# Patient Record
Sex: MALE | Race: BLACK OR AFRICAN AMERICAN | NOT HISPANIC OR LATINO | Employment: OTHER | URBAN - METROPOLITAN AREA
[De-identification: names, ages, dates, MRNs, and addresses within clinical notes are randomized per-mention and may not be internally consistent; named-entity substitution may affect disease eponyms.]

---

## 2017-05-01 ENCOUNTER — HISTORICAL (OUTPATIENT)
Dept: LAB | Facility: HOSPITAL | Age: 62
End: 2017-05-01

## 2017-05-01 LAB
AMPHET UR QL SCN: NORMAL
BARBITURATE SCN PRESENT UR: NORMAL
BENZODIAZ UR QL SCN: NORMAL
CANNABINOIDS UR QL SCN: NORMAL
COCAINE UR QL SCN: NORMAL
OPIATES UR QL SCN: NORMAL
PCP UR QL: NORMAL
PH UR STRIP.AUTO: 7 [PH] (ref 5–7.5)
SP GR FLD REFRACTOMETRY: 1.01 (ref 1–1.03)

## 2017-05-03 ENCOUNTER — HISTORICAL (OUTPATIENT)
Dept: CARDIOLOGY | Facility: HOSPITAL | Age: 62
End: 2017-05-03

## 2017-06-21 ENCOUNTER — HISTORICAL (OUTPATIENT)
Dept: ADMINISTRATIVE | Facility: HOSPITAL | Age: 62
End: 2017-06-21

## 2017-06-21 LAB
ABS NEUT (OLG): 5.2 X10(3)/MCL (ref 2.1–9.2)
BASOPHILS # BLD AUTO: 0 X10(3)/MCL (ref 0–0.2)
BASOPHILS NFR BLD AUTO: 1 %
BUN SERPL-MCNC: 67 MG/DL (ref 7–18)
CALCIUM SERPL-MCNC: 7.8 MG/DL (ref 8.5–10.1)
CHLORIDE SERPL-SCNC: 108 MMOL/L (ref 98–107)
CO2 SERPL-SCNC: 21 MMOL/L (ref 21–32)
CREAT SERPL-MCNC: 5.83 MG/DL (ref 0.7–1.3)
EOSINOPHIL # BLD AUTO: 0.1 X10(3)/MCL (ref 0–0.9)
EOSINOPHIL NFR BLD AUTO: 2 %
ERYTHROCYTE [DISTWIDTH] IN BLOOD BY AUTOMATED COUNT: 12.7 % (ref 11.5–17)
GLUCOSE SERPL-MCNC: 94 MG/DL (ref 74–106)
HCT VFR BLD AUTO: 26.2 % (ref 42–52)
HGB BLD-MCNC: 8.5 GM/DL (ref 14–18)
LYMPHOCYTES # BLD AUTO: 0.7 X10(3)/MCL (ref 0.6–4.6)
LYMPHOCYTES NFR BLD AUTO: 11 %
MCH RBC QN AUTO: 31.4 PG (ref 27–31)
MCHC RBC AUTO-ENTMCNC: 32.4 GM/DL (ref 33–36)
MCV RBC AUTO: 96.7 FL (ref 80–94)
MONOCYTES # BLD AUTO: 0.4 X10(3)/MCL (ref 0.1–1.3)
MONOCYTES NFR BLD AUTO: 7 %
NEUTROPHILS # BLD AUTO: 5.2 X10(3)/MCL (ref 1.4–7.9)
NEUTROPHILS NFR BLD AUTO: 79 %
PLATELET # BLD AUTO: 230 X10(3)/MCL (ref 130–400)
PMV BLD AUTO: 9.4 FL (ref 9.4–12.4)
POTASSIUM SERPL-SCNC: 4.4 MMOL/L (ref 3.5–5.1)
RBC # BLD AUTO: 2.71 X10(6)/MCL (ref 4.7–6.1)
SODIUM SERPL-SCNC: 139 MMOL/L (ref 136–145)
WBC # SPEC AUTO: 6.6 X10(3)/MCL (ref 4.5–11.5)

## 2017-07-06 ENCOUNTER — HISTORICAL (OUTPATIENT)
Dept: INFUSION THERAPY | Facility: HOSPITAL | Age: 62
End: 2017-07-06

## 2017-07-06 LAB
HCT VFR BLD AUTO: 26.2 % (ref 42–52)
HGB BLD-MCNC: 8.3 GM/DL (ref 14–18)

## 2017-07-20 ENCOUNTER — HISTORICAL (OUTPATIENT)
Dept: INFUSION THERAPY | Facility: HOSPITAL | Age: 62
End: 2017-07-20

## 2017-08-03 ENCOUNTER — HISTORICAL (OUTPATIENT)
Dept: INFUSION THERAPY | Facility: HOSPITAL | Age: 62
End: 2017-08-03

## 2017-08-03 LAB
HCT VFR BLD AUTO: 30.7 % (ref 42–52)
HGB BLD-MCNC: 9.4 GM/DL (ref 14–18)

## 2017-08-17 ENCOUNTER — HISTORICAL (OUTPATIENT)
Dept: INFUSION THERAPY | Facility: HOSPITAL | Age: 62
End: 2017-08-17

## 2017-08-17 LAB
HCT VFR BLD AUTO: 34.2 % (ref 42–52)
HGB BLD-MCNC: 10.8 GM/DL (ref 14–18)

## 2017-08-31 ENCOUNTER — HISTORICAL (OUTPATIENT)
Dept: INFUSION THERAPY | Facility: HOSPITAL | Age: 62
End: 2017-08-31

## 2017-08-31 LAB
ABS NEUT (OLG): 4.37 X10(3)/MCL (ref 2.1–9.2)
BASOPHILS # BLD AUTO: 0 X10(3)/MCL (ref 0–0.2)
BASOPHILS NFR BLD AUTO: 1 %
EOSINOPHIL # BLD AUTO: 0.2 X10(3)/MCL (ref 0–0.9)
EOSINOPHIL NFR BLD AUTO: 3 %
ERYTHROCYTE [DISTWIDTH] IN BLOOD BY AUTOMATED COUNT: 13 % (ref 11.5–17)
HCT VFR BLD AUTO: 30.6 % (ref 42–52)
HGB BLD-MCNC: 10 GM/DL (ref 14–18)
LYMPHOCYTES # BLD AUTO: 1 X10(3)/MCL (ref 0.6–4.6)
LYMPHOCYTES NFR BLD AUTO: 17 %
MCH RBC QN AUTO: 31.1 PG (ref 27–31)
MCHC RBC AUTO-ENTMCNC: 32.7 GM/DL (ref 33–36)
MCV RBC AUTO: 95 FL (ref 80–94)
MONOCYTES # BLD AUTO: 0.4 X10(3)/MCL (ref 0.1–1.3)
MONOCYTES NFR BLD AUTO: 7 %
NEUTROPHILS # BLD AUTO: 4.37 X10(3)/MCL (ref 1.4–7.9)
NEUTROPHILS NFR BLD AUTO: 72 %
PLATELET # BLD AUTO: 181 X10(3)/MCL (ref 130–400)
PMV BLD AUTO: 9.2 FL (ref 9.4–12.4)
RBC # BLD AUTO: 3.22 X10(6)/MCL (ref 4.7–6.1)
WBC # SPEC AUTO: 6.1 X10(3)/MCL (ref 4.5–11.5)

## 2017-09-14 ENCOUNTER — HISTORICAL (OUTPATIENT)
Dept: INFUSION THERAPY | Facility: HOSPITAL | Age: 62
End: 2017-09-14

## 2017-09-14 LAB
FERRITIN SERPL-MCNC: 227 NG/ML (ref 8–388)
HCT VFR BLD AUTO: 35.6 % (ref 42–52)
HGB BLD-MCNC: 10.8 GM/DL (ref 14–18)
IRON SATN MFR SERPL: 11.2 % (ref 20–50)
IRON SERPL-MCNC: 28 MCG/DL (ref 50–175)
TIBC SERPL-MCNC: 251 MCG/DL (ref 250–450)
TRANSFERRIN SERPL-MCNC: 180 MG/DL (ref 200–360)

## 2017-09-28 ENCOUNTER — HISTORICAL (OUTPATIENT)
Dept: ADMINISTRATIVE | Facility: HOSPITAL | Age: 62
End: 2017-09-28

## 2017-09-28 LAB
FERRITIN SERPL-MCNC: 298.3 NG/ML (ref 8–388)
HCT VFR BLD AUTO: 31.6 % (ref 42–52)
HGB BLD-MCNC: 10.5 GM/DL (ref 14–18)
IRON SATN MFR SERPL: 14.4 % (ref 20–50)
IRON SERPL-MCNC: 31 MCG/DL (ref 50–175)
TIBC SERPL-MCNC: 215 MCG/DL (ref 250–450)
TRANSFERRIN SERPL-MCNC: 163 MG/DL (ref 200–360)

## 2017-10-12 ENCOUNTER — HISTORICAL (OUTPATIENT)
Dept: INFUSION THERAPY | Facility: HOSPITAL | Age: 62
End: 2017-10-12

## 2017-10-12 ENCOUNTER — HISTORICAL (OUTPATIENT)
Dept: ADMINISTRATIVE | Facility: HOSPITAL | Age: 62
End: 2017-10-12

## 2017-10-12 LAB
HCT VFR BLD AUTO: 28.9 % (ref 42–52)
HGB BLD-MCNC: 9.5 GM/DL (ref 14–18)

## 2017-10-26 ENCOUNTER — HISTORICAL (OUTPATIENT)
Dept: INFUSION THERAPY | Facility: HOSPITAL | Age: 62
End: 2017-10-26

## 2017-10-26 LAB
HCT VFR BLD AUTO: 31 % (ref 42–52)
HGB BLD-MCNC: 10.1 GM/DL (ref 14–18)

## 2017-11-09 ENCOUNTER — HISTORICAL (OUTPATIENT)
Dept: INFUSION THERAPY | Facility: HOSPITAL | Age: 62
End: 2017-11-09

## 2017-11-09 ENCOUNTER — HISTORICAL (OUTPATIENT)
Dept: ADMINISTRATIVE | Facility: HOSPITAL | Age: 62
End: 2017-11-09

## 2017-11-09 LAB
HCT VFR BLD AUTO: 30.7 % (ref 42–52)
HGB BLD-MCNC: 9.7 GM/DL (ref 14–18)

## 2017-12-15 ENCOUNTER — HISTORICAL (OUTPATIENT)
Dept: INFUSION THERAPY | Facility: HOSPITAL | Age: 62
End: 2017-12-15

## 2017-12-15 ENCOUNTER — HISTORICAL (OUTPATIENT)
Dept: ADMINISTRATIVE | Facility: HOSPITAL | Age: 62
End: 2017-12-15

## 2017-12-15 LAB
HCT VFR BLD AUTO: 29.9 % (ref 42–52)
HGB BLD-MCNC: 9.5 GM/DL (ref 14–18)

## 2017-12-28 ENCOUNTER — HISTORICAL (OUTPATIENT)
Dept: ADMINISTRATIVE | Facility: HOSPITAL | Age: 62
End: 2017-12-28

## 2017-12-28 ENCOUNTER — HISTORICAL (OUTPATIENT)
Dept: INFUSION THERAPY | Facility: HOSPITAL | Age: 62
End: 2017-12-28

## 2017-12-28 LAB
FERRITIN SERPL-MCNC: 287.7 NG/ML (ref 8–388)
HCT VFR BLD AUTO: 29.5 % (ref 42–52)
HGB BLD-MCNC: 9.4 GM/DL (ref 14–18)
IRON SATN MFR SERPL: 24.5 % (ref 20–50)
IRON SERPL-MCNC: 53 MCG/DL (ref 50–175)
TIBC SERPL-MCNC: 216 MCG/DL (ref 250–450)
TRANSFERRIN SERPL-MCNC: 185 MG/DL (ref 200–360)

## 2018-01-08 ENCOUNTER — HISTORICAL (OUTPATIENT)
Dept: LAB | Facility: HOSPITAL | Age: 63
End: 2018-01-08

## 2018-01-08 LAB
ABS NEUT (OLG): 3.62 X10(3)/MCL (ref 2.1–9.2)
ALBUMIN SERPL-MCNC: 3.4 GM/DL (ref 3.4–5)
ALBUMIN/GLOB SERPL: 0.7 {RATIO}
ALP SERPL-CCNC: 71 UNIT/L (ref 50–136)
ALT SERPL-CCNC: 21 UNIT/L (ref 12–78)
AMPHET UR QL SCN: NORMAL
APPEARANCE, UA: ABNORMAL
AST SERPL-CCNC: 7 UNIT/L (ref 15–37)
BACTERIA SPEC CULT: ABNORMAL /HPF
BARBITURATE SCN PRESENT UR: NORMAL
BASOPHILS # BLD AUTO: 0 X10(3)/MCL (ref 0–0.2)
BASOPHILS NFR BLD AUTO: 1 %
BENZODIAZ UR QL SCN: NORMAL
BILIRUB SERPL-MCNC: 0.7 MG/DL (ref 0.2–1)
BILIRUB UR QL STRIP: NEGATIVE
BILIRUBIN DIRECT+TOT PNL SERPL-MCNC: 0.2 MG/DL (ref 0–0.2)
BILIRUBIN DIRECT+TOT PNL SERPL-MCNC: 0.5 MG/DL (ref 0–0.8)
BUN SERPL-MCNC: 76 MG/DL (ref 7–18)
CALCIUM SERPL-MCNC: 8.4 MG/DL (ref 8.5–10.1)
CANNABINOIDS UR QL SCN: NORMAL
CHLORIDE SERPL-SCNC: 106 MMOL/L (ref 98–107)
CHOLEST SERPL-MCNC: 112 MG/DL (ref 0–200)
CHOLEST/HDLC SERPL: 2.1 {RATIO} (ref 0–5)
CO2 SERPL-SCNC: 21 MMOL/L (ref 21–32)
COCAINE UR QL SCN: NORMAL
COLOR UR: YELLOW
CREAT SERPL-MCNC: 6.72 MG/DL (ref 0.7–1.3)
DEPRECATED CALCIDIOL+CALCIFEROL SERPL-MC: 46.03 NG/ML (ref 30–80)
EOSINOPHIL # BLD AUTO: 0.2 X10(3)/MCL (ref 0–0.9)
EOSINOPHIL NFR BLD AUTO: 5 %
ERYTHROCYTE [DISTWIDTH] IN BLOOD BY AUTOMATED COUNT: 14.4 % (ref 11.5–17)
EST. AVERAGE GLUCOSE BLD GHB EST-MCNC: 88 MG/DL
GLOBULIN SER-MCNC: 4.8 GM/DL (ref 2.4–3.5)
GLUCOSE (UA): NEGATIVE
GLUCOSE SERPL-MCNC: 103 MG/DL (ref 74–106)
HAV IGM SERPL QL IA: NEGATIVE
HBA1C MFR BLD: 4.7 % (ref 4.2–6.3)
HBV CORE IGM SERPL QL IA: NEGATIVE
HBV SURFACE AG SERPL QL IA: NEGATIVE
HCT VFR BLD AUTO: 28.8 % (ref 42–52)
HCV AB SERPL QL IA: NEGATIVE
HDLC SERPL-MCNC: 54 MG/DL (ref 35–60)
HEPATITIS PANEL INTERP: NORMAL
HGB BLD-MCNC: 9.4 GM/DL (ref 14–18)
HGB UR QL STRIP: ABNORMAL
KETONES UR QL STRIP: NEGATIVE
LDLC SERPL CALC-MCNC: 45 MG/DL (ref 0–129)
LEUKOCYTE ESTERASE UR QL STRIP: ABNORMAL
LYMPHOCYTES # BLD AUTO: 0.9 X10(3)/MCL (ref 0.6–4.6)
LYMPHOCYTES NFR BLD AUTO: 18 %
MCH RBC QN AUTO: 31.4 PG (ref 27–31)
MCHC RBC AUTO-ENTMCNC: 32.6 GM/DL (ref 33–36)
MCV RBC AUTO: 96.3 FL (ref 80–94)
MONOCYTES # BLD AUTO: 0.4 X10(3)/MCL (ref 0.1–1.3)
MONOCYTES NFR BLD AUTO: 8 %
NEUTROPHILS # BLD AUTO: 3.62 X10(3)/MCL (ref 2.1–9.2)
NEUTROPHILS NFR BLD AUTO: 69 %
NITRITE UR QL STRIP: NEGATIVE
OPIATES UR QL SCN: NORMAL
PCP UR QL: NORMAL
PH UR STRIP.AUTO: 7 [PH] (ref 5–7.5)
PH UR STRIP: 7 [PH] (ref 5–9)
PLATELET # BLD AUTO: 200 X10(3)/MCL (ref 130–400)
PMV BLD AUTO: 9 FL (ref 9.4–12.4)
POTASSIUM SERPL-SCNC: 5.1 MMOL/L (ref 3.5–5.1)
PROT SERPL-MCNC: 8.2 GM/DL (ref 6.4–8.2)
PROT UR QL STRIP: NEGATIVE
PSA SERPL-MCNC: 10.3 NG/ML (ref 0–4)
RBC # BLD AUTO: 2.99 X10(6)/MCL (ref 4.7–6.1)
RBC #/AREA URNS HPF: 6 /HPF (ref 0–2)
SODIUM SERPL-SCNC: 137 MMOL/L (ref 136–145)
SP GR FLD REFRACTOMETRY: 1.01 (ref 1–1.03)
SP GR UR STRIP: 1.01 (ref 1–1.03)
SQUAMOUS EPITHELIAL, UA: ABNORMAL
TRIGL SERPL-MCNC: 67 MG/DL (ref 30–150)
TSH SERPL-ACNC: 0.67 MIU/ML (ref 0.36–3.74)
UROBILINOGEN UR STRIP-ACNC: 0.2
VLDLC SERPL CALC-MCNC: 13 MG/DL
WBC # SPEC AUTO: 5.2 X10(3)/MCL (ref 4.5–11.5)
WBC #/AREA URNS HPF: 74 /HPF (ref 0–3)

## 2018-01-10 LAB — FINAL CULTURE: NO GROWTH

## 2018-01-25 ENCOUNTER — HISTORICAL (OUTPATIENT)
Dept: INFUSION THERAPY | Facility: HOSPITAL | Age: 63
End: 2018-01-25

## 2018-01-25 ENCOUNTER — HISTORICAL (OUTPATIENT)
Dept: ADMINISTRATIVE | Facility: HOSPITAL | Age: 63
End: 2018-01-25

## 2018-01-25 LAB
HCT VFR BLD AUTO: 26.6 % (ref 42–52)
HGB BLD-MCNC: 9.1 GM/DL (ref 14–18)

## 2018-02-08 ENCOUNTER — HISTORICAL (OUTPATIENT)
Dept: INFUSION THERAPY | Facility: HOSPITAL | Age: 63
End: 2018-02-08

## 2018-02-08 LAB
HCT VFR BLD AUTO: 27.7 % (ref 42–52)
HGB BLD-MCNC: 9.1 GM/DL (ref 14–18)

## 2018-02-22 ENCOUNTER — HISTORICAL (OUTPATIENT)
Dept: INFUSION THERAPY | Facility: HOSPITAL | Age: 63
End: 2018-02-22

## 2018-02-22 LAB
HCT VFR BLD AUTO: 28.2 % (ref 42–52)
HGB BLD-MCNC: 9.5 GM/DL (ref 14–18)

## 2018-03-08 ENCOUNTER — HISTORICAL (OUTPATIENT)
Dept: ADMINISTRATIVE | Facility: HOSPITAL | Age: 63
End: 2018-03-08

## 2018-03-08 ENCOUNTER — HISTORICAL (OUTPATIENT)
Dept: INFUSION THERAPY | Facility: HOSPITAL | Age: 63
End: 2018-03-08

## 2018-03-08 LAB
FERRITIN SERPL-MCNC: 253.9 NG/ML (ref 8–388)
HCT VFR BLD AUTO: 31 % (ref 42–52)
HGB BLD-MCNC: 10.3 GM/DL (ref 14–18)
IRON SATN MFR SERPL: 27.8 % (ref 20–50)
IRON SERPL-MCNC: 57 MCG/DL (ref 50–175)
TIBC SERPL-MCNC: 205 MCG/DL (ref 250–450)
TRANSFERRIN SERPL-MCNC: 168 MG/DL (ref 200–360)

## 2018-03-22 ENCOUNTER — HISTORICAL (OUTPATIENT)
Dept: INFUSION THERAPY | Facility: HOSPITAL | Age: 63
End: 2018-03-22

## 2018-03-22 ENCOUNTER — HISTORICAL (OUTPATIENT)
Dept: ADMINISTRATIVE | Facility: HOSPITAL | Age: 63
End: 2018-03-22

## 2018-03-22 LAB
ALBUMIN SERPL-MCNC: 3.6 GM/DL (ref 3.4–5)
ALBUMIN/GLOB SERPL: 0.7 RATIO (ref 1.1–2)
ALP SERPL-CCNC: 77 UNIT/L (ref 50–136)
ALT SERPL-CCNC: 28 UNIT/L (ref 12–78)
AST SERPL-CCNC: 11 UNIT/L (ref 15–37)
BILIRUB SERPL-MCNC: 0.7 MG/DL (ref 0.2–1)
BILIRUBIN DIRECT+TOT PNL SERPL-MCNC: 0.2 MG/DL (ref 0–0.5)
BILIRUBIN DIRECT+TOT PNL SERPL-MCNC: 0.5 MG/DL (ref 0–0.8)
BUN SERPL-MCNC: 80 MG/DL (ref 7–18)
CALCIUM SERPL-MCNC: 8.5 MG/DL (ref 8.5–10.1)
CHLORIDE SERPL-SCNC: 104 MMOL/L (ref 98–107)
CO2 SERPL-SCNC: 21 MMOL/L (ref 21–32)
CREAT SERPL-MCNC: 8.9 MG/DL (ref 0.7–1.3)
DEPRECATED CALCIDIOL+CALCIFEROL SERPL-MC: 47.35 NG/ML (ref 30–80)
ERYTHROCYTE [DISTWIDTH] IN BLOOD BY AUTOMATED COUNT: 14.7 % (ref 11.5–17)
FERRITIN SERPL-MCNC: 251.8 NG/ML (ref 8–388)
GLOBULIN SER-MCNC: 4.9 GM/DL (ref 2.4–3.5)
GLUCOSE SERPL-MCNC: 99 MG/DL (ref 74–106)
HCT VFR BLD AUTO: 33.6 % (ref 42–52)
HGB BLD-MCNC: 10.8 GM/DL (ref 14–18)
IRON SATN MFR SERPL: 30.3 % (ref 20–50)
IRON SERPL-MCNC: 64 MCG/DL (ref 50–175)
MAGNESIUM SERPL-MCNC: 1.9 MG/DL (ref 1.8–2.4)
MCH RBC QN AUTO: 29.8 PG (ref 27–31)
MCHC RBC AUTO-ENTMCNC: 32.1 GM/DL (ref 33–36)
MCV RBC AUTO: 92.8 FL (ref 80–94)
PHOSPHATE SERPL-MCNC: 5.1 MG/DL (ref 2.5–4.9)
PLATELET # BLD AUTO: 180 X10(3)/MCL (ref 130–400)
PMV BLD AUTO: 8.8 FL (ref 9.4–12.4)
POTASSIUM SERPL-SCNC: 4.8 MMOL/L (ref 3.5–5.1)
PROT SERPL-MCNC: 8.5 GM/DL (ref 6.4–8.2)
PTH-INTACT SERPL-MCNC: 355.5 PG/ML (ref 18.4–80.1)
RBC # BLD AUTO: 3.62 X10(6)/MCL (ref 4.7–6.1)
SODIUM SERPL-SCNC: 136 MMOL/L (ref 136–145)
TIBC SERPL-MCNC: 211 MCG/DL (ref 250–450)
TRANSFERRIN SERPL-MCNC: 179 MG/DL (ref 200–360)
WBC # SPEC AUTO: 5.3 X10(3)/MCL (ref 4.5–11.5)

## 2018-04-03 ENCOUNTER — HOSPITAL ENCOUNTER (OUTPATIENT)
Dept: MEDSURG UNIT | Facility: HOSPITAL | Age: 63
End: 2018-04-07
Admitting: INTERNAL MEDICINE

## 2018-04-03 LAB
ABS NEUT (OLG): 3.32 X10(3)/MCL (ref 2.1–9.2)
ALBUMIN SERPL-MCNC: 3.6 GM/DL (ref 3.4–5)
ALBUMIN/GLOB SERPL: 0.7 RATIO (ref 1.1–2)
ALP SERPL-CCNC: 87 UNIT/L (ref 50–136)
ALT SERPL-CCNC: 24 UNIT/L (ref 12–78)
AST SERPL-CCNC: 13 UNIT/L (ref 15–37)
BASOPHILS # BLD AUTO: 0.1 X10(3)/MCL (ref 0–0.2)
BASOPHILS NFR BLD AUTO: 1 %
BILIRUB SERPL-MCNC: 0.6 MG/DL (ref 0.2–1)
BILIRUBIN DIRECT+TOT PNL SERPL-MCNC: 0.2 MG/DL (ref 0–0.5)
BILIRUBIN DIRECT+TOT PNL SERPL-MCNC: 0.4 MG/DL (ref 0–0.8)
BUN SERPL-MCNC: 73 MG/DL (ref 7–18)
CALCIUM SERPL-MCNC: 8.6 MG/DL (ref 8.5–10.1)
CHLORIDE SERPL-SCNC: 103 MMOL/L (ref 98–107)
CO2 SERPL-SCNC: 20 MMOL/L (ref 21–32)
CREAT SERPL-MCNC: 8.35 MG/DL (ref 0.7–1.3)
EOSINOPHIL # BLD AUTO: 0.2 X10(3)/MCL (ref 0–0.9)
EOSINOPHIL NFR BLD AUTO: 4 %
ERYTHROCYTE [DISTWIDTH] IN BLOOD BY AUTOMATED COUNT: 14.2 % (ref 11.5–17)
GLOBULIN SER-MCNC: 5.4 GM/DL (ref 2.4–3.5)
GLUCOSE SERPL-MCNC: 93 MG/DL (ref 74–106)
HBV SURFACE AG SERPL QL IA: NEGATIVE
HCT VFR BLD AUTO: 32.8 % (ref 42–52)
HGB BLD-MCNC: 10.5 GM/DL (ref 14–18)
LYMPHOCYTES # BLD AUTO: 1 X10(3)/MCL (ref 0.6–4.6)
LYMPHOCYTES NFR BLD AUTO: 21 %
MCH RBC QN AUTO: 30.7 PG (ref 27–31)
MCHC RBC AUTO-ENTMCNC: 32 GM/DL (ref 33–36)
MCV RBC AUTO: 95.9 FL (ref 80–94)
MONOCYTES # BLD AUTO: 0.3 X10(3)/MCL (ref 0.1–1.3)
MONOCYTES NFR BLD AUTO: 6 %
NEUTROPHILS # BLD AUTO: 3.32 X10(3)/MCL (ref 1.4–7.9)
NEUTROPHILS NFR BLD AUTO: 68 %
PLATELET # BLD AUTO: 199 X10(3)/MCL (ref 130–400)
PMV BLD AUTO: 9.5 FL (ref 9.4–12.4)
POTASSIUM SERPL-SCNC: 4.7 MMOL/L (ref 3.5–5.1)
PROT SERPL-MCNC: 9 GM/DL (ref 6.4–8.2)
RBC # BLD AUTO: 3.42 X10(6)/MCL (ref 4.7–6.1)
SODIUM SERPL-SCNC: 135 MMOL/L (ref 136–145)
WBC # SPEC AUTO: 4.9 X10(3)/MCL (ref 4.5–11.5)

## 2018-04-05 ENCOUNTER — HISTORICAL (OUTPATIENT)
Dept: INFUSION THERAPY | Facility: HOSPITAL | Age: 63
End: 2018-04-05

## 2018-04-06 LAB
ABS NEUT (OLG): 2.58 X10(3)/MCL (ref 2.1–9.2)
ALBUMIN SERPL-MCNC: 3.4 GM/DL (ref 3.4–5)
ALBUMIN/GLOB SERPL: 0.7 {RATIO}
ALP SERPL-CCNC: 80 UNIT/L (ref 50–136)
ALT SERPL-CCNC: 14 UNIT/L (ref 12–78)
AST SERPL-CCNC: 18 UNIT/L (ref 15–37)
BASOPHILS # BLD AUTO: 0 X10(3)/MCL (ref 0–0.2)
BASOPHILS NFR BLD AUTO: 1 %
BILIRUB SERPL-MCNC: 0.6 MG/DL (ref 0.2–1)
BILIRUBIN DIRECT+TOT PNL SERPL-MCNC: 0.2 MG/DL (ref 0–0.2)
BILIRUBIN DIRECT+TOT PNL SERPL-MCNC: 0.4 MG/DL (ref 0–0.8)
BUN SERPL-MCNC: 42 MG/DL (ref 7–18)
CALCIUM SERPL-MCNC: 8.4 MG/DL (ref 8.5–10.1)
CHLORIDE SERPL-SCNC: 100 MMOL/L (ref 98–107)
CO2 SERPL-SCNC: 26 MMOL/L (ref 21–32)
CREAT SERPL-MCNC: 6.56 MG/DL (ref 0.7–1.3)
EOSINOPHIL # BLD AUTO: 0.2 X10(3)/MCL (ref 0–0.9)
EOSINOPHIL NFR BLD AUTO: 4 %
ERYTHROCYTE [DISTWIDTH] IN BLOOD BY AUTOMATED COUNT: 14 % (ref 11.5–17)
GLOBULIN SER-MCNC: 5 GM/DL (ref 2.4–3.5)
GLUCOSE SERPL-MCNC: 92 MG/DL (ref 74–106)
HCT VFR BLD AUTO: 30.9 % (ref 42–52)
HGB BLD-MCNC: 10 GM/DL (ref 14–18)
LYMPHOCYTES # BLD AUTO: 0.9 X10(3)/MCL (ref 0.6–4.6)
LYMPHOCYTES NFR BLD AUTO: 22 %
MCH RBC QN AUTO: 29.7 PG (ref 27–31)
MCHC RBC AUTO-ENTMCNC: 32.4 GM/DL (ref 33–36)
MCV RBC AUTO: 91.7 FL (ref 80–94)
MONOCYTES # BLD AUTO: 0.6 X10(3)/MCL (ref 0.1–1.3)
MONOCYTES NFR BLD AUTO: 13 %
NEUTROPHILS # BLD AUTO: 2.58 X10(3)/MCL (ref 2.1–9.2)
NEUTROPHILS NFR BLD AUTO: 60 %
PLATELET # BLD AUTO: 155 X10(3)/MCL (ref 130–400)
PMV BLD AUTO: 9.5 FL (ref 9.4–12.4)
POTASSIUM SERPL-SCNC: 4.1 MMOL/L (ref 3.5–5.1)
PROT SERPL-MCNC: 8.4 GM/DL (ref 6.4–8.2)
RBC # BLD AUTO: 3.37 X10(6)/MCL (ref 4.7–6.1)
SODIUM SERPL-SCNC: 134 MMOL/L (ref 136–145)
WBC # SPEC AUTO: 4.3 X10(3)/MCL (ref 4.5–11.5)

## 2018-08-06 ENCOUNTER — TELEPHONE (OUTPATIENT)
Dept: TRANSPLANT | Facility: CLINIC | Age: 63
End: 2018-08-06

## 2018-08-07 ENCOUNTER — HISTORICAL (OUTPATIENT)
Dept: LAB | Facility: HOSPITAL | Age: 63
End: 2018-08-07

## 2018-08-07 LAB
AMPHET UR QL SCN: ABNORMAL
BARBITURATE SCN PRESENT UR: ABNORMAL
BENZODIAZ UR QL SCN: ABNORMAL
CANNABINOIDS UR QL SCN: ABNORMAL
COCAINE UR QL SCN: ABNORMAL
OPIATES UR QL SCN: ABNORMAL
PCP UR QL: ABNORMAL
PH UR STRIP.AUTO: 8.5 [PH] (ref 5–7.5)
SP GR FLD REFRACTOMETRY: 1.01 (ref 1–1.03)

## 2018-09-17 DIAGNOSIS — Z76.82 ORGAN TRANSPLANT CANDIDATE: Primary | ICD-10-CM

## 2018-09-27 ENCOUNTER — HOSPITAL ENCOUNTER (OUTPATIENT)
Dept: RADIOLOGY | Facility: HOSPITAL | Age: 63
Discharge: HOME | End: 2018-09-27
Attending: NURSE PRACTITIONER
Payer: MEDICARE

## 2018-09-27 ENCOUNTER — CLINICAL SUPPORT (OUTPATIENT)
Dept: INFECTIOUS DISEASES | Facility: CLINIC | Age: 63
End: 2018-09-27
Payer: MEDICARE

## 2018-09-27 ENCOUNTER — OFFICE VISIT (OUTPATIENT)
Dept: TRANSPLANT | Facility: CLINIC | Age: 63
End: 2018-09-27
Payer: MEDICARE

## 2018-09-27 ENCOUNTER — DOCUMENTATION ONLY (OUTPATIENT)
Dept: TRANSPLANT | Facility: CLINIC | Age: 63
End: 2018-09-27

## 2018-09-27 VITALS
SYSTOLIC BLOOD PRESSURE: 137 MMHG | HEIGHT: 66 IN | TEMPERATURE: 98 F | OXYGEN SATURATION: 96 % | RESPIRATION RATE: 18 BRPM | BODY MASS INDEX: 26.93 KG/M2 | HEART RATE: 81 BPM | DIASTOLIC BLOOD PRESSURE: 89 MMHG | WEIGHT: 167.56 LBS

## 2018-09-27 DIAGNOSIS — N18.6 ESRD ON HEMODIALYSIS: ICD-10-CM

## 2018-09-27 DIAGNOSIS — Z76.82 ORGAN TRANSPLANT CANDIDATE: ICD-10-CM

## 2018-09-27 DIAGNOSIS — N18.6 ANEMIA IN ESRD (END-STAGE RENAL DISEASE): ICD-10-CM

## 2018-09-27 DIAGNOSIS — Z01.818 PRE-TRANSPLANT EVALUATION FOR CHRONIC KIDNEY DISEASE: Primary | ICD-10-CM

## 2018-09-27 DIAGNOSIS — I15.0 RENOVASCULAR HYPERTENSION: ICD-10-CM

## 2018-09-27 DIAGNOSIS — Z99.2 ESRD ON HEMODIALYSIS: ICD-10-CM

## 2018-09-27 DIAGNOSIS — R97.20 ELEVATED PSA: ICD-10-CM

## 2018-09-27 DIAGNOSIS — G89.29 OTHER CHRONIC PAIN: ICD-10-CM

## 2018-09-27 DIAGNOSIS — D63.1 ANEMIA IN ESRD (END-STAGE RENAL DISEASE): ICD-10-CM

## 2018-09-27 PROCEDURE — 99204 OFFICE O/P NEW MOD 45 MIN: CPT | Mod: S$PBB,TXP,, | Performed by: PHYSICIAN ASSISTANT

## 2018-09-27 PROCEDURE — 76770 US EXAM ABDO BACK WALL COMP: CPT | Mod: TC,TXP

## 2018-09-27 PROCEDURE — 72170 X-RAY EXAM OF PELVIS: CPT | Mod: 26,TXP,, | Performed by: RADIOLOGY

## 2018-09-27 PROCEDURE — 76770 US EXAM ABDO BACK WALL COMP: CPT | Mod: 26,TXP,, | Performed by: RADIOLOGY

## 2018-09-27 PROCEDURE — 93978 VASCULAR STUDY: CPT | Mod: 26,TXP,, | Performed by: RADIOLOGY

## 2018-09-27 PROCEDURE — 72170 X-RAY EXAM OF PELVIS: CPT | Mod: TC,FY,TXP

## 2018-09-27 PROCEDURE — 99211 OFF/OP EST MAY X REQ PHY/QHP: CPT | Mod: PBBFAC,25,TXP

## 2018-09-27 PROCEDURE — 99205 OFFICE O/P NEW HI 60 MIN: CPT | Mod: S$PBB,TXP,, | Performed by: NURSE PRACTITIONER

## 2018-09-27 PROCEDURE — 90471 IMMUNIZATION ADMIN: CPT | Mod: PBBFAC,TXP

## 2018-09-27 PROCEDURE — 99999 PR PBB SHADOW E&M-EST. PATIENT-LVL I: CPT | Mod: PBBFAC,TXP,,

## 2018-09-27 PROCEDURE — 99999 PR PBB SHADOW E&M-EST. PATIENT-LVL III: CPT | Mod: PBBFAC,TXP,, | Performed by: NURSE PRACTITIONER

## 2018-09-27 PROCEDURE — 71046 X-RAY EXAM CHEST 2 VIEWS: CPT | Mod: 26,TXP,, | Performed by: RADIOLOGY

## 2018-09-27 PROCEDURE — 71046 X-RAY EXAM CHEST 2 VIEWS: CPT | Mod: TC,FY,TXP

## 2018-09-27 PROCEDURE — 93978 VASCULAR STUDY: CPT | Mod: TC,TXP

## 2018-09-27 PROCEDURE — 99213 OFFICE O/P EST LOW 20 MIN: CPT | Mod: PBBFAC,25,27,TXP | Performed by: NURSE PRACTITIONER

## 2018-09-27 PROCEDURE — 99244 OFF/OP CNSLTJ NEW/EST MOD 40: CPT | Mod: S$PBB,TXP,, | Performed by: SURGERY

## 2018-09-27 RX ORDER — SODIUM BICARBONATE 650 MG/1
1300 TABLET ORAL 3 TIMES DAILY
COMMUNITY
End: 2022-06-07 | Stop reason: SDUPTHER

## 2018-09-27 RX ORDER — GABAPENTIN 300 MG/1
300 CAPSULE ORAL 3 TIMES DAILY PRN
COMMUNITY
End: 2023-01-19

## 2018-09-27 RX ORDER — HYDROCODONE BITARTRATE AND ACETAMINOPHEN 10; 325 MG/1; MG/1
1 TABLET ORAL EVERY 8 HOURS PRN
COMMUNITY
End: 2022-06-07 | Stop reason: SDUPTHER

## 2018-09-27 RX ORDER — DOXAZOSIN 8 MG/1
2 TABLET ORAL NIGHTLY
Status: ON HOLD | COMMUNITY
End: 2024-01-01 | Stop reason: HOSPADM

## 2018-09-27 RX ORDER — FLUTICASONE PROPIONATE 50 MCG
1 SPRAY, SUSPENSION (ML) NASAL DAILY PRN
COMMUNITY

## 2018-09-27 RX ORDER — AMLODIPINE BESYLATE 10 MG/1
10 TABLET ORAL NIGHTLY
COMMUNITY
End: 2022-05-17 | Stop reason: SDUPTHER

## 2018-09-27 NOTE — PROGRESS NOTES
Transplant Surgery  Kidney Transplant Recipient Evaluation    Referring Physician: Maik Messer  Current Nephrologist: Maik Messer    Subjective:     Reason for Visit: evaluate transplant candidacy    History of Present Illness: Mirza Nelson is a 63 y.o. year old male undergoing transplant evaluation.    Dialysis History: Mirza is on hemodialysis.      Transplant History: N/A    Etiology of Renal Disease: Diabetes Mellitus - Type II (based on medical records from referral).    Review of Systems   Constitutional: Positive for fatigue.   HENT: Negative for drooling, postnasal drip and sore throat.    Eyes: Negative for discharge and itching.   Respiratory: Negative for choking and stridor.    Gastrointestinal: Negative for rectal pain.   Endocrine: Negative for polydipsia.   Genitourinary: Negative for enuresis and genital sores.   Musculoskeletal: Negative for back pain, neck pain and neck stiffness.   Allergic/Immunologic: Negative for immunocompromised state.   Neurological: Negative for facial asymmetry and numbness.   Hematological: Negative for adenopathy.   Psychiatric/Behavioral: Negative for behavioral problems, self-injury and suicidal ideas.       Objective:     Physical Exam:  Constitutional:   Vitals reviewed: yes   Well-nourished and well-groomed: yes  Eyes:   Sclerae icteric: no   Extraocular movements intact: yes  GI:    Bowel sounds normal: yes   Tenderness: no    If yes, quadrant/location: not applicable   Palpable masses: no    If yes, quadrant/location: not applicable   Hepatosplenomegaly: no   Ascites: no   Hernia: no    If yes, type/location: not applicable   Surgical scars: yes    If yes, type/location: Right upper flank incision for perinephric surgery on right side  Resp:   Effort normal: yes   Breath sounds normal: yes    CV:   Regular rate and rhythm: yes   Heart sounds normal: yes   Femoral pulses normal: yes   Extremities edematous: no  Skin:   Rashes or lesions  present: no    If yes, describe:not applicable   Jaundice:: no    Musculoskeletal:   Gait normal: yes   Strength normal: yes  Psych:   Oriented to person, place, and time: yes   Affect and mood normal: yes    Additional comments: not applicable    Counseling: We provided Mirza Nelson Jr. with a group education session today.  We discussed kidney transplantation at length with him, including risks, potential complications, and alternatives in the management of his renal failure.  The discussion included complications related to anesthesia, bleeding, infection, primary nonfunction, and ATN.  I discussed the typical postoperative course, length of hospitalization, the need for long-term immunosuppression, and the need for long-term routine follow-up.  I discussed living-donor and -donor transplantation and the relative advantages and disadvantages of each.  I also discussed average waiting times for both living donation and  donation.  I discussed national and center-specific survival rates.  I also mentioned the potential benefit of multicenter listing to candidates listed with centers within more than one organ procurement organization.  All questions were answered.    Final determination of transplant candidacy will be made once evaluation is complete and reviewed by the Kidney & Kidney/Pancreas Selection Committee.         Transplant Surgery - Candidacy   Assessment/Plan:   Mirza Nelson Jr. has end stage renal disease (ESRD) on dialysis. I see no surgical contraindication to placing a kidney transplant. Based on available information, Mirza Nelson Jr. is a suitable kidney transplant candidate.     Kody aBrboza MD

## 2018-09-27 NOTE — PROGRESS NOTES
CLINIC TEACHING NOTE    Mirza Nelson was seen in transplant clinic today.  Coordinator verified that the patient viewed the teaching video and received written educational material.    The following information was reviewed:  · Waiting list time for cadaveric vs. living donation  · Waiting list process including: back-up calls, notification of changes in contact information, dialysis/physician information to the transplant coordinator, notifications of changes in health or if hospitalized, and notification of travel out of the area  · Monthly antibody testing to be obtained by the dialysis unit or arranged through a local lab and mailed to the transplant center.  Patient informed that if blood is not sent monthly and a kidney becomes available, the patient will need to come to the hospital to provide a fresh sample of blood for testing.    Patient was instructed that once on the waiting list, an appointment with the transplant physician will be scheduled yearly or every 6 months to ensure patient remains an acceptable transplant candidate.    Patient also informed that at the time of transplant, participation in a research protocol may be offered.  Notified that this is strictly voluntary and will not affect the quality of care received.      The option to have name placed on multiple transplant lists to increase opportunity for transplant was reviewed.    All patient questions were answered.  Patient verbalized understanding of above information.    Patient presents as a suitable candidate for transplant.

## 2018-09-27 NOTE — PROGRESS NOTES
PHARM.D. PRE-TRANSPLANT NOTE:    This patient's medication therapy was evaluated as part of his pre-transplant evaluation.    The following pharmacologic concerns were noted: patient taking norco for pain    This patient's medication profile was reviewed for contraindications for DAA Hepatitis C therapy:    [x]  No current inducers of CYP 3A4 or PGP  [x]  No amiodarone on this patient's EMR profile in the last 24 months  [x]  No past or current atrial fibrillation on this patient's EMR profile       Current Outpatient Medications   Medication Sig Dispense Refill    amLODIPine (NORVASC) 10 MG tablet Take 10 mg by mouth every evening.      doxazosin (CARDURA) 8 MG Tab Take 8 mg by mouth every evening.      fluticasone (FLONASE) 50 mcg/actuation nasal spray 1 spray by Each Nare route daily as needed for Rhinitis.      gabapentin (NEURONTIN) 300 MG capsule Take 300 mg by mouth 3 (three) times daily as needed.      HYDROcodone-acetaminophen (NORCO)  mg per tablet Take 1 tablet by mouth every 8 (eight) hours as needed for Pain.      sodium bicarbonate 650 MG tablet Take 1,300 mg by mouth 3 (three) times daily.       No current facility-administered medications for this visit.          Currently Mr Nelson is responsible for preparing / administering this patient's medications on a daily basis.  I am available for consultation and can be contacted, as needed by the other members of the Kidney Transplant team.

## 2018-09-27 NOTE — PROGRESS NOTES
Transplant Nephrology  Kidney Transplant Recipient Evaluation    Referring Physician: Maik Messer  Current Nephrologist: Maik Messer    Subjective:   CC:  Initial evaluation of kidney transplant candidacy.    HPI:  Mr. Nelson is a 63 y.o. year old Black or  male who has presented to be evaluated as a potential kidney transplant recipient.  He has ESRD secondary to diabetic nephropathy and unknown etiology.  Patient is currently on hemodialysis started on 4/3/2018. Patient is dialyzing on MWF schedule.  Patient reports that he is tolerating dialysis well.. He has a LUE AV fistula for dialysis access.   Dialyses for 3 1/2 hours per session.     Previous Transplant: no    Past Medical and Surgical History: Mr. Nelson  has a past medical history of Anemia, Disorder of kidney and ureter, Hypertension, and Neck injury.  He has a past surgical history that includes Neck surgery and AV fistula placement.    Past Social and Family History: Mr. Nelson reports that he quit smoking about 2 years ago. His smoking use included cigarettes. He has a 5.00 pack-year smoking history. he has never used smokeless tobacco. He reports that he uses drugs. Drug: Marijuana. He reports that he does not drink alcohol. His family history includes Breast cancer in his sister; Cancer in his father and sister; Diabetes in his mother; Heart disease in his mother; Hypertension in his father and mother; No Known Problems in his sister; Prostate cancer in his father.      DM2-Pt reports he has never had diabetes and never treated for diabetes.    He states the diagnosis is a mistake  Lab Results   Component Value Date    HGBA1C 4.6 09/27/2018       Neck injury in 1994  Has had 2  neck surgeries and reports a 2 year span where he was ~ 2005 ( around the 2nd neck surgery)   Reports leg movement returned. He went through PT . Walks with a cane for stability.     Fx assessment  Remains active. Will walk on non dialysis  days  ~ 4x/week for ~ 1 hour . Uses a cane to help with balance.   Denies chest chest pain, SOB or leg pain.     May have a donor.    Records from care every where     3/21/2017 CT head w/o contrast   Impression:  1. No acute intracranial abnormality, specifically no evidence for mass, hemorrhage or infarction.  2. Age-related volume loss.    MRI brain 10/3/2016  Indication: 61-year-old man with tremors, weakness, and fall. History of neck injury in 1994.  Impression:  No acute intracranial pathology or appreciable change since a brain MRI 10/31/2014.  Mild chronic microangiopathic change is unchanged and there is persistent mild chronic sinusitis.  Chronic discogenic degenerative disease C3-C4.    7/27/2016  Renal US  Impression: Moderate bilateral hydronephrosis presumably due to prostatic bladder outlet obstruction. Additional findings suspicious for a 2.5 cm mucosal bladder lesion, urologic consultation recommended.  Pt is uncertain if he has was f/u for t he bladder lesion. He is seeing DR Navarrete / urology in [place]. He reports BPH and was started on Doxazosin    Lab Results   Component Value Date    PSA 10.4 (H) 09/27/2018     Denies kidney biopsy     Reports surgery to remove a mass near one of the kidneys ~ 2005 at [facility]   Dr Navarrete performed the surgery.     Review of Systems   Constitutional: Positive for unexpected weight change. Negative for activity change, appetite change, chills, fatigue and fever.   HENT: Negative for congestion, facial swelling, postnasal drip, rhinorrhea, sinus pressure, sore throat and trouble swallowing.    Eyes: Negative for pain, redness and visual disturbance.   Respiratory: Negative for cough, chest tightness, shortness of breath and wheezing.    Cardiovascular: Negative.  Negative for chest pain, palpitations and leg swelling.   Gastrointestinal: Negative for abdominal pain, diarrhea, nausea and vomiting.   Genitourinary: Negative for dysuria, flank pain  "and urgency.        Still makes urine    Musculoskeletal: Positive for back pain and neck pain. Negative for gait problem and neck stiffness.        Uses a cane   Lumbar neuropathy--takes gabapentin and NORCO (1 every 4-6 hours)--Is starting pain management on 10/2  Dr Becca Bond.    Skin: Negative for rash.   Allergic/Immunologic: Negative for environmental allergies, food allergies and immunocompromised state.   Neurological: Negative for dizziness, weakness, light-headedness and headaches.   Psychiatric/Behavioral: Negative for agitation and confusion. The patient is not nervous/anxious.        Objective:   Blood pressure 137/89, pulse 81, temperature 98 °F (36.7 °C), temperature source Oral, resp. rate 18, height 5' 6.34" (1.685 m), weight 76 kg (167 lb 8.8 oz), SpO2 96 %.body mass index is 26.77 kg/m².    Physical Exam   Constitutional: He is oriented to person, place, and time. He appears well-developed and well-nourished.   HENT:   Head: Normocephalic.   Mouth/Throat: Oropharynx is clear and moist. No oropharyngeal exudate.   Eyes: Conjunctivae and EOM are normal. Pupils are equal, round, and reactive to light. No scleral icterus.   Neck: Normal range of motion. Neck supple.   Cardiovascular: Normal rate, regular rhythm and normal heart sounds.   Pulmonary/Chest: Effort normal and breath sounds normal.   Abdominal: Soft. Normal appearance and bowel sounds are normal. He exhibits no distension and no mass. There is no splenomegaly or hepatomegaly. There is no tenderness. There is no rebound, no guarding, no CVA tenderness, no tenderness at McBurney's point and negative Morton's sign.   Musculoskeletal: Normal range of motion. He exhibits edema.        Arms:  Bilateral trace peripheral edema   Lymphadenopathy:     He has no cervical adenopathy.   Neurological: He is alert and oriented to person, place, and time. He exhibits normal muscle tone. Coordination normal.   Skin: Skin is warm and dry. "   Psychiatric: He has a normal mood and affect. His behavior is normal.   Vitals reviewed.      Labs:  Lab Results   Component Value Date    PSA 10.4 (H) 09/27/2018       Lab Results   Component Value Date    WBC 5.35 09/27/2018    HGB 11.1 (L) 09/27/2018    HCT 35.1 (L) 09/27/2018     09/27/2018    K 3.9 09/27/2018    CL 97 09/27/2018    CO2 34 (H) 09/27/2018    BUN 24 (H) 09/27/2018    CREATININE 5.4 (H) 09/27/2018    EGFRNONAA 10.5 (A) 09/27/2018    CALCIUM 9.2 09/27/2018    PHOS 3.5 09/27/2018    ALBUMIN 3.5 09/27/2018    AST 12 09/27/2018    ALT 10 09/27/2018    .0 (H) 09/27/2018       No results found for: PREALBUMIN, BILIRUBINUA, GGT, AMYLASE, LIPASE, PROTEINUA, NITRITE, RBCUA, WBCUA    No results found for: HLAABCTYPE    Labs were reviewed with the patient.    Assessment:     1. Pre-transplant evaluation for chronic kidney disease    2. Organ transplant candidate    3. ESRD on hemodialysis    4. Anemia in ESRD (end-stage renal disease)    5. Renovascular hypertension    6. Elevated PSA    7. Other chronic pain        Plan:   Urology referral and clarification:    Work up for suspicious for a 2.5 cm mucosal bladder lesion/ renal US in 2016     elevated PSA   Lab Results   Component Value Date    PSA 10.4 (H) 09/27/2018     Comment and records if available--mass removal near kidney ~ 2005 by Dr Navarrete at [facility]      Transplant Candidacy:   Based on available information, Mr. Nelson is a suitable kidney transplant candidate.  Final determination of transplant candidacy will be made once workup is complete and reviewed by the selection committee.    Kody Barboza MD       [facility] Patient Status  Functional Status: 60% - Requires occasional assistance but is able to care for needs  Physical Capacity: No Limitations

## 2018-09-27 NOTE — NURSING
Reviewed pt transplant labs.  Pt to continue to follow-up with dialysis unit dietitians recommendations.      Carmelita De Jesus MS RD LDN

## 2018-09-27 NOTE — LETTER
October 1, 2018        Maik Messer  [address] [place] [address]  Phone: [phone]  Fax: [phone]             [facility]  [address]  [facility] [address]  Phone: [phone]   Patient: Mirza Nelson Jr.   MR Number: [id number]   YOB: 1955   Date of Visit: 9/27/2018       Dear Dr. Maik Messer    Thank you for referring Mirza Nelson to me for evaluation. Attached you will find relevant portions of my assessment and plan of care.    If you have questions, please do not hesitate to call me. I look forward to following Mirza Nelson along with you.    Sincerely,    Carmelita Shah, NP    Enclosure    If you would like to receive this communication electronically, please contact [email or web address] or [phone] to request [employer] access.    [employer] is a tool which provides read-only access to select patient information with whom you have a relationship. Its easy to use and provides real time access to review your patients record including encounter summaries, notes, results, and demographic information.    If you feel you have received this communication in error or would no longer like to receive these types of communications, please e-mail [email or web address]

## 2018-09-27 NOTE — PROGRESS NOTES
Pre Transplant Infectious Diseases Consult  Kidney Transplant Recipient Evaluation    Requesting Physician: Maik Messer MD    Reason for Visit:  Kidney transplant evaluation     History of Present Illness  Mirza Nelson is a 63 y.o. year old Black or  male with advanced Kidney disease currently being evaluated for Kidney transplant. Patient is currently on HD via LUE AVG. He is tolerating dialysis well. Denies dialysis related infections.  Patient denies any recent fever, chills, or infective illnesses.      1) Do you have a history of:   YES NO   Diabetes      [] [x]     Diabetic Foot Infection/Bone Infection  []        [x]     Surgical Removal of Spleen   []  [x]                 2) Have you had recurrent infections involving:             YES NO  Sinus infections  []         [x]   Sore Throat   []         [x]                 Prostate Infections  []         [x]              Bladder Infections  []         [x] UTI x 1                     Kidney Infections  []         [x]                               Intestinal Infections  []         [x]      Skin Infections   []         [x]       Reproductive Infections          []  [x]   Periodontal Disease  []         [x]        3)Have you ever had: YES     NO UNKNOWN      Chicken Pox   []         [x]  []   Shingles   []         [x]  []   Orolabial Herpes             []  [x]  []   Genital Herpes  []         [x]  []   Cytomegalovirus  []         [x]  []   Reymundo-Barr Virus  []         [x]  []   Genital Warts   []         [x]  []   Hepatitis A   []         [x]  []   Hepatitis B   []         [x]  []   Hepatitis C   []         [x]  []   Syphilis   []         [x]  []   Gonorrhea   []         [x]  []   Pelvic Inflammatory   Disease   []         [x]  []   Chlamydia Infection  []         [x]  []   Intestinal parasites   or worms   []         [x]  []   Fungal Infections  []         [x]  []   Blood Infections  []         [x]  []        Comment:       4) Have you  ever been exposed   YES NO  To someone with tuberculosis?  []   [x]   If yes, what treatment did you receive:     5) What states have you lived in? [place], [place]    6) What countries have you visited for more than 2 weeks? N/A                        YES NO  7) Did you have any associated infections?  []  [x]       8) Are you planning to travel outside the    []  [x]   [place] after your transplant?    9) Household                   YES NO  Do you have pets living in your house?    [x]         []   If yes, describe: Dog, vaccinated    Do you spend time or live on a farm or     [x]         []   have livestock or other farm animals?  If yes, which ones: horses, chickens, goats - got rid of them all    Do you have a fish tank?          []  [x]       Do you have a litter box?      []         [x]     Do you fish or hunt?      [x]         []     Do you clean or skin fish or animals?    []         [x]     Do you consume raw or undercooked    []         [x]   meat, fish, or shellfish?      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    [x]         []   13)Do you hike, camp, or spend     time in wooded areas?   []         [x]         14) The patient's immunization history was reviewed.    Have you ever received:  YES NO UNKNOWN DATES   Routine Childhood vaccines  [x]         []  []      Influenza vaccine   [x]  []  []    Pneumovax    [x]  []  []     Tetanus-diptheria   [x]         []  []  2017 from PCP   Hepatitis A vaccine series       []  [x]  []    Hepatitis B vaccine series         [x]  []  []    Meningitis vaccine   []         [x]  []    Varicella vaccine   []         [x]  []         Based on the patients immunization history and serologies, immunizations were ordered:         Ordered  Not Ordered  Influenza Vaccine     []    []   Hepatitis A series at 0,  6 months   [x]     []   Hepatitis B seriesat 0, 1, and 6 months  []    []   Hepatitis B High Dose 0,1, and 6 months  []    []   Prevnar      []    []   Pneumovax      []    []    TDap       []    []    Zoster       []    []   Menactra      []    []            The patient was encouraged to contact us about any problems that may develop after immunization and possible side effects were reviewed.      Previous Transplant: no    Etiology of Kidney Disease: prior kidney mass    Allergies: Iodine and iodide containing products  There is no immunization history for the selected administration types on file for this patient.  History reviewed. No pertinent past medical history.  History reviewed. No pertinent surgical history.   Social History     Socioeconomic History    Marital status:      Spouse name: Not on file    Number of children: Not on file    Years of education: 12    Highest education level: Not on file   Social Needs    Financial resource strain: Not on file    Food insecurity - worry: Not on file    Food insecurity - inability: Not on file    Transportation needs - medical: Not on file    Transportation needs - non-medical: Not on file   Occupational History    Not on file   Tobacco Use    Smoking status: Not on file   Substance and Sexual Activity    Alcohol use: Not on file    Drug use: Not on file    Sexual activity: Not on file   Other Topics Concern    Not on file   Social History Narrative    Not on file       Review of Systems   Constitution: Positive for weakness (left sided). Negative for chills, decreased appetite, fever, malaise/fatigue, night sweats, weight gain and weight loss.   HENT: Negative for congestion, ear pain, hearing loss, hoarse voice, sore throat and tinnitus.    Eyes: Negative for blurred vision, redness and visual disturbance.   Cardiovascular: Negative for chest pain, leg swelling and palpitations.   Respiratory: Negative for cough, hemoptysis, shortness of breath, sputum  production and wheezing.    Endocrine: Negative for cold intolerance and heat intolerance.   Hematologic/Lymphatic: Negative for adenopathy. Does not bruise/bleed easily.   Skin: Negative for dry skin, itching, rash and suspicious lesions.   Musculoskeletal: Positive for arthritis, back pain (chronic) and neck pain (chronic). Negative for joint pain and myalgias.   Gastrointestinal: Negative for abdominal pain, constipation, diarrhea, heartburn, nausea and vomiting.   Genitourinary: Negative for dysuria, flank pain, frequency, hematuria, hesitancy and urgency.   Neurological: Positive for numbness (left sided) and sensory change. Negative for dizziness, headaches and paresthesias.        Gait problem - walk with a cane since overcoming paralysis   Psychiatric/Behavioral: Negative for depression and memory loss. The patient does not have insomnia and is not nervous/anxious.    Allergic/Immunologic: Negative for environmental allergies, HIV exposure, hives and persistent infections.     Physical Exam   Constitutional: He is oriented to person, place, and time. He appears well-developed and well-nourished.   HENT:   Head: Normocephalic and atraumatic.   Mouth/Throat: Uvula is midline, oropharynx is clear and moist and mucous membranes are normal. He does not have dentures. No oral lesions. Abnormal dentition (missing teeth, cracked upper molar). No dental abscesses, lacerations or dental caries.   Eyes: Conjunctivae and lids are normal. Pupils are equal, round, and reactive to light. No scleral icterus.   Neck: Neck supple.   Cardiovascular: Normal rate and regular rhythm. Exam reveals no gallop and no friction rub.   No murmur heard.  Pulmonary/Chest: Effort normal and breath sounds normal. No respiratory distress. He has no decreased breath sounds. He has no wheezes. He has no rhonchi. He has no rales.   Abdominal: Soft. Normal appearance, normal aorta and bowel sounds are normal. He exhibits no distension and no  mass. There is no hepatosplenomegaly. There is no tenderness. There is no rebound and no guarding.   Musculoskeletal: He exhibits no edema.   LUE AVG c/d/i. Good thrill   Lymphadenopathy:        Head (right side): No submental, no submandibular and no occipital adenopathy present.        Head (left side): No submental, no submandibular and no occipital adenopathy present.     He has no cervical adenopathy.   Neurological: He is alert and oriented to person, place, and time. No cranial nerve deficit.   Skin: Skin is warm, dry and intact. No lesion and no rash noted. He is not diaphoretic. No erythema. No pallor.   Very dry flaky skin. No wounds/skin breakdown   Psychiatric: He has a normal mood and affect. His behavior is normal.     Diagnostics: No results found for: RPR  No results found for: CMVANTIBODIE  No results found for: HIV1X2  No results found for: HTLVIIIANTIB  No results found for: HEPBSAG  No results found for: HEPBCAB  No results found for: HEPCAB  No results found for: TOXOIGG  No components found for: TOXOIGGINTER  No results found for: OMB0NOT  No results found for: LZE6TTP  No results found for: VARICELLAZOS  No results found for: VARICELLAINT  No results found for: STRONGANTIGG  No results found for: EPSTEINBARRV  No results found for: HEPBSAB  No results found for: QUANTIFERON  No results found for: HEPAIGM  No results found for: PPD  No results found for this or any previous visit.         Transplant Infectious Diseases - Candidacy    Assessment/Plan:     Transplant Candidacy: Based on available information, there are no identified significant barriers to transplantation from an infectious disease standpoint pending acceptable serologies. Quantiferon gold, HIV, strongyloides IgG and RPR are pending. Please refer to ID clinic for any serologies requiring further evaluation.    Immunizations recommended:  - Influenza annually  - Hepatitis A vaccine today and in 6 months. Rx given.   - If Varicella  zoster IgG returns positive, recommend vaccination with Shingrix         Final determination of transplant candidacy will be made once evaluation is complete and reviewed by the Transplant Selection Committee.    Nely Castañeda PA-C         Counseling:I discussed with Mirza the risk for increased susceptibility to infections following transplantation including increased risk for infection right after transplant and if rejection should occur.  The patients has been counseled on the importance of vaccinations including but not limited to a yearly flu vaccine.  Specific guidance has been provided to the patient regarding the patients occupation, hobbies and activities to avoid future infectious complications including but not limited to avoiding undercooked meats and seafood, proper hygiene, and contact with animals.

## 2018-10-03 NOTE — PROGRESS NOTES
"Transplant Recipient Adult Psychosocial Assessment    Mirza Nelson  [address] [address]  [facility] [address]    Telephone Information:   Mobile [phone]   Home  [phone] (home)  Work  There is no work phone number on file.  E-mail  No e-mail address on record    Sex: male  YOB: 1955  Age: 63 y.o.    Encounter Date: 2018  [place] Citizen: yes  Primary Language: English   Needed: no    Emergency Contact:  Name: Maya Dixon  Relationship: friend  Address: [address] [place] [id number]  Phone Numbers:  n/a (home), n/a (work), [phone] (mobile)    Family/Social Support:   Number of dependents/: patient has one dog  Marital history:  and  twice.  In long term relationship with friend, Maya (6 yrs).  Other family dynamics: Pt reports parents ; three sisters and one brother.  Pt stated he is not close with siblings even though they live in [facility].  "I love them, but I'd rather stay away from them.  They are bipolar."  Pt describes family dynamics as chaotic and tries his best to stay out of the chaos.  Pt states he has three grown children, none of whom are very involved with his care: Haydee Nelson, 41, [place], has cancer, [phone]; Heather Ab, 36, [place], "[employer]" [employer] [phone]; Leonel Le, 30, [place], [phone].    Household Composition:  Name: Mirza Nelson Jr "Sergei"  Age: 63  Relationship: patient  Does person drive? yes, however stated he does not drive far or very often after getting lost while driving in town.  Pt stated he forgot to take BP meds, pressure went up and he got lost.  Had to get help to get home.  MD informed.    Name: Maya Ivyes  Age: 58  Relationship: friend  Does person drive? yes    Do you and your caregivers have access to reliable transportation? yes  PRIMARY CAREGIVER: Maya Dixon, friend will be primary caregiver, phone number [phone].      provided " in-depth information to patient and caregiver regarding pre- and post-transplant caregiver role.   strongly encourages patient and caregiver to have concrete plan regarding post-transplant care giving, including back-up caregiver(s) to ensure care giving needs are met as needed.    Patient and Caregiver states understanding all aspects of caregiver role/commitment and is able/willing/committed to being caregiver to the fullest extent necessary.    Patient and Caregiver verbalizes understanding of the education provided today and caregiver responsibilities.         remains available. Patient and Caregiver agree to contact  in a timely manner if concerns arise.      Able to take time off work without financial concerns: yes.     Additional Significant Others who will Assist with Transplant:  Name: Mirza Coreas [phone]  Age: 52  City: [place] State: [place]  Relationship: cousin  Does person drive? yes      Living Will: no  Healthcare Power of : no  Advance Directives on file: <<no information> per medical record.  Verbally reviewed LW/HCPA information.   provided patient with copy of LW/HCPA documents and provided education on completion of forms.    Living Donors: No. Education and resource information given to patient.    Highest Education Level: High School (9-12) or GED  Reading Ability: 12th grade  Reports difficulty with: N/A  Learns Best By:  Hands on     Status: no  VA Benefits: no     Working for Income: No  If no, reason not working: Disability  Patient is disabled.  Prior to disability, patient  was employed as multiple jobs including [employer]  and ...    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    Monthly Income:   Disability: $840  Food [place]: $80  Able to afford all costs now and if transplanted, including  "medications: yes  Patient and Caregiver verbalizes understanding of personal responsibilities related to transplant costs and the importance of having a financial plan to ensure that patients transplant costs are fully covered.       provided fundraising information/education. Patient and Caregiververbalizes understanding.   remains available.    Insurance:   Payor/Plan Subscr  Sex Relation Sub. Ins. ID Effective Group Num   1. MEDICARE - MESUSSY BONNER* 1955 Male  [id number]                                    [address]   2. [place]* SUSSY GALLEGOS* 1955 Male  [id number]* [id number]                                    [address] [address]     Primary Insurance (for UNOS reporting): Public Insurance - Medicare FFS (Fee For Service)  Secondary Insurance (for UNOS reporting): Public Insurance - Medicaid  Patient and Caregiver verbalizes clear understanding that patient may experience difficulty obtaining and/or be denied insurance coverage post-surgery. This includes and is not limited to disability insurance, life insurance, health insurance, burial insurance, long term care insurance, and other insurances.      Patient and Caregiver also reports understanding that future health concerns related to or unrelated to transplantation may not be covered by patient's insurance.  Resources and information provided and reviewed.     Patient and Caregiver provides verbal permission to release any necessary information to outside resources for patient care and discharge planning.  Resources and information provided are reviewed.      Dialysis Adherence: Patient reports He is compliant with dialysis; attends all treatments and stays full time.  Pt stated he began dialysis in April of this year.  Dialysis center reports over last three months that the patient has had "none" AMAs, "none" no-shows, and "Is an excellent patient.  He comes to all treatments, does not complain and " "does what he is told.  He does not have any problem with fluid gains"  , no concerns with labs.  Nurse reports that pt's dtr calls him every day and his dtr Heather and grndtrKathy are devoted to him, taking him places and doing things with him.  (NOTE: this is different than pt reports.  Nurse states pt probably does not want to ask for help.)Reported by nurse Laura.    Infusion Service: patient utilizing? no  Home Health: patient utilizing? no  DME: pt has a cane and walker for use when weak after dialysis.  Pulmonary/Cardiac Rehab: none   ADLS:  Pt states ability to independently accomplish all adls.    Adherence:   Pt states current and expected compliance with all healthcare recommendations..  Adherence education and counseling provided.     Per History Section:  Past Medical History:   Diagnosis Date    Anemia     Disorder of kidney and ureter     Hypertension     Neck injury      Social History     Tobacco Use    Smoking status: Former Smoker     Packs/day: 0.25     Years: 20.00     Pack years: 5.00     Types: Cigarettes     Last attempt to quit: 2016     Years since quittin.0    Smokeless tobacco: Never Used   Substance Use Topics    Alcohol use: No     Frequency: Never     Social History     Substance and Sexual Activity   Drug Use Yes    Types: Marijuana     Social History     Substance and Sexual Activity   Sexual Activity Not on file       Per Today's Psychosocial:  Tobacco: Pt stated he smokes a pack every three days until two years ago when he quit.  .  Alcohol: Quit 17 yrs ago.  States alcohol was never a problem..  Illicit Drugs/Non-prescribed Medications: Pt states he smokes one joint a day for pain.  Education and cessation info provided..    Patient and Caregiver states clear understanding of the potential impact of substance use as it relates to transplant candidacy and is aware of possible random substance screening.  Substance abstinence/cessation counseling, " education and resources provided and reviewed.     Arrests/DWI/Treatment/Rehab: patient denies    Psychiatric History:    Mental Health: Pt denies mental health concerns  Psychiatrist/Counselor: denies  Medications:  denies  Suicide/Homicide Issues: Pt denies current or past suicidal/homicidal ideation.   Safety at home: Pt denies any home safety concerns.    Knowledge: Patient and Caregiver states having clear understanding and realistic expectations regarding the potential risks and potential benefits of organ transplantation and organ donation and agrees to discuss with health care team members and support system members, as well as to utilize available resources and express questions and/or concerns in order to further facilitate the pt informed decision-making.  Resources and information provided and reviewed.    Patient and Caregiver is aware of [facility]'s affiliation and/or partnership with agencies in home health care, LTAC, SNF, [facility], and other hospitals and clinics.    Understanding: Patient and Caregiver reports having a clear understanding of the many lifetime commitments involved with being a transplant recipient, including costs, compliance, medications, lab work, procedures, appointments, concrete and financial planning, preparedness, timely and appropriate communication of concerns, abstinence (ETOH, tobacco, illicit non-prescribed drugs), adherence to all health care team recommendations, support system and caregiver involvement, appropriate and timely resource utilization and follow-through, mental health counseling as needed/recommended, and patient and caregiver responsibilities.  Social Service Handbook, resources and detailed educational information provided and reviewed.  Educational information provided.    Patient and Caregiver also reports current and expected compliance with health care regime and states having a clear understanding of the importance of compliance.      Patient and Caregiver  reports a clear understanding that risks and benefits may be involved with organ transplantation and with organ donation.       Patient and Caregiver also reports clear understanding that psychosocial risk factors may affect patient, and include but are not limited to feelings of depression, generalized anxiety, anxiety regarding dependence on others, post traumatic stress disorder, feelings of guilt and other emotional and/or mental concerns, and/or exacerbation of existing mental health concerns.  Detailed resources provided and discussed.      Patient and Caregiver agrees to access appropriate resources in a timely manner as needed and/or as recommended, and to communicate concerns appropriately.  Patient and Caregiver also reports a clear understanding of treatment options available.     Patient and Caregiver received education in a group setting.   reviewed education, provided additional information, and answered questions.    Feelings or Concerns: pt and s/o deny concerns.    Coping: Pt stated he geraldo by spending time with others, enjoys gambling, fishing, TV, picking cans (his gambling money) and watching sports.    Goals: get off dialysis.  Patient referred to Vocational Rehabilitation.    Interview Behavior: Patient and Caregiver presents as alert and oriented x 4, pleasant, good eye contact, well groomed, recall good, concentration/judgement good, average intelligence, calm, communicative, cooperative and asking and answering questions appropriately. He was accompanied by friend, s/o, Maya Dixon who presented as supportive of pt's pursuit of transplant.        Transplant Social Work - Candidacy  Assessment/Plan:     Psychosocial Suitability: Patient presents as an acceptable candidate for kidney transplant at this time. Based on psychosocial risk factors, patient presents as low risk, due to absence of significant psychosocial risk factors.    Recommendations/Additional Comments: recommend  fundraising; will benefit from [address]    Maira Menezes, RAQUEL

## 2018-10-05 ENCOUNTER — TELEPHONE (OUTPATIENT)
Dept: TRANSPLANT | Facility: CLINIC | Age: 63
End: 2018-10-05

## 2018-10-05 NOTE — TELEPHONE ENCOUNTER
Nutritional assessment from dialysis unit received and reviewed--no nutritional changes to plan needed at this time.  Pt to continue to follow-up with renal dietitians recommendations.      Carmelita De Jesus MS RD LDN

## 2018-10-11 ENCOUNTER — TELEPHONE (OUTPATIENT)
Dept: TRANSPLANT | Facility: CLINIC | Age: 63
End: 2018-10-11

## 2018-10-18 ENCOUNTER — TELEPHONE (OUTPATIENT)
Dept: TRANSPLANT | Facility: CLINIC | Age: 63
End: 2018-10-18

## 2018-10-18 NOTE — TELEPHONE ENCOUNTER
----- Message from Nely Castañeda PA-C sent at 10/17/2018  5:12 PM CDT -----  I will call him tomorrow and discuss the treatment with him. Ivermectin can be sent to his pharmacy.  ----- Message -----  From: Xi Carlos  Sent: 10/17/2018  11:48 AM  To: NICOLE Claros,    Patient lives in [place]. 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 Thanks.    Xi  ----- Message -----  From: Tara Childers  Sent: 10/17/2018   9:10 AM  To: Xi Carlos        ----- Message -----  From: Ligia Kolb  Sent: 10/1/2018   3:09 PM  To: Tara Childers        ----- Message -----  From: Yair Del Rosario MD  Sent: 10/1/2018   9:25 AM  To: [facility] Pre-Kidney Transplant Clinical    Please have this review by ID for treatment. Thanks  J

## 2018-10-19 ENCOUNTER — TELEPHONE (OUTPATIENT)
Dept: INFECTIOUS DISEASES | Facility: HOSPITAL | Age: 63
End: 2018-10-19

## 2018-10-19 DIAGNOSIS — Z76.82 ORGAN TRANSPLANT CANDIDATE: ICD-10-CM

## 2018-10-19 DIAGNOSIS — Z01.818 PRE-TRANSPLANT EVALUATION FOR CHRONIC KIDNEY DISEASE: Primary | ICD-10-CM

## 2018-10-19 RX ORDER — IVERMECTIN 3 MG/1
200 TABLET ORAL DAILY
Qty: 10 TABLET | Refills: 0 | Status: SHIPPED | OUTPATIENT
Start: 2018-10-19 | End: 2023-01-01 | Stop reason: ALTCHOICE

## 2018-10-19 NOTE — TELEPHONE ENCOUNTER
Called patient and informed him of his positive Strongyloides IgG lab result. 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 Ivermectin was electronically sent to his pharmacy. All questions were answered.

## 2018-10-19 NOTE — TELEPHONE ENCOUNTER
Attempted to call patient to discuss Strongyloides treatment and patient did not answer. Voicemail was left for him to call the ID clinic. Will try to reach the patient again in a few hours.

## 2018-11-27 ENCOUNTER — TELEPHONE (OUTPATIENT)
Dept: TRANSPLANT | Facility: CLINIC | Age: 63
End: 2018-11-27

## 2018-11-27 NOTE — TELEPHONE ENCOUNTER
Spoke to patient, states he had a PSA today and has Urology follow up appt on 12/20/18. We discussed the importance of addressing prostate if PSA is still elevated if he wants a transplant. Pt voiced understanding. He also still needs to be scheduled for stress test, echo, cards visit, and colonoscopy. He states he has had dialysis access troubles and will get his doctor to schedule him asap. Re-faxed order sheet to dialysis unit. Pt is aware.

## 2018-11-29 ENCOUNTER — HISTORICAL (OUTPATIENT)
Dept: ADMINISTRATIVE | Facility: HOSPITAL | Age: 63
End: 2018-11-29

## 2018-11-29 LAB
ABS NEUT (OLG): 3.99 X10(3)/MCL (ref 2.1–9.2)
BASOPHILS # BLD AUTO: 0 X10(3)/MCL (ref 0–0.2)
BASOPHILS NFR BLD AUTO: 1 %
BUN SERPL-MCNC: 18 MG/DL (ref 7–18)
CALCIUM SERPL-MCNC: 8.5 MG/DL (ref 8.5–10.1)
CHLORIDE SERPL-SCNC: 97 MMOL/L (ref 98–107)
CO2 SERPL-SCNC: 30 MMOL/L (ref 21–32)
CREAT SERPL-MCNC: 6.03 MG/DL (ref 0.7–1.3)
CREAT/UREA NIT SERPL: 3
EOSINOPHIL # BLD AUTO: 0.1 X10(3)/MCL (ref 0–0.9)
EOSINOPHIL NFR BLD AUTO: 2 %
ERYTHROCYTE [DISTWIDTH] IN BLOOD BY AUTOMATED COUNT: 12.2 % (ref 11.5–17)
GLUCOSE SERPL-MCNC: 99 MG/DL (ref 74–106)
HCT VFR BLD AUTO: 35.1 % (ref 42–52)
HGB BLD-MCNC: 11.4 GM/DL (ref 14–18)
LYMPHOCYTES # BLD AUTO: 0.9 X10(3)/MCL (ref 0.6–4.6)
LYMPHOCYTES NFR BLD AUTO: 16 %
MCH RBC QN AUTO: 33 PG (ref 27–31)
MCHC RBC AUTO-ENTMCNC: 32.5 GM/DL (ref 33–36)
MCV RBC AUTO: 101.7 FL (ref 80–94)
MONOCYTES # BLD AUTO: 0.5 X10(3)/MCL (ref 0.1–1.3)
MONOCYTES NFR BLD AUTO: 8 %
NEUTROPHILS # BLD AUTO: 3.99 X10(3)/MCL (ref 2.1–9.2)
NEUTROPHILS NFR BLD AUTO: 72 %
PLATELET # BLD AUTO: 234 X10(3)/MCL (ref 130–400)
PMV BLD AUTO: 9.3 FL (ref 9.4–12.4)
POTASSIUM SERPL-SCNC: 4.1 MMOL/L (ref 3.5–5.1)
RBC # BLD AUTO: 3.45 X10(6)/MCL (ref 4.7–6.1)
SODIUM SERPL-SCNC: 137 MMOL/L (ref 136–145)
WBC # SPEC AUTO: 5.5 X10(3)/MCL (ref 4.5–11.5)

## 2018-12-06 LAB — GROUP & RH: NORMAL

## 2018-12-07 ENCOUNTER — HISTORICAL (OUTPATIENT)
Dept: LAB | Facility: HOSPITAL | Age: 63
End: 2018-12-07

## 2019-01-14 ENCOUNTER — TELEPHONE (OUTPATIENT)
Dept: TRANSPLANT | Facility: CLINIC | Age: 64
End: 2019-01-14

## 2019-01-14 NOTE — TELEPHONE ENCOUNTER
Spoken to Leonor, Dr. Navarrete recommend pt undergoes a suprapubic prostatectomy. She stated that patient's gland is too big for a TURP.  Also, pt's PVR was greater than 250cc. I informed her that what every the doctor feels is best for the pt. They will call pt to arrange for procedure.

## 2019-01-14 NOTE — TELEPHONE ENCOUNTER
----- Message from Lyla Shetty sent at 1/14/2019  1:08 PM CST -----  Contact: Leonor/[facility]/Dr Navarrete  Please call Leonor at [phone]  Fax# [phone]     Would like to discuss the urology clearance    Thank you

## 2019-01-14 NOTE — TELEPHONE ENCOUNTER
----- Message from An Degroot sent at 1/14/2019  1:57 PM CST -----  Contact: Patient   Needs Advice    Reason for call:  Patient states he received a letter         Communication Preference: [phone]    Additional Information: n/a

## 2019-01-17 ENCOUNTER — TELEPHONE (OUTPATIENT)
Dept: TRANSPLANT | Facility: CLINIC | Age: 64
End: 2019-01-17

## 2019-02-19 ENCOUNTER — HISTORICAL (OUTPATIENT)
Dept: ENDOSCOPY | Facility: HOSPITAL | Age: 64
End: 2019-02-19

## 2019-02-19 ENCOUNTER — TELEPHONE (OUTPATIENT)
Dept: TRANSPLANT | Facility: CLINIC | Age: 64
End: 2019-02-19

## 2019-02-19 LAB
CRC RECOMMENDATION EXT: NORMAL
POTASSIUM SERPL-SCNC: 4.8 MMOL/L (ref 3.5–5.1)

## 2019-02-19 NOTE — TELEPHONE ENCOUNTER
LM for patient to inquire about whether or not he had prostatectomy with Urology clearance and colonoscopy.

## 2019-02-19 NOTE — TELEPHONE ENCOUNTER
Pt just had colonoscopy at [facility] today. Pt is being scheduled with Urologist - needs to be on a Tuesday or Thursday due to dialysis MWF. Pt states he is trying to get appointment done for transplant but the doctors in [place] are slow scheduling things. Will check with patient in a few weeks.

## 2019-02-19 NOTE — TELEPHONE ENCOUNTER
----- Message from An Degroot sent at 2/19/2019 12:13 PM CST -----  Contact: Patient   Needs Advice    Reason for call:  Patient states he's retuning a call from coordinator         Communication Preference: [phone]    Additional Information: n/a

## 2019-03-21 ENCOUNTER — TELEPHONE (OUTPATIENT)
Dept: TRANSPLANT | Facility: CLINIC | Age: 64
End: 2019-03-21

## 2019-03-21 NOTE — TELEPHONE ENCOUNTER
LM for patient to inquire about his Urology follow up. He was supposed to have a partial prostatectomy due to the Urologist not recommending a TURP. Needs clearance. PSA was 8-10.

## 2019-03-25 ENCOUNTER — TELEPHONE (OUTPATIENT)
Dept: TRANSPLANT | Facility: CLINIC | Age: 64
End: 2019-03-25

## 2019-03-25 NOTE — TELEPHONE ENCOUNTER
Spoke to patient, states he is on a call list with his Urologist otherwise they do not have any available appts until June 2019. He needs to see Urology for elevated PSA. Will call us if he gets earlier appt.

## 2019-04-05 ENCOUNTER — HISTORICAL (OUTPATIENT)
Dept: CARDIOLOGY | Facility: HOSPITAL | Age: 64
End: 2019-04-05

## 2019-04-05 LAB
ABS NEUT (OLG): 8.01 X10(3)/MCL (ref 2.1–9.2)
BASOPHILS # BLD AUTO: 0 X10(3)/MCL (ref 0–0.2)
BASOPHILS NFR BLD AUTO: 0 %
BUN SERPL-MCNC: 54 MG/DL (ref 7–18)
BURR CELLS BLD QL SMEAR: 1
CALCIUM SERPL-MCNC: 8.9 MG/DL (ref 8.5–10.1)
CHLORIDE SERPL-SCNC: 95 MMOL/L (ref 98–107)
CO2 SERPL-SCNC: 28 MMOL/L (ref 21–32)
CREAT SERPL-MCNC: 7.12 MG/DL (ref 0.7–1.3)
CREAT/UREA NIT SERPL: 7.6
ERYTHROCYTE [DISTWIDTH] IN BLOOD BY AUTOMATED COUNT: 12.1 % (ref 11.5–17)
GLUCOSE SERPL-MCNC: 139 MG/DL (ref 74–106)
HCT VFR BLD AUTO: 37.1 % (ref 42–52)
HGB BLD-MCNC: 11.4 GM/DL (ref 14–18)
LYMPHOCYTES # BLD AUTO: 0.5 X10(3)/MCL (ref 0.6–4.6)
LYMPHOCYTES NFR BLD AUTO: 6 % (ref 13–40)
MACROCYTES BLD QL SMEAR: 1 /MCL
MCH RBC QN AUTO: 32.6 PG (ref 27–31)
MCHC RBC AUTO-ENTMCNC: 30.7 GM/DL (ref 33–36)
MCV RBC AUTO: 106 FL (ref 80–94)
MONOCYTES # BLD AUTO: 0.1 X10(3)/MCL (ref 0.1–1.3)
MONOCYTES NFR BLD AUTO: 1 %
NEUTROPHILS # BLD AUTO: 8.01 X10(3)/MCL (ref 2.1–9.2)
NEUTROPHILS NFR BLD AUTO: 92 %
PLATELET # BLD AUTO: 197 X10(3)/MCL (ref 130–400)
PLATELET # BLD EST: NORMAL 10*3/UL
PMV BLD AUTO: 9.6 FL (ref 7.4–10.4)
POIKILOCYTOSIS BLD QL SMEAR: 1
POTASSIUM SERPL-SCNC: 4.3 MMOL/L (ref 3.5–5.1)
RBC # BLD AUTO: 3.5 X10(6)/MCL (ref 4.7–6.1)
SODIUM SERPL-SCNC: 133 MMOL/L (ref 136–145)
WBC # SPEC AUTO: 8.7 X10(3)/MCL (ref 4.5–11.5)

## 2019-04-17 ENCOUNTER — TELEPHONE (OUTPATIENT)
Dept: TRANSPLANT | Facility: CLINIC | Age: 64
End: 2019-04-17

## 2019-04-17 NOTE — TELEPHONE ENCOUNTER
GABRIEL for patient and spoke to dialysis unit nurse. Pt should try his PCP first to get Hep A vaccine. If no luck, he will need to come to [facility] to get it.

## 2019-04-17 NOTE — TELEPHONE ENCOUNTER
----- Message from Xi Carlos sent at 4/16/2019  3:27 PM CDT -----  Contact: Patient       ----- Message -----  From: Wesley Contreras  Sent: 4/16/2019   3:18 PM  To: [facility] Pre-Kidney Transplant Clinical    Needs Advice    Reason for call: Pt would like to notify his coordinator that he cannot find a pharmacy that     have the Hepatitis A shot        Communication Preference: [phone]     Additional Information: Pt have dialysis at 11am tomorrow

## 2019-04-17 NOTE — TELEPHONE ENCOUNTER
----- Message from Nely Castañeda PA-C sent at 4/17/2019 10:33 AM CDT -----  Regarding: RE:  If he has a primary care provider, they should carry it at the office. Otherwise, he may have to come here to have it administered.  ----- Message -----  From: Xi Carlos  Sent: 4/17/2019   8:38 AM  To: NICOLE Claros,    Mr. Nelson had his first Hep A vaccine when he came to [facility] on 9/28/18. You gave him a RX to have the 2nd one done in [place]. He is calling me stating he can't find a pharmacy who carries the vaccine. What should I tell him? Will he need to come here or [facility]? Unfortunately, he is not coming back for any testing in the future for the rest of his transplant evaluation. Thanks.     Xi

## 2019-04-25 ENCOUNTER — TELEPHONE (OUTPATIENT)
Dept: TRANSPLANT | Facility: CLINIC | Age: 64
End: 2019-04-25

## 2019-04-25 NOTE — TELEPHONE ENCOUNTER
----- Message from India Au sent at 4/25/2019  3:04 PM CDT -----  Contact: Patient  Needs Advice    Reason for call: Patient stated he went got his Hep A injection done today.         Communication Preference: [phone]    Additional Information: n/a

## 2019-05-02 ENCOUNTER — HISTORICAL (OUTPATIENT)
Dept: LAB | Facility: HOSPITAL | Age: 64
End: 2019-05-02

## 2019-05-02 LAB
APPEARANCE, UA: ABNORMAL
BACTERIA SPEC CULT: ABNORMAL /HPF
BILIRUB SERPL-MCNC: NEGATIVE MG/DL
BILIRUB UR QL STRIP: NEGATIVE
BLOOD URINE, POC: NORMAL
CLARITY, POC UA: NORMAL
COLOR UR: YELLOW
COLOR, POC UA: NORMAL
GLUCOSE (UA): NEGATIVE
GLUCOSE UR QL STRIP: NORMAL
HGB UR QL STRIP: ABNORMAL
KETONES UR QL STRIP: NEGATIVE
KETONES UR QL STRIP: NEGATIVE
LEUKOCYTE EST, POC UA: NORMAL
LEUKOCYTE ESTERASE UR QL STRIP: ABNORMAL
NITRITE UR QL STRIP: NEGATIVE
NITRITE, POC UA: NEGATIVE
PH UR STRIP: 8 [PH] (ref 5–9)
PH, POC UA: 8
PROT UR QL STRIP: ABNORMAL
PROTEIN, POC: NORMAL
RBC #/AREA URNS HPF: ABNORMAL /HPF
SP GR UR STRIP: 1.02 (ref 1–1.03)
SPECIFIC GRAVITY, POC UA: 1.01
SQUAMOUS EPITHELIAL, UA: ABNORMAL
UROBILINOGEN UR STRIP-ACNC: 0.2
UROBILINOGEN, POC UA: NORMAL
WBC #/AREA URNS HPF: ABNORMAL /HPF

## 2019-05-04 LAB — FINAL CULTURE: NORMAL

## 2019-09-17 ENCOUNTER — TELEPHONE (OUTPATIENT)
Dept: TRANSPLANT | Facility: CLINIC | Age: 64
End: 2019-09-17

## 2019-09-17 NOTE — TELEPHONE ENCOUNTER
----- Message from Scot Warner sent at 9/17/2019  2:53 PM CDT -----  Contact: Pt  Kidney pt  ----- Message -----  From: Wesley Contreras  Sent: 9/17/2019   2:35 PM  To: [facility] Pre-Liver Transplant Non-Clinical    Pt received a letter and need to speak with Florencio to schedule appts    Contact: [phone]     Pt have dialysis Mon, Wed, & Fri

## 2019-09-23 ENCOUNTER — TELEPHONE (OUTPATIENT)
Dept: TRANSPLANT | Facility: CLINIC | Age: 64
End: 2019-09-23

## 2019-09-23 NOTE — TELEPHONE ENCOUNTER
----- Message from Wanda NIKKI Hopson sent at 9/23/2019 12:13 PM CDT -----  Contact: PT  PT called to give us the fax number to his urologist from [place].     Olaf Navarrete MD   Fax: [phone]    He stated that Dr. Olaf Navarrete would like us to fax him a request form of what it is that we need from him in regards to clearance for PT's surgery.     Callback: [phone]

## 2019-10-11 ENCOUNTER — TELEPHONE (OUTPATIENT)
Dept: TRANSPLANT | Facility: CLINIC | Age: 64
End: 2019-10-11

## 2019-10-11 NOTE — TELEPHONE ENCOUNTER
----- Message from Wesley Contreras sent at 10/11/2019  9:59 AM CDT -----  Contact: Diana Polanco [phone]  Calling to verify if pt is still on evaluation process      Diana stated the letter was overlooked    Diana contact [phone]

## 2019-10-28 ENCOUNTER — TELEPHONE (OUTPATIENT)
Dept: TRANSPLANT | Facility: CLINIC | Age: 64
End: 2019-10-28

## 2019-11-12 ENCOUNTER — TELEPHONE (OUTPATIENT)
Dept: TRANSPLANT | Facility: CLINIC | Age: 64
End: 2019-11-12

## 2019-11-12 NOTE — TELEPHONE ENCOUNTER
----- Message from Sona Lua sent at 11/11/2019 11:36 AM CST -----  Pt calling to inform coordinator that his appt w/urologist is 12/12---Dr. Igor Navarrete    Pt contact [phone]

## 2020-02-06 ENCOUNTER — HISTORICAL (OUTPATIENT)
Dept: LAB | Facility: HOSPITAL | Age: 65
End: 2020-02-06

## 2020-02-06 LAB
ABS NEUT (OLG): 3.86 X10(3)/MCL (ref 2.1–9.2)
ALBUMIN SERPL-MCNC: 3.2 GM/DL (ref 3.4–5)
ALBUMIN/GLOB SERPL: 0.7 RATIO (ref 1.1–2)
ALP SERPL-CCNC: 61 UNIT/L (ref 50–136)
ALT SERPL-CCNC: 13 UNIT/L (ref 12–78)
ANISOCYTOSIS BLD QL SMEAR: 1
APPEARANCE, UA: ABNORMAL
AST SERPL-CCNC: 5 UNIT/L (ref 15–37)
BACTERIA SPEC CULT: ABNORMAL /HPF
BASOPHILS # BLD AUTO: 0.1 X10(3)/MCL (ref 0–0.2)
BASOPHILS NFR BLD AUTO: 1 %
BILIRUB SERPL-MCNC: 0.8 MG/DL (ref 0.2–1)
BILIRUB UR QL STRIP: NEGATIVE
BILIRUBIN DIRECT+TOT PNL SERPL-MCNC: 0.2 MG/DL (ref 0–0.5)
BILIRUBIN DIRECT+TOT PNL SERPL-MCNC: 0.6 MG/DL (ref 0–0.8)
BUN SERPL-MCNC: 27 MG/DL (ref 7–18)
CALCIUM SERPL-MCNC: 8.3 MG/DL (ref 8.5–10.1)
CHLORIDE SERPL-SCNC: 100 MMOL/L (ref 98–107)
CHOLEST SERPL-MCNC: 117 MG/DL (ref 0–200)
CHOLEST/HDLC SERPL: 1.9 {RATIO} (ref 0–5)
CO2 SERPL-SCNC: 33 MMOL/L (ref 21–32)
COLOR UR: YELLOW
CREAT SERPL-MCNC: 6.34 MG/DL (ref 0.7–1.3)
DEPRECATED CALCIDIOL+CALCIFEROL SERPL-MC: 21.71 NG/ML (ref 30–80)
EOSINOPHIL # BLD AUTO: 0.2 X10(3)/MCL (ref 0–0.9)
EOSINOPHIL NFR BLD AUTO: 3 %
ERYTHROCYTE [DISTWIDTH] IN BLOOD BY AUTOMATED COUNT: 13.7 % (ref 11.5–17)
EST. AVERAGE GLUCOSE BLD GHB EST-MCNC: 80 MG/DL
GLOBULIN SER-MCNC: 4.4 GM/DL (ref 2.4–3.5)
GLUCOSE (UA): NEGATIVE
GLUCOSE SERPL-MCNC: 98 MG/DL (ref 74–106)
HBA1C MFR BLD: 4.4 % (ref 4.2–6.3)
HCT VFR BLD AUTO: 32.5 % (ref 42–52)
HDLC SERPL-MCNC: 63 MG/DL (ref 35–60)
HGB BLD-MCNC: 10.1 GM/DL (ref 14–18)
HGB UR QL STRIP: ABNORMAL
KETONES UR QL STRIP: NEGATIVE
LDLC SERPL CALC-MCNC: 43 MG/DL (ref 0–129)
LEUKOCYTE ESTERASE UR QL STRIP: ABNORMAL
LYMPHOCYTES # BLD AUTO: 0.8 X10(3)/MCL (ref 0.6–4.6)
LYMPHOCYTES NFR BLD AUTO: 15 %
MACROCYTES BLD QL SMEAR: 1 /MCL
MCH RBC QN AUTO: 33 PG (ref 27–31)
MCHC RBC AUTO-ENTMCNC: 31.1 GM/DL (ref 33–36)
MCV RBC AUTO: 106.2 FL (ref 80–94)
MONOCYTES # BLD AUTO: 0.6 X10(3)/MCL (ref 0.1–1.3)
MONOCYTES NFR BLD AUTO: 10 %
NEUTROPHILS # BLD AUTO: 3.86 X10(3)/MCL (ref 2.1–9.2)
NEUTROPHILS NFR BLD AUTO: 70 %
NITRITE UR QL STRIP: NEGATIVE
PH UR STRIP: >=9 [PH] (ref 5–9)
PLATELET # BLD AUTO: 240 X10(3)/MCL (ref 130–400)
PLATELET # BLD EST: NORMAL 10*3/UL
PMV BLD AUTO: 9.8 FL (ref 7.4–10.4)
POTASSIUM SERPL-SCNC: 3.6 MMOL/L (ref 3.5–5.1)
PROT SERPL-MCNC: 7.6 GM/DL (ref 6.4–8.2)
PROT UR QL STRIP: ABNORMAL
PSA SERPL-MCNC: 8.03 NG/ML (ref 0–4)
RBC # BLD AUTO: 3.06 X10(6)/MCL (ref 4.7–6.1)
RBC #/AREA URNS HPF: 15 /HPF (ref 0–2)
SODIUM SERPL-SCNC: 138 MMOL/L (ref 136–145)
SP GR UR STRIP: 1.01 (ref 1–1.03)
SQUAMOUS EPITHELIAL, UA: ABNORMAL
TRIGL SERPL-MCNC: 53 MG/DL (ref 30–150)
TSH SERPL-ACNC: 0.49 MIU/L (ref 0.36–3.74)
UROBILINOGEN UR STRIP-ACNC: 0.2
VLDLC SERPL CALC-MCNC: 11 MG/DL
WBC # SPEC AUTO: 5.5 X10(3)/MCL (ref 4.5–11.5)
WBC #/AREA URNS HPF: 292 /HPF (ref 0–3)

## 2020-02-08 LAB — FINAL CULTURE: NO GROWTH

## 2020-04-03 NOTE — LETTER
April 3, 2020    Mirza Omar [address] [address]  [facility] [address]    Dear Mirza Nelson:  MRN: [id number]    The [facility] team reviewed your transplant candidacy.  The kidney team has denied the option of transplantation due to failure to complete kidney transplant evaluation..    Although the [facility] team believes you are not a suitable transplant candidate, you have the option to be evaluated at other transplant centers.  You may request your [facility] records be sent to any center of your choice by contacting our Medical Records Department at [phone].    Attached is a letter from the [employer] for Organ Sharing (UNOS).  It describes the services and information offered to patients by UNOS and the Organ Procurement and Transplant Network.                                                                                                                                      The [facility] team sincerely wishes you the best and remains available to answer any questions.  Please do not hesitate to contact our pre-transplant office if we can assist you in any other way.  You should follow-up with your primary care physician for routine medical care.                                                                             Sincerely,    XAVI CollinsN, RN, CCTC  Pre-Kidney Coordinator  lh/enclosure    [facility] [place]  [address] [place] [id number]  [phone]    CC:  Dr. Maik Messer          [facility]             The Organ Procurement and Transplantation Network   Toll-free patient services line: Your resource for organ transplant information     If you have a question regarding your own medical care, you always should call your transplant hospital first. However, for general organ transplant-related information, you can call the Organ Procurement and Transplantation Network (OPTN) toll-free patient services line  at [phone].     Anyone, including potential transplant candidates, candidates, recipients, family members, friends, living donors, and donor family members, can call this number to:     · Talk about organ donation, living donation, the transplant process, the donation process, and transplant policies.   · Get a free patient information kit with helpful booklets, waiting list and transplant information, and a list of all transplant hospitals.   · Ask questions about the OPTN website (https://optn.transplant.hrsa.gov/), the [employer] for Organ Sharings (UNOS) website (https://unos.org/), or the UNOS website for living donors and transplant recipients. (https://www.transplantliving.org/).   · Learn how the OPTN can help you.   · Talk about any concerns that you may have with a transplant hospital.     The nations transplant system, the OPTN, is managed under federal contract by the [employer] Organ Sharing (UNOS), which is a non-profit charitable organization. The OPTN helps create and define organ sharing policies that make the best use of donated organs. This process continuously evaluating new advances and discoveries so policies can be adapted to best serve patients waiting for transplants. To do so, the OPTN works closely with transplant professionals, transplant patients, transplant candidates, donor families, living donors, and the public. All transplant programs and organ procurement organizations throughout the country are OPTN members and are obligated to follow the policies the OPTN creates for allocating organs.     The OPTN also is responsible for:   · Providing educational material for patients, the public, and professionals.   · Raising awareness of the need for donated organs and tissue.   · Coordinating organ procurement, matching, and placement.   · Collecting information about every organ transplant and donation that occurs in the [place].     Remember, you should contact  your transplant hospital directly if you have questions or concerns about your own medical care including medical records, work-up progress, and test results.     We are not your transplant hospital, and our staff will not be able to answer questions about your case, so please keep your transplant hospitals phone number handy.   However, while you research your transplant needs and learn as much as you can about transplantation and donation, we welcome your call to our toll-free patient services line at [phone].

## 2020-04-28 ENCOUNTER — TELEPHONE (OUTPATIENT)
Dept: TRANSPLANT | Facility: CLINIC | Age: 65
End: 2020-04-28

## 2020-04-28 NOTE — TELEPHONE ENCOUNTER
Patient informed that he will need to be re-refered. All questions were answered and patient verbalized understanding.     ----- Message from Florencio Shelton sent at 4/21/2020  9:51 AM CDT -----  Contact: pt      ----- Message -----  From: Abhilash Nelson  Sent: 4/21/2020   9:15 AM CDT  To: [facility] Pre-Kidney Transplant Non-Clinical    Calling to speak with Roxana Au    Call back: [phone]

## 2020-07-30 ENCOUNTER — HISTORICAL (OUTPATIENT)
Dept: ADMINISTRATIVE | Facility: HOSPITAL | Age: 65
End: 2020-07-30

## 2020-07-30 LAB
APPEARANCE, UA: ABNORMAL
BACTERIA SPEC CULT: ABNORMAL /HPF
BILIRUB UR QL STRIP: NEGATIVE
COLOR UR: ABNORMAL
GLUCOSE (UA): NEGATIVE
HGB UR QL STRIP: ABNORMAL
KETONES UR QL STRIP: NEGATIVE
LEUKOCYTE ESTERASE UR QL STRIP: ABNORMAL
NITRITE UR QL STRIP: NEGATIVE
PH UR STRIP: >=9 [PH] (ref 5–9)
PROT UR QL STRIP: ABNORMAL
RBC #/AREA URNS HPF: ABNORMAL /HPF
SP GR UR STRIP: 1.01 (ref 1–1.03)
SQUAMOUS EPITHELIAL, UA: ABNORMAL
UROBILINOGEN UR STRIP-ACNC: 0.2
WBC #/AREA URNS HPF: 273 /HPF (ref 0–3)

## 2020-08-01 LAB — FINAL CULTURE: NORMAL

## 2020-08-06 ENCOUNTER — HISTORICAL (OUTPATIENT)
Dept: ADMINISTRATIVE | Facility: HOSPITAL | Age: 65
End: 2020-08-06

## 2020-08-06 LAB
APPEARANCE, UA: ABNORMAL
BACTERIA SPEC CULT: ABNORMAL /HPF
BILIRUB UR QL STRIP: NEGATIVE
COLOR UR: YELLOW
GLUCOSE (UA): NEGATIVE
HGB UR QL STRIP: ABNORMAL
KETONES UR QL STRIP: NEGATIVE
LEUKOCYTE ESTERASE UR QL STRIP: ABNORMAL
NITRITE UR QL STRIP: NEGATIVE
PH UR STRIP: >=9 [PH] (ref 5–9)
PROT UR QL STRIP: ABNORMAL
RBC #/AREA URNS HPF: 20 /HPF (ref 0–2)
SP GR UR STRIP: 1.01 (ref 1–1.03)
SQUAMOUS EPITHELIAL, UA: ABNORMAL
UROBILINOGEN UR STRIP-ACNC: 0.2
WBC #/AREA URNS HPF: 837 /HPF (ref 0–3)

## 2020-08-08 LAB — FINAL CULTURE: NO GROWTH

## 2020-10-28 ENCOUNTER — HISTORICAL (OUTPATIENT)
Dept: CARDIOLOGY | Facility: HOSPITAL | Age: 65
End: 2020-10-28

## 2020-10-28 LAB
BUN SERPL-MCNC: 15.2 MG/DL (ref 8.4–25.7)
CALCIUM SERPL-MCNC: 9.2 MG/DL (ref 8.8–10)
CHLORIDE SERPL-SCNC: 98 MMOL/L (ref 98–107)
CO2 SERPL-SCNC: 29 MMOL/L (ref 23–31)
CREAT SERPL-MCNC: 5.16 MG/DL (ref 0.73–1.18)
CREAT/UREA NIT SERPL: 3
GLUCOSE SERPL-MCNC: 105 MG/DL (ref 82–115)
POTASSIUM SERPL-SCNC: 3.4 MMOL/L (ref 3.5–5.1)
SODIUM SERPL-SCNC: 141 MMOL/L (ref 136–145)

## 2020-12-23 LAB
ABS NEUT (OLG): 2.07 X10(3)/MCL (ref 2.1–9.2)
ALBUMIN SERPL-MCNC: 1.4 GM/DL (ref 3.4–4.8)
ALBUMIN/GLOB SERPL: 0.3 RATIO (ref 1.1–2)
ALP SERPL-CCNC: 58 UNIT/L (ref 40–150)
ALT SERPL-CCNC: 10 UNIT/L (ref 0–55)
AST SERPL-CCNC: 12 UNIT/L (ref 5–34)
BASOPHILS # BLD AUTO: 0.01 X10(3)/MCL (ref 0–0.2)
BASOPHILS NFR BLD AUTO: 0.3 % (ref 0–0.9)
BILIRUB SERPL-MCNC: 0.5 MG/DL (ref 0.2–1.2)
BILIRUBIN DIRECT+TOT PNL SERPL-MCNC: 0.2 MG/DL (ref 0–0.8)
BILIRUBIN DIRECT+TOT PNL SERPL-MCNC: 0.3 MG/DL (ref 0–0.5)
BUN SERPL-MCNC: 32.2 MG/DL (ref 8.4–25.7)
CALCIUM SERPL-MCNC: 7.6 MG/DL (ref 8.8–10)
CHLORIDE SERPL-SCNC: 110 MMOL/L (ref 98–107)
CO2 SERPL-SCNC: 20 MMOL/L (ref 23–31)
CREAT SERPL-MCNC: 5.78 MG/DL (ref 0.72–1.25)
CROSSMATCH INTERPRETATION: NORMAL
CRP SERPL HS-MCNC: 36.48 MG/L (ref 0–5)
DEPRECATED CALCIDIOL+CALCIFEROL SERPL-MC: 11.4 NG/ML (ref 30–80)
EOSINOPHIL # BLD AUTO: 0.2 X10(3)/MCL (ref 0–0.9)
EOSINOPHIL NFR BLD AUTO: 6.2 % (ref 0–6.5)
ERYTHROCYTE [DISTWIDTH] IN BLOOD BY AUTOMATED COUNT: 15.5 % (ref 11.5–17)
ERYTHROCYTE [SEDIMENTATION RATE] IN BLOOD: >140 MM/HR (ref 0–20)
FERRITIN SERPL-MCNC: 2541.48 NG/ML (ref 21.81–274.66)
FOLATE SERPL-MCNC: 8.9 NG/ML (ref 7–31.4)
GLOBULIN SER-MCNC: 5.2 GM/DL (ref 2.4–3.5)
GLUCOSE SERPL-MCNC: 100 MG/DL (ref 82–115)
GROUP & RH: NORMAL
HCT VFR BLD AUTO: 24.2 % (ref 42–52)
HGB BLD-MCNC: 7.7 GM/DL (ref 14–18)
IMM GRANULOCYTES # BLD AUTO: 0.04 10*3/UL (ref 0–0.02)
IMM GRANULOCYTES NFR BLD AUTO: 1.2 % (ref 0–0.43)
IRON SATN MFR SERPL: 38 % (ref 20–50)
IRON SERPL-MCNC: 39 UG/DL (ref 65–175)
LYMPHOCYTES # BLD AUTO: 0.63 X10(3)/MCL (ref 0.6–4.6)
LYMPHOCYTES NFR BLD AUTO: 19.6 % (ref 16.2–38.3)
MAGNESIUM SERPL-MCNC: 1.79 MG/DL (ref 1.6–2.6)
MCH RBC QN AUTO: 30.9 PG (ref 27–31)
MCHC RBC AUTO-ENTMCNC: 31.8 GM/DL (ref 33–36)
MCV RBC AUTO: 97.2 FL (ref 80–94)
MONOCYTES # BLD AUTO: 0.27 X10(3)/MCL (ref 0.1–1.3)
MONOCYTES NFR BLD AUTO: 8.4 % (ref 4.7–11.3)
NEUTROPHILS # BLD AUTO: 2.07 X10(3)/MCL (ref 2.1–9.2)
NEUTROPHILS NFR BLD AUTO: 64.3 % (ref 49.1–73.4)
NRBC BLD AUTO-RTO: 0 % (ref 0–0.2)
PHOSPHATE SERPL-MCNC: 3.4 MG/DL (ref 2.3–4.7)
PLATELET # BLD AUTO: 195 X10(3)/MCL (ref 130–400)
PMV BLD AUTO: 11.2 FL (ref 7.4–10.4)
POTASSIUM SERPL-SCNC: 4.4 MMOL/L (ref 3.5–5.1)
PREALB SERPL-MCNC: 13.1 MG/DL (ref 16–42)
PRODUCT READY: NORMAL
PROT SERPL-MCNC: 6.6 GM/DL (ref 5.8–7.6)
RBC # BLD AUTO: 2.49 X10(6)/MCL (ref 4.7–6.1)
SODIUM SERPL-SCNC: 139 MMOL/L (ref 136–145)
TIBC SERPL-MCNC: 103 UG/DL (ref 250–450)
TIBC SERPL-MCNC: 64 UG/DL (ref 69–240)
TRANSFERRIN SERPL-MCNC: 82 MG/DL (ref 163–344)
TRANSFUSION ORDER: NORMAL
VIT B12 SERPL-MCNC: 743 PG/ML (ref 213–816)
WBC # SPEC AUTO: 3.2 X10(3)/MCL (ref 4.5–11.5)

## 2020-12-24 LAB
ABS NEUT (OLG): 3.07 X10(3)/MCL (ref 2.1–9.2)
BASOPHILS # BLD AUTO: 0.02 X10(3)/MCL (ref 0–0.2)
BASOPHILS NFR BLD AUTO: 0.4 % (ref 0–0.9)
BUN SERPL-MCNC: 22.1 MG/DL (ref 8.4–25.7)
CALCIUM SERPL-MCNC: 7.8 MG/DL (ref 8.8–10)
CHLORIDE SERPL-SCNC: 105 MMOL/L (ref 98–107)
CO2 SERPL-SCNC: 23 MMOL/L (ref 23–31)
CREAT SERPL-MCNC: 4.67 MG/DL (ref 0.72–1.25)
CREAT/UREA NIT SERPL: 5
EOSINOPHIL # BLD AUTO: 0.21 X10(3)/MCL (ref 0–0.9)
EOSINOPHIL NFR BLD AUTO: 4.6 % (ref 0–6.5)
ERYTHROCYTE [DISTWIDTH] IN BLOOD BY AUTOMATED COUNT: 16.9 % (ref 11.5–17)
GLUCOSE SERPL-MCNC: 97 MG/DL (ref 82–115)
HCT VFR BLD AUTO: 31.9 % (ref 42–52)
HGB BLD-MCNC: 10.6 GM/DL (ref 14–18)
IMM GRANULOCYTES # BLD AUTO: 0.06 10*3/UL (ref 0–0.02)
IMM GRANULOCYTES NFR BLD AUTO: 1.3 % (ref 0–0.43)
LYMPHOCYTES # BLD AUTO: 0.81 X10(3)/MCL (ref 0.6–4.6)
LYMPHOCYTES NFR BLD AUTO: 17.8 % (ref 16.2–38.3)
MCH RBC QN AUTO: 30.5 PG (ref 27–31)
MCHC RBC AUTO-ENTMCNC: 33.2 GM/DL (ref 33–36)
MCV RBC AUTO: 91.7 FL (ref 80–94)
MONOCYTES # BLD AUTO: 0.37 X10(3)/MCL (ref 0.1–1.3)
MONOCYTES NFR BLD AUTO: 8.1 % (ref 4.7–11.3)
NEUTROPHILS # BLD AUTO: 3.07 X10(3)/MCL (ref 2.1–9.2)
NEUTROPHILS NFR BLD AUTO: 67.8 % (ref 49.1–73.4)
NRBC BLD AUTO-RTO: 0 % (ref 0–0.2)
PLATELET # BLD AUTO: 181 X10(3)/MCL (ref 130–400)
PMV BLD AUTO: 10.6 FL (ref 7.4–10.4)
POTASSIUM SERPL-SCNC: 4 MMOL/L (ref 3.5–5.1)
RBC # BLD AUTO: 3.48 X10(6)/MCL (ref 4.7–6.1)
SODIUM SERPL-SCNC: 137 MMOL/L (ref 136–145)
WBC # SPEC AUTO: 4.5 X10(3)/MCL (ref 4.5–11.5)

## 2020-12-26 LAB
ABS NEUT (OLG): 9.35 X10(3)/MCL (ref 2.1–9.2)
BASOPHILS # BLD AUTO: 0.03 X10(3)/MCL (ref 0–0.2)
BASOPHILS NFR BLD AUTO: 0.3 % (ref 0–0.9)
BUN SERPL-MCNC: 28.6 MG/DL (ref 8.4–25.7)
CALCIUM SERPL-MCNC: 7.9 MG/DL (ref 8.8–10)
CHLORIDE SERPL-SCNC: 102 MMOL/L (ref 98–107)
CO2 SERPL-SCNC: 20 MMOL/L (ref 23–31)
CREAT SERPL-MCNC: 6.23 MG/DL (ref 0.72–1.25)
CREAT/UREA NIT SERPL: 5
EOSINOPHIL # BLD AUTO: 0.13 X10(3)/MCL (ref 0–0.9)
EOSINOPHIL NFR BLD AUTO: 1.2 % (ref 0–6.5)
ERYTHROCYTE [DISTWIDTH] IN BLOOD BY AUTOMATED COUNT: 16.6 % (ref 11.5–17)
GLUCOSE SERPL-MCNC: 93 MG/DL (ref 82–115)
HCT VFR BLD AUTO: 35.5 % (ref 42–52)
HGB BLD-MCNC: 11.5 GM/DL (ref 14–18)
IMM GRANULOCYTES # BLD AUTO: 0.1 10*3/UL (ref 0–0.02)
IMM GRANULOCYTES NFR BLD AUTO: 0.9 % (ref 0–0.43)
LYMPHOCYTES # BLD AUTO: 0.95 X10(3)/MCL (ref 0.6–4.6)
LYMPHOCYTES NFR BLD AUTO: 8.5 % (ref 16.2–38.3)
MCH RBC QN AUTO: 30.2 PG (ref 27–31)
MCHC RBC AUTO-ENTMCNC: 32.4 GM/DL (ref 33–36)
MCV RBC AUTO: 93.2 FL (ref 80–94)
MONOCYTES # BLD AUTO: 0.65 X10(3)/MCL (ref 0.1–1.3)
MONOCYTES NFR BLD AUTO: 5.8 % (ref 4.7–11.3)
NEUTROPHILS # BLD AUTO: 9.35 X10(3)/MCL (ref 2.1–9.2)
NEUTROPHILS NFR BLD AUTO: 83.3 % (ref 49.1–73.4)
NRBC BLD AUTO-RTO: 0 % (ref 0–0.2)
PLATELET # BLD AUTO: 197 X10(3)/MCL (ref 130–400)
PMV BLD AUTO: 11.1 FL (ref 7.4–10.4)
POTASSIUM SERPL-SCNC: 4 MMOL/L (ref 3.5–5.1)
RBC # BLD AUTO: 3.81 X10(6)/MCL (ref 4.7–6.1)
SODIUM SERPL-SCNC: 135 MMOL/L (ref 136–145)
WBC # SPEC AUTO: 11.2 X10(3)/MCL (ref 4.5–11.5)

## 2020-12-28 LAB
ABS NEUT (OLG): 6.5 X10(3)/MCL (ref 2.1–9.2)
ALBUMIN SERPL-MCNC: 1.5 GM/DL (ref 3.4–4.8)
ALBUMIN/GLOB SERPL: 0.3 RATIO (ref 1.1–2)
ALP SERPL-CCNC: 61 UNIT/L (ref 40–150)
ALT SERPL-CCNC: 6 UNIT/L (ref 0–55)
AST SERPL-CCNC: 11 UNIT/L (ref 5–34)
BASOPHILS # BLD AUTO: 0.05 X10(3)/MCL (ref 0–0.2)
BASOPHILS NFR BLD AUTO: 0.6 % (ref 0–0.9)
BILIRUB SERPL-MCNC: 0.5 MG/DL (ref 0.2–1.2)
BILIRUBIN DIRECT+TOT PNL SERPL-MCNC: 0.2 MG/DL (ref 0–0.8)
BILIRUBIN DIRECT+TOT PNL SERPL-MCNC: 0.3 MG/DL (ref 0–0.5)
BUN SERPL-MCNC: 47.7 MG/DL (ref 8.4–25.7)
CALCIUM SERPL-MCNC: 7.6 MG/DL (ref 8.8–10)
CHLORIDE SERPL-SCNC: 102 MMOL/L (ref 98–107)
CO2 SERPL-SCNC: 19 MMOL/L (ref 23–31)
CREAT SERPL-MCNC: 7.91 MG/DL (ref 0.72–1.25)
CRP SERPL HS-MCNC: 37.82 MG/L (ref 0–5)
EOSINOPHIL # BLD AUTO: 0.17 X10(3)/MCL (ref 0–0.9)
EOSINOPHIL NFR BLD AUTO: 2 % (ref 0–6.5)
ERYTHROCYTE [DISTWIDTH] IN BLOOD BY AUTOMATED COUNT: 16.4 % (ref 11.5–17)
ERYTHROCYTE [SEDIMENTATION RATE] IN BLOOD: 114 MM/HR (ref 0–20)
GLOBULIN SER-MCNC: 4.8 GM/DL (ref 2.4–3.5)
GLUCOSE SERPL-MCNC: 96 MG/DL (ref 82–115)
HCT VFR BLD AUTO: 28.7 % (ref 42–52)
HGB BLD-MCNC: 9.4 GM/DL (ref 14–18)
IMM GRANULOCYTES # BLD AUTO: 0.21 10*3/UL (ref 0–0.02)
IMM GRANULOCYTES NFR BLD AUTO: 2.5 % (ref 0–0.43)
LYMPHOCYTES # BLD AUTO: 1.05 X10(3)/MCL (ref 0.6–4.6)
LYMPHOCYTES NFR BLD AUTO: 12.4 % (ref 16.2–38.3)
MAGNESIUM SERPL-MCNC: 1.76 MG/DL (ref 1.6–2.6)
MCH RBC QN AUTO: 30.2 PG (ref 27–31)
MCHC RBC AUTO-ENTMCNC: 32.8 GM/DL (ref 33–36)
MCV RBC AUTO: 92.3 FL (ref 80–94)
MONOCYTES # BLD AUTO: 0.52 X10(3)/MCL (ref 0.1–1.3)
MONOCYTES NFR BLD AUTO: 6.1 % (ref 4.7–11.3)
NEUTROPHILS # BLD AUTO: 6.5 X10(3)/MCL (ref 2.1–9.2)
NEUTROPHILS NFR BLD AUTO: 76.4 % (ref 49.1–73.4)
NRBC BLD AUTO-RTO: 0 % (ref 0–0.2)
PHOSPHATE SERPL-MCNC: 4.1 MG/DL (ref 2.3–4.7)
PLATELET # BLD AUTO: 212 X10(3)/MCL (ref 130–400)
PMV BLD AUTO: 10.7 FL (ref 7.4–10.4)
POTASSIUM SERPL-SCNC: 4.2 MMOL/L (ref 3.5–5.1)
PREALB SERPL-MCNC: 20.4 MG/DL (ref 16–42)
PROT SERPL-MCNC: 6.3 GM/DL (ref 5.8–7.6)
RBC # BLD AUTO: 3.11 X10(6)/MCL (ref 4.7–6.1)
SODIUM SERPL-SCNC: 132 MMOL/L (ref 136–145)
WBC # SPEC AUTO: 8.5 X10(3)/MCL (ref 4.5–11.5)

## 2020-12-29 ENCOUNTER — HISTORICAL (OUTPATIENT)
Dept: ADMINISTRATIVE | Facility: HOSPITAL | Age: 65
End: 2020-12-29

## 2020-12-29 LAB
ABS NEUT (OLG): 9.17 X10(3)/MCL (ref 2.1–9.2)
APPEARANCE, UA: ABNORMAL
BACTERIA SPEC CULT: ABNORMAL /HPF
BILIRUB UR QL STRIP: ABNORMAL
BUN SERPL-MCNC: 29.8 MG/DL (ref 8.4–25.7)
CALCIUM SERPL-MCNC: 7.6 MG/DL (ref 8.8–10)
CHLORIDE SERPL-SCNC: 104 MMOL/L (ref 98–107)
CO2 SERPL-SCNC: 24 MMOL/L (ref 23–31)
COLOR STL: NORMAL
COLOR UR: ABNORMAL
CONSISTENCY STL: NORMAL
CREAT SERPL-MCNC: 5.84 MG/DL (ref 0.72–1.25)
CREAT/UREA NIT SERPL: 5
ERYTHROCYTE [DISTWIDTH] IN BLOOD BY AUTOMATED COUNT: 16.7 % (ref 11.5–17)
FLUAV AG UPPER RESP QL IA.RAPID: NEGATIVE
FLUAV AG UPPER RESP QL IA.RAPID: NEGATIVE
FLUBV AG UPPER RESP QL IA.RAPID: NEGATIVE
FLUBV AG UPPER RESP QL IA.RAPID: NEGATIVE
GLUCOSE (UA): NEGATIVE
GLUCOSE SERPL-MCNC: 100 MG/DL (ref 82–115)
HCT VFR BLD AUTO: 30 % (ref 42–52)
HGB BLD-MCNC: 9.7 GM/DL (ref 14–18)
HGB UR QL STRIP: ABNORMAL
KETONES UR QL STRIP: ABNORMAL
LACTATE SERPL-SCNC: 0.9 MMOL/L (ref 0.5–2.2)
LEUKOCYTE ESTERASE UR QL STRIP: ABNORMAL
MCH RBC QN AUTO: 30.3 PG (ref 27–31)
MCHC RBC AUTO-ENTMCNC: 32.3 GM/DL (ref 33–36)
MCV RBC AUTO: 93.8 FL (ref 80–94)
NITRITE UR QL STRIP: NEGATIVE
NRBC BLD AUTO-RTO: 0 % (ref 0–0.2)
PH UR STRIP: 7 [PH] (ref 5–7)
PLATELET # BLD AUTO: 225 X10(3)/MCL (ref 130–400)
PMV BLD AUTO: 10.5 FL (ref 7.4–10.4)
POTASSIUM SERPL-SCNC: 4.2 MMOL/L (ref 3.5–5.1)
PROT UR QL STRIP: ABNORMAL
RBC # BLD AUTO: 3.2 X10(6)/MCL (ref 4.7–6.1)
RBC #/AREA URNS HPF: ABNORMAL /HPF
SODIUM SERPL-SCNC: 136 MMOL/L (ref 136–145)
SP GR UR STRIP: 1.01 (ref 1–1.03)
SQUAMOUS EPITHELIAL, UA: ABNORMAL /LPF
UROBILINOGEN UR STRIP-ACNC: NEGATIVE
WBC # SPEC AUTO: 10.5 X10(3)/MCL (ref 4.5–11.5)
WBC #/AREA URNS HPF: ABNORMAL /HPF

## 2020-12-30 LAB
ABS NEUT (OLG): 5.65 X10(3)/MCL (ref 2.1–9.2)
BASOPHILS # BLD AUTO: 0.06 X10(3)/MCL (ref 0–0.2)
BASOPHILS NFR BLD AUTO: 0.9 % (ref 0–0.9)
BUN SERPL-MCNC: 34.1 MG/DL (ref 8.4–25.7)
CALCIUM SERPL-MCNC: 7.7 MG/DL (ref 8.8–10)
CHLORIDE SERPL-SCNC: 106 MMOL/L (ref 98–107)
CO2 SERPL-SCNC: 22 MMOL/L (ref 23–31)
CREAT SERPL-MCNC: 6.88 MG/DL (ref 0.72–1.25)
CREAT/UREA NIT SERPL: 5
EOSINOPHIL # BLD AUTO: 0.01 X10(3)/MCL (ref 0–0.9)
EOSINOPHIL NFR BLD AUTO: 0.1 % (ref 0–6.5)
ERYTHROCYTE [DISTWIDTH] IN BLOOD BY AUTOMATED COUNT: 16.9 % (ref 11.5–17)
GLUCOSE SERPL-MCNC: 107 MG/DL (ref 82–115)
HCT VFR BLD AUTO: 29.1 % (ref 42–52)
HGB BLD-MCNC: 9.4 GM/DL (ref 14–18)
IMM GRANULOCYTES # BLD AUTO: 0.19 10*3/UL (ref 0–0.02)
IMM GRANULOCYTES NFR BLD AUTO: 2.9 % (ref 0–0.43)
LYMPHOCYTES # BLD AUTO: 0.43 X10(3)/MCL (ref 0.6–4.6)
LYMPHOCYTES NFR BLD AUTO: 6.4 % (ref 16.2–38.3)
MAGNESIUM SERPL-MCNC: 1.58 MG/DL (ref 1.6–2.6)
MCH RBC QN AUTO: 30.6 PG (ref 27–31)
MCHC RBC AUTO-ENTMCNC: 32.3 GM/DL (ref 33–36)
MCV RBC AUTO: 94.8 FL (ref 80–94)
MONOCYTES # BLD AUTO: 0.43 X10(3)/MCL (ref 0.1–1.3)
MONOCYTES NFR BLD AUTO: 6.4 % (ref 4.7–11.3)
NEUTROPHILS # BLD AUTO: 5.65 X10(3)/MCL (ref 2.1–9.2)
NEUTROPHILS NFR BLD AUTO: 83.8 % (ref 49.1–73.4)
NRBC BLD AUTO-RTO: 0 % (ref 0–0.2)
PHOSPHATE SERPL-MCNC: 3.6 MG/DL (ref 2.3–4.7)
PLATELET # BLD AUTO: 310 X10(3)/MCL (ref 130–400)
PMV BLD AUTO: 10.5 FL (ref 7.4–10.4)
POTASSIUM SERPL-SCNC: 4.5 MMOL/L (ref 3.5–5.1)
RBC # BLD AUTO: 3.07 X10(6)/MCL (ref 4.7–6.1)
SODIUM SERPL-SCNC: 137 MMOL/L (ref 136–145)
WBC # SPEC AUTO: 6.7 X10(3)/MCL (ref 4.5–11.5)

## 2021-01-03 LAB
ABS NEUT (OLG): 5.95 X10(3)/MCL (ref 2.1–9.2)
ALBUMIN SERPL-MCNC: 1.4 GM/DL (ref 3.4–4.8)
ALBUMIN/GLOB SERPL: 0.3 RATIO (ref 1.1–2)
ALP SERPL-CCNC: 55 UNIT/L (ref 40–150)
ALT SERPL-CCNC: 10 UNIT/L (ref 0–55)
AST SERPL-CCNC: 17 UNIT/L (ref 5–34)
BASOPHILS # BLD AUTO: 0.07 X10(3)/MCL (ref 0–0.2)
BASOPHILS NFR BLD AUTO: 0.9 % (ref 0–0.9)
BILIRUB SERPL-MCNC: 0.5 MG/DL (ref 0.2–1.2)
BILIRUBIN DIRECT+TOT PNL SERPL-MCNC: 0.2 MG/DL (ref 0–0.5)
BILIRUBIN DIRECT+TOT PNL SERPL-MCNC: 0.3 MG/DL (ref 0–0.8)
BUN SERPL-MCNC: 27.5 MG/DL (ref 8.4–25.7)
CALCIUM SERPL-MCNC: 7.3 MG/DL (ref 8.8–10)
CHLORIDE SERPL-SCNC: 102 MMOL/L (ref 98–107)
CO2 SERPL-SCNC: 24 MMOL/L (ref 23–31)
CREAT SERPL-MCNC: 5.31 MG/DL (ref 0.72–1.25)
EOSINOPHIL # BLD AUTO: 0.07 X10(3)/MCL (ref 0–0.9)
EOSINOPHIL NFR BLD AUTO: 0.9 % (ref 0–6.5)
ERYTHROCYTE [DISTWIDTH] IN BLOOD BY AUTOMATED COUNT: 15.8 % (ref 11.5–17)
GLOBULIN SER-MCNC: 4.3 GM/DL (ref 2.4–3.5)
GLUCOSE SERPL-MCNC: 84 MG/DL (ref 82–115)
HCT VFR BLD AUTO: 26.2 % (ref 42–52)
HGB BLD-MCNC: 8.5 GM/DL (ref 14–18)
IMM GRANULOCYTES # BLD AUTO: 0.1 10*3/UL (ref 0–0.02)
IMM GRANULOCYTES NFR BLD AUTO: 1.3 % (ref 0–0.43)
LYMPHOCYTES # BLD AUTO: 1.08 X10(3)/MCL (ref 0.6–4.6)
LYMPHOCYTES NFR BLD AUTO: 13.7 % (ref 16.2–38.3)
MAGNESIUM SERPL-MCNC: 1.64 MG/DL (ref 1.6–2.6)
MCH RBC QN AUTO: 30.1 PG (ref 27–31)
MCHC RBC AUTO-ENTMCNC: 32.4 GM/DL (ref 33–36)
MCV RBC AUTO: 92.9 FL (ref 80–94)
MONOCYTES # BLD AUTO: 0.61 X10(3)/MCL (ref 0.1–1.3)
MONOCYTES NFR BLD AUTO: 7.7 % (ref 4.7–11.3)
NEUTROPHILS # BLD AUTO: 5.95 X10(3)/MCL (ref 2.1–9.2)
NEUTROPHILS NFR BLD AUTO: 75.5 % (ref 49.1–73.4)
NRBC BLD AUTO-RTO: 0 % (ref 0–0.2)
PHOSPHATE SERPL-MCNC: 3 MG/DL (ref 2.3–4.7)
PLATELET # BLD AUTO: 265 X10(3)/MCL (ref 130–400)
PMV BLD AUTO: 10 FL (ref 7.4–10.4)
POTASSIUM SERPL-SCNC: 3.1 MMOL/L (ref 3.5–5.1)
PROT SERPL-MCNC: 5.7 GM/DL (ref 5.8–7.6)
RBC # BLD AUTO: 2.82 X10(6)/MCL (ref 4.7–6.1)
SODIUM SERPL-SCNC: 134 MMOL/L (ref 136–145)
WBC # SPEC AUTO: 7.9 X10(3)/MCL (ref 4.5–11.5)

## 2021-01-04 LAB
ABS NEUT (OLG): 5.81 X10(3)/MCL (ref 2.1–9.2)
ALBUMIN SERPL-MCNC: 1.4 GM/DL (ref 3.4–4.8)
ALBUMIN/GLOB SERPL: 0.3 RATIO (ref 1.1–2)
ALP SERPL-CCNC: 59 UNIT/L (ref 40–150)
ALT SERPL-CCNC: 9 UNIT/L (ref 0–55)
APTT PPP: 49.8 SEC (ref 23.4–34.9)
AST SERPL-CCNC: 12 UNIT/L (ref 5–34)
BASOPHILS # BLD AUTO: 0.05 X10(3)/MCL (ref 0–0.2)
BASOPHILS NFR BLD AUTO: 0.6 % (ref 0–0.9)
BILIRUB SERPL-MCNC: 0.5 MG/DL (ref 0.2–1.2)
BILIRUBIN DIRECT+TOT PNL SERPL-MCNC: 0.2 MG/DL (ref 0–0.5)
BILIRUBIN DIRECT+TOT PNL SERPL-MCNC: 0.3 MG/DL (ref 0–0.8)
BUN SERPL-MCNC: 35.5 MG/DL (ref 8.4–25.7)
CALCIUM SERPL-MCNC: 7.4 MG/DL (ref 8.8–10)
CHLORIDE SERPL-SCNC: 103 MMOL/L (ref 98–107)
CO2 SERPL-SCNC: 23 MMOL/L (ref 23–31)
CREAT SERPL-MCNC: 6.89 MG/DL (ref 0.72–1.25)
CRP SERPL HS-MCNC: 50.66 MG/L (ref 0–5)
EOSINOPHIL # BLD AUTO: 0.04 X10(3)/MCL (ref 0–0.9)
EOSINOPHIL NFR BLD AUTO: 0.5 % (ref 0–6.5)
ERYTHROCYTE [DISTWIDTH] IN BLOOD BY AUTOMATED COUNT: 15.9 % (ref 11.5–17)
ERYTHROCYTE [SEDIMENTATION RATE] IN BLOOD: 95 MM/HR (ref 0–20)
GLOBULIN SER-MCNC: 4.5 GM/DL (ref 2.4–3.5)
GLUCOSE SERPL-MCNC: 103 MG/DL (ref 82–115)
HCT VFR BLD AUTO: 26.3 % (ref 42–52)
HGB BLD-MCNC: 8.6 GM/DL (ref 14–18)
IMM GRANULOCYTES # BLD AUTO: 0.13 10*3/UL (ref 0–0.02)
IMM GRANULOCYTES NFR BLD AUTO: 1.7 % (ref 0–0.43)
INR PPP: 1.2 (ref 2–3)
LYMPHOCYTES # BLD AUTO: 1.19 X10(3)/MCL (ref 0.6–4.6)
LYMPHOCYTES NFR BLD AUTO: 15.3 % (ref 16.2–38.3)
MAGNESIUM SERPL-MCNC: 1.71 MG/DL (ref 1.6–2.6)
MCH RBC QN AUTO: 30.1 PG (ref 27–31)
MCHC RBC AUTO-ENTMCNC: 32.7 GM/DL (ref 33–36)
MCV RBC AUTO: 92 FL (ref 80–94)
MONOCYTES # BLD AUTO: 0.56 X10(3)/MCL (ref 0.1–1.3)
MONOCYTES NFR BLD AUTO: 7.2 % (ref 4.7–11.3)
NEUTROPHILS # BLD AUTO: 5.81 X10(3)/MCL (ref 2.1–9.2)
NEUTROPHILS NFR BLD AUTO: 74.7 % (ref 49.1–73.4)
NRBC BLD AUTO-RTO: 0 % (ref 0–0.2)
PHOSPHATE SERPL-MCNC: 3.6 MG/DL (ref 2.3–4.7)
PLATELET # BLD AUTO: 290 X10(3)/MCL (ref 130–400)
PMV BLD AUTO: 10 FL (ref 7.4–10.4)
POTASSIUM SERPL-SCNC: 3.3 MMOL/L (ref 3.5–5.1)
PREALB SERPL-MCNC: 18.6 MG/DL (ref 16–42)
PROT SERPL-MCNC: 5.9 GM/DL (ref 5.8–7.6)
PROTHROMBIN TIME: 15.3 SEC (ref 11.7–14.5)
RBC # BLD AUTO: 2.86 X10(6)/MCL (ref 4.7–6.1)
SODIUM SERPL-SCNC: 135 MMOL/L (ref 136–145)
WBC # SPEC AUTO: 7.8 X10(3)/MCL (ref 4.5–11.5)

## 2021-01-05 LAB
BUN SERPL-MCNC: 21.3 MG/DL (ref 8.4–25.7)
CALCIUM SERPL-MCNC: 7.9 MG/DL (ref 8.8–10)
CHLORIDE SERPL-SCNC: 107 MMOL/L (ref 98–107)
CO2 SERPL-SCNC: 24 MMOL/L (ref 23–31)
CREAT SERPL-MCNC: 4.81 MG/DL (ref 0.72–1.25)
CREAT/UREA NIT SERPL: 4
GLUCOSE SERPL-MCNC: 95 MG/DL (ref 82–115)
POTASSIUM SERPL-SCNC: 4.6 MMOL/L (ref 3.5–5.1)
SODIUM SERPL-SCNC: 136 MMOL/L (ref 136–145)

## 2021-01-07 LAB
ABS NEUT (OLG): 6.41 X10(3)/MCL (ref 2.1–9.2)
BASOPHILS # BLD AUTO: 0.04 X10(3)/MCL (ref 0–0.2)
BASOPHILS NFR BLD AUTO: 0.5 % (ref 0–0.9)
BUN SERPL-MCNC: 20.4 MG/DL (ref 8.4–25.7)
CALCIUM SERPL-MCNC: 7.5 MG/DL (ref 8.8–10)
CHLORIDE SERPL-SCNC: 105 MMOL/L (ref 98–107)
CO2 SERPL-SCNC: 23 MMOL/L (ref 23–31)
CREAT SERPL-MCNC: 4.52 MG/DL (ref 0.72–1.25)
CREAT/UREA NIT SERPL: 5
EOSINOPHIL # BLD AUTO: 0.02 X10(3)/MCL (ref 0–0.9)
EOSINOPHIL NFR BLD AUTO: 0.3 % (ref 0–6.5)
ERYTHROCYTE [DISTWIDTH] IN BLOOD BY AUTOMATED COUNT: 16.2 % (ref 11.5–17)
GLUCOSE SERPL-MCNC: 102 MG/DL (ref 82–115)
HCT VFR BLD AUTO: 26 % (ref 42–52)
HGB BLD-MCNC: 8.4 GM/DL (ref 14–18)
IMM GRANULOCYTES # BLD AUTO: 0.1 10*3/UL (ref 0–0.02)
IMM GRANULOCYTES NFR BLD AUTO: 1.3 % (ref 0–0.43)
IRON SATN MFR SERPL: 43 % (ref 20–50)
IRON SERPL-MCNC: 49 UG/DL (ref 65–175)
LYMPHOCYTES # BLD AUTO: 0.65 X10(3)/MCL (ref 0.6–4.6)
LYMPHOCYTES NFR BLD AUTO: 8.4 % (ref 16.2–38.3)
MCH RBC QN AUTO: 29.8 PG (ref 27–31)
MCHC RBC AUTO-ENTMCNC: 32.3 GM/DL (ref 33–36)
MCV RBC AUTO: 92.2 FL (ref 80–94)
MONOCYTES # BLD AUTO: 0.53 X10(3)/MCL (ref 0.1–1.3)
MONOCYTES NFR BLD AUTO: 6.8 % (ref 4.7–11.3)
NEUTROPHILS # BLD AUTO: 6.41 X10(3)/MCL (ref 2.1–9.2)
NEUTROPHILS NFR BLD AUTO: 82.7 % (ref 49.1–73.4)
NRBC BLD AUTO-RTO: 0 % (ref 0–0.2)
PHOSPHATE SERPL-MCNC: 2.4 MG/DL (ref 2.3–4.7)
PLATELET # BLD AUTO: 238 X10(3)/MCL (ref 130–400)
PMV BLD AUTO: 9.2 FL (ref 7.4–10.4)
POTASSIUM SERPL-SCNC: 3.8 MMOL/L (ref 3.5–5.1)
RBC # BLD AUTO: 2.82 X10(6)/MCL (ref 4.7–6.1)
SODIUM SERPL-SCNC: 135 MMOL/L (ref 136–145)
TIBC SERPL-MCNC: 114 UG/DL (ref 250–450)
TIBC SERPL-MCNC: 65 UG/DL (ref 69–240)
TRANSFERRIN SERPL-MCNC: 86 MG/DL (ref 163–344)
WBC # SPEC AUTO: 7.8 X10(3)/MCL (ref 4.5–11.5)

## 2021-01-08 LAB
FINAL CULTURE: NORMAL
FINAL CULTURE: NORMAL

## 2021-01-09 LAB — FINAL CULTURE: NORMAL

## 2021-01-11 LAB
ABS NEUT (OLG): 9.48 X10(3)/MCL (ref 2.1–9.2)
ALBUMIN SERPL-MCNC: 1.5 GM/DL (ref 3.4–4.8)
ALBUMIN/GLOB SERPL: 0.3 RATIO (ref 1.1–2)
ALP SERPL-CCNC: 58 UNIT/L (ref 40–150)
ALT SERPL-CCNC: 10 UNIT/L (ref 0–55)
AST SERPL-CCNC: 13 UNIT/L (ref 5–34)
BASOPHILS # BLD AUTO: 0.02 X10(3)/MCL (ref 0–0.2)
BASOPHILS NFR BLD AUTO: 0.2 % (ref 0–0.9)
BILIRUB SERPL-MCNC: 1 MG/DL (ref 0.2–1.2)
BILIRUBIN DIRECT+TOT PNL SERPL-MCNC: 0.5 MG/DL (ref 0–0.5)
BILIRUBIN DIRECT+TOT PNL SERPL-MCNC: 0.5 MG/DL (ref 0–0.8)
BUN SERPL-MCNC: 34.6 MG/DL (ref 8.4–25.7)
CALCIUM SERPL-MCNC: 7.8 MG/DL (ref 8.8–10)
CHLORIDE SERPL-SCNC: 104 MMOL/L (ref 98–107)
CO2 SERPL-SCNC: 19 MMOL/L (ref 23–31)
CREAT SERPL-MCNC: 7.36 MG/DL (ref 0.72–1.25)
CROSSMATCH INTERPRETATION: NORMAL
CRP SERPL HS-MCNC: 265.69 MG/L (ref 0–5)
EOSINOPHIL # BLD AUTO: 0.01 X10(3)/MCL (ref 0–0.9)
EOSINOPHIL NFR BLD AUTO: 0.1 % (ref 0–6.5)
ERYTHROCYTE [DISTWIDTH] IN BLOOD BY AUTOMATED COUNT: 16.6 % (ref 11.5–17)
ERYTHROCYTE [SEDIMENTATION RATE] IN BLOOD: 119 MM/HR (ref 0–20)
GLOBULIN SER-MCNC: 4.9 GM/DL (ref 2.4–3.5)
GLUCOSE SERPL-MCNC: 106 MG/DL (ref 82–115)
GROUP & RH: NORMAL
HCT VFR BLD AUTO: 23.5 % (ref 42–52)
HGB BLD-MCNC: 7.7 GM/DL (ref 14–18)
IMM GRANULOCYTES # BLD AUTO: 0.06 10*3/UL (ref 0–0.02)
IMM GRANULOCYTES NFR BLD AUTO: 0.6 % (ref 0–0.43)
LYMPHOCYTES # BLD AUTO: 0.52 X10(3)/MCL (ref 0.6–4.6)
LYMPHOCYTES NFR BLD AUTO: 4.8 % (ref 16.2–38.3)
MAGNESIUM SERPL-MCNC: 1.45 MG/DL (ref 1.6–2.6)
MCH RBC QN AUTO: 30.1 PG (ref 27–31)
MCHC RBC AUTO-ENTMCNC: 32.8 GM/DL (ref 33–36)
MCV RBC AUTO: 91.8 FL (ref 80–94)
MONOCYTES # BLD AUTO: 0.67 X10(3)/MCL (ref 0.1–1.3)
MONOCYTES NFR BLD AUTO: 6.2 % (ref 4.7–11.3)
NEUTROPHILS # BLD AUTO: 9.48 X10(3)/MCL (ref 2.1–9.2)
NEUTROPHILS NFR BLD AUTO: 88.1 % (ref 49.1–73.4)
NRBC BLD AUTO-RTO: 0 % (ref 0–0.2)
PHOSPHATE SERPL-MCNC: 3.6 MG/DL (ref 2.3–4.7)
PLATELET # BLD AUTO: 172 X10(3)/MCL (ref 130–400)
PMV BLD AUTO: 10.2 FL (ref 7.4–10.4)
POTASSIUM SERPL-SCNC: 4.3 MMOL/L (ref 3.5–5.1)
PREALB SERPL-MCNC: 14.1 MG/DL (ref 16–42)
PRODUCT READY: NORMAL
PROT SERPL-MCNC: 6.4 GM/DL (ref 5.8–7.6)
RBC # BLD AUTO: 2.56 X10(6)/MCL (ref 4.7–6.1)
SODIUM SERPL-SCNC: 134 MMOL/L (ref 136–145)
TRANSFUSION ORDER: NORMAL
WBC # SPEC AUTO: 10.8 X10(3)/MCL (ref 4.5–11.5)

## 2021-01-12 LAB
ABS NEUT (OLG): 4.28 X10(3)/MCL (ref 2.1–9.2)
BUN SERPL-MCNC: 21.1 MG/DL (ref 8.4–25.7)
CALCIUM SERPL-MCNC: 8 MG/DL (ref 8.8–10)
CHLORIDE SERPL-SCNC: 103 MMOL/L (ref 98–107)
CO2 SERPL-SCNC: 24 MMOL/L (ref 23–31)
CREAT SERPL-MCNC: 4.94 MG/DL (ref 0.72–1.25)
CREAT/UREA NIT SERPL: 4
ERYTHROCYTE [DISTWIDTH] IN BLOOD BY AUTOMATED COUNT: 17.4 % (ref 11.5–17)
GLUCOSE SERPL-MCNC: 104 MG/DL (ref 82–115)
GRANULOCYTES NFR BLD MANUAL: 70 % (ref 47–80)
HCT VFR BLD AUTO: 27.9 % (ref 42–52)
HGB BLD-MCNC: 9.4 GM/DL (ref 14–18)
LYMPHOCYTES NFR BLD MANUAL: 8 % (ref 13–40)
MCH RBC QN AUTO: 29.8 PG (ref 27–31)
MCHC RBC AUTO-ENTMCNC: 33.7 GM/DL (ref 33–36)
MCV RBC AUTO: 88.6 FL (ref 80–94)
MONOCYTES NFR BLD MANUAL: 11 % (ref 2–11)
NEUTS BAND NFR BLD MANUAL: 11 % (ref 6–10)
PLATELET # BLD AUTO: 146 X10(3)/MCL (ref 130–400)
PLATELET # BLD EST: ABNORMAL 10*3/UL
PMV BLD AUTO: 10.7 FL (ref 7.4–10.4)
POTASSIUM SERPL-SCNC: 4.3 MMOL/L (ref 3.5–5.1)
RBC # BLD AUTO: 3.15 X10(6)/MCL (ref 4.7–6.1)
SODIUM SERPL-SCNC: 136 MMOL/L (ref 136–145)
WBC # SPEC AUTO: 5.4 X10(3)/MCL (ref 4.5–11.5)

## 2021-01-14 LAB
ABS NEUT (OLG): 2.43 X10(3)/MCL (ref 2.1–9.2)
ALBUMIN SERPL-MCNC: 1.4 GM/DL (ref 3.4–4.8)
ALBUMIN/GLOB SERPL: 0.3 RATIO (ref 1.1–2)
ALP SERPL-CCNC: 51 UNIT/L (ref 40–150)
ALT SERPL-CCNC: 10 UNIT/L (ref 0–55)
AST SERPL-CCNC: 8 UNIT/L (ref 5–34)
BASOPHILS # BLD AUTO: 0.02 X10(3)/MCL (ref 0–0.2)
BASOPHILS NFR BLD AUTO: 0.6 % (ref 0–0.9)
BILIRUB SERPL-MCNC: 0.8 MG/DL (ref 0.2–1.2)
BILIRUBIN DIRECT+TOT PNL SERPL-MCNC: 0.4 MG/DL (ref 0–0.5)
BILIRUBIN DIRECT+TOT PNL SERPL-MCNC: 0.4 MG/DL (ref 0–0.8)
BUN SERPL-MCNC: 21.5 MG/DL (ref 8.4–25.7)
CALCIUM SERPL-MCNC: 7.5 MG/DL (ref 8.8–10)
CHLORIDE SERPL-SCNC: 102 MMOL/L (ref 98–107)
CO2 SERPL-SCNC: 22 MMOL/L (ref 23–31)
CREAT SERPL-MCNC: 4.68 MG/DL (ref 0.72–1.25)
EOSINOPHIL # BLD AUTO: 0.01 X10(3)/MCL (ref 0–0.9)
EOSINOPHIL NFR BLD AUTO: 0.3 % (ref 0–6.5)
ERYTHROCYTE [DISTWIDTH] IN BLOOD BY AUTOMATED COUNT: 16.7 % (ref 11.5–17)
GLOBULIN SER-MCNC: 4.4 GM/DL (ref 2.4–3.5)
GLUCOSE SERPL-MCNC: 95 MG/DL (ref 82–115)
HCT VFR BLD AUTO: 26.2 % (ref 42–52)
HGB BLD-MCNC: 8.8 GM/DL (ref 14–18)
IMM GRANULOCYTES # BLD AUTO: 0.03 10*3/UL (ref 0–0.02)
IMM GRANULOCYTES NFR BLD AUTO: 0.8 % (ref 0–0.43)
LYMPHOCYTES # BLD AUTO: 0.5 X10(3)/MCL (ref 0.6–4.6)
LYMPHOCYTES NFR BLD AUTO: 14.2 % (ref 16.2–38.3)
MCH RBC QN AUTO: 29.9 PG (ref 27–31)
MCHC RBC AUTO-ENTMCNC: 33.6 GM/DL (ref 33–36)
MCV RBC AUTO: 89.1 FL (ref 80–94)
MONOCYTES # BLD AUTO: 0.54 X10(3)/MCL (ref 0.1–1.3)
MONOCYTES NFR BLD AUTO: 15.3 % (ref 4.7–11.3)
NEUTROPHILS # BLD AUTO: 2.43 X10(3)/MCL (ref 2.1–9.2)
NEUTROPHILS NFR BLD AUTO: 68.8 % (ref 49.1–73.4)
NRBC BLD AUTO-RTO: 0 % (ref 0–0.2)
PLATELET # BLD AUTO: 138 X10(3)/MCL (ref 130–400)
PMV BLD AUTO: 10.8 FL (ref 7.4–10.4)
POTASSIUM SERPL-SCNC: 3.6 MMOL/L (ref 3.5–5.1)
PROT SERPL-MCNC: 5.8 GM/DL (ref 5.8–7.6)
RBC # BLD AUTO: 2.94 X10(6)/MCL (ref 4.7–6.1)
SODIUM SERPL-SCNC: 134 MMOL/L (ref 136–145)
WBC # SPEC AUTO: 3.5 X10(3)/MCL (ref 4.5–11.5)

## 2021-01-15 LAB — C DIFF INTERP: NEGATIVE

## 2021-01-16 LAB
ANISOCYTOSIS BLD QL SMEAR: ABNORMAL
BUN SERPL-MCNC: 18.9 MG/DL (ref 8.4–25.7)
CALCIUM SERPL-MCNC: 7.6 MG/DL (ref 8.8–10)
CHLORIDE SERPL-SCNC: 99 MMOL/L (ref 98–107)
CO2 SERPL-SCNC: 27 MMOL/L (ref 23–31)
CREAT SERPL-MCNC: 3.91 MG/DL (ref 0.72–1.25)
CREAT/UREA NIT SERPL: 5
EOSINOPHIL NFR BLD MANUAL: 3 % (ref 0–8)
ERYTHROCYTE [DISTWIDTH] IN BLOOD BY AUTOMATED COUNT: 16.4 % (ref 11.5–17)
GLUCOSE SERPL-MCNC: 96 MG/DL (ref 82–115)
HCT VFR BLD AUTO: 28 % (ref 42–52)
HGB BLD-MCNC: 9.2 GM/DL (ref 14–18)
HYPOCHROMIA BLD QL SMEAR: ABNORMAL
LYMPHOCYTES NFR BLD MANUAL: 20 % (ref 13–40)
MAGNESIUM SERPL-MCNC: 1.64 MG/DL (ref 1.6–2.6)
MCH RBC QN AUTO: 29.4 PG (ref 27–31)
MCHC RBC AUTO-ENTMCNC: 32.9 GM/DL (ref 33–36)
MCV RBC AUTO: 89.5 FL (ref 80–94)
MONOCYTES NFR BLD MANUAL: 6 % (ref 2–11)
NEUTROPHILS NFR BLD MANUAL: 71 % (ref 47–80)
PHOSPHATE SERPL-MCNC: 2.3 MG/DL (ref 2.3–4.7)
PLATELET # BLD AUTO: 173 X10(3)/MCL (ref 130–400)
PLATELET # BLD EST: ADEQUATE 10*3/UL
PMV BLD AUTO: 11.2 FL (ref 7.4–10.4)
POIKILOCYTOSIS BLD QL SMEAR: ABNORMAL
POTASSIUM SERPL-SCNC: 3.6 MMOL/L (ref 3.5–5.1)
RBC # BLD AUTO: 3.13 X10(6)/MCL (ref 4.7–6.1)
RBC MORPH BLD: ABNORMAL
SCHISTOCYTES BLD QL AUTO: SLIGHT
SODIUM SERPL-SCNC: 135 MMOL/L (ref 136–145)
WBC # SPEC AUTO: 3.4 X10(3)/MCL (ref 4.5–11.5)

## 2021-01-18 LAB
ABS NEUT (OLG): 1.97 X10(3)/MCL (ref 2.1–9.2)
ALBUMIN SERPL-MCNC: 1.5 GM/DL (ref 3.4–4.8)
ALBUMIN/GLOB SERPL: 0.3 RATIO (ref 1.1–2)
ALP SERPL-CCNC: 57 UNIT/L (ref 40–150)
ALT SERPL-CCNC: 12 UNIT/L (ref 0–55)
ANISOCYTOSIS BLD QL SMEAR: ABNORMAL
AST SERPL-CCNC: 8 UNIT/L (ref 5–34)
BILIRUB SERPL-MCNC: 0.6 MG/DL (ref 0.2–1.2)
BILIRUBIN DIRECT+TOT PNL SERPL-MCNC: 0.3 MG/DL (ref 0–0.5)
BILIRUBIN DIRECT+TOT PNL SERPL-MCNC: 0.3 MG/DL (ref 0–0.8)
BUN SERPL-MCNC: 36.1 MG/DL (ref 8.4–25.7)
CALCIUM SERPL-MCNC: 7.7 MG/DL (ref 8.8–10)
CHLORIDE SERPL-SCNC: 100 MMOL/L (ref 98–107)
CO2 SERPL-SCNC: 25 MMOL/L (ref 23–31)
CREAT SERPL-MCNC: 6.1 MG/DL (ref 0.72–1.25)
CRP SERPL HS-MCNC: 79.59 MG/L (ref 0–5)
EOSINOPHIL NFR BLD MANUAL: 1 % (ref 0–8)
ERYTHROCYTE [DISTWIDTH] IN BLOOD BY AUTOMATED COUNT: 15.9 % (ref 11.5–17)
ERYTHROCYTE [SEDIMENTATION RATE] IN BLOOD: 116 MM/HR (ref 0–20)
GLOBULIN SER-MCNC: 4.8 GM/DL (ref 2.4–3.5)
GLUCOSE SERPL-MCNC: 91 MG/DL (ref 82–115)
HCT VFR BLD AUTO: 28.4 % (ref 42–52)
HGB BLD-MCNC: 9.3 GM/DL (ref 14–18)
HYPOCHROMIA BLD QL SMEAR: ABNORMAL
LYMPHOCYTES NFR BLD MANUAL: 18 % (ref 13–40)
MAGNESIUM SERPL-MCNC: 1.7 MG/DL (ref 1.6–2.6)
MCH RBC QN AUTO: 29.5 PG (ref 27–31)
MCHC RBC AUTO-ENTMCNC: 32.7 GM/DL (ref 33–36)
MCV RBC AUTO: 90.2 FL (ref 80–94)
MONOCYTES NFR BLD MANUAL: 13 % (ref 2–11)
NEUTROPHILS NFR BLD MANUAL: 68 % (ref 47–80)
PHOSPHATE SERPL-MCNC: 2.6 MG/DL (ref 2.3–4.7)
PLATELET # BLD AUTO: 209 X10(3)/MCL (ref 130–400)
PLATELET # BLD EST: ADEQUATE 10*3/UL
PMV BLD AUTO: 10.3 FL (ref 7.4–10.4)
POIKILOCYTOSIS BLD QL SMEAR: ABNORMAL
POTASSIUM SERPL-SCNC: 4 MMOL/L (ref 3.5–5.1)
PREALB SERPL-MCNC: 19.7 MG/DL (ref 16–42)
PROT SERPL-MCNC: 6.3 GM/DL (ref 5.8–7.6)
RBC # BLD AUTO: 3.15 X10(6)/MCL (ref 4.7–6.1)
RBC MORPH BLD: ABNORMAL
SCHISTOCYTES BLD QL AUTO: SLIGHT
SODIUM SERPL-SCNC: 134 MMOL/L (ref 136–145)
WBC # SPEC AUTO: 3.3 X10(3)/MCL (ref 4.5–11.5)

## 2021-01-19 ENCOUNTER — HISTORICAL (OUTPATIENT)
Dept: SURGERY | Facility: HOSPITAL | Age: 66
End: 2021-01-19

## 2021-01-19 LAB — POTASSIUM SERPL-SCNC: 4 MMOL/L (ref 3.5–5.1)

## 2021-01-21 LAB
ABS NEUT (OLG): 4.08 X10(3)/MCL (ref 2.1–9.2)
BASOPHILS # BLD AUTO: 0.04 X10(3)/MCL (ref 0–0.2)
BASOPHILS NFR BLD AUTO: 0.6 % (ref 0–0.9)
BUN SERPL-MCNC: 23 MG/DL (ref 8.4–25.7)
CALCIUM SERPL-MCNC: 7.5 MG/DL (ref 8.8–10)
CHLORIDE SERPL-SCNC: 103 MMOL/L (ref 98–107)
CO2 SERPL-SCNC: 25 MMOL/L (ref 23–31)
CREAT SERPL-MCNC: 3.87 MG/DL (ref 0.72–1.25)
CREAT/UREA NIT SERPL: 6
EOSINOPHIL # BLD AUTO: 0.3 X10(3)/MCL (ref 0–0.9)
EOSINOPHIL NFR BLD AUTO: 4.9 % (ref 0–6.5)
ERYTHROCYTE [DISTWIDTH] IN BLOOD BY AUTOMATED COUNT: 15.8 % (ref 11.5–17)
GLUCOSE SERPL-MCNC: 106 MG/DL (ref 82–115)
HCT VFR BLD AUTO: 24.7 % (ref 42–52)
HGB BLD-MCNC: 8.1 GM/DL (ref 14–18)
IMM GRANULOCYTES # BLD AUTO: 0.07 10*3/UL (ref 0–0.02)
IMM GRANULOCYTES NFR BLD AUTO: 1.1 % (ref 0–0.43)
LYMPHOCYTES # BLD AUTO: 0.92 X10(3)/MCL (ref 0.6–4.6)
LYMPHOCYTES NFR BLD AUTO: 14.9 % (ref 16.2–38.3)
MCH RBC QN AUTO: 29.7 PG (ref 27–31)
MCHC RBC AUTO-ENTMCNC: 32.8 GM/DL (ref 33–36)
MCV RBC AUTO: 90.5 FL (ref 80–94)
MONOCYTES # BLD AUTO: 0.77 X10(3)/MCL (ref 0.1–1.3)
MONOCYTES NFR BLD AUTO: 12.5 % (ref 4.7–11.3)
NEUTROPHILS # BLD AUTO: 4.08 X10(3)/MCL (ref 2.1–9.2)
NEUTROPHILS NFR BLD AUTO: 66 % (ref 49.1–73.4)
NRBC BLD AUTO-RTO: 0 % (ref 0–0.2)
PHOSPHATE SERPL-MCNC: 2 MG/DL (ref 2.3–4.7)
PLATELET # BLD AUTO: 214 X10(3)/MCL (ref 130–400)
PMV BLD AUTO: 9.5 FL (ref 7.4–10.4)
POTASSIUM SERPL-SCNC: 3.8 MMOL/L (ref 3.5–5.1)
RBC # BLD AUTO: 2.73 X10(6)/MCL (ref 4.7–6.1)
SODIUM SERPL-SCNC: 134 MMOL/L (ref 136–145)
WBC # SPEC AUTO: 6.2 X10(3)/MCL (ref 4.5–11.5)

## 2021-01-22 LAB
ABS NEUT (OLG): 5.53 X10(3)/MCL (ref 2.1–9.2)
CROSSMATCH INTERPRETATION: NORMAL
ERYTHROCYTE [DISTWIDTH] IN BLOOD BY AUTOMATED COUNT: 15.7 % (ref 11.5–17)
GROUP & RH: NORMAL
HCT VFR BLD AUTO: 23.6 % (ref 42–52)
HGB BLD-MCNC: 7.8 GM/DL (ref 14–18)
MCH RBC QN AUTO: 29.5 PG (ref 27–31)
MCHC RBC AUTO-ENTMCNC: 33.1 GM/DL (ref 33–36)
MCV RBC AUTO: 89.4 FL (ref 80–94)
NRBC BLD AUTO-RTO: 0 % (ref 0–0.2)
PHOSPHATE SERPL-MCNC: 2.8 MG/DL (ref 2.3–4.7)
PLATELET # BLD AUTO: 260 X10(3)/MCL (ref 130–400)
PMV BLD AUTO: 9.4 FL (ref 7.4–10.4)
PRODUCT READY: NORMAL
RBC # BLD AUTO: 2.64 X10(6)/MCL (ref 4.7–6.1)
TRANSFUSION ORDER: NORMAL
WBC # SPEC AUTO: 7.5 X10(3)/MCL (ref 4.5–11.5)

## 2021-01-23 LAB
ABS NEUT (OLG): 5.94 X10(3)/MCL (ref 2.1–9.2)
BASOPHILS # BLD AUTO: 0.05 X10(3)/MCL (ref 0–0.2)
BASOPHILS NFR BLD AUTO: 0.6 % (ref 0–0.9)
BUN SERPL-MCNC: 26.1 MG/DL (ref 8.4–25.7)
CALCIUM SERPL-MCNC: 7.6 MG/DL (ref 8.8–10)
CHLORIDE SERPL-SCNC: 102 MMOL/L (ref 98–107)
CO2 SERPL-SCNC: 23 MMOL/L (ref 23–31)
CREAT SERPL-MCNC: 3.57 MG/DL (ref 0.72–1.25)
CREAT/UREA NIT SERPL: 7
EOSINOPHIL # BLD AUTO: 0.11 X10(3)/MCL (ref 0–0.9)
EOSINOPHIL NFR BLD AUTO: 1.4 % (ref 0–6.5)
ERYTHROCYTE [DISTWIDTH] IN BLOOD BY AUTOMATED COUNT: 15.6 % (ref 11.5–17)
GLUCOSE SERPL-MCNC: 92 MG/DL (ref 82–115)
HCT VFR BLD AUTO: 34.5 % (ref 42–52)
HGB BLD-MCNC: 11.5 GM/DL (ref 14–18)
IMM GRANULOCYTES # BLD AUTO: 0.11 10*3/UL (ref 0–0.02)
IMM GRANULOCYTES NFR BLD AUTO: 1.4 % (ref 0–0.43)
LYMPHOCYTES # BLD AUTO: 0.89 X10(3)/MCL (ref 0.6–4.6)
LYMPHOCYTES NFR BLD AUTO: 11.3 % (ref 16.2–38.3)
MCH RBC QN AUTO: 29.6 PG (ref 27–31)
MCHC RBC AUTO-ENTMCNC: 33.3 GM/DL (ref 33–36)
MCV RBC AUTO: 88.7 FL (ref 80–94)
MONOCYTES # BLD AUTO: 0.77 X10(3)/MCL (ref 0.1–1.3)
MONOCYTES NFR BLD AUTO: 9.8 % (ref 4.7–11.3)
NEUTROPHILS # BLD AUTO: 5.94 X10(3)/MCL (ref 2.1–9.2)
NEUTROPHILS NFR BLD AUTO: 75.5 % (ref 49.1–73.4)
NRBC BLD AUTO-RTO: 0 % (ref 0–0.2)
PLATELET # BLD AUTO: 242 X10(3)/MCL (ref 130–400)
PMV BLD AUTO: 9.3 FL (ref 7.4–10.4)
POTASSIUM SERPL-SCNC: 4.6 MMOL/L (ref 3.5–5.1)
RBC # BLD AUTO: 3.89 X10(6)/MCL (ref 4.7–6.1)
SODIUM SERPL-SCNC: 131 MMOL/L (ref 136–145)
WBC # SPEC AUTO: 7.9 X10(3)/MCL (ref 4.5–11.5)

## 2021-01-25 LAB
ABS NEUT (OLG): 6.07 X10(3)/MCL (ref 2.1–9.2)
ALBUMIN SERPL-MCNC: 1.5 GM/DL (ref 3.4–4.8)
ALBUMIN/GLOB SERPL: 0.3 RATIO (ref 1.1–2)
ALP SERPL-CCNC: 60 UNIT/L (ref 40–150)
ALT SERPL-CCNC: 12 UNIT/L (ref 0–55)
AST SERPL-CCNC: 7 UNIT/L (ref 5–34)
BASOPHILS # BLD AUTO: 0.03 X10(3)/MCL (ref 0–0.2)
BASOPHILS NFR BLD AUTO: 0.4 % (ref 0–0.9)
BILIRUB SERPL-MCNC: 0.7 MG/DL (ref 0.2–1.2)
BILIRUBIN DIRECT+TOT PNL SERPL-MCNC: 0.3 MG/DL (ref 0–0.5)
BILIRUBIN DIRECT+TOT PNL SERPL-MCNC: 0.4 MG/DL (ref 0–0.8)
BUN SERPL-MCNC: 51 MG/DL (ref 8.4–25.7)
CALCIUM SERPL-MCNC: 7.8 MG/DL (ref 8.8–10)
CHLORIDE SERPL-SCNC: 100 MMOL/L (ref 98–107)
CO2 SERPL-SCNC: 21 MMOL/L (ref 23–31)
CREAT SERPL-MCNC: 5.75 MG/DL (ref 0.72–1.25)
CRP SERPL HS-MCNC: 118.14 MG/L (ref 0–5)
EOSINOPHIL # BLD AUTO: 0.08 X10(3)/MCL (ref 0–0.9)
EOSINOPHIL NFR BLD AUTO: 1 % (ref 0–6.5)
ERYTHROCYTE [DISTWIDTH] IN BLOOD BY AUTOMATED COUNT: 15.8 % (ref 11.5–17)
ERYTHROCYTE [SEDIMENTATION RATE] IN BLOOD: 98 MM/HR (ref 0–20)
GLOBULIN SER-MCNC: 4.9 GM/DL (ref 2.4–3.5)
GLUCOSE SERPL-MCNC: 81 MG/DL (ref 82–115)
HCT VFR BLD AUTO: 33.3 % (ref 42–52)
HGB BLD-MCNC: 10.9 GM/DL (ref 14–18)
IMM GRANULOCYTES # BLD AUTO: 0.06 10*3/UL (ref 0–0.02)
IMM GRANULOCYTES NFR BLD AUTO: 0.8 % (ref 0–0.43)
LYMPHOCYTES # BLD AUTO: 0.82 X10(3)/MCL (ref 0.6–4.6)
LYMPHOCYTES NFR BLD AUTO: 10.4 % (ref 16.2–38.3)
MAGNESIUM SERPL-MCNC: 2.31 MG/DL (ref 1.6–2.6)
MCH RBC QN AUTO: 29.1 PG (ref 27–31)
MCHC RBC AUTO-ENTMCNC: 32.7 GM/DL (ref 33–36)
MCV RBC AUTO: 89 FL (ref 80–94)
MONOCYTES # BLD AUTO: 0.84 X10(3)/MCL (ref 0.1–1.3)
MONOCYTES NFR BLD AUTO: 10.6 % (ref 4.7–11.3)
NEUTROPHILS # BLD AUTO: 6.07 X10(3)/MCL (ref 2.1–9.2)
NEUTROPHILS NFR BLD AUTO: 76.8 % (ref 49.1–73.4)
NRBC BLD AUTO-RTO: 0 % (ref 0–0.2)
PHOSPHATE SERPL-MCNC: 3 MG/DL (ref 2.3–4.7)
PLATELET # BLD AUTO: 254 X10(3)/MCL (ref 130–400)
PMV BLD AUTO: 9.5 FL (ref 7.4–10.4)
POTASSIUM SERPL-SCNC: 5 MMOL/L (ref 3.5–5.1)
PREALB SERPL-MCNC: 15.5 MG/DL (ref 16–42)
PROT SERPL-MCNC: 6.4 GM/DL (ref 5.8–7.6)
RBC # BLD AUTO: 3.74 X10(6)/MCL (ref 4.7–6.1)
SODIUM SERPL-SCNC: 130 MMOL/L (ref 136–145)
WBC # SPEC AUTO: 7.9 X10(3)/MCL (ref 4.5–11.5)

## 2021-01-28 LAB
BUN SERPL-MCNC: 36.5 MG/DL (ref 8.4–25.7)
CALCIUM SERPL-MCNC: 7.8 MG/DL (ref 8.8–10)
CHLORIDE SERPL-SCNC: 100 MMOL/L (ref 98–107)
CO2 SERPL-SCNC: 22 MMOL/L (ref 23–31)
CREAT SERPL-MCNC: 4.93 MG/DL (ref 0.72–1.25)
CREAT/UREA NIT SERPL: 7
GLUCOSE SERPL-MCNC: 105 MG/DL (ref 82–115)
HAV IGM SERPL QL IA: NONREACTIVE
HBV CORE IGM SERPL QL IA: NONREACTIVE
HBV SURFACE AG SERPL QL IA: NONREACTIVE
HCV AB SERPL QL IA: REACTIVE
POTASSIUM SERPL-SCNC: 4.5 MMOL/L (ref 3.5–5.1)
SODIUM SERPL-SCNC: 130 MMOL/L (ref 136–145)

## 2021-02-02 ENCOUNTER — HISTORICAL (OUTPATIENT)
Dept: ADMINISTRATIVE | Facility: HOSPITAL | Age: 66
End: 2021-02-02

## 2021-02-02 LAB
ABS NEUT (OLG): 3.91 X10(3)/MCL (ref 2.1–9.2)
ALBUMIN SERPL-MCNC: 1.7 GM/DL (ref 3.4–4.8)
ALBUMIN/GLOB SERPL: 0.4 RATIO (ref 1.1–2)
ALP SERPL-CCNC: 63 UNIT/L (ref 40–150)
ALT SERPL-CCNC: 13 UNIT/L (ref 0–55)
AST SERPL-CCNC: 9 UNIT/L (ref 5–34)
BASOPHILS # BLD AUTO: 0.1 X10(3)/MCL (ref 0–0.2)
BASOPHILS NFR BLD AUTO: 1 %
BILIRUB SERPL-MCNC: 0.6 MG/DL
BILIRUBIN DIRECT+TOT PNL SERPL-MCNC: 0.3 MG/DL (ref 0–0.5)
BILIRUBIN DIRECT+TOT PNL SERPL-MCNC: 0.3 MG/DL (ref 0–0.8)
BUN SERPL-MCNC: 62.9 MG/DL (ref 8.4–25.7)
CALCIUM SERPL-MCNC: 7.4 MG/DL (ref 8.8–10)
CHLORIDE SERPL-SCNC: 101 MMOL/L (ref 98–107)
CHOLEST SERPL-MCNC: 97 MG/DL
CHOLEST/HDLC SERPL: 3 {RATIO} (ref 0–5)
CO2 SERPL-SCNC: 19 MMOL/L (ref 23–31)
CREAT SERPL-MCNC: 7.53 MG/DL (ref 0.73–1.18)
EOSINOPHIL # BLD AUTO: 0.1 X10(3)/MCL (ref 0–0.9)
EOSINOPHIL NFR BLD AUTO: 2 %
ERYTHROCYTE [DISTWIDTH] IN BLOOD BY AUTOMATED COUNT: 15.3 % (ref 11.5–17)
EST. AVERAGE GLUCOSE BLD GHB EST-MCNC: 73.8 MG/DL
GLOBULIN SER-MCNC: 4.5 GM/DL (ref 2.4–3.5)
GLUCOSE SERPL-MCNC: 140 MG/DL (ref 82–115)
HBA1C MFR BLD: 4.2 %
HCT VFR BLD AUTO: 28.3 % (ref 42–52)
HDLC SERPL-MCNC: 29 MG/DL (ref 35–60)
HGB BLD-MCNC: 8.7 GM/DL (ref 14–18)
LDLC SERPL CALC-MCNC: 61 MG/DL (ref 50–140)
LYMPHOCYTES # BLD AUTO: 0.6 X10(3)/MCL (ref 0.6–4.6)
LYMPHOCYTES NFR BLD AUTO: 11 %
MCH RBC QN AUTO: 28.6 PG (ref 27–31)
MCHC RBC AUTO-ENTMCNC: 30.7 GM/DL (ref 33–36)
MCV RBC AUTO: 93.1 FL (ref 80–94)
MONOCYTES # BLD AUTO: 0.5 X10(3)/MCL (ref 0.1–1.3)
MONOCYTES NFR BLD AUTO: 10 %
NEUTROPHILS # BLD AUTO: 3.91 X10(3)/MCL (ref 2.1–9.2)
NEUTROPHILS NFR BLD AUTO: 75 %
PLATELET # BLD AUTO: 304 X10(3)/MCL (ref 130–400)
PMV BLD AUTO: 9.6 FL (ref 9.4–12.4)
POTASSIUM SERPL-SCNC: 4.4 MMOL/L (ref 3.5–5.1)
PREALB SERPL-MCNC: 16.1 MG/DL (ref 16–42)
PROT SERPL-MCNC: 6.2 GM/DL (ref 5.8–7.6)
RBC # BLD AUTO: 3.04 X10(6)/MCL (ref 4.7–6.1)
SODIUM SERPL-SCNC: 133 MMOL/L (ref 136–145)
TRIGL SERPL-MCNC: 37 MG/DL (ref 34–140)
TSH SERPL-ACNC: 1.01 UIU/ML (ref 0.35–4.94)
VLDLC SERPL CALC-MCNC: 7 MG/DL
WBC # SPEC AUTO: 5.2 X10(3)/MCL (ref 4.5–11.5)

## 2021-02-17 ENCOUNTER — HISTORICAL (OUTPATIENT)
Dept: ADMINISTRATIVE | Facility: HOSPITAL | Age: 66
End: 2021-02-17

## 2021-02-17 LAB
ABS NEUT (OLG): 3.24 X10(3)/MCL (ref 2.1–9.2)
BASOPHILS # BLD AUTO: 0 X10(3)/MCL (ref 0–0.2)
BASOPHILS NFR BLD AUTO: 1 %
BUN SERPL-MCNC: 27.3 MG/DL (ref 8.4–25.7)
CALCIUM SERPL-MCNC: 7.6 MG/DL (ref 8.8–10)
CHLORIDE SERPL-SCNC: 99 MMOL/L (ref 98–107)
CO2 SERPL-SCNC: 27 MMOL/L (ref 23–31)
CREAT SERPL-MCNC: 5.92 MG/DL (ref 0.73–1.18)
CREAT/UREA NIT SERPL: 5
EOSINOPHIL # BLD AUTO: 0.2 X10(3)/MCL (ref 0–0.9)
EOSINOPHIL NFR BLD AUTO: 4 %
ERYTHROCYTE [DISTWIDTH] IN BLOOD BY AUTOMATED COUNT: 19.5 % (ref 11.5–17)
GLUCOSE SERPL-MCNC: 121 MG/DL (ref 82–115)
HCT VFR BLD AUTO: 25.3 % (ref 42–52)
HGB BLD-MCNC: 7.8 GM/DL (ref 14–18)
LYMPHOCYTES # BLD AUTO: 0.7 X10(3)/MCL (ref 0.6–4.6)
LYMPHOCYTES NFR BLD AUTO: 14 %
MCH RBC QN AUTO: 30 PG (ref 27–31)
MCHC RBC AUTO-ENTMCNC: 30.8 GM/DL (ref 33–36)
MCV RBC AUTO: 97.3 FL (ref 80–94)
MONOCYTES # BLD AUTO: 0.7 X10(3)/MCL (ref 0.1–1.3)
MONOCYTES NFR BLD AUTO: 14 %
NEUTROPHILS # BLD AUTO: 3.24 X10(3)/MCL (ref 2.1–9.2)
NEUTROPHILS NFR BLD AUTO: 66 %
PLATELET # BLD AUTO: 182 X10(3)/MCL (ref 130–400)
PMV BLD AUTO: 10.5 FL (ref 9.4–12.4)
POTASSIUM SERPL-SCNC: 3.8 MMOL/L (ref 3.5–5.1)
RBC # BLD AUTO: 2.6 X10(6)/MCL (ref 4.7–6.1)
SODIUM SERPL-SCNC: 134 MMOL/L (ref 136–145)
WBC # SPEC AUTO: 4.9 X10(3)/MCL (ref 4.5–11.5)

## 2021-06-08 ENCOUNTER — HISTORICAL (OUTPATIENT)
Dept: ADMINISTRATIVE | Facility: HOSPITAL | Age: 66
End: 2021-06-08

## 2022-02-22 ENCOUNTER — HISTORICAL (OUTPATIENT)
Dept: ADMINISTRATIVE | Facility: HOSPITAL | Age: 67
End: 2022-02-22

## 2022-04-07 ENCOUNTER — HISTORICAL (OUTPATIENT)
Dept: ADMINISTRATIVE | Facility: HOSPITAL | Age: 67
End: 2022-04-07
Payer: MEDICARE

## 2022-04-24 VITALS
DIASTOLIC BLOOD PRESSURE: 83 MMHG | SYSTOLIC BLOOD PRESSURE: 175 MMHG | OXYGEN SATURATION: 98 % | HEIGHT: 66 IN | WEIGHT: 149.13 LBS | BODY MASS INDEX: 23.97 KG/M2

## 2022-04-30 NOTE — OP NOTE
DATE OF SURGERY:    06/21/2017    SURGEON:  Dax Medina MD    PREOPERATIVE DIAGNOSIS:  Chronic kidney disease stage 4.    POSTOPERATIVE DIAGNOSIS:  Chronic kidney disease stage 4.    ANESTHESIA:  General.    PROCEDURE PERFORMED:    1. Creation of the AV fistula of the left upper arm with transposition of the brachial vein.  2. Ligation of the malfunctioning fistula of the left basilic vein.    INDICATION FOR PROCEDURE:  A 62-year-old male expecting to get dialysis in the near future, had creation of AV fistula with the basilic vein to brachial artery just distal to the cubital fossa.  The fistula never developed well and the patient is suspected to need dialysis in the near future and was brought for revision.    PROCEDURE IN DETAIL:  The patient was brought with the supraclavicular block.  The entire extremity was cleansed and draped and the incision was done on the cubital fossa exposing the takeoff of the malfunctioning AV fistula.  The incision was extended cephalad.  At the time, the patient was feeling pain and then the anesthesia proceeded to give him general via LMA.  The inspection of the cephalic vein converted in the fistula showed that it was quite fibrotic and it never developed well (the patient had a PICC line in the long past).  I then proceeded to expose and mobilize the brachial vein and by ligating branches, transected this then the whole vein was  from the brachial artery.  The mobilization of the vein was extended up to the axilla.  The vein was gently irrigated with heparinized saline.  The borders were marked with the marking pen to prevent any twisting.  Then using the __________ the vein was passed into this tunnel and brought on the takeoff of the malfunctioning fistula.  The arterial site of the old fistula then controlled with bulldog clamp, arteriotomy was done, and the end of the vein was anastomosed with running stitches of 6-0 Prolene.  At the completion, there  was an excellent thrill and pulse over the     fistula as well as the distal on the hands.  Soft tissues were approximated with Vicryl and skin edges with staples.  In a stable condition, the patient was moved to recovery room.        ______________________________  MD BILL Castillo/EDILIA  DD:  06/21/2017  Time:  04:18PM  DT:  06/22/2017  Time:  10:09AM  Job #:  [id number]

## 2022-04-30 NOTE — CONSULTS
Patient:   Mirza Nelson Jr            MRN: [id number]            FIN: [id number]               Age:   65 years     Sex:  Male     :  1955   Associated Diagnoses:   Other allergic rhinitis; Moderate protein-calorie malnutrition; End stage renal disease   Author:   Marcell Larry MD      Consultation Information   Consultation:  nonfunctioning right femoral hemodialysis catheter.       Chief Complaint   nonfunctioning right femoral hemodialysis catheter      History of Present Illness   his is a 65-year-old male with multiple medical comorbidities.  He has end-stage renal disease and is on hemodialysis.  He had placement of a right IJ tunneled dialysis catheter on .  This catheter was removed and a temporary HD catheter was placed at bedside, now it not functioning properly, for this reason, I was consulted      Review of Systems   Constitutional:  No fever, No chills, No sweats.    Eye:  No icterus, No blurring, No double vision.    Ear/Nose/Mouth/Throat:  No nasal congestion, No sore throat.    Respiratory:  No shortness of breath, No cough, No hemoptysis.    Cardiovascular:  No palpitations, No bradycardia, No tachycardia.    Gastrointestinal:  No nausea, No vomiting, No diarrhea.    Genitourinary:  No dysuria, No hematuria, No change in urine stream.       Health Status   Allergies:    Allergic Reactions (Selected)  Severity Not Documented  Iodine- Difficulty breathing.  Seafood- Swelling.,    Allergies (2) Active Reaction  iodine difficulty breathing  Seafood Swelling     Current medications:  (Selected)   Inpatient Medications  Ordered  Aquaphor: 1 aracely, form: Ointment, TOP, BID PRN for dry skin, first dose 20 12:05:00 CST  Balmex topical cream: 1 aracely, form: Cream, TOP, 5x/Day PRN for other (see comment), first dose 20 12:06:00 CST, incontinent episodes  Biofreeze 4% topical gel: 1 aracely, form: Gel, TOP, TID, first dose 20 15:00:00 CST, apply to neck  Carafate 1 g  oral tablet: 1 gm, form: Tab, Oral, AC & HS, first dose 12/22/20 21:00:00 CST  Coreg 3.125 mg oral tablet: 12.5 mg, form: Tab, Oral, BID, first dose 01/11/21 9:00:00 CST  Dulcolax Laxative 10 mg RECTAL suppository: 10 mg, form: Supp, [place] (rectal), q24hr PRN for constipation, first dose 12/22/20 16:32:00 CST  Flonase 50 mcg/inh nasal spray: 2 spray(s), 100 mcg =, form: Spray-Nasal, Nasal, Daily PRN for allergy symptoms, first dose 12/22/20 16:33:00 CST  Neutra-Phos oral powder: 1 packet(s), form: Powder-Recon, Oral, Daily, first dose 01/01/21 9:00:00 CST  Nitrostat 0.4 mg sublingual tab: 0.4 mg, form: Tab-SL, SL, q5min PRN for chest pain, Order duration: 3 dose(s), first dose 12/22/20 16:32:00 CST, stop date Limited # of times  Norco  5 mg-325 mg oral tablet: 1 tab(s), form: Tab, Oral, q6hr PRN for pain, severe, first dose 12/23/20 18:41:00 CST, Waste Code ZELDA  Pepcid 20 mg oral tablet: 20 mg, form: Tab, Oral, BID PRN for indigestion, first dose 12/22/20 16:32:00 CST  Procrit: 10,000 units, form: Soln, Subcutaneous, Once-chemo, first dose 12/15/17 10:53:00 CST, stop date 12/15/17 10:53:00 CST, [id number]  Proscar: 5 mg, form: Tab, Oral, Daily, first dose 12/23/20 9:00:00 CST  Protonix 40 mg ORAL enteric coated tablet: 40 mg, form: Tab-EC, Oral, BID, first dose 12/22/20 21:00:00 CST  Retacrit: 10,000 units, form: Soln, Subcutaneous, M,W,F, first dose 01/06/21 9:00:00 CST  Sodium Chloride 0.9% Normal Saline Flush: 10 mL, form: Injection, IV Push, q12hr, first dose 12/22/20 17:00:00 CST  Tylenol: 500 mg, form: Liquid, Oral, q6hr PRN for fever, first dose 12/29/20 12:10:00 CST  Tylenol: 650 mg, form: Supp, [place] (rectal), q4hr PRN for fever, first dose 12/29/20 18:59:00 CST  Venelex 788 mg-87 mg/g topical ointment: 1 aracely, form: Ointment, TOP, TID, first dose 01/13/21 10:59:00 CST, Apply to rafael butttocks and scrotum tid and prn soiling  Vistaril 50 mg oral capsule: 50 mg, form: Cap, Oral, At Bedtime PRN for insomnia, first dose  12/22/20 16:32:00 CST  Xanax 0.25 mg oral tablet: 0.25 mg, form: Tab, Oral, TID PRN for anxiety, first dose 12/22/20 16:32:00 CST  Zofran 2 mg/mL injectable solution: 4 mg, form: Injection, IV Push, q4hr PRN for nausea, first dose 12/23/20 12:28:00 CST  Zyvox: 600 mg, form: Tab, Oral, BID, first dose 01/07/21 21:00:00 CST, stop date 01/16/21 21:00:00 CST  acetaminophen 325 mg oral tablet: 650 mg, form: Tab, Oral, q4hr PRN for fever, first dose 12/22/20 16:32:00 CST, Notify MD for temp > 101 F°  acetaminophen 325 mg oral tablet: 650 mg, form: Tab, Oral, q4hr PRN for pain, first dose 12/22/20 16:32:00 CST  amlodipine 10 mg oral tablet: 10 mg, form: Tab, Oral, Daily, first dose 12/23/20 9:00:00 CST  ferrous sulfate 325 mg (65 mg elemental iron) oral enteric coated tablet: 325 mg, form: Tab, Oral, BID, first dose 12/24/20 21:00:00 CST  folic acid 1 mg oral tablet: 1 mg, form: Tab, Oral, Daily, first dose 12/23/20 9:00:00 CST  heparin 1000 units/mL - 1 mL injectable solution: 4 mL, 4,000 units =, form: Injection, IV, M,W,F PRN for other (see comment), first dose 12/23/20 14:50:00 CST, use to hep lock hd cath only  labetalol 5 mg/mL intravenous solution: 10 mg, form: Soln, IV Push, q2hr PRN for hypertension, first dose 12/22/20 16:32:00 CST  megestrol 40 mg/mL oral suspension: 400 mg, form: Susp, Oral, BID, first dose 12/22/20 21:00:00 CST  sodium bicarbonate 650 mg oral tablet: 1,300 mg, form: Tab, Oral, BID, first dose 12/28/20 21:00:00 CST  tamsulosin 0.4 mg oral capsule: 0.4 mg, form: Cap, Oral, BID, first dose 12/22/20 21:00:00 CST  Pending Complete  midazolam: 2 mg, form: Injection, IV Push, q5min, Order duration: 2 dose(s), first dose 06/21/17 11:04:00 CDT, stop date 06/21/17 11:13:00 CDT, (up to 5 mg for moderate anxiety), 30,025  Suspended  Lovenox: 30 mg, form: Injection, Subcutaneous, Daily, first dose 12/23/20 9:00:00 CST, , for hip, knee surgery and medical patients  Prescriptions  Prescribed  Flonase 50  mcg/inh nasal spray: 2 spray(s), Nasal, Daily, PRN PRN allergy symptoms, in each nostril, # 1 EA, 11 Refill(s), Pharmacy: [facility] [id number]  Documented Medications  Documented  Carafate 1 g oral tablet: 1 gm = 1 tab(s), Oral, AC & HS, 0 Refill(s)  Coreg 3.125 mg oral tablet: 6.25 mg = 2 tab(s), Oral, BID, 0 Refill(s)  Neutra-Phos oral powder: 1 packet(s), Oral, BID, 0 Refill(s)  Protonix 40 mg ORAL enteric coated tablet: 40 mg = 1 tab(s), Oral, BID, 0 Refill(s)  Retacrit 10,000 units/mL preservative-free injectable solution: 20,000 units = 2 mL, Subcutaneous, qWeek, 0 Refill(s)  amLODIPine 10 mg oral tablet: 10 mg = 1 tab(s), Oral, Daily  folic acid 1 mg oral tablet: 1 mg = 1 tab(s), Oral, Daily, 0 Refill(s)  megestrol 40 mg/mL oral suspension: 400 mg = 10 mL, Oral, BID, 0 Refill(s)  tamsulosin 0.4 mg oral capsule: 0.4 mg = 1 cap(s), Oral, BID, # 30 cap(s), 0 Refill(s),    Medications (32) Active  Scheduled: (16)  amLODIPine 10 mg Tab UD ([employer])  10 mg 1 tab(s), Oral, Daily  balsam [place]-castor oil topical 87 mg-788 mg/g Oint  1 aracely, TOP, TID  Biofreeze Gel  1 aracely, TOP, TID  carvedilol 12.5 mg Tab UD  12.5 mg 1 tab(s), Oral, BID  epoetin gary epbx 10,000 units/mL Soln - NON-ESRD  10,000 units 1 mL, Subcutaneous, M,W,F  ferrous sulfate 325 mg Tab UD  325 mg 1 tab(s), Oral, BID  finasteride 5 mg Tab  5 mg 1 tab(s), Oral, Daily  folic acid 1 mg Tab UD  1 mg 1 tab(s), Oral, Daily  Linezolid 600mg tab  600 mg 1 tab(s), Oral, BID  megestrol 40 mg/mL Patricia UD (10mL)  400 mg 10 mL, Oral, BID  pantoprazole 40 mg EC Tab  40 mg 1 tab(s), Oral, BID  potassium phosphate-sodium phosphate 250 mg-280 mg-160 mg Powd UD  1 packet(s), Oral, Daily  sodium bicarbonate 650 mg Tab  1,300 mg 2 tab(s), Oral, BID  sodium chloride 0.9% Inj-10ml  10 mL, IV Push, q12hr  sucralfate 1 gm tablet  1 gm 1 tab(s), Oral, AC & HS  tamsulosin 0.4 mg Cap  0.4 mg 1 cap(s), Oral, BID  Continuous: (0)  PRN: (16)  acetaminophen 160 mg/5 ml 4 oz. Liq.   500 mg 15.63 mL, Oral, q6hr  acetaminophen 325 mg Tab  650 mg 2 tab(s), Oral, q4hr  acetaminophen 325 mg Tab  650 mg 2 tab(s), Oral, q4hr  acetaminophen 650 mg Sup UD  650 mg 1 supp, TN (rectal), q4hr  alprazolam 0.25 mg Tab UD  0.25 mg 1 tab(s), Oral, TID  bisacodyl 10 mg Sup  10 mg 1 supp, TN (rectal), q24hr  emollients(AQUAPHOR) top-Oin 1.75 oz  1 aracely, TOP, BID  famotidine 20 mg Tab UD  20 mg 1 tab(s), Oral, BID  fluticasone nasal 0.05 mg/inh Spr  100 mcg 2 spray(s), Nasal, Daily  Heparin Sodium 1000 unit/mL inj.-1 mL  4,000 units 4 mL, IV, M,W,F  hydrocodone 5mg/APAP 325 mg  1 tab(s), Oral, q6hr  hydrOXYzine pamoate 50 mg Cap UD  50 mg 1 cap(s), Oral, At Bedtime  labetalol 5 mg/mL 20mL vial  10 mg 2 mL, IV Push, q2hr  Nitroglycerin 0.4 mg TAB  0.4 mg 1 tab(s), SL, q5min  ondansetron 2 mg/mL inj - 2mL  4 mg 2 mL, IV Push, q4hr  zinc oxide(BALMEX) top-crm 2 oz  1 aracely, TOP, 5x/Day     Problem list:    All Problems  2019-nCoV / SNOMED CT 3782914429 / Confirmed  Problem added via discern rule sz_covid_pos_neg  Altered mental status / SNOMED CT 3441794680 / Confirmed  Other specified anemias / SNOMED CT 202353188 / Confirmed  Bacterial UTI / SNOMED CT 6899939211 / Confirmed  Cervical radiculopathy / SNOMED CT 279303901 / Confirmed  Chronic neck pain / SNOMED CT [id number] / Confirmed  Chronic pain / ICD-9-.29 / Confirmed  End stage renal disease / SNOMED CT 53909266 / Confirmed  HTN / ICD-9-.9 / Confirmed  Impaired mobility / SNOMED CT 577893411 / Confirmed / Stable  Bilateral lumbar radiculopathy / SNOMED CT 643952510 / Confirmed  Malnutrition / SNOMED CT 936069351 / Confirmed  Malnutrition / SNOMED CT 605360300 / Confirmed  Elevated MCV / SNOMED CT 352634709 / Confirmed  Nodule of skin of neck / SNOMED CT 3835723523 / Confirmed  Preventative health care / SNOMED CT 748943609 / Confirmed  Elevated PSA, between 10 and less than 20 ng/ml / SNOMED CT 5588404258 / Confirmed  Need for  shingles vaccine / SNOMED CT 0517603311 / Confirmed  Review of care plan / SNOMED CT 6589648213 / Confirmed  Screening PSA (prostate specific antigen) / SNOMED CT 556917666 / Confirmed  Total self-care deficit / SNOMED CT 105339055 / Confirmed / Stable  Spell of visual loss / SNOMED CT 90544197 / Confirmed  Vitamin D deficiency / SNOMED CT 67045079 / Confirmed,    Active Problems (2019-nCoV   Altered mental status   Bacterial UTI   Bilateral lumbar radiculopathy   Cervical radiculopathy   Chronic neck pain   Chronic pain   Elevated MCV   Elevated PSA, between 10 and less than 20 ng/ml   End stage renal disease   HTN   Impaired mobility   Malnutrition   Malnutrition   Need for shingles vaccine   Nodule of skin of neck   Other specified anemias   Preventative health care   Review of care plan   Screening PSA (prostate specific antigen)   Spell of visual loss   Total self-care deficit   Vitamin D deficiency         Histories   Past Medical History:    Active  HTN (401.9)  Chronic pain (338.29)  Preventative health care (530569263)  Bacterial UTI (7430443022)  Screening PSA (prostate specific antigen) (350892145)  Resolved  Tobacco user (860580850):  Resolved.  CKD (chronic kidney disease), stage IV (2378577408):  Resolved.  Other acute sinusitis (52788003):  Resolved.  Flu vaccine need (999840483):  Resolved.   Family History:    Diabetes mellitus type 2  Mother ()  Coronary artery disease  Father ()  Gastric cancer  Father ()  Acute myocardial infarction.  Father ()  Hypertension.  Father ()  Mother ()     Procedure history:    Esophagogastroduodenoscopy on 2020 at 65 Years.  Comments:  2020 15:54 Fátima Berkowitz RN.  auto-populated from documented surgical case  Biopsy Gastrointestinal on 2020 at 65 Years.  Comments:  2020 15:54 Fátima Berkowitz RN  auto-populated from documented surgical case  Catheter Insertion  Palindrome (.) on 12/9/2020 at 65 Years.  Comments:  12/9/2020 22:08 SERGIO - Justin BOLES, Johnson BRIGHT  auto-populated from documented surgical case  Polypectomy on 2/19/2019 at 63 Years.  Comments:  2/19/2019 10:24 Roc Mckeon RN  auto-populated from documented surgical case  Colonoscopy on 2/19/2019 at 63 Years.  Comments:  2/19/2019 10:24 Roc Mckeon RN  auto-populated from documented surgical case  Angiogram (Left) on 11/29/2018 at 63 Years.  Comments:  11/29/2018 13:33 SERGIO Mary RN, Beatrice FONG  auto-populated from documented surgical case  Arteriovenous Fistula (Left) on 4/6/2018 at 62 Years.  Comments:  4/6/2018 14:51 CDT - Dari Michele RN  auto-populated from documented surgical case  Catheter Insertion Palindrome (.) on 4/4/2018 at 62 Years.  Comments:  4/4/2018 11:09 BETYT - Saba Gao RN  auto-populated from documented surgical case  Arteriovenous Fistula (.) on 6/21/2017 at 62 Years.  Comments:  6/21/2017 14:21 CDT - Norma Robles RN  auto-populated from documented surgical case  Vascular access incision ([id number]) in 2016 at 61 Years.  Comments:  5/1/2017 9:34 Heather Su LPN  dialysis shunt  Colonoscopy ([id number]) on 7/1/2011 at 56 Years.  Cervical spinal fusion ([id number]).  Partial Nephrectomy.   Social History        Social & Psychosocial Habits    Alcohol  11/29/2018  Use: Never    Employment/School    Comment: Disabled - 05/10/2017 10:23 - Diana Chaves RN    Exercise    Comment: no current program - 02/01/2017 11:41 - Heather Torres LPN    Home/Environment  05/10/2017  Lives with: Significant other    Nutrition/Health  02/01/2017  Type of diet: Regular    Sexual  02/01/2017  Sexually active: No    Substance Use  11/29/2018  Use: Current    Type: Prescription medications    Comment: denies - 11/29/2018 10:24 - Kiki Munguia RN    Tobacco  08/06/2020  Use: Former smoker, quit more    Patient Wants Consult For Cessation Counseling N/A     Concerns about tobacco use in household: No    Smokeless tobacco use: Never    10/28/2020  Use: Former smoker, quit more    Patient Wants Consult For Cessation Counseling N/A    11/30/2020  Use: Former smoker, quit more    Patient Wants Consult For Cessation Counseling N/A    12/22/2020  Use: Former smoker, quit more    Patient Wants Consult For Cessation Counseling N/A    Abuse/Neglect  08/06/2020  SHX Any signs of abuse or neglect No    Feels unsafe at home: No    Safe place to go: Yes    10/28/2020  SHX Any signs of abuse or neglect No    11/30/2020  SHX Any signs of abuse or neglect No    Feels unsafe at home: No    Safe place to go: Yes    12/22/2020  SHX Any signs of abuse or neglect No    Feels unsafe at home: No    Safe place to go: Yes  .        Physical Examination   Vital Signs   1/14/2021 8:00 CST       Temperature Oral          36.9 DegC                             Temperature Oral (calculated)             98.42 DegF                             Heart Rate Monitored      82 bpm                             Respiratory Rate          21 br/min                             SpO2                      98 %                             Systolic Blood Pressure   144 mmHg  HI                             Diastolic Blood Pressure  74 mmHg    1/14/2021 4:00 CST       Temperature Oral          37.0 DegC                             Temperature Oral (calculated)             98.60 DegF                             Heart Rate Monitored      89 bpm                             Respiratory Rate          27 br/min  HI                             SpO2                      97 %                             Systolic Blood Pressure   141 mmHg  HI                             Diastolic Blood Pressure  80 mmHg    1/14/2021 0:00 CST       Temperature Oral          36.5 DegC                             Temperature Oral (calculated)             97.70 DegF                             Heart Rate Monitored      97 bpm                              Respiratory Rate          14 br/min                             SpO2                      98 %                             Systolic Blood Pressure   146 mmHg  HI                             Diastolic Blood Pressure  74 mmHg    1/13/2021 20:00 CST      Temperature Oral          37.5 DegC                             Temperature Oral (calculated)             99.50 DegF                             Heart Rate Monitored      93 bpm                             Respiratory Rate          22 br/min                             SpO2                      96 %                             Systolic Blood Pressure   132 mmHg                             Diastolic Blood Pressure  77 mmHg    1/13/2021 19:30 CST      Oxygen Therapy            Room air    1/13/2021 12:00 CST      Temperature Temporal Artery               36.7 DegC                             Heart Rate Monitored      83 bpm                             Respiratory Rate          20 br/min                             SpO2                      98 %                             Systolic Blood Pressure   119 mmHg                             Diastolic Blood Pressure  70 mmHg    1/13/2021 9:00 CST       Oxygen Therapy            Room air    1/13/2021 8:00 CST       Temperature Temporal Artery               36.6 DegC                             Heart Rate Monitored      88 bpm                             Respiratory Rate          20 br/min                             SpO2                      96 %                             Systolic Blood Pressure   131 mmHg                             Diastolic Blood Pressure  75 mmHg    1/13/2021 4:59 CST       24 HR Intake Totals       40 mL                             24 HR Output Totals       0 mL                             24 HR I&O Balance         40 mL    1/13/2021 4:00 CST       Temperature Axillary      36.6 DegC                             Temperature Axillary (calculated)         97.88 DegF                             Heart Rate  Monitored      89 bpm                             Respiratory Rate          20 br/min                             SpO2                      99 %                             Systolic Blood Pressure   122 mmHg                             Diastolic Blood Pressure  78 mmHg                             Mean Arterial Pressure, Cuff              93 mmHg    1/13/2021 0:59 CST       24 HR Intake Totals       40 mL                             24 HR Output Totals       0 mL                             24 HR I&O Balance         40 mL    1/13/2021 0:00 CST       Temperature Axillary      36.6 DegC                             Temperature Axillary (calculated)         97.88 DegF                             Heart Rate Monitored      83 bpm                             Respiratory Rate          18 br/min                             SpO2                      99 %                             Systolic Blood Pressure   133 mmHg                             Diastolic Blood Pressure  75 mmHg                             Mean Arterial Pressure, Cuff              94 mmHg        Vital Signs (last 24 hrs)_____  Last Charted___________  Temp Oral     36.9 DegC  (JAN 14 08:00)  Resp Rate         21 br/min  (JAN 14 08:00)  SBP      H 144mmHg  (JAN 14 08:00)  DBP      74 mmHg  (JAN 14 08:00)  SpO2      98 %  (JAN 14 08:00)     Measurements from flowsheet : Measurements   1/13/2021 9:00 CST       Weight Measured           68.8 kg                             Weight                    Not Done  (Not Done)    General:  Alert and oriented, No acute distress.    HENT:  Normal, Normal hearing.    Neck:  Normal, No jugular venous distention, No lymphadenopathy.    Respiratory:  Lungs are clear to auscultation, Respirations are non-labored, Breath sounds are equal.    Cardiovascular:  Normal rate, Regular rhythm, No murmur, No gallop.    Gastrointestinal:  Soft, Non-tender, Non-distended.       Review / Management   Results review:     Labs (Last four charted  values)  WBC                  L 3.5 (JAN 14) 5.4 (JAN 12) 10.8 (JAN 11) 7.8 (JAN 07)   Hgb                  L 8.8 (JAN 14) L 9.4 (JAN 12) L 7.7 (JAN 11) L 8.4 (JAN 07)   Hct                  L 26.2 (JAN 14) L 27.9 (JAN 12) L 23.5 (JAN 11) L 26.0 (JAN 07)   Plt                  138 (JAN 14) 146 (JAN 12) 172 (JAN 11) 238 (JAN 07)   Na                   L 134 (JAN 14) 136 (JAN 12) L 134 (JAN 11) L 135 (JAN 07)   K                    3.6 (JAN 14) 4.3 (JAN 12) 4.3 (JAN 11) 3.8 (JAN 07)   CO2                  L 22 (JAN 14) 24 (JAN 12) L 19 (JAN 11) 23 (JAN 07)   Cl                   102 (JAN 14) 103 (JAN 12) 104 (JAN 11) 105 (JAN 07)   Cr                   H 4.68 (JAN 14) H 4.94 (JAN 12) H 7.36 (JAN 11) H 4.52 (JAN 07)   BUN                  21.5 (JAN 14) 21.1 (JAN 12) H 34.6 (JAN 11) 20.4 (JAN 07)   Glucose Random       95 (JAN 14) 104 (JAN 12) 106 (JAN 11) 102 (JAN 07)   PT                   H 15.3 (JAN 04)   INR                  L 1.2 (JAN 04)   PTT                  H 49.8 (JAN 04) .       Impression and Plan   Diagnosis     Other allergic rhinitis (IFW63-AH J30.89).     Moderate protein-calorie malnutrition (WYF22-XD E44.0).     End stage renal disease (CLC23-VO N18.6).     Course:  Unchanged, will need a 24cm temporary HD catheter brought to bedside and I will exchange old catheter for a longer one, He will later need a permacath for a more durable dialysis access.

## 2022-04-30 NOTE — DISCHARGE SUMMARY
DISCHARGE DATE:  04/07/2018    CHIEF COMPLAINT:  None    FINAL DIAGNOSES:    1. ESRD, new onset secondary to hypotension.  2. Hypotension.  3. Anemia of chronic kidney disease.    HOSPITAL COURSE:  This is a 62-year-old male who has been followed by Dr. Briceño has finally developed ESRD.  He was admitted and his left upper extremity AV fistula was not able to get cannulated and subsequently this has been changed into a left upper extremity AV graft by Dr. Medina.  The patient got dialysis started over here and is set up as an outpatient at [facility] to be dialyzed on Mondays, Wednesdays and Fridays.    DISCHARGE INSTRUCTIONS:  He will be followed by Dr. Briceño.  His medications and diet is the same as prior to admit except he will have increased protein intake.  He has been detailed on phosphate binders and low phosphate diet also.  Activities as tolerated.        ______________________________  MD DEMAR Salgado/SELENA  DD:  04/07/2018  Time:  04:11PM  DT:  04/10/2018  Time:  11:06AM  Job #:  [id number]    cc: Maik Messer MD

## 2022-04-30 NOTE — CONSULTS
DATE OF CONSULTATION:  04/03/2018    ATTENDING PHYSICIAN:  Physician SAGE  CONSULTING PHYSICIAN:  Dax Medina MD    REASON FOR CONSULTATION:  Evaluate worsening of the chronic kidney disease.    HISTORY:  This is a 62-year-old male known with chronic kidney disease, expecting to need dialysis, was seen last year when he was sent for dialysis angio access.  Sometime in November 2016, he was evaluated in our office and found to be a right-handed person with decreased renal function.  At that time, he underwent creation of AV fistula in the cubital fossa, which never developed well, for which the fistula was revised at the end of last year and has developed well, as expecting to need dialysis, to be used for that purpose.  The patient has been at this time admitted with  tiredness, decreased appetite, being nauseated, and found to be anemic, for which the nephrologist sent him for admission and to start on dialysis.    PAST MEDICAL HISTORY:  Significant for:   1. Hypertension.  2. Chronic kidney disease.  3. Spinal cord injury (from  accident), as well as quadriparesis, post spinal surgery 7 years ago.    PAST SURGICAL HISTORY:  Positive for partial nephrectomy for right renal mass.    SOCIAL HISTORY:  The patient is .  He lives by himself without pets in the house.  He smokes about 10-5 cigarettes per day.    FAMILY HISTORY:  Father with prostate cancer.    REVIEW OF SYSTEMS:  A system review was significant per H and P, with debility and worsening of his numbers for renal function.    ALLERGIES:  Iodine.    PHYSICAL EXAMINATION:  GENERAL:  The patient was awake and alert, and at that time, did not seem in acute distress.  His weight was 70 kg.   VITAL SIGNS:  Blood pressure 152/85 with heart rate of 62 per minute.  Pulse oximetry 98% on nasal cannula.   HEAD AND NECK:  Without gross deformity.   CHEST:  Seemed to be well expanded.   ABDOMEN:  Soft.   EXTREMITIES:  The patient has an AV  fistula of the left cubital fossa that has good diameter just at the takeoff of the skin fold in the cubital fossa and also a good diameter reaching up to the axilla with a single segment that is rather narrow.    LABORATORY DATA:  Sodium 135, potassium 4.7, chloride 103, CO2 20, calcium 8, glucose 93, BUN 73, creatinine 8.3.     WBC 4.9, hemoglobin 10.5, hematocrit 32.8, with platelets 195,000.    IMPRESSION:  Chronic kidney disease, 5, with glomerular filtration rate of 8.    RECOMMENDATIONS:  Considering that the fistula has a narrow segment that probably will not yield a good flow for dialysis, balloon dilatation of this segment.  As the patient is in need of dialysis, we could do insertion of a catheter via the internal jugular vein system and then simultaneously or later on post dialysis, then attempt to reopen this segment of the fistula.     I thank Dr. Briceño for referring me to reassess this patient, and I will schedule him for the procedure first hours in the morning (patient has been eating half a full lunch tray at the time of my visit).        ______________________________  Dax Medina MD JRP/UB  DD:  04/03/2018  Time:  05:10PM  DT:  04/03/2018  Time:  07:13PM  Job #:  [id number]

## 2022-04-30 NOTE — H&P
HISTORY OF PRESENT ILLNESS:  This is a 62 year old, black male, who has chronic kidney disease stage 5.  He was supposed to be admitted to start hemodialysis and now also he is found to have a clotted left upper arm AV fistula.  He was admitted for treatment of this AV fistula as well as to start hemodialysis.    PAST MEDICAL HISTORY:  Arterial hypertension, type 2 diabetes mellitus, chronic kidney disease currently at stage 5.    ALLERGIES:  Iodine.    REVIEW OF SYSTEMS:  Essentially noncontributory except for some lower extremity tenderness when walking and this is chronic which has been followed by Dr. Flores.    FAMILY HISTORY:  Father passed away with a myocardial infarction and mother with diabetes mellitus.    MEDICATIONS:  Medication list can be found in the medication section of the chart.    LABORATORY DATA:  Sodium 135, potassium 4.7, chloride 103, CO2 of 20, BUN 73, creatinine 8.35, total protein 9, globulin 5.4, H&H 10.5 and 32.8, normal white blood cells and platelet count.    PHYSICAL EXAMINATION:  GENERAL:  Fully alert and oriented and not in acute distress.   VITAL SIGNS:  Blood pressure 166/97, heart rate 62, respiratory rate 18, temperature 36.7.   HEENT:  Atraumatic and normocephalic.  PERRLA.  No scleral discoloration.  No discharge.  Ears and nose free of lesions.   NECK:  Without jugulovenous distention or masses.   CHEST:  Clear.   HEART:  Regular rhythm.   ABDOMEN:  Soft and nontender.  No peripheral edema.   EXTREMITIES:  Free of cyanosis or clubbing.  There is an AV fistula in the left upper arm without bruits or thrill now.    IMPRESSION:    1. Chronic kidney disease currently at stage 5, to start hemodialysis, but now is complicated with a clotted AV fistula.  2. History of arterial hypertension.  3. History of type 2 diabetes mellitus as well as a lumbar neuropathy.    PLAN:  We will call Dr. Medina in consult to deal with the care of this AV fistula and as soon as the  patient has access for hemodialysis he will receive his first treatment.  He will continue to follow up with our partners.        ______________________________  Maik GALLOWAY MD Ayde    AJL/CS  DD:  04/03/2018  Time:  11:49AM  DT:  04/03/2018  Time:  12:37PM  Job #:  [id number]    The H&P was reviewed, the patient was examined, and the following changes to the patients condition are noted:  ______________________________________________________________________________  ______________________________________________________________________________  ______________________________________________________________________________  [  ] No changes to the patient's condition:      ______________________________                                             ___________________  PHYSICIAN SIGNATURE                                                             DATE/TIME

## 2022-04-30 NOTE — OP NOTE
Patient:   Mirza Nelson Jr            MRN: [id number]            FIN: [id number]               Age:   63 years     Sex:  Male     :  1955   Associated Diagnoses:   None   Author:   Inez CHILDS, Parish Gomez      Operative Note   Operative Information   Procedures Performed: LUE AVG declot.     Indications: clotted LUE AVG.     Preoperative Diagnosis: Clotted LUE AVG.     Postoperative Diagnosis: Same.     Surgeon: Parish Wiley MD.     Anesthesia: moderate sedation, 1% lidocaine.     Esimated blood loss: No blood loss.     Description of Procedure/Findings/    Complications: Pt was taken to the cath lab and placed on the table in supine position.  The left arm was prepped and draped in the usual sterile fashion.  Timeout was performed.  Ultrasound was used to evaluate the graft and it was confirmed to be clotted.  Ultrasound was used to guide access to the graft toward the venous outflow.  A 6fr short sheath was placed over a wire.  Ultrasound was used to guide access to the graft toward the arterial inflow.  A 6fr short sheath was placed over a wire.  A  device was used to morselate clot in both directions, taking care not to cross the arterial anastomosis (so as not to restore flow until after TPA treatment).  10mg TPA were infused into the graft and allowed to sit for 10 minutes.  The  device was again passed in both directions.  The venous outflow continued to be without flow.  With extensive cleaning, a severe central stenosis, just where the left innominate vein empties into the SVC, was identified.  The 6fr venous direction sheath was exchanged for an 8fr sheath.  The stenosis was dilated first with a 10x4 balloon and then with a 14x6 balloon, 3 minute inflation time for each.  Completion fistula gram was performed confirming good inflow from the artery and good outflow through the venous system.  The central stenosis was not completely resolved, but was significantly improved.   The sheaths were removed and purse string 3-0 monocryl sutures were placed to achieve hemostasis.  Sterile dressings were applied.  The patient was returned to her room for complete recovery..     .     Findings: significant central stenosis identified at the junction of the left innominate vein and the SVC, adequately resolved with balloon angioplasty with 14mm balloon..     Complications: None.     Notes: The graft is appropriate for immediate use at dialysis..

## 2022-04-30 NOTE — CONSULTS
DATE OF CONSULTATION:  04/06/2018    ATTENDING PHYSICIAN:  Physician ER  CONSULTING PHYSICIAN:  Kareem Chanel MD    REASON FOR CONSULTATION:  Consideration for left arm fistulogram.    HISTORY OF PRESENT ILLNESS:  This is a 62-year-old gentleman with end stage renal disease who is currently utilizing a left IJ tunneled catheter for hemodialysis.  He does have a previously placed left arm cephalic fistula and left arm basilic fistula.  Neither have developed well and been deemed suitable for cannulation.  Apparently, the cephalic vein fistula was created first and thrombosed, and the basilic vein fistula was more recently created and has been found to be stenotic and small.  I was contacted by Dr. Medina to consider angioplasty of the basilic fistula.    PAST MEDICAL HISTORY:    1. End stage renal disease.   2. Hypertension.   3. Previous spinal cord injury.    PAST SURGICAL HISTORY:    1. Partial nephrectomy for renal mass.    2. Spine surgery.    MEDICATIONS:  On the chart.    ALLERGIES:  Iodine.    FAMILY HISTORY:  Mother with diabetes.  Father had MI.    SOCIAL HISTORY:  He is .  He lives by himself and he smokes about half of a pack of cigarettes per day.    REVIEW OF SYSTEMS:  He does report a recent fever, but can't say how high the fever was.  No headaches.  No chest pain or shortness of breath.  Otherwise, ten system review is negative.    PHYSICAL EXAMINATION:  VITAL SIGNS:  Temperature is 36.9, pulse 82, blood pressure 110/77, pulse ox 100%.   GENERAL:  He is alert and interactive.  He answers questions appropriately.     HEENT:  No scleral icterus.  Ears, nose and mouth are grossly normal.     NECK:  No neck lymphadenopathy.  He does have a left chest wall tunneled catheter in place.     EXTREMITIES:  His left arm does have evidence of a previous basilic fistula and previous cephalic fistula.  Both appear thrombosed with no thrill or bruit.  He does have palpable radial pulses  bilaterally.     HEART:  Heart rhythm is regular.   ABDOMEN:  Soft and nontender.     EXTREMITIES:  Without significant edema and do appear well perfused.     SKIN:  No skin lesions concerning for malignancy were observed.    LABS:  Sodium is 134, potassium 4.1, creatinine 6.5, white blood cell count 4.3, hemoglobin 10 and platelets 155.    ASSESSMENT & PLAN:  This is a 62-year-old man with end stage renal disease who has failed left arm cephalic and basilic fistula creations.  The basilic fistula was apparently patent earlier this week and has since thrombosed.  In palpating the fistula, it appears to be short in length and will have a limited cannulation zone.  Additionally, it feels sclerotic.  I think thrombectomy and percutaneous intervention to achieve maturation will unlikely be fruitful in the long term giving the appearance of his access sites.  I think he would be best served with new access creation.  I think a left upper arm dialysis graft would be the most appropriate and help him to progress to suitability for cannulation quicker.  I voiced all of this to Dr. Medina, and he states he will likely take him for dialysis graft placement.        ______________________________  MD ANN Rivas/DIMITRIS  DD:  04/06/2018  Time:  01:26PM  DT:  04/07/2018  Time:  10:04AM  Job #:  [id number]

## 2022-04-30 NOTE — OP NOTE
Patient:   Mirza Nelson Jr            MRN: [id number]            FIN: [id number]               Age:   65 years     Sex:  Male     :  1955   Associated Diagnoses:   Other allergic rhinitis; Moderate protein-calorie malnutrition; End stage renal disease   Author:   Marcell Larry MD      Operative Note   Operative Information   Date/ Time:  2021 15:59:00.     Procedures Performed: removal of right chest tunelled hemodialysis catheter.     Indications: infected permacath.     Preoperative Diagnosis: Other allergic rhinitis (PDQ95-AT J30.89), Moderate protein-calorie malnutrition (GUJ43-AK E44.0), End stage renal disease (ZQC78-SQ N18.6).     Postoperative Diagnosis: Other allergic rhinitis (RMY90-LS J30.89), Moderate protein-calorie malnutrition (QVY72-FZ E44.0), End stage renal disease (VZS24-VG N18.6).     Surgeon: Marcell Larry MD.     Anesthesia: local 1 percent lidocaine subcutaeneous.     Speciman Removed: permacath.     Description of Procedure/Findings/    Complications: Procedure done at bedside    right chest port site prepped and draped in sterile fashion    right port incision site was injected with 10cc of 1 percent lidocaine for local anesthesia    15 skin knife used to reopen incision on upper right chest    sutures from previous were cut with scissors    permacath cuff was cut circumferentially with scissors and catheter removed    bleeding tract sutured with 3-0 vicryl    clean dressing applied    .     Esimated blood loss: loss  2  cc.     Complications: None.

## 2022-04-30 NOTE — OP NOTE
DATE OF SURGERY:    11/29/2018    SURGEON:  Dax Medina MD    PREOPERATIVE TIRSO  1. End-stage renal disease on dialysis.  2. Cervical radiculopathy.    POSTOPERATIVE DIAGNOSES  3. End-stage renal disease on dialysis.  4. Cervical radiculopathy.    ANESTHESIA:  General.    SURGERIES PERFORMED:    1. A percutaneous angiogram of the arteriovenous graft of the left upper arm.  2. Balloon dilatation of the arterial inflow.  3. Balloon dilatation of the venous outflow.    INDICATION FOR SURGERY:  This is a 63-year-old male with end-stage renal disease on hemodialysis, was given full flow through the loop AV graft of the left upper arm from the brachial artery to the axillary vein.    DESCRIPTION OF PROCEDURE:  The patient was brought to surgery.  A time-out was called with information of allergy to the iodine.  Anesthesia addressed this, given Benadryl and steroids according to their judgment.  The entire extremity was cleaned and draped.  Via percutaneous puncture, the graft was entered and directed the flow through the arterial limbs.  Injection of the diluted contrast showed the anastomosis was rather tight.  I passed a Glidewire through this area, and, then on top of the Glidewire, a 4 cm in length by 7 mm in diameter balloon was used to reopen that area.  Then, via 2nd puncture, the venous limb of the graft was entered, passing a Glidewire to the graft until venous anastomosis in the area was identified as rather narrow.  On top of this glidewire, the balloon was placed in, and dilatation of the narrow area was achieved.  This was followed by irrigation of the graft with heparinized saline, removing     the hardware and the introducer and placing 1 stitch of 4-0 Prolene in each of the 2 puncture wounds.  With good thrill over the graft, as well as palpable pulse over the wrist, the patient was moved to recovery room.        ______________________________  Dax Medina MD JRP/UN  DD:  11/29/2018  Time:   08:02PM  DT:  11/29/2018  Time:  10:26PM  Job #:  [id number]

## 2022-04-30 NOTE — OP NOTE
DATE OF SURGERY:    04/04/2018    SURGEON:  Dax Medina MD    PREOPERATIVE DIAGNOSIS:  Chronic kidney disease stage V.    POSTOPERATIVE DIAGNOSIS:  Chronic kidney disease stage V.    ANESTHESIA:  Local plus MAC.    PROCEDURE:    1. Ultrasound assisted localization of neck.  2. Insertion of tunneled hemocatheter via left internal jugular vein under fluoroscopy.    ASSISTANT:  Dr. Reed    INDICATIONS:  The patient has chronic kidney disease and last year underwent creation of AV fistula of the left upper extremity as a preparation for dialysis angioaccess.  The patient had reached the need for dialysis and the fistula is not ready due to stenosis at central segment.    PROCEDURE IN DETAIL:  Upper chest and neck were prepped and draped.  Xylocaine was infiltrated in the soft tissues.  Using the ultrasound, the neck was explored, observing rather small collapsed vein and an attempt on the right side resulted in arterial puncture (using the _____ needle).       On the left side, the vein was located, then via percutaneous puncture, the lumen was entered, guide wire was advanced under fluoroscopy, but we noted that it was going up to the opposite side.  I passed the mini introducer on top of the wire, and through the mini introducer and using the Glidewire, after multiple torque maneuvers, the wire was advanced down until reaching the right atrium.  The puncture wound was enlarged.  Using the peel-away introducer, Palindrome catheter 20 cm from tip to cuff was inserted.  The distal end of the catheter was exteriorized in the left upper chest next to the anterior axillary line, affixed to     the skin with two stitches and the lumen packed with heparin 1000 unit/mL.  The small incision of the neck was repaired with Vicryl.      ______________________________  Dax Medina MD JRP/CD  DD:  04/04/2018  Time:  11:22AM  DT:  04/04/2018  Time:  03:09PM  Job #:  [id number]

## 2022-04-30 NOTE — H&P
Patient:   Mirza Nelson Jr            MRN: [id number]            FIN: [id number]               Age:   63 years     Sex:  Male     :  1955   Associated Diagnoses:   None   Author:   Jerry Carrillo MD      Basic Information   Source of history:  Self, Medical record.       History of Present Illness   63-year-old male referred by his PCP for evaluation for screening colonoscopy.  Patient denies previous colonoscopies and he also denies problems with rectal bleeding and abdominal pain constipation diarrhea or rectal bleeding.  He denies any family history of colon cancer etc. his father passed with stomach cancer.  Patient is on dialysis and is being evaluated for kidney transplant and this is part of his preoperative workup      Review of Systems   Constitutional:  Negative.    Respiratory:  Negative.    Cardiovascular:  Negative.    Gastrointestinal:  Negative.    Genitourinary:  Negative.       Health Status   Allergies:    Allergic Reactions (Selected)  Severity Not Documented  Iodine- Difficulty breathing.,    Allergies (1) Active Reaction  iodine difficulty breathing   Current medications:  (Selected)   Inpatient Medications  Ordered  Procrit: 10,000 units, form: Soln, Subcutaneous, Once-chemo, first dose 12/15/17 10:53:00 CST, stop date 12/15/17 10:53:00 CST, [id number]  Pending Complete  midazolam: 2 mg, form: Injection, IV Push, q5min, Order duration: 2 dose(s), first dose 17 11:04:00 CDT, stop date 17 11:13:00 CDT, (up to 5 mg for moderate anxiety), 30,025  Prescriptions  Prescribed  Flonase 50 mcg/inh nasal spray: 2 spray(s), Nasal, Daily, PRN PRN allergy symptoms, in each nostril, # 1 EA, 11 Refill(s), Pharmacy: [email or web address] [id number]  gabapentin 300 mg oral capsule: 300 mg = 1 cap(s), Oral, TID, PRN PRN nerve pain, # 90 cap(s), 0 Refill(s), Pharmacy: [email or web address] [id number]  Documented Medications  Documented  Norco 10 mg-325 mg oral tablet: 1 tab(s), Oral, q8hr, PRN  PRN for pain, 0 Refill(s)  RENVELA      TAB 800M mg = 1 tab(s), Oral, TID  amlodipine 10 mg oral tablet: 10 mg = 1 tab(s), Oral, Daily, # 30 tab(s), 0 Refill(s)  sodium bicarbonate 650 mg oral tablet: 1,300 mg = 2 tab(s), Oral, TID, # 60 tab(s), 0 Refill(s)  tamsulosin 0.4 mg oral capsule: 0.4 mg = 1 cap(s), Oral, Daily, # 30 cap(s), 0 Refill(s)   Problem list:    All Problems  Other specified anemias / SNOMED CT 756359367 / Confirmed  Cervical radiculopathy / SNOMED CT 956872107 / Confirmed  Chronic neck pain / SNOMED CT [id number] / Confirmed  Chronic pain / ICD-9-.29 / Confirmed  End stage renal disease / SNOMED CT 47449968 / Confirmed  HTN / ICD-9-.9 / Confirmed  Bilateral lumbar radiculopathy / SNOMED CT 973272330 / Confirmed  Elevated PSA, between 10 and less than 20 ng/ml / SNOMED CT 6861012903 / Confirmed  Review of care plan / SNOMED CT 3048616040 / Confirmed  Spell of visual loss / SNOMED CT 06261510 / Confirmed  Vitamin D deficiency / SNOMED CT 29475537 / Confirmed,    Active Problems (11)  Bilateral lumbar radiculopathy   Cervical radiculopathy   Chronic neck pain   Chronic pain   Elevated PSA, between 10 and less than 20 ng/ml   End stage renal disease   HTN   Other specified anemias   Review of care plan   Spell of visual loss   Vitamin D deficiency       Histories   Past Medical History:    Active  HTN (401.9)  Chronic pain (338.29)  Resolved  Tobacco user (710926787):  Resolved.  Preventative health care (250397300):  Resolved.  CKD (chronic kidney disease), stage IV (1825773567):  Resolved.  Bacterial UTI (4274432356):  Resolved.  Other acute sinusitis (94073230):  Resolved.  Screening PSA (prostate specific antigen) (216544633):  Resolved.  Flu vaccine need (232224496):  Resolved.   Family History:    Diabetes mellitus type 2  Mother ()  Coronary artery disease  Father ()  Gastric cancer  Father ()  Acute myocardial  infarction.  Father ()  Hypertension.  Father ()  Mother ()     Procedure history:    Angiogram (Left) on 2018 at 63 Years.  Comments:  2018 13:33 - Usmna BOLES, Beatrice FONG  auto-populated from documented surgical case  Arteriovenous Fistula (Left) on 2018 at 62 Years.  Comments:  2018 14:51 - Dari Michele RN  auto-populated from documented surgical case  Catheter Insertion Palindrome (.) on 2018 at 62 Years.  Comments:  2018 11:09 - Saba Gao RN  auto-populated from documented surgical case  Arteriovenous Fistula (.) on 2017 at 62 Years.  Comments:  2017 14:21 - Norma Robles RN  auto-populated from documented surgical case  Vascular access incision (096727773) in 2016 at 61 Years.  Comments:  2017 09:34 - Heather Torres LPN  dialysis shunt  Colonoscopy (522209925) on 2011 at 56 Years.  renal mass removed.  Cervical spinal fusion (981054902).   Social History        Social & Psychosocial Habits    Alcohol  2018  Use: Never    Employment/School    Comment: Disabled - 05/10/2017 10:23 - Diana Chaves RN    Exercise    Comment: no current program - 2017 11:41 - Heather Torres LPN    Home/Environment  05/10/2017  Lives with: Significant other    Nutrition/Health  2017  Type of diet: Regular    Sexual  2017  Sexually active: No    Substance Abuse  2018  Use: Current    Type: Prescription medications    Comment: denies - 2018 10:24 - Kiki Munguia RN    Tobacco  2019  Use: Former smoker, quit more    Type: Cigarettes    Patient Wants Consult For Cessation Counseling N/A    Stopped at age: 60 Years.        Physical Examination   General:  Alert and oriented, No acute distress.    Eye:  Pupils are equal, round and reactive to light, Extraocular movements are intact, Normal conjunctiva.    HENT:  Normocephalic, Normal hearing, No pharyngeal erythema.    Neck:  Supple, Non-tender,  No lymphadenopathy.    Respiratory:  Lungs are clear to auscultation, Respirations are non-labored, Breath sounds are equal.    Cardiovascular:  Normal rate, Regular rhythm, No murmur.    Gastrointestinal:  Soft, Non-tender, Non-distended, Normal bowel sounds, No organomegaly.       No qualifying data available   Lymphatics:  No lymphadenopathy neck, axilla, groin.    Musculoskeletal:  Normal range of motion, Normal strength, No tenderness, Normal gait.    Integumentary:  Warm, Moist, No rash.    Neurologic:  Alert, Oriented, Normal sensory, Normal motor function, No focal deficits.    Psychiatric:  Cooperative, Appropriate mood & affect.       Health Maintenance      Health Maintenance     Pending (in the next year)        Due            Coronary Artery Disease Maintenance-Antiplatelet Agent Prescribed due  02/19/19  and every            Coronary Artery Disease Maintenance-Lipid Lowering Therapy due  02/19/19  and every         Due In Future            Aspirin Therapy for CVD Prevention not due until  05/03/19  and every 1  year(s)           Depression Screening not due until  08/07/19  and every 1  year(s)           ADL Screening not due until  12/06/19  and every 1  year(s)           Blood Pressure Screening not due until  02/05/20  and every 1  year(s)           Body Mass Index Check not due until  02/05/20  and every 1  year(s)           Alcohol Misuse Screening not due until  02/05/20  and every 1  year(s)           Obesity Screening not due until  02/05/20  and every 1  year(s)     Satisfied (in the past 1 year)        Satisfied            ADL Screening on  12/06/18.  Satisfied by Heather Torres LPN           Alcohol Misuse Screening on  02/05/19.  Satisfied by Heather Torres LPN           Aspirin Therapy for CVD Prevention on  05/03/18.  Satisfied by Elo Flores MD  Reason: Expectation Satisfied Elsewhere           Blood Pressure Screening on  02/05/19.  Satisfied by Heather Torres LPN            Body Mass Index Check on  02/05/19.  Satisfied by Heather Torres LPN           Coronary Artery Disease Maintenance-Lipid Lowering Therapy on  08/07/18.  Satisfied by Elo Flores MD           Depression Screening on  08/07/18.  Satisfied by Heather Torres LPN           Diabetes Screening on  11/29/18.  Satisfied by Savannah Flanagan MT           Influenza Vaccine on  11/28/18.  Satisfied by Divine BOLES, Dorita PA           Obesity Screening on  02/05/19.  Satisfied by Heather Torres LPN        Canceled            Coronary Artery Disease Maintenance-Antiplatelet Agent Prescribed on  08/07/18. Recorded by Elo Flores MD Reason: Expectation Not Necessary        Review / Management   Results review:     No qualifying data available.       Impression and Plan   Plan for colonoscopy recommendations to follow

## 2022-04-30 NOTE — OP NOTE
DATE OF SURGERY:    10/28/2020    SURGEON:  Kareem Chanel MD    PREOPERATIVE DIAGNOSES:    1. End-stage renal disease.  2. Mechanical complication of left arm dialysis graft.    POSTOPERATIVE DIAGNOSES:    1. End-stage renal disease.  2. Mechanical complication of left arm dialysis graft.    PROCEDURES:  Left arm fistulogram with balloon angioplasty of venous anastomosis of graft and balloon angioplasty and stenting of left innominate vein.    HISTORY OF PRESENTING ILLNESS:  Mr. Mirza Nelson is a 65-year-old gentleman with prolonged bleeding and pulsatility associated with his left arm graft.  He was referred to us for issues with his graft.  He presents today for fistulogram.    DESCRIPTION OF PROCEDURE:  Mr. Nelson was taken to the fluoroscopy suite and placed in the supine position, where his left arm was prepped and draped in the usual sterile fashion.  Appropriate timeout was performed.  Moderate sedation was initiated.  B-mode ultrasound was utilized to evaluate the graft.  1% lidocaine was injected into the subcutaneous tissues, and a needle was utilized to cannulate the graft near the apex.  A 6-Kittitian sheath was then placed.  A fistulogram was performed, demonstrating an approximately 85% stenosis to the left innominate vein.  The superior vena cava and subclavian vein were patent.  The left axillary vein was patent, but there was an approximately 60% stenosis at the venous anastomosis of the graft.  The remainder of the graft was patent.     We utilized a 7 mm balloon to perform balloon angioplasty of the venous anastomosis.  This left about a 20% residual stenosis, which I felt was satisfactory.     We then turned our attention to the left innominate vein.  We utilized a 12 mm balloon and only got minimal improvement with about a residual of 70% stenosis.  A 14 mm balloon was then utilized for angioplasty in that location and again only got some mild improvements with approximately residual  60% stenosis.  We then placed a 14 mm x 4 cm self-expanding stent at that location, performed balloon angioplasty with a 14 mm balloon, and had a residual of about 30% stenosis.  There was no evidence of     complication, and I felt this was a satisfactory result.  4-0 Monocryl was placed about the sheath site.  The sheath was removed and pressure was held.  This completed the procedure.        ______________________________  MD ANN Rivas/UM  DD:  10/28/2020  Time:  03:41PM  DT:  10/28/2020  Time:  03:56PM  Job #:  [id number]

## 2022-04-30 NOTE — CONSULTS
DATE OF CONSULTATION:  12/31/2020    ATTENDING PHYSICIAN:    CONSULTING PHYSICIAN:  Marcell Larry MD    REASON FOR CONSULTATION:  Removal of infected right chest tunneled hemodialysis catheter.    HISTORY OF PRESENT ILLNESS:  This is a 65-year-old male with multiple medical comorbidities.  He has end-stage renal disease and is on hemodialysis.  He had placement of a right IJ tunneled dialysis catheter on December 9th.  His primary medical team fears that it is infected.  I was consulted for its removal.  I presented to the bedside today to remove the catheter.  However, the patient still has several hours of dialysis.    ALLERGIES:  Iodine and seafood, which cause swelling.    MEDICATIONS:  Please see med rec sheet.    LABORATORY DATA:  Blood drawn from the dialysis catheter shows gram-positive cocci, probable Streptococcus.    PHYSICAL EXAMINATION:  GENERAL:  He is in no acute distress.  Awake and alert.     VITAL SIGNS:  Stable.  He is afebrile.     CARDIAC:  Regular rate and rhythm.  No rubs, murmurs, or gallops.     LUNGS:  Clear to auscultation bilaterally.  No wheezes, rales, or rhonchi.   ABDOMEN:  Soft, nontender, nondistended.  Bowel sounds present.     CHEST:  The right chest catheter, I see no pus or purulence around the catheter.    IMPRESSION AND PLAN:  A 65-year-old male with bacteremia.  I came to remove the catheter today; however, he still has several hours of dialysis.  Unfortunately, I will not be available in the next 2 hours to remove the catheter.  If it is felt that it is urgent to remove the catheter, he will need to be transferred to [facility] for the catheter removal.  However, if the medical team feels that he can wait, I will be happy to remove the catheter on Monday when I have availability.  However, please do not hesitate to transfer him to [facility] if it is felt that the catheter must be removed.  Otherwise, it will need to wait till Monday, unfortunately.        My cell phone number is [phone].  Please do not hesitate to contact me for the care of this patient or any other patient needing general surgical care.        ______________________________  Marcell Larry MD RA/ALFIE  DD:  12/31/2020  Time:  10:11AM  DT:  12/31/2020  Time:  10:30AM  Job #:  [id number]

## 2022-04-30 NOTE — DISCHARGE SUMMARY
Patient:   Mirza Nelson Jr            MRN: [id number]            FIN: [id number]               Age:   65 years     Sex:  Male     :  1955   Associated Diagnoses:   None   Author:   Tony Lweis MD      Date of Service: 2021      Discharge Information      Discharge Summary Information   Date of Admission: 2020  Date of Discharge: 2021  Admit Diagnosis: E. coli bacteremia         Acute encephalopathy         ESRD on HD         Physical deconditioning         HTN         Macrocytic anemia         Vitamin D deficiency         GERD         PCM         Failure to thrive         BPH         Urinary retention  Discharge Diagnosis:  E. coli bacteremia (resolved)           Acute encephalopathy (resolved)           ESRD on HD (stable)            Physical deconditioning (improved)           HTN (stable)            Macrocytic anemia (stable)            Vitamin D deficiency (stable)            GERD (stable)            PCM (trending down)           Failure to thrive (current)           BPH (stable)            Urinary retention (stable)            VRE bacteremia (resolved)           Neck pain (resolved)           Hiccups (stable)   Internal Medicine (attending): Tony Lewis MD  Nephrology: Maik Messer MD    OUTPATIENT PROVIDERS  PCP: Elo Flores MD  Nephrology: Chang Butler MD  Urology: Celio Bhakta MD      Hospital Course    65-year-old -American male presented to [facility] ED on 2020 with complaints of weakness and falls (near syncopal episode during dialysis).  PMH significant for ESRD on HD MWF and BPH s/p cystoscopy in 2020.  Work-up significant for acute hypoxic respiratory failure secondary to COVID-19 pneumonitis versus fluid volume overload.  Decadron, azithromycin, and Rocephin initiated.  Complains of intractable hiccups on  Carafate and Protonix initiated.  Thorazine initiated as needed.  Baclofen added on .  AV fistula reported on  12/8.  Tolerated right IJ catheter placement on 12/9 without perioperative complications.  Continues to have hiccups.  Reglan initiated on 12/11 with good improvement of hiccups.  Feeds to eat and requested a PEG tube.  Anemia noted on 12/12 and 1 unit PRBC transfused.  Protonix converted to IV.  Tolerated 2 units PRBC on 12/15.  Patient developed temp.  Blood culture significant for E. coli and Zosyn initiated.  Urology consulted due to hematuria.  Urology recommended changing catheter every 2 weeks.  Flomax and Proscar initiated.  Urine culture not obtained prior to antibiotic initiation.  Likely the source of E. coli bacteremia.  Urology recommended follow-up with urologist Celio Bhakta MD outpatient.  Appetite and encephalopathy reported to be slowly improving.  Tolerated transfer to [facility] ([facility] inpatient LTAC on  on 12/22 without incident.    During LTAC patient course patient continued HD MWF.  He continued meropenem with planned end date on 12/31.  Required 1 unit PRBC with hemodialysis on 12/24.  Appetite pooronly eating about 25% of his meals.  Staff assisted feedings initiated on 12/25.  Low BP and elevated temperature reported on 12/29.  Pancultured.  Vancomycin initiated.  Urine culture significant for Candida tropicalis.  Blood cultures x2 significant for VRE and Candida tropicalis.  Vancomycin discontinued and Zyvox 600 mg and Diflucan 200 mg daily initiated.  Infectious disease consulted.  Completed dialysis on 1/4 and surgery discontinued central dialysis line.  Tolerated right femoral HD cath at bedside on 1/6.  Continue with dialysis.  Zyvox changed to oral on 1/7 with completion date on 1/16.  Diflucan continued until 1/11.  On 1/12 urine appeared to be milk-like discharge.  CBI initiated.  Surgery replaced nonfunctioning hemodialysis catheter on 1/15 without complications.  Tolerating 2 units PRBC on 1/22.  Complained of hiccups on 1/22GI cocktail helped.  Pepcid  did not.  On 1/24 patient received Thorazine 25 mg IM with resolution of hiccups.  Continued on Thorazine 25 mg 3 times daily as needed.  Indwelling catheter discontinued.  Follow-up vital signs stable.  Lab work stable.  Med reconciliation completed.  Discharge orders initiated.  Stable for transfer to skilled nursing facility.  To follow-up with skilled nursing NP or MD within 24 to 72 hours of admission.    Chief Complaint: E. coli bacteremia with likely source bladder was encephalopathy, debility, and COVID-19 pneumonitisrecovered       Physical Examination      Vital Signs (last 24 hrs)_____  Last Charted___________  Temp Oral         36.6 DegC  (JAN 29 04:00)  Heart Rate Peripheral   86 bpm  (JAN 29 04:00)  Resp Rate             22 br/min  (JAN 29 04:00)  SBP      132 mmHg  (JAN 29 04:00)  DBP      86 mmHg  (JAN 29 04:00)  SpO2      94 %  (JAN 29 04:00)     General:  No acute distress, Awake and alert.    Eye:  Vision unchanged.    HENT:  Normocephalic.    Neck:  Supple.    Respiratory:  Lungs are clear to auscultation, Respirations are non-labored, Breath sounds are equal, Symmetrical chest wall expansion, No chest wall tenderness.    Cardiovascular:  Normal rate, Regular rhythm, LUE AV fistula with palpable thrill, LCW tunneled HD catheter intact.         Systolic murmur: Location ( 5th left intercostal space ), Intensity ( Grade IV ).    Gastrointestinal:  Soft, Non-tender, Normal bowel sounds.    Genitourinary:  Indwelling catheter  Musculoskeletal:  Normal range of motion, No tenderness, No swelling, No deformity.    Integumentary:  Warm, Dry, Dressing to right groin clean and intact.    Neurologic:  Alert, Normal sensory, Normal motor function, No focal deficits, Cranial Nerves II-XII are grossly intact.         Orientation: To person, Not to place, Not to time, Not to situation.    Cognition and Speech:  Speech clear and coherent.    Psychiatric:  Cooperative, Appropriate mood & affect, Non-suicidal.     *MD performed and documented physical examination        Results Review   General results   Most recent results   Discrete results only   1/28/2021 13:45 CST      Hep A IgM                 Nonreactive                             Hep B Core IgM            Nonreactive                             Hep Bs Ag                 Nonreactive                             Hep C Ab                  Reactive    1/28/2021 3:53 CST       Est Creat Clearance Ser   12.42 mL/min    1/28/2021 3:20 CST       Sodium Lvl                130 mmol/L  LOW                             Potassium Lvl             4.5 mmol/L                             Chloride                  100 mmol/L                             CO2                       22 mmol/L  LOW                             Calcium Lvl               7.8 mg/dL  LOW                             Glucose Lvl               105 mg/dL                             BUN                       36.5 mg/dL  HI                             Creatinine                4.93 mg/dL  HI                             BUN/Creat Ratio           7  NA                             eGFR-AA                   15  NA                             eGFR-VLAD                  13 mL/min/1.73 m2  NA        Chest x-ray results   Anterior/posterior, Lateral, Reported at  12/29/2020 11:44:00, Interpretation:  Multifocal patchy opacities as above   Radiology results   CT, Without contrast, Abdomen , Reported at  12/17/2020 09:32:00, Interpretation:   Small bilateral pleural effusions with irregular bilateral opacities compatible with COVID 19. Severe bilateral hydronephrosis which appears to be chronic given renal parenchymal atrophy. This is likely related to chronic outlet obstruction. Markedly enlarged prostate with nodular extension towards the bladder. There is an additional nodule within the bladder posteriorly which appears to be separate from the prostate. Would recommend correlation with cystoscopy when feasible to exclude  neoplasm    Radiology results   MRI, Without contrast, Brain , Reported at  12/17/2020 09:11:00, Interpretation:  No acute intracranial abnormality.  Mild chronic microvascular ischemic changes and parenchymal volume loss   Radiology results   ECHO, Transthoracic echocardiogram , Reported at  1/4/2021 06:30:00, Interpretation:  EF 60%.  Mild aortic valve regurgitation.  No evidence of pericardial effusion.  No mass or vegetation noted.       Discharge Plan   Discharge Summary Plan   Discharge Status: improved.        Location: Discharge to SNF     Medications: See discharge medicine reconciliation     Activity: as tolerated     Diet: Renal diet     Instructions:  Take all medications as prescribed.          Attend appointments as scheduled.          Return to ED if symptoms worsen, or if t > 100.4.     Education: Bacteremia.  ESRD.  HTN.     Follow-up: Follow-up with NP or MD at skilled nursing facility within 24 to 72 hours of admission    Discussed plan of care, and patient communicated understanding. Agreed to comply with recommendations.    Dewayne Salamanca NP conducted independent examination and assisted with medical documentation.     Discharge Time: 48 minutes

## 2022-04-30 NOTE — ED PROVIDER NOTES
Patient:   Mirza Nelson Jr            MRN: [id number]            FIN: [id number]               Age:   62 years     Sex:  Male     :  1955   Associated Diagnoses:   HTN (hypertension); ESRD on hemodialysis   Author:   Jez CHILDS, Igor BRIGHT      Basic Information   Time seen: Date & time 4/3/2018 09:35:00.   History source: Patient.   Arrival mode: Private vehicle.   History limitation: None.   Additional information: Patient's physician(s): Sandra CHILDS, Elo BOWIE, Ayde CHILDS, Maik LANDA, Chief Complaint from Nursing Triage Note : Chief Complaint   4/3/2018 9:24 CDT        Chief Complaint           patient states that he was sent to ED for first dialysis treatment by Dr Briceño. spoke with Tara at Dr Briceño's office and states for ED to see.  .      History of Present Illness   The patient presents with Patient reports being called by Nephrologist office today and told to come to ER for admission and dialysis. Ewa NP and I, Dr. Story, assumed care for this patient at 1011.    61 y/o AA male with history of ESRD presents to ED after being called by his nephrologist office and told to come to ED for his 1st dialysis run. Patient has no pain or complaints at this time..  The onset was just prior to arrival.  The course/duration of symptoms is constant.  The character of symptoms is no pain.  The degree at onset was minimal.  The degree at present is none.  Risk factors consist of Hx of ESRD.  Therapy today: none.  Associated symptoms: none.        Review of Systems   Constitutional symptoms:  Negative except as documented in HPI.   Skin symptoms:  Negative except as documented in HPI.   Eye symptoms:  Negative except as documented in HPI.   ENMT symptoms:  Negative except as documented in HPI.   Respiratory symptoms:  Negative except as documented in HPI.   Cardiovascular symptoms:  Negative except as documented in HPI.   Gastrointestinal symptoms:  Negative except as documented in  HPI.   Genitourinary symptoms:  Negative except as documented in HPI.   Musculoskeletal symptoms:  Negative except as documented in HPI.   Neurologic symptoms:  Negative except as documented in HPI.      Health Status   Allergies:    Allergic Reactions (Selected)  Severity Not Documented  Iodine- No reactions were documented..      Past Medical/ Family/ Social History   Medical history:    Active  HTN (401.9)  Resolved  Chronic pain (338.29):  Resolved.  Tobacco user ([id number]):  Resolved.  Bacterial UTI ([id number]):  Resolved., Reviewed as documented in chart, ESRD.   Surgical history:    Arteriovenous Fistula (.) on 2017 at 62 Years.  Comments:  2017 14:21 - Margaret BOLES, Norma GALLOWAY  auto-populated from documented surgical case  Vascular access incision ([id number]) in 2016 at 61 Years.  Comments:  2017 09:34 - Heather Torres LPN  dialysis shunt  Colonoscopy ([id number]) on 2011 at 56 Years.  renal mass removed.  Cervical spinal fusion ([id number])., Reviewed as documented in chart.   Family history:    Diabetes mellitus type 2  Mother ()  Coronary artery disease  Father ()  Gastric cancer  Father ()  Acute myocardial infarction.  Father ()  Hypertension.  Father ()  Mother ()  , Reviewed as documented in chart.   Social history: Alcohol use: Denies, Tobacco use: former, Drug use: Denies.      Physical Examination               Vital Signs   Vital Signs   4/3/2018 9:24 CDT        Temperature Oral          36.7 DegC                             Temperature Oral (calculated)             98.06 DegF                             Peripheral Pulse Rate     75 bpm                             Respiratory Rate          16 br/min                             SpO2                      99 %                             Oxygen Therapy            Room air                             Systolic Blood Pressure   184 mmHg  HI                             Diastolic Blood  Pressure  93 mmHg  HI  .   Measurements   4/3/2018 9:24 CDT        Weight Dosing             70.5 kg                             Weight Measured and Calculated in Lbs     155.42 lb                             Weight Estimated          70.5 kg                             Height/Length Dosing      167 cm                             Height/Length Estimated   167 cm                             Body Mass Index Estimated 25.28 kg/m2  .   Basic Oxygen Information   4/3/2018 9:24 CDT        SpO2                      99 %                             Oxygen Therapy            Room air  .   General:  Alert, no acute distress, Ambulation status: Assistive devices cane.    Skin:  Warm, dry, intact.    Head:  Normocephalic, atraumatic.    Eye   Cardiovascular:  Regular rate and rhythm, Normal peripheral perfusion, No edema.    Respiratory:  Lungs are clear to auscultation, respirations are non-labored, breath sounds are equal, Symmetrical chest wall expansion.    Gastrointestinal:  Soft, Nontender, Non distended, Normal bowel sounds, Guarding: Negative, Rebound: Negative.    Musculoskeletal:  No deformity, dialysis shunt to left UE with good thrill.    Neurological:  Alert and oriented to person, place, time, and situation, No focal neurological deficit observed, normal speech observed.    Psychiatric:  Cooperative, appropriate mood & affect.       Medical Decision Making   Documents reviewed:  Emergency department nurses' notes.   Orders  Launch Orders   Laboratory:  CMP (Order): Stat collect, 4/3/2018 9:36 CDT, Blood, Lab Collect, Print Label By Order Location, 4/3/2018 9:36 CDT  CBC w/ Auto Diff (Order): Now collect, 4/3/2018 9:35 CDT, Blood, Lab Collect, Print Label By Order Location, 4/3/2018 9:35 CDT.   Results review:  Lab results : Lab View   4/3/2018 9:50 CDT        Sodium Lvl                135 mmol/L  LOW                             Potassium Lvl             4.7 mmol/L                             Chloride                   103 mmol/L                             CO2                       20.0 mmol/L  LOW                             Calcium Lvl               8.6 mg/dL                             Glucose Lvl               93 mg/dL                             BUN                       73.0 mg/dL  HI                             Creatinine                8.35 mg/dL  HI                             eGFR-AA                   8 mL/min/1.73 m2  NA                             eGFR-VLAD                  7 mL/min/1.73 m2  NA                             Bili Total                0.6 mg/dL                             Bili Direct               0.20 mg/dL                             Bili Indirect             0.40 mg/dL                             AST                       13 unit/L  LOW                             ALT                       24 unit/L                             Alk Phos                  87 unit/L                             Total Protein             9.0 gm/dL  HI                             Albumin Lvl               3.60 gm/dL                             Globulin                  5.40 gm/dL  HI                             A/G Ratio                 0.7 ratio  LOW                             WBC                       4.9 x10(3)/mcL                             RBC                       3.42 x10(6)/mcL  LOW                             Hgb                       10.5 gm/dL  LOW                             Hct                       32.8 %  LOW                             Platelet                  199 x10(3)/mcL                             MCV                       95.9 fL  HI                             MCH                       30.7 pg                             MCHC                      32.0 gm/dL  LOW                             RDW                       14.2 %                             MPV                       9.5 fL                             Abs Neut                  3.32 x10(3)/mcL                             Neutro Auto                68 %  NA                             Lymph Auto                21 %  NA                             Mono Auto                 6 %  NA                             Eos Auto                  4 %  NA                             Abs Eos                   0.2 x10(3)/mcL                             Basophil Auto             1 %  NA                             Abs Neutro                3.32 x10(3)/mcL                             Abs Lymph                 1.0 x10(3)/mcL                             Abs Mono                  0.3 x10(3)/mcL                             Abs Baso                  0.1 x10(3)/mcL  .      Impression and Plan   Diagnosis   HTN (hypertension) (JAI71-GE I10)   ESRD on hemodialysis (PAR37-WU N18.6)      Calls-Consults   -  4/3/2018 10:20:00 , Luke CHILDS, Chang ARECHIGA, Dr. Butler states he will come evaluate the patient in the ED., Dr Briceño came to ED to evaluate the patient.    Plan   Condition: Improved, Stable.    Disposition: Admit to Inpatient Unit.    Counseled: Patient, Regarding diagnosis, Regarding diagnostic results, Regarding treatment plan, Patient indicated understanding of instructions.    Notes: I, Kelley Gomez, acted solely as a scribe for and in the presence of Dr. Story who performed the service.  , I, Dr Story have read note from scribe and I agree with history and physical except as amended by me.  All information was dictated from my history and my examination of patient..

## 2022-04-30 NOTE — CONSULTS
DATE OF CONSULT:      HISTORY OF PRESENT ILLNESS:  We are called in consultation to see this 65-year-old black male who has end-stage renal disease for continuation of renal care and hemodialysis.  The patient has been admitted in November __________ to [facility].  He was positive for COVID-19.  He was found to have a left upper arm graft occluded and right IJ tunnel HD catheter had to be placed by Dr. Chanel on the 9th December 2020.    PAST MEDICAL HISTORY:  Patient has had a past history of end-stage renal disease undergoing chronic hemodialysis on Mondays, Wednesdays, and Fridays schedule.  He also had a history of an anemia, chronic pain and arterial hypertension.    ALLERGIES:  TO SEAFOOD AND IODINE.     MEDICATIONS:  Medication list can be found in the medication section of the chart.    LABORATORY ANALYSIS:  From today shows a hemoglobin and hematocrit of 7.7 and 24.2% with white blood cell count somewhat decreased to 3200, platelet count within normal limits.  Sodium was 139, potassium 4.4, chloride 110, CO2 20, calcium 7.6, but serum albumin was 1.4.  BUN 32.2, creatinine 5.78.    PHYSICAL EXAMINATION:  GENERAL:  Now, the patient is fully alert, oriented.  He is not in acute distress.   VITAL SIGNS:  His blood pressure is 150/70 with a heart rate of 73 regular, respirations 20, temperature 36.9 degrees Celsius.   HEENT:  Pupils equally reactive to light.  Ears and nose free of lesions.   NECK:  Without jugular venous distention or masses.   CHEST:  Clear.  Right upper chest hemodialysis catheter in place.   HEART:  Having regular rhythm.   ABDOMEN:  Soft and nontender.  No masses palpated.  There is a clotted left upper arm AV fistula.   EXTREMITIES:  Free of cyanosis, clubbing, or edema.    ASSESSMENT:  End-stage renal disease on chronic hemodialysis running on Mondays, Wednesdays, and Fridays schedule, anemia of chronic kidney disease has gotten worse.  We are going to  transfuse him today and check for occult blood in the stools.  He had a recent history of encephalopathy, but has been resolved.  Hypophosphatemia, which is resolving now.  His appetite has been low.  We are going to add megestrol to his treatment plan.  Continue monitoring dialysis Mondays, Wednesdays, and Fridays schedule.  Add Megace for appetite improvement, give 2 units of packed red blood cells with dialysis today.  Check stools for occult blood and repeat blood count tomorrow.        ______________________________  MD AMY Hankins/SADIQ  DD:  12/23/2020  Time:  09:25AM  DT:  12/23/2020  Time:  09:40AM  Job #:  [id number]    cc: [facility] __________ Talib Baker

## 2022-04-30 NOTE — OP NOTE
DATE OF SURGERY:    04/06/2018    SURGEON:  Dax Medina MD    PREOPERATIVE DIAGNOSIS:    1. Clotted AV fistula.  2. End stage renal disease on hemodialysis.    POSTOPERATIVE DIAGNOSIS:    1. Clotted AV fistula.  2. End stage renal disease on hemodialysis.   Anesthesia:  General.    PROCEDURE:  Looped AV graft on the left upper arm between the proximal brachial artery and brachial axillary vein.    INDICATION FOR SURGERY:  This is a 52-year-old male with chronic kidney disease IV-V who had last year creation of AV fistula of the left upper arm with transposition of the basilic vein as a preparation for dialysis.  The patient had worsening of his renal function, was admitted and began dialysis via a catheter, and the fistula was not functioning well, not well developed, and the need for long term angio access was fulfilled by placement of AV graft.    PROCEDURE IN DETAIL:  The upper extremity was cleansed and draped.  An incision was done on the axilla exposing the veins and passing vessel loops around it.  The artery was located deep and posterior to the medial nerve.  By gently mobilizing the nerve, vessel loops were passed around the artery.  Then assisted by two small incisions on the distal part of the arm, a taped 7 mm to 4 mm PTFE thin walled Radford-Omari graft was placed in a subcutaneous tunnel in a U-shape, having the 4 mm end in front of the arty and the 7 mm end in front of the vein.  Systemic heparinization was given (5,000 units).  Then the artery was controlled with vascular clamps.  Arteriotomy was done, and the 4 mm end was anastomosed to the artery with running stitches of 6-0 Prolene.  A good flow was noticed within the graft.  I placed then a vascular clamp on the graft to one of the small lateral incisions, then the vein was controlled with a bulldog clamp, venotomy was done, and the 7 mm end of the graft that was transected in a cobra head shape, was anastomosed to the venotomy with 6-0  Prolene.  At the completion of the anastomosis we had a good thrill and pulse over the graft as well as palpable pulses at the level of the wrist.  We then proceeded to     approximate the soft tissues with Vicryl, skin edges with staples and the patient was moved to recovery room in stable condition.        ______________________________  MD BILL Castillo/DARCY  DD:  04/07/2018  Time:  12:00PM  DT:  04/09/2018  Time:  08:17AM  Job #:  [id number]

## 2022-04-30 NOTE — OP NOTE
DATE OF SURGERY:    01/06/2021    SURGEON:  Marcell Larry MD    PROCEDURE PERFORMED:  Placement of temporary right groin femoral vein dialysis catheter.    INDICATIONS:  This is a 65-year-old male who has end-stage renal disease, among multiple medical comorbidities.  He is recovering in the LTAC unit.  He developed bloodborne sepsis, and I needed to remove his tunneled hemodialysis catheter, and it was requested that we give him a line holiday.  However, this was done several days ago, and now he needs dialysis today.  For this reason, it is time to place a temporary dialysis catheter.    PREOPERATIVE DIAGNOSES:    1. End-stage renal disease.  2. Bacterial urinary tract infection.  3. Hypertension.  4. Impaired mobility.  5. Total self-care deficit.  6. Malnutrition.    POSTOPERATIVE DIAGNOSES:    1. End-stage renal disease.  2. Bacterial urinary tract infection.  3. Hypertension.  4. Impaired mobility.  5. Total self-care deficit.  6. Malnutrition.    ANESTHESIA:  Local anesthesia 1% subcutaneously in the area of the right groin.    SPECIMENS:  There were no specimens removed.    BLOOD LOSS:  5 cc.    COMPLICATIONS:  There were no complications.    DESCRIPTION OF PROCEDURE:  This procedure was done at the patient's bedside.  The right groin was prepped and draped in the usual sterile fashion.  A large-bore needle was placed into the right femoral vein by palpation, and using the Seldinger technique, a guidewire was placed in the right femoral vein, and it was serially dilated, and a 20 cm Mahurkar catheter was placed in the right femoral vein and sutured into place and flushed appropriately and affixed with the appropriate ports and such.  It was covered with a clean dressing.  Patient tolerated the procedure well, and he will begin dialysis later today.        ______________________________  Marcell Larry MD RA/BOB  DD:  01/06/2021  Time:  09:44AM  DT:  01/06/2021  Time:  10:07AM  Job #:  [id number]

## 2022-04-30 NOTE — OP NOTE
Patient:   Mirza Nelson Jr            MRN: [id number]            FIN: [id number]               Age:   62 years     Sex:  Male     :  1955   Associated Diagnoses:   None   Author:   Dax Medina MD      Brief Operative Note   Operative Information   Date/ Time:  2017 14:30:00.     Preoperative Diagnosis: ckd 4.     Postoperative Diagnosis: same.     Procedures Performed: transposition left brachial vein, ligation left basilic A-V fistula.     Surgeon: Dax Medina MD.     Esimated blood loss: loss less than  20  cc.

## 2022-04-30 NOTE — OP NOTE
Patient:   Mirza Nelson Jr            MRN: [id number]            FIN: [id number]               Age:   65 years     Sex:  Male     :  1955   Associated Diagnoses:   Other allergic rhinitis; Moderate protein-calorie malnutrition; End stage renal disease   Author:   Marcell Larry MD      Operative Note   Operative Information   Date/ Time:  2021 15:42:00.     Procedures Performed: placement of tunnelled right internal jugular hemodialysis catheter under fluoroscopy.     Indications: chronic renal failure, had to have prior IJ tunnelled catheter removed for infection, then a temporary catheter, now sepsis cleared, and in need of durable dialysis catheter.     Preoperative Diagnosis: Other allergic rhinitis (DZW90-EE J30.89), Moderate protein-calorie malnutrition (FQS90-PE E44.0), End stage renal disease (TCI76-CJ N18.6).     Postoperative Diagnosis: Other allergic rhinitis (HWY78-PA J30.89), Moderate protein-calorie malnutrition (TQH94-KJ E44.0), End stage renal disease (IRN22-JJ N18.6).     Surgeon: Marcell Larry MD.     Anesthesia: mac.     Description of Procedure/Findings/    Complications: Pt brought to OR    laid supine or OR table    Monitored sedation anesthesia administered      right neck and chest prepped and draped in usual sterile fashion    lidocaine injected at skin over right neck    SONOSITE used to locateright internal jugular vein, Vein punctured with 18gauge needle and guidewire advanced without difficulty    Xray taken to confirm placement of guidewire in SVC    the palindrome catheter breakaway cannula was inserted over guidewire     a 1cm incision was made 2 fingerbreadths inferior to the clavicle    the tubing was tunneled to the puncture site throught the subucutaneous tissue using the tunneler provided in the palindrome dialysis catheter kit       the tubing was placed down the breakaway cannula and xray confirmed postion in the svc    the breakaway cannula was  removed and the mediport was secured in the pocket with 3-0 pds sutures    the catheter was flushed several times with hep/saline and there was excellent blood return with the port needle    the tubing was sutured to the skin, the tissue cuff that was left tunneled under the skin towards the infrclavicular incision        pt taken to PACU in stable satisfactory condition.     Esimated blood loss: loss  5  cc.     Complications: None.

## 2022-04-30 NOTE — OP NOTE
Patient:   Mirza Nelson Jr            MRN: [id number]            FIN: [id number]               Age:   65 years     Sex:  Male     :  1955   Associated Diagnoses:   Other allergic rhinitis; Moderate protein-calorie malnutrition; End stage renal disease   Author:   Marcell Larry MD      Operative Note   Operative Information   Date/ Time:  1/15/2021 11:07:00.     Procedures Performed: 1. removal on nonfunctioning temporary hemodialysis catheter from right femoral vein  2. insertion of 24cm triple lumen temporary hemodialysis catheter in right femoral vein.     Indications: nonfunctioning right 20cm temporary hemodialysis catheter.     Preoperative Diagnosis: Other allergic rhinitis (EAW79-NU J30.89), Moderate protein-calorie malnutrition (VLQ28-FC E44.0), End stage renal disease (AKA40-TY N18.6).     Postoperative Diagnosis: Other allergic rhinitis (KCC72-PP J30.89), Moderate protein-calorie malnutrition (NJQ99-XY E44.0), End stage renal disease (EEJ93-XL N18.6).     Anesthesia: 5cc one percent lidocaine injected subcutaneously at right groin catheter site.     Description of Procedure/Findings/    Complications:     Procedure done at patients bedside    Pt placed in flat supine position in bed     the right and left groins were both prepped with multiple vials of chlorprep      a [employer] temporary hemodialysis kit was was used, 24 cm long            a guide wire was placed through the old catheter blue port and the catheter was removed, the 24cm long trialysis catheter was inserted over the guide wire and sutured into place and sterile dressing appliead    catheter sutured in place    tolerated procedure well.     Esimated blood loss: No blood loss.     Complications: None.

## 2022-05-17 ENCOUNTER — OFFICE VISIT (OUTPATIENT)
Dept: FAMILY MEDICINE | Facility: CLINIC | Age: 67
End: 2022-05-17
Payer: MEDICARE

## 2022-05-17 VITALS
HEIGHT: 65 IN | BODY MASS INDEX: 24.47 KG/M2 | DIASTOLIC BLOOD PRESSURE: 80 MMHG | OXYGEN SATURATION: 98 % | HEART RATE: 88 BPM | RESPIRATION RATE: 17 BRPM | WEIGHT: 146.88 LBS | SYSTOLIC BLOOD PRESSURE: 130 MMHG | TEMPERATURE: 98 F

## 2022-05-17 DIAGNOSIS — Z01.818 PREOPERATIVE CLEARANCE: Primary | ICD-10-CM

## 2022-05-17 DIAGNOSIS — N18.6 ESRD ON HEMODIALYSIS: ICD-10-CM

## 2022-05-17 DIAGNOSIS — I15.0 RENOVASCULAR HYPERTENSION: Chronic | ICD-10-CM

## 2022-05-17 DIAGNOSIS — Z99.2 ESRD ON HEMODIALYSIS: ICD-10-CM

## 2022-05-17 PROBLEM — I10 HYPERTENSION: Chronic | Status: ACTIVE | Noted: 2022-05-17

## 2022-05-17 PROBLEM — M54.12 CERVICAL RADICULOPATHY: Status: ACTIVE | Noted: 2022-05-17

## 2022-05-17 PROBLEM — G89.29 OTHER CHRONIC PAIN: Chronic | Status: ACTIVE | Noted: 2018-09-27

## 2022-05-17 PROBLEM — M54.12 CERVICAL RADICULOPATHY: Chronic | Status: ACTIVE | Noted: 2022-05-17

## 2022-05-17 PROBLEM — R53.1 GENERALIZED WEAKNESS: Chronic | Status: ACTIVE | Noted: 2021-04-28

## 2022-05-17 PROBLEM — I12.9 HYPERTENSIVE RENAL FAILURE: Chronic | Status: RESOLVED | Noted: 2020-10-11 | Resolved: 2022-05-17

## 2022-05-17 PROBLEM — I10 HYPERTENSION: Status: ACTIVE | Noted: 2022-05-17

## 2022-05-17 PROBLEM — I12.9 HYPERTENSIVE RENAL FAILURE: Chronic | Status: ACTIVE | Noted: 2020-10-11

## 2022-05-17 PROBLEM — I12.9 HYPERTENSIVE RENAL FAILURE: Status: ACTIVE | Noted: 2020-10-11

## 2022-05-17 PROBLEM — R53.1 GENERALIZED WEAKNESS: Status: ACTIVE | Noted: 2021-04-28

## 2022-05-17 PROBLEM — E55.9 VITAMIN D DEFICIENCY: Chronic | Status: ACTIVE | Noted: 2022-05-17

## 2022-05-17 PROBLEM — E55.9 VITAMIN D DEFICIENCY: Status: ACTIVE | Noted: 2022-05-17

## 2022-05-17 PROCEDURE — 3288F PR FALLS RISK ASSESSMENT DOCUMENTED: ICD-10-PCS | Mod: CPTII,,, | Performed by: FAMILY MEDICINE

## 2022-05-17 PROCEDURE — 3075F SYST BP GE 130 - 139MM HG: CPT | Mod: CPTII,,, | Performed by: FAMILY MEDICINE

## 2022-05-17 PROCEDURE — 99213 OFFICE O/P EST LOW 20 MIN: CPT | Mod: ,,, | Performed by: FAMILY MEDICINE

## 2022-05-17 PROCEDURE — 1159F PR MEDICATION LIST DOCUMENTED IN MEDICAL RECORD: ICD-10-PCS | Mod: CPTII,,, | Performed by: FAMILY MEDICINE

## 2022-05-17 PROCEDURE — 1160F PR REVIEW ALL MEDS BY PRESCRIBER/CLIN PHARMACIST DOCUMENTED: ICD-10-PCS | Mod: CPTII,,, | Performed by: FAMILY MEDICINE

## 2022-05-17 PROCEDURE — 1160F RVW MEDS BY RX/DR IN RCRD: CPT | Mod: CPTII,,, | Performed by: FAMILY MEDICINE

## 2022-05-17 PROCEDURE — 3079F DIAST BP 80-89 MM HG: CPT | Mod: CPTII,,, | Performed by: FAMILY MEDICINE

## 2022-05-17 PROCEDURE — 1101F PR PT FALLS ASSESS DOC 0-1 FALLS W/OUT INJ PAST YR: ICD-10-PCS | Mod: CPTII,,, | Performed by: FAMILY MEDICINE

## 2022-05-17 PROCEDURE — 3288F FALL RISK ASSESSMENT DOCD: CPT | Mod: CPTII,,, | Performed by: FAMILY MEDICINE

## 2022-05-17 PROCEDURE — 1101F PT FALLS ASSESS-DOCD LE1/YR: CPT | Mod: CPTII,,, | Performed by: FAMILY MEDICINE

## 2022-05-17 PROCEDURE — 3008F BODY MASS INDEX DOCD: CPT | Mod: CPTII,,, | Performed by: FAMILY MEDICINE

## 2022-05-17 PROCEDURE — 1159F MED LIST DOCD IN RCRD: CPT | Mod: CPTII,,, | Performed by: FAMILY MEDICINE

## 2022-05-17 PROCEDURE — 99213 PR OFFICE/OUTPT VISIT, EST, LEVL III, 20-29 MIN: ICD-10-PCS | Mod: ,,, | Performed by: FAMILY MEDICINE

## 2022-05-17 PROCEDURE — 3079F PR MOST RECENT DIASTOLIC BLOOD PRESSURE 80-89 MM HG: ICD-10-PCS | Mod: CPTII,,, | Performed by: FAMILY MEDICINE

## 2022-05-17 PROCEDURE — 3075F PR MOST RECENT SYSTOLIC BLOOD PRESS GE 130-139MM HG: ICD-10-PCS | Mod: CPTII,,, | Performed by: FAMILY MEDICINE

## 2022-05-17 PROCEDURE — 1125F AMNT PAIN NOTED PAIN PRSNT: CPT | Mod: CPTII,,, | Performed by: FAMILY MEDICINE

## 2022-05-17 PROCEDURE — 3008F PR BODY MASS INDEX (BMI) DOCUMENTED: ICD-10-PCS | Mod: CPTII,,, | Performed by: FAMILY MEDICINE

## 2022-05-17 PROCEDURE — 1125F PR PAIN SEVERITY QUANTIFIED, PAIN PRESENT: ICD-10-PCS | Mod: CPTII,,, | Performed by: FAMILY MEDICINE

## 2022-05-17 RX ORDER — FOLIC ACID-PYRIDOXINE-CYANOCOBALAMIN TAB 2.5-25-2 MG 2.5-25-2 MG
2.5 TAB ORAL DAILY
COMMUNITY
Start: 2022-02-10

## 2022-05-17 RX ORDER — LORATADINE 10 MG/1
10 TABLET ORAL DAILY
COMMUNITY
Start: 2022-02-11 | End: 2022-06-07 | Stop reason: SDUPTHER

## 2022-05-17 RX ORDER — MEGESTROL ACETATE 40 MG/ML
40 SUSPENSION ORAL 2 TIMES DAILY
COMMUNITY

## 2022-05-17 RX ORDER — TIZANIDINE 2 MG/1
2 TABLET ORAL EVERY 8 HOURS PRN
COMMUNITY
Start: 2022-01-05 | End: 2022-08-25 | Stop reason: ALTCHOICE

## 2022-05-17 RX ORDER — HYDRALAZINE HYDROCHLORIDE 50 MG/1
50 TABLET, FILM COATED ORAL 3 TIMES DAILY
Qty: 90 TABLET | Refills: 0 | Status: SHIPPED | OUTPATIENT
Start: 2022-05-17 | End: 2022-05-17

## 2022-05-17 RX ORDER — CARVEDILOL 12.5 MG/1
12.5 TABLET ORAL 2 TIMES DAILY
COMMUNITY
Start: 2022-02-11 | End: 2022-05-17 | Stop reason: SDUPTHER

## 2022-05-17 RX ORDER — BUSPIRONE HYDROCHLORIDE 10 MG/1
10 TABLET ORAL DAILY
COMMUNITY
Start: 2022-04-11 | End: 2022-06-07 | Stop reason: SDUPTHER

## 2022-05-17 RX ORDER — TAMSULOSIN HYDROCHLORIDE 0.4 MG/1
1 CAPSULE ORAL 2 TIMES DAILY
Status: ON HOLD | COMMUNITY
Start: 2022-05-09 | End: 2023-01-01 | Stop reason: HOSPADM

## 2022-05-17 RX ORDER — HYDRALAZINE HYDROCHLORIDE 50 MG/1
50 TABLET, FILM COATED ORAL 3 TIMES DAILY
COMMUNITY
Start: 2022-02-11 | End: 2022-05-17 | Stop reason: SDUPTHER

## 2022-05-17 RX ORDER — CELECOXIB 200 MG/1
200 CAPSULE ORAL 2 TIMES DAILY
COMMUNITY
Start: 2022-01-13 | End: 2022-05-17

## 2022-05-17 RX ORDER — PANTOPRAZOLE SODIUM 40 MG/1
40 TABLET, DELAYED RELEASE ORAL DAILY
COMMUNITY
Start: 2022-03-04

## 2022-05-17 RX ORDER — LOPERAMIDE HYDROCHLORIDE 2 MG/1
2 CAPSULE ORAL DAILY PRN
COMMUNITY
Start: 2022-01-13 | End: 2023-01-01

## 2022-05-17 RX ORDER — AMLODIPINE BESYLATE 10 MG/1
10 TABLET ORAL NIGHTLY
Qty: 30 TABLET | Refills: 3 | Status: SHIPPED | OUTPATIENT
Start: 2022-05-17 | End: 2022-11-29

## 2022-05-17 RX ORDER — ASCORBIC ACID 500 MG
500 TABLET ORAL DAILY
COMMUNITY
End: 2023-01-01 | Stop reason: ALTCHOICE

## 2022-05-17 RX ORDER — FERROUS GLUCONATE 324(38)MG
1 TABLET ORAL DAILY
COMMUNITY
Start: 2022-02-11 | End: 2022-06-07 | Stop reason: SDUPTHER

## 2022-05-17 RX ORDER — CARVEDILOL 12.5 MG/1
12.5 TABLET ORAL 2 TIMES DAILY
Qty: 60 TABLET | Refills: 3 | Status: SHIPPED | OUTPATIENT
Start: 2022-05-17 | End: 2022-06-06

## 2022-05-17 RX ORDER — BETAMETHASONE DIPROPIONATE 0.5 MG/ML
0.05 LOTION, AUGMENTED TOPICAL
Status: ON HOLD | COMMUNITY
End: 2023-01-01 | Stop reason: HOSPADM

## 2022-05-17 RX ORDER — FLAVOXATE HYDROCHLORIDE 100 MG/1
200 TABLET ORAL 4 TIMES DAILY
COMMUNITY
Start: 2021-11-22

## 2022-05-17 RX ORDER — TRAZODONE HYDROCHLORIDE 50 MG/1
50 TABLET ORAL NIGHTLY
COMMUNITY
Start: 2022-01-05

## 2022-05-17 RX ORDER — SEVELAMER CARBONATE 800 MG/1
800 TABLET, FILM COATED ORAL 3 TIMES DAILY
Status: ON HOLD | COMMUNITY
End: 2024-01-01

## 2022-05-17 NOTE — PROGRESS NOTES
Pre-Operative Evaluation:     Subjective:      Patient ID: Mirza Nelson Jr. is a 66 y.o. male.    Chief Complaint: Surgery Clearance- [facility]      The patient is here for a preop clearance to have surgery on: Patient is unaware of date or type of surgery he is having but believes it may be due to his prostate.  The physician that is performing the surgery is: [facility], specific surgeon unknown.    66yoM with hx of HTN, renovascular disease with ESRD on HD, elevated PSA and anemia. He presents for preop clearance however patient is uncertain of what surgical procedure he is having. He denies having cardiac clearance and states his last follow up was 1 year ago. He denies recent hospitalization, chest pain, nausea, vomiting, diarrhea,SOB, fever, LE swelling, or dizziness. The patient states he ambulated with a rolling walker which he has present in clinic today. The patient states he bathes, dresses, and toilets without assistance however he is unable to clean his home without assistance. He is able to walk around his house and 1-2 blocks on level ground with assistance from rolling walker. He is able to attend to his own finances and can balance a check book. He admits to difficulty climbing stairs without assistance.     Past Medical History:   Diagnosis Date    Anemia     Cervical radiculopathy 5/17/2022    Disorder of kidney and ureter     ESRD on hemodialysis 9/27/2018    Hypertension     Neck injury 1994    Other chronic pain 9/27/2018    Renovascular hypertension 9/27/2018    Vitamin D deficiency 5/17/2022     Past Surgical History:   Procedure Laterality Date    AV FISTULA PLACEMENT      MASS EXCISION  2005    near the kidney     NECK SURGERY       Review of patient's allergies indicates:   Allergen Reactions    Iodine      Other reaction(s): difficulty breathing, Facial Swelling    Iodine and iodide containing products Swelling    Shellfish containing products      Other  reaction(s): Swelling     Current Outpatient Medications on File Prior to Visit   Medication Sig Dispense Refill    ascorbic acid, vitamin C, (VITAMIN C) 500 MG tablet Take 500 mg by mouth once daily.      augmented betamethasone (DIPROLENE) 0.05 % lotion Apply 0.05 application topically every Mon, Wed, Fri.      busPIRone (BUSPAR) 10 MG tablet Take 10 mg by mouth once daily.      doxazosin (CARDURA) 8 MG Tab Take 8 mg by mouth every evening.      ferrous gluconate (FERGON) 324 MG tablet Take 1 tablet by mouth once daily.      flavoxATE (URISPAS) 100 mg Tab Take 200 mg by mouth 4 (four) times daily.      fluticasone (FLONASE) 50 mcg/actuation nasal spray 1 spray by Each Nare route daily as needed for Rhinitis.      folic acid-vit B6-vit B12 2.5-25-2 mg (FOLBIC) 2.5-25-2 mg Tab Take 2.5 tablets by mouth Daily.      gabapentin (NEURONTIN) 300 MG capsule Take 300 mg by mouth 3 (three) times daily as needed.      HYDROcodone-acetaminophen (NORCO)  mg per tablet Take 1 tablet by mouth every 8 (eight) hours as needed for Pain.      ivermectin (STROMECTOL) 3 mg Tab Take 5 tablets (15,000 mcg total) by mouth Daily. Take 5 tablets on day one. Take 5 tablets on day two. 10 tablet 0    loratadine (CLARITIN) 10 mg tablet Take 10 mg by mouth once daily.      megestroL (MEGACE) 400 mg/10 mL (40 mg/mL) Susp Take 40 mg by mouth Daily.      pantoprazole (PROTONIX) 40 MG tablet Take 40 mg by mouth once daily.      sevelamer carbonate (RENVELA) 800 mg Tab Take 800 mg by mouth 3 (three) times daily.      sodium bicarbonate 650 MG tablet Take 1,300 mg by mouth 3 (three) times daily.      sodium zirconium cyclosilicate (LOKELMA) 5 gram packet Take 5 g by mouth 2 (two) times a day.      tamsulosin (FLOMAX) 0.4 mg Cap Take 1 capsule by mouth 2 (two) times daily.      tiZANidine (ZANAFLEX) 2 MG tablet Take 2 mg by mouth every 8 (eight) hours as needed.      traZODone (DESYREL) 50 MG tablet Take 50 mg by mouth  "nightly.      [DISCONTINUED] amLODIPine (NORVASC) 10 MG tablet Take 10 mg by mouth every evening.      [DISCONTINUED] carvediloL (COREG) 12.5 MG tablet Take 12.5 mg by mouth 2 (two) times daily.      [DISCONTINUED] celecoxib (CELEBREX) 200 MG capsule Take 200 mg by mouth 2 (two) times daily.      [DISCONTINUED] hydrALAZINE (APRESOLINE) 50 MG tablet Take 50 mg by mouth 3 (three) times daily.      loperamide (IMODIUM) 2 mg capsule Take 2 mg by mouth daily as needed.       No current facility-administered medications on file prior to visit.     Social History     Socioeconomic History    Marital status:     Years of education: 12   Tobacco Use    Smoking status: Former Smoker     Packs/day: 0.25     Years: 20.00     Pack years: 5.00     Types: Cigarettes     Quit date: 2016     Years since quittin.6    Smokeless tobacco: Never Used   Substance and Sexual Activity    Alcohol use: No    Drug use: Yes     Types: Marijuana    Sexual activity: Not Currently     Family History   Problem Relation Age of Onset    Heart disease Mother     Diabetes Mother     Hypertension Mother     Cancer Father     Prostate cancer Father     Hypertension Father     Cancer Sister     Breast cancer Sister     No Known Problems Sister        Review of Systems   Constitutional: Negative for activity change, fatigue and fever.   Eyes: Negative for visual disturbance.   Respiratory: Positive for shortness of breath. Negative for cough and chest tightness.    Cardiovascular: Negative for chest pain and palpitations.   Gastrointestinal: Negative for abdominal pain, diarrhea, nausea and vomiting.   Musculoskeletal: Positive for arthralgias, back pain and neck pain.   Neurological: Negative for dizziness, light-headedness and headaches.       Objective:     /80 (BP Location: Left arm, Patient Position: Sitting, BP Method: Medium (Manual))   Pulse 88   Temp 98.1 °F (36.7 °C) (Oral)   Resp 17   Ht 5' 5" " (1.651 m)   Wt 66.6 kg (146 lb 14.4 oz)   SpO2 98%   BMI 24.45 kg/m²     Physical Exam  Vitals and nursing note reviewed.   Constitutional:       General: He is not in acute distress.     Appearance: Normal appearance. He is well-developed and well-groomed.   HENT:      Head: Normocephalic and atraumatic.      Right Ear: External ear normal.      Left Ear: External ear normal.      Nose: Nose normal.      Mouth/Throat:      Mouth: Mucous membranes are moist.      Pharynx: Oropharynx is clear.      Comments: edentulous  Eyes:      General: No scleral icterus.     Extraocular Movements: Extraocular movements intact.      Conjunctiva/sclera: Conjunctivae normal.      Pupils: Pupils are equal, round, and reactive to light.   Neck:      Vascular: No carotid bruit.   Cardiovascular:      Rate and Rhythm: Normal rate and regular rhythm.      Pulses: Normal pulses.      Heart sounds: Normal heart sounds, S1 normal and S2 normal. No murmur heard.    No friction rub. No gallop.   Pulmonary:      Effort: Pulmonary effort is normal. No respiratory distress.      Breath sounds: Normal breath sounds. No wheezing, rhonchi or rales.   Abdominal:      General: Abdomen is flat. There is no distension.      Palpations: Abdomen is soft. There is no hepatomegaly, splenomegaly or mass.      Tenderness: There is no abdominal tenderness. There is no guarding or rebound.   Musculoskeletal:         General: Normal range of motion.      Cervical back: Normal range of motion and neck supple.      Right lower leg: No edema.      Left lower leg: No edema.   Skin:     General: Skin is warm and dry.      Capillary Refill: Capillary refill takes less than 2 seconds.      Coloration: Skin is not jaundiced.      Findings: No rash.   Neurological:      General: No focal deficit present.      Mental Status: He is alert and oriented to person, place, and time. Mental status is at baseline.      Cranial Nerves: No cranial nerve deficit.      Sensory:  No sensory deficit.      Motor: No weakness.   Psychiatric:         Attention and Perception: Attention normal.         Mood and Affect: Mood and affect normal.         Speech: Speech normal.         Behavior: Behavior normal. Behavior is cooperative.         Thought Content: Thought content normal.         Cognition and Memory: Cognition normal.         Judgment: Judgment normal.         Assessment:     1. Preoperative clearance    2. ESRD on hemodialysis    3. Renovascular hypertension        Plan:   The patient is not cleared for surgery.  Records to be requested from urology and discuss surgery needed.   Patient will also need cardiac clearance by Dr. Centeno his cardiologist.  Request for clearance sent to cardiology.  Recommend follow up and reevaluation prior to surgery.  CXR, CMP, CBC ordered and pending.  EKG in clinic today: HR 65bpm, NSR, no ST elevation, no QT prolongation, left axis deviation.  Results of below orders will be discussed with patient.  Continue dialysis MWF and keep follow up with nephrology.  BP at goal. Continue current medications. Hydralazine, amlodipine and carvedilol refilled per patient request.    Orders Placed This Encounter   Procedures    X-Ray Chest PA And Lateral     Standing Status:   Future     Standing Expiration Date:   5/17/2023     Order Specific Question:   May the Radiologist modify the order per protocol to meet the clinical needs of the patient?     Answer:   Yes     Order Specific Question:   Release to patient     Answer:   Immediate    Comprehensive Metabolic Panel     Standing Status:   Future     Standing Expiration Date:   8/17/2022    CBC Auto Differential     Standing Status:   Future     Standing Expiration Date:   8/17/2022    IN OFFICE EKG 12-LEAD (to Muse)       Follow up in about 6 weeks (around 6/28/2022) for ESRD, HTN Follow Up, HLD Follow Up and discus surgical clearance.

## 2022-05-23 NOTE — TELEPHONE ENCOUNTER
Type:  Needs Medical Advice    Who Called:[facility]   Symptoms (please be specific): na   How long has patient had these symptoms:  na  Pharmacy name and phone #:  na  Would the patient rather a call back or a response via [facility]? Call back   Best Call Back Number: [phone]  Additional Information: need a prescription for Upright rollator fax number [phone]

## 2022-05-26 PROCEDURE — 85025 COMPLETE CBC W/AUTO DIFF WBC: CPT | Performed by: FAMILY MEDICINE

## 2022-05-26 PROCEDURE — 80053 COMPREHEN METABOLIC PANEL: CPT | Performed by: FAMILY MEDICINE

## 2022-05-27 ENCOUNTER — LAB REQUISITION (OUTPATIENT)
Dept: LAB | Facility: HOSPITAL | Age: 67
End: 2022-05-27
Payer: MEDICARE

## 2022-05-27 DIAGNOSIS — R33.8 OTHER RETENTION OF URINE: ICD-10-CM

## 2022-05-27 DIAGNOSIS — M54.12 RADICULOPATHY, CERVICAL REGION: ICD-10-CM

## 2022-05-27 DIAGNOSIS — N13.9 OBSTRUCTIVE AND REFLUX UROPATHY, UNSPECIFIED: ICD-10-CM

## 2022-05-27 DIAGNOSIS — N40.1 BENIGN PROSTATIC HYPERPLASIA WITH LOWER URINARY TRACT SYMPTOMS: ICD-10-CM

## 2022-05-27 LAB
ALBUMIN SERPL-MCNC: 3.2 GM/DL (ref 3.4–4.8)
ALBUMIN/GLOB SERPL: 0.7 RATIO (ref 1.1–2)
ALP SERPL-CCNC: 89 UNIT/L (ref 40–150)
ALT SERPL-CCNC: 17 UNIT/L (ref 0–55)
AST SERPL-CCNC: 16 UNIT/L (ref 5–34)
BASOPHILS # BLD AUTO: 0.05 X10(3)/MCL (ref 0–0.2)
BASOPHILS NFR BLD AUTO: 1.3 %
BILIRUBIN DIRECT+TOT PNL SERPL-MCNC: 0.6 MG/DL
BUN SERPL-MCNC: 23.2 MG/DL (ref 8.4–25.7)
CALCIUM SERPL-MCNC: 9.3 MG/DL (ref 8.8–10)
CHLORIDE SERPL-SCNC: 96 MMOL/L (ref 98–107)
CO2 SERPL-SCNC: 28 MMOL/L (ref 23–31)
CREAT SERPL-MCNC: 6.43 MG/DL (ref 0.73–1.18)
EOSINOPHIL # BLD AUTO: 0.22 X10(3)/MCL (ref 0–0.9)
EOSINOPHIL NFR BLD AUTO: 5.6 %
ERYTHROCYTE [DISTWIDTH] IN BLOOD BY AUTOMATED COUNT: 15.2 % (ref 11.5–17)
GLOBULIN SER-MCNC: 4.3 GM/DL (ref 2.4–3.5)
GLUCOSE SERPL-MCNC: 39 MG/DL (ref 82–115)
HCT VFR BLD AUTO: 35.7 % (ref 42–52)
HGB BLD-MCNC: 10.8 GM/DL (ref 14–18)
IMM GRANULOCYTES # BLD AUTO: 0 X10(3)/MCL (ref 0–0.02)
IMM GRANULOCYTES NFR BLD AUTO: 0 % (ref 0–0.43)
LYMPHOCYTES # BLD AUTO: 0.92 X10(3)/MCL (ref 0.6–4.6)
LYMPHOCYTES NFR BLD AUTO: 23.4 %
MCH RBC QN AUTO: 32.9 PG (ref 27–31)
MCHC RBC AUTO-ENTMCNC: 30.3 MG/DL (ref 33–36)
MCV RBC AUTO: 108.8 FL (ref 80–94)
MONOCYTES # BLD AUTO: 0.51 X10(3)/MCL (ref 0.1–1.3)
MONOCYTES NFR BLD AUTO: 12.9 %
NEUTROPHILS # BLD AUTO: 2.2 X10(3)/MCL (ref 2.1–9.2)
NEUTROPHILS NFR BLD AUTO: 56.8 %
NRBC BLD AUTO-RTO: 0 %
PLATELET # BLD AUTO: 222 X10(3)/MCL (ref 130–400)
PMV BLD AUTO: 10.2 FL (ref 9.4–12.4)
POTASSIUM SERPL-SCNC: 3.9 MMOL/L (ref 3.5–5.1)
PROT SERPL-MCNC: 7.5 GM/DL (ref 5.8–7.6)
RBC # BLD AUTO: 3.28 X10(6)/MCL (ref 4.7–6.1)
SODIUM SERPL-SCNC: 138 MMOL/L (ref 136–145)
WBC # SPEC AUTO: 3.9 X10(3)/MCL (ref 4.5–11.5)

## 2022-06-03 ENCOUNTER — TELEPHONE (OUTPATIENT)
Dept: FAMILY MEDICINE | Facility: CLINIC | Age: 67
End: 2022-06-03
Payer: MEDICARE

## 2022-06-03 NOTE — TELEPHONE ENCOUNTER
----- Message from Zaira Amador sent at 6/3/2022 11:30 AM CDT -----  Regarding: Lab results  Type:  Needs Medical Advice    Who Called: BOOKER Perez  Symptoms (please be specific): JOCY   How long has patient had these symptoms:  JOCY  Pharmacy name and phone #:  NA  Would the patient rather a call back or a response via [facility]? Call back  Best Call Back Number: [phone]  Additional Information: Ana CELESTE wants to know if lab results were received

## 2022-06-07 RX ORDER — FERROUS GLUCONATE 324(38)MG
1 TABLET ORAL DAILY
Qty: 30 TABLET | Refills: 3 | Status: SHIPPED | OUTPATIENT
Start: 2022-06-07 | End: 2023-01-01 | Stop reason: SDUPTHER

## 2022-06-07 RX ORDER — HYDROCODONE BITARTRATE AND ACETAMINOPHEN 10; 325 MG/1; MG/1
1 TABLET ORAL EVERY 8 HOURS PRN
Qty: 90 TABLET | Refills: 0 | Status: SHIPPED | OUTPATIENT
Start: 2022-06-07 | End: 2022-07-06 | Stop reason: SDUPTHER

## 2022-06-07 RX ORDER — SODIUM BICARBONATE 650 MG/1
1300 TABLET ORAL 3 TIMES DAILY
Qty: 90 TABLET | Refills: 3 | Status: SHIPPED | OUTPATIENT
Start: 2022-06-07 | End: 2023-01-01 | Stop reason: ALTCHOICE

## 2022-06-07 RX ORDER — CARVEDILOL 12.5 MG/1
12.5 TABLET ORAL 2 TIMES DAILY
Qty: 180 TABLET | Refills: 3 | Status: SHIPPED | OUTPATIENT
Start: 2022-06-07 | End: 2022-08-15

## 2022-06-07 RX ORDER — BUSPIRONE HYDROCHLORIDE 10 MG/1
10 TABLET ORAL DAILY
Qty: 30 TABLET | Refills: 3 | Status: SHIPPED | OUTPATIENT
Start: 2022-06-07 | End: 2022-09-26

## 2022-06-07 RX ORDER — LORATADINE 10 MG/1
10 TABLET ORAL DAILY
Qty: 30 TABLET | Refills: 3 | Status: ON HOLD | OUTPATIENT
Start: 2022-06-07 | End: 2023-01-01 | Stop reason: HOSPADM

## 2022-06-17 ENCOUNTER — TELEPHONE (OUTPATIENT)
Dept: ADMINISTRATIVE | Facility: HOSPITAL | Age: 67
End: 2022-06-17
Payer: MEDICARE

## 2022-06-17 NOTE — TELEPHONE ENCOUNTER
Type:  Needs Medical Advice    Who Called: [facility]  Symptoms (please be specific): na   How long has patient had these symptoms:  na  Pharmacy name and phone #:  na  Would the patient rather a call back or a response via [facility]? na  Best Call Back Number: na  Additional Information: [facility] stated that a fax was sent last week to inform Dr. Flores that the pt stopped taking amlodipine due to hypotension and not being able to complete dialysis requesting that someone from the office follow up

## 2022-06-24 ENCOUNTER — EXTERNAL HOME HEALTH (OUTPATIENT)
Dept: HOME HEALTH SERVICES | Facility: HOSPITAL | Age: 67
End: 2022-06-24
Payer: MEDICARE

## 2022-06-28 ENCOUNTER — OFFICE VISIT (OUTPATIENT)
Dept: FAMILY MEDICINE | Facility: CLINIC | Age: 67
End: 2022-06-28
Payer: MEDICARE

## 2022-06-28 VITALS
TEMPERATURE: 98 F | DIASTOLIC BLOOD PRESSURE: 82 MMHG | HEART RATE: 68 BPM | BODY MASS INDEX: 24.32 KG/M2 | RESPIRATION RATE: 17 BRPM | SYSTOLIC BLOOD PRESSURE: 140 MMHG | HEIGHT: 65 IN | OXYGEN SATURATION: 98 % | WEIGHT: 146 LBS

## 2022-06-28 DIAGNOSIS — Z02.4 ENCOUNTER FOR DRIVER'S LICENSE HISTORY AND PHYSICAL: ICD-10-CM

## 2022-06-28 DIAGNOSIS — E16.2 LOW BLOOD SUGAR: Primary | ICD-10-CM

## 2022-06-28 DIAGNOSIS — I15.1 HYPERTENSION SECONDARY TO OTHER RENAL DISORDERS: ICD-10-CM

## 2022-06-28 PROCEDURE — 99212 OFFICE O/P EST SF 10 MIN: CPT | Mod: ,,, | Performed by: FAMILY MEDICINE

## 2022-06-28 PROCEDURE — 1125F PR PAIN SEVERITY QUANTIFIED, PAIN PRESENT: ICD-10-PCS | Mod: CPTII,,, | Performed by: FAMILY MEDICINE

## 2022-06-28 PROCEDURE — 3077F SYST BP >= 140 MM HG: CPT | Mod: CPTII,,, | Performed by: FAMILY MEDICINE

## 2022-06-28 PROCEDURE — 1160F RVW MEDS BY RX/DR IN RCRD: CPT | Mod: CPTII,,, | Performed by: FAMILY MEDICINE

## 2022-06-28 PROCEDURE — 3008F PR BODY MASS INDEX (BMI) DOCUMENTED: ICD-10-PCS | Mod: CPTII,,, | Performed by: FAMILY MEDICINE

## 2022-06-28 PROCEDURE — 1125F AMNT PAIN NOTED PAIN PRSNT: CPT | Mod: CPTII,,, | Performed by: FAMILY MEDICINE

## 2022-06-28 PROCEDURE — 3077F PR MOST RECENT SYSTOLIC BLOOD PRESSURE >= 140 MM HG: ICD-10-PCS | Mod: CPTII,,, | Performed by: FAMILY MEDICINE

## 2022-06-28 PROCEDURE — 1101F PT FALLS ASSESS-DOCD LE1/YR: CPT | Mod: CPTII,,, | Performed by: FAMILY MEDICINE

## 2022-06-28 PROCEDURE — 99212 PR OFFICE/OUTPT VISIT, EST, LEVL II, 10-19 MIN: ICD-10-PCS | Mod: ,,, | Performed by: FAMILY MEDICINE

## 2022-06-28 PROCEDURE — 3079F PR MOST RECENT DIASTOLIC BLOOD PRESSURE 80-89 MM HG: ICD-10-PCS | Mod: CPTII,,, | Performed by: FAMILY MEDICINE

## 2022-06-28 PROCEDURE — 3008F BODY MASS INDEX DOCD: CPT | Mod: CPTII,,, | Performed by: FAMILY MEDICINE

## 2022-06-28 PROCEDURE — 3288F PR FALLS RISK ASSESSMENT DOCUMENTED: ICD-10-PCS | Mod: CPTII,,, | Performed by: FAMILY MEDICINE

## 2022-06-28 PROCEDURE — 1160F PR REVIEW ALL MEDS BY PRESCRIBER/CLIN PHARMACIST DOCUMENTED: ICD-10-PCS | Mod: CPTII,,, | Performed by: FAMILY MEDICINE

## 2022-06-28 PROCEDURE — 1159F PR MEDICATION LIST DOCUMENTED IN MEDICAL RECORD: ICD-10-PCS | Mod: CPTII,,, | Performed by: FAMILY MEDICINE

## 2022-06-28 PROCEDURE — 3288F FALL RISK ASSESSMENT DOCD: CPT | Mod: CPTII,,, | Performed by: FAMILY MEDICINE

## 2022-06-28 PROCEDURE — 1101F PR PT FALLS ASSESS DOC 0-1 FALLS W/OUT INJ PAST YR: ICD-10-PCS | Mod: CPTII,,, | Performed by: FAMILY MEDICINE

## 2022-06-28 PROCEDURE — 1159F MED LIST DOCD IN RCRD: CPT | Mod: CPTII,,, | Performed by: FAMILY MEDICINE

## 2022-06-28 PROCEDURE — 3079F DIAST BP 80-89 MM HG: CPT | Mod: CPTII,,, | Performed by: FAMILY MEDICINE

## 2022-06-28 NOTE — PROGRESS NOTES
Subjective:      Patient ID: Mirza Nelson Jr. is a 67 y.o. male.    Chief Complaint: 6 week follow up    Disclaimer:  This note is prepared using voice recognition software and as such is likely to have errors despite attempts at proofreading. Please contact me for questions.     67yoM with hx of ESRD on HD, anemia, and HTN who presents for 6 week follow up and drivers license physical exam. The patient states he has been doing well with 3x week hemodialysis and denies concerns about AV fistula. He states he had an evaluation by cardiology for clearance as patient is waiting to be scheduled for suprapubic prostatectomy with urology. He states he also completed a CXR with home health. Records will be requested. He denies SOB, chest pain, depression, nausea, vomiting, diarrhea, or fever. He admits to compliance with medications. He admits to completing most ADLs independently however admits to needing assistance with strenuous house work. He denies hearing impairment, vision impairment, difficulty driving or hx of having license revoked.     Past Medical History:   Diagnosis Date    Anemia     Cervical radiculopathy 5/17/2022    Disorder of kidney and ureter     ESRD on hemodialysis 9/27/2018    Hypertension     Neck injury 1994    Other chronic pain 9/27/2018    Renovascular hypertension 9/27/2018    Vitamin D deficiency 5/17/2022        Current Outpatient Medications on File Prior to Visit   Medication Sig Dispense Refill    amLODIPine (NORVASC) 10 MG tablet Take 1 tablet (10 mg total) by mouth every evening. 30 tablet 3    ascorbic acid, vitamin C, (VITAMIN C) 500 MG tablet Take 500 mg by mouth once daily.      augmented betamethasone (DIPROLENE) 0.05 % lotion Apply 0.05 application topically every Mon, Wed, Fri.      busPIRone (BUSPAR) 10 MG tablet Take 1 tablet (10 mg total) by mouth once daily. 30 tablet 3    carvediloL (COREG) 12.5 MG tablet Take 1 tablet (12.5 mg total) by mouth 2 (two) times a  day. 180 tablet 3    doxazosin (CARDURA) 8 MG Tab Take 8 mg by mouth every evening.      ferrous gluconate (FERGON) 324 MG tablet Take 1 tablet (324 mg total) by mouth once daily. 30 tablet 3    flavoxATE (URISPAS) 100 mg Tab Take 200 mg by mouth 4 (four) times daily.      fluticasone (FLONASE) 50 mcg/actuation nasal spray 1 spray by Each Nare route daily as needed for Rhinitis.      folic acid-vit B6-vit B12 2.5-25-2 mg (FOLBIC) 2.5-25-2 mg Tab Take 2.5 tablets by mouth Daily.      gabapentin (NEURONTIN) 300 MG capsule Take 300 mg by mouth 3 (three) times daily as needed.      hydrALAZINE (APRESOLINE) 50 MG tablet TAKE 1 TABLET(50 MG) BY MOUTH THREE TIMES DAILY 270 tablet 3    HYDROcodone-acetaminophen (NORCO)  mg per tablet Take 1 tablet by mouth every 8 (eight) hours as needed for Pain. 90 tablet 0    ivermectin (STROMECTOL) 3 mg Tab Take 5 tablets (15,000 mcg total) by mouth Daily. Take 5 tablets on day one. Take 5 tablets on day two. 10 tablet 0    loperamide (IMODIUM) 2 mg capsule Take 2 mg by mouth daily as needed.      loratadine (CLARITIN) 10 mg tablet Take 1 tablet (10 mg total) by mouth once daily. 30 tablet 3    megestroL (MEGACE) 400 mg/10 mL (40 mg/mL) Susp Take 40 mg by mouth Daily.      pantoprazole (PROTONIX) 40 MG tablet Take 40 mg by mouth once daily.      sevelamer carbonate (RENVELA) 800 mg Tab Take 800 mg by mouth 3 (three) times daily.      sodium bicarbonate 650 MG tablet Take 2 tablets (1,300 mg total) by mouth 3 (three) times daily. 90 tablet 3    sodium zirconium cyclosilicate (LOKELMA) 5 gram packet Take 5 g by mouth 2 (two) times a day.      tamsulosin (FLOMAX) 0.4 mg Cap Take 1 capsule by mouth 2 (two) times daily.      tiZANidine (ZANAFLEX) 2 MG tablet Take 2 mg by mouth every 8 (eight) hours as needed.      traZODone (DESYREL) 50 MG tablet Take 50 mg by mouth nightly.      walker Misc 1 each by Misc.(Non-Drug; Combo Route) route once daily. Upright Rollator 1  "each 0     No current facility-administered medications on file prior to visit.        Review of patient's allergies indicates:   Allergen Reactions    Iodine      Other reaction(s): difficulty breathing, Facial Swelling    Iodine and iodide containing products Swelling    Shellfish containing products      Other reaction(s): Swelling        Lab Results   Component Value Date    WBC 3.9 (L) 05/26/2022    HGB 10.8 (L) 05/26/2022    HCT 35.7 (L) 05/26/2022     05/26/2022    CHOL 117 02/06/2020    TRIG 53 02/06/2020    HDL 63 (H) 02/06/2020    ALT 17 05/26/2022    AST 16 05/26/2022     05/26/2022    K 3.9 05/26/2022    CL 97 09/27/2018    CREATININE 6.43 (H) 05/26/2022    BUN 23.2 05/26/2022    CO2 28 05/26/2022    TSH 0.494 02/06/2020    PSA 8.03 (H) 02/06/2020    INR 1.1 02/22/2022    HGBA1C 4.4 02/06/2020    LDDRJEUX75SR 21.71 (L) 02/06/2020    CALCIUM 9.3 05/26/2022       ROS    Objective:     Vitals:    06/28/22 0810 06/28/22 0845   BP: (!) 144/70 (!) 140/82   BP Location: Left arm Left arm   Patient Position: Sitting Sitting   BP Method: Small (Manual) Small (Manual)   Pulse: 68    Resp: 17    Temp: 98 °F (36.7 °C)    TempSrc: Oral    SpO2: 98%    Weight: 66.2 kg (146 lb)    Height: 5' 5" (1.651 m)      Physical Exam  Vitals and nursing note reviewed.   Constitutional:       General: He is not in acute distress.     Appearance: Normal appearance. He is well-developed and well-groomed.   HENT:      Head: Normocephalic and atraumatic.      Right Ear: External ear normal.      Left Ear: External ear normal.      Nose: Nose normal.      Mouth/Throat:      Mouth: Mucous membranes are moist.      Pharynx: Oropharynx is clear.   Eyes:      General: No scleral icterus.     Extraocular Movements: Extraocular movements intact.      Conjunctiva/sclera: Conjunctivae normal.      Pupils: Pupils are equal, round, and reactive to light.   Neck:      Vascular: No carotid bruit.   Cardiovascular:      Rate and " Rhythm: Normal rate and regular rhythm.      Pulses: Normal pulses.      Heart sounds: Normal heart sounds, S1 normal and S2 normal. No murmur heard.    No friction rub. No gallop.   Pulmonary:      Effort: Pulmonary effort is normal. No respiratory distress.      Breath sounds: Normal breath sounds. No wheezing, rhonchi or rales.   Musculoskeletal:         General: Normal range of motion.      Cervical back: Normal range of motion and neck supple.      Comments: Ambulating with walker   Skin:     General: Skin is warm and dry.      Capillary Refill: Capillary refill takes less than 2 seconds.      Coloration: Skin is not jaundiced.      Findings: No rash.   Neurological:      General: No focal deficit present.      Mental Status: He is alert and oriented to person, place, and time. Mental status is at baseline.      Cranial Nerves: No cranial nerve deficit.      Sensory: No sensory deficit.      Motor: No weakness.   Psychiatric:         Attention and Perception: Attention normal.         Mood and Affect: Mood and affect normal.         Speech: Speech normal.         Behavior: Behavior normal. Behavior is cooperative.         Thought Content: Thought content normal.         Cognition and Memory: Cognition normal.         Judgment: Judgment normal.         Assessment:     1. Low blood sugar    2. Encounter for 's license history and physical    3. Hypertension secondary to other renal disorders      Plan:   Mirza was seen today for 6 week follow up.    Diagnoses and all orders for this visit:    Low blood sugar  Patient found to have low blood sugar (39) on routine lab work. Patient asymptomatic.  He admitted to having dialysis and fasting prior to labs.  CBG in clinic today 70s  Counseled patient on eating small meals multiple times a day (approximately 6)  Seek immediate medical treatment for dizziness, tremors, diaphoresis, presyncope or syncopal episodes, chest pain, or SOB.    Encounter for 's  license history and physical  Patient denies hx of having license revoked.  Visual acuity left and right 20/25, bilateral 20/20 (completed with nursing staff)  Using rolling walker due to intermittent muscle cramping from hemodialysis.  Denies recent falls, chest pain, or SOB.  Admits to performing most ADLs independently.  License forms filled. Recommend annual evaluation for license renewal.    Hypertension secondary to other renal disorders  BP not at goal  Encouraged medication compliance.  Continue current medication  Low sodium diet and exercise recommended  Monitor BP at home and notify MD if sBP >160 or <90. Also notify MD if dBP >100 or <60.  Limit caffeine intake.  Seek immediate medical treatment for chest pain, SOB, LE edema, severe headache, blurred vision, dizziness, slurred speech, any new or worsening symptoms.    Follow up in about 8 weeks (around 8/23/2022) for Low blood sugar, HTN, 2 weeks with  for controlled substance refill.    Mirza Nelson Jr. was given education on their disease process and medications.

## 2022-06-30 ENCOUNTER — TELEPHONE (OUTPATIENT)
Dept: FAMILY MEDICINE | Facility: CLINIC | Age: 67
End: 2022-06-30
Payer: MEDICARE

## 2022-06-30 NOTE — TELEPHONE ENCOUNTER
Home Health called stating she went out today to see patient, he notified her for diarrhea this mornign twice but it was dark black.  He did vomit x3 this morning but it has all stopped.  Wife does have a stomach bug, just wanted to report this to us     Loraine:[phone]

## 2022-07-06 RX ORDER — HYDROCODONE BITARTRATE AND ACETAMINOPHEN 10; 325 MG/1; MG/1
1 TABLET ORAL EVERY 8 HOURS PRN
Qty: 90 TABLET | Refills: 0 | Status: SHIPPED | OUTPATIENT
Start: 2022-07-06 | End: 2022-08-08 | Stop reason: SDUPTHER

## 2022-07-06 NOTE — TELEPHONE ENCOUNTER
----- Message from MadelynStarla Daniel sent at 7/6/2022  4:45 PM CDT -----  Regarding: rx request  Type:  Patient Returning Call    Who Called: pt  Who Left Message for Patient: nurse  Does the patient know what this is regarding?: yes  Would the patient rather a call back or a response via [facility]?     Best Call Back Number: [phone]  Additional Information: pt requesting pain meds - neck and lower back pain .. woke up from his diaylsis appointment in severe pain ([facility])

## 2022-07-07 ENCOUNTER — DOCUMENTATION ONLY (OUTPATIENT)
Dept: ADMINISTRATIVE | Facility: HOSPITAL | Age: 67
End: 2022-07-07
Payer: MEDICARE

## 2022-08-08 RX ORDER — HYDROCODONE BITARTRATE AND ACETAMINOPHEN 10; 325 MG/1; MG/1
1 TABLET ORAL EVERY 8 HOURS PRN
Qty: 90 TABLET | Refills: 0 | Status: SHIPPED | OUTPATIENT
Start: 2022-08-08 | End: 2022-08-30 | Stop reason: SDUPTHER

## 2022-08-08 NOTE — TELEPHONE ENCOUNTER
----- Message from Zaira Amador sent at 8/8/2022 10:37 AM CDT -----  Regarding: Med Refill  .Type:  RX Refill Request    Who Called: Pt  Refill or New Rx:Refill  RX Name and Strength:HYDROcodone-acetaminophen (NORCO)  mg per tablet  How is the patient currently taking it? (ex. 1XDay):1xday every 8 hrs  Is this a 30 day or 90 day RX:90  Preferred Pharmacy with phone number:[id number] - [place], [place] - [address] [facility] [address]  Local or Mail Order:Local  Ordering Provider:Sandra  Would the patient rather a call back or a response via [facility]? Call back  Best Call Back Number:[phone]  Additional Information:

## 2022-08-14 PROCEDURE — G0179 MD RECERTIFICATION HHA PT: HCPCS | Mod: ,,, | Performed by: FAMILY MEDICINE

## 2022-08-14 PROCEDURE — G0179 PR HOME HEALTH MD RECERTIFICATION: ICD-10-PCS | Mod: ,,, | Performed by: FAMILY MEDICINE

## 2022-08-16 ENCOUNTER — TELEPHONE (OUTPATIENT)
Dept: FAMILY MEDICINE | Facility: CLINIC | Age: 67
End: 2022-08-16
Payer: MEDICARE

## 2022-08-16 NOTE — TELEPHONE ENCOUNTER
----- Message from Fang Pinto sent at 8/16/2022  9:46 AM CDT -----  Regarding: Surg clearance  Leonor with 's office called requesting a call back in ref to the status on pt's surgery clearance. She stated she received the clearance from his cardiologist on 6/23. She is now waiting on the PCP. Call back number is [phone].

## 2022-08-19 ENCOUNTER — EXTERNAL HOME HEALTH (OUTPATIENT)
Dept: HOME HEALTH SERVICES | Facility: HOSPITAL | Age: 67
End: 2022-08-19
Payer: MEDICARE

## 2022-08-23 DIAGNOSIS — Z01.818 PREOPERATIVE EXAMINATION: Primary | ICD-10-CM

## 2022-08-23 DIAGNOSIS — E16.2 HYPOGLYCEMIA: ICD-10-CM

## 2022-08-23 DIAGNOSIS — N18.5 ANEMIA DUE TO STAGE 5 CHRONIC KIDNEY DISEASE, NOT ON CHRONIC DIALYSIS: ICD-10-CM

## 2022-08-23 DIAGNOSIS — D63.1 ANEMIA DUE TO STAGE 5 CHRONIC KIDNEY DISEASE, NOT ON CHRONIC DIALYSIS: ICD-10-CM

## 2022-08-25 ENCOUNTER — OFFICE VISIT (OUTPATIENT)
Dept: FAMILY MEDICINE | Facility: CLINIC | Age: 67
End: 2022-08-25
Payer: MEDICARE

## 2022-08-25 VITALS
BODY MASS INDEX: 24.63 KG/M2 | RESPIRATION RATE: 17 BRPM | OXYGEN SATURATION: 98 % | HEART RATE: 72 BPM | SYSTOLIC BLOOD PRESSURE: 140 MMHG | HEIGHT: 65 IN | WEIGHT: 147.81 LBS | TEMPERATURE: 98 F | DIASTOLIC BLOOD PRESSURE: 82 MMHG

## 2022-08-25 DIAGNOSIS — E16.2 HYPOGLYCEMIA: Primary | ICD-10-CM

## 2022-08-25 DIAGNOSIS — N18.6 ESRD ON HEMODIALYSIS: Chronic | ICD-10-CM

## 2022-08-25 DIAGNOSIS — G89.4 CHRONIC PAIN SYNDROME: ICD-10-CM

## 2022-08-25 DIAGNOSIS — Z01.818 PREOPERATIVE TESTING: ICD-10-CM

## 2022-08-25 DIAGNOSIS — I15.0 RENOVASCULAR HYPERTENSION: Chronic | ICD-10-CM

## 2022-08-25 DIAGNOSIS — Z99.2 ESRD ON HEMODIALYSIS: Chronic | ICD-10-CM

## 2022-08-25 PROCEDURE — 3079F PR MOST RECENT DIASTOLIC BLOOD PRESSURE 80-89 MM HG: ICD-10-PCS | Mod: CPTII,,, | Performed by: FAMILY MEDICINE

## 2022-08-25 PROCEDURE — 1125F PR PAIN SEVERITY QUANTIFIED, PAIN PRESENT: ICD-10-PCS | Mod: CPTII,,, | Performed by: FAMILY MEDICINE

## 2022-08-25 PROCEDURE — 3077F SYST BP >= 140 MM HG: CPT | Mod: CPTII,,, | Performed by: FAMILY MEDICINE

## 2022-08-25 PROCEDURE — 3288F PR FALLS RISK ASSESSMENT DOCUMENTED: ICD-10-PCS | Mod: CPTII,,, | Performed by: FAMILY MEDICINE

## 2022-08-25 PROCEDURE — 1101F PT FALLS ASSESS-DOCD LE1/YR: CPT | Mod: CPTII,,, | Performed by: FAMILY MEDICINE

## 2022-08-25 PROCEDURE — 3008F BODY MASS INDEX DOCD: CPT | Mod: CPTII,,, | Performed by: FAMILY MEDICINE

## 2022-08-25 PROCEDURE — 1101F PR PT FALLS ASSESS DOC 0-1 FALLS W/OUT INJ PAST YR: ICD-10-PCS | Mod: CPTII,,, | Performed by: FAMILY MEDICINE

## 2022-08-25 PROCEDURE — 99214 PR OFFICE/OUTPT VISIT, EST, LEVL IV, 30-39 MIN: ICD-10-PCS | Mod: ,,, | Performed by: FAMILY MEDICINE

## 2022-08-25 PROCEDURE — 3079F DIAST BP 80-89 MM HG: CPT | Mod: CPTII,,, | Performed by: FAMILY MEDICINE

## 2022-08-25 PROCEDURE — 99214 OFFICE O/P EST MOD 30 MIN: CPT | Mod: ,,, | Performed by: FAMILY MEDICINE

## 2022-08-25 PROCEDURE — 1159F MED LIST DOCD IN RCRD: CPT | Mod: CPTII,,, | Performed by: FAMILY MEDICINE

## 2022-08-25 PROCEDURE — 1125F AMNT PAIN NOTED PAIN PRSNT: CPT | Mod: CPTII,,, | Performed by: FAMILY MEDICINE

## 2022-08-25 PROCEDURE — 3008F PR BODY MASS INDEX (BMI) DOCUMENTED: ICD-10-PCS | Mod: CPTII,,, | Performed by: FAMILY MEDICINE

## 2022-08-25 PROCEDURE — 3077F PR MOST RECENT SYSTOLIC BLOOD PRESSURE >= 140 MM HG: ICD-10-PCS | Mod: CPTII,,, | Performed by: FAMILY MEDICINE

## 2022-08-25 PROCEDURE — 3288F FALL RISK ASSESSMENT DOCD: CPT | Mod: CPTII,,, | Performed by: FAMILY MEDICINE

## 2022-08-25 PROCEDURE — 1159F PR MEDICATION LIST DOCUMENTED IN MEDICAL RECORD: ICD-10-PCS | Mod: CPTII,,, | Performed by: FAMILY MEDICINE

## 2022-08-25 RX ORDER — METHOCARBAMOL 500 MG/1
500 TABLET, FILM COATED ORAL NIGHTLY PRN
Qty: 10 TABLET | Refills: 0 | Status: SHIPPED | OUTPATIENT
Start: 2022-08-25 | End: 2022-09-04

## 2022-08-25 NOTE — PROGRESS NOTES
Subjective:      Patient ID: Mirza Nelson Jr. is a 67 y.o. male.    Chief Complaint: Surgery clearance follow up    Disclaimer:  This note is prepared using voice recognition software and as such is likely to have errors despite attempts at proofreading. Please contact me for questions.     67yoM who presents for follow up of hypoglycemia, preoperative labs and chronic pain.  Hypoglycemia: Incidental finding of hypoglycemia (blood glucose 39) on preoperative labs in 05/2022. The patient states that he had been fasting and had dialysis. CBG in clinic on 07/2022 in 70s. The patient was instructed to eat small meals throughout the day. CBG in clinic today 119. He is currently asymptomatic. Repeat CMP and CBC previously ordered however the patient states that he has not completed the labs but will try to have them done at dialysis tomorrow.  Renovascular HTN: Patient denies chest pain, SOB, or dizziness. He states he had multiple episodes of hypotension which caused the dialysis tech to refuse to start dialysis. The patient states he was instructed by nephrology to stop hydralazine and take only when sBP >160 and/or dBP >90. The patient states he monitors his BP at home and it is typically 140s/80s. He denies recent elevated BP. Nuclear stress test in 02/2022 revealed LVEF 68% and no large reversible defect or ischemia.  Preoperative labs: The patient will be scheduled for suprapubic prostatectomy by urology however he was awaiting cardiac clearance which was recently received. He has not completed his repeat CMP and CBC. The patient has also not completed his CXR. Patient will have the above aforementioned tests prior to surgery. Patient counseled on risks and benefits of surgery. He verbalized understanding.  Chronic pain: The patient admits to chronic back pain and chronic cervical radiculopathy. He is currently taking Norco sparingly as well as Tylenol PRN. The patient states that the pain is likely the cause of  his elevated blood pressure. He states he was previously referred to pain management but did not receive an appointment with a physician. He admits to pain with ROM. The patient is currently ambulating with a rolling walker.      Past Medical History:   Diagnosis Date    Anemia     Cervical radiculopathy 05/17/2022    Disorder of kidney and ureter     ESRD on hemodialysis 09/27/2018    Hypertension     Neck injury 1994    Other chronic pain 09/27/2018    Personal history of colonic polyps     Renovascular hypertension 09/27/2018    Vitamin D deficiency 05/17/2022        Current Outpatient Medications on File Prior to Visit   Medication Sig Dispense Refill    amLODIPine (NORVASC) 10 MG tablet Take 1 tablet (10 mg total) by mouth every evening. 30 tablet 3    ascorbic acid, vitamin C, (VITAMIN C) 500 MG tablet Take 500 mg by mouth once daily.      augmented betamethasone (DIPROLENE) 0.05 % lotion Apply 0.05 application topically every Mon, Wed, Fri.      busPIRone (BUSPAR) 10 MG tablet Take 1 tablet (10 mg total) by mouth once daily. 30 tablet 3    carvediloL (COREG) 12.5 MG tablet TAKE 1 TABLET(12.5 MG) BY MOUTH TWICE DAILY 180 tablet 3    doxazosin (CARDURA) 8 MG Tab Take 8 mg by mouth every evening.      ferrous gluconate (FERGON) 324 MG tablet Take 1 tablet (324 mg total) by mouth once daily. 30 tablet 3    flavoxATE (URISPAS) 100 mg Tab Take 200 mg by mouth 4 (four) times daily.      fluticasone (FLONASE) 50 mcg/actuation nasal spray 1 spray by Each Nare route daily as needed for Rhinitis.      folic acid-vit B6-vit B12 2.5-25-2 mg (FOLBIC) 2.5-25-2 mg Tab Take 2.5 tablets by mouth Daily.      gabapentin (NEURONTIN) 300 MG capsule Take 300 mg by mouth 3 (three) times daily as needed.      HYDROcodone-acetaminophen (NORCO)  mg per tablet Take 1 tablet by mouth every 8 (eight) hours as needed for Pain. 90 tablet 0    ivermectin (STROMECTOL) 3 mg Tab Take 5 tablets (15,000 mcg total) by mouth Daily. Take 5  tablets on day one. Take 5 tablets on day two. 10 tablet 0    loperamide (IMODIUM) 2 mg capsule Take 2 mg by mouth daily as needed.      loratadine (CLARITIN) 10 mg tablet Take 1 tablet (10 mg total) by mouth once daily. 30 tablet 3    megestroL (MEGACE) 400 mg/10 mL (40 mg/mL) Susp Take 40 mg by mouth Daily.      pantoprazole (PROTONIX) 40 MG tablet Take 40 mg by mouth once daily.      sevelamer carbonate (RENVELA) 800 mg Tab Take 800 mg by mouth 3 (three) times daily.      sodium bicarbonate 650 MG tablet Take 2 tablets (1,300 mg total) by mouth 3 (three) times daily. 90 tablet 3    sodium zirconium cyclosilicate (LOKELMA) 5 gram packet Take 5 g by mouth 2 (two) times a day.      tamsulosin (FLOMAX) 0.4 mg Cap Take 1 capsule by mouth 2 (two) times daily.      traZODone (DESYREL) 50 MG tablet Take 50 mg by mouth nightly.      walker Misc 1 each by Misc.(Non-Drug; Combo Route) route once daily. Upright Rollator 1 each 0    [DISCONTINUED] tiZANidine (ZANAFLEX) 2 MG tablet Take 2 mg by mouth every 8 (eight) hours as needed.      hydrALAZINE (APRESOLINE) 50 MG tablet TAKE 1 TABLET(50 MG) BY MOUTH THREE TIMES DAILY 270 tablet 3     No current facility-administered medications on file prior to visit.        Review of patient's allergies indicates:   Allergen Reactions    Iodine      Other reaction(s): difficulty breathing, Facial Swelling    Iodine and iodide containing products Swelling    Shellfish containing products      Other reaction(s): Swelling            Review of Systems   Constitutional: Negative for chills, diaphoresis and fever.   Eyes: Negative for blurred vision and double vision.   Respiratory: Negative for cough and shortness of breath.    Cardiovascular: Negative for chest pain and leg swelling.   Gastrointestinal: Negative for abdominal pain, diarrhea, nausea and vomiting.   Musculoskeletal: Positive for back pain and neck pain.   Skin: Negative for rash.   Neurological: Negative for dizziness, tremors  "and headaches.       Objective:     Vitals:    08/25/22 0901 08/25/22 0937   BP: (!) 160/82 (!) 140/82   BP Location: Other (Comment)  Comment: left wrist Left arm   Patient Position: Sitting Sitting   BP Method: Medium (Manual) Large (Manual)   Pulse: 72    Resp: 17    Temp: 98 °F (36.7 °C)    TempSrc: Oral    SpO2: 98%    Weight: 67 kg (147 lb 12.8 oz)    Height: 5' 5" (1.651 m)      Physical Exam  Constitutional:       General: He is not in acute distress.     Appearance: Normal appearance. He is not ill-appearing or toxic-appearing.   HENT:      Head: Normocephalic and atraumatic.      Nose: Nose normal.      Mouth/Throat:      Mouth: Mucous membranes are moist.   Eyes:      Extraocular Movements: Extraocular movements intact.      Conjunctiva/sclera: Conjunctivae normal.   Neck:      Comments: Decreased extension due to pain, tenderness mild to moderate  Cardiovascular:      Rate and Rhythm: Normal rate and regular rhythm.      Pulses: Normal pulses.      Heart sounds: Normal heart sounds. No murmur heard.    No gallop.   Pulmonary:      Effort: Pulmonary effort is normal. No respiratory distress.      Breath sounds: Normal breath sounds. No stridor. No wheezing, rhonchi or rales.   Musculoskeletal:      Cervical back: Neck supple. No erythema, rigidity or crepitus. Pain with movement, spinous process tenderness and muscular tenderness present. Decreased range of motion.      Thoracic back: Tenderness and bony tenderness present. No swelling or edema. Normal range of motion.      Lumbar back: Tenderness and bony tenderness present. No swelling or edema. Normal range of motion.      Comments: Rolling walker present at patient's side during exam   Skin:     General: Skin is warm and dry.      Coloration: Skin is not pale.   Neurological:      General: No focal deficit present.      Mental Status: He is alert and oriented to person, place, and time.   Psychiatric:         Mood and Affect: Mood normal.         " Behavior: Behavior normal.         Thought Content: Thought content normal.         Assessment:     1. Hypoglycemia    2. Renovascular hypertension    3. ESRD on hemodialysis    4. Preoperative testing    5. Chronic pain syndrome      Plan:   Mirza was seen today for surgery clearance follow up.    Diagnoses and all orders for this visit:    Hypoglycemia   in clinic.  CMP and CBC ordered prior to visit but not yet completed.  Continue eating small meals multiple times a day (approximately 4-6)  Seek immediate medical treatment for dizziness, tremors, diaphoresis, presyncope or syncopal episodes, chest pain, or SOB.    Renovascular hypertension  -     Protime-INR; Future  -     APTT; Future  Repeat BP closer to goal.  Encouraged medication compliance.  Continue current medications  Low sodium diet and exercise recommended  Monitor BP at home, keep BP log and notify MD if sBP >160 or <90. Also notify MD if dBP >100 or <60.  Limit caffeine intake.  Seek immediate medical treatment for chest pain, SOB, LE edema, severe headache, blurred vision, dizziness, slurred speech, any new or worsening symptoms.     ESRD on hemodialysis  -     Protime-INR; Future  -     APTT; Future  Keep follow up with nephrology.  CBC, CMP, PT, PTT, and INR ordered and pending.  Continue dialysis MWF  Dialysis diet encouraged.    Preoperative testing  -     X-Ray Chest PA And Lateral; Future  -     Protime-INR; Future  -     APTT; Future  Keep follow up with urology.  Labs and CXR ordered and pending.  Patient has been cleared by cardiology.    Chronic pain syndrome  -     Ambulatory referral/consult to Pain Clinic; Future  -     methocarbamoL (ROBAXIN) 500 MG Tab; Take 1 tablet (500 mg total) by mouth nightly as needed (pain). Discuss with nephrologist before starting  Gentle stretching as tolerated, warm compresses, consider PT if pain does not improve  Methocarbamol 500 mg qhs PRN, (do not take medication while driving or operating  heavy machinery)  Avoid NSAIDs due to hx of ESRD on HD.  Avoid taking Tylenol and Norco concurrently.  Contact clinic for any questions or concerns and notify MD if symptoms worsen or not improving.      Follow up in about 3 months (around 11/25/2022) for HTN, hypoglycemia, anemia.    Mirza Nelson Jr. was given education on their disease process and medications.     ADDENDUM:  Patient has received clearance from cardiology for surgical intervention. CXR shows no acute cardiopulmonary process. Initial labs showed significant hypoglycemia however on repeat CMP glucose was 102. Patient has significant anemia likely due to anemia of chronic disease. Hgb/Hct 8.6/27.6. Labs faxed to nephrology for evaluation and treatment. Recommend repeat CBC prior to surgery. Risks and benefits of surgery were discussed with patient at above visit. He verbalized understanding. Patient is at intermediate to high risk for a intermediate risk procedure.

## 2022-08-30 ENCOUNTER — TELEPHONE (OUTPATIENT)
Dept: FAMILY MEDICINE | Facility: CLINIC | Age: 67
End: 2022-08-30
Payer: MEDICARE

## 2022-08-30 DIAGNOSIS — G89.29 OTHER CHRONIC PAIN: Primary | Chronic | ICD-10-CM

## 2022-08-30 DIAGNOSIS — M54.12 CERVICAL RADICULOPATHY: Chronic | ICD-10-CM

## 2022-08-30 RX ORDER — HYDROCODONE BITARTRATE AND ACETAMINOPHEN 10; 325 MG/1; MG/1
1 TABLET ORAL EVERY 8 HOURS PRN
Qty: 90 TABLET | Refills: 0 | Status: SHIPPED | OUTPATIENT
Start: 2022-08-30 | End: 2022-10-03 | Stop reason: SDUPTHER

## 2022-08-30 RX ORDER — HYDROCODONE BITARTRATE AND ACETAMINOPHEN 10; 325 MG/1; MG/1
1 TABLET ORAL EVERY 8 HOURS PRN
Qty: 90 TABLET | Refills: 0 | OUTPATIENT
Start: 2022-08-30

## 2022-08-30 NOTE — TELEPHONE ENCOUNTER
Can new pain management referral be placed. Was in [place] patient never got call      pt claims the referral never make it to [facility] the fax number is [phone] pt was complaining of pain and to have the referral faxed to the number provided.

## 2022-08-30 NOTE — TELEPHONE ENCOUNTER
Patient has appt for 10/11. This is the soonest for virtual or in office. Asked if norco's can be refilled due to schedule

## 2022-09-08 ENCOUNTER — TELEPHONE (OUTPATIENT)
Dept: FAMILY MEDICINE | Facility: CLINIC | Age: 67
End: 2022-09-08

## 2022-09-08 DIAGNOSIS — S01.91XS LACERATION OF HEAD WITHOUT FOREIGN BODY, UNSPECIFIED PART OF HEAD, SEQUELA: Primary | ICD-10-CM

## 2022-09-08 NOTE — TELEPHONE ENCOUNTER
Home Health called, patient hit head on door frame over the weekend and has a cut. Patient refused ER from family and home health.  Asking if they can get wound care orders.    Loriane sent me pictures for you to see wound on my email

## 2022-09-12 ENCOUNTER — TELEPHONE (OUTPATIENT)
Dept: FAMILY MEDICINE | Facility: CLINIC | Age: 67
End: 2022-09-12
Payer: MEDICARE

## 2022-09-12 NOTE — TELEPHONE ENCOUNTER
Spoke with Loraine with [facility], will have patients labs/XR done at tomorrows visit if patient will cooperate

## 2022-09-16 ENCOUNTER — LAB REQUISITION (OUTPATIENT)
Dept: LAB | Facility: HOSPITAL | Age: 67
End: 2022-09-16
Payer: MEDICARE

## 2022-09-16 DIAGNOSIS — N40.1 BENIGN PROSTATIC HYPERPLASIA WITH LOWER URINARY TRACT SYMPTOMS: ICD-10-CM

## 2022-09-16 DIAGNOSIS — N18.6 END STAGE RENAL DISEASE: ICD-10-CM

## 2022-09-16 DIAGNOSIS — I12.0 HYPERTENSIVE CHRONIC KIDNEY DISEASE WITH STAGE 5 CHRONIC KIDNEY DISEASE OR END STAGE RENAL DISEASE: ICD-10-CM

## 2022-09-16 LAB
ALBUMIN SERPL-MCNC: 3.2 GM/DL (ref 3.4–4.8)
ALBUMIN/GLOB SERPL: 0.8 RATIO (ref 1.1–2)
ALP SERPL-CCNC: 83 UNIT/L (ref 40–150)
ALT SERPL-CCNC: 11 UNIT/L (ref 0–55)
APTT PPP: 32 SECONDS (ref 23.2–33.7)
AST SERPL-CCNC: 12 UNIT/L (ref 5–34)
BILIRUBIN DIRECT+TOT PNL SERPL-MCNC: 0.6 MG/DL
BUN SERPL-MCNC: 19 MG/DL (ref 8.4–25.7)
CALCIUM SERPL-MCNC: 8.7 MG/DL (ref 8.8–10)
CHLORIDE SERPL-SCNC: 95 MMOL/L (ref 98–107)
CO2 SERPL-SCNC: 31 MMOL/L (ref 23–31)
CREAT SERPL-MCNC: 4.42 MG/DL (ref 0.73–1.18)
GFR SERPLBLD CREATININE-BSD FMLA CKD-EPI: 14 MLS/MIN/1.73/M2
GLOBULIN SER-MCNC: 4 GM/DL (ref 2.4–3.5)
GLUCOSE SERPL-MCNC: 187 MG/DL (ref 82–115)
INR BLD: 1.04 (ref 0–1.3)
POTASSIUM SERPL-SCNC: 3.4 MMOL/L (ref 3.5–5.1)
PROT SERPL-MCNC: 7.2 GM/DL (ref 5.8–7.6)
PROTHROMBIN TIME: 13.5 SECONDS (ref 12.5–14.5)
SODIUM SERPL-SCNC: 135 MMOL/L (ref 136–145)

## 2022-09-16 PROCEDURE — 85730 THROMBOPLASTIN TIME PARTIAL: CPT | Performed by: FAMILY MEDICINE

## 2022-09-16 PROCEDURE — 80053 COMPREHEN METABOLIC PANEL: CPT | Performed by: FAMILY MEDICINE

## 2022-09-16 PROCEDURE — 85610 PROTHROMBIN TIME: CPT | Performed by: FAMILY MEDICINE

## 2022-09-17 ENCOUNTER — LAB REQUISITION (OUTPATIENT)
Dept: LAB | Facility: HOSPITAL | Age: 67
End: 2022-09-17
Payer: MEDICARE

## 2022-09-17 DIAGNOSIS — I12.0 HYPERTENSIVE CHRONIC KIDNEY DISEASE WITH STAGE 5 CHRONIC KIDNEY DISEASE OR END STAGE RENAL DISEASE: ICD-10-CM

## 2022-09-17 LAB
BASOPHILS # BLD AUTO: 0.05 X10(3)/MCL (ref 0–0.2)
BASOPHILS NFR BLD AUTO: 1.2 %
EOSINOPHIL # BLD AUTO: 0.27 X10(3)/MCL (ref 0–0.9)
EOSINOPHIL NFR BLD AUTO: 6.6 %
ERYTHROCYTE [DISTWIDTH] IN BLOOD BY AUTOMATED COUNT: 14 % (ref 11.5–17)
HCT VFR BLD AUTO: 27.6 % (ref 42–52)
HGB BLD-MCNC: 8.6 GM/DL (ref 14–18)
IMM GRANULOCYTES # BLD AUTO: 0.01 X10(3)/MCL (ref 0–0.04)
IMM GRANULOCYTES NFR BLD AUTO: 0.2 %
LYMPHOCYTES # BLD AUTO: 1.03 X10(3)/MCL (ref 0.6–4.6)
LYMPHOCYTES NFR BLD AUTO: 25.1 %
MACROCYTES BLD QL SMEAR: ABNORMAL
MCH RBC QN AUTO: 33.6 PG (ref 27–31)
MCHC RBC AUTO-ENTMCNC: 31.2 MG/DL (ref 33–36)
MCV RBC AUTO: 107.8 FL (ref 80–94)
MONOCYTES # BLD AUTO: 0.47 X10(3)/MCL (ref 0.1–1.3)
MONOCYTES NFR BLD AUTO: 11.5 %
NEUTROPHILS # BLD AUTO: 2.3 X10(3)/MCL (ref 2.1–9.2)
NEUTROPHILS NFR BLD AUTO: 55.4 %
NRBC BLD AUTO-RTO: 0 %
PLATELET # BLD AUTO: 206 X10(3)/MCL (ref 130–400)
PLATELET # BLD EST: NORMAL 10*3/UL
PMV BLD AUTO: 9.9 FL (ref 7.4–10.4)
RBC # BLD AUTO: 2.56 X10(6)/MCL (ref 4.7–6.1)
RBC MORPH BLD: ABNORMAL
WBC # SPEC AUTO: 4.1 X10(3)/MCL (ref 4.5–11.5)

## 2022-09-17 PROCEDURE — 85025 COMPLETE CBC W/AUTO DIFF WBC: CPT | Performed by: FAMILY MEDICINE

## 2022-09-19 ENCOUNTER — DOCUMENT SCAN (OUTPATIENT)
Dept: HOME HEALTH SERVICES | Facility: HOSPITAL | Age: 67
End: 2022-09-19
Payer: MEDICARE

## 2022-09-20 ENCOUNTER — LAB REQUISITION (OUTPATIENT)
Dept: LAB | Facility: HOSPITAL | Age: 67
End: 2022-09-20
Payer: MEDICARE

## 2022-09-20 ENCOUNTER — DOCUMENT SCAN (OUTPATIENT)
Dept: HOME HEALTH SERVICES | Facility: HOSPITAL | Age: 67
End: 2022-09-20
Payer: MEDICARE

## 2022-09-20 DIAGNOSIS — I12.0 HYPERTENSIVE CHRONIC KIDNEY DISEASE WITH STAGE 5 CHRONIC KIDNEY DISEASE OR END STAGE RENAL DISEASE: ICD-10-CM

## 2022-09-20 DIAGNOSIS — N18.6 END STAGE RENAL DISEASE: ICD-10-CM

## 2022-09-20 DIAGNOSIS — Z99.2 DEPENDENCE ON RENAL DIALYSIS: ICD-10-CM

## 2022-09-20 LAB
ALBUMIN SERPL-MCNC: 3.4 GM/DL (ref 3.4–4.8)
ALBUMIN/GLOB SERPL: 0.9 RATIO (ref 1.1–2)
ALP SERPL-CCNC: 87 UNIT/L (ref 40–150)
ALT SERPL-CCNC: 13 UNIT/L (ref 0–55)
AST SERPL-CCNC: 12 UNIT/L (ref 5–34)
BILIRUBIN DIRECT+TOT PNL SERPL-MCNC: 0.6 MG/DL
BUN SERPL-MCNC: 40.7 MG/DL (ref 8.4–25.7)
CALCIUM SERPL-MCNC: 9 MG/DL (ref 8.8–10)
CHLORIDE SERPL-SCNC: 96 MMOL/L (ref 98–107)
CO2 SERPL-SCNC: 30 MMOL/L (ref 23–31)
CREAT SERPL-MCNC: 8.22 MG/DL (ref 0.73–1.18)
GFR SERPLBLD CREATININE-BSD FMLA CKD-EPI: 7 MLS/MIN/1.73/M2
GLOBULIN SER-MCNC: 4 GM/DL (ref 2.4–3.5)
GLUCOSE SERPL-MCNC: 102 MG/DL (ref 82–115)
INR BLD: 1.09 (ref 0–1.3)
POTASSIUM SERPL-SCNC: 4.6 MMOL/L (ref 3.5–5.1)
PROT SERPL-MCNC: 7.4 GM/DL (ref 5.8–7.6)
PROTHROMBIN TIME: 14 SECONDS (ref 12.5–14.5)
SODIUM SERPL-SCNC: 135 MMOL/L (ref 136–145)

## 2022-09-20 PROCEDURE — 80053 COMPREHEN METABOLIC PANEL: CPT | Performed by: FAMILY MEDICINE

## 2022-09-20 PROCEDURE — 85610 PROTHROMBIN TIME: CPT | Performed by: FAMILY MEDICINE

## 2022-09-21 ENCOUNTER — TELEPHONE (OUTPATIENT)
Dept: FAMILY MEDICINE | Facility: CLINIC | Age: 67
End: 2022-09-21
Payer: MEDICARE

## 2022-09-21 ENCOUNTER — HISTORICAL (OUTPATIENT)
Dept: ADMINISTRATIVE | Facility: HOSPITAL | Age: 67
End: 2022-09-21
Payer: MEDICARE

## 2022-09-21 NOTE — TELEPHONE ENCOUNTER
----- Message from Luigi Hawthorne sent at 9/21/2022  9:30 AM CDT -----  .Type:  Test Results    Who Called: [facility]  Name of Test (Lab/Mammo/Etc):   Date of Test:   Ordering Provider:   Where the test was performed:   Would the patient rather a call back or a response via [facility]? Call back  Best Call Back Number: [phone]  Additional Information:  calling to see if labs results were received, they were faxed over yesterday

## 2022-09-28 ENCOUNTER — TELEPHONE (OUTPATIENT)
Dept: FAMILY MEDICINE | Facility: CLINIC | Age: 67
End: 2022-09-28
Payer: MEDICARE

## 2022-09-28 DIAGNOSIS — T14.8XXD DELAYED WOUND HEALING: Primary | ICD-10-CM

## 2022-09-28 NOTE — TELEPHONE ENCOUNTER
----- Message from Zaira Amador sent at 9/28/2022 11:52 AM CDT -----  Regarding: Med Advice  .Type:  Needs Medical Advice    Who Called: BOOKER Baron  Symptoms (please be specific): Wound   How long has patient had these symptoms:    Pharmacy name and phone #:    Would the patient rather a call back or a response via [facility]? Call back  Best Call Back Number: [phone]  Additional Information: Faxed over info about pt's wound on yestrday, wants to make sure it was received, pls f/u with her.

## 2022-10-03 RX ORDER — HYDROCODONE BITARTRATE AND ACETAMINOPHEN 10; 325 MG/1; MG/1
1 TABLET ORAL EVERY 8 HOURS PRN
Qty: 90 TABLET | Refills: 0 | Status: SHIPPED | OUTPATIENT
Start: 2022-10-05 | End: 2022-10-11 | Stop reason: SDUPTHER

## 2022-10-11 ENCOUNTER — OFFICE VISIT (OUTPATIENT)
Dept: FAMILY MEDICINE | Facility: CLINIC | Age: 67
End: 2022-10-11
Payer: MEDICARE

## 2022-10-11 VITALS
SYSTOLIC BLOOD PRESSURE: 137 MMHG | DIASTOLIC BLOOD PRESSURE: 81 MMHG | OXYGEN SATURATION: 98 % | HEIGHT: 65 IN | HEART RATE: 75 BPM | WEIGHT: 147 LBS | BODY MASS INDEX: 24.49 KG/M2 | TEMPERATURE: 98 F | RESPIRATION RATE: 18 BRPM

## 2022-10-11 DIAGNOSIS — M54.16 RADICULOPATHY, LUMBAR REGION: ICD-10-CM

## 2022-10-11 DIAGNOSIS — Z99.2 ESRD ON HEMODIALYSIS: ICD-10-CM

## 2022-10-11 DIAGNOSIS — M54.16 BILATERAL LUMBAR RADICULOPATHY: Primary | ICD-10-CM

## 2022-10-11 DIAGNOSIS — N18.6 ESRD ON HEMODIALYSIS: ICD-10-CM

## 2022-10-11 PROCEDURE — 3075F SYST BP GE 130 - 139MM HG: CPT | Mod: CPTII,,, | Performed by: FAMILY MEDICINE

## 2022-10-11 PROCEDURE — 1101F PT FALLS ASSESS-DOCD LE1/YR: CPT | Mod: CPTII,,, | Performed by: FAMILY MEDICINE

## 2022-10-11 PROCEDURE — 3008F BODY MASS INDEX DOCD: CPT | Mod: CPTII,,, | Performed by: FAMILY MEDICINE

## 2022-10-11 PROCEDURE — 1101F PR PT FALLS ASSESS DOC 0-1 FALLS W/OUT INJ PAST YR: ICD-10-PCS | Mod: CPTII,,, | Performed by: FAMILY MEDICINE

## 2022-10-11 PROCEDURE — 3079F DIAST BP 80-89 MM HG: CPT | Mod: CPTII,,, | Performed by: FAMILY MEDICINE

## 2022-10-11 PROCEDURE — 3288F FALL RISK ASSESSMENT DOCD: CPT | Mod: CPTII,,, | Performed by: FAMILY MEDICINE

## 2022-10-11 PROCEDURE — 1160F PR REVIEW ALL MEDS BY PRESCRIBER/CLIN PHARMACIST DOCUMENTED: ICD-10-PCS | Mod: CPTII,,, | Performed by: FAMILY MEDICINE

## 2022-10-11 PROCEDURE — 1126F PR PAIN SEVERITY QUANTIFIED, NO PAIN PRESENT: ICD-10-PCS | Mod: CPTII,,, | Performed by: FAMILY MEDICINE

## 2022-10-11 PROCEDURE — 1158F ADVNC CARE PLAN TLK DOCD: CPT | Mod: CPTII,,, | Performed by: FAMILY MEDICINE

## 2022-10-11 PROCEDURE — 1126F AMNT PAIN NOTED NONE PRSNT: CPT | Mod: CPTII,,, | Performed by: FAMILY MEDICINE

## 2022-10-11 PROCEDURE — 99213 OFFICE O/P EST LOW 20 MIN: CPT | Mod: ,,, | Performed by: FAMILY MEDICINE

## 2022-10-11 PROCEDURE — 3288F PR FALLS RISK ASSESSMENT DOCUMENTED: ICD-10-PCS | Mod: CPTII,,, | Performed by: FAMILY MEDICINE

## 2022-10-11 PROCEDURE — 1159F PR MEDICATION LIST DOCUMENTED IN MEDICAL RECORD: ICD-10-PCS | Mod: CPTII,,, | Performed by: FAMILY MEDICINE

## 2022-10-11 PROCEDURE — 3008F PR BODY MASS INDEX (BMI) DOCUMENTED: ICD-10-PCS | Mod: CPTII,,, | Performed by: FAMILY MEDICINE

## 2022-10-11 PROCEDURE — 3079F PR MOST RECENT DIASTOLIC BLOOD PRESSURE 80-89 MM HG: ICD-10-PCS | Mod: CPTII,,, | Performed by: FAMILY MEDICINE

## 2022-10-11 PROCEDURE — 1158F PR ADVANCE CARE PLANNING DISCUSS DOCUMENTED IN MEDICAL RECORD: ICD-10-PCS | Mod: CPTII,,, | Performed by: FAMILY MEDICINE

## 2022-10-11 PROCEDURE — 3075F PR MOST RECENT SYSTOLIC BLOOD PRESS GE 130-139MM HG: ICD-10-PCS | Mod: CPTII,,, | Performed by: FAMILY MEDICINE

## 2022-10-11 PROCEDURE — 99213 PR OFFICE/OUTPT VISIT, EST, LEVL III, 20-29 MIN: ICD-10-PCS | Mod: ,,, | Performed by: FAMILY MEDICINE

## 2022-10-11 PROCEDURE — 1160F RVW MEDS BY RX/DR IN RCRD: CPT | Mod: CPTII,,, | Performed by: FAMILY MEDICINE

## 2022-10-11 PROCEDURE — 1159F MED LIST DOCD IN RCRD: CPT | Mod: CPTII,,, | Performed by: FAMILY MEDICINE

## 2022-10-11 RX ORDER — HYDROCODONE BITARTRATE AND ACETAMINOPHEN 10; 325 MG/1; MG/1
1 TABLET ORAL EVERY 8 HOURS PRN
Qty: 90 TABLET | Refills: 0 | Status: SHIPPED | OUTPATIENT
Start: 2022-11-04

## 2022-10-11 NOTE — PROGRESS NOTES
"   Patient ID: [id number]     Chief Complaint: Pain        HPI:     Mirza Nelson Jr. is a 67 y.o. male here today for a follow up.   Here for followup Chronic LBP. Chronic LBP is stable with chronic opioid medications and he was following up with pain management (Dr. He), but he retired and he needs a new pain management M.D., he needs refill of Rx [place] until he has get in with a pain management specialist, Rx is due on 11/05/2022, he denies adverse Rx side effects.  - He reports that his BP is well controlled with medications, no adverse Rx side effects, he had [facility] workup with Dr. Chris Centeno, goes every year in September.  - He does followup with Hematology as scheduled for anemia treatment.  -He does followup with Nephrology (Dr. Briceño/Julio César) for treatment of ESRD, on HD three times weekly (Monday, Wed and Friday), he is awaiting Renal Transplant with [facility]. He is currently living with a friend and needs to find a place to stay, he does have home health [facility], would like  referral.   - Patient is without any other complaints today.    Advance Care Planning     Date: 10/11/2022    Power of   I initiated the process of advance care planning today and explained the importance of this process to the patient.  I introduced the concept of advance directives to the patient, as well. Then the patient received detailed information about the importance of designating a Health Care Power of  (HCPOA). He was also instructed to communicate with this person about their wishes for future healthcare, should he become sick and lose decision-making capacity. The patient has previously appointed a HCPOA. After our discussion, the patient has decided to complete a HCPOA and has appointed his daughter, health care agent:  Heather Smith" Ab  & health care agent number:  [phone]  I spent a total time of 5 minutes discussing this issue with the patient.        "       ----------------------------  Anemia  Cervical radiculopathy  Disorder of kidney and ureter  ESRD on hemodialysis  Hypertension  Neck injury  Other chronic pain  Personal history of colonic polyps  Renovascular hypertension  Vitamin D deficiency     Past Surgical History:   Procedure Laterality Date    AV FISTULA PLACEMENT      COLONOSCOPY W/ POLYPECTOMY  02/19/2019    MASS EXCISION  2005    near the kidney     NECK SURGERY         Review of patient's allergies indicates:   Allergen Reactions    Iodine      Other reaction(s): difficulty breathing, Facial Swelling    Iodine and iodide containing products Swelling    Shellfish containing products      Other reaction(s): Swelling       Outpatient Medications Marked as Taking for the 10/11/22 encounter (Office Visit) with Elo Flores MD   Medication Sig Dispense Refill    amLODIPine (NORVASC) 10 MG tablet Take 1 tablet (10 mg total) by mouth every evening. 30 tablet 3    ascorbic acid, vitamin C, (VITAMIN C) 500 MG tablet Take 500 mg by mouth once daily.      augmented betamethasone (DIPROLENE) 0.05 % lotion Apply 0.05 application topically every Mon, Wed, Fri.      busPIRone (BUSPAR) 10 MG tablet TAKE 1 TABLET(10 MG) BY MOUTH EVERY DAY 30 tablet 3    carvediloL (COREG) 12.5 MG tablet TAKE 1 TABLET(12.5 MG) BY MOUTH TWICE DAILY 180 tablet 3    doxazosin (CARDURA) 8 MG Tab Take 8 mg by mouth every evening.      ferrous gluconate (FERGON) 324 MG tablet Take 1 tablet (324 mg total) by mouth once daily. 30 tablet 3    flavoxATE (URISPAS) 100 mg Tab Take 200 mg by mouth 4 (four) times daily.      fluticasone (FLONASE) 50 mcg/actuation nasal spray 1 spray by Each Nare route daily as needed for Rhinitis.      folic acid-vit B6-vit B12 2.5-25-2 mg (FOLBIC) 2.5-25-2 mg Tab Take 2.5 tablets by mouth Daily.      gabapentin (NEURONTIN) 300 MG capsule Take 300 mg by mouth 3 (three) times daily as needed.      hydrALAZINE (APRESOLINE) 50 MG tablet TAKE 1 TABLET(50 MG)  BY MOUTH THREE TIMES DAILY 270 tablet 3    ivermectin (STROMECTOL) 3 mg Tab Take 5 tablets (15,000 mcg total) by mouth Daily. Take 5 tablets on day one. Take 5 tablets on day two. 10 tablet 0    loperamide (IMODIUM) 2 mg capsule Take 2 mg by mouth daily as needed.      loratadine (CLARITIN) 10 mg tablet Take 1 tablet (10 mg total) by mouth once daily. 30 tablet 3    megestroL (MEGACE) 400 mg/10 mL (40 mg/mL) Susp Take 40 mg by mouth Daily.      pantoprazole (PROTONIX) 40 MG tablet Take 40 mg by mouth once daily.      sevelamer carbonate (RENVELA) 800 mg Tab Take 800 mg by mouth 3 (three) times daily.      sodium bicarbonate 650 MG tablet Take 2 tablets (1,300 mg total) by mouth 3 (three) times daily. 90 tablet 3    sodium zirconium cyclosilicate (LOKELMA) 5 gram packet Take 5 g by mouth 2 (two) times a day.      tamsulosin (FLOMAX) 0.4 mg Cap Take 1 capsule by mouth 2 (two) times daily.      traZODone (DESYREL) 50 MG tablet Take 50 mg by mouth nightly.      walker Misc 1 each by Misc.(Non-Drug; Combo Route) route once daily. Upright Rollator 1 each 0    [DISCONTINUED] HYDROcodone-acetaminophen (NORCO)  mg per tablet Take 1 tablet by mouth every 8 (eight) hours as needed for Pain. 90 tablet 0       Social History     Socioeconomic History    Marital status:     Years of education: 12   Tobacco Use    Smoking status: Former     Packs/day: 0.25     Years: 20.00     Pack years: 5.00     Types: Cigarettes     Quit date: 2016     Years since quittin.0    Smokeless tobacco: Never   Substance and Sexual Activity    Alcohol use: No    Drug use: Yes     Types: Marijuana    Sexual activity: Not Currently        Family History   Problem Relation Age of Onset    Heart disease Mother     Diabetes Mother     Hypertension Mother     Cancer Father     Prostate cancer Father     Hypertension Father     Cancer Sister     Breast cancer Sister     No Known Problems Sister         Subjective:       Review of  "Systems:    See HPI for details    Constitutional: No fever, No chills, No fatigue.   Eye: No blurring, No visual disturbances.   Ear/Nose/Mouth/Throat: No decreased hearing, No ear pain, No nasal congestion, No sore throat.   Respiratory: No shortness of breath, No cough, No wheezing.   Cardiovascular: No chest pain, No palpitations, No peripheral edema.   Gastrointestinal: No nausea, No vomiting, No diarrhea, No constipation, No abdominal pain.   Genitourinary: No dysuria, No hematuria.   Hematology/Lymphatics: No bruising tendency, No bleeding tendency, No swollen lymph glands.   Endocrine: No excessive thirst, No polyuria, No excessive hunger.   Musculoskeletal: Joint pain, No muscle pain, No decreased range of motion.   Integumentary: No rash, No pruritus.   Neurologic: No abnormal balance, No confusion, No headache.   Psychiatric: No anxiety, No depression, Not suicidal, No hallucinations.     All Other ROS: Negative except as stated in HPI.       Objective:     /81 (BP Location: Left arm, Patient Position: Sitting, BP Method: Medium (Automatic))   Pulse 75   Temp 98.2 °F (36.8 °C) (Oral)   Resp 18   Ht 5' 5" (1.651 m)   Wt 66.7 kg (147 lb)   SpO2 98%   BMI 24.46 kg/m²     Physical Exam    General: Alert and oriented, No acute distress.   Eye: Pupils are equal, round and reactive to light, Normal conjunctiva.   HENT: Normocephalic, Tympanic membranes are clear, Normal hearing, Oral mucosa is moist, No pharyngeal erythema.   Throat: Pharynx ( Not edematous, No exudate ).   Neck: Supple, Non-tender, No carotid bruit, No lymphadenopathy, No thyromegaly.   Respiratory: Lungs are clear to auscultation, Respirations are non-labored, Breath sounds are equal, Symmetrical chest wall expansion, No chest wall tenderness.   Cardiovascular: Normal rate, Regular rhythm, No murmur, Good pulses equal in all extremities, Bilateral LE with 1+ pitting edema.   Gastrointestinal: Soft, Non-tender, Non-distended, " Normal bowel sounds, No organomegaly.   Genitourinary: No costovertebral angle tenderness.   Musculoskeletal: Normal range of motion, Mobility/ gait: Able to walk with moderate assistance, Able to walk with a cane.   Integumentary: No rashes, no lesions.   Neurologic: No focal deficits, Cranial Nerves II-XII are grossly intact.   Psychiatric: Cooperative, Appropriate mood & affect, Normal judgment, Non-suicidal.   Mood and affect: Calm.   Behavior: Relaxed.         Assessment:       ICD-10-CM ICD-9-CM   1. Bilateral lumbar radiculopathy  M54.16 724.4   2. ESRD on hemodialysis  N18.6 585.6    Z99.2 V45.11   3. Radiculopathy, lumbar region  M54.16 724.4        Plan:     Problem List Items Addressed This Visit          Renal/    ESRD on hemodialysis (Chronic)    Relevant Orders    Ambulatory referral/consult to Social Work     Other Visit Diagnoses       Bilateral lumbar radiculopathy    -  Primary    Relevant Medications    HYDROcodone-acetaminophen (NORCO)  mg per tablet (Start on 11/4/2022)    Other Relevant Orders    Ambulatory referral/consult to Pain Clinic    Radiculopathy, lumbar region        Relevant Orders    Ambulatory referral/consult to Social Work         1. Bilateral lumbar radiculopathy  - HYDROcodone-acetaminophen (NORCO)  mg per tablet; Take 1 tablet by mouth every 8 (eight) hours as needed for Pain.  Dispense: 90 tablet; Refill: 0 to fill on 11/04/2022.  - Ambulatory referral/consult to Pain Clinic; Future  - Pt. has been on chronic opioids for several years that was ordered by pain management, will refill [place] temporarily until pt. can get an appt. with a new pain management provider. Referral to pain management is in file. Pt. has signed controlled Rx agreement on file.  reviewed today. Notify M.D. or ER if symptoms persist or worsen, SI/HI, temp greater than 100.4, or any acute illness.     2. ESRD on hemodialysis  - Ambulatory referral/consult to Social Work; Future  - Continue  HD three times weekly. Continue followup with Nephrology as scheduled.    3. Radiculopathy, lumbar region  - Ambulatory referral/consult to Social Work; Future  - Same as #1.     Mirza was seen today for pain.    Diagnoses and all orders for this visit:    Bilateral lumbar radiculopathy  -     HYDROcodone-acetaminophen (NORCO)  mg per tablet; Take 1 tablet by mouth every 8 (eight) hours as needed for Pain.  -     Ambulatory referral/consult to Pain Clinic; Future    ESRD on hemodialysis  -     Ambulatory referral/consult to Social Work; Future    Radiculopathy, lumbar region  -     Ambulatory referral/consult to Social Work; Future        Medication List with Changes/Refills   Current Medications    AMLODIPINE (NORVASC) 10 MG TABLET    Take 1 tablet (10 mg total) by mouth every evening.       Start Date: 5/17/2022 End Date: 10/11/2022    ASCORBIC ACID, VITAMIN C, (VITAMIN C) 500 MG TABLET    Take 500 mg by mouth once daily.       Start Date: --        End Date: --    AUGMENTED BETAMETHASONE (DIPROLENE) 0.05 % LOTION    Apply 0.05 application topically every Mon, Wed, Fri.       Start Date: --        End Date: --    BUSPIRONE (BUSPAR) 10 MG TABLET    TAKE 1 TABLET(10 MG) BY MOUTH EVERY DAY       Start Date: 9/26/2022 End Date: --    CARVEDILOL (COREG) 12.5 MG TABLET    TAKE 1 TABLET(12.5 MG) BY MOUTH TWICE DAILY       Start Date: 8/15/2022 End Date: --    DOXAZOSIN (CARDURA) 8 MG TAB    Take 8 mg by mouth every evening.       Start Date: --        End Date: --    FERROUS GLUCONATE (FERGON) 324 MG TABLET    Take 1 tablet (324 mg total) by mouth once daily.       Start Date: 6/7/2022  End Date: --    FLAVOXATE (URISPAS) 100 MG TAB    Take 200 mg by mouth 4 (four) times daily.       Start Date: 11/22/2021End Date: --    FLUTICASONE (FLONASE) 50 MCG/ACTUATION NASAL SPRAY    1 spray by Each Nare route daily as needed for Rhinitis.       Start Date: --        End Date: --    FOLIC ACID-VIT B6-VIT B12 2.5-25-2 MG  (FOLBIC) 2.5-25-2 MG TAB    Take 2.5 tablets by mouth Daily.       Start Date: 2/10/2022 End Date: --    GABAPENTIN (NEURONTIN) 300 MG CAPSULE    Take 300 mg by mouth 3 (three) times daily as needed.       Start Date: --        End Date: --    HYDRALAZINE (APRESOLINE) 50 MG TABLET    TAKE 1 TABLET(50 MG) BY MOUTH THREE TIMES DAILY       Start Date: 5/17/2022 End Date: 10/11/2022    IVERMECTIN (STROMECTOL) 3 MG TAB    Take 5 tablets (15,000 mcg total) by mouth Daily. Take 5 tablets on day one. Take 5 tablets on day two.       Start Date: 10/19/2018End Date: --    LOPERAMIDE (IMODIUM) 2 MG CAPSULE    Take 2 mg by mouth daily as needed.       Start Date: 1/13/2022 End Date: --    LORATADINE (CLARITIN) 10 MG TABLET    Take 1 tablet (10 mg total) by mouth once daily.       Start Date: 6/7/2022  End Date: --    MEGESTROL (MEGACE) 400 MG/10 ML (40 MG/ML) SUSP    Take 40 mg by mouth Daily.       Start Date: --        End Date: --    PANTOPRAZOLE (PROTONIX) 40 MG TABLET    Take 40 mg by mouth once daily.       Start Date: 3/4/2022  End Date: --    SEVELAMER CARBONATE (RENVELA) 800 MG TAB    Take 800 mg by mouth 3 (three) times daily.       Start Date: --        End Date: --    SODIUM BICARBONATE 650 MG TABLET    Take 2 tablets (1,300 mg total) by mouth 3 (three) times daily.       Start Date: 6/7/2022  End Date: --    SODIUM ZIRCONIUM CYCLOSILICATE (LOKELMA) 5 GRAM PACKET    Take 5 g by mouth 2 (two) times a day.       Start Date: 11/22/2021End Date: --    TAMSULOSIN (FLOMAX) 0.4 MG CAP    Take 1 capsule by mouth 2 (two) times daily.       Start Date: 5/9/2022  End Date: --    TRAZODONE (DESYREL) 50 MG TABLET    Take 50 mg by mouth nightly.       Start Date: 1/5/2022  End Date: --    WALKER MISC    1 each by Misc.(Non-Drug; Combo Route) route once daily. Upright Rollator       Start Date: 5/24/2022 End Date: --   Changed and/or Refilled Medications    Modified Medication Previous Medication    HYDROCODONE-ACETAMINOPHEN  (NORCO)  MG PER TABLET HYDROcodone-acetaminophen (NORCO)  mg per tablet       Take 1 tablet by mouth every 8 (eight) hours as needed for Pain.    Take 1 tablet by mouth every 8 (eight) hours as needed for Pain.       Start Date: 11/4/2022 End Date: --    Start Date: 10/5/2022 End Date: 10/11/2022          Follow up in about 3 months (around 1/11/2023) for Chronic LBP- 30 minute appointment.

## 2022-10-13 ENCOUNTER — TELEPHONE (OUTPATIENT)
Dept: FAMILY MEDICINE | Facility: CLINIC | Age: 67
End: 2022-10-13
Payer: MEDICARE

## 2022-10-13 DIAGNOSIS — M54.16 BILATERAL LUMBAR RADICULOPATHY: Primary | ICD-10-CM

## 2022-10-13 PROCEDURE — G0179 PR HOME HEALTH MD RECERTIFICATION: ICD-10-PCS | Mod: ,,, | Performed by: FAMILY MEDICINE

## 2022-10-13 PROCEDURE — G0179 MD RECERTIFICATION HHA PT: HCPCS | Mod: ,,, | Performed by: FAMILY MEDICINE

## 2022-10-13 NOTE — TELEPHONE ENCOUNTER
Dr Addison Randall's office reached out, they do not accept patient's insurance, can new referral be placed

## 2022-10-18 ENCOUNTER — TELEPHONE (OUTPATIENT)
Dept: FAMILY MEDICINE | Facility: CLINIC | Age: 67
End: 2022-10-18
Payer: MEDICARE

## 2022-10-18 NOTE — TELEPHONE ENCOUNTER
----- Message from Ann Melgar sent at 10/18/2022  2:53 PM CDT -----  .Type:  Needs Medical Advice    Who Called: Nae moss/ Dr Dennis  Symptoms (please be specific):    How long has patient had these symptoms:    Pharmacy name and phone #:    Would the patient rather a call back or a response via [facility]? Call back  Best Call Back Number: [phone]  Additional Information: Dr Dennis denied treatment she doesn't do medication management she does injection and therapy only.

## 2022-10-19 ENCOUNTER — EXTERNAL HOME HEALTH (OUTPATIENT)
Dept: HOME HEALTH SERVICES | Facility: HOSPITAL | Age: 67
End: 2022-10-19
Payer: MEDICARE

## 2022-10-31 ENCOUNTER — DOCUMENT SCAN (OUTPATIENT)
Dept: HOME HEALTH SERVICES | Facility: HOSPITAL | Age: 67
End: 2022-10-31
Payer: MEDICARE

## 2022-11-01 ENCOUNTER — TELEPHONE (OUTPATIENT)
Dept: FAMILY MEDICINE | Facility: CLINIC | Age: 67
End: 2022-11-01

## 2022-11-01 NOTE — TELEPHONE ENCOUNTER
----- Message from Derrick Rankin sent at 11/1/2022  8:35 AM CDT -----  .Type:  Needs Medical Advice    Who Called: Mirza  Symptoms (please be specific):    How long has patient had these symptoms:    Pharmacy name and phone #:    Would the patient rather a call back or a response via [facility]?   Best Call Back Number: [phone]  Additional Information: He would like a call back to get the name of the referring provider for pain management.

## 2022-11-29 ENCOUNTER — OFFICE VISIT (OUTPATIENT)
Dept: FAMILY MEDICINE | Facility: CLINIC | Age: 67
End: 2022-11-29
Payer: MEDICARE

## 2022-11-29 VITALS
SYSTOLIC BLOOD PRESSURE: 167 MMHG | BODY MASS INDEX: 25.33 KG/M2 | HEIGHT: 65 IN | OXYGEN SATURATION: 99 % | WEIGHT: 152 LBS | TEMPERATURE: 98 F | RESPIRATION RATE: 16 BRPM | HEART RATE: 73 BPM | DIASTOLIC BLOOD PRESSURE: 73 MMHG

## 2022-11-29 DIAGNOSIS — N18.6 END-STAGE RENAL DISEASE ON HEMODIALYSIS: ICD-10-CM

## 2022-11-29 DIAGNOSIS — L89.93 PRESSURE INJURY, STAGE 3, UNSPECIFIED LOCATION: ICD-10-CM

## 2022-11-29 DIAGNOSIS — Z12.5 SCREENING PSA (PROSTATE SPECIFIC ANTIGEN): ICD-10-CM

## 2022-11-29 DIAGNOSIS — E55.9 VITAMIN D DEFICIENCY: ICD-10-CM

## 2022-11-29 DIAGNOSIS — Z99.2 END-STAGE RENAL DISEASE ON HEMODIALYSIS: ICD-10-CM

## 2022-11-29 DIAGNOSIS — Z00.00 MEDICARE ANNUAL WELLNESS VISIT, SUBSEQUENT: Primary | ICD-10-CM

## 2022-11-29 DIAGNOSIS — R79.9 ABNORMAL FINDING OF BLOOD CHEMISTRY, UNSPECIFIED: ICD-10-CM

## 2022-11-29 DIAGNOSIS — I10 PRIMARY HYPERTENSION: ICD-10-CM

## 2022-11-29 PROCEDURE — 1159F PR MEDICATION LIST DOCUMENTED IN MEDICAL RECORD: ICD-10-PCS | Mod: CPTII,,, | Performed by: FAMILY MEDICINE

## 2022-11-29 PROCEDURE — 3288F PR FALLS RISK ASSESSMENT DOCUMENTED: ICD-10-PCS | Mod: CPTII,,, | Performed by: FAMILY MEDICINE

## 2022-11-29 PROCEDURE — 3008F BODY MASS INDEX DOCD: CPT | Mod: CPTII,,, | Performed by: FAMILY MEDICINE

## 2022-11-29 PROCEDURE — 3078F DIAST BP <80 MM HG: CPT | Mod: CPTII,,, | Performed by: FAMILY MEDICINE

## 2022-11-29 PROCEDURE — 1160F RVW MEDS BY RX/DR IN RCRD: CPT | Mod: CPTII,,, | Performed by: FAMILY MEDICINE

## 2022-11-29 PROCEDURE — 1101F PT FALLS ASSESS-DOCD LE1/YR: CPT | Mod: CPTII,,, | Performed by: FAMILY MEDICINE

## 2022-11-29 PROCEDURE — 99213 OFFICE O/P EST LOW 20 MIN: CPT | Mod: 25,,, | Performed by: FAMILY MEDICINE

## 2022-11-29 PROCEDURE — 1101F PR PT FALLS ASSESS DOC 0-1 FALLS W/OUT INJ PAST YR: ICD-10-PCS | Mod: CPTII,,, | Performed by: FAMILY MEDICINE

## 2022-11-29 PROCEDURE — 3077F SYST BP >= 140 MM HG: CPT | Mod: CPTII,,, | Performed by: FAMILY MEDICINE

## 2022-11-29 PROCEDURE — G0439 PPPS, SUBSEQ VISIT: HCPCS | Mod: ,,, | Performed by: FAMILY MEDICINE

## 2022-11-29 PROCEDURE — 3288F FALL RISK ASSESSMENT DOCD: CPT | Mod: CPTII,,, | Performed by: FAMILY MEDICINE

## 2022-11-29 PROCEDURE — 3008F PR BODY MASS INDEX (BMI) DOCUMENTED: ICD-10-PCS | Mod: CPTII,,, | Performed by: FAMILY MEDICINE

## 2022-11-29 PROCEDURE — 3078F PR MOST RECENT DIASTOLIC BLOOD PRESSURE < 80 MM HG: ICD-10-PCS | Mod: CPTII,,, | Performed by: FAMILY MEDICINE

## 2022-11-29 PROCEDURE — 99213 PR OFFICE/OUTPT VISIT, EST, LEVL III, 20-29 MIN: ICD-10-PCS | Mod: 25,,, | Performed by: FAMILY MEDICINE

## 2022-11-29 PROCEDURE — 1125F PR PAIN SEVERITY QUANTIFIED, PAIN PRESENT: ICD-10-PCS | Mod: CPTII,,, | Performed by: FAMILY MEDICINE

## 2022-11-29 PROCEDURE — 1160F PR REVIEW ALL MEDS BY PRESCRIBER/CLIN PHARMACIST DOCUMENTED: ICD-10-PCS | Mod: CPTII,,, | Performed by: FAMILY MEDICINE

## 2022-11-29 PROCEDURE — G0439 PR MEDICARE ANNUAL WELLNESS SUBSEQUENT VISIT: ICD-10-PCS | Mod: ,,, | Performed by: FAMILY MEDICINE

## 2022-11-29 PROCEDURE — 3077F PR MOST RECENT SYSTOLIC BLOOD PRESSURE >= 140 MM HG: ICD-10-PCS | Mod: CPTII,,, | Performed by: FAMILY MEDICINE

## 2022-11-29 PROCEDURE — 1125F AMNT PAIN NOTED PAIN PRSNT: CPT | Mod: CPTII,,, | Performed by: FAMILY MEDICINE

## 2022-11-29 PROCEDURE — 1159F MED LIST DOCD IN RCRD: CPT | Mod: CPTII,,, | Performed by: FAMILY MEDICINE

## 2022-11-29 RX ORDER — AMLODIPINE BESYLATE 5 MG/1
5 TABLET ORAL DAILY
Qty: 30 TABLET | Refills: 11 | Status: SHIPPED | OUTPATIENT
Start: 2022-11-29 | End: 2023-01-01

## 2022-11-29 NOTE — PROGRESS NOTES
Patient ID: [id number]     Chief Complaint: Medicare AWV (Medicare wellness)        HPI:     Mirza Nelson Jr. is a 67 y.o. male here today for a Medicare Wellness.   The patient presents for well adult exam, The patient's general health status is described as good. The patient's diet is described as balanced. Exercise: occasional. Associated symptoms consist of denies weight loss, denies weight gain, denies fatigue, denies headache, denies hearing loss and denies vision changes. Additional pertinent history: last eye exam: 2022 with [facility], last colonoscopy: 02/19/2019 (with GI-Dr. Carrillo), seat belt use, occasional caffeine use, tobacco use (he quit smoking in 2016 -2 cigarettes a day for 26 years ), no alcohol use and He is due for lab work, he will bring orders with him to dialysis. He is UTD on all vaccines, including Tdap, Zostavax, Flu vaccine, he never chickenpox in the past, refuses Shingrix. He does followup with Urology (Dr. Navarrete) for prostate health, due for labs. He reports that his BP is well controlled with medications, no adverse Rx side effects, he had [facility] workup with Dr. Chris Centeno, goes every year in September. Chronic LBP is stable with chronic opioid medications and he was following up with pain management (Dr. Mary). He does followup with Hematology (at [facility]) as scheduled for anemia treatment. He does followup with Nephrology (Dr. Angela) for treatment of ESRD, on HD three times weekly (Monday, Wed. and Friday), he is awaiting Renal Transplant with [facility]. Patient had a pressure ulcer on the left buttocks, it has completely healed with wound care. Patient is without any other complaints today.     Patient Care Team:  Elo Flores MD as PCP - General (Family Medicine)     Opioid Screening: Patient medication list reviewed, patient is taking prescription opioids. Patient is not using additional opioids than prescribed. Patient is at low risk  "of substance abuse based on this opioid use history.      Advance Care Planning      Date: 10/11/2022     Power of   I initiated the process of advance care planning today and explained the importance of this process to the patient.  I introduced the concept of advance directives to the patient, as well. Then the patient received detailed information about the importance of designating a Health Care Power of  (HCPOA). He was also instructed to communicate with this person about their wishes for future healthcare, should he become sick and lose decision-making capacity. The patient has previously appointed a HCPOA. After our discussion, the patient has decided to complete a HCPOA and has appointed his daughter, health care agent:  Heather Smith" Ab  & health care agent number:  [phone]  I spent a total time of 5 minutes discussing this issue with the patient.           ----------------------------  Anemia  Cervical radiculopathy  Disorder of kidney and ureter  ESRD on hemodialysis  Hypertension  Neck injury  Other chronic pain  Personal history of colonic polyps  Renovascular hypertension  Vitamin D deficiency     Past Surgical History:   Procedure Laterality Date    AV FISTULA PLACEMENT      COLONOSCOPY W/ POLYPECTOMY  02/19/2019    MASS EXCISION  2005    near the kidney     NECK SURGERY         Review of patient's allergies indicates:   Allergen Reactions    Iodine      Other reaction(s): difficulty breathing, Facial Swelling    Iodine and iodide containing products Swelling    Shellfish containing products      Other reaction(s): Swelling       Outpatient Medications Marked as Taking for the 11/29/22 encounter (Office Visit) with Elo Flores MD   Medication Sig Dispense Refill    ascorbic acid, vitamin C, (VITAMIN C) 500 MG tablet Take 500 mg by mouth once daily.      augmented betamethasone (DIPROLENE) 0.05 % lotion Apply 0.05 application topically every Mon, Wed, Fri.      " busPIRone (BUSPAR) 10 MG tablet TAKE 1 TABLET(10 MG) BY MOUTH EVERY DAY 30 tablet 3    carvediloL (COREG) 12.5 MG tablet TAKE 1 TABLET(12.5 MG) BY MOUTH TWICE DAILY 180 tablet 3    doxazosin (CARDURA) 8 MG Tab Take 8 mg by mouth every evening.      ferrous gluconate (FERGON) 324 MG tablet Take 1 tablet (324 mg total) by mouth once daily. 30 tablet 3    flavoxATE (URISPAS) 100 mg Tab Take 200 mg by mouth 4 (four) times daily.      fluticasone (FLONASE) 50 mcg/actuation nasal spray 1 spray by Each Nare route daily as needed for Rhinitis.      folic acid-vit B6-vit B12 2.5-25-2 mg (FOLBIC) 2.5-25-2 mg Tab Take 2.5 tablets by mouth Daily.      gabapentin (NEURONTIN) 300 MG capsule Take 300 mg by mouth 3 (three) times daily as needed.      HYDROcodone-acetaminophen (NORCO)  mg per tablet Take 1 tablet by mouth every 8 (eight) hours as needed for Pain. 90 tablet 0    ivermectin (STROMECTOL) 3 mg Tab Take 5 tablets (15,000 mcg total) by mouth Daily. Take 5 tablets on day one. Take 5 tablets on day two. 10 tablet 0    loperamide (IMODIUM) 2 mg capsule Take 2 mg by mouth daily as needed.      loratadine (CLARITIN) 10 mg tablet Take 1 tablet (10 mg total) by mouth once daily. 30 tablet 3    megestroL (MEGACE) 400 mg/10 mL (40 mg/mL) Susp Take 40 mg by mouth Daily.      pantoprazole (PROTONIX) 40 MG tablet Take 40 mg by mouth once daily.      sevelamer carbonate (RENVELA) 800 mg Tab Take 800 mg by mouth 3 (three) times daily.      sodium bicarbonate 650 MG tablet Take 2 tablets (1,300 mg total) by mouth 3 (three) times daily. 90 tablet 3    sodium zirconium cyclosilicate (LOKELMA) 5 gram packet Take 5 g by mouth 2 (two) times a day.      tamsulosin (FLOMAX) 0.4 mg Cap Take 1 capsule by mouth 2 (two) times daily.      traZODone (DESYREL) 50 MG tablet Take 50 mg by mouth nightly.      walker Misc 1 each by Misc.(Non-Drug; Combo Route) route once daily. Upright Rollator 1 each 0       Social History     Socioeconomic  "History    Marital status:     Years of education: 12   Tobacco Use    Smoking status: Former     Packs/day: 0.25     Years: 20.00     Pack years: 5.00     Types: Cigarettes     Quit date: 2016     Years since quittin.1    Smokeless tobacco: Never   Substance and Sexual Activity    Alcohol use: No    Drug use: Yes     Types: Marijuana    Sexual activity: Not Currently        Family History   Problem Relation Age of Onset    Heart disease Mother     Diabetes Mother     Hypertension Mother     Cancer Father     Prostate cancer Father     Hypertension Father     Cancer Sister     Breast cancer Sister     No Known Problems Sister         Subjective:     ROS      See HPI for details    Constitutional: No fever, No chills, No fatigue.   Eye: No blurring, No visual disturbances.   Ear/Nose/Mouth/Throat: No decreased hearing, No ear pain, No nasal congestion, No sore throat.   Respiratory: No shortness of breath, No cough, No wheezing.   Cardiovascular: No chest pain, No palpitations, No peripheral edema.   Gastrointestinal: No nausea, No vomiting, No diarrhea, No constipation, No abdominal pain.   Genitourinary: No dysuria, No hematuria.   Hematology/Lymphatics: No bruising tendency, No bleeding tendency, No swollen lymph glands.   Endocrine: No excessive thirst, No polyuria, No excessive hunger.   Musculoskeletal: Joint pain- followed by pain management, No muscle pain, No decreased range of motion.   Integumentary: No rash, No pruritus.   Neurologic: No abnormal balance, No confusion, No headache.   Psychiatric: No anxiety, No depression, Not suicidal, No hallucinations.     All Other ROS: Negative except as stated in HPI.       Objective:     BP (!) 167/73 (BP Location: Left arm)   Pulse 73   Temp 98.1 °F (36.7 °C) (Oral)   Resp 16   Ht 5' 5" (1.651 m)   Wt 68.9 kg (152 lb)   SpO2 99%   BMI 25.29 kg/m²     Physical Exam    General: Alert and oriented, No acute distress.   Eye: Pupils are equal, " round and reactive to light, Normal conjunctiva.   HENT: Normocephalic, Tympanic membranes are clear, Normal hearing, Oral mucosa is moist, No pharyngeal erythema.   Throat: Pharynx ( Not edematous, No exudate ).   Neck: Supple, Non-tender, No carotid bruit, No lymphadenopathy, No thyromegaly.   Respiratory: Lungs are clear to auscultation, Respirations are non-labored, Breath sounds are equal, Symmetrical chest wall expansion, No chest wall tenderness.   Cardiovascular: Normal rate, Regular rhythm, No murmur, Good pulses equal in all extremities, Bilateral LE with 1+ pitting edema.   Gastrointestinal: Soft, Non-tender, Non-distended, Normal bowel sounds, No organomegaly.   Genitourinary: No costovertebral angle tenderness.   Musculoskeletal: Normal range of motion, Mobility/ gait: Able to walk with moderate assistance, Able to walk with a walker.   Integumentary: No rashes, no lesions.   Neurologic: No focal deficits, Cranial Nerves II-XII are grossly intact.   Psychiatric: Cooperative, Appropriate mood & affect, Normal judgment, Non-suicidal.   Mood and affect: Calm.   Behavior: Relaxed.       Assessment:       ICD-10-CM ICD-9-CM   1. Medicare annual wellness visit, subsequent  Z00.00 V70.0   2. Pressure injury, stage 3, unspecified location  L89.93 707.00     707.23   3. Primary hypertension  I10 401.9   4. End-stage renal disease on hemodialysis  N18.6 585.6    Z99.2 V45.11   5. Screening PSA (prostate specific antigen)  Z12.5 V76.44   6. Vitamin D deficiency  E55.9 268.9   7. Abnormal finding of blood chemistry, unspecified  R79.9 790.6        Plan:       Medicare Annual Wellness and Personalized Prevention Plan:     Fall Risk + Home Safety + Hearing Impairment + Depression Screen + Cognitive Impairment Screen + Health Risk Assessment all reviewed.     No flowsheet data found.  Depression Screeening PHQ2 10/11/2022 8/25/2022 6/28/2022   Over the last two weeks how often have you been bothered by little interest  or pleasure in doing things 0 0 0   Over the last two weeks how often have you been bothered by feeling down, depressed or hopeless 0 0 0   PHQ-2 Total Score 0 0 0     Fall Risk Assessment - Outpatient 11/29/2022 10/11/2022 8/25/2022 6/28/2022 5/17/2022 9/27/2018   Mobility Status Ambulatory Ambulatory Ambulatory Ambulatory Ambulatory Ambulatory w/ assistance   Number of falls 0 0 0 0 0 0   Identified as fall risk 0 0 0 0 0 0             What [age] 65-69  Are you male or female?: Male  During the past four weeks, how much have you been bothered by emotional problems such as feeling anxious, depressed, irritable, sad, or downhearted and blue?: Not at all  During the past five weeks, has your physical and/or emotional health limited your social activities with family, friends, neighbors, or groups?: Not at all  During the past four weeks, how much bodily pain have you generally had?: Severe pain  During the past four weeks, was someone available to help if you needed and wanted help?: Yes, as much as I wanted  During the past four weeks, what was the hardest physical activity you could do for at least two minutes?: Light  Can you get to places out of walking distance without help?  (For example, can you travel alone on buses or taxis, or drive your own car?): No  Can you go shopping for groceries or clothes without someone's help?: No  Can you prepare your own meals?: No  Can you do your own housework without help?: No  Because of any health problems, do you need the help of another person with your personal care needs such as eating, bathing, dressing, or getting around the house?: Yes  Can you handle your own money without help?: Yes  During the past four weeks, how would you rate your health in general?: Fair  How have things been going for you during the past four weeks?: Good and bad parts about equal  Are you having difficulties driving your car?: Not applicable, I do not use a car  Do you always fasten  your seat belt when you are in a car?: Yes, usually  How often in the past four weeks have you been bothered by falling or dizzy when standing up?: Never  How often in the past four weeks have you been bothered by sexual problems?: Never  How often in the past four weeks have you been bothered by trouble eating well?: Never  How often in the past four weeks have you been bothered by teeth or denture problems?: Never  How often in the past four weeks have you been bothered with problems using the telephone?: Never  How often in the past four weeks have you been bothered by tiredness or fatigue?: Never  Have you fallen two or more times in the past year?: No  Are you afraid of falling?: No  Are you a smoker?: No  During the past four weeks, how many drinks of wine, beer, or other alcoholic beverages did you have?: No alcohol at all  Do you exercise for about 20 minutes three or more days a week?: No, I usually do not exercise this much  Have you been given any information to help you with hazards in your house that might hurt you?: No  Have you been given any information to help you with keeping track of your medications?: No  How often do you have trouble taking medicines the way you've been told to take them?: I always take them as prescribed  How confident are you that you can control and manage most of your health problems?: Very confident  What is your race? (Check all that apply.):                  Alcohol/Tobacco Use - Stressed importance of smoking cessation and limiting alcohol intake.  CVD Risk Factors - Reviewed  Obesity/Physical Activity - Normal BMI. Encouraged daily 30 minute physical activity x 5 days per week.      Health Maintenance Topics with due status: Not Due       Topic Last Completion Date    TETANUS VACCINE 12/13/2016    Colorectal Cancer Screening 12/29/2020    Lipid Panel 02/02/2021        Vaccinations -   Immunization History   Administered Date(s) Administered    COVID-19  Vaccine 02/19/2021, 03/19/2021, 11/08/2021    COVID-19, MRNA, LN-S, PF (MODERNA FULL 0.5 ML DOSE) 02/19/2021, 03/19/2021, 11/08/2021    Hepatitis A, Adult 09/27/2018, 04/25/2019    Influenza - Quadrivalent - High Dose - PF (65 years and older) 09/27/2021, 10/17/2022    Influenza - Quadrivalent - PF *Preferred* (6 months and older) 01/08/2018    Influenza - Trivalent - PF (ADULT) 01/08/2018, 10/10/2018, 10/03/2019, 10/13/2021    Tdap 12/13/2016          1. Medicare annual wellness visit, subsequent  - CBC Auto Differential; Future  - Comprehensive Metabolic Panel; Future  - Hemoglobin A1C; Future  - Lipid Panel; Future  - TSH; Future  - Urinalysis, Reflex to Urine Culture Urine, Clean Catch; Future  - Iron and TIBC; Future  - Will treat pending lab results.  Diet, exercise, and 10% weight loss encouraged. Keep appointment for annual eye exam as scheduled. Keep appointment with specialists as scheduled. Notify M.D. or ER if temp greater than 100.4, or any acute illness.      2. Pressure injury, stage 3, unspecified location  - Resolved, continue precautions for prevention.     3. Primary hypertension  - CBC Auto Differential; Future  - Comprehensive Metabolic Panel; Future  - Lipid Panel; Future  - BP is not controlled. Rx Amlodipine 5mg po x qday sent to add to regimen. Continue remaining BP Rx as prescribed. Continue followup with Cardiology and Nephrology as scheduled. Keep daily BP log. Will titrate BP Rx until BP is <140/90. Notify M.D. or ER if BP >170/100 or <90/60, chest pain, palpitations, headache, SOB, temp greater than 100.4, or any acute illness.   Continue  Low Sodium Diet (DASH Diet - Less than 2 grams of sodium per day).  Monitor blood pressure daily and log. Report consistent numbers greater than 140/90.  Smoking cessation encouraged to aid in BP reduction.  Maintain healthy weight with goal BMI <30. Exercise 30 minutes per day, 5 days per week.      4. End-stage renal disease on hemodialysis  -  Hemoglobin A1C; Future  - Continue followup with Nephrology as scheduled.     5. Screening PSA (prostate specific antigen)  - PSA, Screening; Future    6. Vitamin D deficiency  - Vitamin D; Future    7. Abnormal finding of blood chemistry, unspecified  - Hemoglobin A1C; Future        Medication List with Changes/Refills   New Medications    AMLODIPINE (NORVASC) 5 MG TABLET    Take 1 tablet (5 mg total) by mouth once daily.       Start Date: 11/29/2022End Date: 11/29/2023   Current Medications    ASCORBIC ACID, VITAMIN C, (VITAMIN C) 500 MG TABLET    Take 500 mg by mouth once daily.       Start Date: --        End Date: --    AUGMENTED BETAMETHASONE (DIPROLENE) 0.05 % LOTION    Apply 0.05 application topically every Mon, Wed, Fri.       Start Date: --        End Date: --    BUSPIRONE (BUSPAR) 10 MG TABLET    TAKE 1 TABLET(10 MG) BY MOUTH EVERY DAY       Start Date: 9/26/2022 End Date: --    CARVEDILOL (COREG) 12.5 MG TABLET    TAKE 1 TABLET(12.5 MG) BY MOUTH TWICE DAILY       Start Date: 8/15/2022 End Date: --    DOXAZOSIN (CARDURA) 8 MG TAB    Take 8 mg by mouth every evening.       Start Date: --        End Date: --    FERROUS GLUCONATE (FERGON) 324 MG TABLET    Take 1 tablet (324 mg total) by mouth once daily.       Start Date: 6/7/2022  End Date: --    FLAVOXATE (URISPAS) 100 MG TAB    Take 200 mg by mouth 4 (four) times daily.       Start Date: 11/22/2021End Date: --    FLUTICASONE (FLONASE) 50 MCG/ACTUATION NASAL SPRAY    1 spray by Each Nare route daily as needed for Rhinitis.       Start Date: --        End Date: --    FOLIC ACID-VIT B6-VIT B12 2.5-25-2 MG (FOLBIC) 2.5-25-2 MG TAB    Take 2.5 tablets by mouth Daily.       Start Date: 2/10/2022 End Date: --    GABAPENTIN (NEURONTIN) 300 MG CAPSULE    Take 300 mg by mouth 3 (three) times daily as needed.       Start Date: --        End Date: --    HYDRALAZINE (APRESOLINE) 50 MG TABLET    TAKE 1 TABLET(50 MG) BY MOUTH THREE TIMES DAILY       Start Date:  5/17/2022 End Date: 10/11/2022    HYDROCODONE-ACETAMINOPHEN (NORCO)  MG PER TABLET    Take 1 tablet by mouth every 8 (eight) hours as needed for Pain.       Start Date: 11/4/2022 End Date: --    IVERMECTIN (STROMECTOL) 3 MG TAB    Take 5 tablets (15,000 mcg total) by mouth Daily. Take 5 tablets on day one. Take 5 tablets on day two.       Start Date: 10/19/2018End Date: --    LOPERAMIDE (IMODIUM) 2 MG CAPSULE    Take 2 mg by mouth daily as needed.       Start Date: 1/13/2022 End Date: --    LORATADINE (CLARITIN) 10 MG TABLET    Take 1 tablet (10 mg total) by mouth once daily.       Start Date: 6/7/2022  End Date: --    MEGESTROL (MEGACE) 400 MG/10 ML (40 MG/ML) SUSP    Take 40 mg by mouth Daily.       Start Date: --        End Date: --    PANTOPRAZOLE (PROTONIX) 40 MG TABLET    Take 40 mg by mouth once daily.       Start Date: 3/4/2022  End Date: --    SEVELAMER CARBONATE (RENVELA) 800 MG TAB    Take 800 mg by mouth 3 (three) times daily.       Start Date: --        End Date: --    SODIUM BICARBONATE 650 MG TABLET    Take 2 tablets (1,300 mg total) by mouth 3 (three) times daily.       Start Date: 6/7/2022  End Date: --    SODIUM ZIRCONIUM CYCLOSILICATE (LOKELMA) 5 GRAM PACKET    Take 5 g by mouth 2 (two) times a day.       Start Date: 11/22/2021End Date: --    TAMSULOSIN (FLOMAX) 0.4 MG CAP    Take 1 capsule by mouth 2 (two) times daily.       Start Date: 5/9/2022  End Date: --    TRAZODONE (DESYREL) 50 MG TABLET    Take 50 mg by mouth nightly.       Start Date: 1/5/2022  End Date: --    WALKER MISC    1 each by Misc.(Non-Drug; Combo Route) route once daily. Upright Rollator       Start Date: 5/24/2022 End Date: --   Discontinued Medications    AMLODIPINE (NORVASC) 10 MG TABLET    Take 1 tablet (10 mg total) by mouth every evening.       Start Date: 5/17/2022 End Date: 11/29/2022          The patient's Health Maintenance was reviewed and the following appears to be due at this time:   Health Maintenance Due    Topic Date Due    PROSTATE-SPECIFIC ANTIGEN  02/06/2021         Follow up in about 1 week (around 12/6/2022) for Blood Pressure Check- Nurse; 3 month followup with MD for HTN management..

## 2022-12-07 ENCOUNTER — TELEPHONE (OUTPATIENT)
Dept: ADMINISTRATIVE | Facility: HOSPITAL | Age: 67
End: 2022-12-07
Payer: MEDICARE

## 2022-12-07 NOTE — TELEPHONE ENCOUNTER
----- Message from Sonja Wilkinson MA sent at 12/7/2022 12:49 PM CST -----  Regarding: FW: Patient Call    ----- Message -----  From: Kristina Sanchez  Sent: 12/6/2022   8:36 AM CST  To: Sandra BOWIE Staff  Subject: Patient Call                                     Type:  Patient Call    Who Called: pt  Who Left Message for Patient: nurse  Does the patient know what this is regarding?: yes  Would the patient rather a call back or a response via [facility]?   Best Call Back Number: [phone]  Additional Information: pt canceling appointment - transportation didn't pick him up, patient said he can check his bp at home

## 2022-12-13 ENCOUNTER — LAB VISIT (OUTPATIENT)
Dept: LAB | Facility: HOSPITAL | Age: 67
End: 2022-12-13
Attending: FAMILY MEDICINE
Payer: MEDICARE

## 2022-12-13 ENCOUNTER — CLINICAL SUPPORT (OUTPATIENT)
Dept: FAMILY MEDICINE | Facility: CLINIC | Age: 67
End: 2022-12-13
Payer: MEDICARE

## 2022-12-13 VITALS — DIASTOLIC BLOOD PRESSURE: 82 MMHG | SYSTOLIC BLOOD PRESSURE: 152 MMHG

## 2022-12-13 DIAGNOSIS — N18.6 END-STAGE RENAL DISEASE ON HEMODIALYSIS: ICD-10-CM

## 2022-12-13 DIAGNOSIS — Z00.00 MEDICARE ANNUAL WELLNESS VISIT, SUBSEQUENT: ICD-10-CM

## 2022-12-13 DIAGNOSIS — E55.9 VITAMIN D DEFICIENCY: ICD-10-CM

## 2022-12-13 DIAGNOSIS — R79.9 ABNORMAL FINDING OF BLOOD CHEMISTRY, UNSPECIFIED: ICD-10-CM

## 2022-12-13 DIAGNOSIS — Z99.2 END-STAGE RENAL DISEASE ON HEMODIALYSIS: ICD-10-CM

## 2022-12-13 DIAGNOSIS — Z12.5 SCREENING PSA (PROSTATE SPECIFIC ANTIGEN): ICD-10-CM

## 2022-12-13 DIAGNOSIS — I10 PRIMARY HYPERTENSION: ICD-10-CM

## 2022-12-13 LAB
ALBUMIN SERPL-MCNC: 3.4 GM/DL (ref 3.4–4.8)
ALBUMIN/GLOB SERPL: 0.8 RATIO (ref 1.1–2)
ALP SERPL-CCNC: 78 UNIT/L (ref 40–150)
ALT SERPL-CCNC: 6 UNIT/L (ref 0–55)
AST SERPL-CCNC: 10 UNIT/L (ref 5–34)
BASOPHILS # BLD AUTO: 0.04 X10(3)/MCL (ref 0–0.2)
BASOPHILS NFR BLD AUTO: 0.8 %
BILIRUBIN DIRECT+TOT PNL SERPL-MCNC: 0.8 MG/DL
BUN SERPL-MCNC: 29.2 MG/DL (ref 8.4–25.7)
CALCIUM SERPL-MCNC: 9 MG/DL (ref 8.8–10)
CHLORIDE SERPL-SCNC: 97 MMOL/L (ref 98–107)
CHOLEST SERPL-MCNC: 121 MG/DL
CHOLEST/HDLC SERPL: 3 {RATIO} (ref 0–5)
CO2 SERPL-SCNC: 26 MMOL/L (ref 23–31)
CREAT SERPL-MCNC: 7.32 MG/DL (ref 0.73–1.18)
DEPRECATED CALCIDIOL+CALCIFEROL SERPL-MC: 18 NG/ML (ref 30–80)
EOSINOPHIL # BLD AUTO: 0.23 X10(3)/MCL (ref 0–0.9)
EOSINOPHIL NFR BLD AUTO: 4.5 %
ERYTHROCYTE [DISTWIDTH] IN BLOOD BY AUTOMATED COUNT: 13.8 % (ref 11.5–17)
EST. AVERAGE GLUCOSE BLD GHB EST-MCNC: 76.7 MG/DL
GFR SERPLBLD CREATININE-BSD FMLA CKD-EPI: 8 MLS/MIN/1.73/M2
GLOBULIN SER-MCNC: 4.3 GM/DL (ref 2.4–3.5)
GLUCOSE SERPL-MCNC: 76 MG/DL (ref 82–115)
HBA1C MFR BLD: 4.3 %
HCT VFR BLD AUTO: 32.7 % (ref 42–52)
HDLC SERPL-MCNC: 43 MG/DL (ref 35–60)
HGB BLD-MCNC: 10.3 GM/DL (ref 14–18)
IMM GRANULOCYTES # BLD AUTO: 0.02 X10(3)/MCL (ref 0–0.04)
IMM GRANULOCYTES NFR BLD AUTO: 0.4 %
IRON SATN MFR SERPL: 36 % (ref 20–50)
IRON SERPL-MCNC: 58 UG/DL (ref 65–175)
LDLC SERPL CALC-MCNC: 69 MG/DL (ref 50–140)
LYMPHOCYTES # BLD AUTO: 0.87 X10(3)/MCL (ref 0.6–4.6)
LYMPHOCYTES NFR BLD AUTO: 16.9 %
MCH RBC QN AUTO: 32 PG (ref 27–31)
MCHC RBC AUTO-ENTMCNC: 31.5 MG/DL (ref 33–36)
MCV RBC AUTO: 101.6 FL (ref 80–94)
MONOCYTES # BLD AUTO: 0.45 X10(3)/MCL (ref 0.1–1.3)
MONOCYTES NFR BLD AUTO: 8.7 %
NEUTROPHILS # BLD AUTO: 3.5 X10(3)/MCL (ref 2.1–9.2)
NEUTROPHILS NFR BLD AUTO: 68.7 %
NRBC BLD AUTO-RTO: 0 %
PLATELET # BLD AUTO: 157 X10(3)/MCL (ref 130–400)
PMV BLD AUTO: 10.3 FL (ref 7.4–10.4)
POTASSIUM SERPL-SCNC: 4.5 MMOL/L (ref 3.5–5.1)
PROT SERPL-MCNC: 7.7 GM/DL (ref 5.8–7.6)
PSA SERPL-MCNC: 7.04 NG/ML
RBC # BLD AUTO: 3.22 X10(6)/MCL (ref 4.7–6.1)
SODIUM SERPL-SCNC: 135 MMOL/L (ref 136–145)
TIBC SERPL-MCNC: 102 UG/DL (ref 69–240)
TIBC SERPL-MCNC: 160 UG/DL (ref 250–450)
TRANSFERRIN SERPL-MCNC: 144 MG/DL (ref 163–344)
TRIGL SERPL-MCNC: 47 MG/DL (ref 34–140)
TSH SERPL-ACNC: 0.64 UIU/ML (ref 0.35–4.94)
VLDLC SERPL CALC-MCNC: 9 MG/DL
WBC # SPEC AUTO: 5.2 X10(3)/MCL (ref 4.5–11.5)

## 2022-12-13 PROCEDURE — 80061 LIPID PANEL: CPT

## 2022-12-13 PROCEDURE — 80053 COMPREHEN METABOLIC PANEL: CPT

## 2022-12-13 PROCEDURE — 85025 COMPLETE CBC W/AUTO DIFF WBC: CPT

## 2022-12-13 PROCEDURE — 36415 COLL VENOUS BLD VENIPUNCTURE: CPT

## 2022-12-13 PROCEDURE — 84443 ASSAY THYROID STIM HORMONE: CPT

## 2022-12-13 PROCEDURE — 84153 ASSAY OF PSA TOTAL: CPT

## 2022-12-13 PROCEDURE — 82306 VITAMIN D 25 HYDROXY: CPT

## 2022-12-13 PROCEDURE — 83540 ASSAY OF IRON: CPT

## 2022-12-13 PROCEDURE — 83036 HEMOGLOBIN GLYCOSYLATED A1C: CPT

## 2022-12-13 NOTE — PROGRESS NOTES
Pt came in for a  BP check. First attempt resulted 160/80. Let pt sit for 5 minutes. Second attempt resulted 152/82. Patient reported taking his BP meds at 6:00 AM.

## 2022-12-14 DIAGNOSIS — E55.9 VITAMIN D DEFICIENCY: Primary | ICD-10-CM

## 2022-12-14 RX ORDER — ERGOCALCIFEROL 1.25 MG/1
50000 CAPSULE ORAL
Qty: 4 CAPSULE | Refills: 2 | Status: SHIPPED | OUTPATIENT
Start: 2022-12-14 | End: 2023-03-14

## 2022-12-27 ENCOUNTER — CLINICAL SUPPORT (OUTPATIENT)
Dept: FAMILY MEDICINE | Facility: CLINIC | Age: 67
End: 2022-12-27
Payer: MEDICARE

## 2022-12-27 VITALS — SYSTOLIC BLOOD PRESSURE: 142 MMHG | DIASTOLIC BLOOD PRESSURE: 72 MMHG

## 2022-12-27 NOTE — PROGRESS NOTES
Pt came in for a 2 WK BP check. First attempt resulted 150/70. Let pt sit for 5 minutes. Second attempt resulted 142/72. Patient reported taking BP meds this morning.

## 2023-01-01 ENCOUNTER — OFFICE VISIT (OUTPATIENT)
Dept: FAMILY MEDICINE | Facility: CLINIC | Age: 68
End: 2023-01-01
Payer: MEDICARE

## 2023-01-01 ENCOUNTER — TELEPHONE (OUTPATIENT)
Dept: FAMILY MEDICINE | Facility: CLINIC | Age: 68
End: 2023-01-01
Payer: MEDICARE

## 2023-01-01 ENCOUNTER — HOSPITAL ENCOUNTER (INPATIENT)
Facility: HOSPITAL | Age: 68
LOS: 8 days | Discharge: HOME HEALTH | DRG: 698 | End: 2023-04-27
Attending: STUDENT IN AN ORGANIZED HEALTH CARE EDUCATION/TRAINING PROGRAM | Admitting: INTERNAL MEDICINE
Payer: MEDICARE

## 2023-01-01 ENCOUNTER — CLINICAL SUPPORT (OUTPATIENT)
Dept: FAMILY MEDICINE | Facility: CLINIC | Age: 68
End: 2023-01-01
Payer: MEDICARE

## 2023-01-01 ENCOUNTER — HOSPITAL ENCOUNTER (EMERGENCY)
Facility: HOSPITAL | Age: 68
Discharge: HOME | End: 2023-06-16
Attending: EMERGENCY MEDICINE
Payer: MEDICARE

## 2023-01-01 ENCOUNTER — ANESTHESIA (OUTPATIENT)
Dept: SURGERY | Facility: HOSPITAL | Age: 68
DRG: 689 | End: 2023-01-01
Payer: MEDICARE

## 2023-01-01 ENCOUNTER — ANESTHESIA EVENT (OUTPATIENT)
Dept: SURGERY | Facility: HOSPITAL | Age: 68
DRG: 689 | End: 2023-01-01
Payer: MEDICARE

## 2023-01-01 ENCOUNTER — DOCUMENT SCAN (OUTPATIENT)
Dept: HOME HEALTH SERVICES | Facility: HOSPITAL | Age: 68
End: 2023-01-01
Payer: MEDICARE

## 2023-01-01 ENCOUNTER — DOCUMENTATION ONLY (OUTPATIENT)
Dept: FAMILY MEDICINE | Facility: CLINIC | Age: 68
End: 2023-01-01

## 2023-01-01 ENCOUNTER — TELEPHONE (OUTPATIENT)
Dept: FAMILY MEDICINE | Facility: CLINIC | Age: 68
End: 2023-01-01

## 2023-01-01 ENCOUNTER — HOSPITAL ENCOUNTER (INPATIENT)
Facility: HOSPITAL | Age: 68
LOS: 10 days | Discharge: HOME HEALTH | DRG: 689 | End: 2023-07-24
Attending: STUDENT IN AN ORGANIZED HEALTH CARE EDUCATION/TRAINING PROGRAM | Admitting: INTERNAL MEDICINE
Payer: MEDICARE

## 2023-01-01 ENCOUNTER — PATIENT OUTREACH (OUTPATIENT)
Dept: ADMINISTRATIVE | Facility: CLINIC | Age: 68
End: 2023-01-01
Payer: MEDICARE

## 2023-01-01 VITALS
RESPIRATION RATE: 13 BRPM | WEIGHT: 145 LBS | DIASTOLIC BLOOD PRESSURE: 98 MMHG | TEMPERATURE: 98 F | SYSTOLIC BLOOD PRESSURE: 189 MMHG | HEART RATE: 57 BPM | BODY MASS INDEX: 23.3 KG/M2 | HEIGHT: 66 IN | OXYGEN SATURATION: 100 %

## 2023-01-01 VITALS
SYSTOLIC BLOOD PRESSURE: 138 MMHG | HEART RATE: 70 BPM | HEIGHT: 65 IN | RESPIRATION RATE: 20 BRPM | WEIGHT: 147.81 LBS | TEMPERATURE: 98 F | DIASTOLIC BLOOD PRESSURE: 69 MMHG | OXYGEN SATURATION: 98 % | BODY MASS INDEX: 24.63 KG/M2

## 2023-01-01 VITALS
RESPIRATION RATE: 17 BRPM | WEIGHT: 148.19 LBS | BODY MASS INDEX: 23.92 KG/M2 | DIASTOLIC BLOOD PRESSURE: 70 MMHG | SYSTOLIC BLOOD PRESSURE: 162 MMHG | HEART RATE: 72 BPM | OXYGEN SATURATION: 99 %

## 2023-01-01 VITALS — SYSTOLIC BLOOD PRESSURE: 138 MMHG | DIASTOLIC BLOOD PRESSURE: 72 MMHG

## 2023-01-01 VITALS
HEART RATE: 50 BPM | WEIGHT: 142.44 LBS | HEIGHT: 66 IN | TEMPERATURE: 98 F | OXYGEN SATURATION: 99 % | RESPIRATION RATE: 18 BRPM | BODY MASS INDEX: 22.89 KG/M2 | DIASTOLIC BLOOD PRESSURE: 73 MMHG | SYSTOLIC BLOOD PRESSURE: 143 MMHG

## 2023-01-01 VITALS
DIASTOLIC BLOOD PRESSURE: 72 MMHG | HEART RATE: 56 BPM | TEMPERATURE: 98 F | SYSTOLIC BLOOD PRESSURE: 138 MMHG | BODY MASS INDEX: 25.71 KG/M2 | WEIGHT: 160 LBS | OXYGEN SATURATION: 96 % | HEIGHT: 66 IN | RESPIRATION RATE: 18 BRPM

## 2023-01-01 VITALS
WEIGHT: 149.13 LBS | DIASTOLIC BLOOD PRESSURE: 70 MMHG | HEART RATE: 74 BPM | SYSTOLIC BLOOD PRESSURE: 162 MMHG | BODY MASS INDEX: 24.81 KG/M2 | OXYGEN SATURATION: 98 %

## 2023-01-01 DIAGNOSIS — R31.9 HEMATURIA, UNSPECIFIED TYPE: ICD-10-CM

## 2023-01-01 DIAGNOSIS — N30.91 HEMORRHAGIC CYSTITIS: Primary | ICD-10-CM

## 2023-01-01 DIAGNOSIS — Z09 HOSPITAL DISCHARGE FOLLOW-UP: Primary | ICD-10-CM

## 2023-01-01 DIAGNOSIS — Z99.2 END-STAGE RENAL DISEASE ON HEMODIALYSIS: ICD-10-CM

## 2023-01-01 DIAGNOSIS — D64.9 SYMPTOMATIC ANEMIA: ICD-10-CM

## 2023-01-01 DIAGNOSIS — I10 PRIMARY HYPERTENSION: ICD-10-CM

## 2023-01-01 DIAGNOSIS — R00.0 TACHYCARDIA: ICD-10-CM

## 2023-01-01 DIAGNOSIS — N18.6 ESRD (END STAGE RENAL DISEASE): ICD-10-CM

## 2023-01-01 DIAGNOSIS — Z99.2 ESRD ON HEMODIALYSIS: ICD-10-CM

## 2023-01-01 DIAGNOSIS — N30.90 CYSTITIS: ICD-10-CM

## 2023-01-01 DIAGNOSIS — I10 PRIMARY HYPERTENSION: Primary | ICD-10-CM

## 2023-01-01 DIAGNOSIS — R29.6 FALLS FREQUENTLY: ICD-10-CM

## 2023-01-01 DIAGNOSIS — R26.81 UNSTEADY GAIT: Primary | ICD-10-CM

## 2023-01-01 DIAGNOSIS — N18.6 ESRD ON HEMODIALYSIS: ICD-10-CM

## 2023-01-01 DIAGNOSIS — N18.6 ESRD ON HEMODIALYSIS: Primary | Chronic | ICD-10-CM

## 2023-01-01 DIAGNOSIS — M54.12 CERVICAL RADICULOPATHY: ICD-10-CM

## 2023-01-01 DIAGNOSIS — M54.16 BILATERAL LUMBAR RADICULOPATHY: Primary | ICD-10-CM

## 2023-01-01 DIAGNOSIS — N18.6 END-STAGE RENAL DISEASE ON HEMODIALYSIS: ICD-10-CM

## 2023-01-01 DIAGNOSIS — K92.1 MELENA: ICD-10-CM

## 2023-01-01 DIAGNOSIS — Z99.2 ESRD ON HEMODIALYSIS: Primary | Chronic | ICD-10-CM

## 2023-01-01 DIAGNOSIS — R26.2 DIFFICULTY IN WALKING: ICD-10-CM

## 2023-01-01 DIAGNOSIS — R31.9 HEMATURIA: ICD-10-CM

## 2023-01-01 DIAGNOSIS — Z78.9 PROBLEM WITH VASCULAR ACCESS: Primary | ICD-10-CM

## 2023-01-01 DIAGNOSIS — G89.29 OTHER CHRONIC PAIN: ICD-10-CM

## 2023-01-01 DIAGNOSIS — M54.16 BILATERAL LUMBAR RADICULOPATHY: ICD-10-CM

## 2023-01-01 DIAGNOSIS — R41.82 AMS (ALTERED MENTAL STATUS): ICD-10-CM

## 2023-01-01 DIAGNOSIS — N25.81 SECONDARY HYPERPARATHYROIDISM OF RENAL ORIGIN: ICD-10-CM

## 2023-01-01 LAB
ABO + RH BLD: NORMAL
ABO + RH BLD: NORMAL
ABS NEUT (OLG): 4.28 X10(3)/MCL (ref 2.1–9.2)
ACINETOBACTER CALCOACETICUS-BAUMANNII COMPLEX (OHS): NOT DETECTED
ALBUMIN SERPL-MCNC: 2.3 G/DL (ref 3.4–4.8)
ALBUMIN SERPL-MCNC: 2.7 G/DL (ref 3.4–4.8)
ALBUMIN SERPL-MCNC: 2.8 G/DL (ref 3.4–4.8)
ALBUMIN SERPL-MCNC: 2.8 G/DL (ref 3.4–4.8)
ALBUMIN SERPL-MCNC: 3 G/DL (ref 3.4–4.8)
ALBUMIN SERPL-MCNC: 3 G/DL (ref 3.4–4.8)
ALBUMIN SERPL-MCNC: 3.1 G/DL (ref 3.4–4.8)
ALBUMIN SERPL-MCNC: 3.1 G/DL (ref 3.4–4.8)
ALBUMIN SERPL-MCNC: 3.7 G/DL (ref 3.4–4.8)
ALBUMIN/GLOB SERPL: 0.7 RATIO (ref 1.1–2)
ALBUMIN/GLOB SERPL: 0.7 RATIO (ref 1.1–2)
ALBUMIN/GLOB SERPL: 0.8 RATIO (ref 1.1–2)
ALBUMIN/GLOB SERPL: 0.9 RATIO (ref 1.1–2)
ALBUMIN/GLOB SERPL: 0.9 RATIO (ref 1.1–2)
ALLENS TEST BLOOD GAS (OHS): YES
ALP SERPL-CCNC: 47 UNIT/L (ref 40–150)
ALP SERPL-CCNC: 61 UNIT/L (ref 40–150)
ALP SERPL-CCNC: 61 UNIT/L (ref 40–150)
ALP SERPL-CCNC: 62 UNIT/L (ref 40–150)
ALP SERPL-CCNC: 62 UNIT/L (ref 40–150)
ALP SERPL-CCNC: 64 UNIT/L (ref 40–150)
ALP SERPL-CCNC: 64 UNIT/L (ref 40–150)
ALP SERPL-CCNC: 67 UNIT/L (ref 40–150)
ALP SERPL-CCNC: 71 UNIT/L (ref 40–150)
ALT SERPL-CCNC: 11 UNIT/L (ref 0–55)
ALT SERPL-CCNC: 11 UNIT/L (ref 0–55)
ALT SERPL-CCNC: 13 UNIT/L (ref 0–55)
ALT SERPL-CCNC: 16 UNIT/L (ref 0–55)
ALT SERPL-CCNC: 16 UNIT/L (ref 0–55)
ALT SERPL-CCNC: 18 UNIT/L (ref 0–55)
ALT SERPL-CCNC: 18 UNIT/L (ref 0–55)
ALT SERPL-CCNC: 8 UNIT/L (ref 0–55)
ALT SERPL-CCNC: 9 UNIT/L (ref 0–55)
AMMONIA PLAS-MSCNC: 14 UMOL/L (ref 18–72)
ANION GAP SERPL CALC-SCNC: 10 MEQ/L
ANION GAP SERPL CALC-SCNC: 11 MEQ/L
ANION GAP SERPL CALC-SCNC: 11 MEQ/L
ANION GAP SERPL CALC-SCNC: 12 MEQ/L
ANION GAP SERPL CALC-SCNC: 16 MEQ/L
ANION GAP SERPL CALC-SCNC: 8 MEQ/L
ANISOCYTOSIS BLD QL SMEAR: ABNORMAL
APPEARANCE UR: ABNORMAL
APPEARANCE UR: ABNORMAL
AST SERPL-CCNC: 12 UNIT/L (ref 5–34)
AST SERPL-CCNC: 13 UNIT/L (ref 5–34)
AST SERPL-CCNC: 13 UNIT/L (ref 5–34)
AST SERPL-CCNC: 14 UNIT/L (ref 5–34)
AST SERPL-CCNC: 15 UNIT/L (ref 5–34)
AST SERPL-CCNC: 17 UNIT/L (ref 5–34)
AST SERPL-CCNC: 19 UNIT/L (ref 5–34)
BACTERIA #/AREA URNS AUTO: ABNORMAL /HPF
BACTERIA #/AREA URNS AUTO: ABNORMAL /HPF
BACTERIA BLD CULT: ABNORMAL
BACTERIA BLD CULT: ABNORMAL
BACTERIA BLD CULT: NORMAL
BACTERIA UR CULT: ABNORMAL
BACTEROIDES FRAGILIS (OHS): NOT DETECTED
BASE EXCESS BLD CALC-SCNC: 2 MMOL/L (ref -2–2)
BASOPHILS # BLD AUTO: 0.01 X10(3)/MCL
BASOPHILS # BLD AUTO: 0.02 X10(3)/MCL
BASOPHILS # BLD AUTO: 0.02 X10(3)/MCL (ref 0–0.2)
BASOPHILS # BLD AUTO: 0.02 X10(3)/MCL (ref 0–0.2)
BASOPHILS # BLD AUTO: 0.03 X10(3)/MCL
BASOPHILS # BLD AUTO: 0.03 X10(3)/MCL (ref 0–0.2)
BASOPHILS # BLD AUTO: 0.04 X10(3)/MCL (ref 0–0.2)
BASOPHILS NFR BLD AUTO: 0.2 %
BASOPHILS NFR BLD AUTO: 0.3 %
BASOPHILS NFR BLD AUTO: 0.4 %
BASOPHILS NFR BLD AUTO: 0.4 %
BASOPHILS NFR BLD AUTO: 0.5 %
BASOPHILS NFR BLD AUTO: 0.6 %
BASOPHILS NFR BLD AUTO: 0.7 %
BASOPHILS NFR BLD AUTO: 0.8 %
BASOPHILS NFR BLD AUTO: 0.9 %
BASOPHILS NFR BLD AUTO: 1 %
BASOPHILS NFR BLD AUTO: 1.1 %
BILIRUB UR QL STRIP.AUTO: ABNORMAL
BILIRUB UR QL STRIP.AUTO: ABNORMAL MG/DL
BILIRUBIN DIRECT+TOT PNL SERPL-MCNC: 0.3 MG/DL
BILIRUBIN DIRECT+TOT PNL SERPL-MCNC: 0.4 MG/DL
BILIRUBIN DIRECT+TOT PNL SERPL-MCNC: 0.7 MG/DL
BILIRUBIN DIRECT+TOT PNL SERPL-MCNC: 0.7 MG/DL
BILIRUBIN DIRECT+TOT PNL SERPL-MCNC: 0.8 MG/DL
BILIRUBIN DIRECT+TOT PNL SERPL-MCNC: 0.8 MG/DL
BILIRUBIN DIRECT+TOT PNL SERPL-MCNC: 1.2 MG/DL
BLD PROD TYP BPU: NORMAL
BLD PROD TYP BPU: NORMAL
BLOOD GAS SAMPLE TYPE (OHS): ABNORMAL
BLOOD UNIT EXPIRATION DATE: NORMAL
BLOOD UNIT EXPIRATION DATE: NORMAL
BLOOD UNIT TYPE CODE: 5100
BLOOD UNIT TYPE CODE: 5100
BUN SERPL-MCNC: 13.3 MG/DL (ref 8.4–25.7)
BUN SERPL-MCNC: 13.6 MG/DL (ref 8.4–25.7)
BUN SERPL-MCNC: 15.3 MG/DL (ref 8.4–25.7)
BUN SERPL-MCNC: 22 MG/DL (ref 8.4–25.7)
BUN SERPL-MCNC: 23.2 MG/DL (ref 8.4–25.7)
BUN SERPL-MCNC: 25.9 MG/DL (ref 8.4–25.7)
BUN SERPL-MCNC: 27 MG/DL (ref 8.4–25.7)
BUN SERPL-MCNC: 28.8 MG/DL (ref 8.4–25.7)
BUN SERPL-MCNC: 29.1 MG/DL (ref 8.4–25.7)
BUN SERPL-MCNC: 29.7 MG/DL (ref 8.4–25.7)
BUN SERPL-MCNC: 31.3 MG/DL (ref 8.4–25.7)
BUN SERPL-MCNC: 32.9 MG/DL (ref 8.4–25.7)
BUN SERPL-MCNC: 40.1 MG/DL (ref 8.4–25.7)
BUN SERPL-MCNC: 41.1 MG/DL (ref 8.4–25.7)
BUN SERPL-MCNC: 52.5 MG/DL (ref 8.4–25.7)
BUN SERPL-MCNC: 57.9 MG/DL (ref 8.4–25.7)
BUN SERPL-MCNC: 60.5 MG/DL (ref 8.4–25.7)
C AURIS DNA BLD POS QL NAA+NON-PROBE: NOT DETECTED
C GATTII+NEOFOR DNA CSF QL NAA+NON-PROBE: NOT DETECTED
CA-I BLD-SCNC: 1.05 MMOL/L (ref 1.12–1.23)
CALCIUM SERPL-MCNC: 7.3 MG/DL (ref 8.8–10)
CALCIUM SERPL-MCNC: 7.7 MG/DL (ref 8.8–10)
CALCIUM SERPL-MCNC: 7.7 MG/DL (ref 8.8–10)
CALCIUM SERPL-MCNC: 7.8 MG/DL (ref 8.8–10)
CALCIUM SERPL-MCNC: 7.9 MG/DL (ref 8.8–10)
CALCIUM SERPL-MCNC: 7.9 MG/DL (ref 8.8–10)
CALCIUM SERPL-MCNC: 8 MG/DL (ref 8.8–10)
CALCIUM SERPL-MCNC: 8.1 MG/DL (ref 8.8–10)
CALCIUM SERPL-MCNC: 8.2 MG/DL (ref 8.8–10)
CALCIUM SERPL-MCNC: 8.2 MG/DL (ref 8.8–10)
CALCIUM SERPL-MCNC: 8.3 MG/DL (ref 8.8–10)
CALCIUM SERPL-MCNC: 8.3 MG/DL (ref 8.8–10)
CALCIUM SERPL-MCNC: 8.5 MG/DL (ref 8.8–10)
CALCIUM SERPL-MCNC: 8.6 MG/DL (ref 8.8–10)
CALCIUM SERPL-MCNC: 8.6 MG/DL (ref 8.8–10)
CALCIUM SERPL-MCNC: 8.7 MG/DL (ref 8.8–10)
CALCIUM SERPL-MCNC: 9.1 MG/DL (ref 8.8–10)
CANDIDA ALBICANS (OHS): NOT DETECTED
CANDIDA GLABRATA (OHS): NOT DETECTED
CANDIDA KRUSEI (OHS): NOT DETECTED
CANDIDA PARAPSILOSIS (OHS): NOT DETECTED
CANDIDA TROPICALIS (OHS): NOT DETECTED
CHLORIDE SERPL-SCNC: 100 MMOL/L (ref 98–107)
CHLORIDE SERPL-SCNC: 101 MMOL/L (ref 98–107)
CHLORIDE SERPL-SCNC: 102 MMOL/L (ref 98–107)
CHLORIDE SERPL-SCNC: 102 MMOL/L (ref 98–107)
CHLORIDE SERPL-SCNC: 91 MMOL/L (ref 98–107)
CHLORIDE SERPL-SCNC: 92 MMOL/L (ref 98–107)
CHLORIDE SERPL-SCNC: 93 MMOL/L (ref 98–107)
CHLORIDE SERPL-SCNC: 95 MMOL/L (ref 98–107)
CHLORIDE SERPL-SCNC: 96 MMOL/L (ref 98–107)
CHLORIDE SERPL-SCNC: 97 MMOL/L (ref 98–107)
CHLORIDE SERPL-SCNC: 99 MMOL/L (ref 98–107)
CO2 BLDA-SCNC: 26.7 MMOL/L
CO2 SERPL-SCNC: 22 MMOL/L (ref 23–31)
CO2 SERPL-SCNC: 23 MMOL/L (ref 23–31)
CO2 SERPL-SCNC: 24 MMOL/L (ref 23–31)
CO2 SERPL-SCNC: 25 MMOL/L (ref 23–31)
CO2 SERPL-SCNC: 25 MMOL/L (ref 23–31)
CO2 SERPL-SCNC: 26 MMOL/L (ref 23–31)
CO2 SERPL-SCNC: 27 MMOL/L (ref 23–31)
CO2 SERPL-SCNC: 28 MMOL/L (ref 23–31)
CO2 SERPL-SCNC: 29 MMOL/L (ref 23–31)
CO2 SERPL-SCNC: 29 MMOL/L (ref 23–31)
COHGB MFR BLDA: 2.5 % (ref 0.5–1.5)
COLOR STL: NORMAL
COLOR UR AUTO: ABNORMAL
COLOR UR: ABNORMAL
CONSISTENCY STL: NORMAL
CREAT SERPL-MCNC: 10.47 MG/DL (ref 0.73–1.18)
CREAT SERPL-MCNC: 10.57 MG/DL (ref 0.73–1.18)
CREAT SERPL-MCNC: 11.43 MG/DL (ref 0.73–1.18)
CREAT SERPL-MCNC: 3.98 MG/DL (ref 0.73–1.18)
CREAT SERPL-MCNC: 5.83 MG/DL (ref 0.73–1.18)
CREAT SERPL-MCNC: 6.33 MG/DL (ref 0.73–1.18)
CREAT SERPL-MCNC: 6.38 MG/DL (ref 0.73–1.18)
CREAT SERPL-MCNC: 6.67 MG/DL (ref 0.73–1.18)
CREAT SERPL-MCNC: 6.74 MG/DL (ref 0.73–1.18)
CREAT SERPL-MCNC: 7.11 MG/DL (ref 0.73–1.18)
CREAT SERPL-MCNC: 8.31 MG/DL (ref 0.73–1.18)
CREAT SERPL-MCNC: 8.53 MG/DL (ref 0.73–1.18)
CREAT SERPL-MCNC: 8.66 MG/DL (ref 0.73–1.18)
CREAT SERPL-MCNC: 9.2 MG/DL (ref 0.73–1.18)
CREAT SERPL-MCNC: 9.46 MG/DL (ref 0.73–1.18)
CREAT SERPL-MCNC: 9.51 MG/DL (ref 0.73–1.18)
CREAT SERPL-MCNC: 9.77 MG/DL (ref 0.73–1.18)
CREAT/UREA NIT SERPL: 3
CREAT/UREA NIT SERPL: 3
CREAT/UREA NIT SERPL: 4
CREAT/UREA NIT SERPL: 5
CROSSMATCH INTERPRETATION: NORMAL
CROSSMATCH INTERPRETATION: NORMAL
CTX-M (OHS): NOT DETECTED
DISPENSE STATUS: NORMAL
DISPENSE STATUS: NORMAL
DRAWN BY BLOOD GAS (OHS): ABNORMAL
ENTEROBACTER CLOACAE COMPLEX (OHS): NOT DETECTED
ENTEROBACTERALES (OHS): DETECTED
ENTEROCOCCUS FAECALIS (OHS): NOT DETECTED
ENTEROCOCCUS FAECIUM (OHS): NOT DETECTED
EOSINOPHIL # BLD AUTO: 0 X10(3)/MCL (ref 0–0.9)
EOSINOPHIL # BLD AUTO: 0.02 X10(3)/MCL (ref 0–0.9)
EOSINOPHIL # BLD AUTO: 0.04 X10(3)/MCL (ref 0–0.9)
EOSINOPHIL # BLD AUTO: 0.05 X10(3)/MCL (ref 0–0.9)
EOSINOPHIL # BLD AUTO: 0.05 X10(3)/MCL (ref 0–0.9)
EOSINOPHIL # BLD AUTO: 0.09 X10(3)/MCL (ref 0–0.9)
EOSINOPHIL # BLD AUTO: 0.11 X10(3)/MCL (ref 0–0.9)
EOSINOPHIL # BLD AUTO: 0.12 X10(3)/MCL (ref 0–0.9)
EOSINOPHIL # BLD AUTO: 0.13 X10(3)/MCL (ref 0–0.9)
EOSINOPHIL # BLD AUTO: 0.14 X10(3)/MCL (ref 0–0.9)
EOSINOPHIL # BLD AUTO: 0.15 X10(3)/MCL (ref 0–0.9)
EOSINOPHIL # BLD AUTO: 0.17 X10(3)/MCL (ref 0–0.9)
EOSINOPHIL # BLD AUTO: 0.19 X10(3)/MCL (ref 0–0.9)
EOSINOPHIL NFR BLD AUTO: 0 %
EOSINOPHIL NFR BLD AUTO: 0.4 %
EOSINOPHIL NFR BLD AUTO: 0.8 %
EOSINOPHIL NFR BLD AUTO: 1.2 %
EOSINOPHIL NFR BLD AUTO: 1.3 %
EOSINOPHIL NFR BLD AUTO: 2.3 %
EOSINOPHIL NFR BLD AUTO: 2.4 %
EOSINOPHIL NFR BLD AUTO: 2.4 %
EOSINOPHIL NFR BLD AUTO: 2.5 %
EOSINOPHIL NFR BLD AUTO: 2.6 %
EOSINOPHIL NFR BLD AUTO: 2.9 %
EOSINOPHIL NFR BLD AUTO: 2.9 %
EOSINOPHIL NFR BLD AUTO: 3.4 %
EOSINOPHIL NFR BLD AUTO: 3.5 %
EOSINOPHIL NFR BLD AUTO: 4.3 %
EOSINOPHIL NFR BLD MANUAL: 0.05 X10(3)/MCL (ref 0–0.9)
EOSINOPHIL NFR BLD MANUAL: 1 %
ERYTHROCYTE [DISTWIDTH] IN BLOOD BY AUTOMATED COUNT: 13.4 % (ref 11.5–17)
ERYTHROCYTE [DISTWIDTH] IN BLOOD BY AUTOMATED COUNT: 13.5 % (ref 11.5–17)
ERYTHROCYTE [DISTWIDTH] IN BLOOD BY AUTOMATED COUNT: 13.5 % (ref 11.5–17)
ERYTHROCYTE [DISTWIDTH] IN BLOOD BY AUTOMATED COUNT: 13.6 % (ref 11.5–17)
ERYTHROCYTE [DISTWIDTH] IN BLOOD BY AUTOMATED COUNT: 13.7 % (ref 11.5–17)
ERYTHROCYTE [DISTWIDTH] IN BLOOD BY AUTOMATED COUNT: 13.9 % (ref 11.5–17)
ERYTHROCYTE [DISTWIDTH] IN BLOOD BY AUTOMATED COUNT: 14.9 % (ref 11.5–17)
ERYTHROCYTE [DISTWIDTH] IN BLOOD BY AUTOMATED COUNT: 15.7 % (ref 11.5–17)
ERYTHROCYTE [DISTWIDTH] IN BLOOD BY AUTOMATED COUNT: 15.8 % (ref 11.5–17)
ERYTHROCYTE [DISTWIDTH] IN BLOOD BY AUTOMATED COUNT: 15.9 % (ref 11.5–17)
ERYTHROCYTE [DISTWIDTH] IN BLOOD BY AUTOMATED COUNT: 16.3 % (ref 11.5–17)
ERYTHROCYTE [DISTWIDTH] IN BLOOD BY AUTOMATED COUNT: 16.7 % (ref 11.5–17)
ERYTHROCYTE [DISTWIDTH] IN BLOOD BY AUTOMATED COUNT: 17.2 % (ref 11.5–17)
ESCHERICHIA COLI (OHS): DETECTED
FIO2 BLOOD GAS (OHS): 21 %
GFR SERPLBLD CREATININE-BSD FMLA CKD-EPI: 10 MLS/MIN/1.73/M2
GFR SERPLBLD CREATININE-BSD FMLA CKD-EPI: 16 MLS/MIN/1.73/M2
GFR SERPLBLD CREATININE-BSD FMLA CKD-EPI: 4 MLS/MIN/1.73/M2
GFR SERPLBLD CREATININE-BSD FMLA CKD-EPI: 5 MLS/MIN/1.73/M2
GFR SERPLBLD CREATININE-BSD FMLA CKD-EPI: 6 MLS/MIN/1.73/M2
GFR SERPLBLD CREATININE-BSD FMLA CKD-EPI: 8 MLS/MIN/1.73/M2
GFR SERPLBLD CREATININE-BSD FMLA CKD-EPI: 8 MLS/MIN/1.73/M2
GFR SERPLBLD CREATININE-BSD FMLA CKD-EPI: 9 MLS/MIN/1.73/M2
GLOBULIN SER-MCNC: 3.2 GM/DL (ref 2.4–3.5)
GLOBULIN SER-MCNC: 3.4 GM/DL (ref 2.4–3.5)
GLOBULIN SER-MCNC: 3.5 GM/DL (ref 2.4–3.5)
GLOBULIN SER-MCNC: 3.6 GM/DL (ref 2.4–3.5)
GLOBULIN SER-MCNC: 3.7 GM/DL (ref 2.4–3.5)
GLOBULIN SER-MCNC: 3.9 GM/DL (ref 2.4–3.5)
GLOBULIN SER-MCNC: 4 GM/DL (ref 2.4–3.5)
GLOBULIN SER-MCNC: 4.1 GM/DL (ref 2.4–3.5)
GLOBULIN SER-MCNC: 4.2 GM/DL (ref 2.4–3.5)
GLUCOSE SERPL-MCNC: 101 MG/DL (ref 82–115)
GLUCOSE SERPL-MCNC: 102 MG/DL (ref 82–115)
GLUCOSE SERPL-MCNC: 115 MG/DL (ref 82–115)
GLUCOSE SERPL-MCNC: 165 MG/DL (ref 82–115)
GLUCOSE SERPL-MCNC: 79 MG/DL (ref 82–115)
GLUCOSE SERPL-MCNC: 82 MG/DL (ref 82–115)
GLUCOSE SERPL-MCNC: 83 MG/DL (ref 82–115)
GLUCOSE SERPL-MCNC: 85 MG/DL (ref 82–115)
GLUCOSE SERPL-MCNC: 88 MG/DL (ref 82–115)
GLUCOSE SERPL-MCNC: 89 MG/DL (ref 82–115)
GLUCOSE SERPL-MCNC: 89 MG/DL (ref 82–115)
GLUCOSE SERPL-MCNC: 92 MG/DL (ref 82–115)
GLUCOSE SERPL-MCNC: 96 MG/DL (ref 82–115)
GLUCOSE SERPL-MCNC: 99 MG/DL (ref 82–115)
GLUCOSE UR QL STRIP.AUTO: ABNORMAL
GLUCOSE UR QL STRIP.AUTO: NEGATIVE MG/DL
GP B STREP DNA CSF QL NAA+NON-PROBE: NOT DETECTED
GRAM STN SPEC: ABNORMAL
GROUP & RH: NORMAL
GROUP & RH: NORMAL
HAEM INFLU DNA CSF QL NAA+NON-PROBE: NOT DETECTED
HBV SURFACE AB SER QL IA: NEGATIVE
HBV SURFACE AB SERPL IA-ACNC: <5 MIU/ML
HBV SURFACE AG SERPL QL IA: NONREACTIVE
HCO3 BLDA-SCNC: 25.6 MMOL/L (ref 22–26)
HCT VFR BLD AUTO: 23.1 % (ref 42–52)
HCT VFR BLD AUTO: 23.1 % (ref 42–52)
HCT VFR BLD AUTO: 23.7 % (ref 42–52)
HCT VFR BLD AUTO: 23.8 % (ref 42–52)
HCT VFR BLD AUTO: 24.2 % (ref 42–52)
HCT VFR BLD AUTO: 25 % (ref 42–52)
HCT VFR BLD AUTO: 25 % (ref 42–52)
HCT VFR BLD AUTO: 25.4 % (ref 42–52)
HCT VFR BLD AUTO: 25.8 % (ref 42–52)
HCT VFR BLD AUTO: 26.1 % (ref 42–52)
HCT VFR BLD AUTO: 26.1 % (ref 42–52)
HCT VFR BLD AUTO: 26.3 % (ref 42–52)
HCT VFR BLD AUTO: 27.1 % (ref 42–52)
HCT VFR BLD AUTO: 27.1 % (ref 42–52)
HCT VFR BLD AUTO: 28.3 % (ref 42–52)
HCT VFR BLD AUTO: 29 % (ref 42–52)
HCT VFR BLD AUTO: 30.4 % (ref 42–52)
HCT VFR BLD AUTO: 33.3 % (ref 42–52)
HEMOCCULT SP1 STL QL: NEGATIVE
HEMOCCULT SP2 STL QL: NEGATIVE
HEMOCCULT SP3 STL QL: NEGATIVE
HGB BLD-MCNC: 10.1 G/DL (ref 14–18)
HGB BLD-MCNC: 10.7 G/DL (ref 13.5–17.5)
HGB BLD-MCNC: 10.7 G/DL (ref 14–18)
HGB BLD-MCNC: 7.4 G/DL (ref 14–18)
HGB BLD-MCNC: 7.5 G/DL (ref 14–18)
HGB BLD-MCNC: 7.5 G/DL (ref 14–18)
HGB BLD-MCNC: 7.7 G/DL (ref 14–18)
HGB BLD-MCNC: 7.7 G/DL (ref 14–18)
HGB BLD-MCNC: 7.9 G/DL (ref 14–18)
HGB BLD-MCNC: 8 G/DL (ref 14–18)
HGB BLD-MCNC: 8.2 G/DL (ref 14–18)
HGB BLD-MCNC: 8.3 G/DL (ref 14–18)
HGB BLD-MCNC: 8.3 G/DL (ref 14–18)
HGB BLD-MCNC: 8.4 G/DL (ref 14–18)
HGB BLD-MCNC: 8.5 G/DL (ref 14–18)
HGB BLD-MCNC: 8.5 G/DL (ref 14–18)
HGB BLD-MCNC: 9 G/DL (ref 14–18)
HGB BLD-MCNC: 9.1 G/DL (ref 14–18)
HGB BLD-MCNC: 9.4 G/DL (ref 14–18)
IMM GRANULOCYTES # BLD AUTO: 0 X10(3)/MCL (ref 0–0.04)
IMM GRANULOCYTES # BLD AUTO: 0.01 X10(3)/MCL (ref 0–0.04)
IMM GRANULOCYTES # BLD AUTO: 0.02 X10(3)/MCL (ref 0–0.04)
IMM GRANULOCYTES # BLD AUTO: 0.03 X10(3)/MCL (ref 0–0.04)
IMM GRANULOCYTES # BLD AUTO: 0.04 X10(3)/MCL (ref 0–0.04)
IMM GRANULOCYTES # BLD AUTO: 0.05 X10(3)/MCL (ref 0–0.04)
IMM GRANULOCYTES # BLD AUTO: 0.05 X10(3)/MCL (ref 0–0.04)
IMM GRANULOCYTES NFR BLD AUTO: 0 %
IMM GRANULOCYTES NFR BLD AUTO: 0.2 %
IMM GRANULOCYTES NFR BLD AUTO: 0.2 %
IMM GRANULOCYTES NFR BLD AUTO: 0.3 %
IMM GRANULOCYTES NFR BLD AUTO: 0.4 %
IMM GRANULOCYTES NFR BLD AUTO: 0.5 %
IMM GRANULOCYTES NFR BLD AUTO: 0.5 %
IMM GRANULOCYTES NFR BLD AUTO: 0.6 %
IMM GRANULOCYTES NFR BLD AUTO: 0.7 %
IMM GRANULOCYTES NFR BLD AUTO: 0.7 %
IMM GRANULOCYTES NFR BLD AUTO: 0.8 %
IMM GRANULOCYTES NFR BLD AUTO: 1 %
IMM GRANULOCYTES NFR BLD AUTO: 1.2 %
IMP (OHS): NOT DETECTED
INDIRECT COOMBS GEL: NORMAL
INDIRECT COOMBS GEL: NORMAL
INR BLD: 1.06 (ref 0–1.3)
INR BLD: 1.31 (ref 0–1.3)
INSTRUMENT WBC (OLG): 4.98 X10(3)/MCL
KETONES UR QL STRIP.AUTO: ABNORMAL
KETONES UR QL STRIP.AUTO: NEGATIVE MG/DL
KLEBSIELLA AEROGENES (OHS): NOT DETECTED
KLEBSIELLA OXYTOCA (OHS): NOT DETECTED
KLEBSIELLA PNEUMONIAE GROUP (OHS): NOT DETECTED
KPC (OHS): NOT DETECTED
L MONOCYTOG DNA CSF QL NAA+NON-PROBE: NOT DETECTED
LACTATE SERPL-SCNC: 1.1 MMOL/L (ref 0.5–2.2)
LACTATE SERPL-SCNC: 1.3 MMOL/L (ref 0.5–2.2)
LEUKOCYTE ESTERASE UR QL STRIP.AUTO: ABNORMAL
LEUKOCYTE ESTERASE UR QL STRIP.AUTO: ABNORMAL UNIT/L
LYMPHOCYTES # BLD AUTO: 0.1 X10(3)/MCL (ref 0.6–4.6)
LYMPHOCYTES # BLD AUTO: 0.24 X10(3)/MCL (ref 0.6–4.6)
LYMPHOCYTES # BLD AUTO: 0.27 X10(3)/MCL (ref 0.6–4.6)
LYMPHOCYTES # BLD AUTO: 0.33 X10(3)/MCL (ref 0.6–4.6)
LYMPHOCYTES # BLD AUTO: 0.34 X10(3)/MCL (ref 0.6–4.6)
LYMPHOCYTES # BLD AUTO: 0.34 X10(3)/MCL (ref 0.6–4.6)
LYMPHOCYTES # BLD AUTO: 0.44 X10(3)/MCL (ref 0.6–4.6)
LYMPHOCYTES # BLD AUTO: 0.51 X10(3)/MCL (ref 0.6–4.6)
LYMPHOCYTES # BLD AUTO: 0.55 X10(3)/MCL (ref 0.6–4.6)
LYMPHOCYTES # BLD AUTO: 0.69 X10(3)/MCL (ref 0.6–4.6)
LYMPHOCYTES # BLD AUTO: 0.71 X10(3)/MCL (ref 0.6–4.6)
LYMPHOCYTES # BLD AUTO: 0.74 X10(3)/MCL (ref 0.6–4.6)
LYMPHOCYTES # BLD AUTO: 0.77 X10(3)/MCL (ref 0.6–4.6)
LYMPHOCYTES # BLD AUTO: 0.79 X10(3)/MCL (ref 0.6–4.6)
LYMPHOCYTES # BLD AUTO: 0.83 X10(3)/MCL (ref 0.6–4.6)
LYMPHOCYTES # BLD AUTO: 0.96 X10(3)/MCL (ref 0.6–4.6)
LYMPHOCYTES # BLD AUTO: 1.01 X10(3)/MCL (ref 0.6–4.6)
LYMPHOCYTES NFR BLD AUTO: 1.7 %
LYMPHOCYTES NFR BLD AUTO: 11.7 %
LYMPHOCYTES NFR BLD AUTO: 12.4 %
LYMPHOCYTES NFR BLD AUTO: 14 %
LYMPHOCYTES NFR BLD AUTO: 15.2 %
LYMPHOCYTES NFR BLD AUTO: 18.2 %
LYMPHOCYTES NFR BLD AUTO: 19.3 %
LYMPHOCYTES NFR BLD AUTO: 19.8 %
LYMPHOCYTES NFR BLD AUTO: 22.5 %
LYMPHOCYTES NFR BLD AUTO: 23.8 %
LYMPHOCYTES NFR BLD AUTO: 23.8 %
LYMPHOCYTES NFR BLD AUTO: 4 %
LYMPHOCYTES NFR BLD AUTO: 5.1 %
LYMPHOCYTES NFR BLD AUTO: 5.2 %
LYMPHOCYTES NFR BLD AUTO: 7 %
LYMPHOCYTES NFR BLD AUTO: 8.2 %
LYMPHOCYTES NFR BLD AUTO: 8.4 %
LYMPHOCYTES NFR BLD MANUAL: 0.4 X10(3)/MCL
LYMPHOCYTES NFR BLD MANUAL: 8 %
MACROCYTES BLD QL SMEAR: ABNORMAL
MAGNESIUM SERPL-MCNC: 1.8 MG/DL (ref 1.6–2.6)
MAGNESIUM SERPL-MCNC: 2.4 MG/DL (ref 1.6–2.6)
MCH RBC QN AUTO: 31.3 PG (ref 27–31)
MCH RBC QN AUTO: 31.4 PG (ref 27–31)
MCH RBC QN AUTO: 31.5 PG (ref 27–31)
MCH RBC QN AUTO: 31.7 PG (ref 27–31)
MCH RBC QN AUTO: 31.8 PG (ref 27–31)
MCH RBC QN AUTO: 32 PG (ref 27–31)
MCH RBC QN AUTO: 32.5 PG (ref 27–31)
MCH RBC QN AUTO: 32.6 PG (ref 27–31)
MCH RBC QN AUTO: 32.8 PG (ref 27–31)
MCH RBC QN AUTO: 33 PG (ref 27–31)
MCH RBC QN AUTO: 33 PG (ref 27–31)
MCH RBC QN AUTO: 33.1 PG (ref 27–31)
MCH RBC QN AUTO: 33.2 PG (ref 27–31)
MCH RBC QN AUTO: 33.5 PG (ref 27–31)
MCH RBC QN AUTO: 33.5 PG (ref 27–31)
MCH RBC QN AUTO: 33.6 PG (ref 27–31)
MCH RBC QN AUTO: 33.6 PG (ref 27–31)
MCH RBC QN AUTO: 33.7 PG (ref 27–31)
MCHC RBC AUTO-ENTMCNC: 30.8 G/DL (ref 33–36)
MCHC RBC AUTO-ENTMCNC: 31 G/DL (ref 33–36)
MCHC RBC AUTO-ENTMCNC: 31.5 G/DL (ref 33–36)
MCHC RBC AUTO-ENTMCNC: 31.8 G/DL (ref 33–36)
MCHC RBC AUTO-ENTMCNC: 31.8 G/DL (ref 33–36)
MCHC RBC AUTO-ENTMCNC: 32 G/DL (ref 33–36)
MCHC RBC AUTO-ENTMCNC: 32 G/DL (ref 33–36)
MCHC RBC AUTO-ENTMCNC: 32.1 G/DL (ref 33–36)
MCHC RBC AUTO-ENTMCNC: 32.2 G/DL (ref 33–36)
MCHC RBC AUTO-ENTMCNC: 32.3 G/DL (ref 33–36)
MCHC RBC AUTO-ENTMCNC: 32.4 G/DL (ref 33–36)
MCHC RBC AUTO-ENTMCNC: 32.5 G/DL (ref 33–36)
MCHC RBC AUTO-ENTMCNC: 32.5 G/DL (ref 33–36)
MCHC RBC AUTO-ENTMCNC: 32.6 G/DL (ref 33–36)
MCHC RBC AUTO-ENTMCNC: 32.6 G/DL (ref 33–36)
MCHC RBC AUTO-ENTMCNC: 32.7 G/DL (ref 33–36)
MCHC RBC AUTO-ENTMCNC: 33.2 G/DL (ref 33–36)
MCHC RBC AUTO-ENTMCNC: 33.2 G/DL (ref 33–36)
MCR-1 (OHS): NOT DETECTED
MCV RBC AUTO: 101.1 FL (ref 80–94)
MCV RBC AUTO: 101.1 FL (ref 80–94)
MCV RBC AUTO: 101.5 FL (ref 80–94)
MCV RBC AUTO: 101.8 FL (ref 80–94)
MCV RBC AUTO: 102.8 FL (ref 80–94)
MCV RBC AUTO: 103 FL (ref 80–94)
MCV RBC AUTO: 103 FL (ref 80–94)
MCV RBC AUTO: 103.5 FL (ref 80–94)
MCV RBC AUTO: 104 FL (ref 80–94)
MCV RBC AUTO: 105 FL (ref 80–94)
MCV RBC AUTO: 105.3 FL (ref 80–94)
MCV RBC AUTO: 106.4 FL (ref 80–94)
MCV RBC AUTO: 97.1 FL (ref 80–94)
MCV RBC AUTO: 97.3 FL (ref 80–94)
MCV RBC AUTO: 98.1 FL (ref 80–94)
MCV RBC AUTO: 98.1 FL (ref 80–94)
MCV RBC AUTO: 98.9 FL (ref 80–94)
MCV RBC AUTO: 99.2 FL (ref 80–94)
MECA/C (OHS): ABNORMAL
MECA/C AND MREJ (MRSA)(OHS): ABNORMAL
METHGB MFR BLDA: 1.4 % (ref 0.4–1.5)
MONOCYTES # BLD AUTO: 0.27 X10(3)/MCL (ref 0.1–1.3)
MONOCYTES # BLD AUTO: 0.35 X10(3)/MCL (ref 0.1–1.3)
MONOCYTES # BLD AUTO: 0.37 X10(3)/MCL (ref 0.1–1.3)
MONOCYTES # BLD AUTO: 0.38 X10(3)/MCL (ref 0.1–1.3)
MONOCYTES # BLD AUTO: 0.41 X10(3)/MCL (ref 0.1–1.3)
MONOCYTES # BLD AUTO: 0.45 X10(3)/MCL (ref 0.1–1.3)
MONOCYTES # BLD AUTO: 0.46 X10(3)/MCL (ref 0.1–1.3)
MONOCYTES # BLD AUTO: 0.48 X10(3)/MCL (ref 0.1–1.3)
MONOCYTES # BLD AUTO: 0.5 X10(3)/MCL (ref 0.1–1.3)
MONOCYTES # BLD AUTO: 0.5 X10(3)/MCL (ref 0.1–1.3)
MONOCYTES # BLD AUTO: 0.51 X10(3)/MCL (ref 0.1–1.3)
MONOCYTES # BLD AUTO: 0.51 X10(3)/MCL (ref 0.1–1.3)
MONOCYTES # BLD AUTO: 0.52 X10(3)/MCL (ref 0.1–1.3)
MONOCYTES # BLD AUTO: 0.55 X10(3)/MCL (ref 0.1–1.3)
MONOCYTES # BLD AUTO: 0.56 X10(3)/MCL (ref 0.1–1.3)
MONOCYTES # BLD AUTO: 0.57 X10(3)/MCL (ref 0.1–1.3)
MONOCYTES # BLD AUTO: 0.72 X10(3)/MCL (ref 0.1–1.3)
MONOCYTES NFR BLD AUTO: 10.2 %
MONOCYTES NFR BLD AUTO: 10.8 %
MONOCYTES NFR BLD AUTO: 11.3 %
MONOCYTES NFR BLD AUTO: 11.5 %
MONOCYTES NFR BLD AUTO: 17.1 %
MONOCYTES NFR BLD AUTO: 17.4 %
MONOCYTES NFR BLD AUTO: 8.3 %
MONOCYTES NFR BLD AUTO: 8.4 %
MONOCYTES NFR BLD AUTO: 8.4 %
MONOCYTES NFR BLD AUTO: 8.5 %
MONOCYTES NFR BLD AUTO: 8.6 %
MONOCYTES NFR BLD AUTO: 8.7 %
MONOCYTES NFR BLD AUTO: 8.8 %
MONOCYTES NFR BLD AUTO: 9 %
MONOCYTES NFR BLD AUTO: 9.2 %
MONOCYTES NFR BLD AUTO: 9.2 %
MONOCYTES NFR BLD AUTO: 9.6 %
MONOCYTES NFR BLD MANUAL: 0.25 X10(3)/MCL (ref 0.1–1.3)
MONOCYTES NFR BLD MANUAL: 5 %
N MEN DNA CSF QL NAA+NON-PROBE: NOT DETECTED
NDM (OHS): NOT DETECTED
NEUTROPHILS # BLD AUTO: 1.69 X10(3)/MCL (ref 2.1–9.2)
NEUTROPHILS # BLD AUTO: 1.86 X10(3)/MCL (ref 2.1–9.2)
NEUTROPHILS # BLD AUTO: 2.2 X10(3)/MCL (ref 2.1–9.2)
NEUTROPHILS # BLD AUTO: 2.61 X10(3)/MCL (ref 2.1–9.2)
NEUTROPHILS # BLD AUTO: 2.83 X10(3)/MCL (ref 2.1–9.2)
NEUTROPHILS # BLD AUTO: 3.05 X10(3)/MCL (ref 2.1–9.2)
NEUTROPHILS # BLD AUTO: 3.19 X10(3)/MCL (ref 2.1–9.2)
NEUTROPHILS # BLD AUTO: 3.48 X10(3)/MCL (ref 2.1–9.2)
NEUTROPHILS # BLD AUTO: 3.54 X10(3)/MCL (ref 2.1–9.2)
NEUTROPHILS # BLD AUTO: 3.94 X10(3)/MCL (ref 2.1–9.2)
NEUTROPHILS # BLD AUTO: 3.94 X10(3)/MCL (ref 2.1–9.2)
NEUTROPHILS # BLD AUTO: 4.12 X10(3)/MCL (ref 2.1–9.2)
NEUTROPHILS # BLD AUTO: 4.44 X10(3)/MCL (ref 2.1–9.2)
NEUTROPHILS # BLD AUTO: 4.5 X10(3)/MCL (ref 2.1–9.2)
NEUTROPHILS # BLD AUTO: 5.12 X10(3)/MCL (ref 2.1–9.2)
NEUTROPHILS # BLD AUTO: 5.21 X10(3)/MCL (ref 2.1–9.2)
NEUTROPHILS # BLD AUTO: 5.37 X10(3)/MCL (ref 2.1–9.2)
NEUTROPHILS NFR BLD AUTO: 56.8 %
NEUTROPHILS NFR BLD AUTO: 62.6 %
NEUTROPHILS NFR BLD AUTO: 64.6 %
NEUTROPHILS NFR BLD AUTO: 66.4 %
NEUTROPHILS NFR BLD AUTO: 67.3 %
NEUTROPHILS NFR BLD AUTO: 67.4 %
NEUTROPHILS NFR BLD AUTO: 71.5 %
NEUTROPHILS NFR BLD AUTO: 71.5 %
NEUTROPHILS NFR BLD AUTO: 72.2 %
NEUTROPHILS NFR BLD AUTO: 74 %
NEUTROPHILS NFR BLD AUTO: 76.4 %
NEUTROPHILS NFR BLD AUTO: 79.1 %
NEUTROPHILS NFR BLD AUTO: 80.7 %
NEUTROPHILS NFR BLD AUTO: 83.3 %
NEUTROPHILS NFR BLD AUTO: 85 %
NEUTROPHILS NFR BLD AUTO: 86 %
NEUTROPHILS NFR BLD AUTO: 89.3 %
NEUTROPHILS NFR BLD MANUAL: 86 %
NITRITE UR QL STRIP.AUTO: ABNORMAL
NITRITE UR QL STRIP.AUTO: NEGATIVE
NRBC BLD AUTO-RTO: 0 %
NRBC BLD MANUAL-RTO: 1 %
O2 HB BLOOD GAS (OHS): 90.5 % (ref 94–97)
OXA-48-LIKE (OHS): NOT DETECTED
OXYHGB MFR BLDA: 13.7 G/DL (ref 12–16)
PCO2 BLDA: 36 MMHG (ref 35–45)
PH BLDA: 7.46 [PH] (ref 7.35–7.45)
PH UR STRIP.AUTO: 7.5 [PH]
PH UR STRIP.AUTO: 8 [PH]
PHOSPHATE SERPL-MCNC: 2.8 MG/DL (ref 2.3–4.7)
PHOSPHATE SERPL-MCNC: 4 MG/DL (ref 2.3–4.7)
PHOSPHATE SERPL-MCNC: 6.6 MG/DL (ref 2.3–4.7)
PLATELET # BLD AUTO: 100 X10(3)/MCL (ref 130–400)
PLATELET # BLD AUTO: 102 X10(3)/MCL (ref 130–400)
PLATELET # BLD AUTO: 112 X10(3)/MCL (ref 130–400)
PLATELET # BLD AUTO: 122 X10(3)/MCL (ref 130–400)
PLATELET # BLD AUTO: 132 X10(3)/MCL (ref 130–400)
PLATELET # BLD AUTO: 138 X10(3)/MCL (ref 130–400)
PLATELET # BLD AUTO: 140 X10(3)/MCL (ref 130–400)
PLATELET # BLD AUTO: 160 X10(3)/MCL (ref 130–400)
PLATELET # BLD AUTO: 196 X10(3)/MCL (ref 130–400)
PLATELET # BLD AUTO: 209 X10(3)/MCL (ref 130–400)
PLATELET # BLD AUTO: 209 X10(3)/MCL (ref 130–400)
PLATELET # BLD AUTO: 215 X10(3)/MCL (ref 130–400)
PLATELET # BLD AUTO: 221 X10(3)/MCL (ref 130–400)
PLATELET # BLD AUTO: 236 X10(3)/MCL (ref 130–400)
PLATELET # BLD AUTO: 246 X10(3)/MCL (ref 130–400)
PLATELET # BLD AUTO: 258 X10(3)/MCL (ref 130–400)
PLATELET # BLD AUTO: 307 X10(3)/MCL (ref 130–400)
PLATELET # BLD AUTO: 97 X10(3)/MCL (ref 130–400)
PLATELET # BLD EST: NORMAL 10*3/UL
PMV BLD AUTO: 10 FL (ref 7.4–10.4)
PMV BLD AUTO: 10.3 FL (ref 7.4–10.4)
PMV BLD AUTO: 10.4 FL (ref 7.4–10.4)
PMV BLD AUTO: 10.7 FL (ref 7.4–10.4)
PMV BLD AUTO: 10.7 FL (ref 7.4–10.4)
PMV BLD AUTO: 10.8 FL (ref 7.4–10.4)
PMV BLD AUTO: 10.8 FL (ref 7.4–10.4)
PMV BLD AUTO: 11.2 FL (ref 7.4–10.4)
PMV BLD AUTO: 11.7 FL (ref 7.4–10.4)
PMV BLD AUTO: 11.8 FL (ref 7.4–10.4)
PMV BLD AUTO: 9.4 FL (ref 7.4–10.4)
PMV BLD AUTO: 9.5 FL (ref 7.4–10.4)
PMV BLD AUTO: 9.6 FL (ref 7.4–10.4)
PMV BLD AUTO: 9.6 FL (ref 7.4–10.4)
PMV BLD AUTO: 9.9 FL (ref 7.4–10.4)
PO2 BLDA: 66 MMHG (ref 80–100)
POCT GLUCOSE: 77 MG/DL (ref 70–110)
POTASSIUM BLOOD GAS (OHS): 3.9 MMOL/L (ref 3.5–5)
POTASSIUM SERPL-SCNC: 3.6 MMOL/L (ref 3.5–5.1)
POTASSIUM SERPL-SCNC: 3.6 MMOL/L (ref 3.5–5.1)
POTASSIUM SERPL-SCNC: 3.8 MMOL/L (ref 3.5–5.1)
POTASSIUM SERPL-SCNC: 3.8 MMOL/L (ref 3.5–5.1)
POTASSIUM SERPL-SCNC: 3.9 MMOL/L (ref 3.5–5.1)
POTASSIUM SERPL-SCNC: 4.1 MMOL/L (ref 3.5–5.1)
POTASSIUM SERPL-SCNC: 4.3 MMOL/L (ref 3.5–5.1)
POTASSIUM SERPL-SCNC: 4.6 MMOL/L (ref 3.5–5.1)
POTASSIUM SERPL-SCNC: 4.6 MMOL/L (ref 3.5–5.1)
POTASSIUM SERPL-SCNC: 4.7 MMOL/L (ref 3.5–5.1)
POTASSIUM SERPL-SCNC: 5.1 MMOL/L (ref 3.5–5.1)
POTASSIUM SERPL-SCNC: 5.5 MMOL/L (ref 3.5–5.1)
PROT SERPL-MCNC: 5.5 GM/DL (ref 5.8–7.6)
PROT SERPL-MCNC: 6.1 GM/DL (ref 5.8–7.6)
PROT SERPL-MCNC: 6.3 GM/DL (ref 5.8–7.6)
PROT SERPL-MCNC: 6.5 GM/DL (ref 5.8–7.6)
PROT SERPL-MCNC: 6.7 GM/DL (ref 5.8–7.6)
PROT SERPL-MCNC: 6.9 GM/DL (ref 5.8–7.6)
PROT SERPL-MCNC: 7.2 GM/DL (ref 5.8–7.6)
PROT SERPL-MCNC: 7.2 GM/DL (ref 5.8–7.6)
PROT SERPL-MCNC: 7.7 GM/DL (ref 5.8–7.6)
PROT UR QL STRIP.AUTO: ABNORMAL
PROT UR QL STRIP.AUTO: ABNORMAL MG/DL
PROTEUS SPP. (OHS): NOT DETECTED
PROTHROMBIN TIME: 13.7 SECONDS (ref 12.5–14.5)
PROTHROMBIN TIME: 16.1 SECONDS (ref 12.5–14.5)
PSA SERPL-MCNC: 10.79 NG/ML
PSEUDOMONAS AERUGINOSA (OHS): NOT DETECTED
PSYCHE PATHOLOGY RESULT: NORMAL
RBC # BLD AUTO: 2.2 X10(6)/MCL (ref 4.7–6.1)
RBC # BLD AUTO: 2.26 X10(6)/MCL (ref 4.7–6.1)
RBC # BLD AUTO: 2.3 X10(6)/MCL (ref 4.7–6.1)
RBC # BLD AUTO: 2.35 X10(6)/MCL (ref 4.7–6.1)
RBC # BLD AUTO: 2.35 X10(6)/MCL (ref 4.7–6.1)
RBC # BLD AUTO: 2.38 X10(6)/MCL (ref 4.7–6.1)
RBC # BLD AUTO: 2.51 X10(6)/MCL (ref 4.7–6.1)
RBC # BLD AUTO: 2.52 X10(6)/MCL (ref 4.7–6.1)
RBC # BLD AUTO: 2.54 X10(6)/MCL (ref 4.7–6.1)
RBC # BLD AUTO: 2.61 X10(6)/MCL (ref 4.7–6.1)
RBC # BLD AUTO: 2.61 X10(6)/MCL (ref 4.7–6.1)
RBC # BLD AUTO: 2.66 X10(6)/MCL (ref 4.7–6.1)
RBC # BLD AUTO: 2.67 X10(6)/MCL (ref 4.7–6.1)
RBC # BLD AUTO: 2.68 X10(6)/MCL (ref 4.7–6.1)
RBC # BLD AUTO: 2.8 X10(6)/MCL (ref 4.7–6.1)
RBC # BLD AUTO: 2.85 X10(6)/MCL (ref 4.7–6.1)
RBC # BLD AUTO: 3.1 X10(6)/MCL (ref 4.7–6.1)
RBC # BLD AUTO: 3.24 X10(6)/MCL (ref 4.7–6.1)
RBC #/AREA URNS AUTO: >100 /HPF
RBC #/AREA URNS AUTO: >100 /HPF
RBC MORPH BLD: ABNORMAL
RBC UR QL AUTO: ABNORMAL
RBC UR QL AUTO: ABNORMAL UNIT/L
S ENT+BONG DNA STL QL NAA+NON-PROBE: NOT DETECTED
S PNEUM DNA CSF QL NAA+NON-PROBE: NOT DETECTED
SAMPLE SITE BLOOD GAS (OHS): ABNORMAL
SAO2 % BLDA: 93.8 %
SERRATIA MARCESCENS (OHS): NOT DETECTED
SODIUM BLOOD GAS (OHS): 132 MMOL/L (ref 137–145)
SODIUM SERPL-SCNC: 126 MMOL/L (ref 136–145)
SODIUM SERPL-SCNC: 129 MMOL/L (ref 136–145)
SODIUM SERPL-SCNC: 130 MMOL/L (ref 136–145)
SODIUM SERPL-SCNC: 132 MMOL/L (ref 136–145)
SODIUM SERPL-SCNC: 133 MMOL/L (ref 136–145)
SODIUM SERPL-SCNC: 133 MMOL/L (ref 136–145)
SODIUM SERPL-SCNC: 134 MMOL/L (ref 136–145)
SODIUM SERPL-SCNC: 135 MMOL/L (ref 136–145)
SODIUM SERPL-SCNC: 136 MMOL/L (ref 136–145)
SODIUM SERPL-SCNC: 137 MMOL/L (ref 136–145)
SODIUM SERPL-SCNC: 138 MMOL/L (ref 136–145)
SODIUM SERPL-SCNC: 138 MMOL/L (ref 136–145)
SP GR UR STRIP.AUTO: 1.02 (ref 1–1.03)
SP GR UR STRIP.AUTO: 1.02 (ref 1–1.03)
SPECIMEN OUTDATE: NORMAL
SPECIMEN OUTDATE: NORMAL
SQUAMOUS #/AREA URNS AUTO: ABNORMAL /HPF
SQUAMOUS #/AREA URNS AUTO: ABNORMAL /HPF
STAPHYLOCOCCUS AUREUS (OHS): NOT DETECTED
STAPHYLOCOCCUS EPIDERMIDIS (OHS): NOT DETECTED
STAPHYLOCOCCUS LUGDUNENSIS (OHS): NOT DETECTED
STAPHYLOCOCCUS SPP. (OHS): NOT DETECTED
STENOTROPHOMONAS MALTOPHILIA (OHS): NOT DETECTED
STREPTOCOCCUS PYOGENES (GROUP A)(OHS): NOT DETECTED
STREPTOCOCCUS SPP. (OHS): NOT DETECTED
UNIT NUMBER: NORMAL
UNIT NUMBER: NORMAL
UROBILINOGEN UR STRIP-ACNC: 0.2 MG/DL
UROBILINOGEN UR STRIP-ACNC: ABNORMAL
VANA/B (OHS): ABNORMAL
VIM (OHS): NOT DETECTED
WBC # SPEC AUTO: 2.8 X10(3)/MCL (ref 4.5–11.5)
WBC # SPEC AUTO: 3 X10(3)/MCL (ref 4.5–11.5)
WBC # SPEC AUTO: 3.5 X10(3)/MCL (ref 4.5–11.5)
WBC # SPEC AUTO: 4 X10(3)/MCL (ref 4.5–11.5)
WBC # SPEC AUTO: 4.13 X10(3)/MCL (ref 4.5–11.5)
WBC # SPEC AUTO: 4.2 X10(3)/MCL (ref 4.5–11.5)
WBC # SPEC AUTO: 4.42 X10(3)/MCL (ref 4.5–11.5)
WBC # SPEC AUTO: 4.88 X10(3)/MCL (ref 4.5–11.5)
WBC # SPEC AUTO: 4.9 X10(3)/MCL (ref 4.5–11.5)
WBC # SPEC AUTO: 4.98 X10(3)/MCL (ref 4.5–11.5)
WBC # SPEC AUTO: 5.2 X10(3)/MCL (ref 4.5–11.5)
WBC # SPEC AUTO: 5.21 X10(3)/MCL (ref 4.5–11.5)
WBC # SPEC AUTO: 5.33 X10(3)/MCL (ref 4.5–11.5)
WBC # SPEC AUTO: 5.51 X10(3)/MCL (ref 4.5–11.5)
WBC # SPEC AUTO: 5.83 X10(3)/MCL (ref 4.5–11.5)
WBC # SPEC AUTO: 5.9 X10(3)/MCL (ref 4.5–11.5)
WBC # SPEC AUTO: 5.96 X10(3)/MCL (ref 4.5–11.5)
WBC # SPEC AUTO: 6.32 X10(3)/MCL (ref 4.5–11.5)
WBC #/AREA URNS AUTO: ABNORMAL /HPF
WBC #/AREA URNS AUTO: ABNORMAL /HPF

## 2023-01-01 PROCEDURE — 63600175 PHARM REV CODE 636 W HCPCS: Performed by: NURSE ANESTHETIST, CERTIFIED REGISTERED

## 2023-01-01 PROCEDURE — 25000003 PHARM REV CODE 250: Performed by: STUDENT IN AN ORGANIZED HEALTH CARE EDUCATION/TRAINING PROGRAM

## 2023-01-01 PROCEDURE — 25000003 PHARM REV CODE 250: Performed by: INTERNAL MEDICINE

## 2023-01-01 PROCEDURE — 1101F PT FALLS ASSESS-DOCD LE1/YR: CPT | Mod: CPTII,,, | Performed by: FAMILY MEDICINE

## 2023-01-01 PROCEDURE — 85025 COMPLETE CBC W/AUTO DIFF WBC: CPT | Performed by: INTERNAL MEDICINE

## 2023-01-01 PROCEDURE — 25000003 PHARM REV CODE 250: Performed by: NURSE PRACTITIONER

## 2023-01-01 PROCEDURE — 21400001 HC TELEMETRY ROOM

## 2023-01-01 PROCEDURE — 3077F PR MOST RECENT SYSTOLIC BLOOD PRESSURE >= 140 MM HG: ICD-10-PCS | Mod: CPTII,,, | Performed by: FAMILY MEDICINE

## 2023-01-01 PROCEDURE — 63600175 PHARM REV CODE 636 W HCPCS: Performed by: NURSE PRACTITIONER

## 2023-01-01 PROCEDURE — 1160F PR REVIEW ALL MEDS BY PRESCRIBER/CLIN PHARMACIST DOCUMENTED: ICD-10-PCS | Mod: CPTII,,, | Performed by: FAMILY MEDICINE

## 2023-01-01 PROCEDURE — 25000003 PHARM REV CODE 250: Performed by: PHYSICIAN ASSISTANT

## 2023-01-01 PROCEDURE — 80048 BASIC METABOLIC PNL TOTAL CA: CPT | Performed by: HOSPITALIST

## 2023-01-01 PROCEDURE — 3077F SYST BP >= 140 MM HG: CPT | Mod: CPTII,,, | Performed by: FAMILY MEDICINE

## 2023-01-01 PROCEDURE — 63600175 PHARM REV CODE 636 W HCPCS: Performed by: PHYSICIAN ASSISTANT

## 2023-01-01 PROCEDURE — 37000009 HC ANESTHESIA EA ADD 15 MINS: Performed by: INTERNAL MEDICINE

## 2023-01-01 PROCEDURE — 97530 THERAPEUTIC ACTIVITIES: CPT

## 2023-01-01 PROCEDURE — 51702 INSERT TEMP BLADDER CATH: CPT

## 2023-01-01 PROCEDURE — 85025 COMPLETE CBC W/AUTO DIFF WBC: CPT | Performed by: NURSE PRACTITIONER

## 2023-01-01 PROCEDURE — 63600175 PHARM REV CODE 636 W HCPCS: Performed by: INTERNAL MEDICINE

## 2023-01-01 PROCEDURE — 1159F PR MEDICATION LIST DOCUMENTED IN MEDICAL RECORD: ICD-10-PCS | Mod: CPTII,,, | Performed by: NURSE PRACTITIONER

## 2023-01-01 PROCEDURE — 83605 ASSAY OF LACTIC ACID: CPT | Performed by: INTERNAL MEDICINE

## 2023-01-01 PROCEDURE — 83735 ASSAY OF MAGNESIUM: CPT | Performed by: NURSE PRACTITIONER

## 2023-01-01 PROCEDURE — 3078F DIAST BP <80 MM HG: CPT | Mod: CPTII,,, | Performed by: NURSE PRACTITIONER

## 2023-01-01 PROCEDURE — 3008F PR BODY MASS INDEX (BMI) DOCUMENTED: ICD-10-PCS | Mod: CPTII,,, | Performed by: NURSE PRACTITIONER

## 2023-01-01 PROCEDURE — 1111F DSCHRG MED/CURRENT MED MERGE: CPT | Mod: CPTII,,, | Performed by: FAMILY MEDICINE

## 2023-01-01 PROCEDURE — 1111F PR DISCHARGE MEDS RECONCILED W/ CURRENT OUTPATIENT MED LIST: ICD-10-PCS | Mod: CPTII,,, | Performed by: FAMILY MEDICINE

## 2023-01-01 PROCEDURE — 80100016 HC MAINTENANCE HEMODIALYSIS

## 2023-01-01 PROCEDURE — D9220A PRA ANESTHESIA: ICD-10-PCS | Mod: CRNA,,, | Performed by: NURSE ANESTHETIST, CERTIFIED REGISTERED

## 2023-01-01 PROCEDURE — 85025 COMPLETE CBC W/AUTO DIFF WBC: CPT | Performed by: STUDENT IN AN ORGANIZED HEALTH CARE EDUCATION/TRAINING PROGRAM

## 2023-01-01 PROCEDURE — 1101F PR PT FALLS ASSESS DOC 0-1 FALLS W/OUT INJ PAST YR: ICD-10-PCS | Mod: CPTII,,, | Performed by: FAMILY MEDICINE

## 2023-01-01 PROCEDURE — C9113 INJ PANTOPRAZOLE SODIUM, VIA: HCPCS | Performed by: PHYSICIAN ASSISTANT

## 2023-01-01 PROCEDURE — 97110 THERAPEUTIC EXERCISES: CPT

## 2023-01-01 PROCEDURE — 63600175 PHARM REV CODE 636 W HCPCS: Performed by: STUDENT IN AN ORGANIZED HEALTH CARE EDUCATION/TRAINING PROGRAM

## 2023-01-01 PROCEDURE — 80048 BASIC METABOLIC PNL TOTAL CA: CPT | Performed by: NURSE PRACTITIONER

## 2023-01-01 PROCEDURE — 87154 CUL TYP ID BLD PTHGN 6+ TRGT: CPT | Performed by: STUDENT IN AN ORGANIZED HEALTH CARE EDUCATION/TRAINING PROGRAM

## 2023-01-01 PROCEDURE — 96375 TX/PRO/DX INJ NEW DRUG ADDON: CPT

## 2023-01-01 PROCEDURE — 82272 OCCULT BLD FECES 1-3 TESTS: CPT | Performed by: NURSE PRACTITIONER

## 2023-01-01 PROCEDURE — 1125F PR PAIN SEVERITY QUANTIFIED, PAIN PRESENT: ICD-10-PCS | Mod: CPTII,,, | Performed by: FAMILY MEDICINE

## 2023-01-01 PROCEDURE — 97162 PT EVAL MOD COMPLEX 30 MIN: CPT

## 2023-01-01 PROCEDURE — 86923 COMPATIBILITY TEST ELECTRIC: CPT | Performed by: INTERNAL MEDICINE

## 2023-01-01 PROCEDURE — 3075F SYST BP GE 130 - 139MM HG: CPT | Mod: CPTII,,, | Performed by: NURSE PRACTITIONER

## 2023-01-01 PROCEDURE — 3066F PR DOCUMENTATION OF TREATMENT FOR NEPHROPATHY: ICD-10-PCS | Mod: CPTII,,, | Performed by: FAMILY MEDICINE

## 2023-01-01 PROCEDURE — 88312 SPECIAL STAINS GROUP 1: CPT

## 2023-01-01 PROCEDURE — 3008F BODY MASS INDEX DOCD: CPT | Mod: CPTII,,, | Performed by: FAMILY MEDICINE

## 2023-01-01 PROCEDURE — D9220A PRA ANESTHESIA: ICD-10-PCS | Mod: ANES,,, | Performed by: ANESTHESIOLOGY

## 2023-01-01 PROCEDURE — 80053 COMPREHEN METABOLIC PANEL: CPT | Performed by: STUDENT IN AN ORGANIZED HEALTH CARE EDUCATION/TRAINING PROGRAM

## 2023-01-01 PROCEDURE — 3079F PR MOST RECENT DIASTOLIC BLOOD PRESSURE 80-89 MM HG: ICD-10-PCS | Mod: CPTII,,, | Performed by: FAMILY MEDICINE

## 2023-01-01 PROCEDURE — 85025 COMPLETE CBC W/AUTO DIFF WBC: CPT

## 2023-01-01 PROCEDURE — 11000001 HC ACUTE MED/SURG PRIVATE ROOM

## 2023-01-01 PROCEDURE — 36430 TRANSFUSION BLD/BLD COMPNT: CPT

## 2023-01-01 PROCEDURE — 87340 HEPATITIS B SURFACE AG IA: CPT | Performed by: NURSE PRACTITIONER

## 2023-01-01 PROCEDURE — 90935 HEMODIALYSIS ONE EVALUATION: CPT

## 2023-01-01 PROCEDURE — D9220A PRA ANESTHESIA: Mod: ANES,,, | Performed by: ANESTHESIOLOGY

## 2023-01-01 PROCEDURE — 85025 COMPLETE CBC W/AUTO DIFF WBC: CPT | Performed by: EMERGENCY MEDICINE

## 2023-01-01 PROCEDURE — 3066F NEPHROPATHY DOC TX: CPT | Mod: CPTII,,, | Performed by: NURSE PRACTITIONER

## 2023-01-01 PROCEDURE — 87088 URINE BACTERIA CULTURE: CPT | Performed by: PHYSICIAN ASSISTANT

## 2023-01-01 PROCEDURE — 36600 WITHDRAWAL OF ARTERIAL BLOOD: CPT

## 2023-01-01 PROCEDURE — 1125F AMNT PAIN NOTED PAIN PRSNT: CPT | Mod: CPTII,,, | Performed by: FAMILY MEDICINE

## 2023-01-01 PROCEDURE — 1101F PR PT FALLS ASSESS DOC 0-1 FALLS W/OUT INJ PAST YR: ICD-10-PCS | Mod: CPTII,,, | Performed by: NURSE PRACTITIONER

## 2023-01-01 PROCEDURE — 80069 RENAL FUNCTION PANEL: CPT | Performed by: STUDENT IN AN ORGANIZED HEALTH CARE EDUCATION/TRAINING PROGRAM

## 2023-01-01 PROCEDURE — 1159F MED LIST DOCD IN RCRD: CPT | Mod: CPTII,,, | Performed by: NURSE PRACTITIONER

## 2023-01-01 PROCEDURE — 99285 EMERGENCY DEPT VISIT HI MDM: CPT | Mod: 25

## 2023-01-01 PROCEDURE — 97116 GAIT TRAINING THERAPY: CPT | Mod: CQ

## 2023-01-01 PROCEDURE — 81001 URINALYSIS AUTO W/SCOPE: CPT | Performed by: NURSE PRACTITIONER

## 2023-01-01 PROCEDURE — 80048 BASIC METABOLIC PNL TOTAL CA: CPT | Performed by: INTERNAL MEDICINE

## 2023-01-01 PROCEDURE — 63600175 PHARM REV CODE 636 W HCPCS: Mod: JZ | Performed by: NURSE PRACTITIONER

## 2023-01-01 PROCEDURE — 3288F PR FALLS RISK ASSESSMENT DOCUMENTED: ICD-10-PCS | Mod: CPTII,,, | Performed by: FAMILY MEDICINE

## 2023-01-01 PROCEDURE — 93005 ELECTROCARDIOGRAM TRACING: CPT

## 2023-01-01 PROCEDURE — 86900 BLOOD TYPING SEROLOGIC ABO: CPT | Performed by: STUDENT IN AN ORGANIZED HEALTH CARE EDUCATION/TRAINING PROGRAM

## 2023-01-01 PROCEDURE — 27201423 OPTIME MED/SURG SUP & DEVICES STERILE SUPPLY: Performed by: INTERNAL MEDICINE

## 2023-01-01 PROCEDURE — 80053 COMPREHEN METABOLIC PANEL: CPT | Performed by: UROLOGY

## 2023-01-01 PROCEDURE — 96374 THER/PROPH/DIAG INJ IV PUSH: CPT

## 2023-01-01 PROCEDURE — 3288F PR FALLS RISK ASSESSMENT DOCUMENTED: ICD-10-PCS | Mod: CPTII,,, | Performed by: NURSE PRACTITIONER

## 2023-01-01 PROCEDURE — 99214 OFFICE O/P EST MOD 30 MIN: CPT | Mod: ,,, | Performed by: FAMILY MEDICINE

## 2023-01-01 PROCEDURE — 85025 COMPLETE CBC W/AUTO DIFF WBC: CPT | Performed by: PHYSICIAN ASSISTANT

## 2023-01-01 PROCEDURE — 88313 SPECIAL STAINS GROUP 2: CPT

## 2023-01-01 PROCEDURE — 82140 ASSAY OF AMMONIA: CPT | Performed by: INTERNAL MEDICINE

## 2023-01-01 PROCEDURE — 88305 TISSUE EXAM BY PATHOLOGIST: CPT | Performed by: INTERNAL MEDICINE

## 2023-01-01 PROCEDURE — 99496 TRANSITIONAL CARE MANAGE SERVICE 7 DAY DISCHARGE: ICD-10-PCS | Mod: ,,, | Performed by: NURSE PRACTITIONER

## 2023-01-01 PROCEDURE — 1126F PR PAIN SEVERITY QUANTIFIED, NO PAIN PRESENT: ICD-10-PCS | Mod: CPTII,,, | Performed by: NURSE PRACTITIONER

## 2023-01-01 PROCEDURE — 1159F PR MEDICATION LIST DOCUMENTED IN MEDICAL RECORD: ICD-10-PCS | Mod: CPTII,,, | Performed by: FAMILY MEDICINE

## 2023-01-01 PROCEDURE — 96365 THER/PROPH/DIAG IV INF INIT: CPT

## 2023-01-01 PROCEDURE — 1160F PR REVIEW ALL MEDS BY PRESCRIBER/CLIN PHARMACIST DOCUMENTED: ICD-10-PCS | Mod: CPTII,,, | Performed by: NURSE PRACTITIONER

## 2023-01-01 PROCEDURE — 3008F BODY MASS INDEX DOCD: CPT | Mod: CPTII,,, | Performed by: NURSE PRACTITIONER

## 2023-01-01 PROCEDURE — 1160F RVW MEDS BY RX/DR IN RCRD: CPT | Mod: CPTII,,, | Performed by: FAMILY MEDICINE

## 2023-01-01 PROCEDURE — 99496 TRANSJ CARE MGMT HIGH F2F 7D: CPT | Mod: ,,, | Performed by: NURSE PRACTITIONER

## 2023-01-01 PROCEDURE — 25000003 PHARM REV CODE 250: Performed by: EMERGENCY MEDICINE

## 2023-01-01 PROCEDURE — 80053 COMPREHEN METABOLIC PANEL: CPT | Performed by: INTERNAL MEDICINE

## 2023-01-01 PROCEDURE — 1159F MED LIST DOCD IN RCRD: CPT | Mod: CPTII,,, | Performed by: FAMILY MEDICINE

## 2023-01-01 PROCEDURE — 80053 COMPREHEN METABOLIC PANEL: CPT | Performed by: PHYSICIAN ASSISTANT

## 2023-01-01 PROCEDURE — 3288F FALL RISK ASSESSMENT DOCD: CPT | Mod: CPTII,,, | Performed by: FAMILY MEDICINE

## 2023-01-01 PROCEDURE — 86923 COMPATIBILITY TEST ELECTRIC: CPT | Performed by: STUDENT IN AN ORGANIZED HEALTH CARE EDUCATION/TRAINING PROGRAM

## 2023-01-01 PROCEDURE — 3078F PR MOST RECENT DIASTOLIC BLOOD PRESSURE < 80 MM HG: ICD-10-PCS | Mod: CPTII,,, | Performed by: NURSE PRACTITIONER

## 2023-01-01 PROCEDURE — 86706 HEP B SURFACE ANTIBODY: CPT | Mod: 90 | Performed by: NURSE PRACTITIONER

## 2023-01-01 PROCEDURE — 82803 BLOOD GASES ANY COMBINATION: CPT

## 2023-01-01 PROCEDURE — 81001 URINALYSIS AUTO W/SCOPE: CPT | Performed by: PHYSICIAN ASSISTANT

## 2023-01-01 PROCEDURE — 3075F PR MOST RECENT SYSTOLIC BLOOD PRESS GE 130-139MM HG: ICD-10-PCS | Mod: CPTII,,, | Performed by: NURSE PRACTITIONER

## 2023-01-01 PROCEDURE — 97166 OT EVAL MOD COMPLEX 45 MIN: CPT

## 2023-01-01 PROCEDURE — 3008F PR BODY MASS INDEX (BMI) DOCUMENTED: ICD-10-PCS | Mod: CPTII,,, | Performed by: FAMILY MEDICINE

## 2023-01-01 PROCEDURE — 37000008 HC ANESTHESIA 1ST 15 MINUTES: Performed by: INTERNAL MEDICINE

## 2023-01-01 PROCEDURE — 80053 COMPREHEN METABOLIC PANEL: CPT | Performed by: NURSE PRACTITIONER

## 2023-01-01 PROCEDURE — 85610 PROTHROMBIN TIME: CPT | Performed by: NURSE PRACTITIONER

## 2023-01-01 PROCEDURE — 99283 EMERGENCY DEPT VISIT LOW MDM: CPT

## 2023-01-01 PROCEDURE — 3078F DIAST BP <80 MM HG: CPT | Mod: CPTII,,, | Performed by: FAMILY MEDICINE

## 2023-01-01 PROCEDURE — 1160F RVW MEDS BY RX/DR IN RCRD: CPT | Mod: CPTII,,, | Performed by: NURSE PRACTITIONER

## 2023-01-01 PROCEDURE — 84100 ASSAY OF PHOSPHORUS: CPT | Performed by: NURSE PRACTITIONER

## 2023-01-01 PROCEDURE — 85027 COMPLETE CBC AUTOMATED: CPT | Performed by: NURSE PRACTITIONER

## 2023-01-01 PROCEDURE — 1126F AMNT PAIN NOTED NONE PRSNT: CPT | Mod: CPTII,,, | Performed by: NURSE PRACTITIONER

## 2023-01-01 PROCEDURE — 1101F PT FALLS ASSESS-DOCD LE1/YR: CPT | Mod: CPTII,,, | Performed by: NURSE PRACTITIONER

## 2023-01-01 PROCEDURE — 99214 PR OFFICE/OUTPT VISIT, EST, LEVL IV, 30-39 MIN: ICD-10-PCS | Mod: ,,, | Performed by: FAMILY MEDICINE

## 2023-01-01 PROCEDURE — 87077 CULTURE AEROBIC IDENTIFY: CPT | Performed by: STUDENT IN AN ORGANIZED HEALTH CARE EDUCATION/TRAINING PROGRAM

## 2023-01-01 PROCEDURE — P9016 RBC LEUKOCYTES REDUCED: HCPCS | Performed by: INTERNAL MEDICINE

## 2023-01-01 PROCEDURE — 25000003 PHARM REV CODE 250: Performed by: HOSPITALIST

## 2023-01-01 PROCEDURE — 84153 ASSAY OF PSA TOTAL: CPT | Performed by: INTERNAL MEDICINE

## 2023-01-01 PROCEDURE — 25000003 PHARM REV CODE 250

## 2023-01-01 PROCEDURE — D9220A PRA ANESTHESIA: Mod: CRNA,,, | Performed by: NURSE ANESTHETIST, CERTIFIED REGISTERED

## 2023-01-01 PROCEDURE — 3066F NEPHROPATHY DOC TX: CPT | Mod: CPTII,,, | Performed by: FAMILY MEDICINE

## 2023-01-01 PROCEDURE — 43239 EGD BIOPSY SINGLE/MULTIPLE: CPT | Performed by: INTERNAL MEDICINE

## 2023-01-01 PROCEDURE — 63600175 PHARM REV CODE 636 W HCPCS: Mod: JZ | Performed by: INTERNAL MEDICINE

## 2023-01-01 PROCEDURE — C9113 INJ PANTOPRAZOLE SODIUM, VIA: HCPCS | Performed by: STUDENT IN AN ORGANIZED HEALTH CARE EDUCATION/TRAINING PROGRAM

## 2023-01-01 PROCEDURE — 3078F PR MOST RECENT DIASTOLIC BLOOD PRESSURE < 80 MM HG: ICD-10-PCS | Mod: CPTII,,, | Performed by: FAMILY MEDICINE

## 2023-01-01 PROCEDURE — P9016 RBC LEUKOCYTES REDUCED: HCPCS | Performed by: STUDENT IN AN ORGANIZED HEALTH CARE EDUCATION/TRAINING PROGRAM

## 2023-01-01 PROCEDURE — 85610 PROTHROMBIN TIME: CPT | Performed by: EMERGENCY MEDICINE

## 2023-01-01 PROCEDURE — 99900035 HC TECH TIME PER 15 MIN (STAT)

## 2023-01-01 PROCEDURE — 83605 ASSAY OF LACTIC ACID: CPT | Performed by: STUDENT IN AN ORGANIZED HEALTH CARE EDUCATION/TRAINING PROGRAM

## 2023-01-01 PROCEDURE — 25000003 PHARM REV CODE 250: Performed by: NURSE ANESTHETIST, CERTIFIED REGISTERED

## 2023-01-01 PROCEDURE — 87186 SC STD MICRODIL/AGAR DIL: CPT | Performed by: PHYSICIAN ASSISTANT

## 2023-01-01 PROCEDURE — 80048 BASIC METABOLIC PNL TOTAL CA: CPT | Performed by: EMERGENCY MEDICINE

## 2023-01-01 PROCEDURE — 97165 OT EVAL LOW COMPLEX 30 MIN: CPT

## 2023-01-01 PROCEDURE — 3066F PR DOCUMENTATION OF TREATMENT FOR NEPHROPATHY: ICD-10-PCS | Mod: CPTII,,, | Performed by: NURSE PRACTITIONER

## 2023-01-01 PROCEDURE — 87040 BLOOD CULTURE FOR BACTERIA: CPT | Performed by: HOSPITALIST

## 2023-01-01 PROCEDURE — 83735 ASSAY OF MAGNESIUM: CPT | Performed by: STUDENT IN AN ORGANIZED HEALTH CARE EDUCATION/TRAINING PROGRAM

## 2023-01-01 PROCEDURE — 3288F FALL RISK ASSESSMENT DOCD: CPT | Mod: CPTII,,, | Performed by: NURSE PRACTITIONER

## 2023-01-01 PROCEDURE — 85025 COMPLETE CBC W/AUTO DIFF WBC: CPT | Performed by: HOSPITALIST

## 2023-01-01 PROCEDURE — 3079F DIAST BP 80-89 MM HG: CPT | Mod: CPTII,,, | Performed by: FAMILY MEDICINE

## 2023-01-01 PROCEDURE — 85025 COMPLETE CBC W/AUTO DIFF WBC: CPT | Performed by: UROLOGY

## 2023-01-01 RX ORDER — BISACODYL 5 MG
20 TABLET, DELAYED RELEASE (ENTERIC COATED) ORAL ONCE
Status: COMPLETED | OUTPATIENT
Start: 2023-01-01 | End: 2023-01-01

## 2023-01-01 RX ORDER — CLONIDINE HYDROCHLORIDE 0.2 MG/1
0.2 TABLET ORAL 3 TIMES DAILY PRN
Status: DISCONTINUED | OUTPATIENT
Start: 2023-01-01 | End: 2023-01-01 | Stop reason: HOSPADM

## 2023-01-01 RX ORDER — DOXAZOSIN 4 MG/1
8 TABLET ORAL NIGHTLY
Status: DISCONTINUED | OUTPATIENT
Start: 2023-01-01 | End: 2023-01-01 | Stop reason: HOSPADM

## 2023-01-01 RX ORDER — SODIUM CHLORIDE, SODIUM LACTATE, POTASSIUM CHLORIDE, CALCIUM CHLORIDE 600; 310; 30; 20 MG/100ML; MG/100ML; MG/100ML; MG/100ML
INJECTION, SOLUTION INTRAVENOUS CONTINUOUS
Status: CANCELLED | OUTPATIENT
Start: 2023-01-01

## 2023-01-01 RX ORDER — HYOSCYAMINE SULFATE 0.12 MG/1
0.12 TABLET SUBLINGUAL EVERY 4 HOURS PRN
Status: DISCONTINUED | OUTPATIENT
Start: 2023-01-01 | End: 2023-01-01 | Stop reason: HOSPADM

## 2023-01-01 RX ORDER — IBUPROFEN 200 MG
24 TABLET ORAL
Status: DISCONTINUED | OUTPATIENT
Start: 2023-01-01 | End: 2023-01-01 | Stop reason: HOSPADM

## 2023-01-01 RX ORDER — POLYETHYLENE GLYCOL 3350 17 G/17G
17 POWDER, FOR SOLUTION ORAL DAILY
Status: DISCONTINUED | OUTPATIENT
Start: 2023-01-01 | End: 2023-01-01

## 2023-01-01 RX ORDER — ONDANSETRON 4 MG/1
4 TABLET, ORALLY DISINTEGRATING ORAL EVERY 6 HOURS PRN
Status: DISCONTINUED | OUTPATIENT
Start: 2023-01-01 | End: 2023-01-01

## 2023-01-01 RX ORDER — PANTOPRAZOLE SODIUM 40 MG/1
40 TABLET, DELAYED RELEASE ORAL
Status: DISCONTINUED | OUTPATIENT
Start: 2023-01-01 | End: 2023-01-01

## 2023-01-01 RX ORDER — DIPHENHYDRAMINE HYDROCHLORIDE 50 MG/ML
25 INJECTION INTRAMUSCULAR; INTRAVENOUS
Status: COMPLETED | OUTPATIENT
Start: 2023-01-01 | End: 2023-01-01

## 2023-01-01 RX ORDER — SODIUM CHLORIDE 9 MG/ML
INJECTION, SOLUTION INTRAVENOUS
Status: DISCONTINUED | OUTPATIENT
Start: 2023-01-01 | End: 2023-01-01 | Stop reason: HOSPADM

## 2023-01-01 RX ORDER — BUSPIRONE HYDROCHLORIDE 5 MG/1
10 TABLET ORAL DAILY
Status: DISCONTINUED | OUTPATIENT
Start: 2023-01-01 | End: 2023-01-01 | Stop reason: HOSPADM

## 2023-01-01 RX ORDER — NALOXONE HYDROCHLORIDE 4 MG/.1ML
SPRAY NASAL
COMMUNITY
Start: 2022-12-03

## 2023-01-01 RX ORDER — SEVELAMER CARBONATE 800 MG/1
800 TABLET, FILM COATED ORAL 3 TIMES DAILY
Status: DISCONTINUED | OUTPATIENT
Start: 2023-01-01 | End: 2023-01-01 | Stop reason: HOSPADM

## 2023-01-01 RX ORDER — MUPIROCIN 20 MG/G
OINTMENT TOPICAL 2 TIMES DAILY
Status: DISPENSED | OUTPATIENT
Start: 2023-01-01 | End: 2023-01-01

## 2023-01-01 RX ORDER — IBUPROFEN 200 MG
16 TABLET ORAL
Status: DISCONTINUED | OUTPATIENT
Start: 2023-01-01 | End: 2023-01-01 | Stop reason: HOSPADM

## 2023-01-01 RX ORDER — FLAVOXATE HYDROCHLORIDE 100 MG/1
200 TABLET ORAL 4 TIMES DAILY
Status: DISCONTINUED | OUTPATIENT
Start: 2023-01-01 | End: 2023-01-01 | Stop reason: HOSPADM

## 2023-01-01 RX ORDER — ONDANSETRON 2 MG/ML
INJECTION INTRAMUSCULAR; INTRAVENOUS
Status: COMPLETED
Start: 2023-01-01 | End: 2023-01-01

## 2023-01-01 RX ORDER — CARVEDILOL 12.5 MG/1
12.5 TABLET ORAL 2 TIMES DAILY
Status: DISCONTINUED | OUTPATIENT
Start: 2023-01-01 | End: 2023-01-01 | Stop reason: HOSPADM

## 2023-01-01 RX ORDER — CETIRIZINE HYDROCHLORIDE 5 MG/5ML
5 SOLUTION ORAL DAILY
Status: DISCONTINUED | OUTPATIENT
Start: 2023-01-01 | End: 2023-01-01 | Stop reason: HOSPADM

## 2023-01-01 RX ORDER — MORPHINE SULFATE 4 MG/ML
2 INJECTION, SOLUTION INTRAMUSCULAR; INTRAVENOUS EVERY 4 HOURS PRN
Status: DISCONTINUED | OUTPATIENT
Start: 2023-01-01 | End: 2023-01-01 | Stop reason: HOSPADM

## 2023-01-01 RX ORDER — BISACODYL 5 MG
5 TABLET, DELAYED RELEASE (ENTERIC COATED) ORAL ONCE
Status: DISCONTINUED | OUTPATIENT
Start: 2023-01-01 | End: 2023-01-01

## 2023-01-01 RX ORDER — OXYCODONE AND ACETAMINOPHEN 10; 325 MG/1; MG/1
1 TABLET ORAL EVERY 4 HOURS PRN
Status: DISCONTINUED | OUTPATIENT
Start: 2023-01-01 | End: 2023-01-01 | Stop reason: HOSPADM

## 2023-01-01 RX ORDER — ACETAMINOPHEN 650 MG/1
650 SUPPOSITORY RECTAL EVERY 6 HOURS PRN
Status: DISCONTINUED | OUTPATIENT
Start: 2023-01-01 | End: 2023-01-01 | Stop reason: HOSPADM

## 2023-01-01 RX ORDER — FERROUS GLUCONATE 324(38)MG
1 TABLET ORAL DAILY
Qty: 30 TABLET | Refills: 3 | Status: SHIPPED | OUTPATIENT
Start: 2023-01-01

## 2023-01-01 RX ORDER — PROCHLORPERAZINE EDISYLATE 5 MG/ML
5 INJECTION INTRAMUSCULAR; INTRAVENOUS EVERY 4 HOURS PRN
Status: DISCONTINUED | OUTPATIENT
Start: 2023-01-01 | End: 2023-01-01 | Stop reason: HOSPADM

## 2023-01-01 RX ORDER — BISACODYL 10 MG
10 SUPPOSITORY, RECTAL RECTAL DAILY PRN
Status: DISCONTINUED | OUTPATIENT
Start: 2023-01-01 | End: 2023-01-01 | Stop reason: HOSPADM

## 2023-01-01 RX ORDER — TRAZODONE HYDROCHLORIDE 50 MG/1
50 TABLET ORAL NIGHTLY
Status: DISCONTINUED | OUTPATIENT
Start: 2023-01-01 | End: 2023-01-01 | Stop reason: HOSPADM

## 2023-01-01 RX ORDER — DOCUSATE SODIUM 100 MG/1
100 CAPSULE, LIQUID FILLED ORAL DAILY
Status: DISCONTINUED | OUTPATIENT
Start: 2023-01-01 | End: 2023-01-01

## 2023-01-01 RX ORDER — ONDANSETRON 2 MG/ML
4 INJECTION INTRAMUSCULAR; INTRAVENOUS
Status: COMPLETED | OUTPATIENT
Start: 2023-01-01 | End: 2023-01-01

## 2023-01-01 RX ORDER — ONDANSETRON 2 MG/ML
4 INJECTION INTRAMUSCULAR; INTRAVENOUS DAILY PRN
Status: CANCELLED | OUTPATIENT
Start: 2023-01-01

## 2023-01-01 RX ORDER — SODIUM CHLORIDE, SODIUM GLUCONATE, SODIUM ACETATE, POTASSIUM CHLORIDE AND MAGNESIUM CHLORIDE 30; 37; 368; 526; 502 MG/100ML; MG/100ML; MG/100ML; MG/100ML; MG/100ML
INJECTION, SOLUTION INTRAVENOUS CONTINUOUS
Status: CANCELLED | OUTPATIENT
Start: 2023-01-01 | End: 2023-01-01

## 2023-01-01 RX ORDER — HYDROCODONE BITARTRATE AND ACETAMINOPHEN 10; 325 MG/1; MG/1
1 TABLET ORAL EVERY 6 HOURS PRN
Status: DISCONTINUED | OUTPATIENT
Start: 2023-01-01 | End: 2023-01-01 | Stop reason: HOSPADM

## 2023-01-01 RX ORDER — AMLODIPINE BESYLATE 10 MG/1
10 TABLET ORAL DAILY
Qty: 90 TABLET | Refills: 3 | Status: SHIPPED | OUTPATIENT
Start: 2023-01-01 | End: 2023-01-01 | Stop reason: SDUPTHER

## 2023-01-01 RX ORDER — SODIUM CHLORIDE 9 MG/ML
INJECTION, SOLUTION INTRAVENOUS ONCE
Status: COMPLETED | OUTPATIENT
Start: 2023-01-01 | End: 2023-01-01

## 2023-01-01 RX ORDER — HYDROCODONE BITARTRATE AND ACETAMINOPHEN 7.5; 325 MG/1; MG/1
1 TABLET ORAL EVERY 6 HOURS PRN
Status: DISCONTINUED | OUTPATIENT
Start: 2023-01-01 | End: 2023-01-01

## 2023-01-01 RX ORDER — CETIRIZINE HYDROCHLORIDE 10 MG/1
10 TABLET ORAL DAILY
Status: DISCONTINUED | OUTPATIENT
Start: 2023-01-01 | End: 2023-01-01

## 2023-01-01 RX ORDER — ADHESIVE BANDAGE
30 BANDAGE TOPICAL ONCE
Status: COMPLETED | OUTPATIENT
Start: 2023-01-01 | End: 2023-01-01

## 2023-01-01 RX ORDER — LABETALOL HYDROCHLORIDE 5 MG/ML
10 INJECTION, SOLUTION INTRAVENOUS EVERY 4 HOURS PRN
Status: DISCONTINUED | OUTPATIENT
Start: 2023-01-01 | End: 2023-01-01 | Stop reason: HOSPADM

## 2023-01-01 RX ORDER — PROPOFOL 10 MG/ML
VIAL (ML) INTRAVENOUS
Status: DISCONTINUED | OUTPATIENT
Start: 2023-01-01 | End: 2023-01-01

## 2023-01-01 RX ORDER — LIDOCAINE HYDROCHLORIDE 20 MG/ML
JELLY TOPICAL
Status: DISPENSED | OUTPATIENT
Start: 2023-01-01 | End: 2023-01-01

## 2023-01-01 RX ORDER — PANTOPRAZOLE SODIUM 40 MG/1
40 TABLET, DELAYED RELEASE ORAL 2 TIMES DAILY
Status: DISCONTINUED | OUTPATIENT
Start: 2023-01-01 | End: 2023-01-01 | Stop reason: HOSPADM

## 2023-01-01 RX ORDER — PANTOPRAZOLE SODIUM 40 MG/10ML
40 INJECTION, POWDER, LYOPHILIZED, FOR SOLUTION INTRAVENOUS 2 TIMES DAILY
Status: DISCONTINUED | OUTPATIENT
Start: 2023-01-01 | End: 2023-01-01

## 2023-01-01 RX ORDER — AMLODIPINE BESYLATE 5 MG/1
5 TABLET ORAL DAILY
Status: DISCONTINUED | OUTPATIENT
Start: 2023-01-01 | End: 2023-01-01 | Stop reason: HOSPADM

## 2023-01-01 RX ORDER — HYDROCODONE BITARTRATE AND ACETAMINOPHEN 10; 325 MG/1; MG/1
1 TABLET ORAL
Status: COMPLETED | OUTPATIENT
Start: 2023-01-01 | End: 2023-01-01

## 2023-01-01 RX ORDER — BUSPIRONE HYDROCHLORIDE 10 MG/1
10 TABLET ORAL DAILY
Qty: 30 TABLET | Refills: 3 | Status: SHIPPED | OUTPATIENT
Start: 2023-01-01 | End: 2023-01-01

## 2023-01-01 RX ORDER — AMLODIPINE BESYLATE 5 MG/1
10 TABLET ORAL DAILY
Status: DISCONTINUED | OUTPATIENT
Start: 2023-01-01 | End: 2023-01-01 | Stop reason: HOSPADM

## 2023-01-01 RX ORDER — LIDOCAINE HYDROCHLORIDE 20 MG/ML
INJECTION, SOLUTION EPIDURAL; INFILTRATION; INTRACAUDAL; PERINEURAL
Status: DISCONTINUED | OUTPATIENT
Start: 2023-01-01 | End: 2023-01-01

## 2023-01-01 RX ORDER — ACETAMINOPHEN 325 MG/1
650 TABLET ORAL EVERY 4 HOURS PRN
Status: DISCONTINUED | OUTPATIENT
Start: 2023-01-01 | End: 2023-01-01 | Stop reason: HOSPADM

## 2023-01-01 RX ORDER — HYDROCODONE BITARTRATE AND ACETAMINOPHEN 500; 5 MG/1; MG/1
TABLET ORAL
Status: DISCONTINUED | OUTPATIENT
Start: 2023-01-01 | End: 2023-01-01 | Stop reason: HOSPADM

## 2023-01-01 RX ORDER — HYDROCODONE BITARTRATE AND ACETAMINOPHEN 10; 325 MG/1; MG/1
1 TABLET ORAL EVERY 8 HOURS PRN
Qty: 90 TABLET | Refills: 0 | OUTPATIENT
Start: 2023-01-01

## 2023-01-01 RX ORDER — AMLODIPINE BESYLATE 10 MG/1
10 TABLET ORAL DAILY
Qty: 90 TABLET | Refills: 3 | Status: ON HOLD | OUTPATIENT
Start: 2023-01-01 | End: 2024-01-01

## 2023-01-01 RX ORDER — HYDRALAZINE HYDROCHLORIDE 50 MG/1
TABLET, FILM COATED ORAL
Qty: 270 TABLET | Refills: 3 | Status: SHIPPED | OUTPATIENT
Start: 2023-01-01 | End: 2024-01-01

## 2023-01-01 RX ORDER — CLONIDINE HYDROCHLORIDE 0.1 MG/1
0.1 TABLET ORAL
Status: COMPLETED | OUTPATIENT
Start: 2023-01-01 | End: 2023-01-01

## 2023-01-01 RX ORDER — PANTOPRAZOLE SODIUM 40 MG/1
40 TABLET, DELAYED RELEASE ORAL DAILY
Status: DISCONTINUED | OUTPATIENT
Start: 2023-01-01 | End: 2023-01-01 | Stop reason: HOSPADM

## 2023-01-01 RX ORDER — HYOSCYAMINE SULFATE 0.12 MG/1
0.12 TABLET SUBLINGUAL EVERY 4 HOURS PRN
Qty: 30 TABLET | Refills: 1 | Status: SHIPPED | OUTPATIENT
Start: 2023-01-01 | End: 2023-01-01 | Stop reason: ALTCHOICE

## 2023-01-01 RX ORDER — ONDANSETRON 2 MG/ML
4 INJECTION INTRAMUSCULAR; INTRAVENOUS EVERY 8 HOURS PRN
Status: DISCONTINUED | OUTPATIENT
Start: 2023-01-01 | End: 2023-01-01 | Stop reason: HOSPADM

## 2023-01-01 RX ORDER — BISACODYL 5 MG
20 TABLET, DELAYED RELEASE (ENTERIC COATED) ORAL ONCE
Status: DISCONTINUED | OUTPATIENT
Start: 2023-01-01 | End: 2023-01-01

## 2023-01-01 RX ORDER — MAG HYDROX/ALUMINUM HYD/SIMETH 200-200-20
30 SUSPENSION, ORAL (FINAL DOSE FORM) ORAL
Status: DISCONTINUED | OUTPATIENT
Start: 2023-01-01 | End: 2023-01-01 | Stop reason: HOSPADM

## 2023-01-01 RX ORDER — MORPHINE SULFATE 4 MG/ML
INJECTION, SOLUTION INTRAMUSCULAR; INTRAVENOUS
Status: COMPLETED
Start: 2023-01-01 | End: 2023-01-01

## 2023-01-01 RX ORDER — HYDRALAZINE HYDROCHLORIDE 20 MG/ML
10 INJECTION INTRAMUSCULAR; INTRAVENOUS EVERY 4 HOURS PRN
Status: DISCONTINUED | OUTPATIENT
Start: 2023-01-01 | End: 2023-01-01 | Stop reason: HOSPADM

## 2023-01-01 RX ORDER — ONDANSETRON 2 MG/ML
4 INJECTION INTRAMUSCULAR; INTRAVENOUS EVERY 4 HOURS PRN
Status: DISCONTINUED | OUTPATIENT
Start: 2023-01-01 | End: 2023-01-01

## 2023-01-01 RX ORDER — PANTOPRAZOLE SODIUM 40 MG/10ML
40 INJECTION, POWDER, LYOPHILIZED, FOR SOLUTION INTRAVENOUS
Status: COMPLETED | OUTPATIENT
Start: 2023-01-01 | End: 2023-01-01

## 2023-01-01 RX ORDER — CARVEDILOL 12.5 MG/1
12.5 TABLET ORAL 2 TIMES DAILY
Qty: 180 TABLET | Refills: 3 | Status: SHIPPED | OUTPATIENT
Start: 2023-01-01 | End: 2024-08-21

## 2023-01-01 RX ORDER — MORPHINE SULFATE 4 MG/ML
4 INJECTION, SOLUTION INTRAMUSCULAR; INTRAVENOUS
Status: COMPLETED | OUTPATIENT
Start: 2023-01-01 | End: 2023-01-01

## 2023-01-01 RX ORDER — TAMSULOSIN HYDROCHLORIDE 0.4 MG/1
0.4 CAPSULE ORAL 2 TIMES DAILY
Status: DISCONTINUED | OUTPATIENT
Start: 2023-01-01 | End: 2023-01-01

## 2023-01-01 RX ORDER — POLYETHYLENE GLYCOL 3350 17 G/17G
17 POWDER, FOR SOLUTION ORAL 2 TIMES DAILY
Status: DISCONTINUED | OUTPATIENT
Start: 2023-01-01 | End: 2023-01-01

## 2023-01-01 RX ORDER — ACETAMINOPHEN 325 MG/1
650 TABLET ORAL EVERY 8 HOURS PRN
Status: DISCONTINUED | OUTPATIENT
Start: 2023-01-01 | End: 2023-01-01 | Stop reason: HOSPADM

## 2023-01-01 RX ORDER — BUSPIRONE HYDROCHLORIDE 10 MG/1
10 TABLET ORAL
Qty: 30 TABLET | Refills: 0 | Status: ON HOLD | OUTPATIENT
Start: 2023-01-01 | End: 2024-01-01 | Stop reason: HOSPADM

## 2023-01-01 RX ORDER — POLYETHYLENE GLYCOL 3350 17 G/17G
17 POWDER, FOR SOLUTION ORAL DAILY
Status: DISCONTINUED | OUTPATIENT
Start: 2023-01-01 | End: 2023-01-01 | Stop reason: HOSPADM

## 2023-01-01 RX ORDER — DOCUSATE SODIUM 100 MG/1
100 CAPSULE, LIQUID FILLED ORAL DAILY PRN
Status: DISCONTINUED | OUTPATIENT
Start: 2023-01-01 | End: 2023-01-01 | Stop reason: HOSPADM

## 2023-01-01 RX ORDER — SUCROFERRIC OXYHYDROXIDE 500 MG/1
TABLET, CHEWABLE ORAL
Status: ON HOLD | COMMUNITY
Start: 2023-03-22 | End: 2023-01-01 | Stop reason: HOSPADM

## 2023-01-01 RX ORDER — LANOLIN ALCOHOL/MO/W.PET/CERES
1 CREAM (GRAM) TOPICAL DAILY
Status: DISCONTINUED | OUTPATIENT
Start: 2023-01-01 | End: 2023-01-01 | Stop reason: HOSPADM

## 2023-01-01 RX ORDER — SODIUM CHLORIDE 9 MG/ML
INJECTION, SOLUTION INTRAVENOUS CONTINUOUS
Status: CANCELLED | OUTPATIENT
Start: 2023-01-01

## 2023-01-01 RX ORDER — BISACODYL 10 MG
10 SUPPOSITORY, RECTAL RECTAL ONCE
Status: DISCONTINUED | OUTPATIENT
Start: 2023-01-01 | End: 2023-01-01 | Stop reason: HOSPADM

## 2023-01-01 RX ORDER — ONDANSETRON 4 MG/1
4 TABLET, ORALLY DISINTEGRATING ORAL EVERY 4 HOURS PRN
Status: DISCONTINUED | OUTPATIENT
Start: 2023-01-01 | End: 2023-01-01 | Stop reason: HOSPADM

## 2023-01-01 RX ORDER — DOCUSATE SODIUM 100 MG/1
100 CAPSULE, LIQUID FILLED ORAL 2 TIMES DAILY PRN
Status: DISCONTINUED | OUTPATIENT
Start: 2023-01-01 | End: 2023-01-01

## 2023-01-01 RX ORDER — GLUCAGON 1 MG
1 KIT INJECTION
Status: DISCONTINUED | OUTPATIENT
Start: 2023-01-01 | End: 2023-01-01 | Stop reason: HOSPADM

## 2023-01-01 RX ORDER — METOPROLOL TARTRATE 1 MG/ML
5 INJECTION, SOLUTION INTRAVENOUS EVERY 4 HOURS PRN
Status: DISCONTINUED | OUTPATIENT
Start: 2023-01-01 | End: 2023-01-01 | Stop reason: HOSPADM

## 2023-01-01 RX ORDER — HYDROCODONE BITARTRATE AND ACETAMINOPHEN 10; 325 MG/1; MG/1
1 TABLET ORAL EVERY 8 HOURS PRN
Status: DISCONTINUED | OUTPATIENT
Start: 2023-01-01 | End: 2023-01-01

## 2023-01-01 RX ADMIN — OXYCODONE AND ACETAMINOPHEN 1 TABLET: 10; 325 TABLET ORAL at 05:07

## 2023-01-01 RX ADMIN — MUPIROCIN: 20 OINTMENT TOPICAL at 08:04

## 2023-01-01 RX ADMIN — MUPIROCIN: 20 OINTMENT TOPICAL at 09:04

## 2023-01-01 RX ADMIN — CARVEDILOL 12.5 MG: 12.5 TABLET, FILM COATED ORAL at 10:04

## 2023-01-01 RX ADMIN — PANTOPRAZOLE SODIUM 40 MG: 40 INJECTION, POWDER, FOR SOLUTION INTRAVENOUS at 09:07

## 2023-01-01 RX ADMIN — Medication 1 TABLET: at 09:04

## 2023-01-01 RX ADMIN — CARVEDILOL 12.5 MG: 12.5 TABLET, FILM COATED ORAL at 09:04

## 2023-01-01 RX ADMIN — FERROUS SULFATE TAB 325 MG (65 MG ELEMENTAL FE) 1 EACH: 325 (65 FE) TAB at 08:07

## 2023-01-01 RX ADMIN — CARVEDILOL 12.5 MG: 12.5 TABLET, FILM COATED ORAL at 09:07

## 2023-01-01 RX ADMIN — CARVEDILOL 12.5 MG: 12.5 TABLET, FILM COATED ORAL at 08:07

## 2023-01-01 RX ADMIN — MUPIROCIN: 20 OINTMENT TOPICAL at 09:07

## 2023-01-01 RX ADMIN — SEVELAMER CARBONATE 800 MG: 800 TABLET, FILM COATED ORAL at 02:07

## 2023-01-01 RX ADMIN — CARVEDILOL 12.5 MG: 12.5 TABLET, FILM COATED ORAL at 08:04

## 2023-01-01 RX ADMIN — PANTOPRAZOLE SODIUM 40 MG: 40 INJECTION, POWDER, FOR SOLUTION INTRAVENOUS at 01:07

## 2023-01-01 RX ADMIN — FERROUS SULFATE TAB 325 MG (65 MG ELEMENTAL FE) 1 EACH: 325 (65 FE) TAB at 09:07

## 2023-01-01 RX ADMIN — HYDROCODONE BITARTRATE AND ACETAMINOPHEN 1 TABLET: 7.5; 325 TABLET ORAL at 01:04

## 2023-01-01 RX ADMIN — HYDROCODONE BITARTRATE AND ACETAMINOPHEN 1 TABLET: 10; 325 TABLET ORAL at 12:04

## 2023-01-01 RX ADMIN — CEFEPIME 1 G: 2 INJECTION, POWDER, FOR SOLUTION INTRAVENOUS at 12:04

## 2023-01-01 RX ADMIN — ALUMINUM HYDROXIDE, MAGNESIUM HYDROXIDE, AND SIMETHICONE 30 ML: 200; 200; 20 SUSPENSION ORAL at 08:04

## 2023-01-01 RX ADMIN — HYDROCODONE BITARTRATE AND ACETAMINOPHEN 1 TABLET: 10; 325 TABLET ORAL at 08:04

## 2023-01-01 RX ADMIN — ALUMINUM HYDROXIDE, MAGNESIUM HYDROXIDE, AND SIMETHICONE 30 ML: 200; 200; 20 SUSPENSION ORAL at 04:04

## 2023-01-01 RX ADMIN — PANTOPRAZOLE SODIUM 40 MG: 40 INJECTION, POWDER, FOR SOLUTION INTRAVENOUS at 11:07

## 2023-01-01 RX ADMIN — POLYETHYLENE GLYCOL 3350 17 G: 17 POWDER, FOR SOLUTION ORAL at 08:07

## 2023-01-01 RX ADMIN — CETIRIZINE HYDROCHLORIDE 10 MG: 10 TABLET, FILM COATED ORAL at 05:04

## 2023-01-01 RX ADMIN — Medication: at 09:07

## 2023-01-01 RX ADMIN — AMLODIPINE BESYLATE 5 MG: 5 TABLET ORAL at 05:04

## 2023-01-01 RX ADMIN — AMLODIPINE BESYLATE 10 MG: 5 TABLET ORAL at 09:07

## 2023-01-01 RX ADMIN — SEVELAMER CARBONATE 800 MG: 800 TABLET, FILM COATED ORAL at 08:07

## 2023-01-01 RX ADMIN — SEVELAMER CARBONATE 800 MG: 800 TABLET, FILM COATED ORAL at 09:07

## 2023-01-01 RX ADMIN — Medication 1 TABLET: at 08:04

## 2023-01-01 RX ADMIN — ALUMINUM HYDROXIDE, MAGNESIUM HYDROXIDE, AND SIMETHICONE 30 ML: 200; 200; 20 SUSPENSION ORAL at 05:04

## 2023-01-01 RX ADMIN — OXYCODONE AND ACETAMINOPHEN 1 TABLET: 10; 325 TABLET ORAL at 12:07

## 2023-01-01 RX ADMIN — ERYTHROPOIETIN 10000 UNITS: 10000 INJECTION, SOLUTION INTRAVENOUS; SUBCUTANEOUS at 03:07

## 2023-01-01 RX ADMIN — OXYCODONE AND ACETAMINOPHEN 1 TABLET: 10; 325 TABLET ORAL at 01:07

## 2023-01-01 RX ADMIN — FLAVOXATE HYDROCHLORIDE 200 MG: 100 TABLET ORAL at 07:04

## 2023-01-01 RX ADMIN — BUSPIRONE HYDROCHLORIDE 10 MG: 5 TABLET ORAL at 10:04

## 2023-01-01 RX ADMIN — CARVEDILOL 12.5 MG: 12.5 TABLET, FILM COATED ORAL at 11:07

## 2023-01-01 RX ADMIN — BISACODYL 20 MG: 5 TABLET, COATED ORAL at 01:07

## 2023-01-01 RX ADMIN — HYDRALAZINE HYDROCHLORIDE 10 MG: 20 INJECTION INTRAMUSCULAR; INTRAVENOUS at 05:04

## 2023-01-01 RX ADMIN — Medication 1 TABLET: at 12:04

## 2023-01-01 RX ADMIN — SEVELAMER CARBONATE 800 MG: 800 TABLET, FILM COATED ORAL at 12:04

## 2023-01-01 RX ADMIN — CEFEPIME 1 G: 1 INJECTION, POWDER, FOR SOLUTION INTRAMUSCULAR; INTRAVENOUS at 05:04

## 2023-01-01 RX ADMIN — OXYCODONE AND ACETAMINOPHEN 1 TABLET: 10; 325 TABLET ORAL at 08:07

## 2023-01-01 RX ADMIN — PANTOPRAZOLE SODIUM 40 MG: 40 TABLET, DELAYED RELEASE ORAL at 09:04

## 2023-01-01 RX ADMIN — FERROUS SULFATE TAB 325 MG (65 MG ELEMENTAL FE) 1 EACH: 325 (65 FE) TAB at 01:07

## 2023-01-01 RX ADMIN — HYDROCODONE BITARTRATE AND ACETAMINOPHEN 1 TABLET: 10; 325 TABLET ORAL at 05:04

## 2023-01-01 RX ADMIN — SEVELAMER CARBONATE 800 MG: 800 TABLET, FILM COATED ORAL at 05:04

## 2023-01-01 RX ADMIN — FLAVOXATE HYDROCHLORIDE 200 MG: 100 TABLET ORAL at 08:04

## 2023-01-01 RX ADMIN — OXYCODONE AND ACETAMINOPHEN 1 TABLET: 10; 325 TABLET ORAL at 04:07

## 2023-01-01 RX ADMIN — SEVELAMER CARBONATE 800 MG: 800 TABLET, FILM COATED ORAL at 06:04

## 2023-01-01 RX ADMIN — ERYTHROPOIETIN 10000 UNITS: 10000 INJECTION, SOLUTION INTRAVENOUS; SUBCUTANEOUS at 09:04

## 2023-01-01 RX ADMIN — ALUMINUM HYDROXIDE, MAGNESIUM HYDROXIDE, AND SIMETHICONE 30 ML: 200; 200; 20 SUSPENSION ORAL at 11:04

## 2023-01-01 RX ADMIN — Medication 1 TABLET: at 05:04

## 2023-01-01 RX ADMIN — HYDROCODONE BITARTRATE AND ACETAMINOPHEN 1 TABLET: 7.5; 325 TABLET ORAL at 06:04

## 2023-01-01 RX ADMIN — DIPHENHYDRAMINE HYDROCHLORIDE 25 MG: 50 INJECTION INTRAMUSCULAR; INTRAVENOUS at 03:07

## 2023-01-01 RX ADMIN — HYDROCODONE BITARTRATE AND ACETAMINOPHEN 1 TABLET: 7.5; 325 TABLET ORAL at 08:04

## 2023-01-01 RX ADMIN — OXYCODONE AND ACETAMINOPHEN 1 TABLET: 10; 325 TABLET ORAL at 02:07

## 2023-01-01 RX ADMIN — CEFEPIME 1 G: 1 INJECTION, POWDER, FOR SOLUTION INTRAMUSCULAR; INTRAVENOUS at 04:04

## 2023-01-01 RX ADMIN — SODIUM CHLORIDE, POTASSIUM CHLORIDE, SODIUM LACTATE AND CALCIUM CHLORIDE 500 ML: 600; 310; 30; 20 INJECTION, SOLUTION INTRAVENOUS at 02:07

## 2023-01-01 RX ADMIN — FLAVOXATE HYDROCHLORIDE 200 MG: 100 TABLET ORAL at 12:04

## 2023-01-01 RX ADMIN — CEFTRIAXONE SODIUM 1 G: 1 INJECTION, POWDER, FOR SOLUTION INTRAMUSCULAR; INTRAVENOUS at 12:04

## 2023-01-01 RX ADMIN — ONDANSETRON 4 MG: 2 INJECTION INTRAMUSCULAR; INTRAVENOUS at 05:04

## 2023-01-01 RX ADMIN — FLAVOXATE HYDROCHLORIDE 200 MG: 100 TABLET ORAL at 05:04

## 2023-01-01 RX ADMIN — ERYTHROPOIETIN 10000 UNITS: 10000 INJECTION, SOLUTION INTRAVENOUS; SUBCUTANEOUS at 10:04

## 2023-01-01 RX ADMIN — ONDANSETRON 4 MG: 2 INJECTION INTRAMUSCULAR; INTRAVENOUS at 02:07

## 2023-01-01 RX ADMIN — Medication 1 TABLET: at 10:04

## 2023-01-01 RX ADMIN — TRAZODONE HYDROCHLORIDE 50 MG: 50 TABLET ORAL at 09:04

## 2023-01-01 RX ADMIN — MORPHINE SULFATE 4 MG: 4 INJECTION, SOLUTION INTRAMUSCULAR; INTRAVENOUS at 02:07

## 2023-01-01 RX ADMIN — CLONIDINE HYDROCHLORIDE 0.2 MG: 0.2 TABLET ORAL at 01:04

## 2023-01-01 RX ADMIN — HYDROCODONE BITARTRATE AND ACETAMINOPHEN 1 TABLET: 10; 325 TABLET ORAL at 10:04

## 2023-01-01 RX ADMIN — AMLODIPINE BESYLATE 5 MG: 5 TABLET ORAL at 12:04

## 2023-01-01 RX ADMIN — PANTOPRAZOLE SODIUM 40 MG: 40 INJECTION, POWDER, FOR SOLUTION INTRAVENOUS at 08:07

## 2023-01-01 RX ADMIN — MORPHINE SULFATE 2 MG: 4 INJECTION, SOLUTION INTRAMUSCULAR; INTRAVENOUS at 05:04

## 2023-01-01 RX ADMIN — ALUMINUM HYDROXIDE, MAGNESIUM HYDROXIDE, AND SIMETHICONE 30 ML: 200; 200; 20 SUSPENSION ORAL at 10:04

## 2023-01-01 RX ADMIN — TRAZODONE HYDROCHLORIDE 50 MG: 50 TABLET ORAL at 08:04

## 2023-01-01 RX ADMIN — FLAVOXATE HYDROCHLORIDE 200 MG: 100 TABLET ORAL at 09:04

## 2023-01-01 RX ADMIN — LIDOCAINE HYDROCHLORIDE 50 MG: 20 INJECTION, SOLUTION EPIDURAL; INFILTRATION; INTRACAUDAL; PERINEURAL at 03:07

## 2023-01-01 RX ADMIN — ONDANSETRON 4 MG: 2 INJECTION INTRAMUSCULAR; INTRAVENOUS at 01:07

## 2023-01-01 RX ADMIN — FLAVOXATE HYDROCHLORIDE 200 MG: 100 TABLET ORAL at 04:04

## 2023-01-01 RX ADMIN — OXYCODONE AND ACETAMINOPHEN 1 TABLET: 10; 325 TABLET ORAL at 09:07

## 2023-01-01 RX ADMIN — LIDOCAINE HYDROCHLORIDE 2 G: 10 INJECTION, SOLUTION INFILTRATION; PERINEURAL at 05:07

## 2023-01-01 RX ADMIN — SEVELAMER CARBONATE 800 MG: 800 TABLET, FILM COATED ORAL at 04:07

## 2023-01-01 RX ADMIN — DOXAZOSIN 8 MG: 4 TABLET ORAL at 09:07

## 2023-01-01 RX ADMIN — CEFTRIAXONE SODIUM 2 G: 2 INJECTION, POWDER, FOR SOLUTION INTRAMUSCULAR; INTRAVENOUS at 01:07

## 2023-01-01 RX ADMIN — CEFEPIME 1 G: 2 INJECTION, POWDER, FOR SOLUTION INTRAVENOUS at 11:04

## 2023-01-01 RX ADMIN — AMLODIPINE BESYLATE 10 MG: 5 TABLET ORAL at 08:07

## 2023-01-01 RX ADMIN — ONDANSETRON 4 MG: 4 TABLET, ORALLY DISINTEGRATING ORAL at 01:07

## 2023-01-01 RX ADMIN — DOXAZOSIN 8 MG: 4 TABLET ORAL at 08:07

## 2023-01-01 RX ADMIN — SODIUM CHLORIDE: 9 INJECTION, SOLUTION INTRAVENOUS at 09:07

## 2023-01-01 RX ADMIN — ONDANSETRON 4 MG: 4 TABLET, ORALLY DISINTEGRATING ORAL at 08:07

## 2023-01-01 RX ADMIN — MORPHINE SULFATE 2 MG: 4 INJECTION, SOLUTION INTRAMUSCULAR; INTRAVENOUS at 06:04

## 2023-01-01 RX ADMIN — ACETAMINOPHEN 650 MG: 325 TABLET ORAL at 01:04

## 2023-01-01 RX ADMIN — AMLODIPINE BESYLATE 5 MG: 5 TABLET ORAL at 09:04

## 2023-01-01 RX ADMIN — CETIRIZINE HYDROCHLORIDE 10 MG: 10 TABLET, FILM COATED ORAL at 10:04

## 2023-01-01 RX ADMIN — MUPIROCIN: 20 OINTMENT TOPICAL at 12:07

## 2023-01-01 RX ADMIN — CEFTRIAXONE SODIUM 1 G: 1 INJECTION, POWDER, FOR SOLUTION INTRAMUSCULAR; INTRAVENOUS at 11:04

## 2023-01-01 RX ADMIN — HYDROCODONE BITARTRATE AND ACETAMINOPHEN 1 TABLET: 7.5; 325 TABLET ORAL at 04:04

## 2023-01-01 RX ADMIN — SEVELAMER CARBONATE 800 MG: 800 TABLET, FILM COATED ORAL at 05:07

## 2023-01-01 RX ADMIN — OXYCODONE AND ACETAMINOPHEN 1 TABLET: 10; 325 TABLET ORAL at 07:07

## 2023-01-01 RX ADMIN — DOXAZOSIN 8 MG: 4 TABLET ORAL at 08:04

## 2023-01-01 RX ADMIN — SEVELAMER CARBONATE 800 MG: 800 TABLET, FILM COATED ORAL at 04:04

## 2023-01-01 RX ADMIN — CARVEDILOL 12.5 MG: 12.5 TABLET, FILM COATED ORAL at 12:04

## 2023-01-01 RX ADMIN — LABETALOL HYDROCHLORIDE 10 MG: 5 INJECTION, SOLUTION INTRAVENOUS at 11:07

## 2023-01-01 RX ADMIN — HYDROCODONE BITARTRATE AND ACETAMINOPHEN 1 TABLET: 10; 325 TABLET ORAL at 02:04

## 2023-01-01 RX ADMIN — CEFTRIAXONE SODIUM 2 G: 2 INJECTION, POWDER, FOR SOLUTION INTRAMUSCULAR; INTRAVENOUS at 08:07

## 2023-01-01 RX ADMIN — FLAVOXATE HYDROCHLORIDE 200 MG: 100 TABLET ORAL at 11:04

## 2023-01-01 RX ADMIN — PANTOPRAZOLE SODIUM 40 MG: 40 TABLET, DELAYED RELEASE ORAL at 09:07

## 2023-01-01 RX ADMIN — CEFEPIME 1 G: 1 INJECTION, POWDER, FOR SOLUTION INTRAMUSCULAR; INTRAVENOUS at 08:07

## 2023-01-01 RX ADMIN — OXYCODONE AND ACETAMINOPHEN 1 TABLET: 10; 325 TABLET ORAL at 06:07

## 2023-01-01 RX ADMIN — HYDROCODONE BITARTRATE AND ACETAMINOPHEN 1 TABLET: 7.5; 325 TABLET ORAL at 02:04

## 2023-01-01 RX ADMIN — PANTOPRAZOLE SODIUM 40 MG: 40 TABLET, DELAYED RELEASE ORAL at 10:04

## 2023-01-01 RX ADMIN — DOXAZOSIN 8 MG: 4 TABLET ORAL at 11:07

## 2023-01-01 RX ADMIN — BUSPIRONE HYDROCHLORIDE 10 MG: 5 TABLET ORAL at 08:04

## 2023-01-01 RX ADMIN — ERYTHROPOIETIN 10000 UNITS: 10000 INJECTION, SOLUTION INTRAVENOUS; SUBCUTANEOUS at 09:07

## 2023-01-01 RX ADMIN — PANTOPRAZOLE SODIUM 40 MG: 40 TABLET, DELAYED RELEASE ORAL at 08:07

## 2023-01-01 RX ADMIN — HYDROCODONE BITARTRATE AND ACETAMINOPHEN 1 TABLET: 10; 325 TABLET ORAL at 06:04

## 2023-01-01 RX ADMIN — CEFEPIME 1 G: 1 INJECTION, POWDER, FOR SOLUTION INTRAMUSCULAR; INTRAVENOUS at 09:07

## 2023-01-01 RX ADMIN — MORPHINE SULFATE 2 MG: 4 INJECTION, SOLUTION INTRAMUSCULAR; INTRAVENOUS at 01:04

## 2023-01-01 RX ADMIN — PANTOPRAZOLE SODIUM 40 MG: 40 TABLET, DELAYED RELEASE ORAL at 12:04

## 2023-01-01 RX ADMIN — ONDANSETRON 4 MG: 4 TABLET, ORALLY DISINTEGRATING ORAL at 12:07

## 2023-01-01 RX ADMIN — MORPHINE SULFATE 2 MG: 4 INJECTION INTRAVENOUS at 09:07

## 2023-01-01 RX ADMIN — PANTOPRAZOLE SODIUM 40 MG: 40 TABLET, DELAYED RELEASE ORAL at 08:04

## 2023-01-01 RX ADMIN — ERYTHROPOIETIN 10000 UNITS: 10000 INJECTION, SOLUTION INTRAVENOUS; SUBCUTANEOUS at 10:07

## 2023-01-01 RX ADMIN — HYDROCODONE BITARTRATE AND ACETAMINOPHEN 1 TABLET: 10; 325 TABLET ORAL at 03:04

## 2023-01-01 RX ADMIN — MAGNESIUM HYDROXIDE 2400 MG: 400 SUSPENSION ORAL at 01:07

## 2023-01-01 RX ADMIN — SODIUM CHLORIDE: 9 INJECTION, SOLUTION INTRAVENOUS at 03:07

## 2023-01-01 RX ADMIN — CEFEPIME 1 G: 2 INJECTION, POWDER, FOR SOLUTION INTRAVENOUS at 10:04

## 2023-01-01 RX ADMIN — BISACODYL 20 MG: 5 TABLET, COATED ORAL at 11:07

## 2023-01-01 RX ADMIN — CETIRIZINE HYDROCHLORIDE 10 MG: 10 TABLET, FILM COATED ORAL at 09:04

## 2023-01-01 RX ADMIN — SEVELAMER CARBONATE 800 MG: 800 TABLET, FILM COATED ORAL at 08:04

## 2023-01-01 RX ADMIN — POLYETHYLENE GLYCOL 3350 17 G: 17 POWDER, FOR SOLUTION ORAL at 09:07

## 2023-01-01 RX ADMIN — AMLODIPINE BESYLATE 5 MG: 5 TABLET ORAL at 08:04

## 2023-01-01 RX ADMIN — CARVEDILOL 12.5 MG: 12.5 TABLET, FILM COATED ORAL at 01:07

## 2023-01-01 RX ADMIN — SEVELAMER CARBONATE 800 MG: 800 TABLET, FILM COATED ORAL at 01:07

## 2023-01-01 RX ADMIN — BUSPIRONE HYDROCHLORIDE 10 MG: 5 TABLET ORAL at 12:04

## 2023-01-01 RX ADMIN — PANTOPRAZOLE SODIUM 40 MG: 40 TABLET, DELAYED RELEASE ORAL at 05:04

## 2023-01-01 RX ADMIN — DOXAZOSIN 8 MG: 4 TABLET ORAL at 09:04

## 2023-01-01 RX ADMIN — DOCUSATE SODIUM 100 MG: 100 CAPSULE, LIQUID FILLED ORAL at 08:07

## 2023-01-01 RX ADMIN — BUSPIRONE HYDROCHLORIDE 10 MG: 5 TABLET ORAL at 09:04

## 2023-01-01 RX ADMIN — ONDANSETRON 4 MG: 2 INJECTION INTRAMUSCULAR; INTRAVENOUS at 06:04

## 2023-01-01 RX ADMIN — METOPROLOL TARTRATE 5 MG: 1 INJECTION, SOLUTION INTRAVENOUS at 05:07

## 2023-01-01 RX ADMIN — MORPHINE SULFATE 2 MG: 4 INJECTION INTRAVENOUS at 10:07

## 2023-01-01 RX ADMIN — OXYCODONE AND ACETAMINOPHEN 1 TABLET: 10; 325 TABLET ORAL at 10:07

## 2023-01-01 RX ADMIN — PANTOPRAZOLE SODIUM 40 MG: 40 INJECTION, POWDER, FOR SOLUTION INTRAVENOUS at 10:07

## 2023-01-01 RX ADMIN — SEVELAMER CARBONATE 800 MG: 800 TABLET, FILM COATED ORAL at 11:04

## 2023-01-01 RX ADMIN — MUPIROCIN: 20 OINTMENT TOPICAL at 10:04

## 2023-01-01 RX ADMIN — AMLODIPINE BESYLATE 5 MG: 5 TABLET ORAL at 10:04

## 2023-01-01 RX ADMIN — ERYTHROPOIETIN 20000 UNITS: 10000 INJECTION, SOLUTION INTRAVENOUS; SUBCUTANEOUS at 08:04

## 2023-01-01 RX ADMIN — MORPHINE SULFATE 2 MG: 4 INJECTION INTRAVENOUS at 08:07

## 2023-01-01 RX ADMIN — CEFTRIAXONE SODIUM 2 G: 2 INJECTION, POWDER, FOR SOLUTION INTRAMUSCULAR; INTRAVENOUS at 09:07

## 2023-01-01 RX ADMIN — PROPOFOL 50 MG: 10 INJECTION, EMULSION INTRAVENOUS at 03:07

## 2023-01-01 RX ADMIN — HYDROCODONE BITARTRATE AND ACETAMINOPHEN 1 TABLET: 7.5; 325 TABLET ORAL at 10:04

## 2023-01-01 RX ADMIN — HYOSCYAMINE SULFATE 0.12 MG: 0.12 TABLET ORAL; SUBLINGUAL at 05:07

## 2023-01-01 RX ADMIN — FLAVOXATE HYDROCHLORIDE 200 MG: 100 TABLET ORAL at 10:04

## 2023-01-01 RX ADMIN — PROPOFOL 40 MG: 10 INJECTION, EMULSION INTRAVENOUS at 03:07

## 2023-01-01 RX ADMIN — LIDOCAINE HYDROCHLORIDE 2 G: 10 INJECTION, SOLUTION INFILTRATION; PERINEURAL at 04:07

## 2023-01-01 RX ADMIN — SEVELAMER CARBONATE 800 MG: 800 TABLET, FILM COATED ORAL at 11:07

## 2023-01-01 RX ADMIN — CEFTRIAXONE SODIUM 1 G: 1 INJECTION, POWDER, FOR SOLUTION INTRAMUSCULAR; INTRAVENOUS at 10:04

## 2023-01-01 RX ADMIN — HYDROCODONE BITARTRATE AND ACETAMINOPHEN 1 TABLET: 7.5; 325 TABLET ORAL at 09:04

## 2023-01-01 RX ADMIN — DOCUSATE SODIUM 100 MG: 100 CAPSULE, LIQUID FILLED ORAL at 11:07

## 2023-01-01 RX ADMIN — CETIRIZINE HYDROCHLORIDE 10 MG: 10 TABLET, FILM COATED ORAL at 08:04

## 2023-01-01 RX ADMIN — Medication: at 08:07

## 2023-01-01 RX ADMIN — ALUMINUM HYDROXIDE, MAGNESIUM HYDROXIDE, AND SIMETHICONE 30 ML: 200; 200; 20 SUSPENSION ORAL at 06:04

## 2023-01-01 RX ADMIN — ALUMINUM HYDROXIDE, MAGNESIUM HYDROXIDE, AND SIMETHICONE 30 ML: 200; 200; 20 SUSPENSION ORAL at 09:04

## 2023-01-01 RX ADMIN — SEVELAMER CARBONATE 800 MG: 800 TABLET, FILM COATED ORAL at 09:04

## 2023-01-01 RX ADMIN — CLONIDINE HYDROCHLORIDE 0.1 MG: 0.1 TABLET ORAL at 01:06

## 2023-01-01 RX ADMIN — HYDROCODONE BITARTRATE AND ACETAMINOPHEN 1 TABLET: 7.5; 325 TABLET ORAL at 12:04

## 2023-01-01 RX ADMIN — CETIRIZINE HYDROCHLORIDE 10 MG: 10 TABLET, FILM COATED ORAL at 12:04

## 2023-01-01 RX ADMIN — HYDROCODONE BITARTRATE AND ACETAMINOPHEN 1 TABLET: 10; 325 TABLET ORAL at 07:04

## 2023-01-01 RX ADMIN — MORPHINE SULFATE 2 MG: 4 INJECTION INTRAVENOUS at 07:07

## 2023-01-01 RX ADMIN — MORPHINE SULFATE 2 MG: 4 INJECTION INTRAVENOUS at 06:07

## 2023-01-01 RX ADMIN — MUPIROCIN: 20 OINTMENT TOPICAL at 11:07

## 2023-01-01 RX ADMIN — SEVELAMER CARBONATE 800 MG: 800 TABLET, FILM COATED ORAL at 10:07

## 2023-01-01 RX ADMIN — MORPHINE SULFATE 2 MG: 4 INJECTION, SOLUTION INTRAMUSCULAR; INTRAVENOUS at 11:04

## 2023-01-01 RX ADMIN — HYDROCODONE BITARTRATE AND ACETAMINOPHEN 1 TABLET: 7.5; 325 TABLET ORAL at 07:04

## 2023-01-01 RX ADMIN — PIPERACILLIN AND TAZOBACTAM 4.5 G: 4; .5 INJECTION, POWDER, LYOPHILIZED, FOR SOLUTION INTRAVENOUS; PARENTERAL at 03:07

## 2023-01-01 RX ADMIN — AMLODIPINE BESYLATE 10 MG: 5 TABLET ORAL at 01:07

## 2023-01-19 ENCOUNTER — OFFICE VISIT (OUTPATIENT)
Dept: FAMILY MEDICINE | Facility: CLINIC | Age: 68
End: 2023-01-19
Payer: MEDICARE

## 2023-01-19 VITALS
SYSTOLIC BLOOD PRESSURE: 143 MMHG | BODY MASS INDEX: 24.94 KG/M2 | DIASTOLIC BLOOD PRESSURE: 70 MMHG | RESPIRATION RATE: 17 BRPM | TEMPERATURE: 98 F | HEART RATE: 64 BPM | HEIGHT: 65 IN | OXYGEN SATURATION: 98 % | WEIGHT: 149.69 LBS

## 2023-01-19 DIAGNOSIS — R22.1 NECK MASS: ICD-10-CM

## 2023-01-19 DIAGNOSIS — M54.50 CHRONIC MIDLINE LOW BACK PAIN WITHOUT SCIATICA: Primary | ICD-10-CM

## 2023-01-19 DIAGNOSIS — I10 PRIMARY HYPERTENSION: Chronic | ICD-10-CM

## 2023-01-19 DIAGNOSIS — G89.29 CHRONIC MIDLINE LOW BACK PAIN WITHOUT SCIATICA: Primary | ICD-10-CM

## 2023-01-19 PROCEDURE — 3077F PR MOST RECENT SYSTOLIC BLOOD PRESSURE >= 140 MM HG: ICD-10-PCS | Mod: CPTII,,, | Performed by: FAMILY MEDICINE

## 2023-01-19 PROCEDURE — 3008F PR BODY MASS INDEX (BMI) DOCUMENTED: ICD-10-PCS | Mod: CPTII,,, | Performed by: FAMILY MEDICINE

## 2023-01-19 PROCEDURE — 99214 PR OFFICE/OUTPT VISIT, EST, LEVL IV, 30-39 MIN: ICD-10-PCS | Mod: ,,, | Performed by: FAMILY MEDICINE

## 2023-01-19 PROCEDURE — 3288F PR FALLS RISK ASSESSMENT DOCUMENTED: ICD-10-PCS | Mod: CPTII,,, | Performed by: FAMILY MEDICINE

## 2023-01-19 PROCEDURE — 3008F BODY MASS INDEX DOCD: CPT | Mod: CPTII,,, | Performed by: FAMILY MEDICINE

## 2023-01-19 PROCEDURE — 3288F FALL RISK ASSESSMENT DOCD: CPT | Mod: CPTII,,, | Performed by: FAMILY MEDICINE

## 2023-01-19 PROCEDURE — 1126F PR PAIN SEVERITY QUANTIFIED, NO PAIN PRESENT: ICD-10-PCS | Mod: CPTII,,, | Performed by: FAMILY MEDICINE

## 2023-01-19 PROCEDURE — 1101F PT FALLS ASSESS-DOCD LE1/YR: CPT | Mod: CPTII,,, | Performed by: FAMILY MEDICINE

## 2023-01-19 PROCEDURE — 3078F DIAST BP <80 MM HG: CPT | Mod: CPTII,,, | Performed by: FAMILY MEDICINE

## 2023-01-19 PROCEDURE — 1101F PR PT FALLS ASSESS DOC 0-1 FALLS W/OUT INJ PAST YR: ICD-10-PCS | Mod: CPTII,,, | Performed by: FAMILY MEDICINE

## 2023-01-19 PROCEDURE — 1160F RVW MEDS BY RX/DR IN RCRD: CPT | Mod: CPTII,,, | Performed by: FAMILY MEDICINE

## 2023-01-19 PROCEDURE — 1159F PR MEDICATION LIST DOCUMENTED IN MEDICAL RECORD: ICD-10-PCS | Mod: CPTII,,, | Performed by: FAMILY MEDICINE

## 2023-01-19 PROCEDURE — 3077F SYST BP >= 140 MM HG: CPT | Mod: CPTII,,, | Performed by: FAMILY MEDICINE

## 2023-01-19 PROCEDURE — 1159F MED LIST DOCD IN RCRD: CPT | Mod: CPTII,,, | Performed by: FAMILY MEDICINE

## 2023-01-19 PROCEDURE — 1126F AMNT PAIN NOTED NONE PRSNT: CPT | Mod: CPTII,,, | Performed by: FAMILY MEDICINE

## 2023-01-19 PROCEDURE — 3078F PR MOST RECENT DIASTOLIC BLOOD PRESSURE < 80 MM HG: ICD-10-PCS | Mod: CPTII,,, | Performed by: FAMILY MEDICINE

## 2023-01-19 PROCEDURE — 1160F PR REVIEW ALL MEDS BY PRESCRIBER/CLIN PHARMACIST DOCUMENTED: ICD-10-PCS | Mod: CPTII,,, | Performed by: FAMILY MEDICINE

## 2023-01-19 PROCEDURE — 99214 OFFICE O/P EST MOD 30 MIN: CPT | Mod: ,,, | Performed by: FAMILY MEDICINE

## 2023-01-19 NOTE — PROGRESS NOTES
Subjective:      Patient ID: Mirza Nelson Jr. is a 67 y.o. male.    Chief Complaint: Follow-up    Disclaimer:  This note is prepared using voice recognition software and as such is likely to have errors despite attempts at proofreading. Please contact me for questions.     67-year-old male with history of chronic low back pain and cervical radiculopathy who presents for follow-up.  The patient states that he has an appointment to see his new pain management provider next month.  He states that his current low back pain is 7/10 however he took his pain medication (Norco  mg t.i.d. p.r.n.) approximately 10 minutes prior to arrival.  He denies any numbness or tingling, loss of bowel or bladder function, or any recent falls.  The patient also states he is ambulating with a walker which is at bedside.  Hypertension- Current medications include hydralazine 50 mg t.i.d., Coreg 12.5 mg b.i.d. and amlodipine 5 mg daily.  The patient admits to compliance with medications.  Upon arrival patient's blood pressure was noted to be mildly elevated.  He denies any chest pain, shortness of breath or current dizziness but states that he has had intermittent dizziness during and/or after dialysis.    Past Medical History:   Diagnosis Date    Anemia     Cervical radiculopathy 05/17/2022    Disorder of kidney and ureter     ESRD on hemodialysis 09/27/2018    Hypertension     Neck injury 1994    Other chronic pain 09/27/2018    Personal history of colonic polyps     Renovascular hypertension 09/27/2018    Vitamin D deficiency 05/17/2022        Current Outpatient Medications on File Prior to Visit   Medication Sig Dispense Refill    amLODIPine (NORVASC) 5 MG tablet Take 1 tablet (5 mg total) by mouth once daily. 30 tablet 11    augmented betamethasone (DIPROLENE) 0.05 % lotion Apply 0.05 application topically every Mon, Wed, Fri.      busPIRone (BUSPAR) 10 MG tablet TAKE 1 TABLET(10 MG) BY MOUTH EVERY DAY 30 tablet 3    carvediloL  (COREG) 12.5 MG tablet TAKE 1 TABLET(12.5 MG) BY MOUTH TWICE DAILY 180 tablet 3    doxazosin (CARDURA) 8 MG Tab Take 8 mg by mouth every evening.      ergocalciferol (ERGOCALCIFEROL) 50,000 unit Cap Take 1 capsule (50,000 Units total) by mouth every 7 days. 4 capsule 2    flavoxATE (URISPAS) 100 mg Tab Take 200 mg by mouth 4 (four) times daily.      fluticasone (FLONASE) 50 mcg/actuation nasal spray 1 spray by Each Nare route daily as needed for Rhinitis.      HYDROcodone-acetaminophen (NORCO)  mg per tablet Take 1 tablet by mouth every 8 (eight) hours as needed for Pain. 90 tablet 0    loperamide (IMODIUM) 2 mg capsule Take 2 mg by mouth daily as needed.      loratadine (CLARITIN) 10 mg tablet Take 1 tablet (10 mg total) by mouth once daily. 30 tablet 3    megestroL (MEGACE) 400 mg/10 mL (40 mg/mL) Susp Take 40 mg by mouth Daily.      pantoprazole (PROTONIX) 40 MG tablet Take 40 mg by mouth once daily.      tamsulosin (FLOMAX) 0.4 mg Cap Take 1 capsule by mouth 2 (two) times daily.      traZODone (DESYREL) 50 MG tablet Take 50 mg by mouth nightly.      ascorbic acid, vitamin C, (VITAMIN C) 500 MG tablet Take 500 mg by mouth once daily.      ferrous gluconate (FERGON) 324 MG tablet Take 1 tablet (324 mg total) by mouth once daily. 30 tablet 3    folic acid-vit B6-vit B12 2.5-25-2 mg (FOLBIC) 2.5-25-2 mg Tab Take 2.5 tablets by mouth Daily.      hydrALAZINE (APRESOLINE) 50 MG tablet TAKE 1 TABLET(50 MG) BY MOUTH THREE TIMES DAILY 270 tablet 3    ivermectin (STROMECTOL) 3 mg Tab Take 5 tablets (15,000 mcg total) by mouth Daily. Take 5 tablets on day one. Take 5 tablets on day two. (Patient not taking: Reported on 1/19/2023) 10 tablet 0    sevelamer carbonate (RENVELA) 800 mg Tab Take 800 mg by mouth 3 (three) times daily.      sodium bicarbonate 650 MG tablet Take 2 tablets (1,300 mg total) by mouth 3 (three) times daily. (Patient not taking: Reported on 1/19/2023) 90 tablet 3    sodium zirconium cyclosilicate  "(LOKELMA) 5 gram packet Take 5 g by mouth 2 (two) times a day.      walker Misc 1 each by Misc.(Non-Drug; Combo Route) route once daily. Upright Rollator 1 each 0    [DISCONTINUED] gabapentin (NEURONTIN) 300 MG capsule Take 300 mg by mouth 3 (three) times daily as needed.       No current facility-administered medications on file prior to visit.        Review of patient's allergies indicates:   Allergen Reactions    Iodine      Other reaction(s): difficulty breathing, Facial Swelling    Iodine and iodide containing products Swelling    Shellfish containing products      Other reaction(s): Swelling        Review of Systems   Constitutional:  Negative for chills, diaphoresis and fever.   Eyes:  Negative for blurred vision and double vision.   Respiratory:  Negative for cough and shortness of breath.    Cardiovascular:  Negative for chest pain and leg swelling.   Gastrointestinal:  Negative for abdominal pain, diarrhea, nausea and vomiting.   Musculoskeletal:  Positive for back pain and neck pain. Negative for falls.   Skin:  Negative for rash.   Neurological:  Negative for tremors and headaches.     Objective:     Vitals:    01/19/23 1305   BP: (!) 143/70   BP Location: Left arm   Patient Position: Sitting   BP Method: Large (Manual)   Pulse: 64   Resp: 17   Temp: 97.9 °F (36.6 °C)   TempSrc: Oral   SpO2: 98%   Weight: 67.9 kg (149 lb 11.2 oz)   Height: 5' 5" (1.651 m)     Physical Exam  Constitutional:       General: He is not in acute distress.     Appearance: Normal appearance. He is not ill-appearing or toxic-appearing.   HENT:      Head: Normocephalic and atraumatic.      Nose: Nose normal.      Mouth/Throat:      Mouth: Mucous membranes are moist.   Eyes:      Extraocular Movements: Extraocular movements intact.      Conjunctiva/sclera: Conjunctivae normal.   Neck:     Cardiovascular:      Rate and Rhythm: Normal rate and regular rhythm.      Pulses: Normal pulses.      Heart sounds: Normal heart sounds. No " murmur heard.    No gallop.   Pulmonary:      Effort: Pulmonary effort is normal. No respiratory distress.      Breath sounds: Normal breath sounds. No stridor. No wheezing, rhonchi or rales.   Musculoskeletal:      Cervical back: Bony tenderness present. No crepitus. Spinous process tenderness present. No pain with movement or muscular tenderness. Normal range of motion.      Thoracic back: No bony tenderness.      Comments: Tenderness of thoracolumbar spine, no paraspinal tenderness, no swelling, no palpable muscle tension, patient has walker at bedside and states he requires the walker for ambulation   Skin:     General: Skin is warm and dry.      Coloration: Skin is not pale.   Neurological:      Mental Status: He is alert and oriented to person, place, and time. Mental status is at baseline.   Psychiatric:         Mood and Affect: Mood normal.         Behavior: Behavior normal.         Thought Content: Thought content normal.       Assessment:     1. Chronic midline low back pain without sciatica    2. Primary hypertension    3. Neck mass      Plan:     1. Chronic midline low back pain without sciatica  ROM exercises given, HEP, warm compresses, consider PT if pain does not improve  Continue current medications.  Keep follow up with pain management.  Contact clinic for any questions or concerns and notify MD if symptoms worsen or not improving.  Patient advised to avoid driving at this time due to intermittently controlled lower back pain, intermittent dizziness after dialysis, and current medications that may be adjusted by pain management provider. Patient and family member at bedside verbalized understanding.    2. Primary hypertension  Repeat BP at goal. Patient admitted to pain prior to exam.  Continue current medications: hydralazine 50 mg TID, Norvasc 5 mg daily, Coreg 12.5 mg BID, as previously prescribed.   Low sodium diet and exercise recommended  Monitor BP at home and notify MD if sBP >160 or <90.  Also notify MD if dBP >100 or <60.  Limit caffeine intake.  Seek immediate medical treatment for chest pain, SOB, LE edema, severe headache, blurred vision, dizziness, slurred speech, any new or worsening symptoms.    3. Neck mass  - US Soft Tissue Head Neck Thyroid; Future   Likely lipoma however patient admits to chronic neck pain.  Warm compresses, consider PT if pain does not improve  Continue current medications.  Keep follow up with pain management.  Contact clinic for any questions or concerns and notify MD if symptoms worsen or not improving.    Orders Placed This Encounter   Procedures    US Soft Tissue Head Neck Thyroid     Standing Status:   Future     Standing Expiration Date:   4/19/2023     Order Specific Question:   [facility] ONLY: Performing/Resulting Location     Answer:   [facility]     Order Specific Question:   Reason for Exam:     Answer:   Mass on left posterior neck     Order Specific Question:   May the Radiologist modify the order per protocol to meet the clinical needs of the patient?     Answer:   Yes     Order Specific Question:   Release to patient     Answer:   Immediate    X-Ray Lumbar Spine 2 Or 3 Views     Standing Status:   Future     Standing Expiration Date:   1/19/2024     Order Specific Question:   May the Radiologist modify the order per protocol to meet the clinical needs of the patient?     Answer:   Yes     Order Specific Question:   Does the patient have a neck collar or brace on?     Answer:   No     Order Specific Question:   Reason for Exam:     Answer:   Back    X-Ray Thoracic Spine AP Lateral     Standing Status:   Future     Standing Expiration Date:   1/19/2024     Order Specific Question:   Does the patient have a neck collar or brace on?     Answer:   No     Order Specific Question:   Reason for Exam:     Answer:   Back pain     Order Specific Question:   May the Radiologist modify the order per protocol to meet the clinical needs of the patient?     Answer:    Yes     Order Specific Question:   Release to patient     Answer:   Immediate       Follow up for Keep scheduled appointment 02/09/2023 with PCP.  Mirza Nelson Jr. was given education on their disease process and medications.

## 2023-01-31 ENCOUNTER — HOSPITAL ENCOUNTER (OUTPATIENT)
Dept: RADIOLOGY | Facility: HOSPITAL | Age: 68
Discharge: HOME | End: 2023-01-31
Attending: FAMILY MEDICINE
Payer: MEDICARE

## 2023-01-31 DIAGNOSIS — M54.50 CHRONIC MIDLINE LOW BACK PAIN WITHOUT SCIATICA: ICD-10-CM

## 2023-01-31 DIAGNOSIS — R22.1 NECK MASS: ICD-10-CM

## 2023-01-31 DIAGNOSIS — G89.29 CHRONIC MIDLINE LOW BACK PAIN WITHOUT SCIATICA: ICD-10-CM

## 2023-01-31 PROCEDURE — 72070 X-RAY EXAM THORAC SPINE 2VWS: CPT | Mod: TC

## 2023-01-31 PROCEDURE — 72100 X-RAY EXAM L-S SPINE 2/3 VWS: CPT | Mod: TC

## 2023-01-31 PROCEDURE — 76536 US EXAM OF HEAD AND NECK: CPT | Mod: TC

## 2023-02-01 ENCOUNTER — TELEPHONE (OUTPATIENT)
Dept: FAMILY MEDICINE | Facility: CLINIC | Age: 68
End: 2023-02-01
Payer: MEDICARE

## 2023-02-01 DIAGNOSIS — G89.29 CHRONIC MIDLINE LOW BACK PAIN WITHOUT SCIATICA: ICD-10-CM

## 2023-02-01 DIAGNOSIS — M43.9 COMPRESSION DEFORMITY OF VERTEBRA: Primary | ICD-10-CM

## 2023-02-01 DIAGNOSIS — M54.50 CHRONIC MIDLINE LOW BACK PAIN WITHOUT SCIATICA: ICD-10-CM

## 2023-02-14 DIAGNOSIS — M43.9 COMPRESSION DEFORMITY OF VERTEBRA: Primary | ICD-10-CM

## 2023-02-16 ENCOUNTER — APPOINTMENT (OUTPATIENT)
Dept: RADIOLOGY | Facility: HOSPITAL | Age: 68
End: 2023-02-16
Attending: FAMILY MEDICINE
Payer: MEDICARE

## 2023-02-16 DIAGNOSIS — M54.50 CHRONIC MIDLINE LOW BACK PAIN WITHOUT SCIATICA: ICD-10-CM

## 2023-02-16 DIAGNOSIS — G89.29 CHRONIC MIDLINE LOW BACK PAIN WITHOUT SCIATICA: ICD-10-CM

## 2023-02-16 DIAGNOSIS — M43.9 COMPRESSION DEFORMITY OF VERTEBRA: ICD-10-CM

## 2023-02-16 PROCEDURE — 72148 MRI LUMBAR SPINE W/O DYE: CPT | Mod: TC

## 2023-02-23 ENCOUNTER — TELEPHONE (OUTPATIENT)
Dept: FAMILY MEDICINE | Facility: CLINIC | Age: 68
End: 2023-02-23
Payer: MEDICARE

## 2023-02-23 DIAGNOSIS — G89.29 CHRONIC LOW BACK PAIN, UNSPECIFIED BACK PAIN LATERALITY, UNSPECIFIED WHETHER SCIATICA PRESENT: Primary | ICD-10-CM

## 2023-02-23 DIAGNOSIS — M54.50 CHRONIC LOW BACK PAIN, UNSPECIFIED BACK PAIN LATERALITY, UNSPECIFIED WHETHER SCIATICA PRESENT: Primary | ICD-10-CM

## 2023-02-23 NOTE — TELEPHONE ENCOUNTER
----- Message from Braulio Hawthorne MD sent at 2/17/2023  8:01 AM CST -----  T12 compression deformity appears to be chronic. Patient has degenerative changes and some nerve impingement in L4-L5 and L5-S1. Keep follow up with paint management. I would suggest referral to neurosurgery for further evaluation and discussion of non-surgical options for pain and potential incontinence issues, if patient is willing referral can be sent.

## 2023-02-23 NOTE — TELEPHONE ENCOUNTER
Notified patient of imaging results he states he would like the ref to Neurosurgery to discuss non-surgical options .    ADDENDUM:  Referral sent to neurology first to assess, if patient has not already been seen by neurology. Evaluation by neurology will also aid in understanding if neurosurgery consult is warranted.   Braulio Hawthorne MD

## 2023-03-02 ENCOUNTER — TELEPHONE (OUTPATIENT)
Dept: FAMILY MEDICINE | Facility: CLINIC | Age: 68
End: 2023-03-02
Payer: MEDICARE

## 2023-03-02 NOTE — TELEPHONE ENCOUNTER
Nae called on 3/1/2023 with some questions on patient's referral. Attempted to call office, NA. Will need to try again later.

## 2023-04-18 NOTE — Clinical Note
Diagnosis: Hematuria [400383]   Admitting Provider:: JAC METCALF [[id number]]   Future Attending Provider: JAC METCALF [[id number]]   Reason for IP Medical Treatment  (Clinical interventions that can only be accomplished in the IP setting? ) :: ESRD on HD, Hematuria   I certify that Inpatient services for greater than or equal to 2 midnights are medically necessary:: Yes   Plans for Post-Acute care--if anticipated (pick the single best option):: A. No post acute care anticipated at this time

## 2023-04-19 NOTE — H&P
[facility] Hospital Medicine History & Physical Examination       Patient Name: Mirza Nelson Jr.  MRN: [id number]  Patient Class: IP- Inpatient   Admission Date: 4/18/2023   Admitting Physician: Tito Herr MD   Length of Stay: 0  Attending Physician: Tito Herr MD   Primary Care Provider: Elo Flores MD  Face-to-Face encounter date: 04/19/2023  Code Status: Full code   Chief Complaint: Hematuria (C/o hematuria for the last couple of hours. Denies abd pain. Denies fever. Arrives with nielsen cath in place - hx of enlarged prostate. )        Patient information was obtained from patient, patient's family, past medical records and ER records.     HISTORY OF PRESENT ILLNESS:   Mirza Nelson Jr. is a 67 y.o. male with a past medical history of essential hypertension, ESRD on HD MWF, anemia, BPH, cervical radiculopathy, and vitamin-D deficiency presented to [facility] on 4/18/2023 for hematuria.  Patient reports hematuria began at 12 PM yesterday, 4/17.  Patient reports he has nielsen catheter in place for BPH and last change was about 3 weeks ago.  Patient denies use of blood thinners, dysuria, abdominal pain, nausea, vomiting, diarrhea, chest pain, shortness of breath, fever, and chills.  Initial vital signs in ED were /72, pulse 58, respirations 18, temperature 36.7° C, and SpO2 100% on room air.  Labs revealed WBC 3, RBC 2.51, hemoglobin 8.3, hematocrit 26.1, , sodium 135, BUN 27, creatinine 8.66, and calcium 8.7.  UA revealed 3+ protein, 3+ occult blood, 3+ leukocytes, greater than 100 red blood cells, 11-20 wbc's, and 4+ bacteria.  Urine culture was obtained.  CT abdomen and pelvis without contrast revealed moderate to advanced renal atrophy on the right greater than left with moderate severe bilateral hydronephrosis left greater than right and hydroureter to the level of the bladder, thickened urothelium which could indicate superimposed inflammation, enlarged  prostate, and bladder decompressed around nielsen catheter with bladder wall  thickening.  Patient was given IV Rocephin 1 g in ED.  Urology was consulted. Patient was admitted to hospital medicine service for further medical management.     PAST MEDICAL HISTORY:   Essential hypertension  ESRD on HD MWF  Anemia   BPH  Cervical radiculopathy   Vitamin-D deficiency    PAST SURGICAL HISTORY:   AV fistula placement   Colonoscopy with polypectomy   Mass excision near kidney   Neck surgery    ALLERGIES:   Iodine, Iodine and iodide containing products, and Shellfish containing products    FAMILY HISTORY:   Reviewed and negative    SOCIAL HISTORY:   Denies tobacco, drug, and alcohol use    HOME MEDICATIONS:     Prior to Admission medications    Medication Sig Start Date End Date Taking? Authorizing Provider   amLODIPine (NORVASC) 5 MG tablet Take 1 tablet (5 mg total) by mouth once daily. 11/29/22 11/29/23 Yes lEo Flores MD   busPIRone (BUSPAR) 10 MG tablet TAKE 1 TABLET(10 MG) BY MOUTH EVERY DAY 3/2/23  Yes Elo Flores MD   carvediloL (COREG) 12.5 MG tablet TAKE 1 TABLET(12.5 MG) BY MOUTH TWICE DAILY 8/15/22  Yes Braulio Hawthorne MD   flavoxATE (URISPAS) 100 mg Tab Take 200 mg by mouth 4 (four) times daily. 11/22/21  Yes Historical Provider   folic acid-vit B6-vit B12 2.5-25-2 mg (FOLBIC) 2.5-25-2 mg Tab Take 2.5 tablets by mouth Daily. 2/10/22  Yes Historical Provider   hydrALAZINE (APRESOLINE) 50 MG tablet TAKE 1 TABLET(50 MG) BY MOUTH THREE TIMES DAILY 5/17/22 4/19/23 Yes Braulio Hawthorne MD   HYDROcodone-acetaminophen (NORCO)  mg per tablet Take 1 tablet by mouth every 8 (eight) hours as needed for Pain. 11/4/22  Yes Elo Flores MD   loratadine (CLARITIN) 10 mg tablet Take 1 tablet (10 mg total) by mouth once daily. 6/7/22  Yes Elo Flores MD   pantoprazole (PROTONIX) 40 MG tablet Take 40 mg by mouth once daily. 3/4/22  Yes Historical Provider   sevelamer carbonate  (RENVELA) 800 mg Tab Take 800 mg by mouth 3 (three) times daily.   Yes Historical Provider   tamsulosin (FLOMAX) 0.4 mg Cap Take 1 capsule by mouth 2 (two) times daily. 5/9/22  Yes Historical Provider   traZODone (DESYREL) 50 MG tablet Take 50 mg by mouth nightly. 1/5/22  Yes Historical Provider   walker Misc 1 each by Misc.(Non-Drug; Combo Route) route once daily. Upright Rollator 5/24/22  Yes Elo Flores MD   augmented betamethasone (DIPROLENE) 0.05 % lotion Apply 0.05 application topically every Mon, Wed, Fri.    Historical Provider   doxazosin (CARDURA) 8 MG Tab Take 8 mg by mouth every evening.    Historical Provider   ferrous gluconate (FERGON) 324 MG tablet Take 1 tablet (324 mg total) by mouth once daily. 6/7/22   Elo Flores MD   fluticasone (FLONASE) 50 mcg/actuation nasal spray 1 spray by Each Nare route daily as needed for Rhinitis.    Historical Provider   loperamide (IMODIUM) 2 mg capsule Take 2 mg by mouth daily as needed. 1/13/22   Historical Provider   megestroL (MEGACE) 400 mg/10 mL (40 mg/mL) Susp Take 40 mg by mouth Daily.    Historical Provider   ascorbic acid, vitamin C, (VITAMIN C) 500 MG tablet Take 500 mg by mouth once daily.  4/19/23  Historical Provider   ivermectin (STROMECTOL) 3 mg Tab Take 5 tablets (15,000 mcg total) by mouth Daily. Take 5 tablets on day one. Take 5 tablets on day two.  Patient not taking: Reported on 1/19/2023 10/19/18 4/19/23  Nely Castañeda PA-C   sodium bicarbonate 650 MG tablet Take 2 tablets (1,300 mg total) by mouth 3 (three) times daily.  Patient not taking: Reported on 1/19/2023 6/7/22 4/19/23  Elo Flores MD   sodium zirconium cyclosilicate (LOKELMA) 5 gram packet Take 5 g by mouth 2 (two) times a day. 11/22/21 4/19/23  Historical Provider       REVIEW OF SYSTEMS:   Except as documented, all other systems reviewed and negative     PHYSICAL EXAM:     VITAL SIGNS: 24 HRS MIN & MAX LAST   Temp  Min: 98.1 °F (36.7 °C)  Max:  98.1 °F (36.7 °C) 98.1 °F (36.7 °C)   BP  Min: 112/56  Max: 164/79 (!) 112/56     Pulse  Min: 53  Max: 71  (!) 57     Resp  Min: 12  Max: 28 12   SpO2  Min: 95 %  Max: 100 % 96 %       General appearance: Male in no apparent distress.  HEENNT: Atraumatic head.   Lungs: Clear to auscultation bilaterally.   Heart: Regular rate and rhythm.    Abdomen/: Soft, non-distended, non-tender. Bowel sounds are normal.  Cheek catheter in place, wine colored urine  Extremities: No cyanosis, clubbing, or edema. No deformities. RUE graft   Skin: No Rash. Warm and dry.   Neuro: Awake, alert, and oriented. Motor and sensory exams grossly intact.   Psych/mental status: Appropriate mood and affect. Responds appropriately to questions.     LABS AND IMAGING:     Recent Labs   Lab 04/18/23  1721 04/19/23  0516   WBC 3.0* 3.5*   RBC 2.51* 2.30*   HGB 8.3* 7.7*   HCT 26.1* 23.7*   .0* 103.0*   MCH 33.1* 33.5*   MCHC 31.8* 32.5*   RDW 13.5 13.4    209   MPV 9.4 9.5       Recent Labs   Lab 04/18/23  1721 04/19/23  0516   * 135*   K 3.8 3.6   CO2 27 29   BUN 27.0* 29.7*   CREATININE 8.66* 9.46*   CALCIUM 8.7* 8.3*   ALBUMIN 3.0* 2.8*   ALKPHOS 64 62   ALT 16 16   AST 14 13   BILITOT 0.4 0.3       Microbiology Results (last 7 days)       Procedure Component Value Units Date/Time    Urine culture [[id number]] Collected: 04/18/23 1918    Order Status: Completed Specimen: Urine Updated: 04/19/23 0657     Urine Culture No Growth At 24 Hours             CT Abdomen Pelvis  Without Contrast  Narrative: EXAMINATION:  CT ABDOMEN PELVIS WITHOUT CONTRAST    CLINICAL HISTORY:  hematurai;    TECHNIQUE:  Low dose axial images, sagittal and coronal reformations were obtained from the lung bases to the pubic symphysis.  .  Oral contrast not given.  .  Automated exposure control used.    COMPARISON:  12/07/2021    FINDINGS:  Advanced right renal atrophy.  Moderate left renal atrophy.  Multiple renal cysts bilaterally.  Moderate  hydronephrosis bilaterally.  Thickened urothelium.  Dilated ureters bilaterally to the level of the bladder.  Prostate gland demonstrates marked enlargement with suspected significant intra bladder extension.  Bladder is otherwise decompressed around a Cheek catheter.  There is bladder wall thickening.    Liver demonstrates normal density with no focal lesion.    Gallbladder and biliary ductal system normal.    Pancreas, stomach, spleen, adrenal glands normal.    Aorta tapers normally.  Severe atherosclerotic calcification.    No bowel obstruction, ascites, inflammatory change, lymphadenopathy.  Appendix normal.    Bones intact.  Lung bases demonstrate mild chronic appearing subpleural lines and interstitial markings.  Dense  Impression: Moderate to advanced renal atrophy on the right greater than left.  Moderate severe bilateral hydronephrosis left greater than right and hydroureter to the level of the bladder.  Thickened urothelium which could indicate superimposed inflammation.  Prostate appears markedly enlarged with significant distortion of and possible extension into the bladder.  Bladder otherwise decompressed around a Cheek catheter and demonstrates bladder wall thickening.  This could represent cystitis or muscular hypertrophy.    There appears to be a significantly reduced degree of hydronephrosis compared to the prior exam particularly on the right side.  Prostate appears slightly larger in the interval.    Agree with shane.    Electronically signed by: Sissy Gonzalez MD  Date:    04/19/2023  Time:    07:35        ASSESSMENT & PLAN:   Assessment:  Hematuria   UTI  Bilateral hydronephrosis  ESRD on HD MWF  Macrocytic anemia   Leukopenia  BPH  Essential hypertension   History of essential hypertension, cervical radiculopathy, and vitamin-D deficiency    Plan:  Urology consulted, appreciate recommendations   IV Rocephin   Urine culture pending, follow-up results   HD today  Nephrology consulted,  appreciate recommendations  Continue appropriate home medications once med rec updated   Labs in a.m.    VTE Prophylaxis:  SCDs    __________________________________________________________________________  INPATIENT LIST OF MEDICATIONS     Scheduled Meds:   cefTRIAXone (ROCEPHIN) IVPB  1 g Intravenous Q24H    epoetin gary  10,000 Units Intravenous Every Mon, Wed, Fri     Continuous Infusions:  PRN Meds:.acetaminophen, acetaminophen, dextrose 10%, dextrose 10%, glucagon (human recombinant), glucose, glucose, HYDROcodone-acetaminophen, ondansetron      IFarhan PA-C, have reviewed and discussed the case with Dr. Tito Herr MD   Please see the following addendum for further assessment and plan from there attending MD.    04/19/2023    ________________________________________________________________________________    MD Addendum:  I,  assumed care of this patient today at ---am/pm  For the patient encounter, I performed the substantive portion of the visit, I reviewed the PA documentation, treatment plan, and medical decision making.  I had face to face time with this patient     A. History:    B. Physical exam:    C. Medical decision making:    Discharge Planning and Disposition: No mobility needs. Ambulating well. Good social support system.   Anticipated discharge    All diagnosis and differential diagnosis have been reviewed; assessment and plan has been documented; I have personally reviewed the labs and test results that are presently available; I have reviewed the patients medication list; I have reviewed the consulting providers response and recommendations. I have reviewed or attempted to review medical records based upon their availability.    All of the patient and family questions have been addressed and answered. Patient's is agreeable to the above stated plan. I will continue to monitor closely and make adjustments to medical management as needed.      04/19/2023

## 2023-04-19 NOTE — ED PROVIDER NOTES
Encounter Date: 4/18/2023    SCRIBE #1 NOTE: I, Corinne Randall, am scribing for, and in the presence of,  Franklyn Raya MD. I have scribed the following portions of the note - Other sections scribed: HPI, ROS, PE.     History     Chief Complaint   Patient presents with    Hematuria     C/o hematuria for the last couple of hours. Denies abd pain. Denies fever. Arrives with nielsen cath in place - hx of enlarged prostate.      67 year old male with a hx of ESRD on dialysis MWF, HTN, and cervical radiculopathy presents to the ED for hematuria beginning several hours ago.  Patient states sometimes he will have hematuria when Nielsen catheter is changed, denies any recent Nielsen catheter changes.  This current one was placed 2-3 weeks ago. He is not on a blood thinner. His urologist is Dr. Navarrete.     The history is provided by the patient. No  was used.   Hematuria  This is a new problem. The current episode started today. The problem is unchanged. He describes the hematuria as gross hematuria. The hematuria occurs throughout his entire urinary stream. The pain is moderate. He describes his urine color as dark red. Irritative symptoms do not include frequency. Pertinent negatives include no abdominal pain, chills, dysuria, fever, flank pain, nausea or vomiting. His past medical history is significant for hypertension.           Review of patient's allergies indicates:   Allergen Reactions    Iodine      Other reaction(s): difficulty breathing, Facial Swelling    Iodine and iodide containing products Swelling    Shellfish containing products      Other reaction(s): Swelling     Past Medical History:   Diagnosis Date    Anemia     Cervical radiculopathy 05/17/2022    Disorder of kidney and ureter     ESRD on hemodialysis 09/27/2018    Hypertension     Neck injury 1994    Other chronic pain 09/27/2018    Personal history of colonic polyps     Renovascular hypertension 09/27/2018    Vitamin D deficiency  2022     Past Surgical History:   Procedure Laterality Date    AV FISTULA PLACEMENT      COLONOSCOPY W/ POLYPECTOMY  2019    MASS EXCISION  2005    near the kidney     NECK SURGERY       Family History   Problem Relation Age of Onset    Heart disease Mother     Diabetes Mother     Hypertension Mother     Cancer Father     Prostate cancer Father     Hypertension Father     Cancer Sister     Breast cancer Sister     No Known Problems Sister      Social History     Tobacco Use    Smoking status: Former     Packs/day: 0.25     Years: 20.00     Pack years: 5.00     Types: Cigarettes     Quit date: 2016     Years since quittin.5    Smokeless tobacco: Never   Substance Use Topics    Alcohol use: No    Drug use: Yes     Types: Marijuana     Review of Systems   Constitutional:  Negative for chills and fever.   HENT:  Negative for congestion and ear pain.    Eyes:  Negative for discharge.   Respiratory:  Negative for cough, shortness of breath and wheezing.    Cardiovascular:  Negative for chest pain and leg swelling.   Gastrointestinal:  Negative for abdominal pain, constipation, diarrhea, nausea and vomiting.   Genitourinary:  Positive for hematuria. Negative for dysuria, flank pain and frequency.   Musculoskeletal:  Negative for back pain and joint swelling.   Skin:  Negative for rash.   Neurological:  Negative for dizziness, weakness and headaches.   Psychiatric/Behavioral:  Negative for agitation, confusion and hallucinations.      Physical Exam     Initial Vitals [23 1551]   BP Pulse Resp Temp SpO2   124/72 (!) 58 18 98.1 °F (36.7 °C) 100 %      MAP       --         Physical Exam    Nursing note and vitals reviewed.  Constitutional: He appears well-developed and well-nourished. He is not diaphoretic. No distress.   HENT:   Head: Normocephalic and atraumatic.   Eyes: Conjunctivae and EOM are normal. Pupils are equal, round, and reactive to light.   Neck:   Normal range of  motion.  Cardiovascular:  Normal rate, regular rhythm, normal heart sounds and intact distal pulses.           No murmur heard.  Pulmonary/Chest: Breath sounds normal. No respiratory distress. He has no wheezes. He has no rales.   Abdominal: Abdomen is soft. He exhibits no distension. There is no abdominal tenderness.   Genitourinary:    Genitourinary Comments: Indwelling nielsen catheter with clots noted in Nielsen catheter and leg bag.      Musculoskeletal:         General: No tenderness or edema. Normal range of motion.      Cervical back: Normal range of motion.     Neurological: He is alert and oriented to person, place, and time. No cranial nerve deficit.   Skin: Skin is warm and dry. Capillary refill takes less than 2 seconds. No rash noted. No erythema.   Psychiatric: He has a normal mood and affect.       ED Course   Procedures  Labs Reviewed   COMPREHENSIVE METABOLIC PANEL - Abnormal; Notable for the following components:       Result Value    Sodium Level 135 (*)     Chloride 96 (*)     Blood Urea Nitrogen 27.0 (*)     Creatinine 8.66 (*)     Calcium Level Total 8.7 (*)     Albumin Level 3.0 (*)     Globulin 4.2 (*)     Albumin/Globulin Ratio 0.7 (*)     All other components within normal limits   URINALYSIS, REFLEX TO URINE CULTURE - Abnormal; Notable for the following components:    Color, UA Red-brown (*)     Appearance, UA Turbid (*)     Protein, UA 3+ (*)     Blood, UA 3+ (*)     Bilirubin, UA 1+ (*)     Leukocyte Esterase, UA 3+ (*)     All other components within normal limits   CBC WITH DIFFERENTIAL - Abnormal; Notable for the following components:    WBC 3.0 (*)     RBC 2.51 (*)     Hgb 8.3 (*)     Hct 26.1 (*)     .0 (*)     MCH 33.1 (*)     MCHC 31.8 (*)     Neut # 1.69 (*)     All other components within normal limits   URINALYSIS, MICROSCOPIC - Abnormal; Notable for the following components:    RBC, UA >100 (*)     WBC, UA 11-20 (*)     Bacteria, UA 4+ (*)     All other components within  normal limits   CULTURE, URINE   CBC W/ AUTO DIFFERENTIAL    Narrative:     The following orders were created for panel order CBC auto differential.  Procedure                               Abnormality         Status                     ---------                               -----------         ------                     CBC with Differential[[id number]]        Abnormal            Final result                 Please view results for these tests on the individual orders.          Imaging Results              CT Abdomen Pelvis  Without Contrast (Preliminary result)  Result time 04/18/23 23:03:10      Preliminary result by Ashwin Thompson Jr., MD (04/18/23 23:03:10)                   Narrative:    START OF REPORT:  TECHNIQUE: CT OF THE ABDOMEN AND PELVIS WAS PERFORMED WITH AXIAL IMAGES AS WELL AS SAGITTAL AND CORONAL RECONSTRUCTION IMAGES WITHOUT INTRAVENOUS CONTRAST OR ORAL CONTRAST.    COMPARISON: COMPARISON IS WITH STUDY DATED2021-12-07 19:55:00.    CLINICAL HISTORY: HEMATURIA.    DOSAGE INFORMATION: AUTOMATED EXPOSURE CONTROL WAS UTILIZED.    Findings:  Lines and Tubes: None.  Thorax:  Lungs: Mild streaky opacity is present at the visualized lung bases, consistent with nonspecific dependent changes scarring and atelectasis. There is interval resolution of patchy ground-glass opacities at the lung bases.  Pleura: No pleural effusion is seen.  Heart: The heart size is within normal limits.  Abdomen:  Abdominal Wall: No abdominal wall pathology is seen.  Liver: The liver appears unremarkable.  Biliary System: No intrahepatic or extrahepatic biliary duct dilatation is seen.  Gallbladder: The gallbladder appears unremarkable.  Pancreas: The pancreas appears unremarkable.  Spleen: The spleen appears unremarkable.  Adrenals: The adrenal glands appear unremarkable.  Kidneys: There is interval atrophy of the right kidney with cortical thinning and multiple tiny cortical cysts. There is also interval near complete  resolution of right hydroureteronephrosis. There is persistence of mild wall thickening of the right renal pelvis and ureter. This may be related to chronic pyeloureteritis. Again noted is a 2 mm nonobstructing stone in the lower pole of the right kidney (series 2 image 34). There is moderate left hydroureteronephrosis, which has partially resolved as compared with the prior examination.  Aorta: There is moderate calcification of the abdominal aorta and its branches.  IVC: Unremarkable.  Bowel:  Esophagus: The visualized esophagus appears unremarkable.  Stomach: The stomach appears unremarkable.  Duodenum: Unremarkable appearing duodenum.  Small Bowel: The small bowel appears unremarkable.  Colon: Nondistended.  Appendix: Again noted is hyperdense material in the appendix, which may reflect inspissated secretions or appendicoliths (series 2 image 47-53). The appendix otherwise appears unremarkable.  Peritoneum: No intraperitoneal free air or ascites is seen.    Pelvis:  Bladder: The bladder is nondistended however the bladder wall appears thickened after considering state of nondistension which may be related to chronic bladder outlet obstruction. An element of cystitis is not excluded. A nielsen catheter is in place. There is hyperdense material in the urinary bladder, which may reflect hematuria (series 2 image 69).  Male:  Prostate gland: Again noted is markedly enlarged prostate that projects into the urinary bladder.    Bony structures:  Dorsal Spine: There is mild to moderate spondylosis of the visualized dorsal spine.  Bony Pelvis: The visualized bony structures of the pelvis appear unremarkable. Degenerative changes are seen in bilateral sacroiliac joints.      Impression:  1. The bladder is nondistended however the bladder wall appears thickened after considering state of nondistension which may be related to chronic bladder outlet obstruction. An element of cystitis is not excluded. There is hyperdense  material in the urinary bladder, which may reflect hematuria (series 2 image 69). Correlate with clinical and laboratory findings as regards additional evaluation and follow-up.  2. There is interval atrophy of the right kidney with cortical thinning and multiple tiny cortical cysts. There is persistence of mild wall thickening of the right renal pelvis and ureter. This may be related to chronic pyeloureteritis. There is moderate left hydroureteronephrosis, which has partially resolved as compared with the prior examination.  3. Again noted is markedly enlarged prostate that projects into the urinary bladder.  4. Details and other findings as discussed above.                                         Medications   cefTRIAXone (ROCEPHIN) 1 g in dextrose 5 % in water (D5W) 5 % 50 mL IVPB (MB+) (0 g Intravenous Stopped 4/19/23 0109)       Medical Decision Making  Problems Addressed:  Cystitis: acute illness or injury that poses a threat to life or bodily functions  ESRD (end stage renal disease): chronic illness or injury  Hematuria: acute illness or injury that poses a threat to life or bodily functions    Amount and/or Complexity of Data Reviewed  External Data Reviewed: labs and radiology.  Labs: ordered.  Radiology: ordered and independent interpretation performed.    Risk  Decision regarding hospitalization.       Medical Decision Making:   History:   Old Medical Records: I decided to obtain old medical records.  Old Records Summarized: records from clinic visits and records from previous admission(s).       <> Summary of Records: 11/10/22 pt diagnosed with bladder disorder   Initial Assessment:   Hematuria  Differential Diagnosis:   Hematuria, UTI, Kidney stone, Bladder cancer  Clinical Tests:   Lab Tests: Ordered and Reviewed  Radiological Study: Ordered and Reviewed  ED Management:  Patient is a 67-year-old male who presents to the emergency department for hematuria.  See HPI.  See physical exam.  Indwelling  Cheek catheter with clots in catheter and leg bag.  Discussed with Urology, and discussed plan to placed 3 way Cheek catheter for CBI.  Will admit to medicine.  Consideration for 3 Cheek catheter, and materials obtained at the bedside with plans to place three-way Cheek catheter for CBI.  Patient states typically is significant amount of difficulty placing Cheek catheter and sometimes requires wire and scope due to his history of BPH.  He is requesting the current catheter not be removed.  As a result, Cheek catheter was irrigated and clots evacuated; with restoration of urine flow from Cheek catheter.  Repeated 1 additional time.  Will hold off 3 way at this time as clots evacuated and patient with relief.  May require CBI if reocclusion.  Hemoglobin of 8.3, previous of 10.  History of ESRD and typically anemic.  Urinalysis with bacteria, white blood cells.  Started on Rocephin.  Discussed with medicine for admission.  Discussed all results with the patient.  Answered all questions time.  Verbalized understanding agreed to plan.        Scribe Attestation:   Scribe #1: I performed the above scribed service and the documentation accurately describes the services I performed. I attest to the accuracy of the note.    Attending Attestation:           Physician Attestation for Scribe:  Physician Attestation Statement for Scribe #1: I, Franklyn Raya MD, reviewed documentation, as scribed by Corinne Randall in my presence, and it is both accurate and complete.           ED Course as of 04/19/23 0232 Wed Apr 19, 2023   0003 Discussed with urology.  Discussed with medicine.  [RP]      ED Course User Index  [RP] Franklyn Raya MD                 Clinical Impression:   Final diagnoses:  [R31.9] Hematuria  [N30.91] Hemorrhagic cystitis (Primary)  [N30.90] Cystitis  [R31.9] Hematuria, unspecified type  [N18.6] ESRD (end stage renal disease)        ED Disposition Condition    Admit Stable                Franklyn Raya  MD  04/19/23 0234

## 2023-04-19 NOTE — PLAN OF CARE
04/19/23 1109   Discharge Assessment   Assessment Type Discharge Planning Assessment   Confirmed/corrected address, phone number and insurance Yes   Confirmed Demographics Correct on Facesheet   Source of Information patient   When was your last doctors appointment? 02/08/23  (PCP: Dr. Flores)   Communicated MEHRAN with patient/caregiver Date not available/Unable to determine   Reason For Admission hematuria   People in Home sibling(s)   Do you expect to return to your current living situation? Yes   Do you have help at home or someone to help you manage your care at home? Yes   Who are your caregiver(s) and their phone number(s)? sister, Sultana Shaw [phone]   Prior to hospitilization cognitive status: Alert/Oriented   Current cognitive status: Alert/Oriented   Walking or Climbing Stairs ambulation difficulty, requires equipment   Mobility Management walker, cane   Dressing/Bathing bathing difficulty, requires equipment   Dressing/Bathing Management shower chair   Home Accessibility stairs to enter home  (working on having a ramp installed)   Number of Stairs, Main Entrance five   Stair Railings, Main Entrance railing on left side (ascending)   Home Layout Able to live on 1st floor   Equipment Currently Used at Home wheelchair;walker, rolling;shower chair;raised toilet;cane, straight   Readmission within 30 days? No   Patient currently being followed by outpatient case management? No   Do you currently have service(s) that help you manage your care at home? No   Do you take prescription medications? Yes  (fills rx at [facility] on [address])   Do you have prescription coverage? Yes   Coverage [employer]  Medicare   Do you have any problems affording any of your prescribed medications? No   Is the patient taking medications as prescribed? yes   Who is going to help you get home at discharge? sister   How do you get to doctors appointments? family or friend will provide;health plan transportation    Are you on dialysis? Yes   Dialysis Name and Scheduled days MWF [facility]   Do you take coumadin? No   Discharge Plan A Home Health   Discharge Plan B Home with family   DME Needed Upon Discharge  none   Discharge Plan discussed with: Patient   Discharge Barriers Identified None     Patient had HH in the past, not sure what company, is not active with any company now.

## 2023-04-19 NOTE — DISCHARGE INSTRUCTIONS
Call physician or come to ED for any problems, questions, or worsening condition. Return to dialysis center tomorrow for your scheduled dialysis treatment

## 2023-04-19 NOTE — NURSING
Nurses Note -- 4 Eyes      4/19/2023   2:05 PM      Skin assessed during: Admit      [x] No Pressure Injuries Present    []Prevention Measures Documented      [] Yes- Altered Skin Integrity Present or Discovered   [] LDA Added if Not in Epic (Describe Wound)   [] New Altered Skin Integrity was Present on Admit and Documented in LDA   [] Wound Image Taken    Wound Care Consulted? No    Attending Nurse:  Johnson Purvis RN     Second RN/Staff Member:  Ermias Rider RN

## 2023-04-19 NOTE — CONSULTS
Mirza Nelson . 1955  [id number]  4/19/2023    CONSULTING PHYSICIAN: Franklyn Raya    Reason for consult: Hematuria, hx of BPH, possible need for 3 way    HPI: Mr. Nelson is a 66 yo male with a past medical history of ESRD on HD, cervical and lumbar radiculopathy, HTN and BPH followed by Dr. Celio Baker.  Presents to the emergency department with complaint of hematuria.  He does have a chronic Cheke catheter which was replaced about 3 weeks ago.  Denies abdominal or flank pain, fever or chills, nausea or vomiting.  Labs on arrival include WBC 3, H&H 8.3 and 26.1, BUN and creatinine 27 and 8.66, UA shows turbid red brown urine, greater than 100 red blood cells, 11-20 white blood cells and 4+ bacteria.  Urine culture pending.  Abdominal/pelvic CT shows moderate severe bilateral hydronephrosis left greater than right and hydroureter to the level of the bladder, moderate to advanced renal atrophy on the right greater left, thickened urothelium which could indicate superimposed inflammation and markedly enlarged prostate with significant distortion of possible extension into the bladder and bladder wall thickening. Required cystoscopy with placement of Cheek over a wire and dilation of bladder neck on 11/10/2021 with Dr. Navarrete for gross hematuria with difficult Cheek catheter placement.    Past Medical History:   Diagnosis Date    Anemia     Cervical radiculopathy 05/17/2022    Disorder of kidney and ureter     ESRD on hemodialysis 09/27/2018    Hypertension     Neck injury 1994    Other chronic pain 09/27/2018    Personal history of colonic polyps     Renovascular hypertension 09/27/2018    Vitamin D deficiency 05/17/2022     Past Surgical History:   Procedure Laterality Date    AV FISTULA PLACEMENT      COLONOSCOPY W/ POLYPECTOMY  02/19/2019    MASS EXCISION  2005    near the kidney     NECK SURGERY       Family History   Problem Relation Age of Onset    Heart disease Mother     Diabetes Mother      Hypertension Mother     Cancer Father     Prostate cancer Father     Hypertension Father     Cancer Sister     Breast cancer Sister     No Known Problems Sister      Social History     Tobacco Use    Smoking status: Former     Packs/day: 0.25     Years: 20.00     Pack years: 5.00     Types: Cigarettes     Quit date: 2016     Years since quittin.5    Smokeless tobacco: Never   Substance Use Topics    Alcohol use: No    Drug use: Yes     Types: Marijuana     Current Facility-Administered Medications   Medication Dose Route Frequency Provider Last Rate Last Admin    acetaminophen tablet 650 mg  650 mg Oral Q8H PRN Farhan Wood PA-C        acetaminophen tablet 650 mg  650 mg Oral Q4H PRN Farhan Wood PA-C        cefTRIAXone (ROCEPHIN) 1 g in dextrose 5 % in water (D5W) 5 % 50 mL IVPB (MB+)  1 g Intravenous Q24H Farhan Wood PA-C        dextrose 10% bolus 125 mL 125 mL  12.5 g Intravenous PRN Farhan Wood PA-C        dextrose 10% bolus 250 mL 250 mL  25 g Intravenous PRN Farhan Wood PA-C        glucagon (human recombinant) injection 1 mg  1 mg Intramuscular PRN Farhan Wood PA-C        glucose chewable tablet 16 g  16 g Oral PRN Farhan Wood PA-C        glucose chewable tablet 24 g  24 g Oral PRN Farhan Wood PA-C        HYDROcodone-acetaminophen 7.5-325 mg per tablet 1 tablet  1 tablet Oral Q6H PRN Mariah Billingsley AGACNP-BC   1 tablet at 23 0821    ondansetron injection 4 mg  4 mg Intravenous Q8H PRN Farhan Wood PA-C         Current Outpatient Medications   Medication Sig Dispense Refill    amLODIPine (NORVASC) 5 MG tablet Take 1 tablet (5 mg total) by mouth once daily. 30 tablet 11    busPIRone (BUSPAR) 10 MG tablet TAKE 1 TABLET(10 MG) BY MOUTH EVERY DAY 30 tablet 3    carvediloL (COREG) 12.5 MG tablet TAKE 1 TABLET(12.5 MG) BY MOUTH TWICE DAILY 180 tablet 3    flavoxATE (URISPAS) 100 mg Tab Take 200 mg by mouth 4 (four) times daily.      folic acid-vit B6-vit B12 2.5-25-2  mg (FOLBIC) 2.5-25-2 mg Tab Take 2.5 tablets by mouth Daily.      hydrALAZINE (APRESOLINE) 50 MG tablet TAKE 1 TABLET(50 MG) BY MOUTH THREE TIMES DAILY 270 tablet 3    HYDROcodone-acetaminophen (NORCO)  mg per tablet Take 1 tablet by mouth every 8 (eight) hours as needed for Pain. 90 tablet 0    loratadine (CLARITIN) 10 mg tablet Take 1 tablet (10 mg total) by mouth once daily. 30 tablet 3    pantoprazole (PROTONIX) 40 MG tablet Take 40 mg by mouth once daily.      sevelamer carbonate (RENVELA) 800 mg Tab Take 800 mg by mouth 3 (three) times daily.      tamsulosin (FLOMAX) 0.4 mg Cap Take 1 capsule by mouth 2 (two) times daily.      traZODone (DESYREL) 50 MG tablet Take 50 mg by mouth nightly.      walker Misc 1 each by Misc.(Non-Drug; Combo Route) route once daily. Upright Rollator 1 each 0    augmented betamethasone (DIPROLENE) 0.05 % lotion Apply 0.05 application topically every Mon, Wed, Fri.      doxazosin (CARDURA) 8 MG Tab Take 8 mg by mouth every evening.      ferrous gluconate (FERGON) 324 MG tablet Take 1 tablet (324 mg total) by mouth once daily. 30 tablet 3    fluticasone (FLONASE) 50 mcg/actuation nasal spray 1 spray by Each Nare route daily as needed for Rhinitis.      loperamide (IMODIUM) 2 mg capsule Take 2 mg by mouth daily as needed.      megestroL (MEGACE) 400 mg/10 mL (40 mg/mL) Susp Take 40 mg by mouth Daily.       Review of patient's allergies indicates:   Allergen Reactions    Iodine      Other reaction(s): difficulty breathing, Facial Swelling    Iodine and iodide containing products Swelling    Shellfish containing products      Other reaction(s): Swelling     ROS: 12 point review of systems negative other than the HPI    PHYSICAL EXAM:  Vitals:    04/19/23 0503 04/19/23 0504 04/19/23 0603 04/19/23 0821   BP: (!) 154/77  131/66    BP Location:   Left arm    Patient Position:   Lying    Pulse: 71  65    Resp:  (!) 28 15 17   Temp:       TempSrc:       SpO2: 99%  99%    Weight:        Height:           Intake/Output Summary (Last 24 hours) at 4/19/2023 0859  Last data filed at 4/19/2023 0109  Gross per 24 hour   Intake 50 ml   Output --   Net 50 ml       GEN: WN/WD NAD  HEENT: NCAT, PERRLA, EOMI, OP clear, nares patent  CV: RRR  RESP: Even and unlabored  ABD: soft, NTND  : dark bloody urine in  bag, light pink in tubing  EXT: no C/C/E  NEURO: no focal deficits, MAEW, AAOx4      LABS:  Recent Results (from the past 24 hour(s))   Comprehensive metabolic panel    Collection Time: 04/18/23  5:21 PM   Result Value Ref Range    Sodium Level 135 (L) 136 - 145 mmol/L    Potassium Level 3.8 3.5 - 5.1 mmol/L    Chloride 96 (L) 98 - 107 mmol/L    Carbon Dioxide 27 23 - 31 mmol/L    Glucose Level 101 82 - 115 mg/dL    Blood Urea Nitrogen 27.0 (H) 8.4 - 25.7 mg/dL    Creatinine 8.66 (H) 0.73 - 1.18 mg/dL    Calcium Level Total 8.7 (L) 8.8 - 10.0 mg/dL    Protein Total 7.2 5.8 - 7.6 gm/dL    Albumin Level 3.0 (L) 3.4 - 4.8 g/dL    Globulin 4.2 (H) 2.4 - 3.5 gm/dL    Albumin/Globulin Ratio 0.7 (L) 1.1 - 2.0 ratio    Bilirubin Total 0.4 <=1.5 mg/dL    Alkaline Phosphatase 64 40 - 150 unit/L    Alanine Aminotransferase 16 0 - 55 unit/L    Aspartate Aminotransferase 14 5 - 34 unit/L    eGFR 6 mls/min/1.73/m2   CBC with Differential    Collection Time: 04/18/23  5:21 PM   Result Value Ref Range    WBC 3.0 (L) 4.5 - 11.5 x10(3)/mcL    RBC 2.51 (L) 4.70 - 6.10 x10(6)/mcL    Hgb 8.3 (L) 14.0 - 18.0 g/dL    Hct 26.1 (L) 42.0 - 52.0 %    .0 (H) 80.0 - 94.0 fL    MCH 33.1 (H) 27.0 - 31.0 pg    MCHC 31.8 (L) 33.0 - 36.0 g/dL    RDW 13.5 11.5 - 17.0 %    Platelet 215 130 - 400 x10(3)/mcL    MPV 9.4 7.4 - 10.4 fL    Neut % 56.8 %    Lymph % 23.8 %    Mono % 17.1 %    Eos % 1.3 %    Basophil % 1.0 %    Lymph # 0.71 0.6 - 4.6 x10(3)/mcL    Neut # 1.69 (L) 2.1 - 9.2 x10(3)/mcL    Mono # 0.51 0.1 - 1.3 x10(3)/mcL    Eos # 0.04 0 - 0.9 x10(3)/mcL    Baso # 0.03 0 - 0.2 x10(3)/mcL    IG# 0.00 0 - 0.04 x10(3)/mcL     IG% 0.0 %    NRBC% 0.0 %   Urinalysis, Reflex to Urine Culture    Collection Time: 04/18/23  7:18 PM    Specimen: Urine   Result Value Ref Range    Color, UA Red-brown (A) Yellow, Light-Yellow, Dark Yellow, Kiki, Straw    Appearance, UA Turbid (A) Clear    Specific Gravity, UA 1.020 1.005 - 1.030    pH, UA 7.5 5.0 - 8.5    Protein, UA 3+ (A) Negative mg/dL    Glucose, UA Negative Negative, Normal mg/dL    Ketones, UA Negative Negative mg/dL    Blood, UA 3+ (A) Negative unit/L    Bilirubin, UA 1+ (A) Negative mg/dL    Urobilinogen, UA 0.2 0.2, 1.0, Normal mg/dL    Nitrites, UA Negative Negative    Leukocyte Esterase, UA 3+ (A) Negative unit/L   Urinalysis, Microscopic    Collection Time: 04/18/23  7:18 PM   Result Value Ref Range    RBC, UA >100 (A) None Seen, 0-2, 3-5, 0-5 /HPF    WBC, UA 11-20 (A) None Seen, 0-2, 3-5, 0-5 /HPF    Squamous Epithelial Cells, UA 0-4 0-4, None Seen /HPF    Bacteria, UA 4+ (A) None Seen, Rare, Occasional /HPF   Urine culture    Collection Time: 04/18/23  7:18 PM    Specimen: Urine   Result Value Ref Range    Urine Culture No Growth At 24 Hours    Comprehensive metabolic panel    Collection Time: 04/19/23  5:16 AM   Result Value Ref Range    Sodium Level 135 (L) 136 - 145 mmol/L    Potassium Level 3.6 3.5 - 5.1 mmol/L    Chloride 97 (L) 98 - 107 mmol/L    Carbon Dioxide 29 23 - 31 mmol/L    Glucose Level 85 82 - 115 mg/dL    Blood Urea Nitrogen 29.7 (H) 8.4 - 25.7 mg/dL    Creatinine 9.46 (H) 0.73 - 1.18 mg/dL    Calcium Level Total 8.3 (L) 8.8 - 10.0 mg/dL    Protein Total 6.5 5.8 - 7.6 gm/dL    Albumin Level 2.8 (L) 3.4 - 4.8 g/dL    Globulin 3.7 (H) 2.4 - 3.5 gm/dL    Albumin/Globulin Ratio 0.8 (L) 1.1 - 2.0 ratio    Bilirubin Total 0.3 <=1.5 mg/dL    Alkaline Phosphatase 62 40 - 150 unit/L    Alanine Aminotransferase 16 0 - 55 unit/L    Aspartate Aminotransferase 13 5 - 34 unit/L    eGFR 6 mls/min/1.73/m2   CBC with Differential    Collection Time: 04/19/23  5:16 AM   Result Value  Ref Range    WBC 3.5 (L) 4.5 - 11.5 x10(3)/mcL    RBC 2.30 (L) 4.70 - 6.10 x10(6)/mcL    Hgb 7.7 (L) 14.0 - 18.0 g/dL    Hct 23.7 (L) 42.0 - 52.0 %    .0 (H) 80.0 - 94.0 fL    MCH 33.5 (H) 27.0 - 31.0 pg    MCHC 32.5 (L) 33.0 - 36.0 g/dL    RDW 13.4 11.5 - 17.0 %    Platelet 209 130 - 400 x10(3)/mcL    MPV 9.5 7.4 - 10.4 fL    Neut % 62.6 %    Lymph % 22.5 %    Mono % 10.8 %    Eos % 2.6 %    Basophil % 0.9 %    Lymph # 0.79 0.6 - 4.6 x10(3)/mcL    Neut # 2.20 2.1 - 9.2 x10(3)/mcL    Mono # 0.38 0.1 - 1.3 x10(3)/mcL    Eos # 0.09 0 - 0.9 x10(3)/mcL    Baso # 0.03 0 - 0.2 x10(3)/mcL    IG# 0.02 0 - 0.04 x10(3)/mcL    IG% 0.6 %    NRBC% 0.0 %         IMAGING:  EXAMINATION:  CT ABDOMEN PELVIS WITHOUT CONTRAST     CLINICAL HISTORY:  hematurai;     TECHNIQUE:  Low dose axial images, sagittal and coronal reformations were obtained from the lung bases to the pubic symphysis.  .  Oral contrast not given.  .  Automated exposure control used.     COMPARISON:  12/07/2021     FINDINGS:  Advanced right renal atrophy.  Moderate left renal atrophy.  Multiple renal cysts bilaterally.  Moderate hydronephrosis bilaterally.  Thickened urothelium.  Dilated ureters bilaterally to the level of the bladder.  Prostate gland demonstrates marked enlargement with suspected significant intra bladder extension.  Bladder is otherwise decompressed around a Cheek catheter.  There is bladder wall thickening.     Liver demonstrates normal density with no focal lesion.     Gallbladder and biliary ductal system normal.     Pancreas, stomach, spleen, adrenal glands normal.     Aorta tapers normally.  Severe atherosclerotic calcification.     No bowel obstruction, ascites, inflammatory change, lymphadenopathy.  Appendix normal.     Bones intact.  Lung bases demonstrate mild chronic appearing subpleural lines and interstitial markings.  Dense     Impression:     Moderate to advanced renal atrophy on the right greater than left.  Moderate  severe bilateral hydronephrosis left greater than right and hydroureter to the level of the bladder.  Thickened urothelium which could indicate superimposed inflammation.  Prostate appears markedly enlarged with significant distortion of and possible extension into the bladder.  Bladder otherwise decompressed around a Cheek catheter and demonstrates bladder wall thickening.  This could represent cystitis or muscular hypertrophy.     There appears to be a significantly reduced degree of hydronephrosis compared to the prior exam particularly on the right side.  Prostate appears slightly larger in the interval.     Agree with shane.        Electronically signed by: Sissy Gonzalez MD  Date:                                            04/19/2023  Time:                                           07:35      ASSESSMENT:  Gross hematuria with chronic Cheek, last exchanged 2-3 weeks ago   ESRD on HD      PLAN:  Continue empiric abx and follow cultures  We will continue to monitor, can hand irrigate if needed    WES Galan

## 2023-04-19 NOTE — CONSULTS
[facility] - Emergency Dept  Nephrology  Consult Note    Patient Name: Mirza Nelson Jr.  MRN: [id number]  Admission Date: 4/18/2023  Hospital Length of Stay: 0 days  Attending Provider: Tito Herr MD   Primary Care Physician: Elo Flores MD  Principal Problem:<principal problem not specified>    Inpatient consult to Nephrology  Consult performed by: KARLOS Preston  Consult ordered by: Franklyn Raya MD      Subjective:     HPI:  This is a 67-year-old  male with ESRD on hemodialysis.  He dialyzes at [facility] on Monday Wednesday Friday schedule followed by Dr. Butler.  He does have a chronic indwelling Cheek and is followed outpatient by Dr. Navarrete.  2-3 weeks ago catheter was changed.  He presented today to ER for hematuria which often presents when catheter is changed.  Urinalysis suggests possible UTI which culture is pending.  CT showed nondistended bladder, bladder thickening likely due to chronic bladder outlet obstruction, element of cystitis, and hyperdense material in the bladder.  There is also an enlarged prostate that projects into the bladder.  He is being treated for hemorrhagic cystitis with consultation to Nephrology to follow for dialysis needs.    Patient has recently received pain medication for chronic neck and back pain. Denies discomfort now or previously at onset of hematuria. Denies trauma to urethra in the past 24 hours prior to onset of hematuria. He is most complaining of hunger.     Past Medical History:   Diagnosis Date    Anemia     Cervical radiculopathy 05/17/2022    Disorder of kidney and ureter     ESRD on hemodialysis 09/27/2018    Hypertension     Neck injury 1994    Other chronic pain 09/27/2018    Personal history of colonic polyps     Renovascular hypertension 09/27/2018    Vitamin D deficiency 05/17/2022       Past Surgical History:   Procedure Laterality Date    AV FISTULA PLACEMENT      COLONOSCOPY W/ POLYPECTOMY   02/19/2019    MASS EXCISION  2005    near the kidney     NECK SURGERY         Review of patient's allergies indicates:   Allergen Reactions    Iodine      Other reaction(s): difficulty breathing, Facial Swelling    Iodine and iodide containing products Swelling    Shellfish containing products      Other reaction(s): Swelling     Current Facility-Administered Medications   Medication Frequency    acetaminophen tablet 650 mg Q8H PRN    acetaminophen tablet 650 mg Q4H PRN    cefTRIAXone (ROCEPHIN) 1 g in dextrose 5 % in water (D5W) 5 % 50 mL IVPB (MB+) Q24H    dextrose 10% bolus 125 mL 125 mL PRN    dextrose 10% bolus 250 mL 250 mL PRN    glucagon (human recombinant) injection 1 mg PRN    glucose chewable tablet 16 g PRN    glucose chewable tablet 24 g PRN    HYDROcodone-acetaminophen 7.5-325 mg per tablet 1 tablet Q6H PRN    ondansetron injection 4 mg Q8H PRN     Current Outpatient Medications   Medication    amLODIPine (NORVASC) 5 MG tablet    busPIRone (BUSPAR) 10 MG tablet    carvediloL (COREG) 12.5 MG tablet    flavoxATE (URISPAS) 100 mg Tab    folic acid-vit B6-vit B12 2.5-25-2 mg (FOLBIC) 2.5-25-2 mg Tab    hydrALAZINE (APRESOLINE) 50 MG tablet    HYDROcodone-acetaminophen (NORCO)  mg per tablet    loratadine (CLARITIN) 10 mg tablet    pantoprazole (PROTONIX) 40 MG tablet    sevelamer carbonate (RENVELA) 800 mg Tab    tamsulosin (FLOMAX) 0.4 mg Cap    traZODone (DESYREL) 50 MG tablet    walker Misc    augmented betamethasone (DIPROLENE) 0.05 % lotion    doxazosin (CARDURA) 8 MG Tab    ferrous gluconate (FERGON) 324 MG tablet    fluticasone (FLONASE) 50 mcg/actuation nasal spray    loperamide (IMODIUM) 2 mg capsule    megestroL (MEGACE) 400 mg/10 mL (40 mg/mL) Susp     Family History       Problem Relation (Age of Onset)    Breast cancer Sister    Cancer Father, Sister    Diabetes Mother    Heart disease Mother    Hypertension Mother, Father    No Known Problems Sister    Prostate cancer Father           Tobacco Use    Smoking status: Former     Packs/day: 0.25     Years: 20.00     Pack years: 5.00     Types: Cigarettes     Quit date: 2016     Years since quittin.5    Smokeless tobacco: Never   Substance and Sexual Activity    Alcohol use: No    Drug use: Yes     Types: Marijuana    Sexual activity: Not Currently     Review of Systems  Objective:     Vital Signs (Most Recent):  Temp: 98.1 °F (36.7 °C) (23 1551)  Pulse: 65 (23 0603)  Resp: 17 (23 0821)  BP: 131/66 (23 0603)  SpO2: 99 % (23 0603) Vital Signs (24h Range):  Temp:  [98.1 °F (36.7 °C)] 98.1 °F (36.7 °C)  Pulse:  [53-71] 65  Resp:  [14-28] 17  SpO2:  [97 %-100 %] 99 %  BP: (124-164)/(66-79) 131/66     Weight: 67.2 kg (148 lb 2.4 oz) (23 1551)  Body mass index is 23.91 kg/m².  Body surface area is 1.77 meters squared.    I/O last 3 completed shifts:  In: 50 [IV Piggyback:50]  Out: -     Physical Exam  Constitutional:       General: He is not in acute distress.  HENT:      Head: Atraumatic.      Nose: Nose normal.      Mouth/Throat:      Mouth: Mucous membranes are moist.   Eyes:      Extraocular Movements: Extraocular movements intact.      Conjunctiva/sclera: Conjunctivae normal.   Cardiovascular:      Rate and Rhythm: Normal rate and regular rhythm.      Pulses: Normal pulses.   Pulmonary:      Effort: Pulmonary effort is normal.      Breath sounds: Normal breath sounds.   Abdominal:      General: There is no distension.      Palpations: Abdomen is soft.   Genitourinary:     Comments: Urinary catheter with gross hematuria  Musculoskeletal:         General: Swelling present.      Cervical back: Normal range of motion.      Comments: RUE graft patent   Skin:     General: Skin is warm and dry.   Neurological:      Mental Status: He is alert and oriented to person, place, and time.   Psychiatric:         Mood and Affect: Mood normal.       Significant Labs:  BMP:   Recent Labs   Lab 23  0516   *   K  3.6   CO2 29   BUN 29.7*   CREATININE 9.46*   CALCIUM 8.3*     CBC:   Recent Labs   Lab 04/19/23  0516   WBC 3.5*   RBC 2.30*   HGB 7.7*   HCT 23.7*      .0*   MCH 33.5*   MCHC 32.5*       Significant Imaging:  CT Abdomen Pelvis Without Contrast [[id number]]Resulted: 04/19/23 0735 Order Status: CompletedUpdated: 04/19/23 0738 Narrative:  EXAMINATION:   CT ABDOMEN PELVIS WITHOUT CONTRAST     CLINICAL HISTORY:   hematurai;     TECHNIQUE:   Low dose axial images, sagittal and coronal reformations were obtained from the lung bases to the pubic symphysis.  .  Oral contrast not given.  .  Automated exposure control used.     COMPARISON:   12/07/2021     FINDINGS:   Advanced right renal atrophy.  Moderate left renal atrophy.  Multiple renal cysts bilaterally.  Moderate hydronephrosis bilaterally.  Thickened urothelium.  Dilated ureters bilaterally to the level of the bladder.  Prostate gland demonstrates marked enlargement with suspected significant intra bladder extension.  Bladder is otherwise decompressed around a Cheek catheter.  There is bladder wall thickening.     Liver demonstrates normal density with no focal lesion.     Gallbladder and biliary ductal system normal.     Pancreas, stomach, spleen, adrenal glands normal.     Aorta tapers normally.  Severe atherosclerotic calcification.     No bowel obstruction, ascites, inflammatory change, lymphadenopathy.  Appendix normal.     Bones intact.  Lung bases demonstrate mild chronic appearing subpleural lines and interstitial markings.  Dense   Impression:    Moderate to advanced renal atrophy on the right greater than left.  Moderate severe bilateral hydronephrosis left greater than right and hydroureter to the level of the bladder.  Thickened urothelium which could indicate superimposed inflammation.  Prostate appears markedly enlarged with significant distortion of and possible extension into the bladder.  Bladder otherwise decompressed around a  Cheek catheter and demonstrates bladder wall thickening.  This could represent cystitis or muscular hypertrophy.     There appears to be a significantly reduced degree of hydronephrosis compared to the prior exam particularly on the right side.  Prostate appears slightly larger in the interval.     Agree with ayank.       Electronically signed by: Sissy Gonzalez MD   Date: 04/19/2023   Time: 07:35     Assessment/Plan:     ESRD on hemodialysis-[facility], [facility]   Hemorrhagic cystitis  Chronic bladder outlet obstruction with chronic Cheek changed every 2-3 weeks followed by Dr. Navarrete  Anemia of chronic disease worsening with acute blood loss  Hypertension    -patient will dialyze today and continue Monday Wednesday Friday schedule while inpatient  -urologist consulted for further input  -start epogen for anemia of ckd    KARLOS Preston  Nephrology  [facility] - Emergency Dept

## 2023-04-19 NOTE — NURSING
04/19/23 1238   Post-Hemodialysis Assessment   Rinseback Volume (mL) 500 mL   Blood Volume Processed (Liters) 62 L   Dialyzer Clearance Lightly streaked   Duration of Treatment 180 minutes   Additional Fluid Intake (mL) 0 mL   Total UF (mL) 500 mL   Net Fluid Removal 0   Patient Response to Treatment pt tolerated well with no issues.

## 2023-04-20 NOTE — TELEPHONE ENCOUNTER
Hussain from [facility] called to let us know that patient is in the hospital and she will be faxing over some paperwork.

## 2023-04-20 NOTE — PROGRESS NOTES
Nephrology consult follow up note    HPI:      Mirza Nelson Jr. is a 67 y.o. male  with ESRD on hemodialysis.  He dialyzes at [facility] on Monday Wednesday Friday schedule followed by Dr. Butler.  He does have a chronic indwelling Cheek and is followed outpatient by Dr. Navarrete.  2-3 weeks ago catheter was changed.  He presented today to ER for hematuria which often presents when catheter is changed.  Urinalysis suggests possible UTI which culture is pending.  CT showed nondistended bladder, bladder thickening likely due to chronic bladder outlet obstruction, element of cystitis, and hyperdense material in the bladder.  There is also an enlarged prostate that projects into the bladder.  He is being treated for hemorrhagic cystitis with consultation to Nephrology consulted for ESRD management.     Interval history:     No acute events overnight. Cheek in place with gross hematruia. Dialyzed yesterday without problem. He did have cramping after HD. No CP, abd pain, or LE edema.      Review of Systems:     Comprehensive 10pt ROS negative except as noted per HPI.    Past medical, family, surgical, and social history reviewed and unchanged from initial consult note.     Objective:       VITAL SIGNS: 24 HR MIN & MAX LAST    Temp  Min: 98.1 °F (36.7 °C)  Max: 99.2 °F (37.3 °C)  98.4 °F (36.9 °C)        BP  Min: 108/53  Max: 159/70  (!) 145/70     Pulse  Min: 53  Max: 72  (!) 53     Resp  Min: 12  Max: 21  20    SpO2  Min: 95 %  Max: 100 %  100 %      GEN: Chronically ill appearing AAM in NAD  CV: RRR +S1,S2 without murmur  PULM: CTAB, unlabored  ABD: Soft, NT/ND abdomen with NABS  : Cheek in place with hematuria.   EXT: No cyanosis or edema  SKIN: Warm and dry  PSYCH: Awake, alert and appropriately conversant.   Vascular access: RUE AVF with good pulsation and thrill.             Component Value Date/Time     (L) 04/20/2023 0323     (L) 04/19/2023 0516     09/27/2018 0800    K 4.1 04/20/2023 0323    K  3.6 04/19/2023 0516    K 3.9 09/27/2018 0800    CHLORIDE 102 04/20/2023 0323    CHLORIDE 97 (L) 04/19/2023 0516    CO2 26 04/20/2023 0323    CO2 29 04/19/2023 0516    CO2 34 (H) 09/27/2018 0800    BUN 15.3 04/20/2023 0323    BUN 29.7 (H) 04/19/2023 0516    BUN 24 (H) 09/27/2018 0800    CREATININE 6.38 (H) 04/20/2023 0323    CREATININE 9.46 (H) 04/19/2023 0516    CREATININE 5.4 (H) 09/27/2018 0800    CALCIUM 8.2 (L) 04/20/2023 0323    CALCIUM 8.3 (L) 04/19/2023 0516    CALCIUM 9.2 09/27/2018 0800    PHOS 4.0 03/03/2022 0515    PHOS 3.5 09/27/2018 0800            Component Value Date/Time    WBC 4.0 (L) 04/20/2023 0323    WBC 3.5 (L) 04/19/2023 0516    WBC 5.35 09/27/2018 0800    HGB 7.5 (L) 04/20/2023 0323    HGB 7.7 (L) 04/19/2023 0516    HGB 11.1 (L) 09/27/2018 0800    HCT 23.8 (L) 04/20/2023 0323    HCT 23.7 (L) 04/19/2023 0516    HCT 33.0 (L) 11/30/2020 0859    HCT 35.1 (L) 09/27/2018 0800     04/20/2023 0323     04/19/2023 0516     09/27/2018 0800         Imaging reviewed      Assessment / Plan:       There are no hospital problems to display for this patient.    ESRD on hemodialysis-[facility], [facility]   Hemorrhagic cystitis  Chronic bladder outlet obstruction with chronic Cheek changed every 2-3 weeks followed by Dr. Navarrete  Anemia of chronic disease worsening with acute blood loss  Hypertension    Plan:  Urology following for hematuria. Dialyzed yesterday but did have cramping afterwards (only removed 500ml). Plan for HD tomorrow on MWF schedule. No UF. Will follow.       Garo Mclain, DO  Nephrology  [facility]  Clinic number: [phone]

## 2023-04-20 NOTE — PROGRESS NOTES
Inpatient Nutrition Assessment    Admit Date: 4/18/2023   Total duration of encounter: 2 days     Nutrition Recommendation/Prescription     Continue current diet as tolerated  Add Novasource TID (provides 475 kcal and 22 g protein per container)  Encouraged adequate PO intake  Monitor appetite/PO intake, weight, and labs    Communication of Recommendations: reviewed with patient    Nutrition Assessment     Malnutrition Assessment/Nutrition-Focused Physical Exam    Malnutrition in the context of chronic illness  Degree of Malnutrition: non-severe (moderate) malnutrition  Energy Intake: does not meet criteria  Interpretation of Weight Loss: does not meet criteria  Body Fat: mild depletion  Area of Body Fat Loss: upper arm region - triceps / biceps and thoracic and lumbar region - ribs, lower back, midaxillary line  Muscle Mass Loss: mild depletion  Area of Muscle Mass Loss: temple region - temporalis muscle, clavicle bone region - pectoralis major, deltoid, trapezius muscles, clavicle and acromion bone region - deltoid muscle, scapular bone region - trapezius, supraspinus, infraspinus muscles, and patellar region - quadricep muscle  Fluid Accumulation: unable to obtain  Edema: unable to obtain  Reduced  Strength: unable to obtain  A minimum of two characteristics is recommended for diagnosis of either severe or non-severe malnutrition.    Chart Review    Reason Seen: continuous nutrition monitoring ESRD    Malnutrition Screening Tool Results   Have you recently lost weight without trying?: No  Have you been eating poorly because of a decreased appetite?: No   MST Score: 0     Diagnosis:  Hematuria   UTI  Bilateral hydronephrosis  ESRD on HD MWF  Macrocytic anemia   Leukopenia  BPH  Essential hypertension   History of essential hypertension, cervical radiculopathy, and vitamin-D deficiency    Relevant Medical History:   Essential hypertension  ESRD on HD MWF  Anemia   BPH  Cervical radiculopathy   Vitamin-D  "deficiency    Nutrition-Related Medications: Rocephin  Calorie Containing IV Medications: no significant kcals from medications at this time    Nutrition-Related Labs:  2023: WBC 4.0, H/H 7.5/23.8, Na 135, Crea 6.38, Ca 8.2, Alb 2.8, Gluc 79    Diet/PN Order: DIET RENAL ON DIALYSIS  Oral Supplement Order: none  Tube Feeding Order: none  Appetite/Oral Intake: good/0-25% of meals (reports good appetite but reports poor PO intake, pt would not specify reasoning)  Factors Affecting Nutritional Intake: none identified  Food/Restorationist/Cultural Preferences:  shellfish, iodine  Food Allergies: none reported       Wound(s):   none noted.    Comments    2023: Pt reports poor appetite/PO intake for ~3 days. Pt reports good appetite but poor PO intake, pt would not specify reasoning. Pt denies N/V but reports loose BM yesterday. Pt denies chewing/swallowing difficulties. Pt wears dentures but does not have them, he declines the need for texture modification. Pt denies unplanned wt loss, he reports UBW as 67.1 kg. Pt agreeable to Novasource BID. Encouraged adequate PO intake. Will monitor.    Anthropometrics    Height: 5' 5.98" (167.6 cm) Height Method: Stated  Last Weight: 67.2 kg (148 lb 2.4 oz) (23 1458) Weight Method: Standard Scale  BMI (Calculated): 23.9  BMI Classification: normal (BMI 18.5-24.9)        Ideal Body Weight (IBW), Male: 141.88 lb     % Ideal Body Weight, Male (lb): 104.42 %                 Usual Body Weight (UBW), k.1 kg  % Usual Body Weight: 100.36     Usual Weight Provided By: patient    Wt Readings from Last 5 Encounters:   23 67.2 kg (148 lb 2.4 oz)   23 67.9 kg (149 lb 11.2 oz)   22 68.9 kg (152 lb)   10/11/22 66.7 kg (147 lb)   22 67 kg (147 lb 12.8 oz)     Weight Change(s) Since Admission:  Admit Weight: 67.2 kg (148 lb 2.4 oz) (23 1551)  2023: 67.2 kg    Estimated Needs    Weight Used For Calorie Calculations: 67.2 kg (148 lb 2.4 oz)  Energy " Calorie Requirements (kcal):  (30-35 kcal/kg)  Energy Need Method: Kcal/kg  Weight Used For Protein Calculations: 67.2 kg (148 lb 2.4 oz)  Protein Requirements: 81-88 (1.2-1.3 g/kg)  Fluid Requirements (mL): 1000 + urine output  Temp (24hrs), Av.5 °F (36.9 °C), Min:98 °F (36.7 °C), Max:99.2 °F (37.3 °C)         Enteral Nutrition    Patient not receiving enteral nutrition at this time.    Parenteral Nutrition    Patient not receiving parenteral nutrition support at this time.    Evaluation of Received Nutrient Intake    Calories: not meeting estimated needs  Protein: not meeting estimated needs    Patient Education    Not applicable.    Nutrition Diagnosis     PES: Malnutrition related to chronic illness as evidenced by mild fat/muscle wasting. (new)    Interventions/Goals     Intervention(s): general/healthful diet and commercial beverage  Goal: Consume % of meals/snacks by follow-up. (new)    Monitoring & Evaluation     Dietitian will monitor food and beverage intake, weight, electrolyte/renal panel, glucose/endocrine profile, and gastrointestinal profile.  Nutrition Risk/Follow-Up: moderate (follow-up in 3-5 days)   Please consult if re-assessment needed sooner.

## 2023-04-20 NOTE — PROGRESS NOTES
[facility]  Hospital Medicine Progress Note        Chief Complaint: Inpatient Follow-up for     Subjective:  Mirza Nelson Jr. is a 67 y.o. male with a past medical history of essential hypertension, ESRD on HD MWF, anemia, BPH, cervical radiculopathy, and vitamin-D deficiency presented to [facility] on 4/18/2023 for hematuria.  Patient reports hematuria began at 12 PM yesterday, 4/17.  Patient reports he has nielsen catheter in place for BPH and last change was about 3 weeks ago.  Patient denies use of blood thinners, dysuria, abdominal pain, nausea, vomiting, diarrhea, chest pain, shortness of breath, fever, and chills.  Initial vital signs in ED were /72, pulse 58, respirations 18, temperature 36.7° C, and SpO2 100% on room air.  Labs revealed WBC 3, RBC 2.51, hemoglobin 8.3, hematocrit 26.1, , sodium 135, BUN 27, creatinine 8.66, and calcium 8.7.  UA revealed 3+ protein, 3+ occult blood, 3+ leukocytes, greater than 100 red blood cells, 11-20 wbc's, and 4+ bacteria.  Urine culture was obtained.  CT abdomen and pelvis without contrast revealed moderate to advanced renal atrophy on the right greater than left with moderate severe bilateral hydronephrosis left greater than right and hydroureter to the level of the bladder, thickened urothelium which could indicate superimposed inflammation, enlarged prostate, and bladder decompressed around nielsen catheter with bladder wall  thickening.  Patient was given IV Rocephin 1 g in ED.  Urology was consulted. Patient was admitted to hospital medicine service for further medical management.     Seen and examined the patient.  Afebrile vitals stable,   He still continues to have hematuria.   Nielsen is bloody.    Objective/physical exam:  General appearance: Male in no apparent distress.  HEENNT: Atraumatic head.   Lungs: Clear to auscultation bilaterally.   Heart: Regular rate and rhythm.    Abdomen/: Soft, non-distended, non-tender. Bowel  sounds are normal.  Cheek catheter in place, wine colored urine  Extremities: No cyanosis, clubbing, or edema. No deformities. RUE graft   Skin: No Rash. Warm and dry.   Neuro: Awake, alert, and oriented. Motor and sensory exams grossly intact.     VITAL SIGNS: 24 HRS MIN & MAX LAST   Temp  Min: 98 °F (36.7 °C)  Max: 99.2 °F (37.3 °C) 98.1 °F (36.7 °C)   BP  Min: 108/53  Max: 169/72 (!) 169/72     Pulse  Min: 53  Max: 72  (!) 57     Resp  Min: 12  Max: 20 16   SpO2  Min: 96 %  Max: 100 % 99 %       Labs, Microbiology and Imaging were Reviewed.      Microbiology Results (last 7 days)       Procedure Component Value Units Date/Time    Urine culture [[id number]]  (Abnormal) Collected: 04/18/23 1918    Order Status: Completed Specimen: Urine Updated: 04/20/23 0758     Urine Culture >/= 100,000 colonies/ml Gram-negative Rods               Medications   cefTRIAXone (ROCEPHIN) IVPB  1 g Intravenous Q24H    epoetin gary  10,000 Units Intravenous Every Mon, Wed, Fri        acetaminophen, acetaminophen, dextrose 10%, dextrose 10%, glucagon (human recombinant), glucose, glucose, HYDROcodone-acetaminophen, hyoscyamine, morphine, ondansetron     Radiology:  CT Abdomen Pelvis  Without Contrast  Narrative: EXAMINATION:  CT ABDOMEN PELVIS WITHOUT CONTRAST    CLINICAL HISTORY:  hematurai;    TECHNIQUE:  Low dose axial images, sagittal and coronal reformations were obtained from the lung bases to the pubic symphysis.  .  Oral contrast not given.  .  Automated exposure control used.    COMPARISON:  12/07/2021    FINDINGS:  Advanced right renal atrophy.  Moderate left renal atrophy.  Multiple renal cysts bilaterally.  Moderate hydronephrosis bilaterally.  Thickened urothelium.  Dilated ureters bilaterally to the level of the bladder.  Prostate gland demonstrates marked enlargement with suspected significant intra bladder extension.  Bladder is otherwise decompressed around a Cheek catheter.  There is bladder wall  thickening.    Liver demonstrates normal density with no focal lesion.    Gallbladder and biliary ductal system normal.    Pancreas, stomach, spleen, adrenal glands normal.    Aorta tapers normally.  Severe atherosclerotic calcification.    No bowel obstruction, ascites, inflammatory change, lymphadenopathy.  Appendix normal.    Bones intact.  Lung bases demonstrate mild chronic appearing subpleural lines and interstitial markings.  Dense  Impression: Moderate to advanced renal atrophy on the right greater than left.  Moderate severe bilateral hydronephrosis left greater than right and hydroureter to the level of the bladder.  Thickened urothelium which could indicate superimposed inflammation.  Prostate appears markedly enlarged with significant distortion of and possible extension into the bladder.  Bladder otherwise decompressed around a Cheek catheter and demonstrates bladder wall thickening.  This could represent cystitis or muscular hypertrophy.    There appears to be a significantly reduced degree of hydronephrosis compared to the prior exam particularly on the right side.  Prostate appears slightly larger in the interval.    Agree with shane.    Electronically signed by: Sissy Gonzalez MD  Date:    04/19/2023  Time:    07:35      Assessment/Plan:  Hematuria   UTI  Bilateral hydronephrosis  ESRD on HD MWF  Macrocytic anemia   Leukopenia  BPH  Essential hypertension   History of essential hypertension, cervical radiculopathy, and vitamin-D deficiency     Plan:  - continue IV antibiotics,   -urology is on board and recommending hand irrigation.   HD today  Nephrology consulted, appreciate recommendations  Continue appropriate home medications once med rec updated   Labs in a.m.    All diagnosis and differential diagnosis have been reviewed; assessment and plan has been documented; I have personally reviewed the labs and test results that are presently available; I have reviewed the patients medication  list; I have reviewed the consulting providers response and recommendations. I have reviewed or attempted to review medical records based upon their availability.       Ulises Mcnair MD   04/20/2023

## 2023-04-20 NOTE — PLAN OF CARE
Problem: Adult Inpatient Plan of Care  Goal: Plan of Care Review  4/20/2023 0157 by Waleska Ayoub RN  Outcome: Ongoing, Progressing  Flowsheets (Taken 4/20/2023 0157)  Plan of Care Reviewed With: patient  4/19/2023 1924 by Waleska Ayoub RN  Outcome: Ongoing, Progressing  Flowsheets (Taken 4/19/2023 1924)  Plan of Care Reviewed With: patient  Goal: Patient-Specific Goal (Individualized)  4/20/2023 0157 by Waleska Ayoub RN  Outcome: Ongoing, Progressing  4/19/2023 1924 by Waleska Ayoub RN  Outcome: Ongoing, Progressing  Goal: Absence of Hospital-Acquired Illness or Injury  4/20/2023 0157 by Waleska Ayoub RN  Outcome: Ongoing, Progressing  4/19/2023 1924 by Waleska Ayoub RN  Outcome: Ongoing, Progressing  Intervention: Identify and Manage Fall Risk  4/20/2023 0157 by Waleska Ayoub RN  Flowsheets (Taken 4/20/2023 0157)  Safety Promotion/Fall Prevention:   assistive device/personal item within reach   Fall Risk reviewed with patient/family   Fall Risk signage in place   lighting adjusted   medications reviewed   nonskid shoes/socks when out of bed   side rails raised x 2   instructed to call staff for mobility  4/19/2023 1924 by Waleska Ayoub RN  Flowsheets (Taken 4/19/2023 1924)  Safety Promotion/Fall Prevention:   assistive device/personal item within reach   Fall Risk reviewed with patient/family   Fall Risk signage in place  Intervention: Prevent Skin Injury  Flowsheets (Taken 4/20/2023 0157)  Body Position:   supine   upper extremity elevated  Skin Protection:   adhesive use limited   tubing/devices free from skin contact   protective footwear used  Intervention: Prevent and Manage VTE (Venous Thromboembolism) Risk  Flowsheets (Taken 4/20/2023 0157)  Activity Management: Sitting at edge of bed - L2  Range of Motion: active ROM (range of motion) encouraged  Intervention: Prevent Infection  Flowsheets (Taken 4/20/2023 0157)  Infection Prevention:   environmental surveillance performed   equipment surfaces disinfected   hand  hygiene promoted   personal protective equipment utilized  Goal: Optimal Comfort and Wellbeing  4/20/2023 0157 by Waleska Ayoub RN  Outcome: Ongoing, Progressing  4/19/2023 1924 by Waleska Ayoub RN  Outcome: Ongoing, Progressing  Intervention: Monitor Pain and Promote Comfort  Flowsheets (Taken 4/20/2023 0157)  Pain Management Interventions:   care clustered   quiet environment facilitated  Intervention: Provide Person-Centered Care  Flowsheets (Taken 4/20/2023 0157)  Trust Relationship/Rapport:   care explained   choices provided   emotional support provided   empathic listening provided   questions answered   questions encouraged   reassurance provided   thoughts/feelings acknowledged  Goal: Readiness for Transition of Care  4/20/2023 0157 by Waleska Ayoub RN  Outcome: Ongoing, Progressing  4/19/2023 1924 by Waleska Ayoub RN  Outcome: Ongoing, Progressing     Problem: Infection  Goal: Absence of Infection Signs and Symptoms  4/20/2023 0157 by Waleska Ayoub RN  Outcome: Ongoing, Progressing  4/19/2023 1924 by Waleska Ayoub RN  Outcome: Ongoing, Progressing     Problem: Device-Related Complication Risk (Hemodialysis)  Goal: Safe, Effective Therapy Delivery  4/20/2023 0157 by Waleska Ayoub RN  Outcome: Ongoing, Progressing  4/19/2023 1924 by Waleska Ayoub RN  Outcome: Ongoing, Progressing     Problem: Hemodynamic Instability (Hemodialysis)  Goal: Effective Tissue Perfusion  4/20/2023 0157 by Waleska Ayoub RN  Outcome: Ongoing, Progressing  4/19/2023 1924 by Waleska Ayoub RN  Outcome: Ongoing, Progressing     Problem: Infection (Hemodialysis)  Goal: Absence of Infection Signs and Symptoms  4/20/2023 0157 by Waleska Ayoub RN  Outcome: Ongoing, Progressing  4/19/2023 1924 by Waleska Ayoub RN  Outcome: Ongoing, Progressing

## 2023-04-20 NOTE — PROGRESS NOTES
.UROLOGY  PROGRESS  NOTE    Mirza Nelson . 1955  [id number]  4/20/2023    Hand irrigated Cheek at bedside, urine much clearer now  H&H 7.5 and 23.8, BUN/creatinine 15.3 and 6.38  VSS, afebrile  Ucx growing GNR    Intake/Output:  I/O this shift:  In: -   Out: 600 [Urine:600]  I/O last 3 completed shifts:  In: 50 [IV Piggyback:50]  Out: 1150 [Urine:650; Other:500]       Exam:    NAD  Card RRR  Resp unlabored   now pale pink urine draining to  bag      Recent Results (from the past 24 hour(s))   Comprehensive Metabolic Panel (CMP)    Collection Time: 04/20/23  3:23 AM   Result Value Ref Range    Sodium Level 135 (L) 136 - 145 mmol/L    Potassium Level 4.1 3.5 - 5.1 mmol/L    Chloride 102 98 - 107 mmol/L    Carbon Dioxide 26 23 - 31 mmol/L    Glucose Level 79 (L) 82 - 115 mg/dL    Blood Urea Nitrogen 15.3 8.4 - 25.7 mg/dL    Creatinine 6.38 (H) 0.73 - 1.18 mg/dL    Calcium Level Total 8.2 (L) 8.8 - 10.0 mg/dL    Protein Total 6.3 5.8 - 7.6 gm/dL    Albumin Level 2.8 (L) 3.4 - 4.8 g/dL    Globulin 3.5 2.4 - 3.5 gm/dL    Albumin/Globulin Ratio 0.8 (L) 1.1 - 2.0 ratio    Bilirubin Total 0.4 <=1.5 mg/dL    Alkaline Phosphatase 62 40 - 150 unit/L    Alanine Aminotransferase 18 0 - 55 unit/L    Aspartate Aminotransferase 15 5 - 34 unit/L    eGFR 9 mls/min/1.73/m2   CBC with Differential    Collection Time: 04/20/23  3:23 AM   Result Value Ref Range    WBC 4.0 (L) 4.5 - 11.5 x10(3)/mcL    RBC 2.26 (L) 4.70 - 6.10 x10(6)/mcL    Hgb 7.5 (L) 14.0 - 18.0 g/dL    Hct 23.8 (L) 42.0 - 52.0 %    .3 (H) 80.0 - 94.0 fL    MCH 33.2 (H) 27.0 - 31.0 pg    MCHC 31.5 (L) 33.0 - 36.0 g/dL    RDW 13.7 11.5 - 17.0 %    Platelet 209 130 - 400 x10(3)/mcL    MPV 9.4 7.4 - 10.4 fL    Neut % 64.6 %    Lymph % 23.8 %    Mono % 9.2 %    Eos % 1.2 %    Basophil % 0.5 %    Lymph # 0.96 0.6 - 4.6 x10(3)/mcL    Neut # 2.61 2.1 - 9.2 x10(3)/mcL    Mono # 0.37 0.1 - 1.3 x10(3)/mcL    Eos # 0.05 0 - 0.9 x10(3)/mcL    Baso # 0.02 0 - 0.2  x10(3)/mcL    IG# 0.03 0 - 0.04 x10(3)/mcL    IG% 0.7 %    NRBC% 0.0 %       Assessment:  -Gross hematuria with chronic Cheek, last exchanged 2-3 weeks ago, preliminary urine culture growing out greater than 100,000 colonies per mL Gram-negative rods   -ESRD on HD      Plan:  -Hand irrigate as needed ( I irrigated a good bit of clot and tissue out, it was VERY foul smelling)  -Levsin PRN bladder spasms  -Continue empiric antibiotics and follow cultures   -We will follow    CATHY GalanP

## 2023-04-21 NOTE — PROGRESS NOTES
UROLOGY  PROGRESS  NOTE    Mirza Nelson Jr. 1955  [id number]  4/21/2023    Attempted to exchange Cheek over wire however, Cheek not in the bladder  H&H 7.9 and 24.2, BUN/creatinine 15.3 and 6.38  BP stable, bradycardic, afebrile  Ucx growing 2 different GNR and presumptive Pseudomonas    Intake/Output:  No intake/output data recorded.  I/O last 3 completed shifts:  In: 720 [P.O.:720]  Out: 1675 [Urine:1675]       Exam:    NAD  Card RRR  Resp unlabored   now pale pink urine draining to  bag      Recent Results (from the past 24 hour(s))   Renal Function Panel    Collection Time: 04/21/23  3:31 AM   Result Value Ref Range    Sodium Level 132 (L) 136 - 145 mmol/L    Potassium Level 3.8 3.5 - 5.1 mmol/L    Chloride 100 98 - 107 mmol/L    Carbon Dioxide 24 23 - 31 mmol/L    Glucose Level 89 82 - 115 mg/dL    Blood Urea Nitrogen 23.2 8.4 - 25.7 mg/dL    Creatinine 8.53 (H) 0.73 - 1.18 mg/dL    Calcium Level Total 8.1 (L) 8.8 - 10.0 mg/dL    Albumin Level 2.7 (L) 3.4 - 4.8 g/dL    Phosphorus Level 2.8 2.3 - 4.7 mg/dL    eGFR 6 mls/min/1.73/m2   CBC with Differential    Collection Time: 04/21/23  3:31 AM   Result Value Ref Range    WBC 4.2 (L) 4.5 - 11.5 x10(3)/mcL    RBC 2.35 (L) 4.70 - 6.10 x10(6)/mcL    Hgb 7.9 (L) 14.0 - 18.0 g/dL    Hct 24.2 (L) 42.0 - 52.0 %    .0 (H) 80.0 - 94.0 fL    MCH 33.6 (H) 27.0 - 31.0 pg    MCHC 32.6 (L) 33.0 - 36.0 g/dL    RDW 13.5 11.5 - 17.0 %    Platelet 196 130 - 400 x10(3)/mcL    MPV 10.0 7.4 - 10.4 fL    Neut % 67.3 %    Lymph % 19.8 %    Mono % 8.3 %    Eos % 2.9 %    Basophil % 0.5 %    Lymph # 0.83 0.6 - 4.6 x10(3)/mcL    Neut # 2.83 2.1 - 9.2 x10(3)/mcL    Mono # 0.35 0.1 - 1.3 x10(3)/mcL    Eos # 0.12 0 - 0.9 x10(3)/mcL    Baso # 0.02 0 - 0.2 x10(3)/mcL    IG# 0.05 (H) 0 - 0.04 x10(3)/mcL    IG% 1.2 %    NRBC% 0.0 %       Assessment:  -Gross hematuria with chronic Cheek, last exchanged 2-3 weeks ago, preliminary urine culture growing out greater than 100,000  colonies per mL Gram-negative rods x 2 and presumptive Pseudomonas  -ESRD on HD      Plan:  Plan to place Cheek with bedside cysto over a wire    WES Galan

## 2023-04-21 NOTE — NURSING
04/21/23 1100   Post-Hemodialysis Assessment   Rinseback Volume (mL) 250 mL   Blood Volume Processed (Liters) 61.2 L   Dialyzer Clearance Moderately streaked   Duration of Treatment 180 minutes   Total UF (mL) 500 mL   Net Fluid Removal 0   Patient Response to Treatment Tolerated well   Post-Hemodialysis Comments Blood rinsed back per P&P

## 2023-04-21 NOTE — PROGRESS NOTES
[facility]  Hospital Medicine Progress Note        Chief Complaint: Inpatient Follow-up for hematuria    Subjective:  Mirza Nelson Jr. is a 67 y.o. male with a past medical history of essential hypertension, ESRD on HD MWF, anemia, BPH, cervical radiculopathy, and vitamin-D deficiency presented to [facility] on 4/18/2023 for hematuria.  Patient reports hematuria began at 12 PM yesterday, 4/17.  Patient reports he has nielsen catheter in place for BPH and last change was about 3 weeks ago.  Patient denies use of blood thinners, dysuria, abdominal pain, nausea, vomiting, diarrhea, chest pain, shortness of breath, fever, and chills.  Initial vital signs in ED were /72, pulse 58, respirations 18, temperature 36.7° C, and SpO2 100% on room air.  Labs revealed WBC 3, RBC 2.51, hemoglobin 8.3, hematocrit 26.1, , sodium 135, BUN 27, creatinine 8.66, and calcium 8.7.  UA revealed 3+ protein, 3+ occult blood, 3+ leukocytes, greater than 100 red blood cells, 11-20 wbc's, and 4+ bacteria.  Urine culture was obtained.  CT abdomen and pelvis without contrast revealed moderate to advanced renal atrophy on the right greater than left with moderate severe bilateral hydronephrosis left greater than right and hydroureter to the level of the bladder, thickened urothelium which could indicate superimposed inflammation, enlarged prostate, and bladder decompressed around nielsen catheter with bladder wall  thickening.  Patient was given IV Rocephin 1 g in ED.  Urology was consulted. Patient was admitted to hospital medicine service for further medical management.     Seen and examined, Nielsen catheter is less bloody.  No other issues reported patient is receiving HD now.    Objective/physical exam:  General appearance: Male in no apparent distress.  HEENNT: Atraumatic head.   Lungs: Clear to auscultation bilaterally.   Heart: Regular rate and rhythm.    Abdomen/: Soft, non-distended, non-tender. Bowel  sounds are normal.  Cheek catheter in place, wine colored urine  Extremities: No cyanosis, clubbing, or edema. No deformities. RUE graft   Skin: No Rash. Warm and dry.   Neuro: Awake, alert, and oriented. Motor and sensory exams grossly intact.     VITAL SIGNS: 24 HRS MIN & MAX LAST   Temp  Min: 98.1 °F (36.7 °C)  Max: 98.8 °F (37.1 °C) 98.4 °F (36.9 °C)   BP  Min: 133/58  Max: 182/79 (!) 133/58     Pulse  Min: 53  Max: 60  (!) 53     Resp  Min: 16  Max: 20 18   SpO2  Min: 98 %  Max: 100 % 100 %       Labs, Microbiology and Imaging were Reviewed.      Microbiology Results (last 7 days)       Procedure Component Value Units Date/Time    Urine culture [[id number]]  (Abnormal) Collected: 04/18/23 1918    Order Status: Completed Specimen: Urine Updated: 04/21/23 0941     Urine Culture >/= 100,000 colonies/ml Presumptive Pseudomonas species      >/= 100,000 colonies/ml Gram-negative Rods      >/= 100,000 colonies/ml Gram-negative Rods               Medications   cefTRIAXone (ROCEPHIN) IVPB  1 g Intravenous Q24H    epoetin gary  10,000 Units Intravenous Every Mon, Wed, Fri    mupirocin   Nasal BID        acetaminophen, acetaminophen, dextrose 10%, dextrose 10%, glucagon (human recombinant), glucose, glucose, hydrALAZINE, HYDROcodone-acetaminophen, hyoscyamine, morphine, ondansetron     Radiology:  CT Abdomen Pelvis  Without Contrast  Narrative: EXAMINATION:  CT ABDOMEN PELVIS WITHOUT CONTRAST    CLINICAL HISTORY:  hematurai;    TECHNIQUE:  Low dose axial images, sagittal and coronal reformations were obtained from the lung bases to the pubic symphysis.  .  Oral contrast not given.  .  Automated exposure control used.    COMPARISON:  12/07/2021    FINDINGS:  Advanced right renal atrophy.  Moderate left renal atrophy.  Multiple renal cysts bilaterally.  Moderate hydronephrosis bilaterally.  Thickened urothelium.  Dilated ureters bilaterally to the level of the bladder.  Prostate gland demonstrates marked enlargement  with suspected significant intra bladder extension.  Bladder is otherwise decompressed around a Cheek catheter.  There is bladder wall thickening.    Liver demonstrates normal density with no focal lesion.    Gallbladder and biliary ductal system normal.    Pancreas, stomach, spleen, adrenal glands normal.    Aorta tapers normally.  Severe atherosclerotic calcification.    No bowel obstruction, ascites, inflammatory change, lymphadenopathy.  Appendix normal.    Bones intact.  Lung bases demonstrate mild chronic appearing subpleural lines and interstitial markings.  Dense  Impression: Moderate to advanced renal atrophy on the right greater than left.  Moderate severe bilateral hydronephrosis left greater than right and hydroureter to the level of the bladder.  Thickened urothelium which could indicate superimposed inflammation.  Prostate appears markedly enlarged with significant distortion of and possible extension into the bladder.  Bladder otherwise decompressed around a Cheek catheter and demonstrates bladder wall thickening.  This could represent cystitis or muscular hypertrophy.    There appears to be a significantly reduced degree of hydronephrosis compared to the prior exam particularly on the right side.  Prostate appears slightly larger in the interval.    Agree with shane.    Electronically signed by: Sissy Gonzalez MD  Date:    04/19/2023  Time:    07:35      Assessment/Plan:  Hematuria   UTI  Bilateral hydronephrosis  ESRD on HD MWF  Macrocytic anemia   Leukopenia  BPH  Essential hypertension   History of essential hypertension, cervical radiculopathy, and vitamin-D deficiency     Plan:  - Continue the current management now monitor blood from the Cheek.  - Continue IV antibiotics,   - Urology is on board and recommending hand irrigation.   HD today  Nephrology consulted, appreciate recommendations  Continue appropriate home medications once med rec updated.    All diagnosis and differential  diagnosis have been reviewed; assessment and plan has been documented; I have personally reviewed the labs and test results that are presently available; I have reviewed the patients medication list; I have reviewed the consulting providers response and recommendations. I have reviewed or attempted to review medical records based upon their availability.       Ulises Mcnair MD   04/21/2023

## 2023-04-21 NOTE — PROGRESS NOTES
Nephrology consult follow up note    HPI:      Mirza Nelson Jr. is a 67 y.o. male  with ESRD on hemodialysis.  He dialyzes at [facility] on Monday Wednesday Friday schedule followed by Dr. Butler.  He does have a chronic indwelling Cheek and is followed outpatient by Dr. Navarrete.  2-3 weeks ago catheter was changed.  He presented today to ER for hematuria which often presents when catheter is changed.  Urinalysis suggests possible UTI which culture is pending.  CT showed nondistended bladder, bladder thickening likely due to chronic bladder outlet obstruction, element of cystitis, and hyperdense material in the bladder.  There is also an enlarged prostate that projects into the bladder.  He is being treated for hemorrhagic cystitis with consultation to Nephrology consulted for ESRD management.     Interval history:     No acute events overnight. Cheek in place with gross hematruia. Dialyzed yesterday without problem. He did have cramping after HD. No CP, abd pain, or LE edema.     4/21/2023  Currently tolerating dialysis.  Denies any chest pains or abdominal pains.  Tolerating oral nutrition.  No nausea vomiting.  Still with indwelling Cheek catheter and hematuria noted.  Urology working on that.     Review of Systems:     Comprehensive 10pt ROS negative except as noted per HPI.    Past medical, family, surgical, and social history reviewed and unchanged from initial consult note.     Objective:       VITAL SIGNS: 24 HR MIN & MAX LAST    Temp  Min: 98.1 °F (36.7 °C)  Max: 98.8 °F (37.1 °C)  98.4 °F (36.9 °C)        BP  Min: 133/58  Max: 182/79  (!) 133/58     Pulse  Min: 53  Max: 60  (!) 53     Resp  Min: 16  Max: 20  18    SpO2  Min: 98 %  Max: 100 %  100 %      GEN:  Moderately developed and nourished.  Awake alert oriented to time person place.  No respiratory distress noted.   CV: RRR without rub or gallop.    PULM: CTAB, unlabored  ABD: Soft, NT/ND abdomen with NABS  : Cheek in place with hematuria.   EXT: No  cyanosis or edema  SKIN: Warm and dry  PSYCH: Awake, alert and appropriately conversant.   Vascular access: RUE AVF with good pulsation and thrill.   Neurologically no focal motor deficit.          Component Value Date/Time     (L) 04/21/2023 0331     (L) 04/20/2023 0323     09/27/2018 0800    K 3.8 04/21/2023 0331    K 4.1 04/20/2023 0323    K 3.9 09/27/2018 0800    CHLORIDE 100 04/21/2023 0331    CHLORIDE 102 04/20/2023 0323    CO2 24 04/21/2023 0331    CO2 26 04/20/2023 0323    CO2 34 (H) 09/27/2018 0800    BUN 23.2 04/21/2023 0331    BUN 15.3 04/20/2023 0323    BUN 24 (H) 09/27/2018 0800    CREATININE 8.53 (H) 04/21/2023 0331    CREATININE 6.38 (H) 04/20/2023 0323    CREATININE 5.4 (H) 09/27/2018 0800    CALCIUM 8.1 (L) 04/21/2023 0331    CALCIUM 8.2 (L) 04/20/2023 0323    CALCIUM 9.2 09/27/2018 0800    PHOS 2.8 04/21/2023 0331    PHOS 3.5 09/27/2018 0800            Component Value Date/Time    WBC 4.2 (L) 04/21/2023 0331    WBC 4.0 (L) 04/20/2023 0323    WBC 5.35 09/27/2018 0800    HGB 7.9 (L) 04/21/2023 0331    HGB 7.5 (L) 04/20/2023 0323    HGB 11.1 (L) 09/27/2018 0800    HCT 24.2 (L) 04/21/2023 0331    HCT 23.8 (L) 04/20/2023 0323    HCT 33.0 (L) 11/30/2020 0859    HCT 35.1 (L) 09/27/2018 0800     04/21/2023 0331     04/20/2023 0323     09/27/2018 0800         Imaging reviewed      Assessment / Plan:     ESRD on hemodialysis-[facility], [facility]   Hemorrhagic cystitis  Chronic bladder outlet obstruction with chronic Cheek changed every 2-3 weeks followed by Dr. Navarrete  Anemia of chronic disease worsening with acute blood loss  Hypertension    Plan:  Continue antibiotics  Continue dialysis Monday Wednesday Friday  Urology will continue to monitor patient hematuria.  My concern is the persistence of hydronephrosis bilaterally with hydroureter still present in spite of the fact patient has indwelling Cheek catheter.

## 2023-04-21 NOTE — PLAN OF CARE
Problem: Adult Inpatient Plan of Care  Goal: Plan of Care Review  Outcome: Ongoing, Progressing  Goal: Patient-Specific Goal (Individualized)  Outcome: Ongoing, Progressing  Goal: Absence of Hospital-Acquired Illness or Injury  Outcome: Ongoing, Progressing  Goal: Optimal Comfort and Wellbeing  Outcome: Ongoing, Progressing  Goal: Readiness for Transition of Care  Outcome: Ongoing, Progressing     Problem: Infection  Goal: Absence of Infection Signs and Symptoms  Outcome: Ongoing, Progressing     Problem: Device-Related Complication Risk (Hemodialysis)  Goal: Safe, Effective Therapy Delivery  Outcome: Ongoing, Progressing     Problem: Hemodynamic Instability (Hemodialysis)  Goal: Effective Tissue Perfusion  Outcome: Ongoing, Progressing     Problem: Infection (Hemodialysis)  Goal: Absence of Infection Signs and Symptoms  Outcome: Ongoing, Progressing

## 2023-04-22 NOTE — PROGRESS NOTES
progress note     Patient without particular complaint.  Cheek catheter was not replaced last night and he voided into a diaper.    I placed an 18 French catheter in standard fashion with steady pressure.  There was return of clear urine initially with purulent change on completion of drainage.  Volume was not particularly excessive.    Impression:  Chronic urinary infection related to Cheek catheter with secondary hematuria.      Recommendation:  Continue antibiotic therapy expecting further clearance of urine with new catheter and irrigate catheter as needed.  Patient will follow-up with Dr. Navarrete.  Depending on antibiotic decisions patient otherwise ready for discharge from my standpoint.      Please re-consult if needed.      Johnson Khan MD

## 2023-04-22 NOTE — PROGRESS NOTES
[facility]  Hospital Medicine Progress Note        Chief Complaint: Inpatient Follow-up for hematuria    Subjective:  Mirza Nelson Jr. is a 67 y.o. male with a past medical history of essential hypertension, ESRD on HD MWF, anemia, BPH, cervical radiculopathy, and vitamin-D deficiency presented to [facility] on 4/18/2023 for hematuria.  Patient reports hematuria began at 12 PM yesterday, 4/17.  Patient reports he has nielsen catheter in place for BPH and last change was about 3 weeks ago.  Patient denies use of blood thinners, dysuria, abdominal pain, nausea, vomiting, diarrhea, chest pain, shortness of breath, fever, and chills.  Initial vital signs in ED were /72, pulse 58, respirations 18, temperature 36.7° C, and SpO2 100% on room air.  Labs revealed WBC 3, RBC 2.51, hemoglobin 8.3, hematocrit 26.1, , sodium 135, BUN 27, creatinine 8.66, and calcium 8.7.  UA revealed 3+ protein, 3+ occult blood, 3+ leukocytes, greater than 100 red blood cells, 11-20 wbc's, and 4+ bacteria.  Urine culture was obtained.  CT abdomen and pelvis without contrast revealed moderate to advanced renal atrophy on the right greater than left with moderate severe bilateral hydronephrosis left greater than right and hydroureter to the level of the bladder, thickened urothelium which could indicate superimposed inflammation, enlarged prostate, and bladder decompressed around nielsen catheter with bladder wall  thickening.  Patient was given IV Rocephin 1 g in ED.  Urology was consulted. Patient was admitted to hospital medicine service for further medical management.     Seen and examined, complaining from back pain which is chronic for him.    Waiting for the Nielsen placement.    Objective/physical exam:  General appearance: Male in no apparent distress.  HEENNT: Atraumatic head.   Lungs: Clear to auscultation bilaterally.   Heart: Regular rate and rhythm.    Abdomen/: Soft, non-distended, non-tender.  Bowel sounds are normal.  Cheek catheter in place, wine colored urine  Extremities: No cyanosis, clubbing, or edema. No deformities. RUE graft   Skin: No Rash. Warm and dry.   Neuro: Awake, alert, and oriented. Motor and sensory exams grossly intact.     VITAL SIGNS: 24 HRS MIN & MAX LAST   Temp  Min: 98 °F (36.7 °C)  Max: 99 °F (37.2 °C) 98.3 °F (36.8 °C)   BP  Min: 125/64  Max: 198/81 (!) 179/81     Pulse  Min: 57  Max: 73  68     Resp  Min: 16  Max: 22 16   SpO2  Min: 97 %  Max: 100 % 100 %       Labs, Microbiology and Imaging were Reviewed.      Microbiology Results (last 7 days)       Procedure Component Value Units Date/Time    Urine culture [[id number]]  (Abnormal)  (Susceptibility) Collected: 04/18/23 1918    Order Status: Completed Specimen: Urine Updated: 04/22/23 0942     Urine Culture >/= 100,000 colonies/ml Pseudomonas aeruginosa      >/= 100,000 colonies/ml Escherichia coli      >/= 100,000 colonies/ml Klebsiella pneumoniae ssp pneumoniae               Medications   ceFEPime (MAXIPIME) IVPB  1 g Intravenous Q12H    epoetin gary  10,000 Units Intravenous Every Mon, Wed, Fri    mupirocin   Nasal BID        acetaminophen, acetaminophen, dextrose 10%, dextrose 10%, glucagon (human recombinant), glucose, glucose, hydrALAZINE, HYDROcodone-acetaminophen, hyoscyamine, morphine, ondansetron     Radiology:  CT Abdomen Pelvis  Without Contrast  Narrative: EXAMINATION:  CT ABDOMEN PELVIS WITHOUT CONTRAST    CLINICAL HISTORY:  hematurai;    TECHNIQUE:  Low dose axial images, sagittal and coronal reformations were obtained from the lung bases to the pubic symphysis.  .  Oral contrast not given.  .  Automated exposure control used.    COMPARISON:  12/07/2021    FINDINGS:  Advanced right renal atrophy.  Moderate left renal atrophy.  Multiple renal cysts bilaterally.  Moderate hydronephrosis bilaterally.  Thickened urothelium.  Dilated ureters bilaterally to the level of the bladder.  Prostate gland demonstrates  marked enlargement with suspected significant intra bladder extension.  Bladder is otherwise decompressed around a Cheek catheter.  There is bladder wall thickening.    Liver demonstrates normal density with no focal lesion.    Gallbladder and biliary ductal system normal.    Pancreas, stomach, spleen, adrenal glands normal.    Aorta tapers normally.  Severe atherosclerotic calcification.    No bowel obstruction, ascites, inflammatory change, lymphadenopathy.  Appendix normal.    Bones intact.  Lung bases demonstrate mild chronic appearing subpleural lines and interstitial markings.  Dense  Impression: Moderate to advanced renal atrophy on the right greater than left.  Moderate severe bilateral hydronephrosis left greater than right and hydroureter to the level of the bladder.  Thickened urothelium which could indicate superimposed inflammation.  Prostate appears markedly enlarged with significant distortion of and possible extension into the bladder.  Bladder otherwise decompressed around a Cheek catheter and demonstrates bladder wall thickening.  This could represent cystitis or muscular hypertrophy.    There appears to be a significantly reduced degree of hydronephrosis compared to the prior exam particularly on the right side.  Prostate appears slightly larger in the interval.    Agree with shane.    Electronically signed by: Sissy Gonzalez MD  Date:    04/19/2023  Time:    07:35      Assessment/Plan:  Hematuria   UTI  Bilateral hydronephrosis  ESRD on HD MWF  Macrocytic anemia   Leukopenia  BPH  Essential hypertension   History of essential hypertension, cervical radiculopathy, and vitamin-D deficiency     Plan:  - Continue the current management now monitor blood from the Cheek.  - Continue IV antibiotics.  - Urology is on board and recommending hand irrigation.   HD today.   Nephrology consulted, appreciate recommendations.   Continue appropriate home medications once med rec updated.    All diagnosis  and differential diagnosis have been reviewed; assessment and plan has been documented; I have personally reviewed the labs and test results that are presently available; I have reviewed the patients medication list; I have reviewed the consulting providers response and recommendations. I have reviewed or attempted to review medical records based upon their availability.       Ulises Mcnair MD   04/22/2023

## 2023-04-23 NOTE — PLAN OF CARE
Problem: Adult Inpatient Plan of Care  Goal: Plan of Care Review  Outcome: Ongoing, Progressing  Goal: Patient-Specific Goal (Individualized)  Outcome: Ongoing, Progressing  Goal: Absence of Hospital-Acquired Illness or Injury  Outcome: Ongoing, Progressing  Goal: Optimal Comfort and Wellbeing  Outcome: Ongoing, Progressing  Goal: Readiness for Transition of Care  Outcome: Ongoing, Progressing     Problem: Infection  Goal: Absence of Infection Signs and Symptoms  Outcome: Ongoing, Progressing     Problem: Device-Related Complication Risk (Hemodialysis)  Goal: Safe, Effective Therapy Delivery  Outcome: Ongoing, Progressing     Problem: Hemodynamic Instability (Hemodialysis)  Goal: Effective Tissue Perfusion  Outcome: Ongoing, Progressing     Problem: Infection (Hemodialysis)  Goal: Absence of Infection Signs and Symptoms  Outcome: Ongoing, Progressing     Problem: Fall Injury Risk  Goal: Absence of Fall and Fall-Related Injury  Outcome: Ongoing, Progressing

## 2023-04-23 NOTE — PROGRESS NOTES
[facility]  Hospital Medicine Progress Note        Chief Complaint: Inpatient Follow-up for hematuria    Subjective:  Mirza Nelson Jr. is a 67 y.o. male with a past medical history of essential hypertension, ESRD on HD MWF, anemia, BPH, cervical radiculopathy, and vitamin-D deficiency presented to [facility] on 4/18/2023 for hematuria.  Patient reports hematuria began at 12 PM yesterday, 4/17.  Patient reports he has nielsen catheter in place for BPH and last change was about 3 weeks ago.  Patient denies use of blood thinners, dysuria, abdominal pain, nausea, vomiting, diarrhea, chest pain, shortness of breath, fever, and chills.  Initial vital signs in ED were /72, pulse 58, respirations 18, temperature 36.7° C, and SpO2 100% on room air.  Labs revealed WBC 3, RBC 2.51, hemoglobin 8.3, hematocrit 26.1, , sodium 135, BUN 27, creatinine 8.66, and calcium 8.7.  UA revealed 3+ protein, 3+ occult blood, 3+ leukocytes, greater than 100 red blood cells, 11-20 wbc's, and 4+ bacteria.  Urine culture was obtained.  CT abdomen and pelvis without contrast revealed moderate to advanced renal atrophy on the right greater than left with moderate severe bilateral hydronephrosis left greater than right and hydroureter to the level of the bladder, thickened urothelium which could indicate superimposed inflammation, enlarged prostate, and bladder decompressed around nielsen catheter with bladder wall  thickening.  Patient was given IV Rocephin 1 g in ED.  Urology was consulted. Patient was admitted to hospital medicine service for further medical management.     Seen and examined the patient.  Afebrile vitals stable and hemodynamically stable.    Nielsen is draining more clear today.      Objective/physical exam:  General appearance: Male in no apparent distress.  HEENNT: Atraumatic head.   Lungs: Clear to auscultation bilaterally.   Heart: Regular rate and rhythm.    Abdomen/: Soft, non-distended,  non-tender. Bowel sounds are normal.  Cheek catheter in place, wine colored urine  Extremities: No cyanosis, clubbing, or edema. No deformities. RUE graft   Skin: No Rash. Warm and dry.   Neuro: Awake, alert, and oriented. Motor and sensory exams grossly intact.     VITAL SIGNS: 24 HRS MIN & MAX LAST   Temp  Min: 98 °F (36.7 °C)  Max: 98.6 °F (37 °C) 98.4 °F (36.9 °C)   BP  Min: 107/59  Max: 151/81 110/66     Pulse  Min: 53  Max: 85  (!) 53     Resp  Min: 15  Max: 18 18   SpO2  Min: 98 %  Max: 100 % 98 %       Labs, Microbiology and Imaging were Reviewed.      Microbiology Results (last 7 days)       Procedure Component Value Units Date/Time    Urine culture [[id number]]  (Abnormal)  (Susceptibility) Collected: 04/18/23 1918    Order Status: Completed Specimen: Urine Updated: 04/22/23 0942     Urine Culture >/= 100,000 colonies/ml Pseudomonas aeruginosa      >/= 100,000 colonies/ml Escherichia coli      >/= 100,000 colonies/ml Klebsiella pneumoniae ssp pneumoniae               Medications   aluminum-magnesium hydroxide-simethicone  30 mL Oral QID (AC & HS)    amLODIPine  5 mg Oral Daily    B12-levomefolate calcium-B6  1 tablet Oral Daily    busPIRone  10 mg Oral Daily    carvediloL  12.5 mg Oral BID    ceFEPime (MAXIPIME) IVPB  1 g Intravenous Q12H    cetirizine  10 mg Oral Daily    doxazosin  8 mg Oral QHS    epoetin gary  10,000 Units Intravenous Every Mon, Wed, Fri    flavoxATE  200 mg Oral QID    mupirocin   Nasal BID    pantoprazole  40 mg Oral Daily    sevelamer carbonate  800 mg Oral TID    traZODone  50 mg Oral Nightly        acetaminophen, acetaminophen, dextrose 10%, dextrose 10%, glucagon (human recombinant), glucose, glucose, hydrALAZINE, HYDROcodone-acetaminophen, hyoscyamine, morphine, ondansetron     Radiology:  CT Abdomen Pelvis  Without Contrast  Narrative: EXAMINATION:  CT ABDOMEN PELVIS WITHOUT CONTRAST    CLINICAL HISTORY:  hematurai;    TECHNIQUE:  Low dose axial images, sagittal and coronal  reformations were obtained from the lung bases to the pubic symphysis.  .  Oral contrast not given.  .  Automated exposure control used.    COMPARISON:  12/07/2021    FINDINGS:  Advanced right renal atrophy.  Moderate left renal atrophy.  Multiple renal cysts bilaterally.  Moderate hydronephrosis bilaterally.  Thickened urothelium.  Dilated ureters bilaterally to the level of the bladder.  Prostate gland demonstrates marked enlargement with suspected significant intra bladder extension.  Bladder is otherwise decompressed around a Cheek catheter.  There is bladder wall thickening.    Liver demonstrates normal density with no focal lesion.    Gallbladder and biliary ductal system normal.    Pancreas, stomach, spleen, adrenal glands normal.    Aorta tapers normally.  Severe atherosclerotic calcification.    No bowel obstruction, ascites, inflammatory change, lymphadenopathy.  Appendix normal.    Bones intact.  Lung bases demonstrate mild chronic appearing subpleural lines and interstitial markings.  Dense  Impression: Moderate to advanced renal atrophy on the right greater than left.  Moderate severe bilateral hydronephrosis left greater than right and hydroureter to the level of the bladder.  Thickened urothelium which could indicate superimposed inflammation.  Prostate appears markedly enlarged with significant distortion of and possible extension into the bladder.  Bladder otherwise decompressed around a Cheek catheter and demonstrates bladder wall thickening.  This could represent cystitis or muscular hypertrophy.    There appears to be a significantly reduced degree of hydronephrosis compared to the prior exam particularly on the right side.  Prostate appears slightly larger in the interval.    Agree with shane.    Electronically signed by: Sissy Gonzalez MD  Date:    04/19/2023  Time:    07:35      Assessment/Plan:  Hematuria   UTI  Bilateral hydronephrosis  ESRD on HD MWF  Macrocytic anemia    Leukopenia  BPH  Essential hypertension   History of essential hypertension, cervical radiculopathy, and vitamin-D deficiency     Plan:  - Continue the current management now monitor blood from the Cheek.  - continue cefepime in the light of new culture results.  - Urology is on board and recommending hand irrigation.   HD today.   Nephrology consulted, appreciate recommendations.   Continue appropriate home medications once med rec updated.    All diagnosis and differential diagnosis have been reviewed; assessment and plan has been documented; I have personally reviewed the labs and test results that are presently available; I have reviewed the patients medication list; I have reviewed the consulting providers response and recommendations. I have reviewed or attempted to review medical records based upon their availability.       Ulises Mcnair MD   04/23/2023

## 2023-04-24 NOTE — PROGRESS NOTES
Nephrology consult follow up note    HPI:      Mirza Nelson Jr. is a 67 y.o. male  with ESRD on hemodialysis.  He dialyzes at [facility] on Monday Wednesday Friday schedule followed by Dr. Butler.  He does have a chronic indwelling Cheek and is followed outpatient by Dr. Navarrete.  2-3 weeks ago catheter was changed.  He presented today to ER for hematuria which often presents when catheter is changed.  Urinalysis suggests possible UTI which culture is pending.  CT showed nondistended bladder, bladder thickening likely due to chronic bladder outlet obstruction, element of cystitis, and hyperdense material in the bladder.  There is also an enlarged prostate that projects into the bladder.  He is being treated for hemorrhagic cystitis with consultation to Nephrology consulted for ESRD management.     Interval history:     No acute events overnight. Cheek in place with gross hematruia. Dialyzed yesterday without problem. He did have cramping after HD. No CP, abd pain, or LE edema.     4/21/2023  Currently tolerating dialysis.  Denies any chest pains or abdominal pains.  Tolerating oral nutrition.  No nausea vomiting.  Still with indwelling Cheek catheter and hematuria noted.  Urology working on that.    4/24/2023 events of the weekend noted.  And he does have a new Cheek catheter and there is no blood in the urine now.  Patient has no complaints of any stomach pains.     Review of Systems:     Comprehensive 10pt ROS negative except as noted per HPI.    Past medical, family, surgical, and social history reviewed and unchanged from initial consult note.     Objective:     Currently tolerating dialysis.  VITAL SIGNS: 24 HR MIN & MAX LAST    Temp  Min: 97.6 °F (36.4 °C)  Max: 98.7 °F (37.1 °C)  97.6 °F (36.4 °C)        BP  Min: 98/53  Max: 129/62  (!) 98/53     Pulse  Min: 53  Max: 77  77     Resp  Min: 16  Max: 18  18    SpO2  Min: 96 %  Max: 100 %  98 %      GEN:  Moderately developed and nourished.  Awake alert oriented  to time person place.  No respiratory distress noted.   CV: RRR without rub or gallop.    PULM: CTAB, unlabored  ABD: Soft, NT/ND abdomen with NABS  : Cheek in place, no obvious blood noted in the urine.  EXT: No cyanosis or edema  SKIN: Warm and dry  PSYCH: Awake, alert and appropriately conversant.   Vascular access: RUE AVF with good pulsation and thrill.   Neurologically no focal motor deficit.          Component Value Date/Time     04/24/2023 0332     (L) 04/22/2023 0313     09/27/2018 0800    K 4.6 04/24/2023 0332    K 3.9 04/22/2023 0313    K 3.9 09/27/2018 0800    CHLORIDE 102 04/24/2023 0332    CHLORIDE 100 04/22/2023 0313    CO2 24 04/24/2023 0332    CO2 23 04/22/2023 0313    CO2 34 (H) 09/27/2018 0800    BUN 29.1 (H) 04/24/2023 0332    BUN 13.6 04/22/2023 0313    BUN 24 (H) 09/27/2018 0800    CREATININE 9.51 (H) 04/24/2023 0332    CREATININE 5.83 (H) 04/22/2023 0313    CREATININE 5.4 (H) 09/27/2018 0800    CALCIUM 8.0 (L) 04/24/2023 0332    CALCIUM 8.6 (L) 04/22/2023 0313    CALCIUM 9.2 09/27/2018 0800    PHOS 4.0 04/24/2023 0332    PHOS 3.5 09/27/2018 0800            Component Value Date/Time    WBC 4.9 04/24/2023 0332    WBC 5.2 04/22/2023 0313    WBC 5.35 09/27/2018 0800    HGB 7.4 (L) 04/24/2023 0332    HGB 9.0 (L) 04/22/2023 0313    HGB 11.1 (L) 09/27/2018 0800    HCT 23.1 (L) 04/24/2023 0332    HCT 27.1 (L) 04/22/2023 0313    HCT 33.0 (L) 11/30/2020 0859    HCT 35.1 (L) 09/27/2018 0800     04/24/2023 0332     04/22/2023 0313     09/27/2018 0800         Imaging reviewed      Assessment / Plan:     ESRD on hemodialysis-MWF, [facility]   Hemorrhagic cystitis  Chronic bladder outlet obstruction with chronic Cheek changed every 2-3 weeks followed by Dr. Navarrete  Anemia of chronic disease worsening with acute blood loss  Hypertension    Plan:  Continue dialysis on Monday Wednesday Friday  Discharged home from the renal standpoint of view.

## 2023-04-24 NOTE — NURSING
04/24/23 1152   Post-Hemodialysis Assessment   Blood Volume Processed (Liters) 62.2 L   Dialyzer Clearance Lightly streaked   Duration of Treatment 180 minutes   Total UF (mL) 0 mL   Patient Response to Treatment tolerated well

## 2023-04-24 NOTE — PHYSICIAN QUERY
PT Name: Mirza Nelson Jr.  MR #: [id number]    DOCUMENTATION CLARIFICATION     CDS/: Mariah Nelson RN, CDS               Contact information: [email or web address]      This form is a permanent document in the medical record.     Query Date: April 24, 2023    By submitting this query, we are merely seeking further clarification of documentation.. Please utilize your independent clinical judgment when addressing the question(s) below.    The medical record contains the following:   Indicators  Supporting Clinical Findings Location in Medical Record   x Energy Intake Energy Intake: does not meet criteria    Pt reports poor appetite/PO intake for ~3 days. Pt reports good appetite but poor PO intake, pt would not specify reasoning   Nutrition PN 4/20    Nutrition PN 4/20   x Weight Loss Interpretation of Weight Loss: does not meet criteria   Nutrition PN 4/20   x Fat Loss Body Fat: mild depletion    Area of Body Fat Loss: upper arm region - triceps / biceps and thoracic and lumbar region - ribs, lower back, midaxillary line   Nutrition PN 4/20    Nutrition PN 4/20   x Muscle Loss Muscle Mass Loss: mild depletion    Area of Muscle Mass Loss: temple region - temporalis muscle, clavicle bone region - pectoralis major, deltoid, trapezius muscles, clavicle and acromion bone region - deltoid muscle, scapular bone region - trapezius, supraspinus, infraspinus muscles, and patellar region - quadricep muscle Nutrition PN 4/20    Nutrition PN 4/20    Edema/Fluid Accumulation      Reduced  Strength (by dynamometer)     x Weight, BMI, Usual Body Weight BMI (Calculated): 23.9   Nutrition PN 4/20    Delayed Wound Healing     x Registered Dietician Diagnosis Malnutrition in the context of chronic illness  Degree of Malnutrition: non-severe (moderate) malnutrition   Nutrition PN 4/20   x Acute or Chronic Illness 67 year old make with a history that includes ESRD on HD and BPH with chronic indwelling nielsen, presented to ED  with hematuria Nutrition PN 4/20    Social or Environmental Circumstances     x Treatment Add Novasource TID    Nutrition PN 4/20   x Other GEN:  Moderately developed and nourished   Nephrology PN 4/24      Academy of Nutrition and Dietetics (Academy) and the American Society for Parenteral and Enteral Nutrition (A.S.P.E.N.) Clinical Characteristics to support Malnutrition   Malnutrition in the Context of Acute Illness or Injury Malnutrition in the Context of Chronic Illness or Injury Malnutrition in the Context of Social or Environmental Circumstances   Malnutrition Level Moderate Severe Moderate Severe   Moderate   Severe   Energy Intake <75%                   >7 days <50%                 >5 days <75%           >1 month <75%                      >1 month   <75% for >3 months   <50% for >1 month   Weight Loss   1-2% in 1 week >2% in 1 week 5% in 1 month >5% in 1 month 5% in 1 month >5% in 1 month    5% in 1 month >5% in 1 month 7.5% in 3 months >7.5% in 3 months 7.5% in 3 months >7.5% in 3 months    7.5% in 3 months >7.5% in 3 months 10% in 6 months >10% in 6 months 10% in 6 months >10% in 6 months        20% in 1 year                    >20% in 1 year                                                                  20% in 1 year                            >20% in 1 year                                                  Subcutaneous Fat Loss Mild  Moderate  Mild  Severe    Mild   Severe   Muscle Loss Mild  Moderate  Mild  Severe    Mild   Severe   Edema/Fluid Accumulation Mild Moderate to severe  Mild  Severe   Mild   Severe   Reduced  Strength         (based on standards supplied by  of dynamometer) N/A Measurably reduced N/A Measurably reduced N/A Measurably reduced     Criteria for mild malnutrition is defined as 1 characteristic outlined above within the established moderate or severe parameters.  A minimum of 2 out of the 6 characteristics noted above are recommended for a diagnosis of moderate  or severe malnutrition.  Chronic illness/injury is a disease/condition lasting 3 months or longer.    The noted clinical guidelines are only system guidelines and do not replace the providers clinical judgment.    Provider, please specify diagnosis or diagnoses associated with above clinical findings.    [  ] Mild Malnutrition - 1 characteristic outlined above within the established moderate or severe parameters   [  ] Moderate Malnutrition - a minimum of 2 of the 6 moderate malnutrition characteristics noted above    [  ] Malnutrition, Unspecified degree   [  ] Other Nutritional Diagnosis (please specify): _______   [  ] Malnutrition ruled out   [ x ] Clinically Undetermined       Please document in your progress notes daily for the duration of treatment until resolved and  include in your discharge summary.      References:    JACQUELINE Hall, & WIL Redmond (2022, April). Assessment and management of anorexia and cachexia in palliative care. Retrieved May 23, 2022, from [email or web address].[employer]/contents/assessment-and-management-of-anorexia-and-cachexia-in-palliative-care?[email or web address]&source=see_link     NILESH Kolb, PhD, RD, Karie THOMPSON P., PhD, RN, REHANA Gamboa MD, PhD, Talia VASQUEZ A., MS, RD, [facility], FEMI Franklin, MS, RD, The Academy Malnutrition Work Group, The A.S.P.E.N. Board of Directors. (2012). Consensus Statement: Academy of Nutrition and Dietetics and American Society for Parenteral and Enteral Nutrition: Characteristics Recommended for the Identification and Documentation of Adult Malnutrition (Undernutrition). Journal of Parenteral and Enteral Nutrition, 36(3), 275-283. doi:10.1177/0888954912607375     Form No. 46230

## 2023-04-24 NOTE — PROGRESS NOTES
[facility]  Hospital Medicine Progress Note        Chief Complaint: Inpatient Follow-up for hematuria    Subjective:  Mirza Nelson Jr. is a 67 y.o. male with a past medical history of essential hypertension, ESRD on HD MWF, anemia, BPH, cervical radiculopathy, and vitamin-D deficiency presented to [facility] on 4/18/2023 for hematuria.  Patient reports hematuria began at 12 PM yesterday, 4/17.  Patient reports he has nielsen catheter in place for BPH and last change was about 3 weeks ago.  Patient denies use of blood thinners, dysuria, abdominal pain, nausea, vomiting, diarrhea, chest pain, shortness of breath, fever, and chills.  Initial vital signs in ED were /72, pulse 58, respirations 18, temperature 36.7° C, and SpO2 100% on room air.  Labs revealed WBC 3, RBC 2.51, hemoglobin 8.3, hematocrit 26.1, , sodium 135, BUN 27, creatinine 8.66, and calcium 8.7.  UA revealed 3+ protein, 3+ occult blood, 3+ leukocytes, greater than 100 red blood cells, 11-20 wbc's, and 4+ bacteria.  Urine culture was obtained.  CT abdomen and pelvis without contrast revealed moderate to advanced renal atrophy on the right greater than left with moderate severe bilateral hydronephrosis left greater than right and hydroureter to the level of the bladder, thickened urothelium which could indicate superimposed inflammation, enlarged prostate, and bladder decompressed around nielsne catheter with bladder wall  thickening.  Patient was given IV Rocephin 1 g in ED.  Urology was consulted. Patient was admitted to hospital medicine service for further medical management.     Seen and examined the patient.  Afebrile vitals stable and hemodynamically stable.    Objective/physical exam:  General appearance: Male in no apparent distress.  HEENNT: Atraumatic head.   Lungs: Clear to auscultation bilaterally.   Heart: Regular rate and rhythm.    Abdomen/: Soft, non-distended, non-tender. Bowel sounds are normal.   Cheek catheter in place, wine colored urine  Extremities: No cyanosis, clubbing, or edema. No deformities. RUE graft   Skin: No Rash. Warm and dry.   Neuro: Awake, alert, and oriented. Motor and sensory exams grossly intact.     VITAL SIGNS: 24 HRS MIN & MAX LAST   Temp  Min: 97.6 °F (36.4 °C)  Max: 98.7 °F (37.1 °C) 98.4 °F (36.9 °C)   BP  Min: 98/53  Max: 151/71 (!) 151/71     Pulse  Min: 54  Max: 77  (!) 57     Resp  Min: 16  Max: 18 18   SpO2  Min: 96 %  Max: 100 % 99 %       Labs, Microbiology and Imaging were Reviewed.      Microbiology Results (last 7 days)       Procedure Component Value Units Date/Time    Urine culture [[id number]]  (Abnormal)  (Susceptibility) Collected: 04/18/23 1918    Order Status: Completed Specimen: Urine Updated: 04/22/23 0942     Urine Culture >/= 100,000 colonies/ml Pseudomonas aeruginosa      >/= 100,000 colonies/ml Escherichia coli      >/= 100,000 colonies/ml Klebsiella pneumoniae ssp pneumoniae               Medications   aluminum-magnesium hydroxide-simethicone  30 mL Oral QID (AC & HS)    amLODIPine  5 mg Oral Daily    B12-levomefolate calcium-B6  1 tablet Oral Daily    busPIRone  10 mg Oral Daily    carvediloL  12.5 mg Oral BID    ceFEPime (MAXIPIME) IVPB  1 g Intravenous Q12H    cetirizine  10 mg Oral Daily    doxazosin  8 mg Oral QHS    epoetin gary  10,000 Units Intravenous Every Mon, Wed, Fri    flavoxATE  200 mg Oral QID    mupirocin   Nasal BID    pantoprazole  40 mg Oral Daily    sevelamer carbonate  800 mg Oral TID    traZODone  50 mg Oral Nightly        sodium chloride, acetaminophen, acetaminophen, dextrose 10%, dextrose 10%, glucagon (human recombinant), glucose, glucose, hydrALAZINE, HYDROcodone-acetaminophen, hyoscyamine, morphine, ondansetron     Radiology:  CT Abdomen Pelvis  Without Contrast  Narrative: EXAMINATION:  CT ABDOMEN PELVIS WITHOUT CONTRAST    CLINICAL HISTORY:  hematurai;    TECHNIQUE:  Low dose axial images, sagittal and coronal reformations were  obtained from the lung bases to the pubic symphysis.  .  Oral contrast not given.  .  Automated exposure control used.    COMPARISON:  12/07/2021    FINDINGS:  Advanced right renal atrophy.  Moderate left renal atrophy.  Multiple renal cysts bilaterally.  Moderate hydronephrosis bilaterally.  Thickened urothelium.  Dilated ureters bilaterally to the level of the bladder.  Prostate gland demonstrates marked enlargement with suspected significant intra bladder extension.  Bladder is otherwise decompressed around a Cheek catheter.  There is bladder wall thickening.    Liver demonstrates normal density with no focal lesion.    Gallbladder and biliary ductal system normal.    Pancreas, stomach, spleen, adrenal glands normal.    Aorta tapers normally.  Severe atherosclerotic calcification.    No bowel obstruction, ascites, inflammatory change, lymphadenopathy.  Appendix normal.    Bones intact.  Lung bases demonstrate mild chronic appearing subpleural lines and interstitial markings.  Dense  Impression: Moderate to advanced renal atrophy on the right greater than left.  Moderate severe bilateral hydronephrosis left greater than right and hydroureter to the level of the bladder.  Thickened urothelium which could indicate superimposed inflammation.  Prostate appears markedly enlarged with significant distortion of and possible extension into the bladder.  Bladder otherwise decompressed around a Cheek catheter and demonstrates bladder wall thickening.  This could represent cystitis or muscular hypertrophy.    There appears to be a significantly reduced degree of hydronephrosis compared to the prior exam particularly on the right side.  Prostate appears slightly larger in the interval.    Agree with shane.    Electronically signed by: Sissy Gonzalez MD  Date:    04/19/2023  Time:    07:35      Assessment/Plan:  Hematuria   UTI  Bilateral hydronephrosis  ESRD on HD MWF  Macrocytic anemia    Leukopenia  BPH  Essential hypertension   History of essential hypertension, cervical radiculopathy, and vitamin-D deficiency     Plan:  - Continue the current management now monitor blood from the Cheek.  - he was started on cefepime at 04/22/2023.  Received only 2 days.  I will like him to receive at least 5 days of antibiotics with cefepime before being discharged.  - Urology is on board and recommending hand irrigation.   HD today.   Nephrology consulted, appreciate recommendations.   Continue appropriate home medications once med rec updated.    All diagnosis and differential diagnosis have been reviewed; assessment and plan has been documented; I have personally reviewed the labs and test results that are presently available; I have reviewed the patients medication list; I have reviewed the consulting providers response and recommendations. I have reviewed or attempted to review medical records based upon their availability.       Ulises Mcnair MD   04/24/2023

## 2023-04-25 NOTE — PT/OT/SLP EVAL
Occupational Therapy   Evaluation and Discharge Note    Name: Mirza Nelson Jr.  MRN: [id number]  Admitting Diagnosis: ESRD on hemodialysis  Recent Surgery: * No surgery found *      Recommendations:     Discharge Recommendations: home  Discharge Equipment Recommendations: none  Barriers to discharge:  None    Assessment:     Mirza Nelson Jr. is a 67 y.o. male with a medical diagnosis of ESRD on hemodialysis. At this time, patient is functioning at their prior level of function and does not require further acute OT services.     Plan:     During this hospitalization, patient does not require further acute OT services.  Please re-consult if situation changes.    Plan of Care Reviewed with: patient    Subjective     Chief Complaint: none   Patient/Family Comments/goals: go home tomorrow after HD    Occupational Profile:  Living Environment: lives in trailer with sisterlonny   Previous level of function: independent/mod I ADL's  Roles and Routines: brother  Equipment Used at home: walker, rolling, rollator, shower chair (raised toilet seat)  Assistance upon Discharge: yes    Pain/Comfort:  Pain Rating 1: 0/10    Patients cultural, spiritual, Pentecostalism conflicts given the current situation:      Objective:     Communicated with: nsg and PT prior to session.  Patient found up in chair with nielsen catheter upon OT entry to room.    General Precautions: Standard, fall  Orthopedic Precautions:    Braces:    Respiratory Status: Room air   Vital Signs:      Occupational Performance:    Bed Mobility:        Functional Mobility/Transfers:  Patient completed Sit <> Stand Transfer with modified independence  with  rolling walker   Functional Mobility: ambulated to  with mod I with RW, good safety awareness    Activities of Daily Living:  Doffed and donned socks mod I  Toilet transfer mod I    Cognitive/Visual Perceptual:  intact    Physical Exam:  Wfl BUE's      AMPAC 6 Click ADL:  AMPAC Total Score:      Therapeutic  Positioning  Risk for acquired pressure injuries is decreased due to ability to mobilize independently .    OT interventions performed during the course of today's session in an effort to prevent and/or reduce acquired pressure injuries:   Education on pressure ulcer prevention    OT recommendations for therapeutic positioning throughout hospitalization:   Follow [facility] Pressure Injury Prevention Protocol    Additional Treatment:       Patient Education:  Patient provided with verbal education regarding OT role/goals/POC, fall prevention, and safety awareness, d/c rec'sUnderstanding was verbalized.     Patient left up in chair with call button in reach    GOALS:   Multidisciplinary Problems       Occupational Therapy Goals       Not on file                    History:     Past Medical History:   Diagnosis Date    Anemia     Cervical radiculopathy 05/17/2022    Disorder of kidney and ureter     ESRD on hemodialysis 09/27/2018    Hypertension     Neck injury 1994    Other chronic pain 09/27/2018    Personal history of colonic polyps     Renovascular hypertension 09/27/2018    Vitamin D deficiency 05/17/2022         Past Surgical History:   Procedure Laterality Date    AV FISTULA PLACEMENT      COLONOSCOPY W/ POLYPECTOMY  02/19/2019    MASS EXCISION  2005    near the kidney     NECK SURGERY         Time Tracking:     OT Date of Treatment: 04/25/23  OT Start Time: 1003  OT Stop Time: 1012  OT Total Time (min): 9 min    Billable Minutes:Evaluation low complexity 9 min    4/25/2023

## 2023-04-25 NOTE — PROGRESS NOTES
[facility]  Hospital Medicine Progress Note        Chief Complaint: Inpatient Follow-up for hematuria    Subjective:  Mirza Nelson Jr. is a 67 y.o. male with a past medical history of essential hypertension, ESRD on HD MWF, anemia, BPH, cervical radiculopathy, and vitamin-D deficiency presented to [facility] on 4/18/2023 for hematuria.  Patient reports hematuria began at 12 PM yesterday, 4/17.  Patient reports he has nielsen catheter in place for BPH and last change was about 3 weeks ago.  Patient denies use of blood thinners, dysuria, abdominal pain, nausea, vomiting, diarrhea, chest pain, shortness of breath, fever, and chills.  Initial vital signs in ED were /72, pulse 58, respirations 18, temperature 36.7° C, and SpO2 100% on room air.  Labs revealed WBC 3, RBC 2.51, hemoglobin 8.3, hematocrit 26.1, , sodium 135, BUN 27, creatinine 8.66, and calcium 8.7.  UA revealed 3+ protein, 3+ occult blood, 3+ leukocytes, greater than 100 red blood cells, 11-20 wbc's, and 4+ bacteria.  Urine culture was obtained.  CT abdomen and pelvis without contrast revealed moderate to advanced renal atrophy on the right greater than left with moderate severe bilateral hydronephrosis left greater than right and hydroureter to the level of the bladder, thickened urothelium which could indicate superimposed inflammation, enlarged prostate, and bladder decompressed around nielsen catheter with bladder wall  thickening.  Patient was given IV Rocephin 1 g in ED.  Urology was consulted. Patient was admitted to hospital medicine service for further medical management.     Seen and examined the patient.  Afebrile vitals stable and hemodynamically stable.  No acute issues receiving IV antibiotics.    Objective/physical exam:  General appearance: Male in no apparent distress.  HEENNT: Atraumatic head.   Lungs: Clear to auscultation bilaterally.   Heart: Regular rate and rhythm.    Abdomen/: Soft,  non-distended, non-tender. Bowel sounds are normal.  Cheek catheter in place, wine colored urine  Extremities: No cyanosis, clubbing, or edema. No deformities. RUE graft   Skin: No Rash. Warm and dry.   Neuro: Awake, alert, and oriented. Motor and sensory exams grossly intact.     VITAL SIGNS: 24 HRS MIN & MAX LAST   Temp  Min: 98.4 °F (36.9 °C)  Max: 99.3 °F (37.4 °C) 98.4 °F (36.9 °C)   BP  Min: 97/58  Max: 138/76 130/89     Pulse  Min: 56  Max: 75  70     Resp  Min: 18  Max: 20 18   SpO2  Min: 98 %  Max: 100 % 100 %       Labs, Microbiology and Imaging were Reviewed.      Microbiology Results (last 7 days)       Procedure Component Value Units Date/Time    Urine culture [[id number]]  (Abnormal)  (Susceptibility) Collected: 04/18/23 1918    Order Status: Completed Specimen: Urine Updated: 04/22/23 0942     Urine Culture >/= 100,000 colonies/ml Pseudomonas aeruginosa      >/= 100,000 colonies/ml Escherichia coli      >/= 100,000 colonies/ml Klebsiella pneumoniae ssp pneumoniae               Medications   aluminum-magnesium hydroxide-simethicone  30 mL Oral QID (AC & HS)    amLODIPine  5 mg Oral Daily    B12-levomefolate calcium-B6  1 tablet Oral Daily    busPIRone  10 mg Oral Daily    carvediloL  12.5 mg Oral BID    ceFEPime (MAXIPIME) IVPB  1 g Intravenous Q24H    cetirizine  10 mg Oral Daily    doxazosin  8 mg Oral QHS    [START ON 4/26/2023] epoetin gary  20,000 Units Intravenous Every Mon, Wed, Fri    flavoxATE  200 mg Oral QID    mupirocin   Nasal BID    pantoprazole  40 mg Oral Daily    sevelamer carbonate  800 mg Oral TID    traZODone  50 mg Oral Nightly        sodium chloride, acetaminophen, acetaminophen, dextrose 10%, dextrose 10%, glucagon (human recombinant), glucose, glucose, hydrALAZINE, HYDROcodone-acetaminophen, hyoscyamine, morphine, ondansetron     Radiology:  CT Abdomen Pelvis  Without Contrast  Narrative: EXAMINATION:  CT ABDOMEN PELVIS WITHOUT CONTRAST    CLINICAL  HISTORY:  hematurai;    TECHNIQUE:  Low dose axial images, sagittal and coronal reformations were obtained from the lung bases to the pubic symphysis.  .  Oral contrast not given.  .  Automated exposure control used.    COMPARISON:  12/07/2021    FINDINGS:  Advanced right renal atrophy.  Moderate left renal atrophy.  Multiple renal cysts bilaterally.  Moderate hydronephrosis bilaterally.  Thickened urothelium.  Dilated ureters bilaterally to the level of the bladder.  Prostate gland demonstrates marked enlargement with suspected significant intra bladder extension.  Bladder is otherwise decompressed around a Cheek catheter.  There is bladder wall thickening.    Liver demonstrates normal density with no focal lesion.    Gallbladder and biliary ductal system normal.    Pancreas, stomach, spleen, adrenal glands normal.    Aorta tapers normally.  Severe atherosclerotic calcification.    No bowel obstruction, ascites, inflammatory change, lymphadenopathy.  Appendix normal.    Bones intact.  Lung bases demonstrate mild chronic appearing subpleural lines and interstitial markings.  Dense  Impression: Moderate to advanced renal atrophy on the right greater than left.  Moderate severe bilateral hydronephrosis left greater than right and hydroureter to the level of the bladder.  Thickened urothelium which could indicate superimposed inflammation.  Prostate appears markedly enlarged with significant distortion of and possible extension into the bladder.  Bladder otherwise decompressed around a Cheek catheter and demonstrates bladder wall thickening.  This could represent cystitis or muscular hypertrophy.    There appears to be a significantly reduced degree of hydronephrosis compared to the prior exam particularly on the right side.  Prostate appears slightly larger in the interval.    Agree with shane.    Electronically signed by: Sissy Gonzalez  MD  Date:    04/19/2023  Time:    07:35      Assessment/Plan:  Hematuria   UTI  Bilateral hydronephrosis  ESRD on HD MWF  Macrocytic anemia   Leukopenia  BPH  Essential hypertension   History of essential hypertension, cervical radiculopathy, and vitamin-D deficiency     Plan:  - Continue the current management now monitor blood from the Cheek.  -continue cefepime to complete the 5 days for MDR in urine.  Afterwards we will discharge.  - Urology is on board and recommending hand irrigation.   HD today.   Nephrology consulted, appreciate recommendations.   Continue appropriate home medications once med rec updated.    All diagnosis and differential diagnosis have been reviewed; assessment and plan has been documented; I have personally reviewed the labs and test results that are presently available; I have reviewed the patients medication list; I have reviewed the consulting providers response and recommendations. I have reviewed or attempted to review medical records based upon their availability.       Ulises Mcnair MD   04/25/2023

## 2023-04-25 NOTE — PROGRESS NOTES
Inpatient Nutrition Assessment    Admit Date: 4/18/2023   Total duration of encounter: 7 days     Nutrition Recommendation/Prescription     Continue current diet as tolerated  Continue Novasource TID (provides 475 kcal and 22 g protein per container)  Encouraged adequate PO intake  Monitor appetite/PO intake, weight, and labs    Communication of Recommendations: reviewed with nurse    Nutrition Assessment     Malnutrition Assessment/Nutrition-Focused Physical Exam    Malnutrition in the context of chronic illness  Degree of Malnutrition: non-severe (moderate) malnutrition  Energy Intake: does not meet criteria  Interpretation of Weight Loss: does not meet criteria  Body Fat: mild depletion  Area of Body Fat Loss: upper arm region - triceps / biceps and thoracic and lumbar region - ribs, lower back, midaxillary line  Muscle Mass Loss: mild depletion  Area of Muscle Mass Loss: temple region - temporalis muscle, clavicle bone region - pectoralis major, deltoid, trapezius muscles, clavicle and acromion bone region - deltoid muscle, scapular bone region - trapezius, supraspinus, infraspinus muscles, and patellar region - quadricep muscle  Fluid Accumulation: unable to obtain  Edema: unable to obtain  Reduced  Strength: unable to obtain  A minimum of two characteristics is recommended for diagnosis of either severe or non-severe malnutrition.    Chart Review    Reason Seen: continuous nutrition monitoring and follow-up ESRD    Malnutrition Screening Tool Results   Have you recently lost weight without trying?: No  Have you been eating poorly because of a decreased appetite?: No   MST Score: 0     Diagnosis:  Hematuria   UTI  Bilateral hydronephrosis  ESRD on HD MWF  Macrocytic anemia   Leukopenia  BPH  Essential hypertension   History of essential hypertension, cervical radiculopathy, and vitamin-D deficiency    Relevant Medical History:   Essential hypertension  ESRD on HD MWF  Anemia   BPH  Cervical radiculopathy    Vitamin-D deficiency    Nutrition-Related Medications:   Scheduled Meds:   aluminum-magnesium hydroxide-simethicone  30 mL Oral QID (AC & HS)    amLODIPine  5 mg Oral Daily    B12-levomefolate calcium-B6  1 tablet Oral Daily    busPIRone  10 mg Oral Daily    carvediloL  12.5 mg Oral BID    ceFEPime (MAXIPIME) IVPB  1 g Intravenous Q24H    cetirizine  10 mg Oral Daily    doxazosin  8 mg Oral QHS    [START ON 4/26/2023] epoetin gary  20,000 Units Intravenous Every Mon, Wed, Fri    flavoxATE  200 mg Oral QID    mupirocin   Nasal BID    pantoprazole  40 mg Oral Daily    sevelamer carbonate  800 mg Oral TID    traZODone  50 mg Oral Nightly     Continuous Infusions:  PRN Meds:.sodium chloride, acetaminophen, acetaminophen, dextrose 10%, dextrose 10%, glucagon (human recombinant), glucose, glucose, hydrALAZINE, HYDROcodone-acetaminophen, hyoscyamine, morphine, ondansetron    Calorie Containing IV Medications: no significant kcals from medications at this time    Nutrition-Related Labs:  4/20/2023: WBC 4.0, H/H 7.5/23.8, Na 135, Crea 6.38, Ca 8.2, Alb 2.8, Gluc 79  4/25/2023: H/H 8.5/26.3, Na 133, Crea 6.67, Ca 7.9, Alb 2.7, Gluc 96    Diet/PN Order: DIET RENAL ON DIALYSIS  Oral Supplement Order: Novasource Renal  Tube Feeding Order: none  Appetite/Oral Intake: good/% of meals   Factors Affecting Nutritional Intake:  food preferences, does not like hospital food  Food/Restoration/Cultural Preferences:  shellfish, iodine  Food Allergies: none reported       Wound(s):   none noted.    Comments    4/20/2023: Pt reports poor appetite/PO intake for ~3 days. Pt reports good appetite but poor PO intake, pt would not specify reasoning. Pt denies N/V but reports loose BM yesterday. Pt denies chewing/swallowing difficulties. Pt wears dentures but does not have them, he declines the need for texture modification. Pt denies unplanned wt loss, he reports UBW as 67.1 kg. Pt agreeable to Novasource BID. Encouraged adequate PO  "intake. Will monitor.    2023: The nurse reports good appetite/PO intake when eating outside food. No reported N/V. Last BM documented as 2023. Novasource BID ordered. Will monitor.    Anthropometrics    Height: 5' 5.98" (167.6 cm) Height Method: Stated  Last Weight: 64.6 kg (142 lb 6.7 oz) (23 0319) Weight Method: Standard Scale  BMI (Calculated): 23  BMI Classification: normal (BMI 18.5-24.9)        Ideal Body Weight (IBW), Male: 141.88 lb     % Ideal Body Weight, Male (lb): 104.42 %                 Usual Body Weight (UBW), k.1 kg  % Usual Body Weight: 100.36     Usual Weight Provided By: patient    Wt Readings from Last 5 Encounters:   23 64.6 kg (142 lb 6.7 oz)   23 67.9 kg (149 lb 11.2 oz)   22 68.9 kg (152 lb)   10/11/22 66.7 kg (147 lb)   22 67 kg (147 lb 12.8 oz)     Weight Change(s) Since Admission:  Admit Weight: 67.2 kg (148 lb 2.4 oz) (23 1551)  2023: 67.2 kg  2023: 64.6 kg    Estimated Needs    Weight Used For Calorie Calculations: 67.2 kg (148 lb 2.4 oz)  Energy Calorie Requirements (kcal):  (30-35 kcal/kg)  Energy Need Method: Kcal/kg  Weight Used For Protein Calculations: 67.2 kg (148 lb 2.4 oz)  Protein Requirements: 81-88 (1.2-1.3 g/kg)  Fluid Requirements (mL): 1000 + urine output  Temp (24hrs), Av.6 °F (37 °C), Min:98.4 °F (36.9 °C), Max:99.3 °F (37.4 °C)         Enteral Nutrition    Patient not receiving enteral nutrition at this time.    Parenteral Nutrition    Patient not receiving parenteral nutrition support at this time.    Evaluation of Received Nutrient Intake    Calories: meeting estimated needs  Protein: meeting estimated needs    Patient Education    Not applicable.    Nutrition Diagnosis     PES: Malnutrition related to chronic illness as evidenced by mild fat/muscle wasting. (continues)    Interventions/Goals     Intervention(s): general/healthful diet and commercial beverage  Goal: Consume % of " meals/snacks by follow-up. (goal progressing)    Monitoring & Evaluation     Dietitian will monitor food and beverage intake, weight, electrolyte/renal panel, glucose/endocrine profile, and gastrointestinal profile.  Nutrition Risk/Follow-Up: moderate (follow-up in 3-5 days)   Please consult if re-assessment needed sooner.

## 2023-04-25 NOTE — PROGRESS NOTES
Nephrology consult follow up note    HPI:      Mirza Nelson Jr. is a 67 y.o. male  with ESRD on hemodialysis.  He dialyzes at [facility] on Monday Wednesday Friday schedule followed by Dr. Butler.  He does have a chronic indwelling Cheek and is followed outpatient by Dr. Navarrete.  2-3 weeks ago catheter was changed.  He presented today to ER for hematuria which often presents when catheter is changed.  Urinalysis suggests possible UTI which culture is pending.  CT showed nondistended bladder, bladder thickening likely due to chronic bladder outlet obstruction, element of cystitis, and hyperdense material in the bladder.  There is also an enlarged prostate that projects into the bladder.  He is being treated for hemorrhagic cystitis with consultation to Nephrology consulted for ESRD management.     Interval history:     No acute events overnight. Cheek in place with gross hematruia. Dialyzed yesterday without problem. He did have cramping after HD. No CP, abd pain, or LE edema.     4/21/2023  Currently tolerating dialysis.  Denies any chest pains or abdominal pains.  Tolerating oral nutrition.  No nausea vomiting.  Still with indwelling Cheek catheter and hematuria noted.  Urology working on that.    4/24/2023 events of the weekend noted.  And he does have a new Cheek catheter and there is no blood in the urine now.  Patient has no complaints of any stomach pains.     4/25/2023 events noted.  He had good dialysis done yesterday.  Hospitalist plan to give antibiotics cefepime for at least 5 days prior to discharge.  Tomorrow is the 5th dose planned.  He will also have his dialysis done tomorrow.    Review of Systems:     Comprehensive 10pt ROS negative except as noted per HPI.    Past medical, family, surgical, and social history reviewed and unchanged from initial consult note.     Objective:       VITAL SIGNS: 24 HR MIN & MAX LAST    Temp  Min: 98.4 °F (36.9 °C)  Max: 99.3 °F (37.4 °C)  98.4 °F (36.9 °C)        BP   Min: 97/58  Max: 151/71  138/76     Pulse  Min: 56  Max: 75  (!) 56     Resp  Min: 18  Max: 20  18    SpO2  Min: 98 %  Max: 100 %  98 %      GEN:  Moderately developed and nourished.  Awake alert oriented to time person place.  No respiratory distress noted.   CV: RRR without rub or gallop.    PULM: CTAB, unlabored  ABD: Soft, NT/ND abdomen with NABS  : Cheek in place, no obvious blood noted in the urine.  EXT: No cyanosis or edema  SKIN: Warm and dry  PSYCH: Awake, alert and appropriately conversant.   Vascular access: RUE AVF with good pulsation and thrill.   Neurologically no focal motor deficit.          Component Value Date/Time     (L) 04/25/2023 0330     04/24/2023 0332     09/27/2018 0800    K 3.9 04/25/2023 0330    K 4.6 04/24/2023 0332    K 3.9 09/27/2018 0800    CHLORIDE 100 04/25/2023 0330    CHLORIDE 102 04/24/2023 0332    CO2 26 04/25/2023 0330    CO2 24 04/24/2023 0332    CO2 34 (H) 09/27/2018 0800    BUN 22.0 04/25/2023 0330    BUN 29.1 (H) 04/24/2023 0332    BUN 24 (H) 09/27/2018 0800    CREATININE 6.67 (H) 04/25/2023 0330    CREATININE 9.51 (H) 04/24/2023 0332    CREATININE 5.4 (H) 09/27/2018 0800    CALCIUM 7.9 (L) 04/25/2023 0330    CALCIUM 8.0 (L) 04/24/2023 0332    CALCIUM 9.2 09/27/2018 0800    PHOS 4.0 04/24/2023 0332    PHOS 3.5 09/27/2018 0800            Component Value Date/Time    WBC 5.9 04/25/2023 0330    WBC 4.9 04/24/2023 0332    WBC 5.35 09/27/2018 0800    HGB 8.5 (L) 04/25/2023 0330    HGB 7.4 (L) 04/24/2023 0332    HGB 11.1 (L) 09/27/2018 0800    HCT 26.3 (L) 04/25/2023 0330    HCT 23.1 (L) 04/24/2023 0332    HCT 33.0 (L) 11/30/2020 0859    HCT 35.1 (L) 09/27/2018 0800     04/25/2023 0330     04/24/2023 0332     09/27/2018 0800         Imaging reviewed      Assessment / Plan:     ESRD on hemodialysis-[facility]   Hemorrhagic cystitis  Chronic bladder outlet obstruction with chronic Cheek changed every 2-3 weeks followed by   Rosalba  Anemia of chronic disease worsening with acute blood loss  Hypertension    Plan:  Agree with antibiotics  Hemodialysis tomorrow

## 2023-04-25 NOTE — PT/OT/SLP EVAL
Physical Therapy Evaluation and Discharge Note    Patient Name:  Mirza Nelson Jr.   MRN:  [id number]    Recommendations:     Discharge Recommendations: home  Discharge Equipment Recommendations: none   Barriers to discharge: None    Assessment:     Mirza Nelson Jr. is a 67 y.o. male admitted with a medical diagnosis of ESRD on hemodialysis. The pt tolerated session well, and is able to ambulate in room with Rw and SBA. Pt is safe to discharge home with his sister at this time. The pt states he is mobilizing as he did at baseline.  At this time, patient is functioning at their prior level of function and does not require further acute PT services.     Recent Surgery: * No surgery found *      Plan:     During this hospitalization, patient does not require further acute PT services.  Please re-consult if situation changes.      Subjective     Chief Complaint: none  Patient/Family Comments/goals: return home  Pain/Comfort:       Patients cultural, spiritual, Judaism conflicts given the current situation:      Living Environment:  Home with sister,  home, 4 steps to enter.   Prior to admission, patients level of function was independent.  Equipment used at home: walker, rolling.  DME owned (not currently used): rolling walker.  Upon discharge, patient will have assistance from sister.    Objective:     Communicated with NSG prior to session.  Patient found up in chair with nielsen catheter upon PT entry to room.    General Precautions: Standard,      Orthopedic Precautions:N/A   Braces: N/A  Respiratory Status: Room air  Blood Pressure:     Exams:  RLE ROM: WFL  RLE Strength: WFL  LLE ROM: WFL  LLE Strength: WFL    Functional Mobility:  Transfers:     Sit to Stand:  SBA with RW  Gait: pt ambulates x 60 feet with SBA and RW for support.  Pt declined stair training 2/2 not wanting to leave the room. Pt states he does not wish to practice stairs.     Patient provided with verbal education regarding POC, discharge  recs.  Understanding was verbalized.     Patient left supine with all lines intact, call button in reach, and NSG notified.    GOALS:   Multidisciplinary Problems       Physical Therapy Goals       Not on file                    History:     Past Medical History:   Diagnosis Date    Anemia     Cervical radiculopathy 05/17/2022    Disorder of kidney and ureter     ESRD on hemodialysis 09/27/2018    Hypertension     Neck injury 1994    Other chronic pain 09/27/2018    Personal history of colonic polyps     Renovascular hypertension 09/27/2018    Vitamin D deficiency 05/17/2022       Past Surgical History:   Procedure Laterality Date    AV FISTULA PLACEMENT      COLONOSCOPY W/ POLYPECTOMY  02/19/2019    MASS EXCISION  2005    near the kidney     NECK SURGERY         Time Tracking:     PT Received On: 04/25/23  PT Start Time: 0956     PT Stop Time: 1010  PT Total Time (min): 14 min     Billable Minutes: Evaluation 14      04/25/2023

## 2023-04-26 NOTE — PROGRESS NOTES
04/26/23 1031   Post-Hemodialysis Assessment   Rinseback Volume (mL) 250 mL   Blood Volume Processed (Liters) 63 L   Dialyzer Clearance Moderately streaked   Duration of Treatment 180 minutes   Additional Fluid Intake (mL) 0 mL   Total UF (mL) 500 mL   Net Fluid Removal 0   Patient Response to Treatment tolerated well   Post-Hemodialysis Comments completed HD tx without complications. No fluid removal. Pt received Epogen 20,000units with HD

## 2023-04-26 NOTE — PROGRESS NOTES
[facility]  Hospital Medicine Progress Note        Chief Complaint: Inpatient Follow-up for hematuria    Subjective:  Mirza Nelson Jr. is a 67 y.o. male with a past medical history of essential hypertension, ESRD on HD MWF, anemia, BPH, cervical radiculopathy, and vitamin-D deficiency presented to [facility] on 4/18/2023 for hematuria.  Patient reports hematuria began at 12 PM yesterday, 4/17.  Patient reports he has nielsen catheter in place for BPH and last change was about 3 weeks ago.  Patient denies use of blood thinners, dysuria, abdominal pain, nausea, vomiting, diarrhea, chest pain, shortness of breath, fever, and chills.  Initial vital signs in ED were /72, pulse 58, respirations 18, temperature 36.7° C, and SpO2 100% on room air.  Labs revealed WBC 3, RBC 2.51, hemoglobin 8.3, hematocrit 26.1, , sodium 135, BUN 27, creatinine 8.66, and calcium 8.7.  UA revealed 3+ protein, 3+ occult blood, 3+ leukocytes, greater than 100 red blood cells, 11-20 wbc's, and 4+ bacteria.  Urine culture was obtained.  CT abdomen and pelvis without contrast revealed moderate to advanced renal atrophy on the right greater than left with moderate severe bilateral hydronephrosis left greater than right and hydroureter to the level of the bladder, thickened urothelium which could indicate superimposed inflammation, enlarged prostate, and bladder decompressed around nielsen catheter with bladder wall  thickening.  Patient was given IV Rocephin 1 g in ED.  Urology was consulted. Patient was admitted to hospital medicine service for further medical management.     Seen and examined the patient.  Afebrile vitals stable.        Objective/physical exam:  General appearance: Male in no apparent distress.  HEENNT: Atraumatic head.   Lungs: Clear to auscultation bilaterally.   Heart: Regular rate and rhythm.    Abdomen/: Soft, non-distended, non-tender. Bowel sounds are normal.  Nielsen catheter in place,  wine colored urine  Extremities: No cyanosis, clubbing, or edema. No deformities. RUE graft   Skin: No Rash. Warm and dry.   Neuro: Awake, alert, and oriented. Motor and sensory exams grossly intact.     VITAL SIGNS: 24 HRS MIN & MAX LAST   Temp  Min: 98 °F (36.7 °C)  Max: 98.2 °F (36.8 °C) 98.1 °F (36.7 °C)   BP  Min: 97/53  Max: 143/67 (!) 143/67     Pulse  Min: 58  Max: 76  61     Resp  Min: 18  Max: 20 20   SpO2  Min: 97 %  Max: 100 % 100 %       Labs, Microbiology and Imaging were Reviewed.      Microbiology Results (last 7 days)       Procedure Component Value Units Date/Time    Urine culture [[id number]]  (Abnormal)  (Susceptibility) Collected: 04/18/23 1918    Order Status: Completed Specimen: Urine Updated: 04/22/23 0942     Urine Culture >/= 100,000 colonies/ml Pseudomonas aeruginosa      >/= 100,000 colonies/ml Escherichia coli      >/= 100,000 colonies/ml Klebsiella pneumoniae ssp pneumoniae               Medications   aluminum-magnesium hydroxide-simethicone  30 mL Oral QID (AC & HS)    amLODIPine  5 mg Oral Daily    B12-levomefolate calcium-B6  1 tablet Oral Daily    busPIRone  10 mg Oral Daily    carvediloL  12.5 mg Oral BID    ceFEPime (MAXIPIME) IVPB  1 g Intravenous Q24H    cetirizine  10 mg Oral Daily    doxazosin  8 mg Oral QHS    epoetin gary  20,000 Units Intravenous Every Mon, Wed, Fri    flavoxATE  200 mg Oral QID    pantoprazole  40 mg Oral Daily    sevelamer carbonate  800 mg Oral TID    traZODone  50 mg Oral Nightly        sodium chloride, acetaminophen, acetaminophen, dextrose 10%, dextrose 10%, glucagon (human recombinant), glucose, glucose, hydrALAZINE, HYDROcodone-acetaminophen, hyoscyamine, morphine, ondansetron     Radiology:  CT Abdomen Pelvis  Without Contrast  Narrative: EXAMINATION:  CT ABDOMEN PELVIS WITHOUT CONTRAST    CLINICAL HISTORY:  hematurai;    TECHNIQUE:  Low dose axial images, sagittal and coronal reformations were obtained from the lung bases to the pubic symphysis.  .   Oral contrast not given.  .  Automated exposure control used.    COMPARISON:  12/07/2021    FINDINGS:  Advanced right renal atrophy.  Moderate left renal atrophy.  Multiple renal cysts bilaterally.  Moderate hydronephrosis bilaterally.  Thickened urothelium.  Dilated ureters bilaterally to the level of the bladder.  Prostate gland demonstrates marked enlargement with suspected significant intra bladder extension.  Bladder is otherwise decompressed around a Cheek catheter.  There is bladder wall thickening.    Liver demonstrates normal density with no focal lesion.    Gallbladder and biliary ductal system normal.    Pancreas, stomach, spleen, adrenal glands normal.    Aorta tapers normally.  Severe atherosclerotic calcification.    No bowel obstruction, ascites, inflammatory change, lymphadenopathy.  Appendix normal.    Bones intact.  Lung bases demonstrate mild chronic appearing subpleural lines and interstitial markings.  Dense  Impression: Moderate to advanced renal atrophy on the right greater than left.  Moderate severe bilateral hydronephrosis left greater than right and hydroureter to the level of the bladder.  Thickened urothelium which could indicate superimposed inflammation.  Prostate appears markedly enlarged with significant distortion of and possible extension into the bladder.  Bladder otherwise decompressed around a Cheek catheter and demonstrates bladder wall thickening.  This could represent cystitis or muscular hypertrophy.    There appears to be a significantly reduced degree of hydronephrosis compared to the prior exam particularly on the right side.  Prostate appears slightly larger in the interval.    Agree with shane.    Electronically signed by: Sissy Gonzalez MD  Date:    04/19/2023  Time:    07:35      Assessment/Plan:  Hematuria   UTI  Bilateral hydronephrosis  ESRD on HD MWF  Macrocytic anemia   Leukopenia  BPH  Essential hypertension   History of essential hypertension,  cervical radiculopathy, and vitamin-D deficiency     Plan:  - last day of abx.   - Continue the current management now monitor blood from the Cheek.  -continue cefepime to complete the 5 days for MDR in urine.  Afterwards we will discharge.  - Urology is on board and recommending hand irrigation.   HD today.   Nephrology consulted, appreciate recommendations.   Discharge pt tomorrow if stable     All diagnosis and differential diagnosis have been reviewed; assessment and plan has been documented; I have personally reviewed the labs and test results that are presently available; I have reviewed the patients medication list; I have reviewed the consulting providers response and recommendations. I have reviewed or attempted to review medical records based upon their availability.       Ulises Mcnair MD   04/26/2023

## 2023-04-26 NOTE — PROGRESS NOTES
Nephrology consult follow up note    HPI:      Mirza Nelson Jr. is a 67 y.o. male  with ESRD on hemodialysis.  He dialyzes at [facility] on Monday Wednesday Friday schedule followed by Dr. Butler.  He does have a chronic indwelling Cheek and is followed outpatient by Dr. Navarrete.  2-3 weeks ago catheter was changed.  He presented today to ER for hematuria which often presents when catheter is changed.  Urinalysis suggests possible UTI which culture is pending.  CT showed nondistended bladder, bladder thickening likely due to chronic bladder outlet obstruction, element of cystitis, and hyperdense material in the bladder.  There is also an enlarged prostate that projects into the bladder.  He is being treated for hemorrhagic cystitis with consultation to Nephrology consulted for ESRD management.     Interval history:     No acute events overnight. Cheek in place with gross hematruia. Dialyzed yesterday without problem. He did have cramping after HD. No CP, abd pain, or LE edema.     4/21/2023  Currently tolerating dialysis.  Denies any chest pains or abdominal pains.  Tolerating oral nutrition.  No nausea vomiting.  Still with indwelling Cheek catheter and hematuria noted.  Urology working on that.    4/24/2023 events of the weekend noted.  And he does have a new Cheek catheter and there is no blood in the urine now.  Patient has no complaints of any stomach pains.     4/25/2023 events noted.  He had good dialysis done yesterday.  Hospitalist plan to give antibiotics cefepime for at least 5 days prior to discharge.  Tomorrow is the 5th dose planned.  He will also have his dialysis done tomorrow.    4/26/2023 currently patient is on dialysis.  No new complaints.  He will be getting his cefepime today.     Review of Systems:     Comprehensive 10pt ROS negative except as noted per HPI.    Past medical, family, surgical, and social history reviewed and unchanged from initial consult note.     Objective:       VITAL SIGNS:  24 HR MIN & MAX LAST    Temp  Min: 98 °F (36.7 °C)  Max: 98.4 °F (36.9 °C)  98.2 °F (36.8 °C)        BP  Min: 97/53  Max: 131/69  131/69     Pulse  Min: 58  Max: 75  75     Resp  Min: 18  Max: 20  18    SpO2  Min: 97 %  Max: 100 %  98 %    Currently tolerating dialysis.  GEN:  Moderately developed and nourished.  Awake alert oriented to time person place.  No respiratory distress noted.   CV: RRR without rub or gallop.    PULM: CTAB, unlabored  ABD: Soft, NT/ND abdomen with NABS  : Cheek in place, no obvious blood noted in the urine.  EXT: No cyanosis or edema  SKIN: Warm and dry  PSYCH: Awake, alert and appropriately conversant.   Vascular access: RUE AVF with good pulsation and thrill.   Neurologically no focal motor deficit.          Component Value Date/Time     (L) 04/25/2023 0330     04/24/2023 0332     09/27/2018 0800    K 3.9 04/25/2023 0330    K 4.6 04/24/2023 0332    K 3.9 09/27/2018 0800    CHLORIDE 100 04/25/2023 0330    CHLORIDE 102 04/24/2023 0332    CO2 26 04/25/2023 0330    CO2 24 04/24/2023 0332    CO2 34 (H) 09/27/2018 0800    BUN 22.0 04/25/2023 0330    BUN 29.1 (H) 04/24/2023 0332    BUN 24 (H) 09/27/2018 0800    CREATININE 6.67 (H) 04/25/2023 0330    CREATININE 9.51 (H) 04/24/2023 0332    CREATININE 5.4 (H) 09/27/2018 0800    CALCIUM 7.9 (L) 04/25/2023 0330    CALCIUM 8.0 (L) 04/24/2023 0332    CALCIUM 9.2 09/27/2018 0800    PHOS 4.0 04/24/2023 0332    PHOS 3.5 09/27/2018 0800            Component Value Date/Time    WBC 5.9 04/25/2023 0330    WBC 4.9 04/24/2023 0332    WBC 5.35 09/27/2018 0800    HGB 8.5 (L) 04/25/2023 0330    HGB 7.4 (L) 04/24/2023 0332    HGB 11.1 (L) 09/27/2018 0800    HCT 26.3 (L) 04/25/2023 0330    HCT 23.1 (L) 04/24/2023 0332    HCT 33.0 (L) 11/30/2020 0859    HCT 35.1 (L) 09/27/2018 0800     04/25/2023 0330     04/24/2023 0332     09/27/2018 0800           Assessment / Plan:     ESRD on hemodialysis-[facility]  cystitis  Chronic bladder outlet obstruction with chronic Cheek changed every 2-3 weeks followed by Dr. Navarrete  Anemia of chronic disease worsening with acute blood loss  Hypertension    Plan:  Renal wise patient is doing fine.  There is no more visible hematuria.  I advised the patient that he must follow with Dr. Navarrete for his hematuria.  I will see him back in the dialysis unit if discharged today.

## 2023-04-27 NOTE — PLAN OF CARE
Problem: Adult Inpatient Plan of Care  Goal: Plan of Care Review  Outcome: Ongoing, Progressing  Flowsheets (Taken 4/26/2023 2158)  Plan of Care Reviewed With: patient  Goal: Patient-Specific Goal (Individualized)  Outcome: Ongoing, Progressing  Goal: Absence of Hospital-Acquired Illness or Injury  Outcome: Ongoing, Progressing  Intervention: Identify and Manage Fall Risk  Flowsheets (Taken 4/26/2023 2158)  Safety Promotion/Fall Prevention:   assistive device/personal item within reach   medications reviewed   lighting adjusted   instructed to call staff for mobility  Intervention: Prevent Skin Injury  Flowsheets (Taken 4/26/2023 2158)  Body Position:   position changed independently   weight shifting  Intervention: Prevent and Manage VTE (Venous Thromboembolism) Risk  Flowsheets (Taken 4/26/2023 2158)  Activity Management: Ambulated in room - L4  VTE Prevention/Management: ambulation promoted  Range of Motion:   active ROM (range of motion) encouraged   ROM (range of motion) performed  Intervention: Prevent Infection  Flowsheets (Taken 4/26/2023 2158)  Infection Prevention:   hand hygiene promoted   personal protective equipment utilized   rest/sleep promoted   single patient room provided   visitors restricted/screened  Goal: Optimal Comfort and Wellbeing  Outcome: Ongoing, Progressing  Intervention: Monitor Pain and Promote Comfort  Flowsheets (Taken 4/26/2023 2158)  Pain Management Interventions:   care clustered   medication offered   pillow support provided   relaxation techniques promoted   quiet environment facilitated  Intervention: Provide Person-Centered Care  Flowsheets (Taken 4/26/2023 2158)  Trust Relationship/Rapport:   care explained   choices provided   emotional support provided   empathic listening provided   questions answered   questions encouraged   reassurance provided   thoughts/feelings acknowledged  Goal: Readiness for Transition of Care  Outcome: Ongoing, Progressing    Problem:  Device-Related Complication Risk (Hemodialysis)  Goal: Safe, Effective Therapy Delivery  Outcome: Ongoing, Progressing  Intervention: Optimize Device Care and Function  Flowsheets (Taken 4/26/2023 2158)  Medication Review/Management: medications reviewed     Problem: Fall Injury Risk  Goal: Absence of Fall and Fall-Related Injury  Outcome: Ongoing, Progressing  Intervention: Identify and Manage Contributors  Flowsheets (Taken 4/26/2023 2158)  Self-Care Promotion: independence encouraged  Medication Review/Management: medications reviewed  Intervention: Promote Injury-Free Environment  Flowsheets (Taken 4/26/2023 2158)  Safety Promotion/Fall Prevention:   assistive device/personal item within reach   medications reviewed   lighting adjusted   instructed to call staff for mobility

## 2023-04-27 NOTE — PROGRESS NOTES
NEPHROLOGY: Progress   67-year-old male with ESRD on HD at [facility] via right upper arm fistula.  Patient with chronic indwelling Cheek due to BPH followed by Dr. Navarrete with Cheek change every 3 weeks.  Patient was admitted with hematuria and polymicrobial urinary infection/colonization with Pseudomonas, E coli and Klebsiella.  Has now completed therapy with cefepime.  The Cheek catheter was exchanged by Urology and his urine has cleared nicely.                Current Facility-Administered Medications:     0.9%  NaCl infusion (for blood administration), , Intravenous, Q24H PRN, Ulises Mcnair MD    acetaminophen tablet 650 mg, 650 mg, Oral, Q8H PRN, Farhan Wood PA-C    acetaminophen tablet 650 mg, 650 mg, Oral, Q4H PRN, Farhan Wood PA-C, 650 mg at 04/27/23 0141    aluminum-magnesium hydroxide-simethicone 200-200-20 mg/5 mL suspension 30 mL, 30 mL, Oral, QID (AC & HS), Ulises Mcnair MD, 30 mL at 04/27/23 0548    amLODIPine tablet 5 mg, 5 mg, Oral, Daily, Ulises Mcnair MD, 5 mg at 04/26/23 1046    B12-levomefolate calcium-B6 (FOLBIC RF) 2-1.13-25 mg tablet 1 tablet, 1 tablet, Oral, Daily, Ulises Mcnair MD, 1 tablet at 04/26/23 1046    busPIRone tablet 10 mg, 10 mg, Oral, Daily, Ulises Mcnair MD, 10 mg at 04/26/23 1046    carvediloL tablet 12.5 mg, 12.5 mg, Oral, BID, Ulises Mcnair MD, 12.5 mg at 04/26/23 2044    cetirizine tablet 10 mg, 10 mg, Oral, Daily, Ulises Mcnair MD, 10 mg at 04/26/23 1046    cloNIDine tablet 0.2 mg, 0.2 mg, Oral, TID PRN, Esther Guzman, AGACNP-BC, 0.2 mg at 04/27/23 0141    dextrose 10% bolus 125 mL 125 mL, 12.5 g, Intravenous, PRN, Farhan Wood PA-C    dextrose 10% bolus 250 mL 250 mL, 25 g, Intravenous, PRN, Farhan Wood PA-C    doxazosin tablet 8 mg, 8 mg, Oral, QHS, Ulises Mcnair MD, 8 mg at 04/26/23 2044    epoetin gary injection 20,000 Units, 20,000 Units,  "Intravenous, Every Mon, Wed, Fri, Chang Butler MD, 20,000 Units at 04/26/23 0833    flavoxATE tablet 200 mg, 200 mg, Oral, QID, Ulises Mcnair MD, 200 mg at 04/26/23 2044    glucagon (human recombinant) injection 1 mg, 1 mg, Intramuscular, PRN, Farhan Wood PA-C    glucose chewable tablet 16 g, 16 g, Oral, PRN, Farhan Wood PA-C    glucose chewable tablet 24 g, 24 g, Oral, PRN, Farhan Wood PA-C    hydrALAZINE injection 10 mg, 10 mg, Intravenous, Q4H PRN, Naya Medley, FNP, 10 mg at 04/21/23 0510    HYDROcodone-acetaminophen  mg per tablet 1 tablet, 1 tablet, Oral, Q6H PRN, Esther Guzman, AGACNP-BC, 1 tablet at 04/27/23 0548    hyoscyamine ODT 0.125 mg, 0.125 mg, Sublingual, Q4H PRN, Almita Mooree, FNP    morphine injection 2 mg, 2 mg, Intravenous, Q4H PRN, Almita P Champagne, FNP, 2 mg at 04/24/23 1742    ondansetron injection 4 mg, 4 mg, Intravenous, Q8H PRN, Farhan Wood PA-C, 4 mg at 04/19/23 1810    pantoprazole EC tablet 40 mg, 40 mg, Oral, Daily, Ulises Mcnair MD, 40 mg at 04/26/23 1046    sevelamer carbonate tablet 800 mg, 800 mg, Oral, TID, Ulises Mcnair MD, 800 mg at 04/27/23 0627    traZODone tablet 50 mg, 50 mg, Oral, Nightly, Ulises Mcnair MD, 50 mg at 04/26/23 2044        /69   Pulse (!) 52   Temp 97.8 °F (36.6 °C)   Resp 18   Ht 5' 5.98" (1.676 m)   Wt 64.6 kg (142 lb 6.7 oz)   SpO2 99%   BMI 23.00 kg/m²     Physical Exam:    GEN:  Chronically ill-appearing black male.  Awake, alert and in no distress.    HEENT: Atraumatic. EOMI, no icterus  NECK : No JVD  CARD : RRR s rub or gallop  LUNGS : Clear to auscultation  ABD : Soft,non-tender. BS active  EXT : No pitting edema.  Right arm AV fistula          Intake/Output Summary (Last 24 hours) at 4/27/2023 0912  Last data filed at 4/26/2023 2100  Gross per 24 hour   Intake 1341 ml   Output 1100 ml   Net 241 ml         Laboratory:  No results found for this or any previous visit (from the past 24 " hour(s)).      Assessment/Plan:  ESRD-MWF-right arm fistula  Hematuria-complicated UTI-polymicrobial  Bilateral hydronephrosis  BPH  Hypertension  Anemia of chronic disease    From renal perspective no objections to discharge.  If he is still here in the morning he can dialyze here before his discharge.    Dax Crow MD, FASN

## 2023-04-28 NOTE — PROGRESS NOTES
C3 nurse spoke with Mirza Nelson Jr.  for a TCC post hospital discharge follow up call. Stated just returning from dialysis. Requested call back. The patient does not have a scheduled HOSFU appointment with Elo Flores MD  within 5-7 days post hospital discharge date 04/27/2023.     Message sent to PCP staff requesting they contact patient and schedule follow up appointment.

## 2023-05-01 NOTE — PROGRESS NOTES
C3 nurse attempted to contact Mirza Nelson Jr.  for a TCC post hospital discharge follow up call. No answer. No voicemail available. The patient has a scheduled HOSFU appointment with Elo Flores MD  on 05/04/2023 @ 8 am.

## 2023-05-02 NOTE — PROGRESS NOTES
C3 nurse spoke with Mirza Nelson Jr.  for a TCC post hospital discharge follow up call. The patient has a scheduled HOSFU appointment with Elo Flores MD  on 05/04/2023 @ 8 am.   Appointment with Dr. Navarrete 05/11/2023.

## 2023-05-04 PROBLEM — Z09 HOSPITAL DISCHARGE FOLLOW-UP: Status: ACTIVE | Noted: 2023-01-01

## 2023-05-04 NOTE — ASSESSMENT & PLAN NOTE
S/P:  Hematuria; UTI; Bilateral hydronephrosis; hemorrhagic cystitis; Anemia.  Med rec completed  Follow up:  Has appt with Dr. Mendoza 5/11/2023  HD M, W, and F  Instructed to continue to monitor symptoms  Report to ED for any hematuria, fever, chills, malaise, CP, SOB, and/or worsening symptoms  Pt is agreeable to plan and verbalizes understanding

## 2023-05-04 NOTE — PROGRESS NOTES
Subjective:      Patient ID: Mirza Nelson Jr. is a 67 y.o. Black or  male      Chief Complaint: Hospital f/u       Past Medical History:   Diagnosis Date    Anemia     BPH (benign prostatic hyperplasia)     Cervical radiculopathy 05/17/2022    Disorder of kidney and ureter     ESRD on hemodialysis 09/27/2018    Hypertension     Neck injury 1994    Other chronic pain 09/27/2018    Personal history of colonic polyps     Renovascular hypertension 09/27/2018    Vitamin D deficiency 05/17/2022        HPI  Presents to clinic for hospital discharge follow up.    Hospital Course:  Presented to ED with complaints of hematuria.  Past medical history significant for chronic indwelling nielsen.  Upon arrival, UA noted with many RBCs, 3+LE, WBCs and 4+ bacteria.  CT revealed moderate severe bilateral hydronephrosis left greater than right and hydroureter to the level of the bladder.  Patient admitted for medical management.  Dx:  Hematuria; UTI; Bilateral hydronephrosis; hemorrhagic cystitis; Anemia.  Started on IV antibiotics.  Urology/Nephrology consulted for evaluation.  Received 1 units PRBC.  Nielsen irrigated while in hospitalized.  Discharged on 4/27/2023 in stable condition.  H/H improved.  Overall, doing well.  Denies any hematuria, fever, chills, body aches, CP, or SOB.      Follow up:  Has appt with Dr. Mendoza 5/11/2023  HD M, W, and F      Review of Systems   Constitutional:  Negative for chills, fatigue, fever and unexpected weight change.   HENT: Negative.     Eyes: Negative.    Respiratory: Negative.  Negative for shortness of breath.    Cardiovascular: Negative.  Negative for chest pain.   Gastrointestinal: Negative.    Endocrine: Negative.    Genitourinary: Negative.    Musculoskeletal: Negative.    Integumentary:  Negative.   Allergic/Immunologic: Negative.    Neurological: Negative.  Negative for weakness.   Hematological: Negative.    Psychiatric/Behavioral: Negative.     All other systems  reviewed and are negative.       Objective:      Vitals:    05/04/23 0800   BP: 138/69   Pulse: 70   Resp: 20   Temp: 97.8 °F (36.6 °C)      Body mass index is 24.6 kg/m².     Physical Exam  Vitals reviewed.   Constitutional:       Appearance: He is not toxic-appearing.   HENT:      Head: Normocephalic.      Mouth/Throat:      Mouth: Mucous membranes are moist.   Eyes:      Extraocular Movements: Extraocular movements intact.      Pupils: Pupils are equal, round, and reactive to light.   Cardiovascular:      Rate and Rhythm: Normal rate and regular rhythm.      Pulses: Normal pulses.      Heart sounds: Normal heart sounds.   Pulmonary:      Effort: Pulmonary effort is normal. No respiratory distress.      Breath sounds: Normal breath sounds.   Abdominal:      General: Bowel sounds are normal. There is no distension.      Palpations: Abdomen is soft.      Tenderness: There is no abdominal tenderness.   Genitourinary:     Comments: Indwelling catheter noted; urine is clear; no hematuria noted.   Musculoskeletal:         General: No tenderness. Normal range of motion.      Cervical back: Neck supple.   Skin:     General: Skin is warm and dry.   Neurological:      Mental Status: He is alert and oriented to person, place, and time.   Psychiatric:         Mood and Affect: Mood normal.          Current Outpatient Medications:     amLODIPine (NORVASC) 5 MG tablet, Take 1 tablet (5 mg total) by mouth once daily., Disp: 30 tablet, Rfl: 11    augmented betamethasone (DIPROLENE) 0.05 % lotion, Apply 0.05 application topically every Mon, Wed, Fri., Disp: , Rfl:     busPIRone (BUSPAR) 10 MG tablet, TAKE 1 TABLET(10 MG) BY MOUTH EVERY DAY, Disp: 30 tablet, Rfl: 3    carvediloL (COREG) 12.5 MG tablet, TAKE 1 TABLET(12.5 MG) BY MOUTH TWICE DAILY, Disp: 180 tablet, Rfl: 3    doxazosin (CARDURA) 8 MG Tab, Take 8 mg by mouth every evening., Disp: , Rfl:     ferrous gluconate (FERGON) 324 MG tablet, Take 1 tablet (324 mg total) by mouth  once daily., Disp: 30 tablet, Rfl: 3    flavoxATE (URISPAS) 100 mg Tab, Take 200 mg by mouth 4 (four) times daily., Disp: , Rfl:     fluticasone (FLONASE) 50 mcg/actuation nasal spray, 1 spray by Each Nare route daily as needed for Rhinitis., Disp: , Rfl:     folic acid-vit B6-vit B12 2.5-25-2 mg (FOLBIC) 2.5-25-2 mg Tab, Take 2.5 tablets by mouth Daily., Disp: , Rfl:     HYDROcodone-acetaminophen (NORCO)  mg per tablet, Take 1 tablet by mouth every 8 (eight) hours as needed for Pain., Disp: 90 tablet, Rfl: 0    loperamide (IMODIUM) 2 mg capsule, Take 2 mg by mouth daily as needed., Disp: , Rfl:     loratadine (CLARITIN) 10 mg tablet, Take 1 tablet (10 mg total) by mouth once daily., Disp: 30 tablet, Rfl: 3    megestroL (MEGACE) 400 mg/10 mL (40 mg/mL) Susp, Take 40 mg by mouth Daily., Disp: , Rfl:     traZODone (DESYREL) 50 MG tablet, Take 50 mg by mouth nightly., Disp: , Rfl:     walker Misc, 1 each by Misc.(Non-Drug; Combo Route) route once daily. Upright Rollator, Disp: 1 each, Rfl: 0    hydrALAZINE (APRESOLINE) 50 MG tablet, TAKE 1 TABLET(50 MG) BY MOUTH THREE TIMES DAILY, Disp: 270 tablet, Rfl: 3    naloxone (NARCAN) 4 mg/actuation Spry, CALL 911. SPR CONTENTS OF ONE SPRAYER (0.1ML) INTO ONE NOSTRIL. REPEAT IN 2-3 MIN IF SYMPTOMS OF OPIOID EMERGENCY PERSIST, ALTERNATE NOSTRILS, Disp: , Rfl:     pantoprazole (PROTONIX) 40 MG tablet, Take 40 mg by mouth once daily., Disp: , Rfl:     sevelamer carbonate (RENVELA) 800 mg Tab, Take 800 mg by mouth 3 (three) times daily., Disp: , Rfl:     VELPHORO 500 mg Chew, Take by mouth., Disp: , Rfl:     Assessment & Plan:     Problem List Items Addressed This Visit          Other    Hospital discharge follow-up - Primary     S/P:  Hematuria; UTI; Bilateral hydronephrosis; hemorrhagic cystitis; Anemia.  Med rec completed  Follow up:  Has appt with Dr. Mendoza 5/11/2023  HD M, W, and F  Instructed to continue to monitor symptoms  Report to ED for any hematuria, fever,  chills, malaise, CP, SOB, and/or worsening symptoms  Pt is agreeable to plan and verbalizes understanding                   Transitional Care Note    Family and/or Caretaker present at visit?  No.  Diagnostic tests reviewed/disposition: No diagnosic tests pending after this hospitalization.  Disease/illness education: n/a  Home health/community services discussion/referrals: Patient does not have home health established from hospital visit.  They do not need home health.  If needed, we will set up home health for the patient.   Establishment or re-establishment of referral orders for community resources: No other necessary community resources.   Discussion with other health care providers: No discussion with other health care providers necessary.

## 2023-05-23 NOTE — TELEPHONE ENCOUNTER
----- Message from Derrick Rankin sent at 5/23/2023  9:28 AM CDT -----  .Type:  Needs Medical Advice    Who Called: Huong Pritchett [facility]   Symptoms (please be specific):    How long has patient had these symptoms:    Pharmacy name and phone #:    Would the patient rather a call back or a response via [facility]?   Best Call Back Number: [phone]  Additional Information: She would like to speak with the nurse about getting a hospital bed and a scooter for the patient as he is living on his own now. She also wanted to let the office know that his blood pressure reading was 180/87 recheck was 170/90.

## 2023-05-25 NOTE — TELEPHONE ENCOUNTER
Spoke with Sana from [employer], she states on 5/23 pts BP was 180/87 and 169/82, today his BP was 130/90 and 120/90. Pt states it has been elevated the last few weeks, he has no other symptoms. Please advise. Thanks

## 2023-05-25 NOTE — TELEPHONE ENCOUNTER
----- Message from Derrick Rankin sent at 5/25/2023  2:57 PM CDT -----  .Type:  Needs Medical Advice    Who Called: Huong Hood [facility]   Symptoms (please be specific):    How long has patient had these symptoms:    Pharmacy name and phone #:    Would the patient rather a call back or a response via [facility]?   Best Call Back Number: [phone]. Patient 's phone #   Additional Information: She needs to speak with the doctor about the patient's elevated blood pressure.

## 2023-05-26 NOTE — TELEPHONE ENCOUNTER
Talked to pt and voiced he needs to call [employer] for the bed and scooter. Pt voiced his understanding. Sending to  for an appointment.

## 2023-06-01 PROBLEM — N25.81 SECONDARY HYPERPARATHYROIDISM OF RENAL ORIGIN: Status: ACTIVE | Noted: 2023-01-01

## 2023-06-01 NOTE — PROGRESS NOTES
Patient ID: [id number]     Chief Complaint: Follow-up (Scooter/Hospital Bed)        HPI:     Mirza Nelson Jr. is a 67 y.o. male here today for mobility evaluation for scooter.    The patient presents with Here for Mobility Examination. Patient has chronic neck and low back pain due to severe DJD C-spine and L-spine followed by pain management (Dr. Mary) and pain level is 8/10 on pain scale (pain is followed by pain management) . He has weakness in both hands this is causing him difficulty with using a cane and now requires a power mobiltiy device. He denies history of pressure sores. He has the ability to shift his weight. A PMD is necessary to assist patient with his MRADLS in particular, getting to the bathroom to toilet and to bathe as well as getting to the bedroom to groom and dress. Patient cannot use a cane/walker due to history of falls and Bilateral UE and Bilateral LE weakness. Patient cannot use a MWC due to Bilateral UE 4/5 and  strength of 4/5. Patient cannot operate the tiller of a POV. Patient can safely operate the power mobility device both mentally and physically. Patient is willing and motivated to use the power mobility device in the home.   - He also needs a regular hospital bed with mattress, the adjustable bed makes his joint pain worse.  - HTN is not controlled with Rx, asymptomatic, he does followup with Cardiology (Dr. Perez) and Nephrology (Dr. Angela). He reports compliance with Rx, no Rx side effects.   - Secondary hyperparathyroidism is stable and asymptomatic, followed by Nephrology (Dr. Angela).   - Patient is without any other complaints today.           ----------------------------  Anemia  BPH (benign prostatic hyperplasia)  Cervical radiculopathy  Disorder of kidney and ureter  ESRD on hemodialysis  Hypertension  Neck injury  Other chronic pain  Personal history of colonic polyps  Renovascular hypertension  Vitamin D deficiency     Past Surgical History:   Procedure  Laterality Date    AV FISTULA PLACEMENT      COLONOSCOPY W/ POLYPECTOMY  02/19/2019    MASS EXCISION  2005    near the kidney     NECK SURGERY         Review of patient's allergies indicates:   Allergen Reactions    Iodine      Other reaction(s): difficulty breathing, Facial Swelling    Iodine and iodide containing products Swelling    Shellfish containing products      Other reaction(s): Swelling       Outpatient Medications Marked as Taking for the 6/1/23 encounter (Office Visit) with Elo Flores MD   Medication Sig Dispense Refill    augmented betamethasone (DIPROLENE) 0.05 % lotion Apply 0.05 application topically every Mon, Wed, Fri.      busPIRone (BUSPAR) 10 MG tablet TAKE 1 TABLET(10 MG) BY MOUTH EVERY DAY 30 tablet 3    carvediloL (COREG) 12.5 MG tablet TAKE 1 TABLET(12.5 MG) BY MOUTH TWICE DAILY 180 tablet 3    doxazosin (CARDURA) 8 MG Tab Take 8 mg by mouth every evening.      ferrous gluconate (FERGON) 324 MG tablet Take 1 tablet (324 mg total) by mouth once daily. 30 tablet 3    flavoxATE (URISPAS) 100 mg Tab Take 200 mg by mouth 4 (four) times daily.      fluticasone (FLONASE) 50 mcg/actuation nasal spray 1 spray by Each Nare route daily as needed for Rhinitis.      folic acid-vit B6-vit B12 2.5-25-2 mg (FOLBIC) 2.5-25-2 mg Tab Take 2.5 tablets by mouth Daily.      hydrALAZINE (APRESOLINE) 50 MG tablet TAKE 1 TABLET(50 MG) BY MOUTH THREE TIMES DAILY 270 tablet 3    HYDROcodone-acetaminophen (NORCO)  mg per tablet Take 1 tablet by mouth every 8 (eight) hours as needed for Pain. 90 tablet 0    loratadine (CLARITIN) 10 mg tablet Take 1 tablet (10 mg total) by mouth once daily. 30 tablet 3    megestroL (MEGACE) 400 mg/10 mL (40 mg/mL) Susp Take 40 mg by mouth Daily.      naloxone (NARCAN) 4 mg/actuation Spry CALL 911. SPR CONTENTS OF ONE SPRAYER (0.1ML) INTO ONE NOSTRIL. REPEAT IN 2-3 MIN IF SYMPTOMS OF OPIOID EMERGENCY PERSIST, ALTERNATE NOSTRILS      pantoprazole (PROTONIX) 40 MG tablet  Take 40 mg by mouth once daily.      sevelamer carbonate (RENVELA) 800 mg Tab Take 800 mg by mouth 3 (three) times daily.      traZODone (DESYREL) 50 MG tablet Take 50 mg by mouth nightly.      VELPHORO 500 mg Chew Take by mouth.      walker Misc 1 each by Misc.(Non-Drug; Combo Route) route once daily. Upright Rollator 1 each 0    [DISCONTINUED] amLODIPine (NORVASC) 5 MG tablet Take 1 tablet (5 mg total) by mouth once daily. 30 tablet 11    [DISCONTINUED] loperamide (IMODIUM) 2 mg capsule Take 2 mg by mouth daily as needed.         Social History     Socioeconomic History    Marital status:     Years of education: 12   Tobacco Use    Smoking status: Former     Packs/day: 0.25     Years: 20.00     Pack years: 5.00     Types: Cigarettes     Quit date: 2016     Years since quittin.6    Smokeless tobacco: Never   Substance and Sexual Activity    Alcohol use: No    Drug use: Not Currently     Types: Marijuana    Sexual activity: Not Currently        Family History   Problem Relation Age of Onset    Heart disease Mother     Diabetes Mother     Hypertension Mother     Cancer Father     Prostate cancer Father     Hypertension Father     Cancer Sister     Breast cancer Sister     No Known Problems Sister         Subjective:       Review of Systems:    See HPI for details    Constitutional:  No fever, No chills, No fatigue.    Eye:  No blurring, No visual disturbances.    Ear/Nose/Mouth/Throat:  No decreased hearing, No ear pain, No nasal congestion, No sore throat.    Respiratory:  No shortness of breath, No cough, No wheezing.    Cardiovascular:  No chest pain, No palpitations, No peripheral edema.    Gastrointestinal:  No nausea, No vomiting, No diarrhea, No constipation, No abdominal pain.    Genitourinary:  No dysuria, No hematuria.    Hematology/Lymphatics:  No bruising tendency, No bleeding tendency, No swollen lymph glands.    Endocrine:  No excessive thirst, No polyuria, No excessive hunger.     Musculoskeletal:  Joint pain, Muscle pain, Decreased range of motion.    Integumentary:  No rash, No pruritus.    Neurologic:  No abnormal balance, No confusion, No headache.    Psychiatric:  No anxiety, No depression, Not suicidal, No hallucinations.      All Other ROS: Negative except as stated in HPI.       Objective:     BP (!) 174/82 (BP Location: Left arm, Patient Position: Sitting, BP Method: Medium (Automatic))   Pulse 74   Wt 67.6 kg (149 lb 1.6 oz)   SpO2 98%   BMI 24.81 kg/m²     Physical Exam    General:  Alert and oriented, No acute distress.    Eye:  Pupils are equal, round and reactive to light, Extraocular movements are intact, Normal conjunctiva.    HENT:  Normocephalic, Tympanic membranes are clear, Normal hearing, Oral mucosa is moist, No pharyngeal erythema.         Throat: Pharynx ( Not edematous, No exudate ).    Neck:  Supple, Non-tender, No carotid bruit, No lymphadenopathy, No thyromegaly.    Respiratory:  Lungs are clear to auscultation, Respirations are non-labored, Breath sounds are equal, Symmetrical chest wall expansion, No chest wall tenderness.    Cardiovascular:  Normal rate, Regular rhythm, No murmur, Good pulses equal in all extremities, No edema.    Gastrointestinal:  Soft, Non-tender, Non-distended, Normal bowel sounds, No organomegaly.    Genitourinary:  No costovertebral angle tenderness.    Musculoskeletal:  No tenderness, BUE and BLE with 4/5 motor strength, decreased range of motion, moderate, decreased flexion and extension; bilateral  strength is 4/5.    .      Mobility/ gait: Able to walk with moderate assistance, has a rollator, slow, shuffling.    Neurologic:  No focal deficits, Cranial Nerves II-XII are grossly intact.    Psychiatric:  Cooperative, Appropriate mood & affect, Normal judgment, Non-suicidal.         Mood and affect: Calm.         Behavior: Relaxed.          Assessment:       ICD-10-CM ICD-9-CM   1. Bilateral lumbar radiculopathy  M54.16 724.4    2. Cervical radiculopathy  M54.12 723.4   3. Difficulty in walking  R26.2 719.7   4. Primary hypertension  I10 401.9   5. Secondary hyperparathyroidism of renal origin  N25.81 588.81        Plan:     Problem List Items Addressed This Visit          Neuro    Cervical radiculopathy (Chronic)    Relevant Orders    HME - OTHER    HOSPITAL BED FOR HOME USE       Cardiac/Vascular    Hypertension (Chronic)    Relevant Medications    amLODIPine (NORVASC) 10 MG tablet       Endocrine    Secondary hyperparathyroidism of renal origin     Other Visit Diagnoses       Bilateral lumbar radiculopathy    -  Primary    Relevant Orders    E - OTHER    HOSPITAL BED FOR HOME USE    Difficulty in walking        Relevant Orders    E - OTHER    HOSPITAL BED FOR HOME USE         1. Bilateral lumbar radiculopathy  - E - OTHER for Power Mobility Device  - HOSPITAL BED FOR HOME USE  - Fall precautions discussed. Continue followup with Pain Management as scheduled. Notify M.D. or ER if symptoms persist or worsen, temp >100.4, or any acute illness.      2. Cervical radiculopathy  - E - OTHER for Power Mobility Device  - HOSPITAL BED FOR HOME USE  - Fall precautions discussed. Continue followup with Pain Management as scheduled. Notify M.D. or ER if symptoms persist or worsen, temp >100.4, or any acute illness.      3. Difficulty in walking  - E - OTHER for Power Mobility Device  - HOSPITAL BED FOR HOME USE  - Fall precautions discussed. Continue followup with Pain Management as scheduled. Notify M.D. or ER if symptoms persist or worsen, temp >100.4, or any acute illness.      4. Primary hypertension  - amLODIPine (NORVASC) 10 MG tablet; Take 1 tablet (10 mg total) by mouth once daily.  Dispense: 90 tablet; Refill: 3  - BP is not controlled. Rx for increased dose of Amlodipine sent. Keep daily BP log. Will titrate BP Rx until BP is <140/90. Continue followup with Nephrology and Cardiology as scheduled. Notify M.D. or ER if BP >170/100  or <90/60, chest pain, palpitations, headache, SOB, temp greater than 100.4, or any acute illness.   Continue  Low Sodium Diet (DASH Diet - Less than 2 grams of sodium per day).  Monitor blood pressure daily and log. Report consistent numbers greater than 140/90.  Smoking cessation encouraged to aid in BP reduction.  Maintain healthy weight with goal BMI <30. Exercise 30 minutes per day, 5 days per week.      5. Secondary hyperparathyroidism of renal origin  - Asymptomatic, continue current treatment plan, continue followup with Nephrology as scheduled.       Mirza was seen today for follow-up.    Diagnoses and all orders for this visit:    Bilateral lumbar radiculopathy  -     [facility] - OTHER  -     HOSPITAL BED FOR HOME USE    Cervical radiculopathy  -     [facility] - OTHER  -     HOSPITAL BED FOR HOME USE    Difficulty in walking  -     [facility] - OTHER  -     HOSPITAL BED FOR HOME USE    Primary hypertension  -     amLODIPine (NORVASC) 10 MG tablet; Take 1 tablet (10 mg total) by mouth once daily.    Secondary hyperparathyroidism of renal origin          Medication List with Changes/Refills   New Medications    AMLODIPINE (NORVASC) 10 MG TABLET    Take 1 tablet (10 mg total) by mouth once daily.       Start Date: 6/1/2023  End Date: 5/31/2024   Current Medications    AUGMENTED BETAMETHASONE (DIPROLENE) 0.05 % LOTION    Apply 0.05 application topically every Mon, Wed, Fri.       Start Date: --        End Date: --    BUSPIRONE (BUSPAR) 10 MG TABLET    TAKE 1 TABLET(10 MG) BY MOUTH EVERY DAY       Start Date: 3/2/2023  End Date: --    CARVEDILOL (COREG) 12.5 MG TABLET    TAKE 1 TABLET(12.5 MG) BY MOUTH TWICE DAILY       Start Date: 8/15/2022 End Date: --    DOXAZOSIN (CARDURA) 8 MG TAB    Take 8 mg by mouth every evening.       Start Date: --        End Date: --    FERROUS GLUCONATE (FERGON) 324 MG TABLET    Take 1 tablet (324 mg total) by mouth once daily.       Start Date: 6/7/2022  End Date: --    FLAVOXATE (URISPAS) 100 MG  TAB    Take 200 mg by mouth 4 (four) times daily.       Start Date: 11/22/2021End Date: --    FLUTICASONE (FLONASE) 50 MCG/ACTUATION NASAL SPRAY    1 spray by Each Nare route daily as needed for Rhinitis.       Start Date: --        End Date: --    FOLIC ACID-VIT B6-VIT B12 2.5-25-2 MG (FOLBIC) 2.5-25-2 MG TAB    Take 2.5 tablets by mouth Daily.       Start Date: 2/10/2022 End Date: --    HYDRALAZINE (APRESOLINE) 50 MG TABLET    TAKE 1 TABLET(50 MG) BY MOUTH THREE TIMES DAILY       Start Date: 5/17/2022 End Date: 6/1/2023    HYDROCODONE-ACETAMINOPHEN (NORCO)  MG PER TABLET    Take 1 tablet by mouth every 8 (eight) hours as needed for Pain.       Start Date: 11/4/2022 End Date: --    LORATADINE (CLARITIN) 10 MG TABLET    Take 1 tablet (10 mg total) by mouth once daily.       Start Date: 6/7/2022  End Date: --    MEGESTROL (MEGACE) 400 MG/10 ML (40 MG/ML) SUSP    Take 40 mg by mouth Daily.       Start Date: --        End Date: --    NALOXONE (NARCAN) 4 MG/ACTUATION SPRY    CALL 911. SPR CONTENTS OF ONE SPRAYER (0.1ML) INTO ONE NOSTRIL. REPEAT IN 2-3 MIN IF SYMPTOMS OF OPIOID EMERGENCY PERSIST, ALTERNATE NOSTRILS       Start Date: 12/3/2022 End Date: --    PANTOPRAZOLE (PROTONIX) 40 MG TABLET    Take 40 mg by mouth once daily.       Start Date: 3/4/2022  End Date: --    SEVELAMER CARBONATE (RENVELA) 800 MG TAB    Take 800 mg by mouth 3 (three) times daily.       Start Date: --        End Date: --    TRAZODONE (DESYREL) 50 MG TABLET    Take 50 mg by mouth nightly.       Start Date: 1/5/2022  End Date: --    VELPHORO 500 MG CHEW    Take by mouth.       Start Date: 3/22/2023 End Date: --    WALKER MISC    1 each by Misc.(Non-Drug; Combo Route) route once daily. Upright Rollator       Start Date: 5/24/2022 End Date: --   Discontinued Medications    AMLODIPINE (NORVASC) 5 MG TABLET    Take 1 tablet (5 mg total) by mouth once daily.       Start Date: 11/29/2022End Date: 6/1/2023    LOPERAMIDE (IMODIUM) 2 MG CAPSULE     Take 2 mg by mouth daily as needed.       Start Date: 1/13/2022 End Date: 6/1/2023          Follow up in about 1 week (around 6/8/2023) for Blood Pressure Check- Nurse; 3 months with MD for HTN followup.

## 2023-06-07 NOTE — DISCHARGE SUMMARY
[facility] Medicine Discharge Summary    Admit Date: 4/18/2023  Discharge Date and Time: 04/27/2023  Admitting Physician:  Team  Discharging Physician: Nicolas Ba MD.  Primary Care Physician: Elo Flores MD  Consults: Nephrology, Urology    Discharge Diagnoses:  Hematuria   UTI  Bilateral hydronephrosis  ESRD on HD MWF  Macrocytic anemia   Leukopenia  BPH  Essential Hypertension   Cervical Radiculopathy  Vitamin-D Deficiency    Hospital Course:   Mr Omar Simpson is a 67 y.o. male with a past medical history of essential hypertension, ESRD on HD MWF, anemia, BPH, cervical radiculopathy, and vitamin-D deficiency presented to [facility] on 4/18/2023 for hematuria.  Patient reports hematuria began at 12 PM yesterday, 4/17.  Patient reports he has nielsen catheter in place for BPH and last change was about 3 weeks ago.  Patient denies use of blood thinners, dysuria, abdominal pain, nausea, vomiting, diarrhea, chest pain, shortness of breath, fever, and chills.  Initial vital signs in ED were /72, pulse 58, respirations 18, temperature 36.7° C, and SpO2 100% on room air.  Labs revealed WBC 3, RBC 2.51, hemoglobin 8.3, hematocrit 26.1, , sodium 135, BUN 27, creatinine 8.66, and calcium 8.7.  UA revealed 3+ protein, 3+ occult blood, 3+ leukocytes, greater than 100 red blood cells, 11-20 wbc's, and 4+ bacteria.  Urine culture was obtained.  CT abdomen and pelvis without contrast revealed moderate to advanced renal atrophy on the right greater than left with moderate severe bilateral hydronephrosis left greater than right and hydroureter to the level of the bladder, thickened urothelium which could indicate superimposed inflammation, enlarged prostate, and bladder decompressed around nielsen catheter with bladder wall  thickening.  Patient was given IV Rocephin 1 g in ED.  Urology was consulted. Patient was admitted to hospital medicine service for further medical  management. Patient continued on IV Rocephin. Nephrology consulted for HD. UCX grew Pseudomonas, E Coli & Klebsiella. Continued on conservative management per Urology recommendations. Levsin PRN added for bladder spasms. Cheek catheter was changed by Urology at bedside after which his urine cleared up. Completed 5 days of IV Cefepime which was started on 04/22 & was then planned for DC. Urology cleared patient for DC.    Pt was seen and examined on the day of discharge. He stated he was doing well and had no new complaints.    Vitals:  VITAL SIGNS: 24 HRS MIN & MAX LAST   No data recorded 97.6 °F (36.4 °C)   No data recorded (!) 143/73   No data recorded  (!) 50   No data recorded 18   No data recorded 99 %       Physical Exam:  General appearance: Male in no apparent distress.  HEENNT: Atraumatic head.   Lungs: Clear to auscultation bilaterally.   Heart: Regular rate and rhythm.    Abdomen/: Soft, non-distended, non-tender. Bowel sounds are normal.  Cheek catheter in place, wine colored urine  Extremities: No cyanosis, clubbing, or edema. No deformities. RUE graft   Skin: No Rash. Warm and dry.   Neuro: Awake, alert, and oriented. Motor and sensory exams grossly intact.     Procedures Performed: No admission procedures for hospital encounter.     Significant Diagnostic Studies: See Full reports for all details    No results for input(s): WBC, RBC, HGB, HCT, MCV, MCH, MCHC, RDW, PLT, MPV, GRAN, LYMPH, MONO, BASO, NRBC in the last 168 hours.    No results for input(s): NA, K, CL, CO2, ANIONGAP, BUN, CREATININE, GLU, CALCIUM, PH, MG, ALBUMIN, PROT, ALKPHOS, ALT, AST, BILITOT in the last 168 hours.     Microbiology Results (last 7 days)       Procedure Component Value Units Date/Time    Urine culture [[id number]]  (Abnormal)  (Susceptibility) Collected: 04/18/23 1918    Order Status: Completed Specimen: Urine Updated: 04/22/23 0942     Urine Culture >/= 100,000 colonies/ml Pseudomonas aeruginosa      >/= 100,000  colonies/ml Escherichia coli      >/= 100,000 colonies/ml Klebsiella pneumoniae ssp pneumoniae             CT Abdomen Pelvis  Without Contrast  Narrative: EXAMINATION:  CT ABDOMEN PELVIS WITHOUT CONTRAST    CLINICAL HISTORY:  hematurai;    TECHNIQUE:  Low dose axial images, sagittal and coronal reformations were obtained from the lung bases to the pubic symphysis.  .  Oral contrast not given.  .  Automated exposure control used.    COMPARISON:  12/07/2021    FINDINGS:  Advanced right renal atrophy.  Moderate left renal atrophy.  Multiple renal cysts bilaterally.  Moderate hydronephrosis bilaterally.  Thickened urothelium.  Dilated ureters bilaterally to the level of the bladder.  Prostate gland demonstrates marked enlargement with suspected significant intra bladder extension.  Bladder is otherwise decompressed around a Cheek catheter.  There is bladder wall thickening.    Liver demonstrates normal density with no focal lesion.    Gallbladder and biliary ductal system normal.    Pancreas, stomach, spleen, adrenal glands normal.    Aorta tapers normally.  Severe atherosclerotic calcification.    No bowel obstruction, ascites, inflammatory change, lymphadenopathy.  Appendix normal.    Bones intact.  Lung bases demonstrate mild chronic appearing subpleural lines and interstitial markings.  Dense  Impression: Moderate to advanced renal atrophy on the right greater than left.  Moderate severe bilateral hydronephrosis left greater than right and hydroureter to the level of the bladder.  Thickened urothelium which could indicate superimposed inflammation.  Prostate appears markedly enlarged with significant distortion of and possible extension into the bladder.  Bladder otherwise decompressed around a Cheek catheter and demonstrates bladder wall thickening.  This could represent cystitis or muscular hypertrophy.    There appears to be a significantly reduced degree of hydronephrosis compared to the prior exam  particularly on the right side.  Prostate appears slightly larger in the interval.    Agree with shane.    Electronically signed by: Sissy Gonzalez MD  Date:    04/19/2023  Time:    07:35         Medication List        CONTINUE taking these medications      augmented betamethasone 0.05 % lotion  Commonly known as: DIPROLENE     busPIRone 10 MG tablet  Commonly known as: BUSPAR  TAKE 1 TABLET(10 MG) BY MOUTH EVERY DAY     carvediloL 12.5 MG tablet  Commonly known as: COREG  TAKE 1 TABLET(12.5 MG) BY MOUTH TWICE DAILY     doxazosin 8 MG Tab  Commonly known as: CARDURA     ferrous gluconate 324 MG tablet  Commonly known as: FERGON  Take 1 tablet (324 mg total) by mouth once daily.     flavoxATE 100 mg Tab  Commonly known as: URISPAS     fluticasone propionate 50 mcg/actuation nasal spray  Commonly known as: FLONASE     FOLBIC 2.5-25-2 mg Tab  Generic drug: folic acid-vit B6-vit B12 2.5-25-2 mg     hydrALAZINE 50 MG tablet  Commonly known as: APRESOLINE  TAKE 1 TABLET(50 MG) BY MOUTH THREE TIMES DAILY     HYDROcodone-acetaminophen  mg per tablet  Commonly known as: NORCO  Take 1 tablet by mouth every 8 (eight) hours as needed for Pain.     loratadine 10 mg tablet  Commonly known as: CLARITIN  Take 1 tablet (10 mg total) by mouth once daily.     megestroL 400 mg/10 mL (40 mg/mL) Susp  Commonly known as: MEGACE     pantoprazole 40 MG tablet  Commonly known as: PROTONIX     sevelamer carbonate 800 mg Tab  Commonly known as: RENVELA     traZODone 50 MG tablet  Commonly known as: DESYREL     walker Misc  1 each by Misc.(Non-Drug; Combo Route) route once daily. Upright Rollator            STOP taking these medications      tamsulosin 0.4 mg Cap  Commonly known as: FLOMAX               Explained in detail to the patient about the discharge plan, medications, and follow-up visits. Pt understands and agrees with the treatment plan  Discharge Disposition: Home-Health Care Svc   Discharged Condition: stable  Diet-     Medications Per MD med rec  Activities as tolerated    For further questions contact hospitalist office    Discharge time 33 minutes    For worsening symptoms, chest pain, shortness of breath, increased abdominal pain, high grade fever, stroke or stroke like symptoms, immediately go to the nearest Emergency Room or call 911 as soon as possible.      Nicolas Hays M.D.

## 2023-06-08 NOTE — PROGRESS NOTES
Patient ID: [id number]     Chief Complaint: BP Check      HPI:     Mirza Nelson Jr. is a 67 y.o. male here today for a nurse visit BP check. Compliant with current medication regimen. Tolerating all medications well without adverse effects. /72 Left arm (Manual)        ----------------------------  Anemia  BPH (benign prostatic hyperplasia)  Cervical radiculopathy  Disorder of kidney and ureter  ESRD on hemodialysis  Hypertension  Neck injury  Other chronic pain  Personal history of colonic polyps  Renovascular hypertension  Vitamin D deficiency     Past Surgical History:   Procedure Laterality Date    AV FISTULA PLACEMENT      COLONOSCOPY W/ POLYPECTOMY  2019    MASS EXCISION  2005    near the kidney     NECK SURGERY         Review of patient's allergies indicates:   Allergen Reactions    Iodine      Other reaction(s): difficulty breathing, Facial Swelling    Iodine and iodide containing products Swelling    Shellfish containing products      Other reaction(s): Swelling       No outpatient medications have been marked as taking for the 23 encounter (Appointment) with [facility].       Social History     Socioeconomic History    Marital status:     Years of education: 12   Tobacco Use    Smoking status: Former     Packs/day: 0.25     Years: 20.00     Pack years: 5.00     Types: Cigarettes     Quit date: 2016     Years since quittin.6    Smokeless tobacco: Never   Substance and Sexual Activity    Alcohol use: No    Drug use: Not Currently     Types: Marijuana    Sexual activity: Not Currently        Family History   Problem Relation Age of Onset    Heart disease Mother     Diabetes Mother     Hypertension Mother     Cancer Father     Prostate cancer Father     Hypertension Father     Cancer Sister     Breast cancer Sister     No Known Problems Sister         Patient Care Team:  Elo Flores MD as PCP - General (Family Medicine)       Subjective:       See  HPI for details    Constitutional: Denies Change in appetite. Denies Chills. Denies Fever. Denies Night sweats.  Eye: Denies Blurred vision. Denies Eye pain.  Respiratory: Denies Cough. Denies Shortness of breath. Denies Shortness of breath with exertion. Denies Wheezing.  Cardiovascular: Denies Chest pain.  Denies Irregular heartbeat. Denies Palpitations. Denies Edema.   Genitourinary: Denies Dysuria. Denies Urinary frequency. Denies Urinary urgency.   Integumentary: Denies Rash. Denies Itching.   Neurologic: Denies Dizziness. Denies Fainting. Denies Headache.  Psychiatric: Denies Depression. Denies Anxiety. Denies Suicidal/Homicidal ideations.    All Other ROS: Negative except as stated in HPI.       Objective:     There were no vitals taken for this visit.        Assessment:     No diagnosis found.     Plan:     There are no diagnoses linked to this encounter.         Medication List with Changes/Refills   Current Medications    AMLODIPINE (NORVASC) 10 MG TABLET    Take 1 tablet (10 mg total) by mouth once daily.       Start Date: 6/1/2023  End Date: 5/31/2024    AUGMENTED BETAMETHASONE (DIPROLENE) 0.05 % LOTION    Apply 0.05 application topically every Mon, Wed, Fri.       Start Date: --        End Date: --    BUSPIRONE (BUSPAR) 10 MG TABLET    TAKE 1 TABLET(10 MG) BY MOUTH EVERY DAY       Start Date: 3/2/2023  End Date: --    CARVEDILOL (COREG) 12.5 MG TABLET    TAKE 1 TABLET(12.5 MG) BY MOUTH TWICE DAILY       Start Date: 8/15/2022 End Date: --    DOXAZOSIN (CARDURA) 8 MG TAB    Take 8 mg by mouth every evening.       Start Date: --        End Date: --    FERROUS GLUCONATE (FERGON) 324 MG TABLET    Take 1 tablet (324 mg total) by mouth once daily.       Start Date: 6/7/2022  End Date: --    FLAVOXATE (URISPAS) 100 MG TAB    Take 200 mg by mouth 4 (four) times daily.       Start Date: 11/22/2021End Date: --    FLUTICASONE (FLONASE) 50 MCG/ACTUATION NASAL SPRAY    1 spray by Each Nare route daily as needed for  Rhinitis.       Start Date: --        End Date: --    FOLIC ACID-VIT B6-VIT B12 2.5-25-2 MG (FOLBIC) 2.5-25-2 MG TAB    Take 2.5 tablets by mouth Daily.       Start Date: 2/10/2022 End Date: --    HYDRALAZINE (APRESOLINE) 50 MG TABLET    TAKE 1 TABLET(50 MG) BY MOUTH THREE TIMES DAILY       Start Date: 5/17/2022 End Date: 6/1/2023    HYDROCODONE-ACETAMINOPHEN (NORCO)  MG PER TABLET    Take 1 tablet by mouth every 8 (eight) hours as needed for Pain.       Start Date: 11/4/2022 End Date: --    LORATADINE (CLARITIN) 10 MG TABLET    Take 1 tablet (10 mg total) by mouth once daily.       Start Date: 6/7/2022  End Date: --    MEGESTROL (MEGACE) 400 MG/10 ML (40 MG/ML) SUSP    Take 40 mg by mouth Daily.       Start Date: --        End Date: --    NALOXONE (NARCAN) 4 MG/ACTUATION SPRY    CALL 911. SPR CONTENTS OF ONE SPRAYER (0.1ML) INTO ONE NOSTRIL. REPEAT IN 2-3 MIN IF SYMPTOMS OF OPIOID EMERGENCY PERSIST, ALTERNATE NOSTRILS       Start Date: 12/3/2022 End Date: --    PANTOPRAZOLE (PROTONIX) 40 MG TABLET    Take 40 mg by mouth once daily.       Start Date: 3/4/2022  End Date: --    SEVELAMER CARBONATE (RENVELA) 800 MG TAB    Take 800 mg by mouth 3 (three) times daily.       Start Date: --        End Date: --    TRAZODONE (DESYREL) 50 MG TABLET    Take 50 mg by mouth nightly.       Start Date: 1/5/2022  End Date: --    VELPHORO 500 MG CHEW    Take by mouth.       Start Date: 3/22/2023 End Date: --    WALKER MISC    1 each by Misc.(Non-Drug; Combo Route) route once daily. Upright Rollator       Start Date: 5/24/2022 End Date: --          No follow-ups on file. In addition to their scheduled follow up, the patient has also been instructed to follow up on as needed basis.

## 2023-06-16 NOTE — DISCHARGE INSTRUCTIONS
Continue with your normal post hemodialysis care take all your medicines as prescribed please return any further bleeding

## 2023-07-13 NOTE — FIRST PROVIDER EVALUATION
"Medical screening examination initiated.  I have conducted a focused provider triage encounter, findings are as follows:    Brief history of present illness:  EMS reports that patient has had dark blood in his stool and mild confusion. Patient was last dialyzed yesterday.     Vitals:    07/13/23 1807   BP: 122/69   Pulse: 87   Resp: 19   Temp: 98.6 °F (37 °C)   TempSrc: Oral   SpO2: 98%   Weight: 72.6 kg (160 lb)   Height: 5' 6" (1.676 m)       Pertinent physical exam:  Awake, alert    Brief workup plan:  Labs, Imaging    Preliminary workup initiated; this workup will be continued and followed by the physician or advanced practice provider that is assigned to the patient when roomed.  "

## 2023-07-14 NOTE — ED PROVIDER NOTES
Encounter Date: 7/13/2023    SCRIBE #1 NOTE: I, Andreia Naranjo, am scribing for, and in the presence of,  Abhilash Angel MD. I have scribed the following portions of the note - Other sections scribed: HPI, ROS, PE.     History     Chief Complaint   Patient presents with    GI Problem     Pt reports per [facility] with reports of AMS and dark blood in stool noted today per NH staff. -blood thinners. Usually GCS15 per NH staff. On dialysis. Last full run yesterday.     68 year old male with history of anemia, BPH, HTN, renovascular HTN, and ESRD on hemodialysis presents to the ED with complaints of dark stool. Pt also complains of lightheadedness and denies symptoms of weakness or pain.     The history is provided by the patient. No  was used.   Review of patient's allergies indicates:   Allergen Reactions    Iodine      Other reaction(s): difficulty breathing, Facial Swelling    Iodine and iodide containing products Swelling    Shellfish containing products      Other reaction(s): Swelling     Past Medical History:   Diagnosis Date    Anemia     BPH (benign prostatic hyperplasia)     Cervical radiculopathy 05/17/2022    Disorder of kidney and ureter     ESRD on hemodialysis 09/27/2018    Hypertension     Neck injury 1994    Other chronic pain 09/27/2018    Personal history of colonic polyps     Renovascular hypertension 09/27/2018    Vitamin D deficiency 05/17/2022     Past Surgical History:   Procedure Laterality Date    AV FISTULA PLACEMENT      COLONOSCOPY W/ POLYPECTOMY  02/19/2019    MASS EXCISION  2005    near the kidney     NECK SURGERY       Family History   Problem Relation Age of Onset    Heart disease Mother     Diabetes Mother     Hypertension Mother     Cancer Father     Prostate cancer Father     Hypertension Father     Cancer Sister     Breast cancer Sister     No Known Problems Sister      Social History     Tobacco Use    Smoking status: Former     Packs/day: 0.25     Years: 20.00      Pack years: 5.00     Types: Cigarettes     Quit date: 2016     Years since quittin.7    Smokeless tobacco: Never   Substance Use Topics    Alcohol use: No    Drug use: Not Currently     Types: Marijuana     Review of Systems   Constitutional:  Negative for chills and fever.   HENT:  Negative for drooling and sore throat.    Eyes:  Negative for pain and redness.   Respiratory:  Negative for shortness of breath, wheezing and stridor.    Cardiovascular:  Negative for chest pain, palpitations and leg swelling.   Gastrointestinal:  Positive for blood in stool. Negative for abdominal pain, nausea and vomiting.   Genitourinary:  Negative for dysuria and hematuria.   Musculoskeletal:  Negative for back pain, neck pain and neck stiffness.   Skin:  Negative for rash and wound.   Neurological:  Positive for light-headedness. Negative for weakness and numbness.   Hematological:  Does not bruise/bleed easily.     Physical Exam     Initial Vitals [23 1807]   BP Pulse Resp Temp SpO2   122/69 87 19 98.6 °F (37 °C) 98 %      MAP       --         Physical Exam    Nursing note and vitals reviewed.  Constitutional: He appears well-developed. He is not diaphoretic. No distress.   HENT:   Head: Normocephalic and atraumatic.   Nose: Nose normal.   Mouth/Throat: Oropharynx is clear and moist.   Eyes: Conjunctivae and EOM are normal. Pupils are equal, round, and reactive to light.   Cardiovascular:  Normal rate and regular rhythm.           No murmur heard.  Pulmonary/Chest: Breath sounds normal. No respiratory distress. He has no wheezes. He has no rales.   Abdominal: Abdomen is soft. He exhibits no distension. There is no abdominal tenderness.     Neurological: He is alert and oriented to person, place, and time. He has normal strength. No cranial nerve deficit.   Skin: Skin is warm. Capillary refill takes less than 2 seconds. No rash noted.       ED Course   Procedures  Labs Reviewed   COMPREHENSIVE METABOLIC PANEL -  Abnormal; Notable for the following components:       Result Value    Potassium Level 5.5 (*)     Chloride 95 (*)     Blood Urea Nitrogen 52.5 (*)     Creatinine 10.47 (*)     Protein Total 7.7 (*)     Globulin 4.0 (*)     Albumin/Globulin Ratio 0.9 (*)     All other components within normal limits   URINALYSIS, REFLEX TO URINE CULTURE - Abnormal; Notable for the following components:    Color, UA Red (*)     Appearance, UA Bloody (*)     All other components within normal limits    Narrative:     Unable to report biochemical test results due to abnormal urine color; microscopic analysis to follow   PROTIME-INR - Abnormal; Notable for the following components:    PT 16.1 (*)     INR 1.31 (*)     All other components within normal limits   CBC WITH DIFFERENTIAL - Abnormal; Notable for the following components:    RBC 3.24 (*)     Hgb 10.7 (*)     Hct 33.3 (*)     .8 (*)     MCH 33.0 (*)     MCHC 32.1 (*)     MPV 10.7 (*)     Lymph # 0.33 (*)     All other components within normal limits   URINALYSIS, MICROSCOPIC - Abnormal; Notable for the following components:    RBC, UA >100 (*)     All other components within normal limits   MAGNESIUM - Normal   LACTIC ACID, PLASMA - Normal   BLOOD CULTURE OLG   BLOOD CULTURE OLG   CBC W/ AUTO DIFFERENTIAL    Narrative:     The following orders were created for panel order CBC Auto Differential.  Procedure                               Abnormality         Status                     ---------                               -----------         ------                     CBC with Differential[[id number]]        Abnormal            Final result                 Please view results for these tests on the individual orders.   TYPE & SCREEN          Imaging Results              CT Abdomen Pelvis  Without Contrast (Preliminary result)  Result time 07/13/23 22:49:03      Preliminary result by Ashwin Thompson Jr., MD (07/13/23 22:49:03)                   Narrative:    START OF  REPORT:  TECHNIQUE: CT OF THE ABDOMEN AND PELVIS WAS PERFORMED WITH AXIAL IMAGES AS WELL AS SAGITTAL AND CORONAL RECONSTRUCTION IMAGES WITHOUT INTRAVENOUS CONTRAST OR ORAL CONTRAST.    COMPARISON: COMPARISON IS WITH STUDY [id number] 23:04:49.    CLINICAL HISTORY: UNDEFINEDTPT REPORTS PER [facility] WITH REPORTS OF AMS AND DARK BLOOD IN STOOL NOTED TODAY PER NH STAFF. -BLOOD THINNERS. USUALLY GCS15 PER NH STAFF. ON DIALYSIS. LAST FULL RUN YESTERDAY POOR KIDNEY, AMS - UNABLE TO FOLLOW COMMANDS.    DOSAGE INFORMATION: AUTOMATED EXPOSURE CONTROL WAS UTILIZED.    Findings:  Lines and Tubes: None.  Thorax:  Lungs: There is mild nonspecific dependent change at the lung bases.  Pleura: No pleural effusion is seen.  Heart: The heart size is within normal limits.  Abdomen:  Abdominal Wall: No abdominal wall pathology is seen.  Liver: The liver appears unremarkable.  Biliary System: No intrahepatic or extrahepatic biliary duct dilatation is seen.  Gallbladder: The gallbladder appears unremarkable.  Pancreas: The pancreas appears unremarkable.  Spleen: The spleen appears unremarkable.  Adrenals: The adrenal glands appear unremarkable.  Kidneys: Mild-to-moderate bilateral hydroureteronephrosis is seen with thinning of the overlying cortex. There is also mild wall thickening of bilateral renal pelves and ureters. Similar findings are also seen on the prior examination and may be related to chronic bladder outlet obstruction with an element of superimposed pyeloureteritis not excluded. Again noted are small bilateral renal cortical cysts. There is a 3 mm nonobstructing stone in the lower pole of the right kidney, unchanged since the prior examination. There is also atrophy of the lower pole of the right kidney.  Aorta: There is pronounced calcification of the abdominal aorta and its branches.  IVC: Unremarkable.  Bowel:  Esophagus: The visualized esophagus appears unremarkable.  Stomach: The stomach appears unremarkable.  Duodenum:  Unremarkable appearing duodenum.  Small Bowel: The small bowel appears unremarkable.  Colon: There is moderate stool in the colon which could reflect an element of constipation.  Appendix: The appendix appears unremarkable (series 2 image 69-74).  Peritoneum: No intraperitoneal free air or ascites is seen.    Pelvis:  Bladder: A nielsen catheter is in place. There is mild urinary bladder wall thickening. This is likely due to chronic bladder outlet obstruction. There is hyperdense material in the dependent urinary bladder, which may reflect blood clots.  Male:  Prostate gland: Again noted is markedly enlarged prostate with its median lobe projects into the bladder base.    Bony structures:  Dorsal Spine: There is mild to moderate spondylosis of the visualized dorsal spine.  Bony Pelvis: Focal sclerotic lesions are again seen in the left iliac and pubic bones (series 2 image 92 and 120). These are suspicious for sclerotic metastases.      Impression:  1. There is moderate stool in the colon which could reflect an element of constipation.  2. Mild-to-moderate bilateral hydroureteronephrosis is seen with thinning of the overlying cortex. Similar findings are also seen on the prior examination and may be related to chronic bladder outlet obstruction with an element of superimposed pyeloureteritis not excluded.  3. Again noted is markedly enlarged prostate with its median lobe projects into the bladder base.  4. There is mild urinary bladder wall thickening. This is likely due to chronic bladder outlet obstruction. There is hyperdense material in the dependent urinary bladder, which may reflect blood clots.  5. Focal sclerotic lesions are again seen in the left iliac and pubic bones (series 2 image 92 and 120). These are suspicious for sclerotic metastases.  6. Details and other findings as discussed above.                                         X-Ray Chest AP Portable (Final result)  Result time 07/13/23 22:33:09       Final result by Dipak Paredes MD (07/13/23 22:33:09)                   Impression:      NO ACUTE CARDIOPULMONARY PROCESS IDENTIFIED.      Electronically signed by: Dipak Paredes  Date:    07/13/2023  Time:    22:33               Narrative:    EXAMINATION:  XR CHEST AP PORTABLE    CLINICAL HISTORY:  Anemia, unspecified    TECHNIQUE:  One view    COMPARISON:  February 22, 2022.    FINDINGS:  Cardiopericardial silhouette is within normal limits. Lungs are without dense focal or segmental consolidation, congestive process, pleural effusions or pneumothorax.                                       CT Head Without Contrast (Final result)  Result time 07/13/23 18:36:08      Final result by Aaron Rosario MD (07/13/23 18:36:08)                   Impression:      Chronic age-related changes.  No acute process      Electronically signed by: Aaron Rosario  Date:    07/13/2023  Time:    18:36               Narrative:    EXAMINATION:  CT HEAD WITHOUT CONTRAST    CLINICAL HISTORY:  Mental status change, unknown cause;    TECHNIQUE:  Multiple axial images were obtained from the base of the brain to the vertex without contrast administration.  Sagittal and coronal reconstructions were performed..Automatic exposure control is utilized to reduce patient radiation exposure.    COMPARISON:  12/05/2021    FINDINGS:  There is no intracranial mass or lesion seen.  No hemorrhage is seen.  No acute infarct is seen.  There is old punctate lacunar infarct in the basal ganglia on the right.  There is some cerebral atrophy seen.  There is some compensatory ventricular dilatation and periventricular white matter changes consistent with the patient's age.  Calvarium is intact.  The posterior fossa is unremarkable..  The visualized portions of the paranasal sinuses show no acute abnormality.                                       Medications   pantoprazole injection 40 mg (40 mg Intravenous Given 7/13/23 8901)   lactated ringers bolus  500 mL (0 mLs Intravenous Stopped 7/14/23 0348)   morphine injection 4 mg (4 mg Intravenous Given 7/14/23 0245)   ondansetron injection 4 mg (4 mg Intravenous Given 7/14/23 0245)   piperacillin-tazobactam (ZOSYN) 4.5 g in dextrose 5 % in water (D5W) 5 % 100 mL IVPB (MB+) (0 g Intravenous Stopped 7/14/23 0406)   morphine 4 mg/mL injection (  Override Pull 7/14/23 0245)   ondansetron 4 mg/2 mL injection (  Override Pull 7/14/23 0245)   diphenhydrAMINE injection 25 mg (25 mg Intravenous Given 7/14/23 0335)                Attending Attestation:           Physician Attestation for Scribe:  Physician Attestation Statement for Scribe #1: I, Abhilash Angel MD, reviewed documentation, as scribed by Andreia Naranjo in my presence, and it is both accurate and complete.       Medical Decision Making      Differential diagnosis (includes but is not limited to):   Upper GI bleed, lower GI bleed, diverticulosis, diverticulitis, GERD, PUD, hematuria, urinary infection, complicated UTI, sepsis    MDM Narrative  68-year-old male presents for evaluation of melanotic stools noted by the nursing home staff.  They also report that he was more confused than normal as well.  Labs are pending.  EKG reviewed.  Continue pulse oximetry and telemetry monitoring.  Pain and nausea control as needed.  Telemetry monitor independently reviewed and shows a sinus rhythm with heart rate in the 70s.  Chest x-ray reviewed.  CT abdomen pelvis pending to rule out acute intra-abdominal pathology.  Urinalysis pending.    Update:  Labs reviewed, hemoglobin 10.7, WBCs normal, creatinine 10.5, BUN mildly elevated at 50, potassium mildly elevated 5.5.  EKG with no peaked T-waves.  CT reviewed.  CT head reviewed.  Patient with heart rate spike into the 150s well as significant lower abdominal pain, blood cultures lactic acid pending, antibiotics ordered.  Patient to be admitted for further evaluation and management given his persistent symptoms and multiple  risk factors and comorbidities.  Hospital Medicine consulted and has accepted the patient.    Dispo:  Admit    My independent radiology interpretation:  As above  Point of care US (independently performed and interpreted):   Decision rules/clinical scoring:     Amount and/or Complexity of Data Reviewed  Independent historian: EMS   Summary of history:  Report received from EMS  External data reviewed: notes from previous admissions, notes from previous ED visits, and notes from clinic visits  Summary of data reviewed:  Prior notes reviewed in the electronic medical record  Risk and benefits of testing: discussed   Labs: ordered and reviewed  Radiology: ordered and independent interpretation performed (see above or ED course)  ECG/medicine tests: ordered and independent interpretation performed (see above or ED course)  Discussion of management or test interpretation with external provider(s): discussed with hospitalist physician   Summary of discussion: as above    Risk  Parenteral controlled substances   Drug therapy requiring intense monitoring for toxicity   Decision regarding hospitalization  Shared decision making     Critical Care  none    Data Reviewed/Counseling: I have personally reviewed the patient's vital signs, nursing notes, and other relevant tests, information, and imaging. I had a detailed discussion regarding the historical points, exam findings, and any diagnostic results supporting the discharge diagnosis. I personally performed the history, PE, MDM and procedures as documented above and agree with the scribe's documentation.    Portions of this note were dictated using voice recognition software. Although it was reviewed for accuracy, some inherent voice recognition errors may have occurred and may be present in this document.        ED Course as of 07/14/23 0604   Thu Jul 13, 2023   8744 X-Ray Chest AP Portable  Independently visualized/reviewed by me during the ED visit.  - No lobar  consolidation, no PTX []   3853 EKG 12-lead  EKG independently interpreted by me.  EKG: SR @ 75, sinus arrhythmia, LVH, QTc 524 []      ED Course User Index  [] Abhilash Angel MD                 Clinical Impression:   Final diagnoses:  [D64.9] Symptomatic anemia  [R41.82] AMS (altered mental status)  [R00.0] Tachycardia  [K92.1] Melena        ED Disposition Condition    Admit Stable                Abhilash Angel MD  07/14/23 0604

## 2023-07-14 NOTE — ED NOTES
Esthela Narayanan, NP with nephrology, at bedside. States talking with Urology, and to place 3 way catheter at this time.

## 2023-07-14 NOTE — ED NOTES
3 way nielsen placed by Theresa BOLES. Catheter was inserted all the way, secured with saline balloon, no urinary output received at this time. Urology NP made aware.

## 2023-07-14 NOTE — PROGRESS NOTES
07/14/23 1712        Hemodialysis AV Fistula Right upper arm   No placement date or time found.   Present Prior to Hospital Arrival?: Yes  Hemodialysis Catheter Type: Tunneled catheter  Location: Right upper arm  Additional Comments: clotted, not in use   Site Assessment Clean;Dry;Intact;No redness;No swelling   Patency Present;Thrill;Bruit   Status Deaccessed   Site Condition No complications   Dressing Gauze   Post-Hemodialysis Assessment   Blood Volume Processed (Liters) 62 L   Dialyzer Clearance Lightly streaked   Duration of Treatment 180 minutes   Additional Fluid Intake (mL) 500 mL   Total UF (mL) 1500 mL   Net Fluid Removal 1000   Patient Response to Treatment tolerated treatment   Post-Hemodialysis Comments Tx complete, pt reinfused, avf deaccessed per p&p, hemostasis acheived, gauze and tape applied, pt arousable but not responding to questions or commands. BP wnl, HR elevated, Pt febrile, Narayanan Np aware, RRT called, NILESH Schmid Rn assumed care, pt brought to room

## 2023-07-14 NOTE — CODE/ RAPID DOCUMENTATION
Response Team - Patient Flow Sheet     Date: 2023  Time: 1600  Location: Primary Care  Name: Mirza Nelson Jr.  : 1955  MRN: [id number]  PCP: Elo Flores MD  Allergies:   Review of patient's allergies indicates:   Allergen Reactions    Iodine      Other reaction(s): difficulty breathing, Facial Swelling    Iodine and iodide containing products Swelling    Shellfish containing products      Other reaction(s): Swelling     Past Medical History:    Past Medical History:   Diagnosis Date    Anemia     BPH (benign prostatic hyperplasia)     Cervical radiculopathy 2022    Disorder of kidney and ureter     ESRD on hemodialysis 2018    Hypertension     Neck injury 1994    Other chronic pain 2018    Personal history of colonic polyps     Renovascular hypertension 2018    Vitamin D deficiency 2022       Reason for response team call:  Lethargy    Injury:  No    EKG performed: on monitor    Response Team Members: Diana Sharmaine, Bhavna Morrison    Called to dialysis for RRT; patient lethargic but responsive to painful stimuli; blood pressures 150/78 HR 120s NSR. Given 10 Percocet @ 1244; Patient gradually became more alert and was able to answer questions. Patient abdomen was found to be firm and painful to touch; manual bladder irrigation performed with dark, large clots removed. Patient noted to have temp of 102.0. Dr. Valdez was notified, as well as Kiki MOTLEY With urology and Esthela Narayanan with Renal; Okay to start continuous irrigation. Dr. Valdez at bedside, notified patient would be sent to room 865; MD stated she would input additional orders on the patient. Patient transported to 8W and handoff given to Disha the intake nurse. johanna Huggins, was at bedside attempting to manually irrigate additional clots from the 3 way catheter. The patient was left in stable condition with blood press 162/77, , and Spo2 96% on Room Air. Notified nurse to call RRT or ICU  team with any concerns.

## 2023-07-14 NOTE — H&P
[facility] Hospital Medicine History & Physical Examination       Patient Name: Mirza Nelson Jr.  MRN: [id number]  Patient Class: IP- Inpatient   Admission Date: 7/13/2023   Admitting Physician: Karina Gonzalez MD  Length of Stay: 0  Attending Physician: Karin Valdez MD  Primary Care Provider: Elo Flores MD  Face-to-Face encounter date: 07/14/2023  Code Status: Full code   Chief Complaint: GI Problem (Pt reports per aasi with reports of AMS and dark blood in stool noted today per NH staff. -blood thinners. Usually GCS15 per NH staff. On dialysis. Last full run yesterday.)        Patient information was obtained from patient, patient's family, past medical records and ER records.     HISTORY OF PRESENT ILLNESS:   Mirza Nelson Jr. is a 68 y.o. male with a past medical history of essential hypertension, ESRD on HD MWF, anemia, BPH, cervical radiculopathy, and vitamin-D deficiency presented to [facility] on 7/13/2023 for dark stools.  Nursing home reported they noticed melanotic stools yesterday and patient was more confused than normal.  On exam patient reports he has had dark stools for the past week with intermittent abdominal and back pain.  Patient denies chest pain, shortness of breath, fever, chills, nausea, vomiting, and diarrhea.  Initial vital signs in ED were /69, pulse 87, respirations 19, temperature 37° C, and SpO2 98% on room air.  Labs revealed WBC 6.32, RBC 3.24, hemoglobin 10.7, hematocrit 33.3, .8, potassium 5.5, chloride 95, BUN 52.5, creatinine 10.47, and lactic acid 1.1.  UA revealed greater than 100 red blood cells.  Blood culture was obtained.  CT head revealed chronic age-related changes without acute process.  Chest x-ray revealed no acute cardiopulmonary process identified.  CT abdomen and pelvis without contrast revealed moderate stool in the colon which could reflect an element of constipation, mild to moderate bilateral  hydroureteronephrosis with superimposed pyeloureteritis not excluded, enlarged prostate, hyperdense material in the dependent urinary bladder which may reflect blood clots, focal sclerotic lesions seen and left iliac and pubic bones suspicious for sclerotic metastases.  Three-way catheter was placed in ED with gross hematuria.  Patient was started on IV Zosyn.  Urology and nephrology were consulted. Patient was admitted to hospital medicine service for further medical management.     PAST MEDICAL HISTORY:   Essential hypertension  ESRD on HD MWF  Anemia   BPH  Cervical radiculopathy   Vitamin-D deficiency    PAST SURGICAL HISTORY:   AV fistula placement   Colonoscopy with polypectomy   Mass excision near kidney   Neck surgery    ALLERGIES:   Iodine, Iodine and iodide containing products, and Shellfish containing products    FAMILY HISTORY:   Reviewed and negative    SOCIAL HISTORY:   Denies tobacco, drug, and alcohol use    HOME MEDICATIONS:     Prior to Admission medications    Medication Sig Start Date End Date Taking? Authorizing Provider   amLODIPine (NORVASC) 10 MG tablet Take 1 tablet (10 mg total) by mouth once daily. 6/1/23 5/31/24  Elo Flores MD   augmented betamethasone (DIPROLENE) 0.05 % lotion Apply 0.05 application topically every Mon, Wed, Fri.    Historical Provider   busPIRone (BUSPAR) 10 MG tablet TAKE 1 TABLET(10 MG) BY MOUTH EVERY DAY 3/2/23   Elo Flores MD   carvediloL (COREG) 12.5 MG tablet TAKE 1 TABLET(12.5 MG) BY MOUTH TWICE DAILY 8/15/22   Braulio Hawthorne MD   doxazosin (CARDURA) 8 MG Tab Take 8 mg by mouth every evening.    Historical Provider   ferrous gluconate (FERGON) 324 MG tablet Take 1 tablet (324 mg total) by mouth once daily. 6/7/22   Elo Flores MD   flavoxATE (URISPAS) 100 mg Tab Take 200 mg by mouth 4 (four) times daily. 11/22/21   Historical Provider   fluticasone (FLONASE) 50 mcg/actuation nasal spray 1 spray by Each Nare route daily as  needed for Rhinitis.    Historical Provider   folic acid-vit B6-vit B12 2.5-25-2 mg (FOLBIC) 2.5-25-2 mg Tab Take 2.5 tablets by mouth Daily. 2/10/22   Historical Provider   hydrALAZINE (APRESOLINE) 50 MG tablet TAKE 1 TABLET(50 MG) BY MOUTH THREE TIMES DAILY 5/17/22 6/1/23  Braulio Hawthorne MD   HYDROcodone-acetaminophen (NORCO)  mg per tablet Take 1 tablet by mouth every 8 (eight) hours as needed for Pain. 11/4/22   Elo Flores MD   loratadine (CLARITIN) 10 mg tablet Take 1 tablet (10 mg total) by mouth once daily. 6/7/22   Elo Flores MD   megestroL (MEGACE) 400 mg/10 mL (40 mg/mL) Susp Take 40 mg by mouth Daily.    Historical Provider   naloxone (NARCAN) 4 mg/actuation Spry CALL 911. SPR CONTENTS OF ONE SPRAYER (0.1ML) INTO ONE NOSTRIL. REPEAT IN 2-3 MIN IF SYMPTOMS OF OPIOID EMERGENCY PERSIST, ALTERNATE NOSTRILS 12/3/22   Historical Provider   pantoprazole (PROTONIX) 40 MG tablet Take 40 mg by mouth once daily. 3/4/22   Historical Provider   sevelamer carbonate (RENVELA) 800 mg Tab Take 800 mg by mouth 3 (three) times daily.    Historical Provider   traZODone (DESYREL) 50 MG tablet Take 50 mg by mouth nightly. 1/5/22   Historical Provider   VELPHORO 500 mg Chew Take by mouth. 3/22/23   Historical Provider   walker Misc 1 each by Misc.(Non-Drug; Combo Route) route once daily. Upright Rollator 5/24/22   Elo Flores MD       REVIEW OF SYSTEMS:   Except as documented, all other systems reviewed and negative     PHYSICAL EXAM:     VITAL SIGNS: 24 HRS MIN & MAX LAST   Temp  Min: 98.6 °F (37 °C)  Max: 98.6 °F (37 °C) 98.6 °F (37 °C)   BP  Min: 122/69  Max: 189/88 (!) 189/88   Pulse  Min: 72  Max: 100  98   Resp  Min: 16  Max: 20 18   SpO2  Min: 94 %  Max: 98 % 95 %       General appearance:  Mild acute distress secondary to pain   HEENNT: Atraumatic head.   Lungs: Clear to auscultation bilaterally.   Heart: Regular rate and rhythm.    Abdomen:  Firm suprapubic area With  associated tenderness   Extremities: No cyanosis, clubbing, or edema. No deformities. RUE graft   Skin: No Rash. Warm and dry.   Neuro:  Awake, alert and seemed oriented at the time of morning evaluation.  No focal deficits    Psych/mental status: Appropriate mood and affect. Responds appropriately to questions.     LABS AND IMAGING:     Recent Labs   Lab 07/13/23 1934 07/14/23  0652   WBC 6.32 4.88   RBC 3.24* 2.85*   HGB 10.7* 9.4*   HCT 33.3* 29.0*   .8* 101.8*   MCH 33.0* 33.0*   MCHC 32.1* 32.4*   RDW 15.9 15.9    140   MPV 10.7* 11.7*       Recent Labs   Lab 07/13/23 1934 07/14/23  0756    136   K 5.5* 5.1   CO2 25 23   BUN 52.5* 57.9*   CREATININE 10.47* 11.43*   CALCIUM 9.1 8.6*   MG 2.40  --    ALBUMIN 3.7 3.0*   ALKPHOS 71 61   ALT 8 9   AST 12 14   BILITOT 0.8 0.7       Microbiology Results (last 7 days)       Procedure Component Value Units Date/Time    Blood Culture #1 **CANNOT BE ORDERED STAT** [[id number]] Collected: 07/14/23 0259    Order Status: Resulted Specimen: Blood from Arm, Left Updated: 07/14/23 0259    Blood Culture #2 **CANNOT BE ORDERED STAT** [[id number]] Collected: 07/14/23 0259    Order Status: Resulted Specimen: Blood from Hand, Left Updated: 07/14/23 0259             US Retroperitoneal Complete  Narrative: EXAMINATION:  US RETROPERITONEAL COMPLETE    CLINICAL HISTORY:  hematuria w clots;    TECHNIQUE:  Ultrasound evaluation of the kidneys, region of the ureters, and urinary bladder.    COMPARISON:  Ultrasound 01/06/2022    CT abdomen/pelvis 07/13/2023    FINDINGS:  Moderate bilateral hydronephrosis with bilateral cortical thinning.  No defined renal calcification.    10 cm echogenic area along the dependent bladder.  Some areas of internal blood flow.    Aortic calcifications.  No significant findings regarding the imaged IVC.    Measurements:    - Right kidney: 11.5 cm in length    - Left kidney: 14.4 cm in length  Impression: 1. 10 cm echogenic area along the  posterior bladder.  Difficult to tell how much of this is bladder hematoma versus enlarged prostate or bladder mass.  2. Moderate bilateral hydronephrosis.    Electronically signed by: Mervin Tyson  Date:    07/14/2023  Time:    08:54  CT Abdomen Pelvis  Without Contrast  Narrative: EXAMINATION:  CT ABDOMEN PELVIS WITHOUT CONTRAST    CLINICAL HISTORY:  Abdominal pain, acute, nonlocalized;    TECHNIQUE:  Multidetector non-contrast axial CT images of the abdomen and pelvis were obtained with coronal and sagittal reconstructions.    Automatic exposure control was utilized to reduce the patient's radiation dose.    DLP= 515    COMPARISON:  04/18/2023    FINDINGS:  Lines and Tubes: None.    Thorax:    Lungs: There is mild nonspecific dependent change at the lung bases.    Pleura: No pleural effusion is seen.    Heart: The heart size is within normal limits.    Abdomen:    Abdominal Wall: No abdominal wall pathology is seen.    Liver: The liver appears unremarkable.    Biliary System: No intrahepatic or extrahepatic biliary duct dilatation is seen.    Gallbladder: The gallbladder appears unremarkable.    Pancreas: The pancreas appears unremarkable.    Spleen: The spleen appears unremarkable.    Adrenals: The adrenal glands appear unremarkable.    Kidneys: Mild-to-moderate bilateral hydroureteronephrosis is seen with thinning of the overlying cortex. There is also mild wall thickening of bilateral renal pelves and ureters. Similar findings are also seen on the prior examination and may be related to chronic bladder outlet obstruction with an element of superimposed pyeloureteritis not excluded. Again noted are small bilateral renal cortical cysts. There is a 3 mm nonobstructing stone in the lower pole of the right kidney, unchanged since the prior examination. There is also atrophy of the lower pole of the right kidney.    Aorta: There is pronounced calcification of the abdominal aorta and its branches.    IVC:  Unremarkable.    Bowel:    Esophagus: The visualized esophagus appears unremarkable.    Stomach: The stomach appears unremarkable.    Duodenum: Unremarkable appearing duodenum.    Small Bowel: The small bowel appears unremarkable.    Colon: There is moderate stool in the colon which could reflect an element of constipation.    Appendix: The appendix appears unremarkable (series 2 image 69-74).    Peritoneum: No intraperitoneal free air or ascites is seen.    Pelvis:    Bladder: A nielsen catheter is in place. There is mild urinary bladder wall thickening. This is likely due to chronic bladder outlet obstruction. There is hyperdense material in the dependent urinary bladder, which may reflect blood clots.    Male:    Prostate gland: Again noted is markedly enlarged prostate with its median lobe projects into the bladder base.    Bony structures:    Dorsal Spine: There is mild to moderate spondylosis of the visualized dorsal spine.    Bony Pelvis: Focal sclerotic lesions are again seen in the left iliac and pubic bones (series 2 image 92 and 120). These are suspicious for sclerotic metastases.  Impression: Impression:    1. There is moderate stool in the colon which could reflect an element of constipation.    2. Mild-to-moderate bilateral hydroureteronephrosis is seen with thinning of the overlying cortex. Similar findings are also seen on the prior examination and may be related to chronic bladder outlet obstruction with an element of superimposed pyeloureteritis not excluded.    3. Again noted is markedly enlarged prostate with its median lobe projects into the bladder base.    4. There is mild urinary bladder wall thickening. This is likely due to chronic bladder outlet obstruction. There is hyperdense material in the dependent urinary bladder, which may reflect blood clots.    5. Focal sclerotic lesions are again seen in the left iliac and pubic bones (series 2 image 92 and 120). These are suspicious for sclerotic  metastases.    6. Details and other findings as discussed above.    Agree with overnight read.    Electronically signed by: Igor Ferguson  Date:    07/14/2023  Time:    08:15        ASSESSMENT & PLAN:   Assessment:  Hematuria   Bilateral hydroureteronephrosis with with superimposed pyeloureteritis not excluded  Melena   Focal sclerotic lesions in left iliac and pubic bone suspicious for sclerotic metastases  ESRD on HD MWF  History of essential hypertension,  anemia, BPH, cervical radiculopathy, and vitamin-D deficiency    Plan:  IV Cefepime 1 g daily   Blood cultures pending, follow-up results   Urology consulted, appreciate recommendations  GI consulted, appreciate recommendations  Close H&H monitoring   IV Protonix 40 mg BID  Dialysis today to continue MWF schedule  Nephrology consulted, appreciate recommendations  Continue appropriate home medications once med rec updated   Labs in a.m.    VTE Prophylaxis:  SCDs    __________________________________________________________________________  INPATIENT LIST OF MEDICATIONS     Scheduled Meds:   sodium chloride 0.9%   Intravenous Once    LIDOcaine HCl 2%   Mucous Membrane ED 1 Time    mupirocin   Nasal BID    piperacillin-tazobactam (Zosyn) IV (PEDS and ADULTS) (extended infusion is not appropriate)  4.5 g Intravenous Q8H     Continuous Infusions:  PRN Meds:.sodium chloride 0.9%, sodium chloride 0.9%      IFarhan PA-C, have reviewed and discussed the case with Dr. Karin Valdez MD  Please see the following addendum for further assessment and plan from there attending MD.    07/14/2023    ________________________________________________________________________________    MD Addendum:  I Dr. Valdez  assumed care of this patient today at around 11:00 a.m.  For the patient encounter, I performed the substantive portion of the visit, I reviewed the PA documentation, treatment plan, and medical decision making.  I had face to face time with this patient     A.  History:  68-year-old male with significant history of HTN, ESRD on HD-MWF schedule, anemia of chronic disease, BPH, cervical radiculopathy, vitamin-D deficiency presented to the ED with complaints of melanotic stools, confusion.  Patient has BPH and has chronic indwelling Cheek paid patient also was noted to have hematuria draining from Cheek on arrival paid also complained of significant suprapubic pain, unable to hand irrigate catheter.  Three-way Cheek was placed in the ED. CT abdomen/pelvis consistent with possible constipation, mild to moderate bilateral hydroureteronephrosis, enlarged prostate, concern for bladder outlet obstruction with superimposed pyelo ureteritis.  Patient also noted to have focal sclerotic lesions in iliac, pubic bones concerning for sclerotic metastasis.  Hospital Medicine, Urology, GI services consulted.  Hemoglobin more than 9.  Nephrology consulted and the patient was dialyzed on 07/14.      B. Physical exam:  As above    C. Medical decision making:    Gross hematuria with large clots  Acute on chronic obstructive uropathy with bilateral hydroureteronephrosis  Suspected bladder outlet obstruction  Acute pyelo ureteritis  GNR bacteremia secondary to above  BPH leading to urinary obstruction  Sclerotic bone lesions-iliac/pubic-rule out metastatic malignancy, possible prostate primary  Acute GI bleed/melanotic stools  Acute on chronic normocytic anemia/possible acute blood loss anemia  ESRD on HD  Essential HTN-stable   Cervical radiculopathy   History of vitamin-D deficiency  Prophylaxis    Evaluated by Urology in the morning   Recommended to hold off on CBI   Recommended p.r.n. hand irrigation  Initiated cefepime for pyelo ureteritis   Await urine and blood cultures   Preliminary blood cultures-GNR  Patient has moderate to severe suprapubic pain most likely secondary to urinary retention/clot retention   Continue to follow Urology recommendations   Symptomatic management for pain    Sclerotic bone lesions concerning for metastatic malignancy   Could be possible prostate primary   Check PSA   CT chest to rule out other source of primary malignancy   I will also obtain a bone scan to look for similar bone lesions elsewhere  GI consulted for melanotic stools   IV PPI 40 mg b.i.d.  Clear liquid diet, NPO after midnight for possible endoscopic evaluation  Nephrology consulted, dialyzed today  Resumed home meds-amlodipine, Coreg, doxazosin, iron, Renvela  DVT prophylaxis-bilateral SCDs, no chemical prophylaxis given hematuria/GI bleed          All diagnosis and differential diagnosis have been reviewed; assessment and plan has been documented; I have personally reviewed the labs and test results that are presently available; I have reviewed the patients medication list; I have reviewed the consulting providers response and recommendations. I have reviewed or attempted to review medical records based upon their availability.    All of the patient and family questions have been addressed and answered. Patient's is agreeable to the above stated plan. I will continue to monitor closely and make adjustments to medical management as needed.      07/14/2023     Update 4:45 p.m.    I was notified by the RRT nurse that the patient is significantly tachycardic in 140s and drowsy in hemodialysis  Went immediately evaluated the patient in hemodialysis unit  Blood pressure stable   Patient is in sinus tach, 120s at the time of my evaluation   Drowsy, but arousable  Complaining of intractable pain in the suprapubic region   RRT team had already contacted Urology and they recommended CBI,  Plan to initiate CBI once he gets on 8th floor  I ordered stat x-ray KUB, repeat ultrasound retroperitoneum  P.r.n. metoprolol for tachycardia  CT head was negative for any acute changes on admit    Update 5:30 p.m.  I was notified by [facility] nurse about the patient  Patient is still drowsy, stat ABG ordered  Stat CBC/CMP was  also ordered  Still complaining of pain   P.r.n. morphine, [place] in place   Urology will come evaluate the patient soon, recommended to hold off on CBI until they evaluate  Patient will benefit from CBI   Concern for urinary retention/clot retention causing suprapubic firmness/tenderness  Await Urology evaluation  Keep NPO given drowsiness

## 2023-07-14 NOTE — NURSING
"Admission questions partially complete. Pt states "I am in too much pain to focus right now." Will attempt to finish remaining questions after pain relief has been achieved.   "

## 2023-07-14 NOTE — CONSULTS
Mirza Nelson . 1955  [id number]  7/14/2023    CONSULTING PHYSICIAN: Esthela Narayanan NP    REASON FOR CONSULTATION: bladder mass, gross hematuria, bladder distention with nielsen    HPI:  The patient is a 68-year-old male who is known to Dr. Navarrete with chronic indwelling Nielsen and BPH.  Additional past medical history includes hypertension, ESRD on HD MWF.  He presented to the emergency room today with complaints of bloody stools and confusion.  He reports bloody stools for the past week with intermittent abdominal and back pain.  He was noted to have hematuria draining from Nielsen that was in place on arrival.  He was having significant suprapubic pain and nursing was unable to hand irrigate catheter.  Three-way Nielsen was subsequently placed with plans for CBI due to hematuria with clot retention.  CT abdomen and pelvis reveal mild-to-moderate bilateral hydroureteronephrosis unchanged compared to previous exam likely secondary to chronic bladder outlet obstruction, markedly enlarged prostate with median lobe projecting into bladder base, mild urinary bladder wall thickening with hyperdense material in dependent urinary bladder possibly reflecting clots vs prostate tissue extending into bladder, moderate stool burden.  Lab work on arrival reveals WBC 6.3, H&H 10.7/33.3, BUN and creatinine 52.5/10.47.  UA with bloody urine, no WBC or bacteria. BC are pending.    Dr. Crow at bedside during rounds. Reports he does not make much urine at baseline. Patient continues with abdominal pain. He states his suprapubic area is always firm, but does not usually have lower abdominal pain. He denies fever or chills prior to arrival. He follows with Dr. Navarrete for chronic indwelling nielsen and catheter is exchanged every 3 weeks.     Past Medical History:   Diagnosis Date    Anemia     BPH (benign prostatic hyperplasia)     Cervical radiculopathy 05/17/2022    Disorder of kidney and ureter     ESRD on hemodialysis 09/27/2018     Hypertension     Neck injury 1994    Other chronic pain 2018    Personal history of colonic polyps     Renovascular hypertension 2018    Vitamin D deficiency 2022     Past Surgical History:   Procedure Laterality Date    AV FISTULA PLACEMENT      COLONOSCOPY W/ POLYPECTOMY  2019    MASS EXCISION  2005    near the kidney     NECK SURGERY       Family History   Problem Relation Age of Onset    Heart disease Mother     Diabetes Mother     Hypertension Mother     Cancer Father     Prostate cancer Father     Hypertension Father     Cancer Sister     Breast cancer Sister     No Known Problems Sister      Social History     Tobacco Use    Smoking status: Former     Packs/day: 0.25     Years: 20.00     Pack years: 5.00     Types: Cigarettes     Quit date: 2016     Years since quittin.7    Smokeless tobacco: Never   Substance Use Topics    Alcohol use: No    Drug use: Not Currently     Types: Marijuana     Current Facility-Administered Medications   Medication Dose Route Frequency Provider Last Rate Last Admin    0.9%  NaCl infusion   Intravenous PRN KARLOS Preston        0.9%  NaCl infusion   Intravenous Once KARLOS [place] 100 mL/hr at 23 0952 New Bag at 23 0952    ceFEPIme (MAXIPIME) 1 g in dextrose 5 % in water (D5W) 5 % 100 mL IVPB (MB+)  1 g Intravenous Daily Farhan Wood PA-C        labetaloL injection 10 mg  10 mg Intravenous Q4H PRN Karin Valdez MD   10 mg at 23 1145    LIDOcaine HCl 2% urojet   Mucous Membrane ED 1 Time KARLOS Preston        morphine injection 2 mg  2 mg Intravenous Q4H PRN Karin Valdez MD   2 mg at 23 1012    mupirocin 2 % ointment   Nasal BID Karina Gonzalez MD        oxyCODONE-acetaminophen  mg per tablet 1 tablet  1 tablet Oral Q4H PRN Karin Valdez MD        pantoprazole injection 40 mg  40 mg Intravenous BID Farhan Wood PA-C   40 mg at 23 1012    sodium chloride 0.9% bolus 250 mL 250 mL  250  mL Intravenous PRN KARLOS Preston         Current Outpatient Medications   Medication Sig Dispense Refill    amLODIPine (NORVASC) 10 MG tablet Take 1 tablet (10 mg total) by mouth once daily. 90 tablet 3    busPIRone (BUSPAR) 10 MG tablet TAKE 1 TABLET(10 MG) BY MOUTH EVERY DAY 30 tablet 3    carvediloL (COREG) 12.5 MG tablet TAKE 1 TABLET(12.5 MG) BY MOUTH TWICE DAILY 180 tablet 3    doxazosin (CARDURA) 8 MG Tab Take 8 mg by mouth every evening.      ferrous gluconate (FERGON) 324 MG tablet Take 1 tablet (324 mg total) by mouth once daily. 30 tablet 3    flavoxATE (URISPAS) 100 mg Tab Take 200 mg by mouth 4 (four) times daily.      fluticasone (FLONASE) 50 mcg/actuation nasal spray 1 spray by Each Nare route daily as needed for Rhinitis.      folic acid-vit B6-vit B12 2.5-25-2 mg (FOLBIC) 2.5-25-2 mg Tab Take 2.5 tablets by mouth Daily.      loratadine (CLARITIN) 10 mg tablet Take 1 tablet (10 mg total) by mouth once daily. 30 tablet 3    megestroL (MEGACE) 400 mg/10 mL (40 mg/mL) Susp Take 40 mg by mouth Daily.      pantoprazole (PROTONIX) 40 MG tablet Take 40 mg by mouth once daily.      sevelamer carbonate (RENVELA) 800 mg Tab Take 800 mg by mouth 3 (three) times daily.      traZODone (DESYREL) 50 MG tablet Take 50 mg by mouth nightly.      VELPHORO 500 mg Chew Take by mouth.      augmented betamethasone (DIPROLENE) 0.05 % lotion Apply 0.05 application topically every Mon, Wed, Fri.      hydrALAZINE (APRESOLINE) 50 MG tablet TAKE 1 TABLET(50 MG) BY MOUTH THREE TIMES DAILY 270 tablet 3    HYDROcodone-acetaminophen (NORCO)  mg per tablet Take 1 tablet by mouth every 8 (eight) hours as needed for Pain. (Patient not taking: Reported on 7/14/2023) 90 tablet 0    naloxone (NARCAN) 4 mg/actuation Spry CALL 911. SPR CONTENTS OF ONE SPRAYER (0.1ML) INTO ONE NOSTRIL. REPEAT IN 2-3 MIN IF SYMPTOMS OF OPIOID EMERGENCY PERSIST, ALTERNATE NOSTRILS      walker Misc 1 each by Misc.(Non-Drug; Combo Route) route once  daily. Upright Rollator 1 each 0     Review of patient's allergies indicates:   Allergen Reactions    Iodine      Other reaction(s): difficulty breathing, Facial Swelling    Iodine and iodide containing products Swelling    Shellfish containing products      Other reaction(s): Swelling       ROS: 12 point review of systems negative other than the HPI    PHYSICAL EXAM:  Vitals:    07/14/23 0757 07/14/23 1007 07/14/23 1012 07/14/23 1145   BP: (!) 189/88 (!) 192/91  (!) 190/96   Pulse: 98 100     Resp: 18 (!) 21 18    Temp:       TempSrc:       SpO2: 95% 97%     Weight:       Height:         No intake or output data in the 24 hours ending 07/14/23 1228    GEN: WN/WD NAD  HEENT: normocephalic, atraumatic, PERRLA, EOMI, OP clear, nares patent  CV: RRR  RESP: Even and unlabored  ABD: abdomen is soft except for suprapubic area which is firm and tender  : Bloody urine in  tubing, minimal output. 3-way nielsen balloon deflated and catheter inserted to the hub, balloon re-inflated; hand irrigation remains difficult and painful. Bloody urine slowly draining from nielsen passively.   EXT: no C/C/E  NEURO: no focal deficits, MAEW, AAOx4    LABS:  Recent Results (from the past 24 hour(s))   Comprehensive Metabolic Panel    Collection Time: 07/13/23  7:34 PM   Result Value Ref Range    Sodium Level 136 136 - 145 mmol/L    Potassium Level 5.5 (H) 3.5 - 5.1 mmol/L    Chloride 95 (L) 98 - 107 mmol/L    Carbon Dioxide 25 23 - 31 mmol/L    Glucose Level 96 82 - 115 mg/dL    Blood Urea Nitrogen 52.5 (H) 8.4 - 25.7 mg/dL    Creatinine 10.47 (H) 0.73 - 1.18 mg/dL    Calcium Level Total 9.1 8.8 - 10.0 mg/dL    Protein Total 7.7 (H) 5.8 - 7.6 gm/dL    Albumin Level 3.7 3.4 - 4.8 g/dL    Globulin 4.0 (H) 2.4 - 3.5 gm/dL    Albumin/Globulin Ratio 0.9 (L) 1.1 - 2.0 ratio    Bilirubin Total 0.8 <=1.5 mg/dL    Alkaline Phosphatase 71 40 - 150 unit/L    Alanine Aminotransferase 8 0 - 55 unit/L    Aspartate Aminotransferase 12 5 - 34 unit/L     eGFR 5 mls/min/1.73/m2   Protime-INR    Collection Time: 07/13/23  7:34 PM   Result Value Ref Range    PT 16.1 (H) 12.5 - 14.5 seconds    INR 1.31 (H) 0.00 - 1.30   CBC with Differential    Collection Time: 07/13/23  7:34 PM   Result Value Ref Range    WBC 6.32 4.50 - 11.50 x10(3)/mcL    RBC 3.24 (L) 4.70 - 6.10 x10(6)/mcL    Hgb 10.7 (L) 14.0 - 18.0 g/dL    Hct 33.3 (L) 42.0 - 52.0 %    .8 (H) 80.0 - 94.0 fL    MCH 33.0 (H) 27.0 - 31.0 pg    MCHC 32.1 (L) 33.0 - 36.0 g/dL    RDW 15.9 11.5 - 17.0 %    Platelet 138 130 - 400 x10(3)/mcL    MPV 10.7 (H) 7.4 - 10.4 fL    Neut % 85.0 %    Lymph % 5.2 %    Mono % 9.0 %    Eos % 0.0 %    Basophil % 0.3 %    Lymph # 0.33 (L) 0.6 - 4.6 x10(3)/mcL    Neut # 5.37 2.1 - 9.2 x10(3)/mcL    Mono # 0.57 0.1 - 1.3 x10(3)/mcL    Eos # 0.00 0 - 0.9 x10(3)/mcL    Baso # 0.02 <=0.2 x10(3)/mcL    IG# 0.03 0 - 0.04 x10(3)/mcL    IG% 0.5 %    NRBC% 0.0 %   Magnesium    Collection Time: 07/13/23  7:34 PM   Result Value Ref Range    Magnesium Level 2.40 1.60 - 2.60 mg/dL   Type & Screen    Collection Time: 07/13/23 11:43 PM   Result Value Ref Range    Group & Rh O POS     Indirect Leonel GEL NEG     Specimen Outdate 07/16/2023 23:59    Lactic acid, plasma    Collection Time: 07/14/23  2:59 AM   Result Value Ref Range    Lactic Acid Level 1.1 0.5 - 2.2 mmol/L   Urinalysis, Reflex to Urine Culture    Collection Time: 07/14/23  3:06 AM    Specimen: Urine   Result Value Ref Range    Color, UA Red (A) Yellow, Light-Yellow, Dark Yellow, Kiki, Straw    Appearance, UA Bloody (A) Clear    Specific Gravity, UA 1.020 1.005 - 1.030    pH, UA 8.0 5.0 - 8.5    Protein, UA      Glucose, UA      Ketones, UA      Blood, UA      Bilirubin, UA      Urobilinogen, UA      Nitrites, UA      Leukocyte Esterase, UA     Urinalysis, Microscopic    Collection Time: 07/14/23  3:06 AM   Result Value Ref Range    RBC, UA >100 (A) None Seen, 0-2, 3-5, 0-5 /HPF    WBC, UA 0-2 None Seen, 0-2, 3-5, 0-5 /HPF     Squamous Epithelial Cells, UA None Seen 0-4, None Seen /HPF    Bacteria, UA None Seen None Seen, Rare, Occasional /HPF   CBC with Differential    Collection Time: 07/14/23  6:52 AM   Result Value Ref Range    WBC 4.88 4.50 - 11.50 x10(3)/mcL    RBC 2.85 (L) 4.70 - 6.10 x10(6)/mcL    Hgb 9.4 (L) 14.0 - 18.0 g/dL    Hct 29.0 (L) 42.0 - 52.0 %    .8 (H) 80.0 - 94.0 fL    MCH 33.0 (H) 27.0 - 31.0 pg    MCHC 32.4 (L) 33.0 - 36.0 g/dL    RDW 15.9 11.5 - 17.0 %    Platelet 140 130 - 400 x10(3)/mcL    MPV 11.7 (H) 7.4 - 10.4 fL    Neut % 80.7 %    Lymph % 7.0 %    Mono % 11.3 %    Eos % 0.4 %    Basophil % 0.2 %    Lymph # 0.34 (L) 0.6 - 4.6 x10(3)/mcL    Neut # 3.94 2.1 - 9.2 x10(3)/mcL    Mono # 0.55 0.1 - 1.3 x10(3)/mcL    Eos # 0.02 0 - 0.9 x10(3)/mcL    Baso # 0.01 <=0.2 x10(3)/mcL    IG# 0.02 0 - 0.04 x10(3)/mcL    IG% 0.4 %    NRBC% 0.0 %   Comprehensive Metabolic Panel    Collection Time: 07/14/23  7:56 AM   Result Value Ref Range    Sodium Level 136 136 - 145 mmol/L    Potassium Level 5.1 3.5 - 5.1 mmol/L    Chloride 97 (L) 98 - 107 mmol/L    Carbon Dioxide 23 23 - 31 mmol/L    Glucose Level 89 82 - 115 mg/dL    Blood Urea Nitrogen 57.9 (H) 8.4 - 25.7 mg/dL    Creatinine 11.43 (H) 0.73 - 1.18 mg/dL    Calcium Level Total 8.6 (L) 8.8 - 10.0 mg/dL    Protein Total 6.9 5.8 - 7.6 gm/dL    Albumin Level 3.0 (L) 3.4 - 4.8 g/dL    Globulin 3.9 (H) 2.4 - 3.5 gm/dL    Albumin/Globulin Ratio 0.8 (L) 1.1 - 2.0 ratio    Bilirubin Total 0.7 <=1.5 mg/dL    Alkaline Phosphatase 61 40 - 150 unit/L    Alanine Aminotransferase 9 0 - 55 unit/L    Aspartate Aminotransferase 14 5 - 34 unit/L    eGFR 4 mls/min/1.73/m2   Phosphorus    Collection Time: 07/14/23  7:56 AM   Result Value Ref Range    Phosphorus Level 6.6 (H) 2.3 - 4.7 mg/dL       IMAGING:  Imaging Results              US Retroperitoneal Complete (Final result)  Result time 07/14/23 08:54:57      Final result by Mervin Tyson MD (07/14/23 08:54:57)                    Impression:      1. 10 cm echogenic area along the posterior bladder.  Difficult to tell how much of this is bladder hematoma versus enlarged prostate or bladder mass.  2. Moderate bilateral hydronephrosis.      Electronically signed by: Mervinmaximus Tyson  Date:    07/14/2023  Time:    08:54               Narrative:    EXAMINATION:  US RETROPERITONEAL COMPLETE    CLINICAL HISTORY:  hematuria w clots;    TECHNIQUE:  Ultrasound evaluation of the kidneys, region of the ureters, and urinary bladder.    COMPARISON:  Ultrasound 01/06/2022    CT abdomen/pelvis 07/13/2023    FINDINGS:  Moderate bilateral hydronephrosis with bilateral cortical thinning.  No defined renal calcification.    10 cm echogenic area along the dependent bladder.  Some areas of internal blood flow.    Aortic calcifications.  No significant findings regarding the imaged IVC.    Measurements:    - Right kidney: 11.5 cm in length    - Left kidney: 14.4 cm in length                                       CT Abdomen Pelvis  Without Contrast (Final result)  Result time 07/14/23 08:15:07      Final result by Igor Ferguson MD (07/14/23 08:15:07)                   Impression:    Impression:    1. There is moderate stool in the colon which could reflect an element of constipation.    2. Mild-to-moderate bilateral hydroureteronephrosis is seen with thinning of the overlying cortex. Similar findings are also seen on the prior examination and may be related to chronic bladder outlet obstruction with an element of superimposed pyeloureteritis not excluded.    3. Again noted is markedly enlarged prostate with its median lobe projects into the bladder base.    4. There is mild urinary bladder wall thickening. This is likely due to chronic bladder outlet obstruction. There is hyperdense material in the dependent urinary bladder, which may reflect blood clots.    5. Focal sclerotic lesions are again seen in the left iliac and pubic bones (series 2 image 92 and 120).  These are suspicious for sclerotic metastases.    6. Details and other findings as discussed above.    Agree with overnight read.      Electronically signed by: Igor Ferguson  Date:    07/14/2023  Time:    08:15               Narrative:    EXAMINATION:  CT ABDOMEN PELVIS WITHOUT CONTRAST    CLINICAL HISTORY:  Abdominal pain, acute, nonlocalized;    TECHNIQUE:  Multidetector non-contrast axial CT images of the abdomen and pelvis were obtained with coronal and sagittal reconstructions.    Automatic exposure control was utilized to reduce the patient's radiation dose.    DLP= 515    COMPARISON:  04/18/2023    FINDINGS:  Lines and Tubes: None.    Thorax:    Lungs: There is mild nonspecific dependent change at the lung bases.    Pleura: No pleural effusion is seen.    Heart: The heart size is within normal limits.    Abdomen:    Abdominal Wall: No abdominal wall pathology is seen.    Liver: The liver appears unremarkable.    Biliary System: No intrahepatic or extrahepatic biliary duct dilatation is seen.    Gallbladder: The gallbladder appears unremarkable.    Pancreas: The pancreas appears unremarkable.    Spleen: The spleen appears unremarkable.    Adrenals: The adrenal glands appear unremarkable.    Kidneys: Mild-to-moderate bilateral hydroureteronephrosis is seen with thinning of the overlying cortex. There is also mild wall thickening of bilateral renal pelves and ureters. Similar findings are also seen on the prior examination and may be related to chronic bladder outlet obstruction with an element of superimposed pyeloureteritis not excluded. Again noted are small bilateral renal cortical cysts. There is a 3 mm nonobstructing stone in the lower pole of the right kidney, unchanged since the prior examination. There is also atrophy of the lower pole of the right kidney.    Aorta: There is pronounced calcification of the abdominal aorta and its branches.    IVC: Unremarkable.    Bowel:    Esophagus: The visualized  esophagus appears unremarkable.    Stomach: The stomach appears unremarkable.    Duodenum: Unremarkable appearing duodenum.    Small Bowel: The small bowel appears unremarkable.    Colon: There is moderate stool in the colon which could reflect an element of constipation.    Appendix: The appendix appears unremarkable (series 2 image 69-74).    Peritoneum: No intraperitoneal free air or ascites is seen.    Pelvis:    Bladder: A nielsen catheter is in place. There is mild urinary bladder wall thickening. This is likely due to chronic bladder outlet obstruction. There is hyperdense material in the dependent urinary bladder, which may reflect blood clots.    Male:    Prostate gland: Again noted is markedly enlarged prostate with its median lobe projects into the bladder base.    Bony structures:    Dorsal Spine: There is mild to moderate spondylosis of the visualized dorsal spine.    Bony Pelvis: Focal sclerotic lesions are again seen in the left iliac and pubic bones (series 2 image 92 and 120). These are suspicious for sclerotic metastases.                        Preliminary result by Igor Ferguson MD (07/13/23 22:49:03)                   Narrative:    START OF REPORT:  TECHNIQUE: CT OF THE ABDOMEN AND PELVIS WAS PERFORMED WITH AXIAL IMAGES AS WELL AS SAGITTAL AND CORONAL RECONSTRUCTION IMAGES WITHOUT INTRAVENOUS CONTRAST OR ORAL CONTRAST.    COMPARISON: COMPARISON IS WITH STUDY [id number] 23:04:49.    CLINICAL HISTORY: UNDEFINEDTPT REPORTS PER [facility] WITH REPORTS OF AMS AND DARK BLOOD IN STOOL NOTED TODAY PER [facility] STAFF. -BLOOD THINNERS. USUALLY G[facility]15 PER [facility] STAFF. ON DIALYSIS. LAST FULL RUN YESTERDAY POOR KIDNEY, AMS - UNABLE TO FOLLOW COMMANDS.    DOSAGE INFORMATION: AUTOMATED EXPOSURE CONTROL WAS UTILIZED.    Findings:  Lines and Tubes: None.  Thorax:  Lungs: There is mild nonspecific dependent change at the lung bases.  Pleura: No pleural effusion is seen.  Heart: The heart size is within normal  limits.  Abdomen:  Abdominal Wall: No abdominal wall pathology is seen.  Liver: The liver appears unremarkable.  Biliary System: No intrahepatic or extrahepatic biliary duct dilatation is seen.  Gallbladder: The gallbladder appears unremarkable.  Pancreas: The pancreas appears unremarkable.  Spleen: The spleen appears unremarkable.  Adrenals: The adrenal glands appear unremarkable.  Kidneys: Mild-to-moderate bilateral hydroureteronephrosis is seen with thinning of the overlying cortex. There is also mild wall thickening of bilateral renal pelves and ureters. Similar findings are also seen on the prior examination and may be related to chronic bladder outlet obstruction with an element of superimposed pyeloureteritis not excluded. Again noted are small bilateral renal cortical cysts. There is a 3 mm nonobstructing stone in the lower pole of the right kidney, unchanged since the prior examination. There is also atrophy of the lower pole of the right kidney.  Aorta: There is pronounced calcification of the abdominal aorta and its branches.  IVC: Unremarkable.  Bowel:  Esophagus: The visualized esophagus appears unremarkable.  Stomach: The stomach appears unremarkable.  Duodenum: Unremarkable appearing duodenum.  Small Bowel: The small bowel appears unremarkable.  Colon: There is moderate stool in the colon which could reflect an element of constipation.  Appendix: The appendix appears unremarkable (series 2 image 69-74).  Peritoneum: No intraperitoneal free air or ascites is seen.    Pelvis:  Bladder: A nielsen catheter is in place. There is mild urinary bladder wall thickening. This is likely due to chronic bladder outlet obstruction. There is hyperdense material in the dependent urinary bladder, which may reflect blood clots.  Male:  Prostate gland: Again noted is markedly enlarged prostate with its median lobe projects into the bladder base.    Bony structures:  Dorsal Spine: There is mild to moderate spondylosis of  the visualized dorsal spine.  Bony Pelvis: Focal sclerotic lesions are again seen in the left iliac and pubic bones (series 2 image 92 and 120). These are suspicious for sclerotic metastases.      Impression:  1. There is moderate stool in the colon which could reflect an element of constipation.  2. Mild-to-moderate bilateral hydroureteronephrosis is seen with thinning of the overlying cortex. Similar findings are also seen on the prior examination and may be related to chronic bladder outlet obstruction with an element of superimposed pyeloureteritis not excluded.  3. Again noted is markedly enlarged prostate with its median lobe projects into the bladder base.  4. There is mild urinary bladder wall thickening. This is likely due to chronic bladder outlet obstruction. There is hyperdense material in the dependent urinary bladder, which may reflect blood clots.  5. Focal sclerotic lesions are again seen in the left iliac and pubic bones (series 2 image 92 and 120). These are suspicious for sclerotic metastases.  6. Details and other findings as discussed above.                                         X-Ray Chest AP Portable (Final result)  Result time 07/13/23 22:33:09      Final result by Dipak Paredes MD (07/13/23 22:33:09)                   Impression:      NO ACUTE CARDIOPULMONARY PROCESS IDENTIFIED.      Electronically signed by: Dipak Paredes  Date:    07/13/2023  Time:    22:33               Narrative:    EXAMINATION:  XR CHEST AP PORTABLE    CLINICAL HISTORY:  Anemia, unspecified    TECHNIQUE:  One view    COMPARISON:  February 22, 2022.    FINDINGS:  Cardiopericardial silhouette is within normal limits. Lungs are without dense focal or segmental consolidation, congestive process, pleural effusions or pneumothorax.                                       CT Head Without Contrast (Final result)  Result time 07/13/23 18:36:08      Final result by Aaron Rosario MD (07/13/23 18:36:08)                    Impression:      Chronic age-related changes.  No acute process      Electronically signed by: Slickgalinaevita Dobsonmeena  Date:    07/13/2023  Time:    18:36               Narrative:    EXAMINATION:  CT HEAD WITHOUT CONTRAST    CLINICAL HISTORY:  Mental status change, unknown cause;    TECHNIQUE:  Multiple axial images were obtained from the base of the brain to the vertex without contrast administration.  Sagittal and coronal reconstructions were performed..Automatic exposure control is utilized to reduce patient radiation exposure.    COMPARISON:  12/05/2021    FINDINGS:  There is no intracranial mass or lesion seen.  No hemorrhage is seen.  No acute infarct is seen.  There is old punctate lacunar infarct in the basal ganglia on the right.  There is some cerebral atrophy seen.  There is some compensatory ventricular dilatation and periventricular white matter changes consistent with the patient's age.  Calvarium is intact.  The posterior fossa is unremarkable..  The visualized portions of the paranasal sinuses show no acute abnormality.                                        ASSESSMENT:  -hematuria s/p 3-way nielsen insertion  -BPH with extension of median lobe into bladder with chronic nielsen catheter  -ESRD on HD - plans for dialysis today  -Dark stools  -Abdominal pain      PLAN:  -Imaging reviewed and discussed with Dr. Rankin, likely prostate extending into bladder as opposed to clot. No significant retention on imaging. Irrigation very painful for patient. Will keep 3-way nielsen in place for now and hand irrigate as needed to maintain patency.   -BC are pending  -Following        Kiki Marin NP

## 2023-07-14 NOTE — CONSULTS
Consult Note    Reason for Consult:      We were consulted by Dr. Valdez to evaluate this patient for melena, anemia.    HPI:     68 y.o. AA male known to Dr. Butler from previous hospital admission with a past medical history of essential hypertension, ESRD on HD MWF, anemia, BPH, cervical radiculopathy, and vitamin-D deficiency.      History is extremely limited at this time as patient has received morphine and percocet prior to my visit. Nurse states patient was complaining of significant back pain prior. History essentially just obtained from chart review.     Presented on 7/13/2023 for dark stools.  Nursing home reported they noticed melanotic stools yesterday and patient was more confused than normal. Patient apparently reported he has had dark stools for the past week with intermittent abdominal and back pain.      Hgb 10.7.  He has keith hematuria.  CT abdomen and pelvis without contrast revealed moderate stool in the colon which could reflect an element of constipation, mild to moderate bilateral hydroureteronephrosis with superimposed pyeloureteritis not excluded, enlarged prostate, hyperdense material in the dependent urinary bladder which may reflect blood clots, focal sclerotic lesions seen and left iliac and pubic bones suspicious for sclerotic metastases.      Urology and nephrology on board.  GI consulted for melena. Again, I am unable to obtain this history from the patient. No family present.  Patient seen in HD.     Previous records reviewed.  EGD 12/2020 for anemia:  LA grade C reflux esophagitis, bile in stomach, gastritis, normal duodenum.  No known colonoscopy on file.  Hgb 1 month ago 10.1 and in April it was 7.4.    PCP:  Elo Flores MD    Review of patient's allergies indicates:   Allergen Reactions    Iodine      Other reaction(s): difficulty breathing, Facial Swelling    Iodine and iodide containing products Swelling    Shellfish containing products      Other reaction(s):  Swelling        Current Facility-Administered Medications   Medication Dose Route Frequency Provider Last Rate Last Admin    0.9%  NaCl infusion   Intravenous PRN KARLOS Preston        ceFEPIme (MAXIPIME) 1 g in dextrose 5 % in water (D5W) 5 % 100 mL IVPB (MB+)  1 g Intravenous Daily Farhan Wood PA-C        labetaloL injection 10 mg  10 mg Intravenous Q4H PRN Karin Valdez MD   10 mg at 07/14/23 1145    LIDOcaine HCl 2% urojet   Mucous Membrane ED 1 Time KARLOS Preston        morphine injection 2 mg  2 mg Intravenous Q4H PRN Karin Valdez MD   2 mg at 07/14/23 1012    mupirocin 2 % ointment   Nasal BID Karina Gonzalez MD        oxyCODONE-acetaminophen  mg per tablet 1 tablet  1 tablet Oral Q4H PRN Karin Valdez MD   1 tablet at 07/14/23 1244    pantoprazole injection 40 mg  40 mg Intravenous BID Farhan Wood PA-C   40 mg at 07/14/23 1012    sodium chloride 0.9% bolus 250 mL 250 mL  250 mL Intravenous PRN KARLOS Preston         Current Outpatient Medications   Medication Sig Dispense Refill    amLODIPine (NORVASC) 10 MG tablet Take 1 tablet (10 mg total) by mouth once daily. 90 tablet 3    busPIRone (BUSPAR) 10 MG tablet TAKE 1 TABLET(10 MG) BY MOUTH EVERY DAY 30 tablet 3    carvediloL (COREG) 12.5 MG tablet TAKE 1 TABLET(12.5 MG) BY MOUTH TWICE DAILY 180 tablet 3    doxazosin (CARDURA) 8 MG Tab Take 8 mg by mouth every evening.      ferrous gluconate (FERGON) 324 MG tablet Take 1 tablet (324 mg total) by mouth once daily. 30 tablet 3    flavoxATE (URISPAS) 100 mg Tab Take 200 mg by mouth 4 (four) times daily.      fluticasone (FLONASE) 50 mcg/actuation nasal spray 1 spray by Each Nare route daily as needed for Rhinitis.      folic acid-vit B6-vit B12 2.5-25-2 mg (FOLBIC) 2.5-25-2 mg Tab Take 2.5 tablets by mouth Daily.      loratadine (CLARITIN) 10 mg tablet Take 1 tablet (10 mg total) by mouth once daily. 30 tablet 3    megestroL (MEGACE) 400 mg/10 mL (40 mg/mL) Susp Take 40 mg by  mouth Daily.      pantoprazole (PROTONIX) 40 MG tablet Take 40 mg by mouth once daily.      sevelamer carbonate (RENVELA) 800 mg Tab Take 800 mg by mouth 3 (three) times daily.      traZODone (DESYREL) 50 MG tablet Take 50 mg by mouth nightly.      VELPHORO 500 mg Chew Take by mouth.      augmented betamethasone (DIPROLENE) 0.05 % lotion Apply 0.05 application topically every Mon, Wed, Fri.      hydrALAZINE (APRESOLINE) 50 MG tablet TAKE 1 TABLET(50 MG) BY MOUTH THREE TIMES DAILY 270 tablet 3    HYDROcodone-acetaminophen (NORCO)  mg per tablet Take 1 tablet by mouth every 8 (eight) hours as needed for Pain. (Patient not taking: Reported on 7/14/2023) 90 tablet 0    naloxone (NARCAN) 4 mg/actuation Spry CALL 911. SPR CONTENTS OF ONE SPRAYER (0.1ML) INTO ONE NOSTRIL. REPEAT IN 2-3 MIN IF SYMPTOMS OF OPIOID EMERGENCY PERSIST, ALTERNATE NOSTRILS      walker Misc 1 each by Misc.(Non-Drug; Combo Route) route once daily. Upright Rollator 1 each 0     (Not in a hospital admission)      Past Medical History:  Past Medical History:   Diagnosis Date    Anemia     BPH (benign prostatic hyperplasia)     Cervical radiculopathy 05/17/2022    Disorder of kidney and ureter     ESRD on hemodialysis 09/27/2018    Hypertension     Neck injury 1994    Other chronic pain 09/27/2018    Personal history of colonic polyps     Renovascular hypertension 09/27/2018    Vitamin D deficiency 05/17/2022      Past Surgical History:  Past Surgical History:   Procedure Laterality Date    AV FISTULA PLACEMENT      COLONOSCOPY W/ POLYPECTOMY  02/19/2019    MASS EXCISION  2005    near the kidney     NECK SURGERY        Family History:  Family History   Problem Relation Age of Onset    Heart disease Mother     Diabetes Mother     Hypertension Mother     Cancer Father     Prostate cancer Father     Hypertension Father     Cancer Sister     Breast cancer Sister     No Known Problems Sister      Social History:  Social History     Tobacco Use     Smoking status: Former     Packs/day: 0.25     Years: 20.00     Pack years: 5.00     Types: Cigarettes     Quit date: 2016     Years since quittin.7    Smokeless tobacco: Never   Substance Use Topics    Alcohol use: No       Review of Systems:     Review of Systems   Unable to perform ROS: Mental status change     Objective:     VITAL SIGNS: 24 HR MIN & MAX LAST    Temp  Min: 98.6 °F (37 °C)  Max: 98.6 °F (37 °C)  98.6 °F (37 °C)        BP  Min: 122/69  Max: 192/91  (!) 177/90     Pulse  Min: 72  Max: 100  84     Resp  Min: 16  Max: 26  16    SpO2  Min: 94 %  Max: 98 %  95 %      No intake or output data in the 24 hours ending 23 1532    Physical Exam  Constitutional:       General: He is not in acute distress.     Appearance: He is ill-appearing.      Comments: Seen on HD   HENT:      Head: Normocephalic and atraumatic.   Eyes:      General: No scleral icterus.     Extraocular Movements: Extraocular movements intact.   Cardiovascular:      Rate and Rhythm: Normal rate and regular rhythm.   Pulmonary:      Effort: Pulmonary effort is normal. No respiratory distress.   Abdominal:      General: Bowel sounds are normal. There is no distension.      Palpations: Abdomen is soft.      Tenderness: There is abdominal tenderness (suprapubic). There is no guarding or rebound.   Genitourinary:     Comments: Hematuria in  bag  Musculoskeletal:      Right lower leg: No edema.      Left lower leg: No edema.      Comments: Contracted left arm at this time - ?unknown baseline.    Skin:     General: Skin is warm and dry.      Coloration: Skin is not jaundiced.   Neurological:      Mental Status: He is lethargic.         Recent Results (from the past 48 hour(s))   Comprehensive Metabolic Panel    Collection Time: 23  7:34 PM   Result Value Ref Range    Sodium Level 136 136 - 145 mmol/L    Potassium Level 5.5 (H) 3.5 - 5.1 mmol/L    Chloride 95 (L) 98 - 107 mmol/L    Carbon Dioxide 25 23 - 31 mmol/L    Glucose  Level 96 82 - 115 mg/dL    Blood Urea Nitrogen 52.5 (H) 8.4 - 25.7 mg/dL    Creatinine 10.47 (H) 0.73 - 1.18 mg/dL    Calcium Level Total 9.1 8.8 - 10.0 mg/dL    Protein Total 7.7 (H) 5.8 - 7.6 gm/dL    Albumin Level 3.7 3.4 - 4.8 g/dL    Globulin 4.0 (H) 2.4 - 3.5 gm/dL    Albumin/Globulin Ratio 0.9 (L) 1.1 - 2.0 ratio    Bilirubin Total 0.8 <=1.5 mg/dL    Alkaline Phosphatase 71 40 - 150 unit/L    Alanine Aminotransferase 8 0 - 55 unit/L    Aspartate Aminotransferase 12 5 - 34 unit/L    eGFR 5 mls/min/1.73/m2   Protime-INR    Collection Time: 07/13/23  7:34 PM   Result Value Ref Range    PT 16.1 (H) 12.5 - 14.5 seconds    INR 1.31 (H) 0.00 - 1.30   CBC with Differential    Collection Time: 07/13/23  7:34 PM   Result Value Ref Range    WBC 6.32 4.50 - 11.50 x10(3)/mcL    RBC 3.24 (L) 4.70 - 6.10 x10(6)/mcL    Hgb 10.7 (L) 14.0 - 18.0 g/dL    Hct 33.3 (L) 42.0 - 52.0 %    .8 (H) 80.0 - 94.0 fL    MCH 33.0 (H) 27.0 - 31.0 pg    MCHC 32.1 (L) 33.0 - 36.0 g/dL    RDW 15.9 11.5 - 17.0 %    Platelet 138 130 - 400 x10(3)/mcL    MPV 10.7 (H) 7.4 - 10.4 fL    Neut % 85.0 %    Lymph % 5.2 %    Mono % 9.0 %    Eos % 0.0 %    Basophil % 0.3 %    Lymph # 0.33 (L) 0.6 - 4.6 x10(3)/mcL    Neut # 5.37 2.1 - 9.2 x10(3)/mcL    Mono # 0.57 0.1 - 1.3 x10(3)/mcL    Eos # 0.00 0 - 0.9 x10(3)/mcL    Baso # 0.02 <=0.2 x10(3)/mcL    IG# 0.03 0 - 0.04 x10(3)/mcL    IG% 0.5 %    NRBC% 0.0 %   Magnesium    Collection Time: 07/13/23  7:34 PM   Result Value Ref Range    Magnesium Level 2.40 1.60 - 2.60 mg/dL   Type & Screen    Collection Time: 07/13/23 11:43 PM   Result Value Ref Range    Group & Rh O POS     Indirect Leonel GEL NEG     Specimen Outdate 07/16/2023 23:59    Lactic acid, plasma    Collection Time: 07/14/23  2:59 AM   Result Value Ref Range    Lactic Acid Level 1.1 0.5 - 2.2 mmol/L   Urinalysis, Reflex to Urine Culture    Collection Time: 07/14/23  3:06 AM    Specimen: Urine   Result Value Ref Range    Color, UA Red (A)  Yellow, Light-Yellow, Dark Yellow, Kiki, Straw    Appearance, UA Bloody (A) Clear    Specific Gravity, UA 1.020 1.005 - 1.030    pH, UA 8.0 5.0 - 8.5    Protein, UA      Glucose, UA      Ketones, UA      Blood, UA      Bilirubin, UA      Urobilinogen, UA      Nitrites, UA      Leukocyte Esterase, UA     Urinalysis, Microscopic    Collection Time: 07/14/23  3:06 AM   Result Value Ref Range    RBC, UA >100 (A) None Seen, 0-2, 3-5, 0-5 /HPF    WBC, UA 0-2 None Seen, 0-2, 3-5, 0-5 /HPF    Squamous Epithelial Cells, UA None Seen 0-4, None Seen /HPF    Bacteria, UA None Seen None Seen, Rare, Occasional /HPF   CBC with Differential    Collection Time: 07/14/23  6:52 AM   Result Value Ref Range    WBC 4.88 4.50 - 11.50 x10(3)/mcL    RBC 2.85 (L) 4.70 - 6.10 x10(6)/mcL    Hgb 9.4 (L) 14.0 - 18.0 g/dL    Hct 29.0 (L) 42.0 - 52.0 %    .8 (H) 80.0 - 94.0 fL    MCH 33.0 (H) 27.0 - 31.0 pg    MCHC 32.4 (L) 33.0 - 36.0 g/dL    RDW 15.9 11.5 - 17.0 %    Platelet 140 130 - 400 x10(3)/mcL    MPV 11.7 (H) 7.4 - 10.4 fL    Neut % 80.7 %    Lymph % 7.0 %    Mono % 11.3 %    Eos % 0.4 %    Basophil % 0.2 %    Lymph # 0.34 (L) 0.6 - 4.6 x10(3)/mcL    Neut # 3.94 2.1 - 9.2 x10(3)/mcL    Mono # 0.55 0.1 - 1.3 x10(3)/mcL    Eos # 0.02 0 - 0.9 x10(3)/mcL    Baso # 0.01 <=0.2 x10(3)/mcL    IG# 0.02 0 - 0.04 x10(3)/mcL    IG% 0.4 %    NRBC% 0.0 %   Comprehensive Metabolic Panel    Collection Time: 07/14/23  7:56 AM   Result Value Ref Range    Sodium Level 136 136 - 145 mmol/L    Potassium Level 5.1 3.5 - 5.1 mmol/L    Chloride 97 (L) 98 - 107 mmol/L    Carbon Dioxide 23 23 - 31 mmol/L    Glucose Level 89 82 - 115 mg/dL    Blood Urea Nitrogen 57.9 (H) 8.4 - 25.7 mg/dL    Creatinine 11.43 (H) 0.73 - 1.18 mg/dL    Calcium Level Total 8.6 (L) 8.8 - 10.0 mg/dL    Protein Total 6.9 5.8 - 7.6 gm/dL    Albumin Level 3.0 (L) 3.4 - 4.8 g/dL    Globulin 3.9 (H) 2.4 - 3.5 gm/dL    Albumin/Globulin Ratio 0.8 (L) 1.1 - 2.0 ratio    Bilirubin Total  0.7 <=1.5 mg/dL    Alkaline Phosphatase 61 40 - 150 unit/L    Alanine Aminotransferase 9 0 - 55 unit/L    Aspartate Aminotransferase 14 5 - 34 unit/L    eGFR 4 mls/min/1.73/m2   Phosphorus    Collection Time: 07/14/23  7:56 AM   Result Value Ref Range    Phosphorus Level 6.6 (H) 2.3 - 4.7 mg/dL       CT Head Without Contrast    Result Date: 7/13/2023  EXAMINATION: CT HEAD WITHOUT CONTRAST CLINICAL HISTORY: Mental status change, unknown cause; TECHNIQUE: Multiple axial images were obtained from the base of the brain to the vertex without contrast administration.  Sagittal and coronal reconstructions were performed..Automatic exposure control is utilized to reduce patient radiation exposure. COMPARISON: 12/05/2021 FINDINGS: There is no intracranial mass or lesion seen.  No hemorrhage is seen.  No acute infarct is seen.  There is old punctate lacunar infarct in the basal ganglia on the right.  There is some cerebral atrophy seen.  There is some compensatory ventricular dilatation and periventricular white matter changes consistent with the patient's age.  Calvarium is intact.  The posterior fossa is unremarkable..  The visualized portions of the paranasal sinuses show no acute abnormality.     Chronic age-related changes.  No acute process Electronically signed by: Aaron Rosario Date:    07/13/2023 Time:    18:36    US Retroperitoneal Complete    Result Date: 7/14/2023  EXAMINATION: US RETROPERITONEAL COMPLETE CLINICAL HISTORY: hematuria w clots; TECHNIQUE: Ultrasound evaluation of the kidneys, region of the ureters, and urinary bladder. COMPARISON: Ultrasound 01/06/2022 CT abdomen/pelvis 07/13/2023 FINDINGS: Moderate bilateral hydronephrosis with bilateral cortical thinning.  No defined renal calcification. 10 cm echogenic area along the dependent bladder.  Some areas of internal blood flow. Aortic calcifications.  No significant findings regarding the imaged IVC. Measurements: - Right kidney: 11.5 cm in length -  Left kidney: 14.4 cm in length     1. 10 cm echogenic area along the posterior bladder.  Difficult to tell how much of this is bladder hematoma versus enlarged prostate or bladder mass. 2. Moderate bilateral hydronephrosis. Electronically signed by: Mervin Tyson Date:    07/14/2023 Time:    08:54    X-Ray Chest AP Portable    Result Date: 7/13/2023  EXAMINATION: XR CHEST AP PORTABLE CLINICAL HISTORY: Anemia, unspecified TECHNIQUE: One view COMPARISON: February 22, 2022. FINDINGS: Cardiopericardial silhouette is within normal limits. Lungs are without dense focal or segmental consolidation, congestive process, pleural effusions or pneumothorax.     NO ACUTE CARDIOPULMONARY PROCESS IDENTIFIED. Electronically signed by: Dipak Paredes Date:    07/13/2023 Time:    22:33    CT Abdomen Pelvis  Without Contrast    Result Date: 7/14/2023  EXAMINATION: CT ABDOMEN PELVIS WITHOUT CONTRAST CLINICAL HISTORY: Abdominal pain, acute, nonlocalized; TECHNIQUE: Multidetector non-contrast axial CT images of the abdomen and pelvis were obtained with coronal and sagittal reconstructions. Automatic exposure control was utilized to reduce the patient's radiation dose. DLP= [id number] COMPARISON: 04/18/2023 FINDINGS: Lines and Tubes: None. Thorax: Lungs: There is mild nonspecific dependent change at the lung bases. Pleura: No pleural effusion is seen. Heart: The heart size is within normal limits. Abdomen: Abdominal Wall: No abdominal wall pathology is seen. Liver: The liver appears unremarkable. Biliary System: No intrahepatic or extrahepatic biliary duct dilatation is seen. Gallbladder: The gallbladder appears unremarkable. Pancreas: The pancreas appears unremarkable. Spleen: The spleen appears unremarkable. Adrenals: The adrenal glands appear unremarkable. Kidneys: Mild-to-moderate bilateral hydroureteronephrosis is seen with thinning of the overlying cortex. There is also mild wall thickening of bilateral renal pelves and ureters. Similar  findings are also seen on the prior examination and may be related to chronic bladder outlet obstruction with an element of superimposed pyeloureteritis not excluded. Again noted are small bilateral renal cortical cysts. There is a 3 mm nonobstructing stone in the lower pole of the right kidney, unchanged since the prior examination. There is also atrophy of the lower pole of the right kidney. Aorta: There is pronounced calcification of the abdominal aorta and its branches. IVC: Unremarkable. Bowel: Esophagus: The visualized esophagus appears unremarkable. Stomach: The stomach appears unremarkable. Duodenum: Unremarkable appearing duodenum. Small Bowel: The small bowel appears unremarkable. Colon: There is moderate stool in the colon which could reflect an element of constipation. Appendix: The appendix appears unremarkable (series 2 image [id number]). Peritoneum: No intraperitoneal free air or ascites is seen. Pelvis: Bladder: A nielsen catheter is in place. There is mild urinary bladder wall thickening. This is likely due to chronic bladder outlet obstruction. There is hyperdense material in the dependent urinary bladder, which may reflect blood clots. Male: Prostate gland: Again noted is markedly enlarged prostate with its median lobe projects into the bladder base. Bony structures: Dorsal Spine: There is mild to moderate spondylosis of the visualized dorsal spine. Bony Pelvis: Focal sclerotic lesions are again seen in the left iliac and pubic bones (series 2 image 92 and 120). These are suspicious for sclerotic metastases.     Impression: 1. There is moderate stool in the colon which could reflect an element of constipation. 2. Mild-to-moderate bilateral hydroureteronephrosis is seen with thinning of the overlying cortex. Similar findings are also seen on the prior examination and may be related to chronic bladder outlet obstruction with an element of superimposed pyeloureteritis not excluded. 3. Again noted is  markedly enlarged prostate with its median lobe projects into the bladder base. 4. There is mild urinary bladder wall thickening. This is likely due to chronic bladder outlet obstruction. There is hyperdense material in the dependent urinary bladder, which may reflect blood clots. 5. Focal sclerotic lesions are again seen in the left iliac and pubic bones (series 2 image 92 and 120). These are suspicious for sclerotic metastases. 6. Details and other findings as discussed above. Agree with overnight read. Electronically signed by: Igor Ferguson Date:    07/14/2023 Time:    08:15    Assessment / Plan:     68 y.o. AA male known to Dr. Butler from previous hospital admission with a past medical history of essential hypertension, ESRD on HD MWF, anemia, BPH, cervical radiculopathy, and vitamin-D deficiency.  Here with apparent melena, back, and abdominal pain.      Report of melena  Acute on chronic anemia   In ESRD patient on HD with gross hematuria, likely malignancy, and ?melena.  -Continue ppi bid  -Monitor H/H and transfuse as needed to Hgb 7  -Monitor stools for bleeding  -Endoscopy to be determined pending clinical course and overt GI bleeding once further hx able to be obtained.         Thank you for allowing us to participate in this patient's care.

## 2023-07-14 NOTE — CONSULTS
[facility] - Emergency Dept  Nephrology  Consult Note    Patient Name: Mirza Nelson Jr.  MRN: [id number]  Admission Date: 7/13/2023  Hospital Length of Stay: 0 days  Attending Provider: Karina Gonzalez MD   Primary Care Physician: Elo Flores MD  Principal Problem:<principal problem not specified>    Consults  Subjective:     HPI: This is a 68-year-old male with dialysis dependent ESRD.  [place] [facility] [place] Wednesday Friday schedule by way of right upper arm AV fistula.  Patient presented to ED on 07/13/2023 with complaints of dark stool.  He also complained of lightheadedness, back pain, neck pain.  He had a CT of the abdomen and pelvis revealing mild to moderate bilateral hydroureteronephrosis with enlarged prostate with median lobe projecting into the bladder base, hyperdense material and dependent urinary bladder blood clot.  Nielsen catheter was placed with gross hematuria noted.  Bladder remains significantly distended on exam with marked tenderness.  ER staff attempted to hand irrigate Nielsen catheter not tolerated by patient due to pain and tachycardic. Patient does have chronic indwelling nielsen catheter changed every three weeks per Dr Navarrete. Blood culture in lab. He is being treated for possible sepsis with Zosyn and IVF. Nephrology consulted for ESRD management.     Past Medical History:   Diagnosis Date    Anemia     BPH (benign prostatic hyperplasia)     Cervical radiculopathy 05/17/2022    Disorder of kidney and ureter     ESRD on hemodialysis 09/27/2018    Hypertension     Neck injury 1994    Other chronic pain 09/27/2018    Personal history of colonic polyps     Renovascular hypertension 09/27/2018    Vitamin D deficiency 05/17/2022       Past Surgical History:   Procedure Laterality Date    AV FISTULA PLACEMENT      COLONOSCOPY W/ POLYPECTOMY  02/19/2019    MASS EXCISION  2005    near the kidney     NECK SURGERY         Review of patient's allergies  indicates:   Allergen Reactions    Iodine      Other reaction(s): difficulty breathing, Facial Swelling    Iodine and iodide containing products Swelling    Shellfish containing products      Other reaction(s): Swelling     Current Facility-Administered Medications   Medication Frequency    0.9%  NaCl infusion PRN    0.9%  NaCl infusion Once    mupirocin 2 % ointment BID    piperacillin-tazobactam (ZOSYN) 4.5 g in dextrose 5 % in water (D5W) 5 % 100 mL IVPB (MB+) Q8H    sodium chloride 0.9% bolus 250 mL 250 mL PRN     Current Outpatient Medications   Medication    amLODIPine (NORVASC) 10 MG tablet    augmented betamethasone (DIPROLENE) 0.05 % lotion    busPIRone (BUSPAR) 10 MG tablet    carvediloL (COREG) 12.5 MG tablet    doxazosin (CARDURA) 8 MG Tab    ferrous gluconate (FERGON) 324 MG tablet    flavoxATE (URISPAS) 100 mg Tab    fluticasone (FLONASE) 50 mcg/actuation nasal spray    folic acid-vit B6-vit B12 2.5-25-2 mg (FOLBIC) 2.5-25-2 mg Tab    hydrALAZINE (APRESOLINE) 50 MG tablet    HYDROcodone-acetaminophen (NORCO)  mg per tablet    loratadine (CLARITIN) 10 mg tablet    megestroL (MEGACE) 400 mg/10 mL (40 mg/mL) Susp    naloxone (NARCAN) 4 mg/actuation Spry    pantoprazole (PROTONIX) 40 MG tablet    sevelamer carbonate (RENVELA) 800 mg Tab    traZODone (DESYREL) 50 MG tablet    VELPHORO 500 mg Chew    walker Misc     Family History       Problem Relation (Age of Onset)    Breast cancer Sister    Cancer Father, Sister    Diabetes Mother    Heart disease Mother    Hypertension Mother, Father    No Known Problems Sister    Prostate cancer Father          Tobacco Use    Smoking status: Former     Packs/day: 0.25     Years: 20.00     Pack years: 5.00     Types: Cigarettes     Quit date: 2016     Years since quittin.7    Smokeless tobacco: Never   Substance and Sexual Activity    Alcohol use: No    Drug use: Not Currently     Types: Marijuana    Sexual activity: Not Currently     Review of  Systems  Objective:     Vital Signs (Most Recent):  Temp: 98.6 °F (37 °C) (07/13/23 1807)  Pulse: 75 (07/14/23 0602)  Resp: 16 (07/14/23 0602)  BP: (!) 161/82 (07/14/23 0602)  SpO2: 98 % (07/14/23 0602) Vital Signs (24h Range):  Temp:  [98.6 °F (37 °C)] 98.6 °F (37 °C)  Pulse:  [] 75  Resp:  [16-20] 16  SpO2:  [94 %-98 %] 98 %  BP: (122-178)/() 161/82     Weight: 72.6 kg (160 lb) (07/13/23 1807)  Body mass index is 25.82 kg/m².  Body surface area is 1.84 meters squared.    No intake/output data recorded.    Physical Exam  Constitutional:       General: He is not in acute distress.     Appearance: He is ill-appearing.   HENT:      Head: Atraumatic.      Nose: Nose normal.      Mouth/Throat:      Mouth: Mucous membranes are moist.   Eyes:      Extraocular Movements: Extraocular movements intact.      Conjunctiva/sclera: Conjunctivae normal.   Cardiovascular:      Rate and Rhythm: Regular rhythm. Tachycardia present.   Pulmonary:      Effort: Pulmonary effort is normal.      Breath sounds: Normal breath sounds.   Chest:      Chest wall: No tenderness.   Abdominal:      Comments: Firmness to lower abdomen likely distended bladder, grimacing with light palpation    Genitourinary:     Comments: Urinary catheter noted with gross hematuria, previously drained urine at bedside with thick blood settling in urinal   Musculoskeletal:         General: Swelling present.   Skin:     General: Skin is warm and dry.   Neurological:      Mental Status: He is alert.      Comments: Falls asleep while talking, seems to be confused. Only able to relay dialysis center and dialysis days    Psychiatric:         Thought Content: Thought content normal.       Significant Labs:  BMP:   Recent Labs   Lab 07/13/23 1934      K 5.5*   CO2 25   BUN 52.5*   CREATININE 10.47*   CALCIUM 9.1   MG 2.40     CBC:   Recent Labs   Lab 07/14/23 0652   WBC 4.88   RBC 2.85*   HGB 9.4*   HCT 29.0*      .8*   MCH 33.0*   MCHC  32.4*     Microbiology Results (last 7 days)       Procedure Component Value Units Date/Time    Blood Culture #1 **CANNOT BE ORDERED STAT** [[id number]] Collected: 07/14/23 0259    Order Status: Resulted Specimen: Blood from Arm, Left Updated: 07/14/23 0259    Blood Culture #2 **CANNOT BE ORDERED STAT** [[id number]] Collected: 07/14/23 0259    Order Status: Resulted Specimen: Blood from Hand, Left Updated: 07/14/23 0259          Recent Labs   Lab 07/14/23  0306   BACTERIA None Seen       Significant Imaging:    CT Abdomen Pelvis Without Contrast [[id number]]Resulted: 07/14/23 0815 Order Status: CompletedUpdated: 07/14/23 0817 Narrative:  EXAMINATION:   CT ABDOMEN PELVIS WITHOUT CONTRAST     CLINICAL HISTORY:   Abdominal pain, acute, nonlocalized;     TECHNIQUE:   Multidetector non-contrast axial CT images of the abdomen and pelvis were obtained with coronal and sagittal reconstructions.     Automatic exposure control was utilized to reduce the patient's radiation dose.     DLP= 515     COMPARISON:   04/18/2023     FINDINGS:   Lines and Tubes: None.     Thorax:     Lungs: There is mild nonspecific dependent change at the lung bases.     Pleura: No pleural effusion is seen.     Heart: The heart size is within normal limits.     Abdomen:     Abdominal Wall: No abdominal wall pathology is seen.     Liver: The liver appears unremarkable.     Biliary System: No intrahepatic or extrahepatic biliary duct dilatation is seen.     Gallbladder: The gallbladder appears unremarkable.     Pancreas: The pancreas appears unremarkable.     Spleen: The spleen appears unremarkable.     Adrenals: The adrenal glands appear unremarkable.     Kidneys: Mild-to-moderate bilateral hydroureteronephrosis is seen with thinning of the overlying cortex. There is also mild wall thickening of bilateral renal pelves and ureters. Similar findings are also seen on the prior examination and may be related to chronic bladder outlet obstruction with an  element of superimposed pyeloureteritis not excluded. Again noted are small bilateral renal cortical cysts. There is a 3 mm nonobstructing stone in the lower pole of the right kidney, unchanged since the prior examination. There is also atrophy of the lower pole of the right kidney.     Aorta: There is pronounced calcification of the abdominal aorta and its branches.     IVC: Unremarkable.     Bowel:     Esophagus: The visualized esophagus appears unremarkable.     Stomach: The stomach appears unremarkable.     Duodenum: Unremarkable appearing duodenum.     Small Bowel: The small bowel appears unremarkable.     Colon: There is moderate stool in the colon which could reflect an element of constipation.     Appendix: The appendix appears unremarkable (series 2 image 69-74).     Peritoneum: No intraperitoneal free air or ascites is seen.     Pelvis:     Bladder: A nielsen catheter is in place. There is mild urinary bladder wall thickening. This is likely due to chronic bladder outlet obstruction. There is hyperdense material in the dependent urinary bladder, which may reflect blood clots.     Male:     Prostate gland: Again noted is markedly enlarged prostate with its median lobe projects into the bladder base.     Bony structures:     Dorsal Spine: There is mild to moderate spondylosis of the visualized dorsal spine.     Bony Pelvis: Focal sclerotic lesions are again seen in the left iliac and pubic bones (series 2 image 92 and 120). These are suspicious for sclerotic metastases.   Impression:  Impression:     1. There is moderate stool in the colon which could reflect an element of constipation.     2. Mild-to-moderate bilateral hydroureteronephrosis is seen with thinning of the overlying cortex. Similar findings are also seen on the prior examination and may be related to chronic bladder outlet obstruction with an element of superimposed pyeloureteritis not excluded.     3. Again noted is markedly enlarged prostate  with its median lobe projects into the bladder base.     4. There is mild urinary bladder wall thickening. This is likely due to chronic bladder outlet obstruction. There is hyperdense material in the dependent urinary bladder, which may reflect blood clots.     5. Focal sclerotic lesions are again seen in the left iliac and pubic bones (series 2 image 92 and 120). These are suspicious for sclerotic metastases.     6. Details and other findings as discussed above.     Agree with overnight read.       Electronically signed by: Igor Ferguson   Date: 07/14/2023   Time: 08:15   Assessment/Plan:     ESRD on HD  Gross hematuria with Bladder mass possibly blood clots per radiolgist, history of chronic indwelling urinary catheter changed every three weeks per Dr Navarrete   Cervical radiculopathy with debility   Dark stools  Anemia   Possible sepsis   Tachycardia     -patient will dialyze today to continue MWF  -urology consultation has been placed. 3 way nielsen catheter being inserted now   -Follow blood cultures and BMP    Esthela Narayanan, KARLOS  Nephrology  [facility] - Emergency Dept

## 2023-07-14 NOTE — ED NOTES
Dr. Correa and urology NP at bedside at this time. States no CBI, small irrigations PRN. Also states to leave 3 way in place.

## 2023-07-15 PROBLEM — K92.1 MELENA: Status: ACTIVE | Noted: 2023-01-01

## 2023-07-15 NOTE — PROGRESS NOTES
Urology  Nielsen not draining  Removed and 3 way 24 Croatian nielsen placed; Required use of catheter guide. Not sure first one was in all the way; Clots irrigated out until urine is pink and cbi running well.  Cont to monitor hematuria and labs

## 2023-07-15 NOTE — ANESTHESIA PREPROCEDURE EVALUATION
07/15/2023  Mirza Nelson Jr. is a 68 y.o., male.    Pre-op Diagnosis: Symptomatic anemia [D64.9]    Procedure(s): EGD (ESOPHAGOGASTRODUODENOSCOPY)   Subjective/Interval History:  This extremely complex 68-year-old has end-stage renal disease and a cervical radiculopathy and BPH.  He was sent in from the nursing home because of melena and confusion.  He apparently had melena for a week.  He also had keith hematuria and though a CT of the abdomen and pelvis found moderate colonic stool burden he also had moderate bilateral hydroureteronephrosis with superimposed pyelo ureteritis, along with hyperdense material in the bladder that may reflect clots.  In fact, he subsequently had a Cheek replaced at this facility and had keith clots evident though his urine has subsequently cleared.    He does have focal sclerotic lesions in his iliac and pubic bones suspicious for metastases.      Review of patient's allergies indicates:   Allergen Reactions    Iodine      Other reaction(s): difficulty breathing, Facial Swelling    Iodine and iodide containing products Swelling    Shellfish containing products      Other reaction(s): Swelling       Current Outpatient Medications   Medication Instructions    amLODIPine (NORVASC) 10 mg, Oral, Daily    augmented betamethasone (DIPROLENE) 0.05 % lotion 0.05 application , Topical (Top), Every Mon, Wed, Fri    busPIRone (BUSPAR) 10 MG tablet TAKE 1 TABLET(10 MG) BY MOUTH EVERY DAY    carvediloL (COREG) 12.5 MG tablet TAKE 1 TABLET(12.5 MG) BY MOUTH TWICE DAILY    doxazosin (CARDURA) 8 mg, Oral, Nightly    ferrous gluconate (FERGON) 324 mg, Oral, Daily    flavoxATE (URISPAS) 200 mg, Oral, 4 times daily    fluticasone (FLONASE) 50 mcg/actuation nasal spray 1 spray, Each Nostril, Daily PRN    folic acid-vit B6-vit B12 2.5-25-2 mg (FOLBIC) 2.5-25-2 mg Tab 2.5 tablets, Oral,  Daily    hydrALAZINE (APRESOLINE) 50 MG tablet TAKE 1 TABLET(50 MG) BY MOUTH THREE TIMES DAILY    HYDROcodone-acetaminophen (NORCO)  mg per tablet 1 tablet, Oral, Every 8 hours PRN    loratadine (CLARITIN) 10 mg, Oral, Daily    megestroL (MEGACE) 40 mg, Oral, Daily    naloxone (NARCAN) 4 mg/actuation Spry CALL 911. SPR CONTENTS OF ONE SPRAYER (0.1ML) INTO ONE NOSTRIL. REPEAT IN 2-3 MIN IF SYMPTOMS OF OPIOID EMERGENCY PERSIST, ALTERNATE NOSTRILS    pantoprazole (PROTONIX) 40 mg, Oral, Daily    sevelamer carbonate (RENVELA) 800 mg, Oral, 3 times daily    traZODone (DESYREL) 50 mg, Oral, Nightly    VELPHORO 500 mg Chew Oral    walker Misc 1 each, Misc.(Non-Drug; Combo Route), Daily, Upright Rollator         Past Medical History:   Diagnosis Date    Anemia     BPH (benign prostatic hyperplasia)     Cervical radiculopathy 05/17/2022    Disorder of kidney and ureter     ESRD on hemodialysis 09/27/2018    Hypertension     Neck injury 1994    Other chronic pain 09/27/2018    Personal history of colonic polyps     Renovascular hypertension 09/27/2018    Vitamin D deficiency 05/17/2022   last dialysis 7/14/2023; chronic neck & back pain    Past Surgical History:   Procedure Laterality Date    AV FISTULA PLACEMENT      COLONOSCOPY W/ POLYPECTOMY  02/19/2019    MASS EXCISION  2005    near the kidney     NECK SURGERY       CT HEAD 7/13/2023  FINDINGS:  There is no intracranial mass or lesion seen.  No hemorrhage is seen.  No acute infarct is seen.  There is old punctate lacunar infarct in the basal ganglia on the right.  There is some cerebral atrophy seen.  There is some compensatory ventricular dilatation and periventricular white matter changes consistent with the patient's age.  Calvarium is intact.  The posterior fossa is unremarkable..  The visualized portions of the paranasal sinuses show no acute abnormality.     Impression:  Chronic age-related changes.  No acute process   Electronically  signed by: Aaron Rosario    07/13/2023   18:36  Recent Labs   Lab 07/14/23  0652 07/14/23  1738 07/15/23  0449   WBC 4.88 5.83 4.13*   RBC 2.85* 2.80* 2.35*   HGB 9.4* 9.1* 7.7*   HCT 29.0* 28.3* 25.0*   .8* 101.1* 106.4*   MCH 33.0* 32.5* 32.8*   MCHC 32.4* 32.2* 30.8*   RDW 15.9 15.8 15.9    112* 97*   MPV 11.7* 10.3 11.8*       Recent Labs   Lab 07/13/23 1934 07/14/23 0756 07/14/23 1738 07/14/23  1905 07/15/23  0449    136 136  --  138   K 5.5* 5.1 3.9  --  4.6   CO2 25 23 22*  --  23   BUN 52.5* 57.9* 28.8*  --  41.1*   CREATININE 10.47* 11.43* 6.33*  --  8.31*   CALCIUM 9.1 8.6* 8.3*  --  7.7*   PH  --   --   --  7.460*  --    MG 2.40  --   --   --   --    ALBUMIN 3.7 3.0* 3.1*  --   --    ALKPHOS 71 61 61  --   --    ALT 8 9 11  --   --    AST 12 14 14  --   --    BILITOT 0.8 0.7 1.2  --   --    12 lead ECG 7/13/2023  SR c sinus arrhythmia LAD inferior T inversion & anterolateral T inversion    Recent Labs   Lab 07/13/23 1934   INR 1.31*   Nuclear Stress 2/8/2022        Pre-op Assessment    I have reviewed the Patient Summary Reports.    I have reviewed the NPO Status.   I have reviewed the Medications.     Review of Systems  Anesthesia Hx:  No problems with previous Anesthesia  Denies Family Hx of Anesthesia complications.   Denies Personal Hx of Anesthesia complications.   Social:  Non-Smoker    Hematology/Oncology:         -- Anemia:   Cardiovascular:   Exercise tolerance: good Hypertension  Denies Angina.  Denies Orthopnea.  Denies PND.  Denies BARR.  Functional Capacity good / => 4 METS    Renal/:   Chronic Renal Disease, ESRD, Dialysis BPH    Musculoskeletal:  Musculoskeletal Normal    Neurological:   Denies TIA. Denies CVA.   Chronic Pain Syndrome   Psych:  Psychiatric Normal           Physical Exam  General: Well nourished, Alert and Oriented    Airway:  Mallampati: III   Mouth Opening: Normal  TM Distance: Normal  Tongue: Normal  Neck ROM: Normal ROM    Dental:No upper  teeth, no rear lower molars; other lower teeth borderline condition; denies loose teeth  Chest/Lungs:  Clear to auscultation    Heart:  Rate: Normal  Rhythm: Regular Rhythm  No pretibial edema  No carotid bruits      Anesthesia Plan  Type of Anesthesia, risks & benefits discussed:    Anesthesia Type: Gen Natural Airway  Intra-op Monitoring Plan: Standard ASA Monitors  Post Op Pain Control Plan: IV/PO Opioids PRN  Induction:  IV  Informed Consent: Informed consent signed with the Patient and all parties understand the risks and agree with anesthesia plan.  All questions answered. Patient consented to blood products? No  ASA Score: 4  Day of Surgery Review of History & Physical: H&P Update referred to the surgeon/provider.  Anesthesia Plan Notes: GA TIVA    Ready For Surgery From Anesthesia Perspective.     .

## 2023-07-15 NOTE — OP NOTE
EGD Report    Referring Physician:  José Miguel    Date of procedure: 07/15/2023     Surgeon: Casey Funes    ASA:  3    Medications: Per anesthesia    Indication:  Melena, anemia    Procedure: EGD biopsy    Description of the Procedure: The patient was brought back to the endoscopy suite where the risks, benefits, and alternatives of the procedure were described in detail. The patient was given the opportunity to ask questions and then signed informed consent. Patient was positioned in the left lateral decubitus position, continuous monitoring was initiated, and supplemental oxygen was provided via nasal cannula. Bite block was placed. Adequate sedation was achieved with the above mentioned medications as documented in chart and then titrated during the entire procedure. Under direct visualization the gastroscope was introduced through the oropharynx into the esophagus. The scope was advanced into the stomach and to the second portion of the duodenum. Scope was withdrawn and the mucosa was carefully examined. The entire gastric mucosa was examined, including the fundus with retroflexion. Air was evacuated from the stomach and the scope was withdrawn into the esophagus. The entire esophageal mucosa was examined. The procedure was completed. The patient tolerated the procedure well and was transferred to the recovery area in stable condition.     Estimated Blood Loss: minimal    Complications: none    Findings:  Some inspissated secretions at the cords, suctioned.  Relatively normal esophagoscopy.  Some foodstuff in the fundus as testament to an element of gastroparesis, otherwise normal cardia and fundus.  Minimal antral gastritis biopsy obtained to rule out Helicobacter.  Moderate bulbar duodenitis without active erosion or ulceration, normal 2nd portion with bile evident.    Impression and Recommendations:   A bit of retained foodstuff, very mild gastritis and moderate bulbar duodenitis though without any active  erosion or ulceration.  Antral biopsies pending to rule out Helicobacter.    The patient has keith bleeding in his urinary catheter and in fact is undergoing irrigation as we speak.  It may well be that a significant portion of his anemia stems from urologic loss.  We will plan to check back on Monday to determine whether he ought to have his colon evaluated prior to discharge later this week.  He does not seem a great candidate, but it might be prudent to evaluate things so as to put that concern to rest down the line    Casey Funes

## 2023-07-15 NOTE — PROGRESS NOTES
Nephrology follow up progress note    HPI:      Mirza Nelson Jr. is a 68 y.o. male presented to the hospital with abdominal pains.  He was found to have distended bladder.  Indwelling Cheek catheter was placed and he had hematuria.  For which she required bladder irrigation and hematuria seems to be clearing up.  His dialysis days are Monday Wednesday Friday and he was dialyzed yesterday.    Some how he is NPO thinking that he may need some GI procedure but patient reports that his last bowel movement was on Wednesday and there is no history of any blood in the stools.  He is complaining of starvation.  Denies any chest pains or abdominal pains.      Interval history:          Review of Systems:       Past medical, family, surgical, and social history reviewed and unchanged from initial consult note.     Objective:       VITAL SIGNS: 24 HR MIN & MAX LAST    Temp  Min: 97.6 °F (36.4 °C)  Max: 100.3 °F (37.9 °C)  97.9 °F (36.6 °C)        BP  Min: 96/59  Max: 192/91  117/69     Pulse  Min: 70  Max: 122  76     Resp  Min: 16  Max: 22  18    SpO2  Min: 95 %  Max: 99 %  98 %      GEN:  Very much awake alert oriented to time person place lying down in the bed no acute distress noted.  HEENT: Conjunctiva anicteric, pupils reactive.  Extraocular movements intact no oral lesion neck supple no JVD.  CV: RRR without rub or gallop.  PULM: CTAB, unlabored  ABD: Soft, NT/ND abdomen with NABS  EXT: No cyanosis or edema, right upper extremity AV fistula noted  SKIN: Warm and dry  PSYCH: Awake, alert, and appropriately conversant  Vascular access:  Right upper extremity AV fistula          Component Value Date/Time     07/15/2023 0449     07/14/2023 1738     09/27/2018 0800    K 4.6 07/15/2023 0449    K 3.9 07/14/2023 1738    K 3.5 02/24/2021 0609    K 3.9 09/27/2018 0800    CHLORIDE 99 07/15/2023 0449    CHLORIDE 97 (L) 07/14/2023 1738    CO2 23 07/15/2023 0449    CO2 22 (L) 07/14/2023 1738    CO2 34 (H)  09/27/2018 0800    BUN 41.1 (H) 07/15/2023 0449    BUN 28.8 (H) 07/14/2023 1738    BUN 24 (H) 09/27/2018 0800    CREATININE 8.31 (H) 07/15/2023 0449    CREATININE 6.33 (H) 07/14/2023 1738    CREATININE 5.4 (H) 09/27/2018 0800    CALCIUM 7.7 (L) 07/15/2023 0449    CALCIUM 8.3 (L) 07/14/2023 1738    CALCIUM 9.2 09/27/2018 0800    PHOS 6.6 (H) 07/14/2023 0756    PHOS 3.5 09/27/2018 0800            Component Value Date/Time    WBC 4.13 (L) 07/15/2023 0449    WBC 5.83 07/14/2023 1738    WBC 5.35 09/27/2018 0800    HGB 7.7 (L) 07/15/2023 0449    HGB 9.1 (L) 07/14/2023 1738    HGB 11.1 (L) 09/27/2018 0800    HCT 25.0 (L) 07/15/2023 0449    HCT 28.3 (L) 07/14/2023 1738    HCT 33.0 (L) 11/30/2020 0859    HCT 35.1 (L) 09/27/2018 0800    PLT 97 (L) 07/15/2023 0449     (L) 07/14/2023 1738     09/27/2018 0800       Imaging reviewed      Assessment / Plan:   ESRD with dialysis days Monday Wednesday Friday  Bacteremia with Gram-negative rods.  Hematuria.  Now resolved.  Indwelling Cheek catheter noted.  Elevated PSA  Anemia probably secondary to ESRD and hematuria.    Recommendations  Since there is no GI plans for any workup, I will start him on his oral nutrition.  Laxatives  Check labs tomorrow  Continue dialysis Monday Wednesday Friday

## 2023-07-15 NOTE — TRANSFER OF CARE
"Anesthesia Transfer of Care Note    Patient: Mirza Nelson Jr.    Procedure(s) Performed: Procedure(s) (LRB):  EGD (ESOPHAGOGASTRODUODENOSCOPY) (N/A)    Patient location: PACU    Anesthesia Type: MAC    Transport from OR: Transported from OR on room air with adequate spontaneous ventilation    Post pain: adequate analgesia    Post assessment: no apparent anesthetic complications    Post vital signs: stable    Level of consciousness: awake and alert    Nausea/Vomiting: no nausea/vomiting    Complications: none    Transfer of care protocol was followed      Last vitals:   Visit Vitals  /72 (BP Location: Left arm, Patient Position: Lying)   Pulse 91   Temp 36.8 °C (98.2 °F) (Skin)   Resp 15   Ht 5' 6" (1.676 m)   Wt 72.6 kg (160 lb)   SpO2 99%   BMI 25.82 kg/m²     "

## 2023-07-15 NOTE — PLAN OF CARE
Problem: Adult Inpatient Plan of Care  Goal: Plan of Care Review  Outcome: Ongoing, Progressing  Flowsheets (Taken 7/15/2023 1083)  Plan of Care Reviewed With: patient  Goal: Patient-Specific Goal (Individualized)  Outcome: Ongoing, Progressing  Goal: Absence of Hospital-Acquired Illness or Injury  Outcome: Ongoing, Progressing  Goal: Optimal Comfort and Wellbeing  Outcome: Ongoing, Progressing  Goal: Readiness for Transition of Care  Outcome: Ongoing, Progressing     Problem: Infection  Goal: Absence of Infection Signs and Symptoms  Outcome: Ongoing, Progressing     Problem: Device-Related Complication Risk (Hemodialysis)  Goal: Safe, Effective Therapy Delivery  Outcome: Ongoing, Progressing     Problem: Hemodynamic Instability (Hemodialysis)  Goal: Effective Tissue Perfusion  Outcome: Ongoing, Progressing     Problem: Infection (Hemodialysis)  Goal: Absence of Infection Signs and Symptoms  Outcome: Ongoing, Progressing     Problem: Skin Injury Risk Increased  Goal: Skin Health and Integrity  Outcome: Ongoing, Progressing     Problem: Impaired Wound Healing  Goal: Optimal Wound Healing  Outcome: Ongoing, Progressing      Impression: Other secondary cataract, right eye: H26.491. Plan: Discussed diagnosis in detail with patient. No treatment is required at this time. Will continue to observe condition and or symptoms. Call if [address] worsens.

## 2023-07-15 NOTE — PROGRESS NOTES
Urology  Urine clear this morning but did require CBI last night.  HCT 25  Recommend transfusion as hematuria not resolved

## 2023-07-15 NOTE — PROGRESS NOTES
"Gastroenterology Progress Note    Subjective/Interval History:  This extremely complex 68-year-old has end-stage renal disease and a cervical radiculopathy and BPH.  He was sent in from the nursing home because of melena and confusion.  He apparently had melena for a week.  He also had keith hematuria and though a CT of the abdomen and pelvis found moderate colonic stool burden he also had moderate bilateral hydroureteronephrosis with superimposed pyelo ureteritis, along with hyperdense material in the bladder that may reflect clots.  In fact, he subsequently had a Cheek replaced at this facility and had keith clots evident though his urine has subsequently cleared.    He does have focal sclerotic lesions in his iliac and pubic bones suspicious for metastases.      It seems his last upper endoscopy took place in 2020 with grade C reflux evident.    He did have a hemoglobin of 10.7 on admission, 7.7 this morning.  Protime was elevated at 16.1.  Creatinine 8.3 in keeping with his azotemia.  His liver tests reassuring.  His PSA 10.79    ROS:  Review of Systems   Unable to perform ROS: Dementia     Vital Signs:  /80   Pulse 75   Temp 98.2 °F (36.8 °C) (Oral)   Resp 18   Ht 5' 6" (1.676 m)   Wt 72.6 kg (160 lb)   SpO2 99%   BMI 25.82 kg/m²   Body mass index is 25.82 kg/m².    Physical Exam:  Physical Exam  Constitutional:       Appearance: He is ill-appearing.   HENT:      Mouth/Throat:      Mouth: Mucous membranes are dry.   Eyes:      Extraocular Movements: Extraocular movements intact.   Cardiovascular:      Rate and Rhythm: Regular rhythm.      Heart sounds: Normal heart sounds.   Pulmonary:      Breath sounds: Normal breath sounds.   Abdominal:      Palpations: Abdomen is soft.      Comments: His altered mental status makes it hard to discern tenderness, and degree there of   Musculoskeletal:         General: Normal range of motion.   Skin:     General: Skin is warm and dry.   Neurological:      Mental " Status: He is disoriented.       Labs:  Recent Results (from the past 48 hour(s))   Comprehensive Metabolic Panel    Collection Time: 07/13/23  7:34 PM   Result Value Ref Range    Sodium Level 136 136 - 145 mmol/L    Potassium Level 5.5 (H) 3.5 - 5.1 mmol/L    Chloride 95 (L) 98 - 107 mmol/L    Carbon Dioxide 25 23 - 31 mmol/L    Glucose Level 96 82 - 115 mg/dL    Blood Urea Nitrogen 52.5 (H) 8.4 - 25.7 mg/dL    Creatinine 10.47 (H) 0.73 - 1.18 mg/dL    Calcium Level Total 9.1 8.8 - 10.0 mg/dL    Protein Total 7.7 (H) 5.8 - 7.6 gm/dL    Albumin Level 3.7 3.4 - 4.8 g/dL    Globulin 4.0 (H) 2.4 - 3.5 gm/dL    Albumin/Globulin Ratio 0.9 (L) 1.1 - 2.0 ratio    Bilirubin Total 0.8 <=1.5 mg/dL    Alkaline Phosphatase 71 40 - 150 unit/L    Alanine Aminotransferase 8 0 - 55 unit/L    Aspartate Aminotransferase 12 5 - 34 unit/L    eGFR 5 mls/min/1.73/m2   Protime-INR    Collection Time: 07/13/23  7:34 PM   Result Value Ref Range    PT 16.1 (H) 12.5 - 14.5 seconds    INR 1.31 (H) 0.00 - 1.30   CBC with Differential    Collection Time: 07/13/23  7:34 PM   Result Value Ref Range    WBC 6.32 4.50 - 11.50 x10(3)/mcL    RBC 3.24 (L) 4.70 - 6.10 x10(6)/mcL    Hgb 10.7 (L) 14.0 - 18.0 g/dL    Hct 33.3 (L) 42.0 - 52.0 %    .8 (H) 80.0 - 94.0 fL    MCH 33.0 (H) 27.0 - 31.0 pg    MCHC 32.1 (L) 33.0 - 36.0 g/dL    RDW 15.9 11.5 - 17.0 %    Platelet 138 130 - 400 x10(3)/mcL    MPV 10.7 (H) 7.4 - 10.4 fL    Neut % 85.0 %    Lymph % 5.2 %    Mono % 9.0 %    Eos % 0.0 %    Basophil % 0.3 %    Lymph # 0.33 (L) 0.6 - 4.6 x10(3)/mcL    Neut # 5.37 2.1 - 9.2 x10(3)/mcL    Mono # 0.57 0.1 - 1.3 x10(3)/mcL    Eos # 0.00 0 - 0.9 x10(3)/mcL    Baso # 0.02 <=0.2 x10(3)/mcL    IG# 0.03 0 - 0.04 x10(3)/mcL    IG% 0.5 %    NRBC% 0.0 %   Magnesium    Collection Time: 07/13/23  7:34 PM   Result Value Ref Range    Magnesium Level 2.40 1.60 - 2.60 mg/dL   Type & Screen    Collection Time: 07/13/23 11:43 PM   Result Value Ref Range    Group & Rh O  POS     Indirect Leonel GEL NEG     Specimen Outdate 07/16/2023 23:59    Prepare RBC 1 Unit    Collection Time: 07/13/23 11:43 PM   Result Value Ref Range    UNIT NUMBER [id number]     UNIT ABO/RH O POS     DISPENSE STATUS Selected     Unit Expiration [id number]     Product Code [id number]     Unit Blood Type Code 5100     CROSSMATCH INTERPRETATION Compatible    Lactic acid, plasma    Collection Time: 07/14/23  2:59 AM   Result Value Ref Range    Lactic Acid Level 1.1 0.5 - 2.2 mmol/L   Blood Culture #1 **CANNOT BE ORDERED STAT**    Collection Time: 07/14/23  2:59 AM    Specimen: Arm, Left; Blood   Result Value Ref Range    CULTURE, BLOOD ([facility]) Gram-negative Rods (A)     GRAM STAIN Seen in gram stain of broth only (AA)     GRAM STAIN Gram Negative Rods (AA)     GRAM STAIN 1 of 2 Aerobic bottles positive (AA)    Blood Culture #2 **CANNOT BE ORDERED STAT**    Collection Time: 07/14/23  2:59 AM    Specimen: Hand, Left; Blood   Result Value Ref Range    CULTURE, BLOOD ([facility]) Gram-negative Rods (A)     GRAM STAIN 2 of 2 bottles positive (AA)     GRAM STAIN Gram Negative Rods (AA)     GRAM STAIN Seen in gram stain of broth only (AA)    BCID2 Panel    Collection Time: 07/14/23  2:59 AM    Specimen: Hand, Left; Blood   Result Value Ref Range    CTX-M (ESBL ) Not Detected Not Detected, N/A    IMP (Cabapenemase ) Not Detected Not Detected, N/A    KPC resistance gene (Carbapenemase ) Not Detected Not Detected, N/A    mcr-1 Not Detected Not Detected, N/A    mecA ID N/A Not Detected, N/A    mecA/C and MREJ (MRSA) gene N/A Not Detected, N/A    NDM (Carbapenemase ) Not Detected Not Detected, N/A    OXA-48-like (Carbapenemase ) Not Detected Not Detected, N/A    Pablo/B (VRE gene) N/A Not Detected, N/A    VIM (Carbapenemase ) Not Detected Not Detected, N/A    Enterococcus faecalis Not Detected Not Detected    Enterococcus faecium Not Detected Not Detected    Listeria monocytogenes Not  Detected Not Detected    Staphylococcus spp. Not Detected Not Detected    Staphylococcus aureus Not Detected Not Detected    Staphylococcus epidermidis Not Detected Not Detected    Staphylococcus lugdunensis Not Detected Not Detected    Streptococcus spp. Not Detected Not Detected    Streptococcus agalactiae (Group B) Not Detected Not Detected    Streptococcus pneumoniae Not Detected Not Detected    Streptococcus pyogenes (Group A) Not Detected Not Detected    Acinetobacter calcoaceticus/baumannii complex Not Detected Not Detected    Bacteroides fragilis Not Detected Not Detected    Enterobacterales Detected (A) Not Detected    Enterobacter cloacae complex Not Detected Not Detected    Escherichia coli Detected (A) Not Detected    Klebsiella aerogenes Not Detected Not Detected    Klebsiella oxytoca Not Detected Not Detected    Klebsiella pneumoniae group Not Detected Not Detected    Proteus spp. Not Detected Not Detected    Salmonella spp. Not Detected Not Detected    Serratia marcescens Not Detected Not Detected    Haemophilus influenzae Not Detected Not Detected    Neisseria meningitidis Not Detected Not Detected    Pseudomonas aeruginosa Not Detected Not Detected    Stenotrophomonas maltophilia Not Detected Not Detected    Candida albicans Not Detected Not Detected    Candida auris Not Detected Not Detected    Candida glabrata Not Detected Not Detected    Candida krusei Not Detected Not Detected    Candida parapsilosis Not Detected Not Detected    Candida tropicalis Not Detected Not Detected    Cryptococcus neoformans/gattii Not Detected Not Detected   Urinalysis, Reflex to Urine Culture    Collection Time: 07/14/23  3:06 AM    Specimen: Urine   Result Value Ref Range    Color, UA Red (A) Yellow, Light-Yellow, Dark Yellow, Kiki, Straw    Appearance, UA Bloody (A) Clear    Specific Gravity, UA 1.020 1.005 - 1.030    pH, UA 8.0 5.0 - 8.5    Protein, UA      Glucose, UA      Ketones, UA      Blood, UA       Bilirubin, UA      Urobilinogen, UA      Nitrites, UA      Leukocyte Esterase, UA     Urinalysis, Microscopic    Collection Time: 07/14/23  3:06 AM   Result Value Ref Range    RBC, UA >100 (A) None Seen, 0-2, 3-5, 0-5 /HPF    WBC, UA 0-2 None Seen, 0-2, 3-5, 0-5 /HPF    Squamous Epithelial Cells, UA None Seen 0-4, None Seen /HPF    Bacteria, UA None Seen None Seen, Rare, Occasional /HPF   CBC with Differential    Collection Time: 07/14/23  6:52 AM   Result Value Ref Range    WBC 4.88 4.50 - 11.50 x10(3)/mcL    RBC 2.85 (L) 4.70 - 6.10 x10(6)/mcL    Hgb 9.4 (L) 14.0 - 18.0 g/dL    Hct 29.0 (L) 42.0 - 52.0 %    .8 (H) 80.0 - 94.0 fL    MCH 33.0 (H) 27.0 - 31.0 pg    MCHC 32.4 (L) 33.0 - 36.0 g/dL    RDW 15.9 11.5 - 17.0 %    Platelet 140 130 - 400 x10(3)/mcL    MPV 11.7 (H) 7.4 - 10.4 fL    Neut % 80.7 %    Lymph % 7.0 %    Mono % 11.3 %    Eos % 0.4 %    Basophil % 0.2 %    Lymph # 0.34 (L) 0.6 - 4.6 x10(3)/mcL    Neut # 3.94 2.1 - 9.2 x10(3)/mcL    Mono # 0.55 0.1 - 1.3 x10(3)/mcL    Eos # 0.02 0 - 0.9 x10(3)/mcL    Baso # 0.01 <=0.2 x10(3)/mcL    IG# 0.02 0 - 0.04 x10(3)/mcL    IG% 0.4 %    NRBC% 0.0 %   Comprehensive Metabolic Panel    Collection Time: 07/14/23  7:56 AM   Result Value Ref Range    Sodium Level 136 136 - 145 mmol/L    Potassium Level 5.1 3.5 - 5.1 mmol/L    Chloride 97 (L) 98 - 107 mmol/L    Carbon Dioxide 23 23 - 31 mmol/L    Glucose Level 89 82 - 115 mg/dL    Blood Urea Nitrogen 57.9 (H) 8.4 - 25.7 mg/dL    Creatinine 11.43 (H) 0.73 - 1.18 mg/dL    Calcium Level Total 8.6 (L) 8.8 - 10.0 mg/dL    Protein Total 6.9 5.8 - 7.6 gm/dL    Albumin Level 3.0 (L) 3.4 - 4.8 g/dL    Globulin 3.9 (H) 2.4 - 3.5 gm/dL    Albumin/Globulin Ratio 0.8 (L) 1.1 - 2.0 ratio    Bilirubin Total 0.7 <=1.5 mg/dL    Alkaline Phosphatase 61 40 - 150 unit/L    Alanine Aminotransferase 9 0 - 55 unit/L    Aspartate Aminotransferase 14 5 - 34 unit/L    eGFR 4 mls/min/1.73/m2   Phosphorus    Collection Time: 07/14/23   7:56 AM   Result Value Ref Range    Phosphorus Level 6.6 (H) 2.3 - 4.7 mg/dL   Comprehensive Metabolic Panel    Collection Time: 07/14/23  5:38 PM   Result Value Ref Range    Sodium Level 136 136 - 145 mmol/L    Potassium Level 3.9 3.5 - 5.1 mmol/L    Chloride 97 (L) 98 - 107 mmol/L    Carbon Dioxide 22 (L) 23 - 31 mmol/L    Glucose Level 96 82 - 115 mg/dL    Blood Urea Nitrogen 28.8 (H) 8.4 - 25.7 mg/dL    Creatinine 6.33 (H) 0.73 - 1.18 mg/dL    Calcium Level Total 8.3 (L) 8.8 - 10.0 mg/dL    Protein Total 6.7 5.8 - 7.6 gm/dL    Albumin Level 3.1 (L) 3.4 - 4.8 g/dL    Globulin 3.6 (H) 2.4 - 3.5 gm/dL    Albumin/Globulin Ratio 0.9 (L) 1.1 - 2.0 ratio    Bilirubin Total 1.2 <=1.5 mg/dL    Alkaline Phosphatase 61 40 - 150 unit/L    Alanine Aminotransferase 11 0 - 55 unit/L    Aspartate Aminotransferase 14 5 - 34 unit/L    eGFR 9 mls/min/1.73/m2   CBC with Differential    Collection Time: 07/14/23  5:38 PM   Result Value Ref Range    WBC 5.83 4.50 - 11.50 x10(3)/mcL    RBC 2.80 (L) 4.70 - 6.10 x10(6)/mcL    Hgb 9.1 (L) 14.0 - 18.0 g/dL    Hct 28.3 (L) 42.0 - 52.0 %    .1 (H) 80.0 - 94.0 fL    MCH 32.5 (H) 27.0 - 31.0 pg    MCHC 32.2 (L) 33.0 - 36.0 g/dL    RDW 15.8 11.5 - 17.0 %    Platelet 112 (L) 130 - 400 x10(3)/mcL    MPV 10.3 7.4 - 10.4 fL    Neut % 89.3 %    Lymph % 1.7 %    Mono % 8.6 %    Eos % 0.0 %    Basophil % 0.2 %    Lymph # 0.10 (L) 0.6 - 4.6 x10(3)/mcL    Neut # 5.21 2.1 - 9.2 x10(3)/mcL    Mono # 0.50 0.1 - 1.3 x10(3)/mcL    Eos # 0.00 0 - 0.9 x10(3)/mcL    Baso # 0.01 <=0.2 x10(3)/mcL    IG# 0.01 0 - 0.04 x10(3)/mcL    IG% 0.2 %    NRBC% 0.0 %   Blood Gas (ABG)    Collection Time: 07/14/23  7:05 PM   Result Value Ref Range    Sample Type Arterial Blood     Sample site Right Radial Artery     Drawn by BB     pH, Blood gas 7.460 (H) 7.350 - 7.450    pCO2, Blood gas 36.0 35.0 - 45.0 mmHg    pO2, Blood gas 66.0 (L) 80.0 - 100.0 mmHg    Sodium, Blood Gas 132 (L) 137 - 145 mmol/L    Potassium, Blood  Gas 3.9 3.5 - 5.0 mmol/L    Calcium Level Ionized 1.05 (L) 1.12 - 1.23 mmol/L    TOC2, Blood gas 26.7 mmol/L    Base Excess, Blood gas 2.00 -2.00 - 2.00 mmol/L    sO2, Blood gas 93.8 %    HCO3, Blood gas 25.6 22.0 - 26.0 mmol/L    THb, Blood gas 13.7 12 - 16 g/dL    O2 Hb, Blood Gas 90.5 (L) 94.0 - 97.0 %    CO Hgb 2.5 (H) 0.5 - 1.5 %    Met Hgb 1.4 0.4 - 1.5 %    Allens Test Yes     FIO2, Blood gas 21 %   Lactic Acid, Plasma    Collection Time: 07/14/23  9:02 PM   Result Value Ref Range    Lactic Acid Level 1.3 0.5 - 2.2 mmol/L   Basic Metabolic Panel    Collection Time: 07/15/23  4:49 AM   Result Value Ref Range    Sodium Level 138 136 - 145 mmol/L    Potassium Level 4.6 3.5 - 5.1 mmol/L    Chloride 99 98 - 107 mmol/L    Carbon Dioxide 23 23 - 31 mmol/L    Glucose Level 96 82 - 115 mg/dL    Blood Urea Nitrogen 41.1 (H) 8.4 - 25.7 mg/dL    Creatinine 8.31 (H) 0.73 - 1.18 mg/dL    BUN/Creatinine Ratio 5     Calcium Level Total 7.7 (L) 8.8 - 10.0 mg/dL    Anion Gap 16.0 mEq/L    eGFR 6 mls/min/1.73/m2   CBC with Differential    Collection Time: 07/15/23  4:49 AM   Result Value Ref Range    WBC 4.13 (L) 4.50 - 11.50 x10(3)/mcL    RBC 2.35 (L) 4.70 - 6.10 x10(6)/mcL    Hgb 7.7 (L) 14.0 - 18.0 g/dL    Hct 25.0 (L) 42.0 - 52.0 %    .4 (H) 80.0 - 94.0 fL    MCH 32.8 (H) 27.0 - 31.0 pg    MCHC 30.8 (L) 33.0 - 36.0 g/dL    RDW 15.9 11.5 - 17.0 %    Platelet 97 (L) 130 - 400 x10(3)/mcL    MPV 11.8 (H) 7.4 - 10.4 fL    Neut % 74.0 %    Lymph % 8.2 %    Mono % 17.4 %    Eos % 0.0 %    Basophil % 0.2 %    Lymph # 0.34 (L) 0.6 - 4.6 x10(3)/mcL    Neut # 3.05 2.1 - 9.2 x10(3)/mcL    Mono # 0.72 0.1 - 1.3 x10(3)/mcL    Eos # 0.00 0 - 0.9 x10(3)/mcL    Baso # 0.01 <=0.2 x10(3)/mcL    IG# 0.01 0 - 0.04 x10(3)/mcL    IG% 0.2 %    NRBC% 0.0 %   PSA, Screening    Collection Time: 07/15/23  4:49 AM   Result Value Ref Range    Prostate Specific Antigen 10.79 (H) <=4.00 ng/mL   He has evidenced progressive anemia, though it is  uncertain whether that relates to his melena or his hematuria.  He appears to have metastatic disease though I am uncertain whether he carries a diagnosis that may explain such.  We will pursue upper endoscopy.  His altered mental status speaks for a serum ammonia and we will order such      Assessment/Plan:  Extremely complex 68-year-old with apparent metastatic disease presents with melena but has hematuria perhaps related to Cheek misadventure, now with progressive anemia and altered mental status.  We will check a serum ammonia and proceed with an upper endoscopy for clarification       Casey Funes PA-C

## 2023-07-15 NOTE — PROGRESS NOTES
[facility] Hospital Medicine Progress Note        Chief Complaint: Inpatient Follow-up for hematuria    HPI:   Mirza Nelson Jr. is a 68 y.o. male with a past medical history of essential hypertension, ESRD on HD MWF, anemia, BPH, cervical radiculopathy, and vitamin-D deficiency presented to [facility] on 7/13/2023 for dark stools.  Nursing home reported they noticed melanotic stools yesterday and patient was more confused than normal.  On exam patient reports he has had dark stools for the past week with intermittent abdominal and back pain.  Patient denies chest pain, shortness of breath, fever, chills, nausea, vomiting, and diarrhea.  Initial vital signs in ED were /69, pulse 87, respirations 19, temperature 37° C, and SpO2 98% on room air.  Labs revealed WBC 6.32, RBC 3.24, hemoglobin 10.7, hematocrit 33.3, .8, potassium 5.5, chloride 95, BUN 52.5, creatinine 10.47, and lactic acid 1.1.  UA revealed greater than 100 red blood cells.  Blood culture was obtained.  CT head revealed chronic age-related changes without acute process.  Chest x-ray revealed no acute cardiopulmonary process identified.  CT abdomen and pelvis without contrast revealed moderate stool in the colon which could reflect an element of constipation, mild to moderate bilateral hydroureteronephrosis with superimposed pyeloureteritis not excluded, enlarged prostate, hyperdense material in the dependent urinary bladder which may reflect blood clots, focal sclerotic lesions seen and left iliac and pubic bones suspicious for sclerotic metastases.  Three-way catheter was placed in ED with gross hematuria.  Patient was started on IV Zosyn.  Urology and nephrology were consulted. Patient was admitted to hospital medicine service for further medical management.        Interval Hx:   The patient was seen and examined.  Starting to get hungry.  Currently NPO for possible GI intervention.    No complaints of any pain  etc..  Noted with drop in H&H, will transfuse 1 unit of packed red blood cells today.  Hematuria seems to be slowly clearing up.  Nephrology on board for dialysis     Case was discussed with patient's nurse and  on the floor.    Objective/physical exam:  General: In no acute distress, afebrile  Chest: Clear to auscultation bilaterally  Heart: RRR, +S1, S2, no appreciable murmur  Abdomen: Soft, nontender, BS +  MSK: Warm, no lower extremity edema, no clubbing or cyanosis  Neurologic: Alert and oriented x4, Cranial nerve II-XII intact, Strength 5/5 in all 4 extremities    VITAL SIGNS: 24 HRS MIN & MAX LAST   Temp  Min: 97.6 °F (36.4 °C)  Max: 100.3 °F (37.9 °C) 97.9 °F (36.6 °C)   BP  Min: 96/59  Max: 190/96 117/69   Pulse  Min: 70  Max: 122  76   Resp  Min: 16  Max: 22 18   SpO2  Min: 95 %  Max: 99 % 98 %     I have reviewed the following labs:    Recent Labs   Lab 07/14/23 0652 07/14/23  1738 07/15/23  0449   WBC 4.88 5.83 4.13*   RBC 2.85* 2.80* 2.35*   HGB 9.4* 9.1* 7.7*   HCT 29.0* 28.3* 25.0*   .8* 101.1* 106.4*   MCH 33.0* 32.5* 32.8*   MCHC 32.4* 32.2* 30.8*   RDW 15.9 15.8 15.9    112* 97*   MPV 11.7* 10.3 11.8*       Recent Labs   Lab 07/13/23  1934 07/14/23 0756 07/14/23 1738 07/14/23  1905 07/15/23  0449    136 136  --  138   K 5.5* 5.1 3.9  --  4.6   CO2 25 23 22*  --  23   BUN 52.5* 57.9* 28.8*  --  41.1*   CREATININE 10.47* 11.43* 6.33*  --  8.31*   CALCIUM 9.1 8.6* 8.3*  --  7.7*   PH  --   --   --  7.460*  --    MG 2.40  --   --   --   --    ALBUMIN 3.7 3.0* 3.1*  --   --    ALKPHOS 71 61 61  --   --    ALT 8 9 11  --   --    AST 12 14 14  --   --    BILITOT 0.8 0.7 1.2  --   --           Microbiology Results (last 7 days)       Procedure Component Value Units Date/Time    Blood Culture #2 **CANNOT BE ORDERED STAT** [[id number]]  (Abnormal) Collected: 07/14/23 0259    Order Status: Completed Specimen: Blood from Hand, Left Updated: 07/15/23 0704     CULTURE, BLOOD  ([facility]) Gram-negative Rods     GRAM STAIN 2 of 2 bottles positive      Gram Negative Rods      Seen in gram stain of broth only    BCID2 Panel [[id number]] Collected: 07/14/23 0259    Order Status: Canceled Specimen: Blood from Arm, Left Updated: 07/15/23 0703    Blood Culture #1 **CANNOT BE ORDERED STAT** [[id number]]  (Abnormal) Collected: 07/14/23 0259    Order Status: Completed Specimen: Blood from Arm, Left Updated: 07/15/23 0703     CULTURE, BLOOD ([facility]) Gram-negative Rods     GRAM STAIN Seen in gram stain of broth only      Gram Negative Rods      1 of 2 Aerobic bottles positive    BCID2 Panel [[id number]]  (Abnormal) Collected: 07/14/23 0259    Order Status: Completed Specimen: Blood from Hand, Left Updated: 07/14/23 1705     CTX-M (ESBL ) Not Detected     IMP (Cabapenemase ) Not Detected     KPC resistance gene (Carbapenemase ) Not Detected     mcr-1 Not Detected     mecA ID N/A     Comment: Note: Antimicrobial resistance can occur via multiple mechanisms. A Not Detected result for antimicrobial resistance gene(s) does not indicate antimicrobial susceptibility. Subculturing is required for species identification and susceptibility testing of   isolates.        mecA/C and MREJ (MRSA) gene N/A     NDM (Carbapenemase ) Not Detected     OXA-48-like (Carbapenemase ) Not Detected     Pablo/B (VRE gene) N/A     VIM (Carbapenemase ) Not Detected     Enterococcus faecalis Not Detected     Enterococcus faecium Not Detected     Listeria monocytogenes Not Detected     Staphylococcus spp. Not Detected     Staphylococcus aureus Not Detected     Staphylococcus epidermidis Not Detected     Staphylococcus lugdunensis Not Detected     Streptococcus spp. Not Detected     Streptococcus agalactiae (Group B) Not Detected     Streptococcus pneumoniae Not Detected     Streptococcus pyogenes (Group A) Not Detected     Acinetobacter calcoaceticus/baumannii complex Not Detected     Bacteroides  fragilis Not Detected     Enterobacterales Detected     Enterobacter cloacae complex Not Detected     Escherichia coli Detected     Klebsiella aerogenes Not Detected     Klebsiella oxytoca Not Detected     Klebsiella pneumoniae group Not Detected     Proteus spp. Not Detected     Salmonella spp. Not Detected     Serratia marcescens Not Detected     Haemophilus influenzae Not Detected     Neisseria meningitidis Not Detected     Pseudomonas aeruginosa Not Detected     Stenotrophomonas maltophilia Not Detected     Candida albicans Not Detected     Candida auris Not Detected     Candida glabrata Not Detected     Candida krusei Not Detected     Candida parapsilosis Not Detected     Candida tropicalis Not Detected     Cryptococcus neoformans/gattii Not Detected    Narrative:      The [employer] BCID2 Panel is a multiplexed nucleic acid test intended for the use with [employer]® 2.0 or [employer]® [employer] Systems for the simultaneous qualitative detection and identification of multiple bacterial and yeast nucleic acids and select genetic determinants associated with antimicrobial resistance.  The [employer] BCID2 Panel test is performed directly on blood culture samples identified as positive by a continuous monitoring blood culture system.  Results are intended to be interpreted in conjunction with Gram stain results.             See below for Radiology    Scheduled Med:   amLODIPine  10 mg Oral Daily    bisacodyL  10 mg Rectal Once    carvediloL  12.5 mg Oral BID    ceFEPime (MAXIPIME) IVPB  1 g Intravenous Daily    doxazosin  8 mg Oral QHS    ferrous sulfate  1 tablet Oral Daily    mupirocin   Nasal BID    pantoprazole  40 mg Intravenous BID    polyethylene glycol  17 g Oral Daily    sevelamer carbonate  800 mg Oral TID        Continuous Infusions:       PRN Meds:  sodium chloride, sodium chloride 0.9%, acetaminophen, hyoscyamine, labetaloL, metoprolol, morphine, oxyCODONE-acetaminophen, sodium chloride 0.9%        Assessment/Plan:  Hematuria   Bilateral hydroureteronephrosis with with superimposed pyeloureteritis not excluded  Melena   Focal sclerotic lesions in left iliac and pubic bone suspicious for sclerotic metastases  ESRD on HD MWF  History of essential hypertension,  anemia, BPH, cervical radiculopathy, and vitamin-D deficiency     Plan:  IV Cefepime 1 g daily   Blood cultures pending, follow-up results   Urology consulted, appreciate recommendations  GI consulted, appreciate recommendations  Close H&H monitoring , noted hemoglobin of 7.7, 1 unit of packed red blood cells today after type and crossmatch  IV Protonix 40 mg BID  Dialysis today to continue MWF schedule  Nephrology consulted, appreciate recommendations  Continue appropriate home medications once med rec updated   Labs in a.m.       VTE prophylaxis:     Patient condition:  Fair      Anticipated discharge and Disposition:   TBD      All diagnosis and differential diagnosis have been reviewed; assessment and plan has been documented; I have personally reviewed the labs and test results that are presently available; I have reviewed the patients medication list; I have reviewed the consulting providers response and recommendations. I have reviewed or attempted to review medical records based upon their availability    All of the patient's questions have been  addressed and answered. Patient's is agreeable to the above stated plan. I will continue to monitor closely and make adjustments to medical management as needed.  _____________________________________________________________________    Nutrition Status:    Radiology:  I have personally reviewed the following imaging and agree with the radiologist.     X-Ray Abdomen AP 1 View  Narrative: EXAMINATION:  XR ABDOMEN AP 1 VIEW    CLINICAL HISTORY:  Abdominal pain;    COMPARISON:  13 July 2023    FINDINGS:  Frontal image of the abdomen.  There is a nonspecific, nonobstructive bowel gas pattern.  There is  moderate stool in the colon.  No large pneumoperitoneum.  Impression: Moderate stool in the colon.    Electronically signed by: Casey Odom  Date:    07/14/2023  Time:    18:11  US Retroperitoneal Complete  Narrative: EXAMINATION:  US RETROPERITONEAL COMPLETE    CLINICAL HISTORY:  hematuria w clots;    TECHNIQUE:  Ultrasound evaluation of the kidneys, region of the ureters, and urinary bladder.    COMPARISON:  Ultrasound 01/06/2022    CT abdomen/pelvis 07/13/2023    FINDINGS:  Moderate bilateral hydronephrosis with bilateral cortical thinning.  No defined renal calcification.    10 cm echogenic area along the dependent bladder.  Some areas of internal blood flow.    Aortic calcifications.  No significant findings regarding the imaged IVC.    Measurements:    - Right kidney: 11.5 cm in length    - Left kidney: 14.4 cm in length  Impression: 1. 10 cm echogenic area along the posterior bladder.  Difficult to tell how much of this is bladder hematoma versus enlarged prostate or bladder mass.  2. Moderate bilateral hydronephrosis.    Electronically signed by: Mervin Tyson  Date:    07/14/2023  Time:    08:54  CT Abdomen Pelvis  Without Contrast  Narrative: EXAMINATION:  CT ABDOMEN PELVIS WITHOUT CONTRAST    CLINICAL HISTORY:  Abdominal pain, acute, nonlocalized;    TECHNIQUE:  Multidetector non-contrast axial CT images of the abdomen and pelvis were obtained with coronal and sagittal reconstructions.    Automatic exposure control was utilized to reduce the patient's radiation dose.    DLP= 515    COMPARISON:  04/18/2023    FINDINGS:  Lines and Tubes: None.    Thorax:    Lungs: There is mild nonspecific dependent change at the lung bases.    Pleura: No pleural effusion is seen.    Heart: The heart size is within normal limits.    Abdomen:    Abdominal Wall: No abdominal wall pathology is seen.    Liver: The liver appears unremarkable.    Biliary System: No intrahepatic or extrahepatic biliary duct dilatation is  seen.    Gallbladder: The gallbladder appears unremarkable.    Pancreas: The pancreas appears unremarkable.    Spleen: The spleen appears unremarkable.    Adrenals: The adrenal glands appear unremarkable.    Kidneys: Mild-to-moderate bilateral hydroureteronephrosis is seen with thinning of the overlying cortex. There is also mild wall thickening of bilateral renal pelves and ureters. Similar findings are also seen on the prior examination and may be related to chronic bladder outlet obstruction with an element of superimposed pyeloureteritis not excluded. Again noted are small bilateral renal cortical cysts. There is a 3 mm nonobstructing stone in the lower pole of the right kidney, unchanged since the prior examination. There is also atrophy of the lower pole of the right kidney.    Aorta: There is pronounced calcification of the abdominal aorta and its branches.    IVC: Unremarkable.    Bowel:    Esophagus: The visualized esophagus appears unremarkable.    Stomach: The stomach appears unremarkable.    Duodenum: Unremarkable appearing duodenum.    Small Bowel: The small bowel appears unremarkable.    Colon: There is moderate stool in the colon which could reflect an element of constipation.    Appendix: The appendix appears unremarkable (series 2 image 69-74).    Peritoneum: No intraperitoneal free air or ascites is seen.    Pelvis:    Bladder: A inelsen catheter is in place. There is mild urinary bladder wall thickening. This is likely due to chronic bladder outlet obstruction. There is hyperdense material in the dependent urinary bladder, which may reflect blood clots.    Male:    Prostate gland: Again noted is markedly enlarged prostate with its median lobe projects into the bladder base.    Bony structures:    Dorsal Spine: There is mild to moderate spondylosis of the visualized dorsal spine.    Bony Pelvis: Focal sclerotic lesions are again seen in the left iliac and pubic bones (series 2 image 92 and 120).  These are suspicious for sclerotic metastases.  Impression: Impression:    1. There is moderate stool in the colon which could reflect an element of constipation.    2. Mild-to-moderate bilateral hydroureteronephrosis is seen with thinning of the overlying cortex. Similar findings are also seen on the prior examination and may be related to chronic bladder outlet obstruction with an element of superimposed pyeloureteritis not excluded.    3. Again noted is markedly enlarged prostate with its median lobe projects into the bladder base.    4. There is mild urinary bladder wall thickening. This is likely due to chronic bladder outlet obstruction. There is hyperdense material in the dependent urinary bladder, which may reflect blood clots.    5. Focal sclerotic lesions are again seen in the left iliac and pubic bones (series 2 image 92 and 120). These are suspicious for sclerotic metastases.    6. Details and other findings as discussed above.    Agree with overnight read.    Electronically signed by: Igor Ferguson  Date:    07/14/2023  Time:    08:15      Alab Reynolds MD   07/15/2023

## 2023-07-15 NOTE — ANESTHESIA POSTPROCEDURE EVALUATION
Anesthesia Post Evaluation    Patient: Mirza Nelson Jr.    Procedure(s) Performed: Procedure(s) (LRB):  EGD (ESOPHAGOGASTRODUODENOSCOPY) (N/A)    Final Anesthesia Type: general      Patient location during evaluation: PACU  Patient participation: Yes- Able to Participate  Level of consciousness: awake and alert and oriented  Post-procedure vital signs: reviewed and stable  Pain management: adequate  Airway patency: patent    PONV status at discharge: No PONV  Anesthetic complications: no      Cardiovascular status: hemodynamically stable  Respiratory status: unassisted  Hydration status: euvolemic  Follow-up not needed.          Vitals Value Taken Time   /73 07/15/23 1620   Temp 36.9 °C (98.4 °F) 07/15/23 1550   Pulse 76 07/15/23 1620   Resp 17 07/15/23 1620   SpO2 96 % 07/15/23 1620         No case tracking events are documented in the log.      Pain/Roger Score: Pain Rating Prior to Med Admin: 10 (7/14/2023  8:16 PM)  Pain Rating Post Med Admin: 2 (7/14/2023  8:46 PM)  Roger Score: 10 (7/15/2023  4:25 PM)

## 2023-07-15 NOTE — NURSING
Nurses Note -- 4 Eyes      7/14/2023   8:50 PM      Skin assessed during: Admit      [] No Altered Skin Integrity Present    []Prevention Measures Documented      [x] Yes- Altered Skin Integrity Present or Discovered   [x] LDA Added if Not in Epic (Describe Wound)   [x] New Altered Skin Integrity was Present on Admit and Documented in LDA   [] Wound Image Taken    Wound Care Consulted? Yes    Attending Nurse:  Disha Gutierrez RN     Second RN/Staff Member:  ARMANA Webb

## 2023-07-16 NOTE — PROGRESS NOTES
[facility]  Hospital Medicine Progress Note        Chief Complaint: Inpatient Follow-up for HEmaturia    HPI:   Mirza Nelson Jr. is a 68 y.o. male with a past medical history of essential hypertension, ESRD on HD MWF, anemia, BPH, cervical radiculopathy, and vitamin-D deficiency presented to [facility] on 7/13/2023 for dark stools.  Nursing home reported they noticed melanotic stools yesterday and patient was more confused than normal.  On exam patient reports he has had dark stools for the past week with intermittent abdominal and back pain.  Patient denies chest pain, shortness of breath, fever, chills, nausea, vomiting, and diarrhea.  Initial vital signs in ED were /69, pulse 87, respirations 19, temperature 37° C, and SpO2 98% on room air.  Labs revealed WBC 6.32, RBC 3.24, hemoglobin 10.7, hematocrit 33.3, .8, potassium 5.5, chloride 95, BUN 52.5, creatinine 10.47, and lactic acid 1.1.  UA revealed greater than 100 red blood cells.  Blood culture was obtained.  CT head revealed chronic age-related changes without acute process.  Chest x-ray revealed no acute cardiopulmonary process identified.  CT abdomen and pelvis without contrast revealed moderate stool in the colon which could reflect an element of constipation, mild to moderate bilateral hydroureteronephrosis with superimposed pyeloureteritis not excluded, enlarged prostate, hyperdense material in the dependent urinary bladder which may reflect blood clots, focal sclerotic lesions seen and left iliac and pubic bones suspicious for sclerotic metastases.  Three-way catheter was placed in ED with gross hematuria.  Patient was started on IV Zosyn.  Urology and nephrology were consulted. Patient was admitted to hospital medicine service for further medical management.     Interval Hx:   The patient was seen and examined.  Continues with bladder irrigation and seems to be clearing out.  Did have some clots earlier in the  night however the most recent output was noted to be almost clear.    Status post 1 unit of packed red blood cells yesterday however labs for today are still pending.  Nursing staff advised to transfuse 1 more unit if hemoglobin less than 7.  Will order labs for a.m..    EGD noted with mild gastritis and moderate bulbar duodenitis, no active ulcerations or erosions    Case was discussed with patient's nurse     Objective/physical exam:  General: In no acute distress, afebrile  Chest: Clear to auscultation bilaterally  Heart: RRR, +S1, S2, no appreciable murmur  Abdomen: Soft, nontender, BS +  MSK: Warm, no lower extremity edema, no clubbing or cyanosis  Neurologic: Alert and oriented x4, nonfocal     VITAL SIGNS: 24 HRS MIN & MAX LAST   Temp  Min: 97.5 °F (36.4 °C)  Max: 99.1 °F (37.3 °C) 98.3 °F (36.8 °C)   BP  Min: 108/58  Max: 148/81 (!) 108/58   Pulse  Min: 68  Max: 91  72   Resp  Min: 15  Max: 22 18   SpO2  Min: 94 %  Max: 99 % 95 %     I have reviewed the following labs:    Recent Labs   Lab 07/14/23 0652 07/14/23  1738 07/15/23  0449   WBC 4.88 5.83 4.13*   RBC 2.85* 2.80* 2.35*   HGB 9.4* 9.1* 7.7*   HCT 29.0* 28.3* 25.0*   .8* 101.1* 106.4*   MCH 33.0* 32.5* 32.8*   MCHC 32.4* 32.2* 30.8*   RDW 15.9 15.8 15.9    112* 97*   MPV 11.7* 10.3 11.8*       Recent Labs   Lab 07/13/23  1934 07/14/23  0756 07/14/23 1738 07/14/23  1905 07/15/23  0449    136 136  --  138   K 5.5* 5.1 3.9  --  4.6   CO2 25 23 22*  --  23   BUN 52.5* 57.9* 28.8*  --  41.1*   CREATININE 10.47* 11.43* 6.33*  --  8.31*   CALCIUM 9.1 8.6* 8.3*  --  7.7*   PH  --   --   --  7.460*  --    MG 2.40  --   --   --   --    ALBUMIN 3.7 3.0* 3.1*  --   --    ALKPHOS 71 61 61  --   --    ALT 8 9 11  --   --    AST 12 14 14  --   --    BILITOT 0.8 0.7 1.2  --   --           Microbiology Results (last 7 days)       Procedure Component Value Units Date/Time    Blood Culture #1 **CANNOT BE ORDERED STAT** [[id number]]  (Abnormal)   (Susceptibility) Collected: 07/14/23 0259    Order Status: Completed Specimen: Blood from Arm, Left Updated: 07/16/23 0805     CULTURE, BLOOD ([facility]) Escherichia coli     GRAM STAIN Seen in gram stain of broth only      Gram Negative Rods      1 of 2 Aerobic bottles positive    Blood Culture #2 **CANNOT BE ORDERED STAT** [[id number]]  (Abnormal)  (Susceptibility) Collected: 07/14/23 0259    Order Status: Completed Specimen: Blood from Hand, Left Updated: 07/16/23 0805     CULTURE, BLOOD ([facility]) Escherichia coli     GRAM STAIN 2 of 2 bottles positive      Gram Negative Rods      Seen in gram stain of broth only    BCID2 Panel [[id number]] Collected: 07/14/23 0259    Order Status: Canceled Specimen: Blood from Arm, Left Updated: 07/15/23 0703    BCID2 Panel [[id number]]  (Abnormal) Collected: 07/14/23 0259    Order Status: Completed Specimen: Blood from Hand, Left Updated: 07/14/23 1705     CTX-M (ESBL ) Not Detected     IMP (Cabapenemase ) Not Detected     KPC resistance gene (Carbapenemase ) Not Detected     mcr-1 Not Detected     mecA ID N/A     Comment: Note: Antimicrobial resistance can occur via multiple mechanisms. A Not Detected result for antimicrobial resistance gene(s) does not indicate antimicrobial susceptibility. Subculturing is required for species identification and susceptibility testing of   isolates.        mecA/C and MREJ (MRSA) gene N/A     NDM (Carbapenemase ) Not Detected     OXA-48-like (Carbapenemase ) Not Detected     Pablo/B (VRE gene) N/A     VIM (Carbapenemase ) Not Detected     Enterococcus faecalis Not Detected     Enterococcus faecium Not Detected     Listeria monocytogenes Not Detected     Staphylococcus spp. Not Detected     Staphylococcus aureus Not Detected     Staphylococcus epidermidis Not Detected     Staphylococcus lugdunensis Not Detected     Streptococcus spp. Not Detected     Streptococcus agalactiae (Group B) Not Detected      Streptococcus pneumoniae Not Detected     Streptococcus pyogenes (Group A) Not Detected     Acinetobacter calcoaceticus/baumannii complex Not Detected     Bacteroides fragilis Not Detected     Enterobacterales Detected     Enterobacter cloacae complex Not Detected     Escherichia coli Detected     Klebsiella aerogenes Not Detected     Klebsiella oxytoca Not Detected     Klebsiella pneumoniae group Not Detected     Proteus spp. Not Detected     Salmonella spp. Not Detected     Serratia marcescens Not Detected     Haemophilus influenzae Not Detected     Neisseria meningitidis Not Detected     Pseudomonas aeruginosa Not Detected     Stenotrophomonas maltophilia Not Detected     Candida albicans Not Detected     Candida auris Not Detected     Candida glabrata Not Detected     Candida krusei Not Detected     Candida parapsilosis Not Detected     Candida tropicalis Not Detected     Cryptococcus neoformans/gattii Not Detected    Narrative:      The [employer] BCID2 Panel is a multiplexed nucleic acid test intended for the use with [employer] or [employer] for the simultaneous qualitative detection and identification of multiple bacterial and yeast nucleic acids and select genetic determinants associated with antimicrobial resistance.  The [employer] BCID2 Panel test is performed directly on blood culture samples identified as positive by a continuous monitoring blood culture system.  Results are intended to be interpreted in conjunction with Gram stain results.             See below for Radiology    Scheduled Med:   amLODIPine  10 mg Oral Daily    bisacodyL  10 mg Rectal Once    carvediloL  12.5 mg Oral BID    ceFEPime (MAXIPIME) IVPB  1 g Intravenous Daily    doxazosin  8 mg Oral QHS    ferrous sulfate  1 tablet Oral Daily    mupirocin   Nasal BID    pantoprazole  40 mg Intravenous BID    polyethylene glycol  17 g Oral Daily    sevelamer carbonate  800 mg Oral TID        Continuous  Infusions:       PRN Meds:  sodium chloride, sodium chloride 0.9%, acetaminophen, hyoscyamine, labetaloL, metoprolol, morphine, oxyCODONE-acetaminophen, sodium chloride 0.9%       Assessment/Plan:  Hematuria   Bilateral hydroureteronephrosis with with superimposed pyeloureteritis not excluded  Melena   Focal sclerotic lesions in left iliac and pubic bone suspicious for sclerotic metastases  ESRD on HD MWF  History of essential hypertension,  anemia, BPH, cervical radiculopathy, and vitamin-D deficiency     Plan:  IV Cefepime 1 g daily   Blood cultures with EColi  Urology consulted, appreciate recommendations  GI consulted, status post EGD and biopsy with very mild gastritis and moderate bulbar duodenitis however no active erosion or ulceration.     Close H&H monitoring , noted hemoglobin of 7.7, 1 unit of packed red blood cells on 07/15 2023, hemoglobin hematocrit pending from this morning, follow-up and transfuse another unit if hemoglobin less than 7  IV Protonix 40 mg BID  Dialysis today to continue MWF schedule  Nephrology consulted, appreciate recommendations  Continue appropriate home medications once med rec updated   Labs in a.m.          VTE prophylaxis: SCDs    Patient condition:  Stable    Anticipated discharge and Disposition:   TBD      All diagnosis and differential diagnosis have been reviewed; assessment and plan has been documented; I have personally reviewed the labs and test results that are presently available; I have reviewed the patients medication list; I have reviewed the consulting providers response and recommendations. I have reviewed or attempted to review medical records based upon their availability    All of the patient's questions have been  addressed and answered. Patient's is agreeable to the above stated plan. I will continue to monitor closely and make adjustments to medical management as needed.  _____________________________________________________________________    Nutrition  Status:    Radiology:  I have personally reviewed the following imaging and agree with the radiologist.     CT Chest Without Contrast  Narrative: EXAMINATION:  CT CHEST WITHOUT CONTRAST    CLINICAL HISTORY:  Rule out occult malignancy;    TECHNIQUE:  Multidetector axial images were performed from thoracic inlet to below hemidiaphragms without intravenous contrast administration. Sagittal and coronal reformations performed.    Dose length product of 225 mGycm. Automated exposure control was utilized to minimize radiation dose.    COMPARISON:  Chest radiograph July 13, 2023.  CT abdomen pelvis July 13, 2023    FINDINGS:  Lungs are remarkable for paraseptal and centrilobular emphysema with some peripheral honeycombing.  There are bilateral lungs peripheral fine and coarse reticulations consistent with fibrosis.  Bronchiectasis involve bilateral lower lung lobes.  Within the left lower lung lobe are denser opacifications which may be due to atelectasis without exclusion of mild associated infiltrates.  There is no discrete pulmonary nodule or mass lesion.  No significant fluid accumulation within the pleural or the pericardial spaces.    Chest lymph nodes are not enlarged in size.  Extensive coronary arteries calcified plaques.  Ectasia of thoracic aorta with maximum diameter of 3.8 cm.    Bilateral kidneys collecting systems dilatation as described on previous CT abdomen report.  There are degenerative changes of the thoracic spine.  No acute or aggressive skeletal abnormality.  Impression: 1. No acute findings identified.    2. Lungs emphysematous changes and scarring.    3. Cardiomegaly and coronary artery disease.    Electronically signed by: Dipak Paredes  Date:    07/15/2023  Time:    18:39      Alba Reynolds MD   07/16/2023

## 2023-07-16 NOTE — PROGRESS NOTES
Nephrology follow up progress note    HPI:      Mirza Nelson Jr. is a 68 y.o. male presented to the hospital with abdominal pains.  He was found to have distended bladder.  Indwelling Cheek catheter was placed and he had hematuria.  For which she required bladder irrigation and hematuria seems to be clearing up.  His dialysis days are Monday Wednesday Friday and he was dialyzed yesterday.    Some how he is NPO thinking that he may need some GI procedure but patient reports that his last bowel movement was on Wednesday and there is no history of any blood in the stools.  He is complaining of starvation.  Denies any chest pains or abdominal pains.    7/16/2023 It looks like he had upper endoscopy done yesterday.  Results noted.  Now he is able to eat and drink fine.  His hematuria is also resolving and this morning he has no new complaint.    Interval history:          Review of Systems:       Past medical, family, surgical, and social history reviewed and unchanged from initial consult note.     Objective:       VITAL SIGNS: 24 HR MIN & MAX LAST    Temp  Min: 97.5 °F (36.4 °C)  Max: 99.1 °F (37.3 °C)  98.3 °F (36.8 °C)        BP  Min: 108/58  Max: 148/81  (!) 108/58     Pulse  Min: 68  Max: 91  72     Resp  Min: 15  Max: 22  18    SpO2  Min: 94 %  Max: 99 %  95 %      GEN:  Very much awake alert oriented to time person place lying down in the bed no acute distress noted.  HEENT: Conjunctiva anicteric, pupils reactive.  Extraocular movements intact no oral lesion neck supple no JVD.  CV: RRR without rub or gallop.  PULM: CTAB, unlabored  ABD: Soft, NT/ND abdomen with NABS  EXT: No cyanosis or edema, right upper extremity AV fistula noted  SKIN: Warm and dry  PSYCH: Awake, alert, and appropriately conversant  Vascular access:  Right upper extremity AV fistula  Indwelling Cheek catheter noted in the urine is clear.        Component Value Date/Time     07/15/2023 0449     07/14/2023 1738      09/27/2018 0800    K 4.6 07/15/2023 0449    K 3.9 07/14/2023 1738    K 3.5 02/24/2021 0609    K 3.9 09/27/2018 0800    CHLORIDE 99 07/15/2023 0449    CHLORIDE 97 (L) 07/14/2023 1738    CO2 23 07/15/2023 0449    CO2 22 (L) 07/14/2023 1738    CO2 34 (H) 09/27/2018 0800    BUN 41.1 (H) 07/15/2023 0449    BUN 28.8 (H) 07/14/2023 1738    BUN 24 (H) 09/27/2018 0800    CREATININE 8.31 (H) 07/15/2023 0449    CREATININE 6.33 (H) 07/14/2023 1738    CREATININE 5.4 (H) 09/27/2018 0800    CALCIUM 7.7 (L) 07/15/2023 0449    CALCIUM 8.3 (L) 07/14/2023 1738    CALCIUM 9.2 09/27/2018 0800    PHOS 6.6 (H) 07/14/2023 0756    PHOS 3.5 09/27/2018 0800            Component Value Date/Time    WBC 4.13 (L) 07/15/2023 0449    WBC 5.83 07/14/2023 1738    WBC 5.35 09/27/2018 0800    HGB 7.7 (L) 07/15/2023 0449    HGB 9.1 (L) 07/14/2023 1738    HGB 11.1 (L) 09/27/2018 0800    HCT 25.0 (L) 07/15/2023 0449    HCT 28.3 (L) 07/14/2023 1738    HCT 33.0 (L) 11/30/2020 0859    HCT 35.1 (L) 09/27/2018 0800    PLT 97 (L) 07/15/2023 0449     (L) 07/14/2023 1738     09/27/2018 0800       Imaging reviewed      Assessment / Plan:   ESRD with dialysis days Monday Wednesday Friday  Bacteremia with Gram-negative rods.  Hematuria.  Now resolved.  Indwelling Cheek catheter noted.  Elevated PSA  Anemia probably secondary to ESRD and hematuria.    Recommendations  Continue dialysis Monday Wednesday Friday  Check labs tomorrow.  Continue antibiotics

## 2023-07-16 NOTE — PROGRESS NOTES
Urology  Awake and alert this AM    Urine pink/clear this am  will wean CBI  Had a few small clots last night

## 2023-07-16 NOTE — PLAN OF CARE
Problem: Adult Inpatient Plan of Care  Goal: Plan of Care Review  Outcome: Ongoing, Progressing  Flowsheets (Taken 7/15/2023 2016)  Plan of Care Reviewed With: patient  Goal: Patient-Specific Goal (Individualized)  Outcome: Ongoing, Progressing  Goal: Absence of Hospital-Acquired Illness or Injury  Outcome: Ongoing, Progressing  Intervention: Identify and Manage Fall Risk  Flowsheets (Taken 7/15/2023 2016)  Safety Promotion/Fall Prevention:   assistive device/personal item within reach   medications reviewed   nonskid shoes/socks when out of bed   side rails raised x 2  Intervention: Prevent Skin Injury  Flowsheets (Taken 7/15/2023 2016)  Body Position: position changed independently  Skin Protection:   adhesive use limited   incontinence pads utilized   tubing/devices free from skin contact  Intervention: Prevent and Manage VTE (Venous Thromboembolism) Risk  Flowsheets (Taken 7/15/2023 2016)  Activity Management: Rolling - L1  VTE Prevention/Management:   ambulation promoted   bleeding risk assessed   ROM (active) performed  Range of Motion:   active ROM (range of motion) encouraged   ROM (range of motion) performed  Intervention: Prevent Infection  Flowsheets (Taken 7/15/2023 2016)  Infection Prevention:   rest/sleep promoted   single patient room provided  Goal: Optimal Comfort and Wellbeing  Outcome: Ongoing, Progressing  Intervention: Monitor Pain and Promote Comfort  Flowsheets (Taken 7/15/2023 2016)  Pain Management Interventions:   care clustered   medication offered   pain management plan reviewed with patient/caregiver   pillow support provided   position adjusted  Intervention: Provide Person-Centered Care  Flowsheets (Taken 7/15/2023 2016)  Trust Relationship/Rapport: care explained  Goal: Readiness for Transition of Care  Outcome: Ongoing, Progressing     Problem: Infection  Goal: Absence of Infection Signs and Symptoms  Outcome: Ongoing, Progressing  Intervention: Prevent or Manage  Infection  Flowsheets (Taken 7/15/2023 2016)  Infection Management: aseptic technique maintained     Problem: Device-Related Complication Risk (Hemodialysis)  Goal: Safe, Effective Therapy Delivery  Outcome: Ongoing, Progressing  Intervention: Optimize Device Care and Function  Flowsheets (Taken 7/15/2023 2016)  Medication Review/Management: medications reviewed     Problem: Hemodynamic Instability (Hemodialysis)  Goal: Effective Tissue Perfusion  Outcome: Ongoing, Progressing     Problem: Infection (Hemodialysis)  Goal: Absence of Infection Signs and Symptoms  Outcome: Ongoing, Progressing     Problem: Skin Injury Risk Increased  Goal: Skin Health and Integrity  Outcome: Ongoing, Progressing  Intervention: Optimize Skin Protection  Flowsheets (Taken 7/15/2023 2016)  Pressure Reduction Techniques:   frequent weight shift encouraged   weight shift assistance provided  Skin Protection:   adhesive use limited   incontinence pads utilized   tubing/devices free from skin contact  Head of Bed (HOB) Positioning: HOB at 30-45 degrees  Intervention: Promote and Optimize Oral Intake  Flowsheets (Taken 7/15/2023 2016)  Oral Nutrition Promotion: rest periods promoted     Problem: Impaired Wound Healing  Goal: Optimal Wound Healing  Outcome: Ongoing, Progressing  Intervention: Promote Wound Healing  Flowsheets (Taken 7/15/2023 2016)  Oral Nutrition Promotion: rest periods promoted  Activity Management: Rolling - L1  Pain Management Interventions:   care clustered   medication offered   pain management plan reviewed with patient/caregiver   pillow support provided   position adjusted

## 2023-07-17 NOTE — NURSING
07/17/23 1757   Post-Hemodialysis Assessment   Rinseback Volume (mL) 500 mL   Blood Volume Processed (Liters) 63.1 L   Dialyzer Clearance Lightly streaked   Duration of Treatment 180 minutes   Total UF (mL) 1500 mL   Net Fluid Removal 1000   Patient Response to Treatment Tolerated well   Post-Hemodialysis Comments Tx completed, pt reinfused. AVF deaccessed per P&P, pressure applied at sites until hemostasis achieved. Pt ran for 3 hours, NET removed= 1000ml

## 2023-07-17 NOTE — PROGRESS NOTES
[facility]  Hospital Medicine Progress Note        Chief Complaint: Inpatient Follow-up     HPI:   68 y.o. male with a past medical history of essential hypertension, ESRD on HD MWF, anemia, BPH, cervical radiculopathy, and vitamin-D deficiency presented to [facility] on 7/13/2023 for dark stools.  Nursing home reported they noticed melanotic stools yesterday and patient was more confused than normal.  On exam patient reports he has had dark stools for the past week with intermittent abdominal and back pain.  Patient denies chest pain, shortness of breath, fever, chills, nausea, vomiting, and diarrhea.  Initial vital signs in ED were /69, pulse 87, respirations 19, temperature 37° C, and SpO2 98% on room air.  Labs revealed WBC 6.32, RBC 3.24, hemoglobin 10.7, hematocrit 33.3, .8, potassium 5.5, chloride 95, BUN 52.5, creatinine 10.47, and lactic acid 1.1.  UA revealed greater than 100 red blood cells.  Blood culture was obtained.  CT head revealed chronic age-related changes without acute process.  Chest x-ray revealed no acute cardiopulmonary process identified.  CT abdomen and pelvis without contrast revealed moderate stool in the colon which could reflect an element of constipation, mild to moderate bilateral hydroureteronephrosis with superimposed pyeloureteritis not excluded, enlarged prostate, hyperdense material in the dependent urinary bladder which may reflect blood clots, focal sclerotic lesions seen and left iliac and pubic bones suspicious for sclerotic metastases.  Three-way catheter was placed in ED with gross hematuria.  Patient was started on IV Zosyn.  Urology and nephrology were consulted. Patient was admitted to hospital medicine service for further medical management.      Interval Hx:   Doing okay this morning.  No new complaints.  Remains on continues bladder irrigation.  No current pain.    Objective/physical exam:  General: In no acute distress,  afebrile  Chest: Clear to auscultation bilaterally  Heart: RRR, +S1, S2, no appreciable murmur  Abdomen: Soft, nontender, BS +  MSK: Warm, no lower extremity edema, no clubbing or cyanosis  Neurologic: Alert and oriented x4, nonfocal  VITAL SIGNS: 24 HRS MIN & MAX LAST   Temp  Min: 97.7 °F (36.5 °C)  Max: 98.2 °F (36.8 °C) 98.1 °F (36.7 °C)   BP  Min: 108/58  Max: 144/76 (!) 144/76   Pulse  Min: 67  Max: 78  75   Resp  Min: 16  Max: 20 18   SpO2  Min: 90 %  Max: 96 % (!) 94 %       Recent Labs   Lab 07/14/23  1738 07/15/23  0449 07/16/23  0845   WBC 5.83 4.13* 5.96   RBC 2.80* 2.35* 2.66*   HGB 9.1* 7.7* 8.5*   HCT 28.3* 25.0* 26.1*   .1* 106.4* 98.1*   MCH 32.5* 32.8* 32.0*   MCHC 32.2* 30.8* 32.6*   RDW 15.8 15.9 17.2*   * 97* 100*   MPV 10.3 11.8* 10.8*       Recent Labs   Lab 07/13/23  1934 07/14/23  0756 07/14/23  1738 07/14/23  1905 07/15/23  0449 07/16/23  0845    136 136  --  138 134*   K 5.5* 5.1 3.9  --  4.6 4.3   CO2 25 23 22*  --  23 24   BUN 52.5* 57.9* 28.8*  --  41.1* 60.5*   CREATININE 10.47* 11.43* 6.33*  --  8.31* 10.57*   CALCIUM 9.1 8.6* 8.3*  --  7.7* 7.3*   PH  --   --   --  7.460*  --   --    MG 2.40  --   --   --   --   --    ALBUMIN 3.7 3.0* 3.1*  --   --  2.3*   ALKPHOS 71 61 61  --   --  47   ALT 8 9 11  --   --  11   AST 12 14 14  --   --  19   BILITOT 0.8 0.7 1.2  --   --  0.8          Microbiology Results (last 7 days)       Procedure Component Value Units Date/Time    Blood Culture #1 **CANNOT BE ORDERED STAT** [[id number]]  (Abnormal)  (Susceptibility) Collected: 07/14/23 0259    Order Status: Completed Specimen: Blood from Arm, Left Updated: 07/16/23 0805     CULTURE, BLOOD ([facility]) Escherichia coli     GRAM STAIN Seen in gram stain of broth only      Gram Negative Rods      1 of 2 Aerobic bottles positive    Blood Culture #2 **CANNOT BE ORDERED STAT** [[id number]]  (Abnormal)  (Susceptibility) Collected: 07/14/23 0259    Order Status: Completed Specimen: Blood from  Hand, Left Updated: 07/16/23 0805     CULTURE, BLOOD ([facility]) Escherichia coli     GRAM STAIN 2 of 2 bottles positive      Gram Negative Rods      Seen in gram stain of broth only    BCID2 Panel [[id number]] Collected: 07/14/23 0259    Order Status: Canceled Specimen: Blood from Arm, Left Updated: 07/15/23 0703    BCID2 Panel [[id number]]  (Abnormal) Collected: 07/14/23 0259    Order Status: Completed Specimen: Blood from Hand, Left Updated: 07/14/23 1705     CTX-M (ESBL ) Not Detected     IMP (Cabapenemase ) Not Detected     KPC resistance gene (Carbapenemase ) Not Detected     mcr-1 Not Detected     mecA ID N/A     Comment: Note: Antimicrobial resistance can occur via multiple mechanisms. A Not Detected result for antimicrobial resistance gene(s) does not indicate antimicrobial susceptibility. Subculturing is required for species identification and susceptibility testing of   isolates.        mecA/C and MREJ (MRSA) gene N/A     NDM (Carbapenemase ) Not Detected     OXA-48-like (Carbapenemase ) Not Detected     Pablo/B (VRE gene) N/A     VIM (Carbapenemase ) Not Detected     Enterococcus faecalis Not Detected     Enterococcus faecium Not Detected     Listeria monocytogenes Not Detected     Staphylococcus spp. Not Detected     Staphylococcus aureus Not Detected     Staphylococcus epidermidis Not Detected     Staphylococcus lugdunensis Not Detected     Streptococcus spp. Not Detected     Streptococcus agalactiae (Group B) Not Detected     Streptococcus pneumoniae Not Detected     Streptococcus pyogenes (Group A) Not Detected     Acinetobacter calcoaceticus/baumannii complex Not Detected     Bacteroides fragilis Not Detected     Enterobacterales Detected     Enterobacter cloacae complex Not Detected     Escherichia coli Detected     Klebsiella aerogenes Not Detected     Klebsiella oxytoca Not Detected     Klebsiella pneumoniae group Not Detected     Proteus spp. Not Detected      Salmonella spp. Not Detected     Serratia marcescens Not Detected     Haemophilus influenzae Not Detected     Neisseria meningitidis Not Detected     Pseudomonas aeruginosa Not Detected     Stenotrophomonas maltophilia Not Detected     Candida albicans Not Detected     Candida auris Not Detected     Candida glabrata Not Detected     Candida krusei Not Detected     Candida parapsilosis Not Detected     Candida tropicalis Not Detected     Cryptococcus neoformans/gattii Not Detected    Narrative:      The [employer] BCID2 Panel is a multiplexed nucleic acid test intended for the use with [employer]® 2.0 or [employer]® [employer] for the simultaneous qualitative detection and identification of multiple bacterial and yeast nucleic acids and select genetic determinants associated with antimicrobial resistance.  The [employer] BCID2 Panel test is performed directly on blood culture samples identified as positive by a continuous monitoring blood culture system.  Results are intended to be interpreted in conjunction with Gram stain results.             See below for Radiology    Scheduled Med:   amLODIPine  10 mg Oral Daily    bisacodyL  10 mg Rectal Once    carvediloL  12.5 mg Oral BID    ceFEPime (MAXIPIME) IVPB  1 g Intravenous Daily    doxazosin  8 mg Oral QHS    ferrous sulfate  1 tablet Oral Daily    mupirocin   Nasal BID    pantoprazole  40 mg Intravenous BID    polyethylene glycol  17 g Oral Daily    sevelamer carbonate  800 mg Oral TID        Continuous Infusions:       PRN Meds:  sodium chloride, sodium chloride 0.9%, acetaminophen, hyoscyamine, labetaloL, metoprolol, morphine, oxyCODONE-acetaminophen, sodium chloride 0.9%       Assessment/Plan:   Hematuria   Bilateral hydroureteronephrosis with with superimposed pyeloureteritis not excluded  Melena   Focal sclerotic lesions in left iliac and pubic bone suspicious for sclerotic metastases  ESRD on HD MWF  History of essential hypertension,  anemia,  BPH, cervical radiculopathy, and vitamin-D deficiency     Blood cultures with E coli.  We need repeat to document clearance.  Remains on IV cefepime for now.    Also on bladder irrigation.  Urine has cleared.  Will follow up Urology recommendations.    Appreciate GI recs.  Status post EGD with biopsy.  Mild gastritis.  Continue Protonix.  Hemoglobin has been increasing.  Nephrology following along.  HD Monday Wednesday and Friday.    Vital signs are stable.  Will get a set of labs this morning.        All diagnosis and differential diagnosis have been reviewed; assessment and plan has been documented; I have personally reviewed the labs and test results that are presently available; I have reviewed the patients medication list; I have reviewed the consulting providers response and recommendations. I have reviewed or attempted to review medical records based upon their availability    All of the patient's questions have been  addressed and answered. Patient's is agreeable to the above stated plan. I will continue to monitor closely and make adjustments to medical management as needed.  _____________________________________________________________________      Radiology:  CT Chest Without Contrast  Narrative: EXAMINATION:  CT CHEST WITHOUT CONTRAST    CLINICAL HISTORY:  Rule out occult malignancy;    TECHNIQUE:  Multidetector axial images were performed from thoracic inlet to below hemidiaphragms without intravenous contrast administration. Sagittal and coronal reformations performed.    Dose length product of 225 mGycm. Automated exposure control was utilized to minimize radiation dose.    COMPARISON:  Chest radiograph July 13, 2023.  CT abdomen pelvis July 13, 2023    FINDINGS:  Lungs are remarkable for paraseptal and centrilobular emphysema with some peripheral honeycombing.  There are bilateral lungs peripheral fine and coarse reticulations consistent with fibrosis.  Bronchiectasis involve bilateral lower lung  lobes.  Within the left lower lung lobe are denser opacifications which may be due to atelectasis without exclusion of mild associated infiltrates.  There is no discrete pulmonary nodule or mass lesion.  No significant fluid accumulation within the pleural or the pericardial spaces.    Chest lymph nodes are not enlarged in size.  Extensive coronary arteries calcified plaques.  Ectasia of thoracic aorta with maximum diameter of 3.8 cm.    Bilateral kidneys collecting systems dilatation as described on previous CT abdomen report.  There are degenerative changes of the thoracic spine.  No acute or aggressive skeletal abnormality.  Impression: 1. No acute findings identified.    2. Lungs emphysematous changes and scarring.    3. Cardiomegaly and coronary artery disease.    Electronically signed by: Dipak Paredes  Date:    07/15/2023  Time:    18:39      Danny Spann MD   07/17/2023

## 2023-07-17 NOTE — PROGRESS NOTES
"Gastroenterology Progress Note    Subjective:  hgb 8.5--7.5.  Patient without any acute GI complaints this morning, denies abdominal pain, N/V.  Tolerating p.o. without issues.      Objective:    ROS:    Review of Systems   Constitutional:  Negative for chills and fever.   Respiratory:  Negative for shortness of breath.    Gastrointestinal:  Negative for abdominal pain, blood in stool, heartburn, melena, nausea and vomiting.   Skin:  Negative for itching and rash.   Neurological:  Negative for seizures and loss of consciousness.   Psychiatric/Behavioral:  The patient is not nervous/anxious.        Vital Signs:  BP (!) 144/76   Pulse 75   Temp 98.1 °F (36.7 °C) (Oral)   Resp 18   Ht 5' 6" (1.676 m)   Wt 72.6 kg (160 lb)   SpO2 (!) 94%   BMI 25.82 kg/m²   Body mass index is 25.82 kg/m².    Physical Exam:    Physical Exam  Constitutional:       General: He is not in acute distress.  HENT:      Head: Normocephalic and atraumatic.      Nose: Nose normal.   Eyes:      Extraocular Movements: Extraocular movements intact.   Cardiovascular:      Pulses: Normal pulses.   Abdominal:      General: Abdomen is flat. There is no distension.      Palpations: Abdomen is soft.      Tenderness: There is no abdominal tenderness. There is no guarding.   Musculoskeletal:         General: No swelling or tenderness.   Skin:     General: Skin is warm.      Coloration: Skin is not jaundiced.   Neurological:      Mental Status: He is alert. Mental status is at baseline.   Psychiatric:         Thought Content: Thought content normal.       Labs:  No results found for this or any previous visit (from the past 24 hour(s)).      Assessment/Plan:  68 y.o. AA male known to Dr. Butler from previous hospital admission with a past medical history of essential hypertension, ESRD on HD MWF, anemia, BPH, cervical radiculopathy, and vitamin-D deficiency.  Here with apparent melena, back, and abdominal pain.       Report of melena  S/p EGD with " retained food, very mild gastritis and moderate bulbar duodenitis, without any active erosions or ulceration.  Antral biopsies pending to rule out H pylori.  - supp crae  Acute on chronic anemia- GI vs urologic loss   -Continue ppi bid  -Monitor H/H and transfuse as needed to Hgb 7  -Monitor stools for bleeding  3.  Focal sclerotic lesions in his iliac and pubic bones, suspicious for metastases- no known primary?  - NM bone scan pending    - no prior colonoscopy- will discuss if inpatient colonoscopy is warranted with NAZARIO Lopez-C  Gastroenterology  [facility]

## 2023-07-17 NOTE — PT/OT/SLP PROGRESS
Occupational Therapy      Patient Name:  Mirza Nelson Jr.   MRN:  [id number]    OT eval orders received. Pt declined therapy today d/t increased pain and previously throwing up. Pt agreed to participate in therapy tomorrow. Will follow up as appropriate.     7/17/2023

## 2023-07-17 NOTE — PROGRESS NOTES
[facility]  Nephrology  Progress Note    Patient Name: Mirza Nelson Jr.  MRN: [id number]  Admission Date: 7/13/2023  Hospital Length of Stay: 3 days  Attending Provider: Danny Spann MD   Primary Care Physician: Elo Flores MD  Principal Problem:<principal problem not specified>      Subjective:   This is a 68-year-old  male with dialysis dependent ESRD.  He dialyzes at [facility] on Monday Wednesday Friday by way of right upper arm AV fistula.  He presented to ED on 07/13/2023 with complaints of dark stool.  He also endorses lightheadedness, back pain, neck pain.  CT of the abdomen and pelvis revealed mild to moderate bilateral hydroureteronephrosis with enlarged prostate with median lobe projecting into the bladder base, hyperdense material independent urinary bladder blood clot.  Cheek catheter was placed and subsequently started on CBI.  At present he is still requiring some minimal irrigation to keep urine pink.  Blood cultures drawn 7/14 positive for E coli.  No urine culture.  He is being treated with IV cefepime.        Review of patient's allergies indicates:   Allergen Reactions    Iodine      Other reaction(s): difficulty breathing, Facial Swelling    Iodine and iodide containing products Swelling    Shellfish containing products      Other reaction(s): Swelling     Current Facility-Administered Medications   Medication Frequency    0.9%  NaCl infusion (for blood administration) Q24H PRN    0.9%  NaCl infusion PRN    acetaminophen suppository 650 mg Q6H PRN    amLODIPine tablet 10 mg Daily    bisacodyL suppository 10 mg Once    bisacodyL suppository 10 mg Daily PRN    carvediloL tablet 12.5 mg BID    ceFEPIme (MAXIPIME) 1 g in dextrose 5 % in water (D5W) 5 % 100 mL IVPB (MB+) Daily    docusate sodium capsule 100 mg BID PRN    doxazosin tablet 8 mg QHS    epoetin gary injection 10,000 Units Every Mon, Wed, Fri    ferrous sulfate tablet 1 each Daily     hyoscyamine ODT 0.125 mg Q4H PRN    labetaloL injection 10 mg Q4H PRN    metoprolol injection 5 mg Q4H PRN    morphine injection 2 mg Q4H PRN    mupirocin 2 % ointment BID    ondansetron disintegrating tablet 4 mg Q6H PRN    oxyCODONE-acetaminophen  mg per tablet 1 tablet Q4H PRN    pantoprazole injection 40 mg BID    polyethylene glycol packet 17 g Daily    sevelamer carbonate tablet 800 mg TID    sodium chloride 0.9% bolus 250 mL 250 mL PRN       Objective:     Vital Signs (Most Recent):  Temp: 98.1 °F (36.7 °C) (07/17/23 0731)  Pulse: 75 (07/17/23 0731)  Resp: 18 (07/17/23 1257)  BP: (!) 144/76 (07/17/23 0731)  SpO2: (!) 94 % (07/17/23 0731) Vital Signs (24h Range):  Temp:  [97.7 °F (36.5 °C)-98.2 °F (36.8 °C)] 98.1 °F (36.7 °C)  Pulse:  [68-78] 75  Resp:  [16-20] 18  SpO2:  [94 %-96 %] 94 %  BP: (117-144)/(64-79) 144/76     Weight: 72.6 kg (160 lb) (07/13/23 1807)  Body mass index is 25.82 kg/m².  Body surface area is 1.84 meters squared.    I/O last 3 completed shifts:  In: 240 [P.O.:240]  Out: 6500 [Urine:6500]    Physical Exam  Constitutional:       General: He is not in acute distress.  HENT:      Head: Normocephalic.      Mouth/Throat:      Mouth: Mucous membranes are moist.   Eyes:      Extraocular Movements: Extraocular movements intact.      Conjunctiva/sclera: Conjunctivae normal.   Cardiovascular:      Rate and Rhythm: Normal rate and regular rhythm.      Pulses: Normal pulses.      Heart sounds: Normal heart sounds.   Pulmonary:      Effort: Pulmonary effort is normal.      Breath sounds: Normal breath sounds.   Abdominal:      Palpations: Abdomen is soft.   Genitourinary:     Comments: CBI, urine pink, no clots  Musculoskeletal:         General: No swelling. Normal range of motion.      Cervical back: Neck supple.      Comments: WONG AVF   Skin:     General: Skin is warm and dry.   Neurological:      Mental Status: He is alert and oriented to person, place, and time.       Significant  Labs:sureBMP:   Recent Labs   Lab 07/13/23  1934 07/14/23  0756 07/16/23  0845      < > 134*   K 5.5*   < > 4.3   CO2 25   < > 24   BUN 52.5*   < > 60.5*   CREATININE 10.47*   < > 10.57*   CALCIUM 9.1   < > 7.3*   MG 2.40  --   --     < > = values in this interval not displayed.     CBC:   Recent Labs   Lab 07/17/23  1024   WBC 5.33   RBC 2.38*   HGB 7.5*   HCT 23.1*   *   MCV 97.1*   MCH 31.5*   MCHC 32.5*     Microbiology Results (last 7 days)       Procedure Component Value Units Date/Time    Blood Culture [[id number]] Collected: 07/17/23 0839    Order Status: Resulted Specimen: Blood Updated: 07/17/23 0844    Blood Culture #1 **CANNOT BE ORDERED STAT** [[id number]]  (Abnormal)  (Susceptibility) Collected: 07/14/23 0259    Order Status: Completed Specimen: Blood from Arm, Left Updated: 07/16/23 0805     CULTURE, BLOOD ([facility]) Escherichia coli     GRAM STAIN Seen in gram stain of broth only      Gram Negative Rods      1 of 2 Aerobic bottles positive    Blood Culture #2 **CANNOT BE ORDERED STAT** [[id number]]  (Abnormal)  (Susceptibility) Collected: 07/14/23 0259    Order Status: Completed Specimen: Blood from Hand, Left Updated: 07/16/23 0805     CULTURE, BLOOD ([facility]) Escherichia coli     GRAM STAIN 2 of 2 bottles positive      Gram Negative Rods      Seen in gram stain of broth only    BCID2 Panel [[id number]] Collected: 07/14/23 0259    Order Status: Canceled Specimen: Blood from Arm, Left Updated: 07/15/23 0703    BCID2 Panel [[id number]]  (Abnormal) Collected: 07/14/23 0259    Order Status: Completed Specimen: Blood from Hand, Left Updated: 07/14/23 1705     CTX-M (ESBL ) Not Detected     IMP (Cabapenemase ) Not Detected     KPC resistance gene (Carbapenemase ) Not Detected     mcr-1 Not Detected     mecA ID N/A     Comment: Note: Antimicrobial resistance can occur via multiple mechanisms. A Not Detected result for antimicrobial resistance gene(s) does not indicate antimicrobial  susceptibility. Subculturing is required for species identification and susceptibility testing of   isolates.        mecA/C and MREJ (MRSA) gene N/A     NDM (Carbapenemase ) Not Detected     OXA-48-like (Carbapenemase ) Not Detected     Pablo/B (VRE gene) N/A     VIM (Carbapenemase ) Not Detected     Enterococcus faecalis Not Detected     Enterococcus faecium Not Detected     Listeria monocytogenes Not Detected     Staphylococcus spp. Not Detected     Staphylococcus aureus Not Detected     Staphylococcus epidermidis Not Detected     Staphylococcus lugdunensis Not Detected     Streptococcus spp. Not Detected     Streptococcus agalactiae (Group B) Not Detected     Streptococcus pneumoniae Not Detected     Streptococcus pyogenes (Group A) Not Detected     Acinetobacter calcoaceticus/baumannii complex Not Detected     Bacteroides fragilis Not Detected     Enterobacterales Detected     Enterobacter cloacae complex Not Detected     Escherichia coli Detected     Klebsiella aerogenes Not Detected     Klebsiella oxytoca Not Detected     Klebsiella pneumoniae group Not Detected     Proteus spp. Not Detected     Salmonella spp. Not Detected     Serratia marcescens Not Detected     Haemophilus influenzae Not Detected     Neisseria meningitidis Not Detected     Pseudomonas aeruginosa Not Detected     Stenotrophomonas maltophilia Not Detected     Candida albicans Not Detected     Candida auris Not Detected     Candida glabrata Not Detected     Candida krusei Not Detected     Candida parapsilosis Not Detected     Candida tropicalis Not Detected     Cryptococcus neoformans/gattii Not Detected    Narrative:      The [employer] BCID2 Panel is a multiplexed nucleic acid test intended for the use with [employer]® 2.0 or [employer]® [employer] for the simultaneous qualitative detection and identification of multiple bacterial and yeast nucleic acids and select genetic determinants associated with  antimicrobial resistance.  The [employer] BCID2 Panel test is performed directly on blood culture samples identified as positive by a continuous monitoring blood culture system.  Results are intended to be interpreted in conjunction with Gram stain results.          Recent Labs   Lab 07/14/23  0306   BACTERIA None Seen       Significant Imaging:  No new imaging     Assessment/Plan:   ESRD on HD  E coli bacteremia  Bilateral hydroureteronephrosis with a superimposed pyelo ureteritis not excluded-chronic indwelling Cheek changed every 3 weeks, now with CBI with gross hematuria  Melena-status post EGD with retained food, mild gastritis, moderate valvular duodenitis, no active erosions or ulceration.    Patient will dialyze today to continue Monday Wednesday Friday schedule  Start Epogen 74579 units Q Monday Wednesday Friday    KARLOS Preston  Nephrology  [facility]

## 2023-07-17 NOTE — PLAN OF CARE
Problem: Adult Inpatient Plan of Care  Goal: Plan of Care Review  Outcome: Ongoing, Progressing  Flowsheets (Taken 7/17/2023 0353)  Plan of Care Reviewed With: patient  Goal: Patient-Specific Goal (Individualized)  Outcome: Ongoing, Progressing  Goal: Absence of Hospital-Acquired Illness or Injury  Outcome: Ongoing, Progressing  Goal: Optimal Comfort and Wellbeing  Outcome: Ongoing, Progressing  Intervention: Monitor Pain and Promote Comfort  Flowsheets (Taken 7/17/2023 0353)  Pain Management Interventions:   care clustered   medication offered   pillow support provided   relaxation techniques promoted   quiet environment facilitated   pain management plan reviewed with patient/caregiver  Intervention: Provide Person-Centered Care  Flowsheets (Taken 7/17/2023 0353)  Trust Relationship/Rapport:   care explained   choices provided   emotional support provided   empathic listening provided   questions answered   questions encouraged   reassurance provided   thoughts/feelings acknowledged  Goal: Readiness for Transition of Care  Outcome: Ongoing, Progressing     Problem: Infection  Goal: Absence of Infection Signs and Symptoms  Outcome: Ongoing, Progressing     Problem: Device-Related Complication Risk (Hemodialysis)  Goal: Safe, Effective Therapy Delivery  Outcome: Ongoing, Progressing     Problem: Hemodynamic Instability (Hemodialysis)  Goal: Effective Tissue Perfusion  Outcome: Ongoing, Progressing     Problem: Infection (Hemodialysis)  Goal: Absence of Infection Signs and Symptoms  Outcome: Ongoing, Progressing     Problem: Skin Injury Risk Increased  Goal: Skin Health and Integrity  Outcome: Ongoing, Progressing  Intervention: Optimize Skin Protection  Flowsheets (Taken 7/17/2023 0353)  Pressure Reduction Techniques:   frequent weight shift encouraged   weight shift assistance provided  Pressure Reduction Devices: positioning supports utilized  Skin Protection:   protective footwear used   tubing/devices free  from skin contact   incontinence pads utilized  Head of Bed (HOB) Positioning: HOB at 30-45 degrees     Problem: Impaired Wound Healing  Goal: Optimal Wound Healing  Outcome: Ongoing, Progressing

## 2023-07-18 PROBLEM — R31.9 HEMATURIA: Status: ACTIVE | Noted: 2023-01-01

## 2023-07-18 NOTE — PROGRESS NOTES
"Gastroenterology Progress Note    Subjective:  hgb 8.5--7.5--8.3  Patient without acute GI complaints this morning- no further signs of melena since EGD. Patient tolerating small amounts of p.o. secondary to intermittent nausea, no episodes of emesis thus far today.    Objective:    ROS:    Review of Systems   Constitutional:  Negative for chills and fever.   Respiratory:  Negative for shortness of breath.    Gastrointestinal:  Negative for abdominal pain, blood in stool, heartburn, melena, nausea and vomiting.   Skin:  Negative for itching and rash.   Neurological:  Negative for seizures and loss of consciousness.   Psychiatric/Behavioral:  The patient is not nervous/anxious.        Vital Signs:  /64   Pulse (!) 58   Temp 98.5 °F (36.9 °C) (Oral)   Resp 18   Ht 5' 6" (1.676 m)   Wt 72.6 kg (160 lb)   SpO2 97%   BMI 25.82 kg/m²   Body mass index is 25.82 kg/m².    Physical Exam:    Physical Exam  Constitutional:       General: He is not in acute distress.  HENT:      Head: Normocephalic and atraumatic.      Nose: Nose normal.   Eyes:      Extraocular Movements: Extraocular movements intact.   Cardiovascular:      Pulses: Normal pulses.   Abdominal:      General: Abdomen is flat. There is no distension.      Palpations: Abdomen is soft.      Tenderness: There is no abdominal tenderness. There is no guarding.   Musculoskeletal:         General: No swelling or tenderness.   Skin:     General: Skin is warm.      Coloration: Skin is not jaundiced.   Neurological:      Mental Status: He is alert. Mental status is at baseline.   Psychiatric:         Thought Content: Thought content normal.       Labs:  Recent Results (from the past 24 hour(s))   Basic Metabolic Panel    Collection Time: 07/18/23  3:31 AM   Result Value Ref Range    Sodium Level 133 (L) 136 - 145 mmol/L    Potassium Level 3.9 3.5 - 5.1 mmol/L    Chloride 95 (L) 98 - 107 mmol/L    Carbon Dioxide 28 23 - 31 mmol/L    Glucose Level 83 82 - 115 " mg/dL    Blood Urea Nitrogen 31.3 (H) 8.4 - 25.7 mg/dL    Creatinine 7.11 (H) 0.73 - 1.18 mg/dL    BUN/Creatinine Ratio 4     Calcium Level Total 7.8 (L) 8.8 - 10.0 mg/dL    Anion Gap 10.0 mEq/L    eGFR 8 mls/min/1.73/m2   CBC with Differential    Collection Time: 07/18/23  3:31 AM   Result Value Ref Range    WBC 5.21 4.50 - 11.50 x10(3)/mcL    RBC 2.61 (L) 4.70 - 6.10 x10(6)/mcL    Hgb 8.3 (L) 14.0 - 18.0 g/dL    Hct 25.4 (L) 42.0 - 52.0 %    MCV 97.3 (H) 80.0 - 94.0 fL    MCH 31.8 (H) 27.0 - 31.0 pg    MCHC 32.7 (L) 33.0 - 36.0 g/dL    RDW 16.3 11.5 - 17.0 %    Platelet 122 (L) 130 - 400 x10(3)/mcL    MPV 11.2 (H) 7.4 - 10.4 fL    Neut % 79.1 %    Lymph % 8.4 %    Mono % 8.8 %    Eos % 2.9 %    Basophil % 0.4 %    Lymph # 0.44 (L) 0.6 - 4.6 x10(3)/mcL    Neut # 4.12 2.1 - 9.2 x10(3)/mcL    Mono # 0.46 0.1 - 1.3 x10(3)/mcL    Eos # 0.15 0 - 0.9 x10(3)/mcL    Baso # 0.02 <=0.2 x10(3)/mcL    IG# 0.02 0 - 0.04 x10(3)/mcL    IG% 0.4 %    NRBC% 0.0 %         Assessment/Plan:  68 y.o. AA male known to Dr. Butler from previous hospital admission with a past medical history of essential hypertension, ESRD on HD MWF, anemia, BPH, cervical radiculopathy, and vitamin-D deficiency.  Here with apparent melena, back, and abdominal pain.       Report of melena- resolved   S/p EGD with retained food, very mild gastritis and moderate bulbar duodenitis, without any active erosions or ulceration.  Antral biopsies pending to rule out H pylori.  - supp crae  - no BM since prior to EGD on 07/15- continue miralax and suppository qd. Will add stool softner prn   Acute on chronic anemia- GI vs urologic loss   -Continue ppi bid  -Monitor H/H and transfuse as needed to Hgb > 7    GI will sign off at this time with no further recommendations. Please call or page with questions.     Hilda Hui PA-C  Gastroenterology  [facility]

## 2023-07-18 NOTE — PROGRESS NOTES
[facility] - On[facility] Acute  Nephrology  Progress Note    Patient Name: Mirza Nelson Jr.  MRN: [id number]  Admission Date: 7/13/2023  Hospital Length of Stay: 4 days  Attending Provider: Darryl Waters MD   Primary Care Physician: Elo Flores MD  Principal Problem:Hematuria      Subjective:   This is a 68-year-old  male with dialysis dependent ESRD.  He dialyzes at [facility] on Monday Wednesday Friday by way of right upper arm AV fistula.  He presented to ED on 07/13/2023 with complaints of dark stool.  He also endorses lightheadedness, back pain, neck pain.  CT of the abdomen and pelvis revealed mild to moderate bilateral hydroureteronephrosis with enlarged prostate with median lobe projecting into the bladder base, hyperdense material independent urinary bladder blood clot.  Nielsen catheter was placed and subsequently started on CBI.  At present he is still requiring some minimal irrigation to keep urine pink.  Blood cultures drawn 7/14 positive for E coli.  No urine culture.  He is being treated with IV cefepime.    Interval history: Patient eating breakfast independently. CBI temporarily paused because irrigation fluid bags are empty. There is dark red blood in nielsen tubing. Patient without complaints including abdominal pain      Review of patient's allergies indicates:   Allergen Reactions    Iodine      Other reaction(s): difficulty breathing, Facial Swelling    Iodine and iodide containing products Swelling    Shellfish containing products      Other reaction(s): Swelling     Current Facility-Administered Medications   Medication Frequency    0.9%  NaCl infusion (for blood administration) Q24H PRN    0.9%  NaCl infusion PRN    acetaminophen suppository 650 mg Q6H PRN    amLODIPine tablet 10 mg Daily    bisacodyL suppository 10 mg Once    bisacodyL suppository 10 mg Daily PRN    carvediloL tablet 12.5 mg BID    cefTRIAXone (ROCEPHIN) 2 g in dextrose 5 % in water  (D5W) 5 % 100 mL IVPB (MB+) Daily    docusate sodium capsule 100 mg BID PRN    doxazosin tablet 8 mg QHS    epoetin gary injection 10,000 Units Every Mon, Wed, Fri    ferrous sulfate tablet 1 each Daily    hyoscyamine ODT 0.125 mg Q4H PRN    labetaloL injection 10 mg Q4H PRN    metoprolol injection 5 mg Q4H PRN    morphine injection 2 mg Q4H PRN    mupirocin 2 % ointment BID    ondansetron disintegrating tablet 4 mg Q6H PRN    oxyCODONE-acetaminophen  mg per tablet 1 tablet Q4H PRN    pantoprazole injection 40 mg BID    polyethylene glycol packet 17 g Daily    sevelamer carbonate tablet 800 mg TID    sodium chloride 0.9% bolus 250 mL 250 mL PRN       Objective:     Vital Signs (Most Recent):  Temp: 98.6 °F (37 °C) (07/18/23 0736)  Pulse: 72 (07/18/23 0736)  Resp: 18 (07/18/23 0736)  BP: (!) 157/70 (07/18/23 0736)  SpO2: 97 % (07/18/23 0736) Vital Signs (24h Range):  Temp:  [97.7 °F (36.5 °C)-98.7 °F (37.1 °C)] 98.6 °F (37 °C)  Pulse:  [70-73] 72  Resp:  [18-20] 18  SpO2:  [94 %-97 %] 97 %  BP: (115-157)/(60-73) 157/70     Weight: 72.6 kg (160 lb) (07/13/23 1807)  Body mass index is 25.82 kg/m².  Body surface area is 1.84 meters squared.    I/O last 3 completed shifts:  In: 240 [P.O.:240]  Out: 3375 [Urine:1875; Other:1500]    Physical Exam  Constitutional:       General: He is not in acute distress.  HENT:      Head: Normocephalic.      Mouth/Throat:      Mouth: Mucous membranes are moist.   Eyes:      Extraocular Movements: Extraocular movements intact.      Conjunctiva/sclera: Conjunctivae normal.   Cardiovascular:      Rate and Rhythm: Normal rate and regular rhythm.      Pulses: Normal pulses.      Heart sounds: Normal heart sounds.   Pulmonary:      Effort: Pulmonary effort is normal.      Breath sounds: Normal breath sounds.   Abdominal:      Palpations: Abdomen is soft.   Genitourinary:     Comments: Dark red, CBI paused   Musculoskeletal:         General: No swelling. Normal range of motion.       Cervical back: Neck supple.      Comments: WONG EVANS   Skin:     General: Skin is warm and dry.   Neurological:      Mental Status: He is alert and oriented to person, place, and time.       Significant Labs:sureBMP:   Recent Labs   Lab 07/13/23  1934 07/14/23  0756 07/18/23 0331      < > 133*   K 5.5*   < > 3.9   CO2 25   < > 28   BUN 52.5*   < > 31.3*   CREATININE 10.47*   < > 7.11*   CALCIUM 9.1   < > 7.8*   MG 2.40  --   --     < > = values in this interval not displayed.       CBC:   Recent Labs   Lab 07/18/23 0331   WBC 5.21   RBC 2.61*   HGB 8.3*   HCT 25.4*   *   MCV 97.3*   MCH 31.8*   MCHC 32.7*       Microbiology Results (last 7 days)       Procedure Component Value Units Date/Time    Blood Culture [[id number]] Collected: 07/17/23 0839    Order Status: Resulted Specimen: Blood Updated: 07/17/23 0844    Blood Culture #1 **CANNOT BE ORDERED STAT** [[id number]]  (Abnormal)  (Susceptibility) Collected: 07/14/23 0259    Order Status: Completed Specimen: Blood from Arm, Left Updated: 07/16/23 0805     CULTURE, BLOOD ([facility]) Escherichia coli     GRAM STAIN Seen in gram stain of broth only      Gram Negative Rods      1 of 2 Aerobic bottles positive    Blood Culture #2 **CANNOT BE ORDERED STAT** [[id number]]  (Abnormal)  (Susceptibility) Collected: 07/14/23 0259    Order Status: Completed Specimen: Blood from Hand, Left Updated: 07/16/23 0805     CULTURE, BLOOD ([facility]) Escherichia coli     GRAM STAIN 2 of 2 bottles positive      Gram Negative Rods      Seen in gram stain of broth only    BCID2 Panel [[id number]] Collected: 07/14/23 0259    Order Status: Canceled Specimen: Blood from Arm, Left Updated: 07/15/23 0703    BCID2 Panel [[id number]]  (Abnormal) Collected: 07/14/23 0259    Order Status: Completed Specimen: Blood from Hand, Left Updated: 07/14/23 1705     CTX-M (ESBL ) Not Detected     IMP (Cabapenemase ) Not Detected     KPC resistance gene (Carbapenemase ) Not Detected      mcr-1 Not Detected     mecA ID N/A     Comment: Note: Antimicrobial resistance can occur via multiple mechanisms. A Not Detected result for antimicrobial resistance gene(s) does not indicate antimicrobial susceptibility. Subculturing is required for species identification and susceptibility testing of   isolates.        mecA/C and MREJ (MRSA) gene N/A     NDM (Carbapenemase ) Not Detected     OXA-48-like (Carbapenemase ) Not Detected     Pablo/B (VRE gene) N/A     VIM (Carbapenemase ) Not Detected     Enterococcus faecalis Not Detected     Enterococcus faecium Not Detected     Listeria monocytogenes Not Detected     Staphylococcus spp. Not Detected     Staphylococcus aureus Not Detected     Staphylococcus epidermidis Not Detected     Staphylococcus lugdunensis Not Detected     Streptococcus spp. Not Detected     Streptococcus agalactiae (Group B) Not Detected     Streptococcus pneumoniae Not Detected     Streptococcus pyogenes (Group A) Not Detected     Acinetobacter calcoaceticus/baumannii complex Not Detected     Bacteroides fragilis Not Detected     Enterobacterales Detected     Enterobacter cloacae complex Not Detected     Escherichia coli Detected     Klebsiella aerogenes Not Detected     Klebsiella oxytoca Not Detected     Klebsiella pneumoniae group Not Detected     Proteus spp. Not Detected     Salmonella spp. Not Detected     Serratia marcescens Not Detected     Haemophilus influenzae Not Detected     Neisseria meningitidis Not Detected     Pseudomonas aeruginosa Not Detected     Stenotrophomonas maltophilia Not Detected     Candida albicans Not Detected     Candida auris Not Detected     Candida glabrata Not Detected     Candida krusei Not Detected     Candida parapsilosis Not Detected     Candida tropicalis Not Detected     Cryptococcus neoformans/gattii Not Detected    Narrative:      The "[employer] BCID2 Panel is a multiplexed nucleic acid test intended for the use with [employer]  FilmArray® 2.0 or [employer]® FilmArray® Torch Systems for the simultaneous qualitative detection and identification of multiple bacterial and yeast nucleic acids and select genetic determinants associated with antimicrobial resistance.  The [employer] BCID2 Panel test is performed directly on blood culture samples identified as positive by a continuous monitoring blood culture system.  Results are intended to be interpreted in conjunction with Gram stain results.          Recent Labs   Lab 07/14/23  0306   BACTERIA None Seen         Significant Imaging:  No new imaging     Assessment/Plan:   ESRD on HD  E coli bacteremia  Bilateral hydroureteronephrosis with a superimposed pyelo ureteritis not excluded-chronic indwelling Cheek changed every 3 weeks, now with CBI with gross hematuria  Melena-status post EGD with retained food, mild gastritis, moderate valvular duodenitis, no active erosions or ulceration.    Patient will continue Monday Wednesday Friday schedule for dialysis   Epogen was initiated on three times weekly schedule yesterday   Repeat lab tomorrow    KARLOS Preston  Nephrology  [facility]

## 2023-07-18 NOTE — PLAN OF CARE
"   07/18/23 0921   Discharge Assessment   Assessment Type Discharge Planning Assessment   Confirmed/corrected address, phone number and insurance Yes   Confirmed Demographics Correct on Facesheet   Source of Information patient   When was your last doctors appointment?   (Patient reports a few weeks ago.)   Communicated MEHRAN with patient/caregiver Yes   Reason For Admission Melena   People in Home alone   Do you expect to return to your current living situation? Yes   Do you have help at home or someone to help you manage your care at home? Yes   Who are your caregiver(s) and their phone number(s)? Daughter: Heather Valerio: [phone]   Prior to hospitilization cognitive status: Unable to Assess   Current cognitive status: Alert/Oriented   Walking or Climbing Stairs ambulation difficulty, requires equipment   Home Accessibility wheelchair accessible   Home Layout Able to live on 1st floor   Equipment Currently Used at Home walker, rolling;shower chair   Readmission within 30 days? No   Patient currently being followed by outpatient case management? No   Do you currently have service(s) that help you manage your care at home? No   Do you take prescription medications? Yes   Do you have prescription coverage? Yes   Coverage [employer] H[employer] PPO Special Needs   Do you have any problems affording any of your prescribed medications? No   Is the patient taking medications as prescribed? yes   Who is going to help you get home at discharge? Patient reports he will "call one of my sister".   How do you get to doctors appointments? family or friend will provide   Are you on dialysis? Yes   Dialysis Name and Scheduled days [facility] on [address]: MWF at 6 AM   Do you take coumadin? No   Discharge Plan A Home   Discharge Plan B Home Health   DME Needed Upon Discharge  none   Discharge Plan discussed with: Patient   Transition of Care Barriers None       Patient's PCP is Dr. Elo Flores.  CM is " arranging iv abx for home use through [facility], an [facility] [place], [place] Phone: [phone].  will also arrange home health services as well.  Patient may need transportation assistance at discharge.  No barriers to discharge at this time.

## 2023-07-18 NOTE — NURSING
Unable to place PICC/midline due to patient having history of dialysis access to his BUE. Patient is not a candidate. Recommend a central line be placed or po antibiotics.

## 2023-07-18 NOTE — PLAN OF CARE
Problem: Occupational Therapy  Goal: Occupational Therapy Goal  Description: Goals to be met by: 8/15/23     Patient will increase functional independence with ADLs by performing:    UE Dressing with Modified Tye.  LE Dressing with Modified Tye.  Grooming while standing with Modified Tye.  Toileting from toilet with Modified Tye for hygiene and clothing management.   Toilet transfer to toilet with Modified Tye.    Outcome: Ongoing, Progressing

## 2023-07-18 NOTE — PT/OT/SLP EVAL
Physical Therapy Evaluation    Patient Name:  Mirza Nelson Jr.   MRN:  [id number]    Recommendations:     Discharge Recommendations: nursing facility, skilled, rehabilitation facility   Discharge Equipment Recommendations: none   Barriers to discharge: Impaired mobility and Ongoing medical needs    Assessment:     Mirza Nelson Jr. is a 68 y.o. male admitted with a medical diagnosis of Hematuria. Patient reports living alone in 1st floor apartment. At baseline, he is independent and ambulates with rollator.  He presents with the following impairments/functional limitations: weakness, impaired endurance, impaired self care skills, impaired functional mobility, gait instability, impaired balance, decreased safety awareness. He required MOD A for bed mobility. MOD A for sit<>stand. Ambulated 30 ft with RW & CGA-MIN A. Balance & safety awareness were poor. I do not feel it is safe for patient to return home at this time. Feel that he would benefit from SNF vs rehab placement at discharge. Progress patient as tolerated.     Rehab Prognosis: Good; patient would benefit from acute skilled PT services to address these deficits and reach maximum level of function.    Recent Surgery: Procedure(s) (LRB):  EGD (ESOPHAGOGASTRODUODENOSCOPY) (N/A)  EGD, WITH CLOSED BIOPSY 3 Days Post-Op    Plan:     During this hospitalization, patient to be seen 5 x/week to address the identified rehab impairments via gait training, therapeutic activities, therapeutic exercises, neuromuscular re-education and progress toward the following goals:    Plan of Care Expires:  08/18/23    Subjective     Chief Complaint: fatigue and weakness  Patient/Family Comments/goals: none  Pain/Comfort:  Pain Rating 1: 0/10    Patients cultural, spiritual, Moravian conflicts given the current situation: no    Living Environment:  Lives alone in 1st-floor apartment.   Prior to admission, patients level of function was independent.  Equipment used at home:  rollator, shower chair.  DME owned (not currently used): none.  Upon discharge, patient will have assistance from TBD.    Objective:     Communicated with nurse prior to session.  Patient found supine with nielsen catheter, peripheral IV  upon PT entry to room.    General Precautions: Standard, fall  Orthopedic Precautions:N/A   Braces: N/A  Respiratory Status: Room air    Exams:  Cognitive Exam:  Patient is oriented to Person, Place, Time, and Situation  Sensation: -       Intact  RLE ROM: WFL  RLE Strength: +3/5 grossly  LLE ROM: WFL  LLE Strength: -4/5 grossly  Skin integrity: Visible skin intact      Functional Mobility:  Bed Mobility:     Supine to Sit: moderate assistance  Sit to Supine: moderate assistance  Transfers:  Sit to Stand:  moderate assistance with rolling walker  Gait: 30 ft with RW & CGA - MIN A. Poor balance and safety awareness.   Balance: poor      AM-PAC 6 CLICK MOBILITY  Total Score:14     Education Provided:  Role and goals of PT, transfer training, bed mobility, gait training, balance training, safety awareness, assistive device, strengthening exercises, deep breathing techniques, and importance of participating in PT to return to PLOF.    Expected compliance:  Moderate      Patient left supine with all lines intact and call button in reach.    GOALS:   Multidisciplinary Problems       Physical Therapy Goals          Problem: Physical Therapy    Goal Priority Disciplines Outcome Goal Variances Interventions   Physical Therapy Goal     PT, PT/OT Ongoing, Progressing     Description: Goals to be met by: 23     Patient will increase functional independence with mobility by performin. Supine to sit with Set-up [place]  2. Sit to supine with Set-up [place]  3. Sit to stand transfer with Stand-by Assistance  4. Gait  x 200 feet with Stand-by Assistance using Rolling Walker.                          History:     Past Medical History:   Diagnosis Date    Anemia     BPH (benign  prostatic hyperplasia)     Cervical radiculopathy 05/17/2022    Disorder of kidney and ureter     ESRD on hemodialysis 09/27/2018    Hypertension     Neck injury 1994    Other chronic pain 09/27/2018    Personal history of colonic polyps     Renovascular hypertension 09/27/2018    Vitamin D deficiency 05/17/2022       Past Surgical History:   Procedure Laterality Date    AV FISTULA PLACEMENT      COLONOSCOPY W/ POLYPECTOMY  02/19/2019    EGD, WITH CLOSED BIOPSY  7/15/2023    Procedure: EGD, WITH CLOSED BIOPSY;  Surgeon: Casey Funes MD;  Location: [facility];  Service: Gastroenterology;;    ESOPHAGOGASTRODUODENOSCOPY N/A 7/15/2023    Procedure: EGD (ESOPHAGOGASTRODUODENOSCOPY);  Surgeon: Casey Funes MD;  Location: [facility];  Service: Gastroenterology;  Laterality: N/A;    MASS EXCISION  2005    near the kidney     NECK SURGERY         Time Tracking:     PT Received On: 07/18/23  PT Start Time: 1335     PT Stop Time: 1355  PT Total Time (min): 20 min     Billable Minutes: Evaluation 20 minutes      07/18/2023

## 2023-07-18 NOTE — PROGRESS NOTES
[facility]  Hospital Medicine Progress Note        Chief Complaint: Inpatient Follow-up for Hematuria and sepsis     HPI:   68 y.o. male with a past medical history of essential hypertension, ESRD on HD MWF, anemia, BPH, cervical radiculopathy, and vitamin-D deficiency presented to [facility] on 7/13/2023 for dark stools.  Nursing home reported they noticed melanotic stools yesterday and patient was more confused than normal.  On exam patient reports he has had dark stools for the past week with intermittent abdominal and back pain.  Patient denies chest pain, shortness of breath, fever, chills, nausea, vomiting, and diarrhea.  Initial vital signs in ED were /69, pulse 87, respirations 19, temperature 37° C, and SpO2 98% on room air.  Labs revealed WBC 6.32, RBC 3.24, hemoglobin 10.7, hematocrit 33.3, .8, potassium 5.5, chloride 95, BUN 52.5, creatinine 10.47, and lactic acid 1.1.  UA revealed greater than 100 red blood cells.  Blood culture was obtained.  CT head revealed chronic age-related changes without acute process.  Chest x-ray revealed no acute cardiopulmonary process identified.  CT abdomen and pelvis without contrast revealed moderate stool in the colon which could reflect an element of constipation, mild to moderate bilateral hydroureteronephrosis with superimposed pyeloureteritis not excluded, enlarged prostate, hyperdense material in the dependent urinary bladder which may reflect blood clots, focal sclerotic lesions seen and left iliac and pubic bones suspicious for sclerotic metastases.  Three-way catheter was placed in ED with gross hematuria.  Patient was started on IV Zosyn.  Urology and nephrology were consulted. Patient was admitted to hospital medicine service for further medical management.     Patient was started on CBI per urology. Blood cultures was positive for Ecoli.   Interval Hx:   Patient today awake and comfortable. Denies any fever, chills or  dysuria. He is on CBI and today morning his urine was clear. He has not ambulated much,. States he lives alone and has HH.   No family at bedside.     Case was discussed with patient's nurse and  on the floor.    Also discussed with urology team and they noticed hematuria during their rounds and want to continue CBI for another 24 hrs.    Objective/physical exam:  General: In no acute distress, Frail  Chest: Clear to auscultation bilaterally  Heart: RRR, +S1, S2, no appreciable murmur  Abdomen: Soft, nontender, BS +  MSK: Warm, no lower extremity edema, no clubbing or cyanosis  Neurologic: Cranial nerve II-XII intact, Strength 5/5 in all 4 extremities    VITAL SIGNS: 24 HRS MIN & MAX LAST   Temp  Min: 97.7 °F (36.5 °C)  Max: 98.7 °F (37.1 °C) 98.6 °F (37 °C)   BP  Min: 115/60  Max: 157/70 (!) 157/70   Pulse  Min: 70  Max: 73  72   Resp  Min: 18  Max: 20 18   SpO2  Min: 94 %  Max: 97 % 97 %     I have reviewed the following labs:    Recent Labs   Lab 07/16/23  0845 07/17/23  1024 07/18/23  0331   WBC 5.96 5.33 5.21   RBC 2.66* 2.38* 2.61*   HGB 8.5* 7.5* 8.3*   HCT 26.1* 23.1* 25.4*   MCV 98.1* 97.1* 97.3*   MCH 32.0* 31.5* 31.8*   MCHC 32.6* 32.5* 32.7*   RDW 17.2* 16.7 16.3   * 102* 122*   MPV 10.8* 10.8* 11.2*       Recent Labs   Lab 07/13/23  1934 07/14/23  0756 07/14/23  1738 07/14/23  1905 07/15/23  0449 07/16/23  0845 07/18/23  0331    136 136  --  138 134* 133*   K 5.5* 5.1 3.9  --  4.6 4.3 3.9   CO2 25 23 22*  --  23 24 28   BUN 52.5* 57.9* 28.8*  --  41.1* 60.5* 31.3*   CREATININE 10.47* 11.43* 6.33*  --  8.31* 10.57* 7.11*   CALCIUM 9.1 8.6* 8.3*  --  7.7* 7.3* 7.8*   PH  --   --   --  7.460*  --   --   --    MG 2.40  --   --   --   --   --   --    ALBUMIN 3.7 3.0* 3.1*  --   --  2.3*  --    ALKPHOS 71 61 61  --   --  47  --    ALT 8 9 11  --   --  11  --    AST 12 14 14  --   --  19  --    BILITOT 0.8 0.7 1.2  --   --  0.8  --           Microbiology Results (last 7 days)        Procedure Component Value Units Date/Time    Blood Culture [[id number]] Collected: 07/17/23 0839    Order Status: Resulted Specimen: Blood Updated: 07/17/23 0844    Blood Culture #1 **CANNOT BE ORDERED STAT** [[id number]]  (Abnormal)  (Susceptibility) Collected: 07/14/23 0259    Order Status: Completed Specimen: Blood from Arm, Left Updated: 07/16/23 0805     CULTURE, BLOOD ([facility]) Escherichia coli     GRAM STAIN Seen in gram stain of broth only      Gram Negative Rods      1 of 2 Aerobic bottles positive    Blood Culture #2 **CANNOT BE ORDERED STAT** [[id number]]  (Abnormal)  (Susceptibility) Collected: 07/14/23 0259    Order Status: Completed Specimen: Blood from Hand, Left Updated: 07/16/23 0805     CULTURE, BLOOD ([facility]) Escherichia coli     GRAM STAIN 2 of 2 bottles positive      Gram Negative Rods      Seen in gram stain of broth only    BCID2 Panel [[id number]] Collected: 07/14/23 0259    Order Status: Canceled Specimen: Blood from Arm, Left Updated: 07/15/23 0703    BCID2 Panel [[id number]]  (Abnormal) Collected: 07/14/23 0259    Order Status: Completed Specimen: Blood from Hand, Left Updated: 07/14/23 1705     CTX-M (ESBL ) Not Detected     IMP (Cabapenemase ) Not Detected     KPC resistance gene (Carbapenemase ) Not Detected     mcr-1 Not Detected     mecA ID N/A     Comment: Note: Antimicrobial resistance can occur via multiple mechanisms. A Not Detected result for antimicrobial resistance gene(s) does not indicate antimicrobial susceptibility. Subculturing is required for species identification and susceptibility testing of   isolates.        mecA/C and MREJ (MRSA) gene N/A     NDM (Carbapenemase ) Not Detected     OXA-48-like (Carbapenemase ) Not Detected     Pablo/B (VRE gene) N/A     VIM (Carbapenemase ) Not Detected     Enterococcus faecalis Not Detected     Enterococcus faecium Not Detected     Listeria monocytogenes Not Detected     Staphylococcus spp. Not  Detected     Staphylococcus aureus Not Detected     Staphylococcus epidermidis Not Detected     Staphylococcus lugdunensis Not Detected     Streptococcus spp. Not Detected     Streptococcus agalactiae (Group B) Not Detected     Streptococcus pneumoniae Not Detected     Streptococcus pyogenes (Group A) Not Detected     Acinetobacter calcoaceticus/baumannii complex Not Detected     Bacteroides fragilis Not Detected     Enterobacterales Detected     Enterobacter cloacae complex Not Detected     Escherichia coli Detected     Klebsiella aerogenes Not Detected     Klebsiella oxytoca Not Detected     Klebsiella pneumoniae group Not Detected     Proteus spp. Not Detected     Salmonella spp. Not Detected     Serratia marcescens Not Detected     Haemophilus influenzae Not Detected     Neisseria meningitidis Not Detected     Pseudomonas aeruginosa Not Detected     Stenotrophomonas maltophilia Not Detected     Candida albicans Not Detected     Candida auris Not Detected     Candida glabrata Not Detected     Candida krusei Not Detected     Candida parapsilosis Not Detected     Candida tropicalis Not Detected     Cryptococcus neoformans/gattii Not Detected    Narrative:      The [employer] [place] BCID2 Panel is a multiplexed nucleic acid test intended for the use with [employer]® 2.0 or [employer]® [employer] for the simultaneous qualitative detection and identification of multiple bacterial and yeast nucleic acids and select genetic determinants associated with antimicrobial resistance.  The [employer] BCID2 Panel test is performed directly on blood culture samples identified as positive by a continuous monitoring blood culture system.  Results are intended to be interpreted in conjunction with Gram stain results.             See below for Radiology    Scheduled Med:   amLODIPine  10 mg Oral Daily    bisacodyL  10 mg Rectal Once    carvediloL  12.5 mg Oral BID    cefTRIAXone (ROCEPHIN) IVPB  2 g Intravenous Daily     doxazosin  8 mg Oral QHS    epoetin gary  10,000 Units Intravenous Every Mon, Wed, Fri    ferrous sulfate  1 tablet Oral Daily    mupirocin   Nasal BID    pantoprazole  40 mg Intravenous BID    polyethylene glycol  17 g Oral Daily    sevelamer carbonate  800 mg Oral TID        Continuous Infusions:       PRN Meds:  sodium chloride, sodium chloride 0.9%, acetaminophen, bisacodyL, docusate sodium, hyoscyamine, labetaloL, metoprolol, morphine, ondansetron, oxyCODONE-acetaminophen, sodium chloride 0.9%       Assessment/Plan:  Hematuria   Bilateral hydroureteronephrosis with with superimposed pyeloureteritis not excluded  Melena   Focal sclerotic lesions in left iliac and pubic bone suspicious for sclerotic metastases  ESRD on HD MWF  History of essential hypertension,  anemia, BPH, cervical radiculopathy, and vitamin-D deficiency    Plan:  Patient looks comfortable but he is weak. Will start PT   He has been afebrile  Urology recommend to continue CBI for 24 hrs   Will switch to rocephin 2mg iv daily for 10 days last dose 7/23/23  Current meds reviewed  Cont HD per renal team   Reviewed today's labs and Hb 8.3, Plt 122, Na 133, BUN 31, Crt 7.11    Labs in am     Cont supportive care     VTE prophylaxis: SCD    Patient condition:  Fair    Anticipated discharge and Disposition:   Dc when cleared by urology       All diagnosis and differential diagnosis have been reviewed; assessment and plan has been documented; I have personally reviewed the labs and test results that are presently available; I have reviewed the patients medication list; I have reviewed the consulting providers response and recommendations. I have reviewed or attempted to review medical records based upon their availability    All of the patient's questions have been  addressed and answered. Patient's is agreeable to the above stated plan. I will continue to monitor closely and make adjustments to medical management as  needed.  _____________________________________________________________________    Nutrition Status:    Radiology:  I have personally reviewed the following imaging and agree with the radiologist.     NM Bone Scan Whole Body  Narrative: EXAMINATION:  NM BONE SCAN WHOLE BODY    CLINICAL HISTORY:  Sclerotic bone lesions-iliac;    TECHNIQUE:  Whole body skeletal scintigraphy was performed following intravenous administration of 26.9 mCi of technetium-99m MDP.    COMPARISON:  CT abdomen pelvis osseous structures July 13, 2023, April 18, 2023, December 7, 2021 and December 17, 2020.    FINDINGS:  Left iliac bone and pubic bone small sclerotic lesions seen on the recent CT abdomen pelvis July 13, 2023 have been stable in overall size and appearance compared to September 17, 2020 exam.  These are below bone scintigraphy resolution due to the small size with no corresponding location abnormal increased radioisotope uptake identified.  There are mild degenerative related uptakes involving the thoracic vertebrae. There otherwise is unremarkable radioisotope activity within the skeletal system.  Impression: 1. No bone scintigraphy suggestion of skeletal metastatic involvement.    2. Left iliac bone and pubic bone small sclerotic lesions have been stable since September 17, 2020 exam.    Electronically signed by: Dipak Paredes  Date:    07/17/2023  Time:    13:07      Darryl Waters MD   07/18/2023

## 2023-07-18 NOTE — PLAN OF CARE
Problem: Adult Inpatient Plan of Care  Goal: Plan of Care Review  Outcome: Ongoing, Progressing  Flowsheets (Taken 7/17/2023 1908)  Plan of Care Reviewed With: patient  Goal: Patient-Specific Goal (Individualized)  Outcome: Ongoing, Progressing  Goal: Absence of Hospital-Acquired Illness or Injury  Outcome: Ongoing, Progressing  Intervention: Identify and Manage Fall Risk  Flowsheets (Taken 7/17/2023 1908)  Safety Promotion/Fall Prevention:   assistive device/personal item within reach   medications reviewed   nonskid shoes/socks when out of bed   side rails raised x 2  Intervention: Prevent Skin Injury  Flowsheets (Taken 7/17/2023 1908)  Body Position: position changed independently  Skin Protection:   adhesive use limited   incontinence pads utilized   tubing/devices free from skin contact  Intervention: Prevent and Manage VTE (Venous Thromboembolism) Risk  Flowsheets (Taken 7/17/2023 1908)  Activity Management: Rolling - L1  VTE Prevention/Management:   ambulation promoted   bleeding risk assessed   ROM (active) performed  Range of Motion:   active ROM (range of motion) encouraged   ROM (range of motion) performed  Intervention: Prevent Infection  Flowsheets (Taken 7/17/2023 1908)  Infection Prevention:   rest/sleep promoted   single patient room provided  Goal: Optimal Comfort and Wellbeing  Outcome: Ongoing, Progressing  Intervention: Monitor Pain and Promote Comfort  Flowsheets (Taken 7/17/2023 1908)  Pain Management Interventions:   care clustered   medication offered   pain management plan reviewed with patient/caregiver   pillow support provided   position adjusted  Intervention: Provide Person-Centered Care  Flowsheets (Taken 7/17/2023 1908)  Trust Relationship/Rapport: care explained  Goal: Readiness for Transition of Care  Outcome: Ongoing, Progressing     Problem: Infection  Goal: Absence of Infection Signs and Symptoms  Outcome: Ongoing, Progressing  Intervention: Prevent or Manage  Infection  Flowsheets (Taken 7/17/2023 1908)  Infection Management: aseptic technique maintained     Problem: Device-Related Complication Risk (Hemodialysis)  Goal: Safe, Effective Therapy Delivery  Outcome: Ongoing, Progressing  Intervention: Optimize Device Care and Function  Flowsheets (Taken 7/17/2023 1908)  Medication Review/Management: medications reviewed     Problem: Hemodynamic Instability (Hemodialysis)  Goal: Effective Tissue Perfusion  Outcome: Ongoing, Progressing     Problem: Infection (Hemodialysis)  Goal: Absence of Infection Signs and Symptoms  Outcome: Ongoing, Progressing     Problem: Skin Injury Risk Increased  Goal: Skin Health and Integrity  Outcome: Ongoing, Progressing  Intervention: Optimize Skin Protection  Flowsheets (Taken 7/17/2023 1908)  Pressure Reduction Techniques:   frequent weight shift encouraged   weight shift assistance provided  Pressure Reduction Devices: positioning supports utilized  Skin Protection:   adhesive use limited   incontinence pads utilized   tubing/devices free from skin contact  Head of Bed (HOB) Positioning: HOB at 30-45 degrees  Intervention: Promote and Optimize Oral Intake  Flowsheets (Taken 7/17/2023 1908)  Oral Nutrition Promotion: rest periods promoted     Problem: Impaired Wound Healing  Goal: Optimal Wound Healing  Outcome: Ongoing, Progressing  Intervention: Promote Wound Healing  Flowsheets (Taken 7/17/2023 1908)  Oral Nutrition Promotion: rest periods promoted  Activity Management: Rolling - L1  Pain Management Interventions:   care clustered   medication offered   pain management plan reviewed with patient/caregiver   pillow support provided   position adjusted

## 2023-07-18 NOTE — PT/OT/SLP EVAL
Occupational Therapy  Evaluation    Name: Mirza Nelson Jr.  MRN: [id number]  Admitting Diagnosis: Hematuria  Recent Surgery: Procedure(s) (LRB):  EGD (ESOPHAGOGASTRODUODENOSCOPY) (N/A)  EGD, WITH CLOSED BIOPSY 3 Days Post-Op    Recommendations:     Discharge Recommendations: home with home health (with assist at home initially as pt with increased weakness from baseline)  Discharge Equipment Recommendations:     Barriers to discharge:  Decreased caregiver support    Assessment:     Mirza Nelson Jr. is a 68 y.o. male with a medical diagnosis of Hematuria.  He presents with good effort with activity, increased time with activity and with some increased weakness from baseline. Performance deficits affecting function:  weakness, endurance, self care, mobility..      Rehab Prognosis: good; patient would benefit from acute skilled OT services to address these deficits and reach maximum level of function.       Plan:     Patient to be seen 3 x/week (3-5x/wk) to address the above listed problems via self-care/home management, therapeutic activities, therapeutic exercises  Plan of Care Expires: 08/15/23  Plan of Care Reviewed with: patient    Subjective     Chief Complaint: c/o feeling weaker since being in the hospital   Patient/Family Comments/goals: get stronger to go home and be ok    Occupational Profile:  Living Environment: lives in  home alone  Previous level of function: mod I, ambulates with walker, has shower chair and grab bars  Roles and Routines: brother  Equipment Used at Home: shower chair, rollator  Assistance upon Discharge: some possibly from family?    Pain/Comfort:  Pain Rating 1: 0/10    Patients cultural, spiritual, Nondenominational conflicts given the current situation:      Objective:     Communicated with: nsg prior to session.  Patient found supine with nielsen catheter, peripheral IV upon OT entry to room.    General Precautions: Standard, fall  Orthopedic Precautions:    Braces:    Respiratory  Status:   Vital Signs:     Occupational Performance:    Bed Mobility:    Sup to sit with SBA    Functional Mobility/Transfers:  Sit to stand with CGA  Functional Mobility: ambulated in room x ~25-30 ft with RW, slow gait, occasional rest, no LOB, CGA    Activities of Daily Living:  Socks with min assist    Cognitive/Visual Perceptual:  intact    Physical Exam:  Wfl BUE's    Therapeutic Positioning  Risk for acquired pressure injuries is decreased due to ability to get to BSC/toilet with assist.    OT interventions performed during the course of today's session in an effort to prevent and/or reduce acquired pressure injuries:   Education on Pressure Ulcer Prevention provided      OT recommendations for therapeutic positioning throughout hospitalization:   Follow [facility] Pressure Injury Prevention Protocol      [facility] 6 Click ADL:  [facility] Total Score:      Additional Treatment:  As above, educated on safety, importance of OOB with assist, POC     Patient Education:  Patient provided with verbal education regarding OT role/goals/POC, fall prevention, safety awareness, and Discharge/DME recommendations.  Understanding was verbalized.     Patient left HOB elevated with all lines intact and call button in reach    GOALS:   Multidisciplinary Problems       Occupational Therapy Goals          Problem: Occupational Therapy    Goal Priority Disciplines Outcome Interventions   Occupational Therapy Goal     OT, PT/OT Ongoing, Progressing    Description: Goals to be met by: 8/15/23     Patient will increase functional independence with ADLs by performing:    UE Dressing with Modified [place].  LE Dressing with Modified [place].  Grooming while standing with Modified [place].  Toileting from toilet with Modified [place] for hygiene and clothing management.   Toilet transfer to toilet with Modified [place].                         History:     Past Medical History:   Diagnosis Date    Anemia     BPH (benign  prostatic hyperplasia)     Cervical radiculopathy 05/17/2022    Disorder of kidney and ureter     ESRD on hemodialysis 09/27/2018    Hypertension     Neck injury 1994    Other chronic pain 09/27/2018    Personal history of colonic polyps     Renovascular hypertension 09/27/2018    Vitamin D deficiency 05/17/2022         Past Surgical History:   Procedure Laterality Date    AV FISTULA PLACEMENT      COLONOSCOPY W/ POLYPECTOMY  02/19/2019    EGD, WITH CLOSED BIOPSY  7/15/2023    Procedure: EGD, WITH CLOSED BIOPSY;  Surgeon: Casey Funes MD;  Location: [facility];  Service: Gastroenterology;;    ESOPHAGOGASTRODUODENOSCOPY N/A 7/15/2023    Procedure: EGD (ESOPHAGOGASTRODUODENOSCOPY);  Surgeon: Casey Funes MD;  Location: [facility];  Service: Gastroenterology;  Laterality: N/A;    MASS EXCISION  2005    near the kidney     NECK SURGERY         Time Tracking:     OT Date of Treatment: 07/18/23  OT Start Time: 1016  OT Stop Time: 1044  OT Total Time (min): 28 min    Billable Minutes:Therapeutic activities 8 min  Eval mod complexity 20 min    7/18/2023

## 2023-07-18 NOTE — PLAN OF CARE
Problem: Physical Therapy  Goal: Physical Therapy Goal  Description: Goals to be met by: 23     Patient will increase functional independence with mobility by performin. Supine to sit with Set-up [place]  2. Sit to supine with Set-up [place]  3. Sit to stand transfer with Stand-by Assistance  4. Gait  x 200 feet with Stand-by Assistance using Rolling Walker.     Outcome: Ongoing, Progressing

## 2023-07-18 NOTE — PROGRESS NOTES
UROLOGY  PROGRESS  NOTE    Mirza Nelson Jr. 1955  [id number]  7/18/2023    Sitting up in bed  Denies suprapubic discomfort  H&H 8.3 and 25.4 post transfusion  Vital signs stable, afebrile      Intake/Output:  No intake/output data recorded.  I/O last 3 completed shifts:  In: 240 [P.O.:240]  Out: 3375 [Urine:1875; Other:1500]       Exam:    NAD  Card RRR  Resp unlabored  Abd soft, NTND   pink tinged urine with moderate CBI      Recent Results (from the past 24 hour(s))   CBC with Differential    Collection Time: 07/17/23 10:24 AM   Result Value Ref Range    WBC 5.33 4.50 - 11.50 x10(3)/mcL    RBC 2.38 (L) 4.70 - 6.10 x10(6)/mcL    Hgb 7.5 (L) 14.0 - 18.0 g/dL    Hct 23.1 (L) 42.0 - 52.0 %    MCV 97.1 (H) 80.0 - 94.0 fL    MCH 31.5 (H) 27.0 - 31.0 pg    MCHC 32.5 (L) 33.0 - 36.0 g/dL    RDW 16.7 11.5 - 17.0 %    Platelet 102 (L) 130 - 400 x10(3)/mcL    MPV 10.8 (H) 7.4 - 10.4 fL    Neut % 83.3 %    Lymph % 5.1 %    Mono % 8.4 %    Eos % 2.4 %    Basophil % 0.4 %    Lymph # 0.27 (L) 0.6 - 4.6 x10(3)/mcL    Neut # 4.44 2.1 - 9.2 x10(3)/mcL    Mono # 0.45 0.1 - 1.3 x10(3)/mcL    Eos # 0.13 0 - 0.9 x10(3)/mcL    Baso # 0.02 <=0.2 x10(3)/mcL    IG# 0.02 0 - 0.04 x10(3)/mcL    IG% 0.4 %    NRBC% 0.0 %   Basic Metabolic Panel    Collection Time: 07/18/23  3:31 AM   Result Value Ref Range    Sodium Level 133 (L) 136 - 145 mmol/L    Potassium Level 3.9 3.5 - 5.1 mmol/L    Chloride 95 (L) 98 - 107 mmol/L    Carbon Dioxide 28 23 - 31 mmol/L    Glucose Level 83 82 - 115 mg/dL    Blood Urea Nitrogen 31.3 (H) 8.4 - 25.7 mg/dL    Creatinine 7.11 (H) 0.73 - 1.18 mg/dL    BUN/Creatinine Ratio 4     Calcium Level Total 7.8 (L) 8.8 - 10.0 mg/dL    Anion Gap 10.0 mEq/L    eGFR 8 mls/min/1.73/m2   CBC with Differential    Collection Time: 07/18/23  3:31 AM   Result Value Ref Range    WBC 5.21 4.50 - 11.50 x10(3)/mcL    RBC 2.61 (L) 4.70 - 6.10 x10(6)/mcL    Hgb 8.3 (L) 14.0 - 18.0 g/dL    Hct 25.4 (L) 42.0 - 52.0 %    MCV 97.3 (H)  80.0 - 94.0 fL    MCH 31.8 (H) 27.0 - 31.0 pg    MCHC 32.7 (L) 33.0 - 36.0 g/dL    RDW 16.3 11.5 - 17.0 %    Platelet 122 (L) 130 - 400 x10(3)/mcL    MPV 11.2 (H) 7.4 - 10.4 fL    Neut % 79.1 %    Lymph % 8.4 %    Mono % 8.8 %    Eos % 2.9 %    Basophil % 0.4 %    Lymph # 0.44 (L) 0.6 - 4.6 x10(3)/mcL    Neut # 4.12 2.1 - 9.2 x10(3)/mcL    Mono # 0.46 0.1 - 1.3 x10(3)/mcL    Eos # 0.15 0 - 0.9 x10(3)/mcL    Baso # 0.02 <=0.2 x10(3)/mcL    IG# 0.02 0 - 0.04 x10(3)/mcL    IG% 0.4 %    NRBC% 0.0 %         Assessment:  Markedly enlarged prostate with gross hematuria complicated by MDRO E. Coli UTI      Plan:  Continue to wean CBI for clear urine  Can hopefully d/c soon  Start [facility] [place]

## 2023-07-18 NOTE — PLAN OF CARE
Problem: Adult Inpatient Plan of Care  Goal: Plan of Care Review  Outcome: Ongoing, Progressing  Flowsheets (Taken 7/18/2023 0113)  Plan of Care Reviewed With: patient  Goal: Patient-Specific Goal (Individualized)  Outcome: Ongoing, Progressing  Goal: Absence of Hospital-Acquired Illness or Injury  Outcome: Ongoing, Progressing  Goal: Optimal Comfort and Wellbeing  Outcome: Ongoing, Progressing  Goal: Readiness for Transition of Care  Outcome: Ongoing, Progressing     Problem: Infection  Goal: Absence of Infection Signs and Symptoms  Outcome: Ongoing, Progressing     Problem: Device-Related Complication Risk (Hemodialysis)  Goal: Safe, Effective Therapy Delivery  Outcome: Ongoing, Progressing     Problem: Hemodynamic Instability (Hemodialysis)  Goal: Effective Tissue Perfusion  Outcome: Ongoing, Progressing     Problem: Infection (Hemodialysis)  Goal: Absence of Infection Signs and Symptoms  Outcome: Ongoing, Progressing     Problem: Skin Injury Risk Increased  Goal: Skin Health and Integrity  Outcome: Ongoing, Progressing     Problem: Impaired Wound Healing  Goal: Optimal Wound Healing  Outcome: Ongoing, Progressing

## 2023-07-19 NOTE — PROGRESS NOTES
"Inpatient Nutrition Assessment    Admit Date: 2023   Total duration of encounter: 6 days     Nutrition Recommendation/Prescription     Continue renal diet.     Communication of Recommendations: reviewed with patient    Nutrition Assessment       Chart Review    Reason Seen: length of stay    Malnutrition Screening Tool Results   Have you recently lost weight without trying?: No  Have you been eating poorly because of a decreased appetite?: No   MST Score: 0     Diagnosis:  ESRD on HD  E coli bacteremia  Bilateral hydroureteronephrosis with a superimposed pyelo ureteritis not excluded  Melena-status post EGD with retained food, mild gastritis, moderate valvular duodenitis, no active erosions or ulceration.    Relevant Medical History:  dialysis dependent ESRD, HTN, anemia, cervical radiculopathy, vitamin-D deficiency     Nutrition-Related Medications: epoetin, ferrous sulfate, pantoprazole, polyethylene glycol, sevelamer carbonate  Calorie Containing IV Medications: no significant kcals from medications at this time    Nutrition-Related Labs:  : Na 130, Chl 92, Bun 40.1, Crea 9.20, GFR 6, Ca 7.7    Diet/PN Order: DIET RENAL NON-DIALYSIS  Oral Supplement Order: none  Tube Feeding Order: none  Appetite/Oral Intake: good/% of meals  Factors Affecting Nutritional Intake: none identified  Food/Islam/Cultural Preferences: none reported  Food Allergies: shellfish and iodine    Skin Integrity: other (see comments)  Wound(s):   altered skin integrity noted sacral spine    Comments    23: Patient reports good appetite and oral intakes. Denies any unintentional wt loss.      Anthropometrics    Height: 5' 6" (167.6 cm)    Last Weight: 72.6 kg (160 lb) (23 1807) Weight Method: Bed Scale  BMI (Calculated): 25.8  BMI Classification: overweight (BMI 25-29.9)        Ideal Body Weight (IBW), Male: 142 lb     % Ideal Body Weight, Male (lb): 112.68 %                 Usual Body Weight (UBW), k.18 kg  % " Usual Body Weight: 106.67     Usual Weight Provided By: EMR weight history, patient reports UBW about 150 lbs.     Wt Readings from Last 5 Encounters:   07/13/23 72.6 kg (160 lb)   06/16/23 65.8 kg (145 lb)   06/01/23 67.6 kg (149 lb 1.6 oz)   05/04/23 67 kg (147 lb 12.8 oz)   04/25/23 64.6 kg (142 lb 6.7 oz)     Weight Change(s) Since Admission:  Admit Weight: 72.6 kg (160 lb) (07/13/23 1807)  .      Enteral Nutrition    Patient not receiving enteral nutrition at this time.    Parenteral Nutrition    Patient not receiving parenteral nutrition support at this time.    Evaluation of Received Nutrient Intake    Calories: meeting estimated needs  Protein: meeting estimated needs    Patient Education    Not applicable.      Monitoring & Evaluation     Dietitian will monitor energy intake and weight.  Nutrition Risk/Follow-Up: low (follow-up in 5-7 days)   Please consult if re-assessment needed sooner.

## 2023-07-19 NOTE — PROGRESS NOTES
UROLOGY  PROGRESS  NOTE    Mirza Nelson . 1955  [id number]  7/19/2023    Sitting up in bed, in HD  Denies suprapubic discomfort  H&H 8.3 and 25.4 post transfusion  Vital signs stable, afebrile      Intake/Output:  No intake/output data recorded.  I/O last 3 completed shifts:  In: 1240 [P.O.:1240]  Out: 7125 [Urine:7125]       Exam:    NAD  Card RRR  Resp unlabored  Abd soft, NTND   pink tinged urine with some sediment with min CBI      Recent Results (from the past 24 hour(s))   Basic Metabolic Panel    Collection Time: 07/19/23  3:54 AM   Result Value Ref Range    Sodium Level 130 (L) 136 - 145 mmol/L    Potassium Level 4.3 3.5 - 5.1 mmol/L    Chloride 92 (L) 98 - 107 mmol/L    Carbon Dioxide 26 23 - 31 mmol/L    Glucose Level 92 82 - 115 mg/dL    Blood Urea Nitrogen 40.1 (H) 8.4 - 25.7 mg/dL    Creatinine 9.20 (H) 0.73 - 1.18 mg/dL    BUN/Creatinine Ratio 4     Calcium Level Total 7.7 (L) 8.8 - 10.0 mg/dL    Anion Gap 12.0 mEq/L    eGFR 6 mls/min/1.73/m2   CBC with Differential    Collection Time: 07/19/23  3:54 AM   Result Value Ref Range    WBC 4.98 4.50 - 11.50 x10(3)/mcL    RBC 2.52 (L) 4.70 - 6.10 x10(6)/mcL    Hgb 8.0 (L) 14.0 - 18.0 g/dL    Hct 25.0 (L) 42.0 - 52.0 %    MCV 99.2 (H) 80.0 - 94.0 fL    MCH 31.7 (H) 27.0 - 31.0 pg    MCHC 32.0 (L) 33.0 - 36.0 g/dL    RDW 15.9 11.5 - 17.0 %    Platelet 160 130 - 400 x10(3)/mcL    MPV 10.7 (H) 7.4 - 10.4 fL    NRBC% 0.0 %   Manual Differential    Collection Time: 07/19/23  3:54 AM   Result Value Ref Range    WBC 4.98 x10(3)/mcL    Neutrophils % 86 %    Lymphs % 8 %    Monocytes % 5 %    Eosinophils % 1 %    nRBC % 1 %    Neutrophils Abs 4.2828 2.1 - 9.2 x10(3)/mcL    Lymphs Abs 0.3984 (L) 0.6 - 4.6 x10(3)/mcL    Monocytes Abs 0.249 0.1 - 1.3 x10(3)/mcL    Eosinophils Abs 0.0498 0 - 0.9 x10(3)/mcL    Platelets Normal Normal, Adequate    RBC Morph Abnormal (A) Normal    Anisocytosis 1+ (A) (none)    Macrocytosis 1+ (A) (none)         Assessment:  Markedly  enlarged prostate with gross hematuria complicated by MDRO E. Coli UTI      Plan:  On CBI clamp trials, can restart if needed, goal is to get him off CBI to get him home  Will need to go home with [facility] and follow up in 2 weeks  Continue Kiah Stark, CATHYP

## 2023-07-19 NOTE — PROGRESS NOTES
[facility]  Hospital Medicine Progress Note        Chief Complaint: Inpatient Follow-up for Hematuria and sepsis     HPI:   68 y.o. male with a past medical history of essential hypertension, ESRD on HD MWF, anemia, BPH, cervical radiculopathy, and vitamin-D deficiency presented to [facility] on 7/13/2023 for dark stools.  Nursing home reported they noticed melanotic stools yesterday and patient was more confused than normal.  On exam patient reports he has had dark stools for the past week with intermittent abdominal and back pain.  Patient denies chest pain, shortness of breath, fever, chills, nausea, vomiting, and diarrhea.  Initial vital signs in ED were /69, pulse 87, respirations 19, temperature 37° C, and SpO2 98% on room air.  Labs revealed WBC 6.32, RBC 3.24, hemoglobin 10.7, hematocrit 33.3, .8, potassium 5.5, chloride 95, BUN 52.5, creatinine 10.47, and lactic acid 1.1.  UA revealed greater than 100 red blood cells.  Blood culture was obtained.  CT head revealed chronic age-related changes without acute process.  Chest x-ray revealed no acute cardiopulmonary process identified.  CT abdomen and pelvis without contrast revealed moderate stool in the colon which could reflect an element of constipation, mild to moderate bilateral hydroureteronephrosis with superimposed pyeloureteritis not excluded, enlarged prostate, hyperdense material in the dependent urinary bladder which may reflect blood clots, focal sclerotic lesions seen and left iliac and pubic bones suspicious for sclerotic metastases.  Three-way catheter was placed in ED with gross hematuria.  Patient was started on IV Zosyn.  Urology and nephrology were consulted. Patient was admitted to hospital medicine service for further medical management.     Patient was started on CBI per urology. Blood cultures was positive for Ecoli. He was continued on iv rocephin. He has persistent blood tinged urine in his  morales.    Interval Hx:   Patient today awake and comfortable. State she has not ambulated since admit. Denies any fever or chills. Eating well.     No family at bedside.       Objective/physical exam:  General: In no acute distress, Frail  Chest: Clear to auscultation bilaterally  Heart: RRR, +S1, S2, no appreciable murmur  Abdomen: Soft, nontender, BS +  MSK: Warm, no lower extremity edema, no clubbing or cyanosis  Neurologic: Cranial nerve II-XII intact, Strength 5/5 in all 4 extremities    VITAL SIGNS: 24 HRS MIN & MAX LAST   Temp  Min: 98.2 °F (36.8 °C)  Max: 98.8 °F (37.1 °C) 98.8 °F (37.1 °C)   BP  Min: 106/62  Max: 139/63 139/63   Pulse  Min: 59  Max: 76  76   Resp  Min: 18  Max: 20 19   SpO2  Min: 97 %  Max: 98 % 97 %     I have reviewed the following labs:    Recent Labs   Lab 07/17/23  1024 07/18/23  0331 07/19/23  0354   WBC 5.33 5.21 4.98   RBC 2.38* 2.61* 2.52*   HGB 7.5* 8.3* 8.0*   HCT 23.1* 25.4* 25.0*   MCV 97.1* 97.3* 99.2*   MCH 31.5* 31.8* 31.7*   MCHC 32.5* 32.7* 32.0*   RDW 16.7 16.3 15.9   * 122* 160   MPV 10.8* 11.2* 10.7*       Recent Labs   Lab 07/13/23  1934 07/14/23  0756 07/14/23  1738 07/14/23  1905 07/15/23  0449 07/16/23  0845 07/18/23  0331 07/19/23  0354    136 136  --    < > 134* 133* 130*   K 5.5* 5.1 3.9  --    < > 4.3 3.9 4.3   CO2 25 23 22*  --    < > 24 28 26   BUN 52.5* 57.9* 28.8*  --    < > 60.5* 31.3* 40.1*   CREATININE 10.47* 11.43* 6.33*  --    < > 10.57* 7.11* 9.20*   CALCIUM 9.1 8.6* 8.3*  --    < > 7.3* 7.8* 7.7*   PH  --   --   --  7.460*  --   --   --   --    MG 2.40  --   --   --   --   --   --   --    ALBUMIN 3.7 3.0* 3.1*  --   --  2.3*  --   --    ALKPHOS 71 61 61  --   --  47  --   --    ALT 8 9 11  --   --  11  --   --    AST 12 14 14  --   --  19  --   --    BILITOT 0.8 0.7 1.2  --   --  0.8  --   --     < > = values in this interval not displayed.          Microbiology Results (last 7 days)       Procedure Component Value Units Date/Time     Blood Culture [[id number]]  (Normal) Collected: 07/17/23 0839    Order Status: Completed Specimen: Blood Updated: 07/19/23 1200     CULTURE, BLOOD ([facility]) No Growth At 48 Hours    Blood Culture #1 **CANNOT BE ORDERED STAT** [[id number]]  (Abnormal)  (Susceptibility) Collected: 07/14/23 0259    Order Status: Completed Specimen: Blood from Arm, Left Updated: 07/16/23 0805     CULTURE, BLOOD ([facility]) Escherichia coli     GRAM STAIN Seen in gram stain of broth only      Gram Negative Rods      1 of 2 Aerobic bottles positive    Blood Culture #2 **CANNOT BE ORDERED STAT** [[id number]]  (Abnormal)  (Susceptibility) Collected: 07/14/23 0259    Order Status: Completed Specimen: Blood from Hand, Left Updated: 07/16/23 0805     CULTURE, BLOOD ([facility]) Escherichia coli     GRAM STAIN 2 of 2 bottles positive      Gram Negative Rods      Seen in gram stain of broth only    BCID2 Panel [[id number]] Collected: 07/14/23 0259    Order Status: Canceled Specimen: Blood from Arm, Left Updated: 07/15/23 0703    BCID2 Panel [[id number]]  (Abnormal) Collected: 07/14/23 0259    Order Status: Completed Specimen: Blood from Hand, Left Updated: 07/14/23 1705     CTX-M (ESBL ) Not Detected     IMP (Cabapenemase ) Not Detected     KPC resistance gene (Carbapenemase ) Not Detected     mcr-1 Not Detected     mecA ID N/A     Comment: Note: Antimicrobial resistance can occur via multiple mechanisms. A Not Detected result for antimicrobial resistance gene(s) does not indicate antimicrobial susceptibility. Subculturing is required for species identification and susceptibility testing of   isolates.        mecA/C and MREJ (MRSA) gene N/A     NDM (Carbapenemase ) Not Detected     OXA-48-like (Carbapenemase ) Not Detected     Pablo/B (VRE gene) N/A     VIM (Carbapenemase ) Not Detected     Enterococcus faecalis Not Detected     Enterococcus faecium Not Detected     Listeria monocytogenes Not Detected     Staphylococcus  spp. Not Detected     Staphylococcus aureus Not Detected     Staphylococcus epidermidis Not Detected     Staphylococcus lugdunensis Not Detected     Streptococcus spp. Not Detected     Streptococcus agalactiae (Group B) Not Detected     Streptococcus pneumoniae Not Detected     Streptococcus pyogenes (Group A) Not Detected     Acinetobacter calcoaceticus/baumannii complex Not Detected     Bacteroides fragilis Not Detected     Enterobacterales Detected     Enterobacter cloacae complex Not Detected     Escherichia coli Detected     Klebsiella aerogenes Not Detected     Klebsiella oxytoca Not Detected     Klebsiella pneumoniae group Not Detected     Proteus spp. Not Detected     Salmonella spp. Not Detected     Serratia marcescens Not Detected     Haemophilus influenzae Not Detected     Neisseria meningitidis Not Detected     Pseudomonas aeruginosa Not Detected     Stenotrophomonas maltophilia Not Detected     Candida albicans Not Detected     Candida auris Not Detected     Candida glabrata Not Detected     Candida krusei Not Detected     Candida parapsilosis Not Detected     Candida tropicalis Not Detected     Cryptococcus neoformans/gattii Not Detected    Narrative:      The ONOFFMIX (?????) BCID2 Panel is a multiplexed nucleic acid test intended for the use with Rosum® 2.0 or Rosum® [employer] for the simultaneous qualitative detection and identification of multiple bacterial and yeast nucleic acids and select genetic determinants associated with antimicrobial resistance.  The BioFire BCID2 Panel test is performed directly on blood culture samples identified as positive by a continuous monitoring blood culture system.  Results are intended to be interpreted in conjunction with Gram stain results.             See below for Radiology    Scheduled Med:   amLODIPine  10 mg Oral Daily    bisacodyL  10 mg Rectal Once    carvediloL  12.5 mg Oral BID    cefTRIAXone (ROCEPHIN) IVPB  2 g Intravenous Daily     doxazosin  8 mg Oral QHS    epoetin gary  10,000 Units Intravenous Every Mon, Wed, Fri    ferrous sulfate  1 tablet Oral Daily    pantoprazole  40 mg Intravenous BID    polyethylene glycol  17 g Oral Daily    sevelamer carbonate  800 mg Oral TID        Continuous Infusions:       PRN Meds:  sodium chloride, sodium chloride 0.9%, acetaminophen, bisacodyL, docusate sodium, hyoscyamine, labetaloL, metoprolol, morphine, ondansetron, oxyCODONE-acetaminophen, sodium chloride 0.9%       Assessment/Plan:  Hematuria, persistent   Bilateral hydroureteronephrosis with with superimposed pyeloureteritis not excluded  Melena   Focal sclerotic lesions in left iliac and pubic bone suspicious for sclerotic metastases  ESRD on HD MWF  History of essential hypertension,  anemia, BPH, cervical radiculopathy, and vitamin-D deficiency    Plan:  Patient needs to ambulate, will get PT   His nielsen is still draining bloody urine, Hb is 8 and Plt is 160  Transfuse if Hb <7  He has been afebrile  Cont rocephin 2mg iv daily for 10 days last dose 7/23/23. If discharged home iv infusion can be set up.   Current meds reviewed  Cont HD per renal team   Current meds reviewed     Labs in am     Cont supportive care     VTE prophylaxis: SCD    Patient condition:  Fair    Anticipated discharge and Disposition:   Dc when cleared by urology       All diagnosis and differential diagnosis have been reviewed; assessment and plan has been documented; I have personally reviewed the labs and test results that are presently available; I have reviewed the patients medication list; I have reviewed the consulting providers response and recommendations. I have reviewed or attempted to review medical records based upon their availability    All of the patient's questions have been  addressed and answered. Patient's is agreeable to the above stated plan. I will continue to monitor closely and make adjustments to medical management as  needed.  _____________________________________________________________________    Nutrition Status:    Radiology:  I have personally reviewed the following imaging and agree with the radiologist.     NM Bone Scan Whole Body  Narrative: EXAMINATION:  NM BONE SCAN WHOLE BODY    CLINICAL HISTORY:  Sclerotic bone lesions-iliac;    TECHNIQUE:  Whole body skeletal scintigraphy was performed following intravenous administration of 26.9 mCi of technetium-99m MDP.    COMPARISON:  CT abdomen pelvis osseous structures July 13, 2023, April 18, 2023, December 7, 2021 and December 17, 2020.    FINDINGS:  Left iliac bone and pubic bone small sclerotic lesions seen on the recent CT abdomen pelvis July 13, 2023 have been stable in overall size and appearance compared to September 17, 2020 exam.  These are below bone scintigraphy resolution due to the small size with no corresponding location abnormal increased radioisotope uptake identified.  There are mild degenerative related uptakes involving the thoracic vertebrae. There otherwise is unremarkable radioisotope activity within the skeletal system.  Impression: 1. No bone scintigraphy suggestion of skeletal metastatic involvement.    2. Left iliac bone and pubic bone small sclerotic lesions have been stable since September 17, 2020 exam.    Electronically signed by: Dipak Paredes  Date:    07/17/2023  Time:    13:07      Darryl Waters MD   07/19/2023

## 2023-07-19 NOTE — NURSING
07/19/23 1140        Hemodialysis AV Fistula Right upper arm   No placement date or time found.   Present Prior to Hospital Arrival?: Yes  Hemodialysis Catheter Type: Tunneled catheter  Location: Right upper arm  Additional Comments: clotted, not in use   Site Assessment Clean;Dry;Intact   Patency Present;Thrill;Bruit   Dressing Status Clean;Dry;Intact   Site Condition No complications   Dressing Gauze   Post-Hemodialysis Assessment   Rinseback Volume (mL) 250 mL   Blood Volume Processed (Liters) 65.2 L   Dialyzer Clearance Heavily streaked   Duration of Treatment 180 minutes   Total UF (mL) 1500 mL   Net Fluid Removal 1000   Patient Response to Treatment Tolerated well   Post-Hemodialysis Comments Blood rinsed back per P&P

## 2023-07-19 NOTE — PT/OT/SLP PROGRESS
Physical Therapy Treatment    Patient Name:  Mirza Nelson Jr.   MRN:  [id number]    Recommendations:     Discharge Recommendations: nursing facility, skilled, rehabilitation facility  Discharge Equipment Recommendations: none  Barriers to discharge: Impaired mobility and Ongoing medical needs    Assessment:     Mirza Nelson Jr. is a 68 y.o. male admitted with a medical diagnosis of Hematuria.  He presents with the following impairments/functional limitations: weakness, impaired endurance, impaired self care skills, impaired functional mobility, gait instability, impaired balance, impaired cognition, decreased lower extremity function, decreased safety awareness. Poor tolerance of session. More confused today. Constant cueing to remain on task.  Notified nurse.     Rehab Prognosis: Good and Fair; patient would benefit from acute skilled PT services to address these deficits and reach maximum level of function.    Recent Surgery: Procedure(s) (LRB):  EGD (ESOPHAGOGASTRODUODENOSCOPY) (N/A)  EGD, WITH CLOSED BIOPSY 4 Days Post-Op    Plan:     During this hospitalization, patient to be seen 5 x/week to address the identified rehab impairments via gait training, therapeutic activities, therapeutic exercises, neuromuscular re-education and progress toward the following goals:    Plan of Care Expires:  08/18/23    Subjective     Chief Complaint: none  Patient/Family Comments/goals: none  Pain/Comfort:  Pain Rating 1: 0/10      Objective:     Communicated with nurse prior to session.  Patient found supine with nielsen catheter, peripheral IV upon PT entry to room.     General Precautions: Standard, fall  Orthopedic Precautions: N/A  Braces: N/A  Respiratory Status: Room air  Skin Integrity: Visible skin intact      Functional Mobility:  Bed Mobility:     Supine to Sit: moderate assistance  Sit to Supine: moderate assistance  Transfers:  Sit to Stand:  minimum assistance with rolling walker  Gait: 10 ft + 10 ft with RW & MIN  A. Difficulty advancing BLE. Seemed to be dragging feet. Multiple standing rest breaks. Assistance with steering RW.  Balance: poor    Therapeutic Exercises:  Patient performed seated Marches, Heel Raises, LAQ, Glute Sets, Resisted Hip ABD/ADD. All performed 2 x 20 reps.    Education Provided:  Role and goals of PT, transfer training, bed mobility, gait training, balance training, safety awareness, assistive device, strengthening exercises, deep breathing techniques, and importance of participating in PT to return to PLOF.    Expected compliance:  Moderate      Patient left HOB elevated with all lines intact and call button in reach..    GOALS:   Multidisciplinary Problems       Physical Therapy Goals          Problem: Physical Therapy    Goal Priority Disciplines Outcome Goal Variances Interventions   Physical Therapy Goal     PT, PT/OT Ongoing, Progressing     Description: Goals to be met by: 23     Patient will increase functional independence with mobility by performin. Supine to sit with Set-up [place]  2. Sit to supine with Set-up [place]  3. Sit to stand transfer with Stand-by Assistance  4. Gait  x 200 feet with Stand-by Assistance using Rolling Walker.                          Time Tracking:     PT Received On: 23  PT Start Time: 1345     PT Stop Time: 1415  PT Total Time (min): 30 min     Billable Minutes: Therapeutic Activity 15 minutes and Therapeutic Exercise 15 minutes    Treatment Type: Treatment  PT/PTA: PT     Number of PTA visits since last PT visit: 2023

## 2023-07-19 NOTE — PROGRESS NOTES
[facility] - [facility]  Nephrology  Progress Note    Patient Name: Mirza Nelson Jr.  MRN: [id number]  Admission Date: 7/13/2023  Hospital Length of Stay: 5 days  Attending Provider: Darryl Waters MD   Primary Care Physician: Elo Flores MD  Principal Problem:Hematuria      Subjective:   This is a 68-year-old  male with dialysis dependent ESRD.  He dialyzes at [facility] on Monday Wednesday Friday by way of right upper arm AV fistula.  He presented to ED on 07/13/2023 with complaints of dark stool.  He also endorses lightheadedness, back pain, neck pain.  CT of the abdomen and pelvis revealed mild to moderate bilateral hydroureteronephrosis with enlarged prostate with median lobe projecting into the bladder base, hyperdense material independent urinary bladder blood clot.  Nielsen catheter was placed and subsequently started on CBI.  At present he is still requiring some minimal irrigation to keep urine pink.  Blood cultures drawn 7/14 positive for E coli.  No urine culture.  He is being treated with IV cefepime.    Interval history: Patient eating breakfast independently. CBI temporarily paused because irrigation fluid bags are empty. There is dark red blood in nielsen tubing. Patient without complaints including abdominal pain    7/19/2023 currently tolerating dialysis.  He is back on continuous bladder irrigation for hematuria.  He reports that he is confused and lost.  He does not know what is going on.  I spent lot of time with him explaining to him that he has infection in his blood which is being treated and also has problem with his prostate for which he is getting continuous bladder irrigation and once infection clears up and he gets strong, down the line he will need to get some prostate surgery done per urologist.  He expressed understanding.      Review of patient's allergies indicates:   Allergen Reactions    Iodine      Other reaction(s): difficulty  breathing, Facial Swelling    Iodine and iodide containing products Swelling    Shellfish containing products      Other reaction(s): Swelling     Current Facility-Administered Medications   Medication Frequency    0.9%  NaCl infusion (for blood administration) Q24H PRN    0.9%  NaCl infusion PRN    acetaminophen suppository 650 mg Q6H PRN    amLODIPine tablet 10 mg Daily    bisacodyL suppository 10 mg Once    bisacodyL suppository 10 mg Daily PRN    carvediloL tablet 12.5 mg BID    cefTRIAXone (ROCEPHIN) 2 g in dextrose 5 % in water (D5W) 5 % 100 mL IVPB (MB+) Daily    docusate sodium capsule 100 mg Daily PRN    doxazosin tablet 8 mg QHS    epoetin gary injection 10,000 Units Every Mon, Wed, Fri    ferrous sulfate tablet 1 each Daily    hyoscyamine ODT 0.125 mg Q4H PRN    labetaloL injection 10 mg Q4H PRN    metoprolol injection 5 mg Q4H PRN    morphine injection 2 mg Q4H PRN    ondansetron disintegrating tablet 4 mg Q6H PRN    oxyCODONE-acetaminophen  mg per tablet 1 tablet Q4H PRN    pantoprazole injection 40 mg BID    polyethylene glycol packet 17 g Daily    sevelamer carbonate tablet 800 mg TID    sodium chloride 0.9% bolus 250 mL 250 mL PRN       Objective:     Vital Signs (Most Recent):  Temp: 98.8 °F (37.1 °C) (07/19/23 0717)  Pulse: 76 (07/19/23 0717)  Resp: 18 (07/19/23 0717)  BP: 139/63 (07/19/23 0717)  SpO2: 97 % (07/19/23 0717) Vital Signs (24h Range):  Temp:  [98.2 °F (36.8 °C)-98.8 °F (37.1 °C)] 98.8 °F (37.1 °C)  Pulse:  [58-76] 76  Resp:  [18-20] 18  SpO2:  [97 %-98 %] 97 %  BP: (106-157)/(62-70) 139/63     Weight: 72.6 kg (160 lb) (07/13/23 1807)  Body mass index is 25.82 kg/m².  Body surface area is 1.84 meters squared.    I/O last 3 completed shifts:  In: 1240 [P.O.:1240]  Out: 7125 [Urine:7125]  Currently tolerating dialysis.  Physical Exam  Constitutional:       General: He is not in acute distress.  HENT:      Head: Normocephalic.      Mouth/Throat:      Mouth: Mucous membranes are  moist.   Eyes:      Extraocular Movements: Extraocular movements intact.      Conjunctiva/sclera: Conjunctivae normal.   Cardiovascular:      Rate and Rhythm: Normal rate and regular rhythm.      Pulses: Normal pulses.      Heart sounds: Normal heart sounds.   Pulmonary:      Effort: Pulmonary effort is normal.      Breath sounds: Normal breath sounds.   Abdominal:      General: Abdomen is flat. Bowel sounds are normal.      Palpations: Abdomen is soft.   Genitourinary:     Comments: Dark red, CBI paused   Musculoskeletal:         General: No swelling. Normal range of motion.      Cervical back: Neck supple.      Comments: WONG AVF   Skin:     General: Skin is warm and dry.   Neurological:      General: No focal deficit present.      Mental Status: He is alert and oriented to person, place, and time.   Psychiatric:         Mood and Affect: Mood normal.         Behavior: Behavior normal.       Significant Labs:sureBMP:   Recent Labs   Lab 07/13/23  1934 07/14/23  0756 07/19/23  0354      < > 130*   K 5.5*   < > 4.3   CO2 25   < > 26   BUN 52.5*   < > 40.1*   CREATININE 10.47*   < > 9.20*   CALCIUM 9.1   < > 7.7*   MG 2.40  --   --     < > = values in this interval not displayed.       CBC:   Recent Labs   Lab 07/19/23  0354   WBC 4.98   RBC 2.52*   HGB 8.0*   HCT 25.0*      MCV 99.2*   MCH 31.7*   MCHC 32.0*       Microbiology Results (last 7 days)       Procedure Component Value Units Date/Time    Blood Culture [[id number]]  (Normal) Collected: 07/17/23 0839    Order Status: Completed Specimen: Blood Updated: 07/18/23 1200     CULTURE, BLOOD ([facility]) No Growth At 24 Hours    Blood Culture #1 **CANNOT BE ORDERED STAT** [[id number]]  (Abnormal)  (Susceptibility) Collected: 07/14/23 0259    Order Status: Completed Specimen: Blood from Arm, Left Updated: 07/16/23 0805     CULTURE, BLOOD ([facility]) Escherichia coli     GRAM STAIN Seen in gram stain of broth only      Gram Negative Rods      1 of 2 Aerobic bottles  positive    Blood Culture #2 **CANNOT BE ORDERED STAT** [[id number]]  (Abnormal)  (Susceptibility) Collected: 07/14/23 0259    Order Status: Completed Specimen: Blood from Hand, Left Updated: 07/16/23 0805     CULTURE, BLOOD ([facility]) Escherichia coli     GRAM STAIN 2 of 2 bottles positive      Gram Negative Rods      Seen in gram stain of broth only    BCID2 Panel [[id number]] Collected: 07/14/23 0259    Order Status: Canceled Specimen: Blood from Arm, Left Updated: 07/15/23 0703    BCID2 Panel [[id number]]  (Abnormal) Collected: 07/14/23 0259    Order Status: Completed Specimen: Blood from Hand, Left Updated: 07/14/23 1705     CTX-M (ESBL ) Not Detected     IMP (Cabapenemase ) Not Detected     KPC resistance gene (Carbapenemase ) Not Detected     mcr-1 Not Detected     mecA ID N/A     Comment: Note: Antimicrobial resistance can occur via multiple mechanisms. A Not Detected result for antimicrobial resistance gene(s) does not indicate antimicrobial susceptibility. Subculturing is required for species identification and susceptibility testing of   isolates.        mecA/C and MREJ (MRSA) gene N/A     NDM (Carbapenemase ) Not Detected     OXA-48-like (Carbapenemase ) Not Detected     Pablo/B (VRE gene) N/A     VIM (Carbapenemase ) Not Detected     Enterococcus faecalis Not Detected     Enterococcus faecium Not Detected     Listeria monocytogenes Not Detected     Staphylococcus spp. Not Detected     Staphylococcus aureus Not Detected     Staphylococcus epidermidis Not Detected     Staphylococcus lugdunensis Not Detected     Streptococcus spp. Not Detected     Streptococcus agalactiae (Group B) Not Detected     Streptococcus pneumoniae Not Detected     Streptococcus pyogenes (Group A) Not Detected     Acinetobacter calcoaceticus/baumannii complex Not Detected     Bacteroides fragilis Not Detected     Enterobacterales Detected     Enterobacter cloacae complex Not Detected      Escherichia coli Detected     Klebsiella aerogenes Not Detected     Klebsiella oxytoca Not Detected     Klebsiella pneumoniae group Not Detected     Proteus spp. Not Detected     Salmonella spp. Not Detected     Serratia marcescens Not Detected     Haemophilus influenzae Not Detected     Neisseria meningitidis Not Detected     Pseudomonas aeruginosa Not Detected     Stenotrophomonas maltophilia Not Detected     Candida albicans Not Detected     Candida auris Not Detected     Candida glabrata Not Detected     Candida krusei Not Detected     Candida parapsilosis Not Detected     Candida tropicalis Not Detected     Cryptococcus neoformans/gattii Not Detected    Narrative:      The [employer] BCID2 Panel is a multiplexed nucleic acid test intended for the use with [employer] Search® 2.0 or Atl[employer]ia Search® [employer] for the simultaneous qualitative detection and identification of multiple bacterial and yeast nucleic acids and select genetic determinants associated with antimicrobial resistance.  The [employer] BCID2 Panel test is performed directly on blood culture samples identified as positive by a continuous monitoring blood culture system.  Results are intended to be interpreted in conjunction with Gram stain results.          Recent Labs   Lab 07/14/23  0306   BACTERIA None Seen           Assessment/Plan:   ESRD on HD  E coli bacteremia  Bilateral hydroureteronephrosis with a superimposed pyelo ureteritis not excluded-chronic indwelling Cheek changed every 3 weeks, now with CBI with gross hematuria  Melena-status post EGD with retained food, mild gastritis, moderate valvular duodenitis, no active erosions or ulceration.    Patient will continue Monday Wednesday Friday schedule for dialysis   Continue Orin Butler MD  Nephrology  [facility] - [facility]

## 2023-07-19 NOTE — PLAN OF CARE
Problem: Adult Inpatient Plan of Care  Goal: Plan of Care Review  Outcome: Ongoing, Progressing  Flowsheets (Taken 7/18/2023 1946)  Plan of Care Reviewed With: patient  Goal: Patient-Specific Goal (Individualized)  Outcome: Ongoing, Progressing  Goal: Absence of Hospital-Acquired Illness or Injury  Outcome: Ongoing, Progressing  Intervention: Identify and Manage Fall Risk  Flowsheets (Taken 7/18/2023 1946)  Safety Promotion/Fall Prevention:   assistive device/personal item within reach   Fall Risk reviewed with patient/family   commode/urinal/bedpan at bedside   Fall Risk signage in place   lighting adjusted   medications reviewed   nonskid shoes/socks when out of bed   side rails raised x 2  Intervention: Prevent Skin Injury  Flowsheets (Taken 7/18/2023 1946)  Body Position: sitting up in bed  Skin Protection:   adhesive use limited   incontinence pads utilized   protective footwear used   tubing/devices free from skin contact  Intervention: Prevent and Manage VTE (Venous Thromboembolism) Risk  Flowsheets (Taken 7/18/2023 1946)  Activity Management: Sitting at edge of bed - L2  VTE Prevention/Management:   ambulation promoted   bleeding risk assessed  Range of Motion:   active ROM (range of motion) encouraged   ROM (range of motion) performed  Intervention: Prevent Infection  Flowsheets (Taken 7/18/2023 1946)  Infection Prevention:   environmental surveillance performed   equipment surfaces disinfected   hand hygiene promoted   personal protective equipment utilized   rest/sleep promoted   single patient room provided  Goal: Optimal Comfort and Wellbeing  Outcome: Ongoing, Progressing  Intervention: Monitor Pain and Promote Comfort  Flowsheets (Taken 7/18/2023 1946)  Pain Management Interventions:   care clustered   pain management plan reviewed with patient/caregiver   quiet environment facilitated  Intervention: Provide Person-Centered Care  Flowsheets (Taken 7/18/2023 1946)  Trust Relationship/Rapport:   care  explained   choices provided   emotional support provided   empathic listening provided   questions answered   thoughts/feelings acknowledged  Goal: Readiness for Transition of Care  Outcome: Ongoing, Progressing     Problem: Infection  Goal: Absence of Infection Signs and Symptoms  Outcome: Ongoing, Progressing  Intervention: Prevent or Manage Infection  Flowsheets (Taken 7/18/2023 1952)  Infection Management: aseptic technique maintained  Isolation Precautions: precautions maintained     Problem: Device-Related Complication Risk (Hemodialysis)  Goal: Safe, Effective Therapy Delivery  Outcome: Ongoing, Progressing     Problem: Hemodynamic Instability (Hemodialysis)  Goal: Effective Tissue Perfusion  Outcome: Ongoing, Progressing     Problem: Infection (Hemodialysis)  Goal: Absence of Infection Signs and Symptoms  Outcome: Ongoing, Progressing     Problem: Skin Injury Risk Increased  Goal: Skin Health and Integrity  Outcome: Ongoing, Progressing  Intervention: Optimize Skin Protection  Flowsheets (Taken 7/18/2023 1952)  Pressure Reduction Techniques:   frequent weight shift encouraged   weight shift assistance provided  Skin Protection:   adhesive use limited   incontinence pads utilized   tubing/devices free from skin contact  Head of Bed (HOB) Positioning: HOB at 45 degrees  Intervention: Promote and Optimize Oral Intake  Flowsheets (Taken 7/18/2023 1952)  Oral Nutrition Promotion: rest periods promoted     Problem: Impaired Wound Healing  Goal: Optimal Wound Healing  Outcome: Ongoing, Progressing  Intervention: Promote Wound Healing  Flowsheets (Taken 7/18/2023 1952)  Oral Nutrition Promotion: rest periods promoted  Sleep/Rest Enhancement:   awakenings minimized   room darkened   regular sleep/rest pattern promoted  Pain Management Interventions:   around-the-clock dosing utilized   quiet environment facilitated   pain management plan reviewed with patient/caregiver

## 2023-07-20 NOTE — PROGRESS NOTES
Attempted tx this date and pt politely declined 2/2 c/o nausea and vomiting, requests to stay in chair at this time. Will con't to attempt as appropriate.

## 2023-07-20 NOTE — PLAN OF CARE
Problem: Adult Inpatient Plan of Care  Goal: Plan of Care Review  Outcome: Ongoing, Progressing  Flowsheets (Taken 7/20/2023 0332)  Plan of Care Reviewed With: patient  Goal: Patient-Specific Goal (Individualized)  Outcome: Ongoing, Progressing  Goal: Absence of Hospital-Acquired Illness or Injury  Outcome: Ongoing, Progressing  Intervention: Identify and Manage Fall Risk  Flowsheets (Taken 7/20/2023 0332)  Safety Promotion/Fall Prevention:   assistive device/personal item within reach   Fall Risk reviewed with patient/family   Fall Risk signage in place   lighting adjusted   nonskid shoes/socks when out of bed   side rails raised x 2  Intervention: Prevent Skin Injury  Flowsheets (Taken 7/20/2023 0332)  Body Position: supine  Skin Protection:   adhesive use limited   tubing/devices free from skin contact  Intervention: Prevent and Manage VTE (Venous Thromboembolism) Risk  Flowsheets (Taken 7/20/2023 0332)  Activity Management:   Standing - L3   Sitting at edge of bed - L2  VTE Prevention/Management:   ambulation promoted   dorsiflexion/plantar flexion performed   bleeding precations maintained   bleeding risk assessed  Range of Motion:   active ROM (range of motion) encouraged   ROM (range of motion) performed  Intervention: Prevent Infection  Flowsheets (Taken 7/20/2023 0332)  Infection Prevention:   environmental surveillance performed   equipment surfaces disinfected   hand hygiene promoted   personal protective equipment utilized   rest/sleep promoted   single patient room provided  Goal: Optimal Comfort and Wellbeing  Outcome: Ongoing, Progressing  Intervention: Monitor Pain and Promote Comfort  Flowsheets (Taken 7/20/2023 0332)  Pain Management Interventions:   care clustered   pain management plan reviewed with patient/caregiver   pillow support provided   quiet environment facilitated  Intervention: Provide Person-Centered Care  Flowsheets (Taken 7/20/2023 0332)  Trust Relationship/Rapport:   care  explained   choices provided   emotional support provided   empathic listening provided   questions answered   thoughts/feelings acknowledged  Goal: Readiness for Transition of Care  Outcome: Ongoing, Progressing     Problem: Infection  Goal: Absence of Infection Signs and Symptoms  Outcome: Ongoing, Progressing     Problem: Device-Related Complication Risk (Hemodialysis)  Goal: Safe, Effective Therapy Delivery  Outcome: Ongoing, Progressing  Intervention: Optimize Device Care and Function  Flowsheets (Taken 7/20/2023 0332)  Medication Review/Management: medications reviewed     Problem: Hemodynamic Instability (Hemodialysis)  Goal: Effective Tissue Perfusion  Outcome: Ongoing, Progressing     Problem: Infection (Hemodialysis)  Goal: Absence of Infection Signs and Symptoms  Outcome: Ongoing, Progressing     Problem: Skin Injury Risk Increased  Goal: Skin Health and Integrity  Outcome: Ongoing, Progressing     Problem: Impaired Wound Healing  Goal: Optimal Wound Healing  Outcome: Ongoing, Progressing     Problem: Fall Injury Risk  Goal: Absence of Fall and Fall-Related Injury  Outcome: Ongoing, Progressing  Intervention: Identify and Manage Contributors  Flowsheets (Taken 7/20/2023 0332)  Self-Care Promotion:   independence encouraged   BADL personal objects within reach   safe use of adaptive equipment encouraged  Medication Review/Management: medications reviewed  Intervention: Promote Injury-Free Environment  Flowsheets (Taken 7/20/2023 0332)  Safety Promotion/Fall Prevention:   assistive device/personal item within reach   Fall Risk reviewed with patient/family   Fall Risk signage in place   lighting adjusted   nonskid shoes/socks when out of bed   side rails raised x 2

## 2023-07-20 NOTE — PLAN OF CARE
Problem: Adult Inpatient Plan of Care  Goal: Plan of Care Review  Outcome: Ongoing, Progressing  Flowsheets (Taken 7/20/2023 0829)  Plan of Care Reviewed With: patient  Goal: Patient-Specific Goal (Individualized)  Outcome: Ongoing, Progressing  Goal: Absence of Hospital-Acquired Illness or Injury  Outcome: Ongoing, Progressing  Intervention: Identify and Manage Fall Risk  Flowsheets (Taken 7/20/2023 0829)  Safety Promotion/Fall Prevention:   assistive device/personal item within reach   medications reviewed   nonskid shoes/socks when out of bed   side rails raised x 2  Intervention: Prevent Skin Injury  Flowsheets (Taken 7/20/2023 0829)  Body Position: position changed independently  Skin Protection:   adhesive use limited   incontinence pads utilized   tubing/devices free from skin contact  Intervention: Prevent and Manage VTE (Venous Thromboembolism) Risk  Flowsheets (Taken 7/20/2023 0829)  Activity Management: Ambulated to bathroom - L4  VTE Prevention/Management:   ambulation promoted   bleeding risk assessed   ROM (active) performed  Range of Motion:   active ROM (range of motion) encouraged   ROM (range of motion) performed  Intervention: Prevent Infection  Flowsheets (Taken 7/20/2023 0829)  Infection Prevention:   rest/sleep promoted   single patient room provided  Goal: Optimal Comfort and Wellbeing  Outcome: Ongoing, Progressing  Intervention: Monitor Pain and Promote Comfort  Flowsheets (Taken 7/20/2023 0829)  Pain Management Interventions:   care clustered   medication offered   pain management plan reviewed with patient/caregiver   pillow support provided   position adjusted  Intervention: Provide Person-Centered Care  Flowsheets (Taken 7/20/2023 0829)  Trust Relationship/Rapport: care explained  Goal: Readiness for Transition of Care  Outcome: Ongoing, Progressing     Problem: Infection  Goal: Absence of Infection Signs and Symptoms  Outcome: Ongoing, Progressing  Intervention: Prevent or Manage  Infection  Flowsheets (Taken 7/20/2023 0829)  Infection Management: aseptic technique maintained  Isolation Precautions: precautions maintained     Problem: Device-Related Complication Risk (Hemodialysis)  Goal: Safe, Effective Therapy Delivery  Outcome: Ongoing, Progressing  Intervention: Optimize Device Care and Function  Flowsheets (Taken 7/20/2023 0829)  Medication Review/Management: medications reviewed     Problem: Hemodynamic Instability (Hemodialysis)  Goal: Effective Tissue Perfusion  Outcome: Ongoing, Progressing     Problem: Infection (Hemodialysis)  Goal: Absence of Infection Signs and Symptoms  Outcome: Ongoing, Progressing     Problem: Skin Injury Risk Increased  Goal: Skin Health and Integrity  Outcome: Ongoing, Progressing  Intervention: Optimize Skin Protection  Flowsheets (Taken 7/20/2023 0829)  Pressure Reduction Techniques:   frequent weight shift encouraged   weight shift assistance provided  Pressure Reduction Devices: positioning supports utilized  Skin Protection:   adhesive use limited   incontinence pads utilized   tubing/devices free from skin contact  Head of Bed (HOB) Positioning: HOB at 30-45 degrees  Intervention: Promote and Optimize Oral Intake  Flowsheets (Taken 7/20/2023 0829)  Oral Nutrition Promotion: rest periods promoted     Problem: Impaired Wound Healing  Goal: Optimal Wound Healing  Outcome: Ongoing, Progressing  Intervention: Promote Wound Healing  Flowsheets (Taken 7/20/2023 0829)  Oral Nutrition Promotion: rest periods promoted  Activity Management: Ambulated to bathroom - L4  Pain Management Interventions:   care clustered   medication offered   pain management plan reviewed with patient/caregiver   pillow support provided   position adjusted     Problem: Fall Injury Risk  Goal: Absence of Fall and Fall-Related Injury  Outcome: Ongoing, Progressing  Intervention: Identify and Manage Contributors  Flowsheets (Taken 7/20/2023 0829)  Self-Care Promotion: independence  encouraged  Medication Review/Management: medications reviewed  Intervention: Promote Injury-Free Environment  Flowsheets (Taken 7/20/2023 7318)  Safety Promotion/Fall Prevention:   assistive device/personal item within reach   medications reviewed   nonskid shoes/socks when out of bed   side rails raised x 2

## 2023-07-20 NOTE — PT/OT/SLP PROGRESS
Physical Therapy Treatment    Patient Name:  Mirza Nelson Jr.   MRN:  [id number]    Recommendations:     Discharge Recommendations: nursing facility, skilled, rehabilitation facility  Discharge Equipment Recommendations: none  Barriers to discharge: Impaired mobility and Ongoing medical needs    Assessment:     Mirza Nelson Jr. is a 68 y.o. male admitted with a medical diagnosis of Hematuria.  He presents with the following impairments/functional limitations: weakness, impaired endurance, impaired self care skills, impaired functional mobility, gait instability, impaired balance, decreased safety awareness, decreased lower extremity function.    Rehab Prognosis: Good; patient would benefit from acute skilled PT services to address these deficits and reach maximum level of function.    Recent Surgery: Procedure(s) (LRB):  EGD (ESOPHAGOGASTRODUODENOSCOPY) (N/A)  EGD, WITH CLOSED BIOPSY 5 Days Post-Op    Plan:     During this hospitalization, patient to be seen 5 x/week to address the identified rehab impairments via gait training, therapeutic activities, therapeutic exercises, neuromuscular re-education and progress toward the following goals:    Plan of Care Expires:  08/18/23    Subjective     Chief Complaint: none  Patient/Family Comments/goals: none  Pain/Comfort:  Pain Rating 1: 0/10      Objective:     Communicated with nurse prior to session.  Patient found supine with nielsen catheter, peripheral IV upon PT entry to room.     General Precautions: Standard, fall  Orthopedic Precautions: N/A  Braces: N/A  Respiratory Status: Room air  Skin Integrity: Visible skin intact      Functional Mobility:  Bed Mobility:     Supine to Sit: minimum assistance  Transfers:  Sit to Stand:  moderate assistance with rolling walker  Gait: 114 ft with RW & CGA. Drags bilateral feet. Seated rest breaks.   Balance: fair/poor    Therapeutic Exercises:  Patient performed seated Marches, Heel Raises, LAQ, Glute Sets, Resisted Hip  ABD/ADD. All performed 2 x 20 reps.    Education Provided:  Role and goals of PT, transfer training, bed mobility, gait training, balance training, safety awareness, assistive device, strengthening exercises, deep breathing techniques, and importance of participating in PT to return to PLOF.    Expected compliance:  Moderate       Patient left up in chair with all lines intact, call button in reach, and nurse notified..    GOALS:   Multidisciplinary Problems       Physical Therapy Goals          Problem: Physical Therapy    Goal Priority Disciplines Outcome Goal Variances Interventions   Physical Therapy Goal     PT, PT/OT Ongoing, Progressing     Description: Goals to be met by: 23     Patient will increase functional independence with mobility by performin. Supine to sit with Set-up [place]  2. Sit to supine with Set-up [place]  3. Sit to stand transfer with Stand-by Assistance  4. Gait  x 200 feet with Stand-by Assistance using Rolling Walker.                          Time Tracking:     PT Received On: 23  PT Start Time: 1115     PT Stop Time: 1145  PT Total Time (min): 30 min     Billable Minutes: Therapeutic Activity 18 minutes and Therapeutic Exercise 12 minutes    Treatment Type: Treatment  PT/PTA: PT     Number of PTA visits since last PT visit: 2     2023

## 2023-07-20 NOTE — PROGRESS NOTES
UROLOGY  PROGRESS  NOTE    Mirza Nelson Jr. 1955  [id number]  7/20/2023    Sitting up in bed, no complaints  Denies suprapubic discomfort  Vital signs stable, afebrile      Intake/Output:  No intake/output data recorded.  I/O last 3 completed shifts:  In: 960 [P.O.:960]  Out: 4475 [Urine:2975; Other:1500]       Exam:    NAD  Card RRR  Resp unlabored  Abd soft, NTND   pink tinged urine with some sediment with min CBI      No results found for this or any previous visit (from the past 24 hour(s)).        Assessment:  Markedly enlarged prostate with gross hematuria complicated by MDRO E. Coli UTI      Plan:  Urine much better  Needs to flush Cheek at home with 20 mL's BID  Will need to go home with Cheek and follow up in 2 weeks  Continue Prossamantha Stark, CATHYP

## 2023-07-20 NOTE — PROGRESS NOTES
[facility]  Hospital Medicine Progress Note        Chief Complaint: Inpatient follow-up on gross hematuria and Gram-negative sepsis    HPI:   68 y.o. male with a past medical history of essential hypertension, ESRD on HD MWF, anemia, BPH, cervical radiculopathy, and vitamin-D deficiency presented to [facility] on 7/13/2023 for dark stools.  Nursing home reported they noticed melanotic stools yesterday and patient was more confused than normal.  On exam patient reports he has had dark stools for the past week with intermittent abdominal and back pain.  Patient denies chest pain, shortness of breath, fever, chills, nausea, vomiting, and diarrhea.  Initial vital signs in ED were /69, pulse 87, respirations 19, temperature 37° C, and SpO2 98% on room air.  Labs revealed WBC 6.32, RBC 3.24, hemoglobin 10.7, hematocrit 33.3, .8, potassium 5.5, chloride 95, BUN 52.5, creatinine 10.47, and lactic acid 1.1.  UA revealed greater than 100 red blood cells.  Blood culture was obtained.  CT head revealed chronic age-related changes without acute process.  Chest x-ray revealed no acute cardiopulmonary process identified.  CT abdomen and pelvis without contrast revealed moderate stool in the colon which could reflect an element of constipation, mild to moderate bilateral hydroureteronephrosis with superimposed pyeloureteritis not excluded, enlarged prostate, hyperdense material in the dependent urinary bladder which may reflect blood clots, focal sclerotic lesions seen and left iliac and pubic bones suspicious for sclerotic metastases.  Three-way catheter was placed in ED with gross hematuria.  Patient was started on IV Zosyn.  Urology and nephrology were consulted. Patient was admitted to hospital medicine service for further medical management.      Patient was started on CBI per urology. Blood cultures was positive for Ecoli. He was continued on iv rocephin. He has persistent blood tinged  urine in his nielsen.    Interval Hx:   Nursing staff contacted me throughout day with numerous issues including constipation (which has been ongoing since the thirteenth and apparently inadequately addressed), nausea, vomiting, encephalopathy.  Patient is afebrile, on room air, hemodynamically stable.  Case was discussed with patient's nurse.    Objective/physical exam:  General:  Chronically ill-appearing  male in no acute distress  HENT: normocephalic, atraumatic  Eye: PERRL, EOMI, clear conjunctiva  Neck: full ROM, no thyromegaly, no JVD  Respiratory:  Diminished breath sounds bilaterally  Cardiovascular: regular rate and rhythm  Gastrointestinal: non-distended, positive bowel sounds, non-tender  Genitourinary:  Nielsen catheter with blood-tinged urine  Musculoskeletal: no gross deformity  Integumentary: warm, dry, intact, no rashes  Neurological: cranial nerves grossly intact, no focal neurological deficit  Psychiatric: cooperative, flat affect      VITAL SIGNS: 24 HRS MIN & MAX LAST   Temp  Min: 97.5 °F (36.4 °C)  Max: 98.4 °F (36.9 °C) 97.8 °F (36.6 °C)   BP  Min: 122/63  Max: 152/72 122/63   Pulse  Min: 60  Max: 80  60   Resp  Min: 18  Max: 20 20   SpO2  Min: 97 %  Max: 99 % 97 %     I have reviewed the following labs:    Recent Labs   Lab 07/18/23 0331 07/19/23  0354 07/20/23  1212   WBC 5.21 4.98 4.42*   RBC 2.61* 2.52* 2.68*   HGB 8.3* 8.0* 8.4*   HCT 25.4* 25.0* 27.1*   MCV 97.3* 99.2* 101.1*   MCH 31.8* 31.7* 31.3*   MCHC 32.7* 32.0* 31.0*   RDW 16.3 15.9 15.9   * 160 221   MPV 11.2* 10.7* 10.4       Recent Labs   Lab 07/13/23  1934 07/14/23  0756 07/14/23  1738 07/14/23  1905 07/15/23  0449 07/16/23  0845 07/18/23  0331 07/19/23  0354 07/20/23  1212    136 136  --    < > 134* 133* 130* 126*   K 5.5* 5.1 3.9  --    < > 4.3 3.9 4.3 4.3   CO2 25 23 22*  --    < > 24 28 26 24   BUN 52.5* 57.9* 28.8*  --    < > 60.5* 31.3* 40.1* 25.9*   CREATININE 10.47* 11.43* 6.33*  --    < >  10.57* 7.11* 9.20* 6.74*   CALCIUM 9.1 8.6* 8.3*  --    < > 7.3* 7.8* 7.7* 8.2*   PH  --   --   --  7.460*  --   --   --   --   --    MG 2.40  --   --   --   --   --   --   --   --    ALBUMIN 3.7 3.0* 3.1*  --   --  2.3*  --   --   --    ALKPHOS 71 61 61  --   --  47  --   --   --    ALT 8 9 11  --   --  11  --   --   --    AST 12 14 14  --   --  19  --   --   --    BILITOT 0.8 0.7 1.2  --   --  0.8  --   --   --     < > = values in this interval not displayed.          Microbiology Results (last 7 days)       Procedure Component Value Units Date/Time    Blood Culture [[id number]]  (Normal) Collected: 07/17/23 0839    Order Status: Completed Specimen: Blood Updated: 07/20/23 1200     CULTURE, BLOOD ([facility]) No Growth At 72 Hours    Blood Culture #1 **CANNOT BE ORDERED STAT** [[id number]]  (Abnormal)  (Susceptibility) Collected: 07/14/23 0259    Order Status: Completed Specimen: Blood from Arm, Left Updated: 07/16/23 0805     CULTURE, BLOOD ([facility]) Escherichia coli     GRAM STAIN Seen in gram stain of broth only      Gram Negative Rods      1 of 2 Aerobic bottles positive    Blood Culture #2 **CANNOT BE ORDERED STAT** [[id number]]  (Abnormal)  (Susceptibility) Collected: 07/14/23 0259    Order Status: Completed Specimen: Blood from Hand, Left Updated: 07/16/23 0805     CULTURE, BLOOD ([facility]) Escherichia coli     GRAM STAIN 2 of 2 bottles positive      Gram Negative Rods      Seen in gram stain of broth only    BCID2 Panel [[id number]] Collected: 07/14/23 0259    Order Status: Canceled Specimen: Blood from Arm, Left Updated: 07/15/23 0703    BCID2 Panel [[id number]]  (Abnormal) Collected: 07/14/23 0259    Order Status: Completed Specimen: Blood from Hand, Left Updated: 07/14/23 1705     CTX-M (ESBL ) Not Detected     IMP (Cabapenemase ) Not Detected     KPC resistance gene (Carbapenemase ) Not Detected     mcr-1 Not Detected     mecA ID N/A     Comment: Note: Antimicrobial resistance can occur via multiple  mechanisms. A Not Detected result for antimicrobial resistance gene(s) does not indicate antimicrobial susceptibility. Subculturing is required for species identification and susceptibility testing of   isolates.        mecA/C and MREJ (MRSA) gene N/A     NDM (Carbapenemase ) Not Detected     OXA-48-like (Carbapenemase ) Not Detected     Pablo/B (VRE gene) N/A     VIM (Carbapenemase ) Not Detected     Enterococcus faecalis Not Detected     Enterococcus faecium Not Detected     Listeria monocytogenes Not Detected     Staphylococcus spp. Not Detected     Staphylococcus aureus Not Detected     Staphylococcus epidermidis Not Detected     Staphylococcus lugdunensis Not Detected     Streptococcus spp. Not Detected     Streptococcus agalactiae (Group B) Not Detected     Streptococcus pneumoniae Not Detected     Streptococcus pyogenes (Group A) Not Detected     Acinetobacter calcoaceticus/baumannii complex Not Detected     Bacteroides fragilis Not Detected     Enterobacterales Detected     Enterobacter cloacae complex Not Detected     Escherichia coli Detected     Klebsiella aerogenes Not Detected     Klebsiella oxytoca Not Detected     Klebsiella pneumoniae group Not Detected     Proteus spp. Not Detected     Salmonella spp. Not Detected     Serratia marcescens Not Detected     Haemophilus influenzae Not Detected     Neisseria meningitidis Not Detected     Pseudomonas aeruginosa Not Detected     Stenotrophomonas maltophilia Not Detected     Candida albicans Not Detected     Candida auris Not Detected     Candida glabrata Not Detected     Candida krusei Not Detected     Candida parapsilosis Not Detected     Candida tropicalis Not Detected     Cryptococcus neoformans/gattii Not Detected    Narrative:      The [employer] BCID2 Panel is a multiplexed nucleic acid test intended for the use with FNZ® 2.0 or FNZ® [employer] for the simultaneous qualitative detection and  identification of multiple bacterial and yeast nucleic acids and select genetic determinants associated with antimicrobial resistance.  The [employer] BCID2 Panel test is performed directly on blood culture samples identified as positive by a continuous monitoring blood culture system.  Results are intended to be interpreted in conjunction with Gram stain results.             See below for Radiology    Scheduled Med:   amLODIPine  10 mg Oral Daily    bisacodyL  10 mg Rectal Once    carvediloL  12.5 mg Oral BID    cefTRIAXone (ROCEPHIN) IVPB  2 g Intravenous Daily    doxazosin  8 mg Oral QHS    epoetin gary  10,000 Units Intravenous Every Mon, Wed, Fri    ferrous sulfate  1 tablet Oral Daily    pantoprazole  40 mg Intravenous BID    polyethylene glycol  17 g Oral BID    sevelamer carbonate  800 mg Oral TID        Continuous Infusions:  None.    PRN Meds:  sodium chloride, sodium chloride 0.9%, acetaminophen, bisacodyL, docusate sodium, hyoscyamine, labetaloL, metoprolol, morphine, ondansetron, oxyCODONE-acetaminophen, prochlorperazine, sodium chloride 0.9%       Assessment/Plan:  Failure to thrive  Metabolic encephalopathy, intermittent, with multifactorial etiology  Multidrug resistant E coli sepsis  Gross hematuria  Suspected pyeloureteritis  Anemia of chronic disease  End-stage renal disease on hemodialysis  Chronic bladder outlet obstruction/obstructive uropathy with moderate bilateral hydronephrosis  Emphysema  Essential hypertension  Coronary artery disease  Cardiomyopathy  Mild gastritis and moderate bulbar duodenitis without any evidence of erosions or ulcerations  Constipation      Plan:   Obtain repeat KUB and start aggressive bowel regimen  Hemodialysis and electrolyte management per Nephrology   Continue intravenous antibiotic therapy   Continue physical therapy and occupational therapy as tolerated  Urology recommends discharge with Cheek catheter in place      Critical Care Diagnosis:  Sepsis requiring  broad-spectrum intravenous antibiotic therapy  Critical Care Interventions: Hands-on evaluation, review of labs, radiographs, medical records, and discussion with the patient and medical staff in order to assess and manage the high probability of imminent or life-threatening deterioration of cardio-respiratory status requiring vasopressor support and/or intubation and mechanical ventilation.  Critical Care Time Spent: 35 minutes      VTE prophylaxis:  SCDs    Patient condition:  Guarded with relatively poor long-term prognosis    Anticipated discharge and Disposition:   Skilled nursing facility      All diagnosis and differential diagnosis have been reviewed; assessment and plan has been documented; I have personally reviewed the labs and test results that are presently available; I have reviewed the patients medication list; I have reviewed the consulting providers response and recommendations. I have reviewed or attempted to review medical records based upon their availability    All of the patient's questions have been  addressed and answered. Patient's is agreeable to the above stated plan. I will continue to monitor closely and make adjustments to medical management as needed.  _____________________________________________________________________      Radiology:  I have personally reviewed the following imaging and agree with the radiologist.     NM Bone Scan Whole Body  Narrative: EXAMINATION:  NM BONE SCAN WHOLE BODY    CLINICAL HISTORY:  Sclerotic bone lesions-iliac;    TECHNIQUE:  Whole body skeletal scintigraphy was performed following intravenous administration of 26.9 mCi of technetium-99m MDP.    COMPARISON:  CT abdomen pelvis osseous structures July 13, 2023, April 18, 2023, December 7, 2021 and December 17, 2020.    FINDINGS:  Left iliac bone and pubic bone small sclerotic lesions seen on the recent CT abdomen pelvis July 13, 2023 have been stable in overall size and appearance compared to September  17, 2020 exam.  These are below bone scintigraphy resolution due to the small size with no corresponding location abnormal increased radioisotope uptake identified.  There are mild degenerative related uptakes involving the thoracic vertebrae. There otherwise is unremarkable radioisotope activity within the skeletal system.  Impression: 1. No bone scintigraphy suggestion of skeletal metastatic involvement.    2. Left iliac bone and pubic bone small sclerotic lesions have been stable since September 17, 2020 exam.    Electronically signed by: Dipak Paredes  Date:    07/17/2023  Time:    13:07      Cordell Flanagan MD   07/20/2023

## 2023-07-20 NOTE — PROGRESS NOTES
[facility] - [facility]  Nephrology  Progress Note    Patient Name: Mirza Nelson Jr.  MRN: [id number]  Admission Date: 7/13/2023  Hospital Length of Stay: 6 days  Attending Provider: Cordell Flanagan MD   Primary Care Physician: Elo Flores MD  Principal Problem:Hematuria      Subjective:   This is a 68-year-old  male with dialysis dependent ESRD.  He dialyzes at [facility] on Monday Wednesday Friday by way of right upper arm AV fistula.  He presented to ED on 07/13/2023 with complaints of dark stool.  He also endorses lightheadedness, back pain, neck pain.  CT of the abdomen and pelvis revealed mild to moderate bilateral hydroureteronephrosis with enlarged prostate with median lobe projecting into the bladder base, hyperdense material independent urinary bladder blood clot.  Nielsen catheter was placed and subsequently started on CBI.  At present he is still requiring some minimal irrigation to keep urine pink.  Blood cultures drawn 7/14 positive for E coli.  No urine culture.  He is being treated with IV cefepime.    Interval history: Patient eating breakfast independently. CBI temporarily paused because irrigation fluid bags are empty. There is dark red blood in nielsen tubing. Patient without complaints including abdominal pain    7/19/2023 currently tolerating dialysis.  He is back on continuous bladder irrigation for hematuria.  He reports that he is confused and lost.  He does not know what is going on.  I spent lot of time with him explaining to him that he has infection in his blood which is being treated and also has problem with his prostate for which he is getting continuous bladder irrigation and once infection clears up and he gets strong, down the line he will need to get some prostate surgery done per urologist.  He expressed understanding.  07/20/2023 -  No other changes overnight.          Review of patient's allergies indicates:   Allergen Reactions     Iodine      Other reaction(s): difficulty breathing, Facial Swelling    Iodine and iodide containing products Swelling    Shellfish containing products      Other reaction(s): Swelling     Current Facility-Administered Medications   Medication Frequency    0.9%  NaCl infusion (for blood administration) Q24H PRN    0.9%  NaCl infusion PRN    acetaminophen suppository 650 mg Q6H PRN    amLODIPine tablet 10 mg Daily    bisacodyL suppository 10 mg Once    bisacodyL suppository 10 mg Daily PRN    carvediloL tablet 12.5 mg BID    cefTRIAXone (ROCEPHIN) 2 g in dextrose 5 % in water (D5W) 5 % 100 mL IVPB (MB+) Daily    docusate sodium capsule 100 mg Daily PRN    doxazosin tablet 8 mg QHS    epoetin gary injection 10,000 Units Every Mon, Wed, Fri    ferrous sulfate tablet 1 each Daily    hyoscyamine ODT 0.125 mg Q4H PRN    labetaloL injection 10 mg Q4H PRN    metoprolol injection 5 mg Q4H PRN    morphine injection 2 mg Q4H PRN    ondansetron disintegrating tablet 4 mg Q6H PRN    oxyCODONE-acetaminophen  mg per tablet 1 tablet Q4H PRN    pantoprazole injection 40 mg BID    polyethylene glycol packet 17 g Daily    sevelamer carbonate tablet 800 mg TID    sodium chloride 0.9% bolus 250 mL 250 mL PRN       Objective:     Vital Signs (Most Recent):  Temp: 98.2 °F (36.8 °C) (07/20/23 0729)  Pulse: 63 (07/20/23 0729)  Resp: 18 (07/20/23 0846)  BP: 135/80 (07/20/23 0729)  SpO2: 99 % (07/20/23 0729) Vital Signs (24h Range):  Temp:  [97.5 °F (36.4 °C)-98.3 °F (36.8 °C)] 98.2 °F (36.8 °C)  Pulse:  [62-80] 63  Resp:  [18-20] 18  SpO2:  [98 %-99 %] 99 %  BP: (111-152)/(66-80) 135/80     Weight: 72.6 kg (160 lb) (07/13/23 1807)  Body mass index is 25.82 kg/m².  Body surface area is 1.84 meters squared.    I/O last 3 completed shifts:  In: 960 [P.O.:960]  Out: 4475 [Urine:2975; Other:1500]  Currently tolerating dialysis.  Physical Exam  Constitutional:       General: He is not in acute distress.  HENT:      Head: Normocephalic.       Mouth/Throat:      Mouth: Mucous membranes are moist.   Eyes:      Extraocular Movements: Extraocular movements intact.      Conjunctiva/sclera: Conjunctivae normal.   Cardiovascular:      Rate and Rhythm: Normal rate and regular rhythm.      Pulses: Normal pulses.      Heart sounds: Normal heart sounds.   Pulmonary:      Effort: Pulmonary effort is normal.      Breath sounds: Normal breath sounds.   Abdominal:      General: Abdomen is flat. Bowel sounds are normal.      Palpations: Abdomen is soft.   Genitourinary:     Comments: Dark red, CBI paused   Musculoskeletal:         General: No swelling. Normal range of motion.      Cervical back: Neck supple.      Comments: WONG AVF   Skin:     General: Skin is warm and dry.   Neurological:      General: No focal deficit present.      Mental Status: He is alert and oriented to person, place, and time.   Psychiatric:         Mood and Affect: Mood normal.         Behavior: Behavior normal.       Significant Labs:sureBMP:   Recent Labs   Lab 07/13/23  1934 07/14/23  0756 07/19/23  0354      < > 130*   K 5.5*   < > 4.3   CO2 25   < > 26   BUN 52.5*   < > 40.1*   CREATININE 10.47*   < > 9.20*   CALCIUM 9.1   < > 7.7*   MG 2.40  --   --     < > = values in this interval not displayed.       CBC:   Recent Labs   Lab 07/19/23  0354   WBC 4.98   RBC 2.52*   HGB 8.0*   HCT 25.0*      MCV 99.2*   MCH 31.7*   MCHC 32.0*       Microbiology Results (last 7 days)       Procedure Component Value Units Date/Time    Blood Culture [[id number]]  (Normal) Collected: 07/17/23 0839    Order Status: Completed Specimen: Blood Updated: 07/19/23 1200     CULTURE, BLOOD ([facility]) No Growth At 48 Hours    Blood Culture #1 **CANNOT BE ORDERED STAT** [[id number]]  (Abnormal)  (Susceptibility) Collected: 07/14/23 0259    Order Status: Completed Specimen: Blood from Arm, Left Updated: 07/16/23 0805     CULTURE, BLOOD ([facility]) Escherichia coli     GRAM STAIN Seen in gram stain of broth only      Gram  Negative Rods      1 of 2 Aerobic bottles positive    Blood Culture #2 **CANNOT BE ORDERED STAT** [[id number]]  (Abnormal)  (Susceptibility) Collected: 07/14/23 0259    Order Status: Completed Specimen: Blood from Hand, Left Updated: 07/16/23 0805     CULTURE, BLOOD ([facility]) Escherichia coli     GRAM STAIN 2 of 2 bottles positive      Gram Negative Rods      Seen in gram stain of broth only    BCID2 Panel [[id number]] Collected: 07/14/23 0259    Order Status: Canceled Specimen: Blood from Arm, Left Updated: 07/15/23 0703    BCID2 Panel [[id number]]  (Abnormal) Collected: 07/14/23 0259    Order Status: Completed Specimen: Blood from Hand, Left Updated: 07/14/23 1705     CTX-M (ESBL ) Not Detected     IMP (Cabapenemase ) Not Detected     KPC resistance gene (Carbapenemase ) Not Detected     mcr-1 Not Detected     mecA ID N/A     Comment: Note: Antimicrobial resistance can occur via multiple mechanisms. A Not Detected result for antimicrobial resistance gene(s) does not indicate antimicrobial susceptibility. Subculturing is required for species identification and susceptibility testing of   isolates.        mecA/C and MREJ (MRSA) gene N/A     NDM (Carbapenemase ) Not Detected     OXA-48-like (Carbapenemase ) Not Detected     Pablo/B (VRE gene) N/A     VIM (Carbapenemase ) Not Detected     Enterococcus faecalis Not Detected     Enterococcus faecium Not Detected     Listeria monocytogenes Not Detected     Staphylococcus spp. Not Detected     Staphylococcus aureus Not Detected     Staphylococcus epidermidis Not Detected     Staphylococcus lugdunensis Not Detected     Streptococcus spp. Not Detected     Streptococcus agalactiae (Group B) Not Detected     Streptococcus pneumoniae Not Detected     Streptococcus pyogenes (Group A) Not Detected     Acinetobacter calcoaceticus/baumannii complex Not Detected     Bacteroides fragilis Not Detected     Enterobacterales Detected      Enterobacter cloacae complex Not Detected     Escherichia coli Detected     Klebsiella aerogenes Not Detected     Klebsiella oxytoca Not Detected     Klebsiella pneumoniae group Not Detected     Proteus spp. Not Detected     Salmonella spp. Not Detected     Serratia marcescens Not Detected     Haemophilus influenzae Not Detected     Neisseria meningitidis Not Detected     Pseudomonas aeruginosa Not Detected     Stenotrophomonas maltophilia Not Detected     Candida albicans Not Detected     Candida auris Not Detected     Candida glabrata Not Detected     Candida krusei Not Detected     Candida parapsilosis Not Detected     Candida tropicalis Not Detected     Cryptococcus neoformans/gattii Not Detected    Narrative:      The [employer] BCID2 Panel is a multiplexed nucleic acid test intended for the use with [employer]® 2.0 or [employer]® [employer] for the simultaneous qualitative detection and identification of multiple bacterial and yeast nucleic acids and select genetic determinants associated with antimicrobial resistance.  The [employer] BCID2 Panel test is performed directly on blood culture samples identified as positive by a continuous monitoring blood culture system.  Results are intended to be interpreted in conjunction with Gram stain results.          Recent Labs   Lab 07/14/23  0306   BACTERIA None Seen           Assessment/Plan:   ESRD on HD  E coli bacteremia-MDRO  Bilateral hydroureteronephrosis with a superimposed pyelo- ureteritis not excluded-chronic indwelling Cheek changed every 3 weeks, now with CBI with gross hematuria  Melena-status post EGD with retained food, mild gastritis, moderate valvular duodenitis, no active erosions or ulceration.    Patient will continue Monday Wednesday Friday schedule for dialysis   Continue Orin Crow MD  Nephrology  [facility] - [facility]

## 2023-07-21 NOTE — PROGRESS NOTES
Nephrology consult follow up note    HPI:      Mirza Nelson Jr. is a 68 y.o. male  with dialysis dependent ESRD.  He dialyzes at [facility] on Monday Wednesday Friday by way of right upper arm AV fistula.  He presented to ED on 07/13/2023 with complaints of dark stool.  He also endorses lightheadedness, back pain, neck pain.  CT of the abdomen and pelvis revealed mild to moderate bilateral hydroureteronephrosis with enlarged prostate with median lobe projecting into the bladder base, hyperdense material independent urinary bladder blood clot.  Cheek catheter was placed and subsequently started on CBI.  At present he is still requiring some minimal irrigation to keep urine pink.  Blood cultures drawn 7/14 positive for E coli.  No urine culture.  He is being treated with IV cefepime.    Interval history:     No acute events overnight.  Patient feeling okay.  Seen on hemodialysis, just started.     Review of Systems:       Past medical, family, surgical, and social history reviewed and unchanged from initial consult note.     Objective:       VITAL SIGNS: 24 HR MIN & MAX LAST    Temp  Min: 97.5 °F (36.4 °C)  Max: 98.4 °F (36.9 °C)  98 °F (36.7 °C)        BP  Min: 122/63  Max: 146/71  131/65     Pulse  Min: 60  Max: 66  63     Resp  Min: 16  Max: 20  18    SpO2  Min: 97 %  Max: 99 %  98 %      GEN: Chronically ill appearing AAM in NAD  EXT: No cyanosis or edema  SKIN: Warm and dry  PSYCH: Awake, alert and appropriately conversant.   Dialysis access:  RUE AVF             Component Value Date/Time     (L) 07/20/2023 1212     (L) 07/19/2023 0354     09/27/2018 0800    K 4.3 07/20/2023 1212    K 4.3 07/19/2023 0354    K 3.5 02/24/2021 0609    K 3.9 09/27/2018 0800    CHLORIDE 91 (L) 07/20/2023 1212    CHLORIDE 92 (L) 07/19/2023 0354    CO2 24 07/20/2023 1212    CO2 26 07/19/2023 0354    CO2 34 (H) 09/27/2018 0800    BUN 25.9 (H) 07/20/2023 1212    BUN 40.1 (H) 07/19/2023 0354    BUN 24 (H) 09/27/2018 0800     CREATININE 6.74 (H) 07/20/2023 1212    CREATININE 9.20 (H) 07/19/2023 0354    CREATININE 5.4 (H) 09/27/2018 0800    CALCIUM 8.2 (L) 07/20/2023 1212    CALCIUM 7.7 (L) 07/19/2023 0354    CALCIUM 9.2 09/27/2018 0800    PHOS 6.6 (H) 07/14/2023 0756    PHOS 3.5 09/27/2018 0800            Component Value Date/Time    WBC 4.42 (L) 07/20/2023 1212    WBC 4.98 07/19/2023 0354    WBC 5.35 09/27/2018 0800    HGB 8.4 (L) 07/20/2023 1212    HGB 8.0 (L) 07/19/2023 0354    HGB 11.1 (L) 09/27/2018 0800    HCT 27.1 (L) 07/20/2023 1212    HCT 25.0 (L) 07/19/2023 0354    HCT 33.0 (L) 11/30/2020 0859    HCT 35.1 (L) 09/27/2018 0800     07/20/2023 1212     07/19/2023 0354     09/27/2018 0800         Imaging reviewed      Assessment / Plan:       Active Hospital Problems    Diagnosis  POA    *Hematuria [R31.9]  Yes    Melena [K92.1]  Unknown      Resolved Hospital Problems   No resolved problems to display.     ESRD on HD  E coli bacteremia-MDRO  Bilateral hydroureteronephrosis with a superimposed pyelo- ureteritis not excluded-chronic indwelling Cheek changed every 3 weeks, now with CBI with gross hematuria  Melena-status post EGD with retained food, mild gastritis, moderate valvular duodenitis, no active erosions or ulceration.    Plan:  Seen on hemodialysis at 9:00 a.m..  Dialysis just started.  1-2 L UF as tolerated.        Garo Mclain DO  Nephrology  [facility]  Clinic number: [phone]

## 2023-07-21 NOTE — PT/OT/SLP PROGRESS
Attempted to see pt for PT tx. Upon arrival pt just finished having very large BM with NSG staff. Pt declined PT tx stating he was very fatigued from dialysis and from having BM. Therapist assisted NSG in pulling pt UIC before exiting room. PT to f/u as schedule allows.

## 2023-07-21 NOTE — PT/OT/SLP PROGRESS
Attempted to see pt for PT tx. pt in dialysis at this time per NSG. PT to f/u as schedule allows.

## 2023-07-21 NOTE — PROGRESS NOTES
UROLOGY  PROGRESS  NOTE    Mirza Nelson Jesus 1955  [id number]  7/21/2023    Sleeping in dialysis  Vital signs stable, afebrile      Intake/Output:  No intake/output data recorded.  I/O last 3 completed shifts:  In: 1140 [P.O.:1140]  Out: 4650 [Urine:4650]       Exam:    NAD  Card RRR  Resp unlabored  Abd soft, NTND   pink tinged urine with some sediment       Recent Results (from the past 24 hour(s))   Basic Metabolic Panel    Collection Time: 07/20/23 12:12 PM   Result Value Ref Range    Sodium Level 126 (L) 136 - 145 mmol/L    Potassium Level 4.3 3.5 - 5.1 mmol/L    Chloride 91 (L) 98 - 107 mmol/L    Carbon Dioxide 24 23 - 31 mmol/L    Glucose Level 99 82 - 115 mg/dL    Blood Urea Nitrogen 25.9 (H) 8.4 - 25.7 mg/dL    Creatinine 6.74 (H) 0.73 - 1.18 mg/dL    BUN/Creatinine Ratio 4     Calcium Level Total 8.2 (L) 8.8 - 10.0 mg/dL    Anion Gap 11.0 mEq/L    eGFR 8 mls/min/1.73/m2   CBC with Differential    Collection Time: 07/20/23 12:12 PM   Result Value Ref Range    WBC 4.42 (L) 4.50 - 11.50 x10(3)/mcL    RBC 2.68 (L) 4.70 - 6.10 x10(6)/mcL    Hgb 8.4 (L) 14.0 - 18.0 g/dL    Hct 27.1 (L) 42.0 - 52.0 %    .1 (H) 80.0 - 94.0 fL    MCH 31.3 (H) 27.0 - 31.0 pg    MCHC 31.0 (L) 33.0 - 36.0 g/dL    RDW 15.9 11.5 - 17.0 %    Platelet 221 130 - 400 x10(3)/mcL    MPV 10.4 7.4 - 10.4 fL    Neut % 72.2 %    Lymph % 12.4 %    Mono % 11.5 %    Eos % 2.5 %    Basophil % 0.7 %    Lymph # 0.55 (L) 0.6 - 4.6 x10(3)/mcL    Neut # 3.19 2.1 - 9.2 x10(3)/mcL    Mono # 0.51 0.1 - 1.3 x10(3)/mcL    Eos # 0.11 0 - 0.9 x10(3)/mcL    Baso # 0.03 <=0.2 x10(3)/mcL    IG# 0.03 0 - 0.04 x10(3)/mcL    IG% 0.7 %    NRBC% 0.0 %   Occult blood x 3, stool    Collection Time: 07/21/23 12:15 AM   Result Value Ref Range    Stool Color 1 Black     Stool Consistancy 1 formed     Occult Blood Stool 1 Negative Negative           Assessment:  Markedly enlarged prostate with gross hematuria complicated by MDRO E. Coli UTI      Plan:  Ok to d/c  from  standpoint  Needs to flush Cheek at home with 20 mL's BID  Will need to go home with Cheek and follow up with Dr. Navarrete in 2 weeks, he is aware of ongoing issues and will discuss definitve surgery at follow up    WES Galan

## 2023-07-21 NOTE — PLAN OF CARE
Problem: Adult Inpatient Plan of Care  Goal: Plan of Care Review  Outcome: Ongoing, Progressing  Flowsheets (Taken 7/20/2023 2230)  Plan of Care Reviewed With: patient  Goal: Patient-Specific Goal (Individualized)  Outcome: Ongoing, Progressing  Goal: Absence of Hospital-Acquired Illness or Injury  Outcome: Ongoing, Progressing  Goal: Optimal Comfort and Wellbeing  Outcome: Ongoing, Progressing  Intervention: Monitor Pain and Promote Comfort  Flowsheets (Taken 7/20/2023 2230)  Pain Management Interventions:   care clustered   medication offered   pillow support provided   relaxation techniques promoted   pain management plan reviewed with patient/caregiver  Intervention: Provide Person-Centered Care  Flowsheets (Taken 7/20/2023 2230)  Trust Relationship/Rapport:   care explained   choices provided   emotional support provided   empathic listening provided   questions answered   questions encouraged   reassurance provided   thoughts/feelings acknowledged  Goal: Readiness for Transition of Care  Outcome: Ongoing, Progressing     Problem: Infection  Goal: Absence of Infection Signs and Symptoms  Outcome: Ongoing, Progressing  Intervention: Prevent or Manage Infection  Flowsheets (Taken 7/20/2023 2230)  Infection Management: aseptic technique maintained  Isolation Precautions: precautions maintained     Problem: Device-Related Complication Risk (Hemodialysis)  Goal: Safe, Effective Therapy Delivery  Outcome: Ongoing, Progressing  Intervention: Optimize Device Care and Function  Flowsheets (Taken 7/20/2023 2230)  Medication Review/Management: medications reviewed     Problem: Hemodynamic Instability (Hemodialysis)  Goal: Effective Tissue Perfusion  Outcome: Ongoing, Progressing     Problem: Infection (Hemodialysis)  Goal: Absence of Infection Signs and Symptoms  Outcome: Ongoing, Progressing     Problem: Skin Injury Risk Increased  Goal: Skin Health and Integrity  Outcome: Ongoing, Progressing     Problem: Impaired  Wound Healing  Goal: Optimal Wound Healing  Outcome: Ongoing, Progressing     Problem: Fall Injury Risk  Goal: Absence of Fall and Fall-Related Injury  Outcome: Ongoing, Progressing

## 2023-07-21 NOTE — NURSING
07/21/23 1220   Post-Hemodialysis Assessment   Blood Volume Processed (Liters) 64.8 L   Dialyzer Clearance Lightly streaked   Duration of Treatment 180 minutes   Total UF (mL) 2000 mL   Patient Response to Treatment Tolerated well   Post-Hemodialysis Comments Treatment completed. NEt fluid removed = 2 liters. UF goal increased per Dr. Mclain on rounds. No issues during tx. No complaints.        No

## 2023-07-21 NOTE — PROGRESS NOTES
Per PT, pt declined for the afternoon, reports increased fatigue after HD. Will con't to attempt as appropriate.

## 2023-07-21 NOTE — PROGRESS NOTES
[facility]  Hospital Medicine Progress Note        Chief Complaint: Inpatient follow-up on gross hematuria and Gram-negative sepsis    HPI:   68 y.o. male with a past medical history of essential hypertension, ESRD on HD MWF, anemia, BPH, cervical radiculopathy, and vitamin-D deficiency presented to [facility] on 7/13/2023 for dark stools.  Nursing home reported they noticed melanotic stools yesterday and patient was more confused than normal.  On exam patient reports he has had dark stools for the past week with intermittent abdominal and back pain.  Patient denies chest pain, shortness of breath, fever, chills, nausea, vomiting, and diarrhea.  Initial vital signs in ED were /69, pulse 87, respirations 19, temperature 37° C, and SpO2 98% on room air.  Labs revealed WBC 6.32, RBC 3.24, hemoglobin 10.7, hematocrit 33.3, .8, potassium 5.5, chloride 95, BUN 52.5, creatinine 10.47, and lactic acid 1.1.  UA revealed greater than 100 red blood cells.  Blood culture was obtained.  CT head revealed chronic age-related changes without acute process.  Chest x-ray revealed no acute cardiopulmonary process identified.  CT abdomen and pelvis without contrast revealed moderate stool in the colon which could reflect an element of constipation, mild to moderate bilateral hydroureteronephrosis with superimposed pyeloureteritis not excluded, enlarged prostate, hyperdense material in the dependent urinary bladder which may reflect blood clots, focal sclerotic lesions seen and left iliac and pubic bones suspicious for sclerotic metastases.  Three-way catheter was placed in ED with gross hematuria.  Patient was started on IV Zosyn.  Urology and nephrology were consulted. Patient was admitted to hospital medicine service for further medical management.      Patient was started on continuous bladder irrigation per urology. Blood cultures were positive for E. coli.  Patient was continued on Rocephin.   Patient has persistent pink tinged urine.    Interval Hx:   Patient is undergoing hemodialysis.  Nursing staff reports that a only had a small bowel movement last night otherwise no new issues have been reported.  Patient is afebrile, on room air, and hemodynamically stable.  Case was discussed with patient's nurse.    Objective/physical exam:  General:  Chronically ill-appearing  male in no acute distress  HENT: normocephalic, atraumatic  Eye: PERRL, EOMI, clear conjunctiva  Neck: full ROM, no thyromegaly, no JVD  Respiratory:  Diminished breath sounds bilaterally  Cardiovascular: regular rate and rhythm  Gastrointestinal: non-distended, positive bowel sounds, non-tender  Genitourinary:  Cheek catheter with pink-tinged urine  Musculoskeletal: no gross deformity  Integumentary: warm, dry, intact, no rashes  Neurological: cranial nerves grossly intact, no focal neurological deficit  Psychiatric: cooperative, flat affect      VITAL SIGNS: 24 HRS MIN & MAX LAST   Temp  Min: 97.5 °F (36.4 °C)  Max: 98.4 °F (36.9 °C) 97.9 °F (36.6 °C)   BP  Min: 122/63  Max: 146/71 126/76   Pulse  Min: 60  Max: 74  74   Resp  Min: 16  Max: 20 18   SpO2  Min: 97 %  Max: 99 % 98 %     I have reviewed the following labs:    Recent Labs   Lab 07/18/23  0331 07/19/23  0354 07/20/23  1212   WBC 5.21 4.98 4.42*   RBC 2.61* 2.52* 2.68*   HGB 8.3* 8.0* 8.4*   HCT 25.4* 25.0* 27.1*   MCV 97.3* 99.2* 101.1*   MCH 31.8* 31.7* 31.3*   MCHC 32.7* 32.0* 31.0*   RDW 16.3 15.9 15.9   * 160 221   MPV 11.2* 10.7* 10.4       Recent Labs   Lab 07/14/23  1738 07/14/23  1905 07/15/23  0449 07/16/23  0845 07/18/23  0331 07/19/23  0354 07/20/23  1212     --    < > 134* 133* 130* 126*   K 3.9  --    < > 4.3 3.9 4.3 4.3   CO2 22*  --    < > 24 28 26 24   BUN 28.8*  --    < > 60.5* 31.3* 40.1* 25.9*   CREATININE 6.33*  --    < > 10.57* 7.11* 9.20* 6.74*   CALCIUM 8.3*  --    < > 7.3* 7.8* 7.7* 8.2*   PH  --  7.460*  --   --   --   --    --    ALBUMIN 3.1*  --   --  2.3*  --   --   --    ALKPHOS 61  --   --  47  --   --   --    ALT 11  --   --  11  --   --   --    AST 14  --   --  19  --   --   --    BILITOT 1.2  --   --  0.8  --   --   --     < > = values in this interval not displayed.          Microbiology Results (last 7 days)       Procedure Component Value Units Date/Time    Blood Culture [[id number]]  (Normal) Collected: 07/17/23 0839    Order Status: Completed Specimen: Blood Updated: 07/21/23 1200     CULTURE, BLOOD ([facility]) No Growth At 96 Hours    Blood Culture #1 **CANNOT BE ORDERED STAT** [[id number]]  (Abnormal)  (Susceptibility) Collected: 07/14/23 0259    Order Status: Completed Specimen: Blood from Arm, Left Updated: 07/16/23 0805     CULTURE, BLOOD ([facility]) Escherichia coli     GRAM STAIN Seen in gram stain of broth only      Gram Negative Rods      1 of 2 Aerobic bottles positive    Blood Culture #2 **CANNOT BE ORDERED STAT** [[id number]]  (Abnormal)  (Susceptibility) Collected: 07/14/23 0259    Order Status: Completed Specimen: Blood from Hand, Left Updated: 07/16/23 0805     CULTURE, BLOOD ([facility]) Escherichia coli     GRAM STAIN 2 of 2 bottles positive      Gram Negative Rods      Seen in gram stain of broth only    BCID2 Panel [[id number]] Collected: 07/14/23 0259    Order Status: Canceled Specimen: Blood from Arm, Left Updated: 07/15/23 0703    BCID2 Panel [[id number]]  (Abnormal) Collected: 07/14/23 0259    Order Status: Completed Specimen: Blood from Hand, Left Updated: 07/14/23 1705     CTX-M (ESBL ) Not Detected     IMP (Cabapenemase ) Not Detected     KPC resistance gene (Carbapenemase ) Not Detected     mcr-1 Not Detected     mecA ID N/A     Comment: Note: Antimicrobial resistance can occur via multiple mechanisms. A Not Detected result for antimicrobial resistance gene(s) does not indicate antimicrobial susceptibility. Subculturing is required for species identification and susceptibility testing of    isolates.        mecA/C and MREJ (MRSA) gene N/A     NDM (Carbapenemase ) Not Detected     OXA-48-like (Carbapenemase ) Not Detected     Pablo/B (VRE gene) N/A     VIM (Carbapenemase ) Not Detected     Enterococcus faecalis Not Detected     Enterococcus faecium Not Detected     Listeria monocytogenes Not Detected     Staphylococcus spp. Not Detected     Staphylococcus aureus Not Detected     Staphylococcus epidermidis Not Detected     Staphylococcus lugdunensis Not Detected     Streptococcus spp. Not Detected     Streptococcus agalactiae (Group B) Not Detected     Streptococcus pneumoniae Not Detected     Streptococcus pyogenes (Group A) Not Detected     Acinetobacter calcoaceticus/baumannii complex Not Detected     Bacteroides fragilis Not Detected     Enterobacterales Detected     Enterobacter cloacae complex Not Detected     Escherichia coli Detected     Klebsiella aerogenes Not Detected     Klebsiella oxytoca Not Detected     Klebsiella pneumoniae group Not Detected     Proteus spp. Not Detected     Salmonella spp. Not Detected     Serratia marcescens Not Detected     Haemophilus influenzae Not Detected     Neisseria meningitidis Not Detected     Pseudomonas aeruginosa Not Detected     Stenotrophomonas maltophilia Not Detected     Candida albicans Not Detected     Candida auris Not Detected     Candida glabrata Not Detected     Candida krusei Not Detected     Candida parapsilosis Not Detected     Candida tropicalis Not Detected     Cryptococcus neoformans/gattii Not Detected    Narrative:      The [employer] BCID2 Panel is a multiplexed nucleic acid test intended for the use with [employer]® 2.0 or [employer]® [employer] for the simultaneous qualitative detection and identification of multiple bacterial and yeast nucleic acids and select genetic determinants associated with antimicrobial resistance.  The [employer] BCID2 Panel test is performed directly on blood culture  samples identified as positive by a continuous monitoring blood culture system.  Results are intended to be interpreted in conjunction with Gram stain results.             See below for Radiology    Scheduled Med:   amLODIPine  10 mg Oral Daily    bisacodyL  10 mg Rectal Once    carvediloL  12.5 mg Oral BID    cefTRIAXone (ROCEPHIN) IVPB  2 g Intravenous Daily    doxazosin  8 mg Oral QHS    epoetin gary  10,000 Units Intravenous Every Mon, Wed, Fri    ferrous sulfate  1 tablet Oral Daily    pantoprazole  40 mg Intravenous BID    polyethylene glycol  17 g Oral BID    sevelamer carbonate  800 mg Oral TID        Continuous Infusions:  None.    PRN Meds:  sodium chloride, sodium chloride 0.9%, acetaminophen, bisacodyL, docusate sodium, hyoscyamine, labetaloL, metoprolol, morphine, ondansetron, oxyCODONE-acetaminophen, prochlorperazine, sodium chloride 0.9%       Assessment/Plan:  Failure to thrive  Metabolic encephalopathy, intermittent, with multifactorial etiology  Multidrug resistant E coli sepsis  Prostatomegaly with resulting chronic bladder outlet obstruction/obstructive uropathy with moderate bilateral hydronephrosis  Gross hematuria  Suspected pyeloureteritis  Anemia of chronic disease  End-stage renal disease on hemodialysis  Emphysema  Essential hypertension  Coronary artery disease  Cardiomyopathy  Mild gastritis and moderate bulbar duodenitis without any evidence of erosions or ulcerations  Constipation      Plan:   Continue aggressive bowel regimen  Hemodialysis and electrolyte management per Nephrology   Continue intravenous antibiotic therapy   Continue physical therapy and occupational therapy as tolerated  Urology recommends discharge with Cheek catheter in place      Critical Care Diagnosis:  Sepsis requiring broad-spectrum intravenous antibiotic therapy  Critical Care Interventions: Hands-on evaluation, review of labs, radiographs, medical records, and discussion with the patient and medical staff in  order to assess and manage the high probability of imminent or life-threatening deterioration of cardio-respiratory status requiring vasopressor support and/or intubation and mechanical ventilation.  Critical Care Time Spent: 35 minutes      VTE prophylaxis:  SCDs    Patient condition:  Guarded with relatively poor long-term prognosis    Anticipated discharge and Disposition:   Skilled nursing facility      All diagnosis and differential diagnosis have been reviewed; assessment and plan has been documented; I have personally reviewed the labs and test results that are presently available; I have reviewed the patients medication list; I have reviewed the consulting providers response and recommendations. I have reviewed or attempted to review medical records based upon their availability    All of the patient's questions have been  addressed and answered. Patient's is agreeable to the above stated plan. I will continue to monitor closely and make adjustments to medical management as needed.  _____________________________________________________________________      Radiology:  I have personally reviewed the following imaging and agree with the radiologist.     X-Ray Abdomen AP 1 View  EXAMINATION  XR ABDOMEN AP 1 VIEW    CLINICAL HISTORY  Vomiting;    TECHNIQUE  A total of 1 AP image(s) of the abdomen.    COMPARISON  14 July 2023    FINDINGS  Lines/tubes/devices: ECG leads overlie the imaged region.    Notable amount of residual stool is present throughout the colon.  A non-obstructed bowel gas pattern is present. No intra-abdominal mass effect is appreciated.   Detection of air-fluid levels and low-volume pneumoperitoneum is limited due to supine technique.    Included lower thoracic cavity and osseous structures are without acute abnormality.    IMPRESSION  1. Similar findings suggestive of constipation.  2. No evidence of new or worsened intra-abdominal process.    Electronically signed by: Bora  Albaor  Date:    07/20/2023  Time:    17:29      Cordell Flanagan MD   07/21/2023

## 2023-07-22 NOTE — NURSING
Nurses Note -- 4 Eyes      7/22/2023   3:43 AM      Skin assessed during: Q Shift Change      [] No Altered Skin Integrity Present    []Prevention Measures Documented      [] Yes- Altered Skin Integrity Present or Discovered   [] LDA Added if Not in Epic (Describe Wound)   [] New Altered Skin Integrity was Present on Admit and Documented in LDA   [] Wound Image Taken    Wound Care Consulted? No    Attending Nurse:  Taras Solares RN     Second RN/Staff Member:  Luan SALMERON

## 2023-07-22 NOTE — PROGRESS NOTES
[facility]  Hospital Medicine Progress Note        Chief Complaint: Inpatient follow-up on gross hematuria and Gram-negative sepsis    HPI:   68 y.o. male with a past medical history of essential hypertension, ESRD on HD MWF, anemia, BPH, cervical radiculopathy, and vitamin-D deficiency presented to [facility] on 7/13/2023 for dark stools.  Nursing home reported they noticed melanotic stools yesterday and patient was more confused than normal.  On exam patient reports he has had dark stools for the past week with intermittent abdominal and back pain.  Patient denies chest pain, shortness of breath, fever, chills, nausea, vomiting, and diarrhea.  Initial vital signs in ED were /69, pulse 87, respirations 19, temperature 37° C, and SpO2 98% on room air.  Labs revealed WBC 6.32, RBC 3.24, hemoglobin 10.7, hematocrit 33.3, .8, potassium 5.5, chloride 95, BUN 52.5, creatinine 10.47, and lactic acid 1.1.  UA revealed greater than 100 red blood cells.  Blood culture was obtained.  CT head revealed chronic age-related changes without acute process.  Chest x-ray revealed no acute cardiopulmonary process identified.  CT abdomen and pelvis without contrast revealed moderate stool in the colon which could reflect an element of constipation, mild to moderate bilateral hydroureteronephrosis with superimposed pyeloureteritis not excluded, enlarged prostate, hyperdense material in the dependent urinary bladder which may reflect blood clots, focal sclerotic lesions seen and left iliac and pubic bones suspicious for sclerotic metastases.  Three-way catheter was placed in ED with gross hematuria.  Patient was started on IV Zosyn.  Urology and nephrology were consulted. Patient was admitted to hospital medicine service for further medical management.      Patient was started on continuous bladder irrigation per urology. Blood cultures were positive for E. coli.  Patient was continued on Rocephin.   Patient has persistent pink tinged urine.    Interval Hx:   Patient is sitting up in bed awake and alert.  Finally had a large bowel movement yesterday thereby relieving his constipation.  No new issues have been reported.  Patient is afebrile, on room air, and hemodynamically stable.      Objective/physical exam:  General:  Chronically ill-appearing  male in no acute distress  HENT: normocephalic, atraumatic  Eye: PERRL, EOMI, clear conjunctiva  Neck: full ROM, no thyromegaly, no JVD  Respiratory:  Diminished breath sounds bilaterally  Cardiovascular: regular rate and rhythm  Gastrointestinal: non-distended, positive bowel sounds, non-tender  Genitourinary:  Cheek catheter with pink-tinged urine  Musculoskeletal: no gross deformity  Integumentary: warm, dry, intact, no rashes  Neurological: cranial nerves grossly intact, no focal neurological deficit  Psychiatric: cooperative, flat affect      VITAL SIGNS: 24 HRS MIN & MAX LAST   Temp  Min: 98 °F (36.7 °C)  Max: 98.6 °F (37 °C) 98.6 °F (37 °C)   BP  Min: 124/71  Max: 151/71 (!) 151/71   Pulse  Min: 63  Max: 72  63   Resp  Min: 16  Max: 20 18   SpO2  Min: 96 %  Max: 99 % 98 %     I have reviewed the following labs:    Recent Labs   Lab 07/18/23 0331 07/19/23 0354 07/20/23  1212   WBC 5.21 4.98 4.42*   RBC 2.61* 2.52* 2.68*   HGB 8.3* 8.0* 8.4*   HCT 25.4* 25.0* 27.1*   MCV 97.3* 99.2* 101.1*   MCH 31.8* 31.7* 31.3*   MCHC 32.7* 32.0* 31.0*   RDW 16.3 15.9 15.9   * 160 221   MPV 11.2* 10.7* 10.4       Recent Labs   Lab 07/16/23  0845 07/18/23  0331 07/19/23  0354 07/20/23  1212   * 133* 130* 126*   K 4.3 3.9 4.3 4.3   CO2 24 28 26 24   BUN 60.5* 31.3* 40.1* 25.9*   CREATININE 10.57* 7.11* 9.20* 6.74*   CALCIUM 7.3* 7.8* 7.7* 8.2*   ALBUMIN 2.3*  --   --   --    ALKPHOS 47  --   --   --    ALT 11  --   --   --    AST 19  --   --   --    BILITOT 0.8  --   --   --           Microbiology Results (last 7 days)       Procedure Component Value  Units Date/Time    Blood Culture [[id number]]  (Normal) Collected: 07/17/23 0839    Order Status: Completed Specimen: Blood Updated: 07/22/23 1200     CULTURE, BLOOD ([facility]) No Growth at 5 days    Blood Culture #1 **CANNOT BE ORDERED STAT** [[id number]]  (Abnormal)  (Susceptibility) Collected: 07/14/23 0259    Order Status: Completed Specimen: Blood from Arm, Left Updated: 07/16/23 0805     CULTURE, BLOOD ([facility]) Escherichia coli     GRAM STAIN Seen in gram stain of broth only      Gram Negative Rods      1 of 2 Aerobic bottles positive    Blood Culture #2 **CANNOT BE ORDERED STAT** [[id number]]  (Abnormal)  (Susceptibility) Collected: 07/14/23 0259    Order Status: Completed Specimen: Blood from Hand, Left Updated: 07/16/23 0805     CULTURE, BLOOD ([facility]) Escherichia coli     GRAM STAIN 2 of 2 bottles positive      Gram Negative Rods      Seen in gram stain of broth only             See below for Radiology    Scheduled Med:   amLODIPine  10 mg Oral Daily    bisacodyL  10 mg Rectal Once    carvediloL  12.5 mg Oral BID    cefTRIAXone (ROCEPHIN) IM WITH LIDOcaine  2 g Intramuscular Q24H    doxazosin  8 mg Oral QHS    epoetin gary  10,000 Units Intravenous Every Mon, Wed, Fri    ferrous sulfate  1 tablet Oral Daily    pantoprazole  40 mg Intravenous BID    polyethylene glycol  17 g Oral Daily    sevelamer carbonate  800 mg Oral TID    zinc oxide-cod liver oil   Topical (Top) BID        Continuous Infusions:  None.    PRN Meds:  sodium chloride, sodium chloride 0.9%, acetaminophen, bisacodyL, docusate sodium, hyoscyamine, labetaloL, metoprolol, morphine, ondansetron, oxyCODONE-acetaminophen, prochlorperazine, sodium chloride 0.9%, zinc oxide-cod liver oil       Assessment/Plan:  Failure to thrive  Metabolic encephalopathy, intermittent, with multifactorial etiology  Multidrug resistant E coli sepsis  Prostatomegaly with resulting chronic bladder outlet obstruction/obstructive uropathy with moderate bilateral  hydronephrosis  Gross hematuria  Suspected pyeloureteritis  Anemia of chronic disease  End-stage renal disease on hemodialysis  Emphysema  Essential hypertension  Coronary artery disease  Cardiomyopathy  Mild gastritis and moderate bulbar duodenitis without any evidence of erosions or ulcerations  Constipation, resolved      Plan:   Hemodialysis and electrolyte management per Nephrology   Continue intravenous antibiotic therapy   Continue physical therapy and occupational therapy as tolerated  Urology recommends discharge with Cheek catheter in place      Critical Care Diagnosis:  Sepsis requiring broad-spectrum intravenous antibiotic therapy  Critical Care Interventions: Hands-on evaluation, review of labs, radiographs, medical records, and discussion with the patient and medical staff in order to assess and manage the high probability of imminent or life-threatening deterioration of cardio-respiratory status requiring vasopressor support and/or intubation and mechanical ventilation.  Critical Care Time Spent: 35 minutes      VTE prophylaxis:  SCDs    Patient condition:  Guarded with relatively poor long-term prognosis    Anticipated discharge and Disposition:   Skilled nursing facility      All diagnosis and differential diagnosis have been reviewed; assessment and plan has been documented; I have personally reviewed the labs and test results that are presently available; I have reviewed the patients medication list; I have reviewed the consulting providers response and recommendations. I have reviewed or attempted to review medical records based upon their availability    All of the patient's questions have been  addressed and answered. Patient's is agreeable to the above stated plan. I will continue to monitor closely and make adjustments to medical management as needed.  _____________________________________________________________________      Radiology:  I have personally reviewed the following imaging and  agree with the radiologist.     X-Ray Abdomen AP 1 View  EXAMINATION  XR ABDOMEN AP 1 VIEW    CLINICAL HISTORY  Vomiting;    TECHNIQUE  A total of 1 AP image(s) of the abdomen.    COMPARISON  14 July 2023    FINDINGS  Lines/tubes/devices: ECG leads overlie the imaged region.    Notable amount of residual stool is present throughout the colon.  A non-obstructed bowel gas pattern is present. No intra-abdominal mass effect is appreciated.   Detection of air-fluid levels and low-volume pneumoperitoneum is limited due to supine technique.    Included lower thoracic cavity and osseous structures are without acute abnormality.    IMPRESSION  1. Similar findings suggestive of constipation.  2. No evidence of new or worsened intra-abdominal process.    Electronically signed by: Bora Irvin  Date:    07/20/2023  Time:    17:29      Cordell Flanagan MD   07/22/2023

## 2023-07-22 NOTE — PLAN OF CARE
Problem: Adult Inpatient Plan of Care  Goal: Plan of Care Review  Outcome: Ongoing, Progressing  Flowsheets (Taken 7/21/2023 2100)  Plan of Care Reviewed With: patient  Goal: Patient-Specific Goal (Individualized)  Outcome: Ongoing, Progressing  Goal: Absence of Hospital-Acquired Illness or Injury  Outcome: Ongoing, Progressing  Intervention: Identify and Manage Fall Risk  Flowsheets (Taken 7/21/2023 2100)  Safety Promotion/Fall Prevention:   assistive device/personal item within reach   medications reviewed   nonskid shoes/socks when out of bed   side rails raised x 2  Intervention: Prevent Skin Injury  Flowsheets (Taken 7/21/2023 2100)  Body Position: turned  Skin Protection:   adhesive use limited   incontinence pads utilized   tubing/devices free from skin contact  Intervention: Prevent and Manage VTE (Venous Thromboembolism) Risk  Flowsheets (Taken 7/21/2023 2100)  Activity Management:   Ambulated in room - L4   Rolling - L1   Up to bedside commode - L3  VTE Prevention/Management:   ambulation promoted   bleeding risk assessed   ROM (active) performed  Range of Motion:   active ROM (range of motion) encouraged   ROM (range of motion) performed  Intervention: Prevent Infection  Flowsheets (Taken 7/21/2023 2100)  Infection Prevention:   rest/sleep promoted   single patient room provided  Goal: Optimal Comfort and Wellbeing  Outcome: Ongoing, Progressing  Intervention: Monitor Pain and Promote Comfort  Flowsheets (Taken 7/21/2023 2100)  Pain Management Interventions:   care clustered   medication offered   pain management plan reviewed with patient/caregiver   pillow support provided   position adjusted  Intervention: Provide Person-Centered Care  Flowsheets (Taken 7/21/2023 2100)  Trust Relationship/Rapport: care explained  Goal: Readiness for Transition of Care  Outcome: Ongoing, Progressing     Problem: Infection  Goal: Absence of Infection Signs and Symptoms  Outcome: Ongoing, Progressing  Intervention:  Prevent or Manage Infection  Flowsheets (Taken 7/21/2023 2100)  Infection Management: aseptic technique maintained     Problem: Device-Related Complication Risk (Hemodialysis)  Goal: Safe, Effective Therapy Delivery  Outcome: Ongoing, Progressing  Intervention: Optimize Device Care and Function  Flowsheets (Taken 7/21/2023 2100)  Medication Review/Management: medications reviewed     Problem: Hemodynamic Instability (Hemodialysis)  Goal: Effective Tissue Perfusion  Outcome: Ongoing, Progressing     Problem: Infection (Hemodialysis)  Goal: Absence of Infection Signs and Symptoms  Outcome: Ongoing, Progressing     Problem: Skin Injury Risk Increased  Goal: Skin Health and Integrity  Outcome: Ongoing, Progressing  Intervention: Optimize Skin Protection  Flowsheets (Taken 7/21/2023 2100)  Pressure Reduction Techniques:   frequent weight shift encouraged   weight shift assistance provided  Pressure Reduction Devices: positioning supports utilized  Skin Protection:   adhesive use limited   incontinence pads utilized   tubing/devices free from skin contact  Head of Bed (HOB) Positioning: HOB at 30-45 degrees  Intervention: Promote and Optimize Oral Intake  Flowsheets (Taken 7/21/2023 2100)  Oral Nutrition Promotion: rest periods promoted     Problem: Impaired Wound Healing  Goal: Optimal Wound Healing  Outcome: Ongoing, Progressing  Intervention: Promote Wound Healing  Flowsheets (Taken 7/21/2023 2100)  Oral Nutrition Promotion: rest periods promoted  Activity Management:   Ambulated in room - L4   Rolling - L1   Up to bedside commode - L3  Pain Management Interventions:   care clustered   medication offered   pain management plan reviewed with patient/caregiver   pillow support provided   position adjusted     Problem: Fall Injury Risk  Goal: Absence of Fall and Fall-Related Injury  Outcome: Ongoing, Progressing  Intervention: Identify and Manage Contributors  Flowsheets (Taken 7/21/2023 2100)  Self-Care Promotion:  independence encouraged  Medication Review/Management: medications reviewed  Intervention: Promote Injury-Free Environment  Flowsheets (Taken 7/21/2023 2100)  Safety Promotion/Fall Prevention:   assistive device/personal item within reach   medications reviewed   nonskid shoes/socks when out of bed   side rails raised x 2

## 2023-07-22 NOTE — PLAN OF CARE
Problem: Adult Inpatient Plan of Care  Goal: Plan of Care Review  Outcome: Ongoing, Progressing  Goal: Patient-Specific Goal (Individualized)  Outcome: Ongoing, Progressing  Goal: Absence of Hospital-Acquired Illness or Injury  Outcome: Ongoing, Progressing  Goal: Optimal Comfort and Wellbeing  Outcome: Ongoing, Progressing  Goal: Readiness for Transition of Care  Outcome: Ongoing, Progressing     Problem: Infection  Goal: Absence of Infection Signs and Symptoms  Outcome: Ongoing, Progressing     Problem: Device-Related Complication Risk (Hemodialysis)  Goal: Safe, Effective Therapy Delivery  Outcome: Ongoing, Progressing     Problem: Hemodynamic Instability (Hemodialysis)  Goal: Effective Tissue Perfusion  Outcome: Ongoing, Progressing     Problem: Infection (Hemodialysis)  Goal: Absence of Infection Signs and Symptoms  Outcome: Ongoing, Progressing     Problem: Skin Injury Risk Increased  Goal: Skin Health and Integrity  Outcome: Ongoing, Progressing     Problem: Impaired Wound Healing  Goal: Optimal Wound Healing  Outcome: Ongoing, Progressing     Problem: Fall Injury Risk  Goal: Absence of Fall and Fall-Related Injury  Outcome: Ongoing, Progressing

## 2023-07-23 NOTE — PLAN OF CARE
Problem: Adult Inpatient Plan of Care  Goal: Plan of Care Review  Outcome: Ongoing, Progressing  Flowsheets (Taken 7/22/2023 1959)  Plan of Care Reviewed With: patient  Goal: Patient-Specific Goal (Individualized)  Outcome: Ongoing, Progressing  Goal: Absence of Hospital-Acquired Illness or Injury  Outcome: Ongoing, Progressing  Intervention: Identify and Manage Fall Risk  Flowsheets (Taken 7/22/2023 1959)  Safety Promotion/Fall Prevention:   assistive device/personal item within reach   Fall Risk reviewed with patient/family   Fall Risk signage in place   lighting adjusted   medications reviewed   high risk medications identified   nonskid shoes/socks when out of bed   in recliner, wheels locked   instructed to call staff for mobility  Intervention: Prevent Skin Injury  Flowsheets (Taken 7/22/2023 1959)  Body Position: supine  Skin Protection:   incontinence pads utilized   tubing/devices free from skin contact  Intervention: Prevent and Manage VTE (Venous Thromboembolism) Risk  Flowsheets (Taken 7/22/2023 1959)  Activity Management: Up in chair - L3  VTE Prevention/Management:   ambulation promoted   bleeding precations maintained   bleeding risk assessed  Range of Motion: active ROM (range of motion) encouraged  Intervention: Prevent Infection  Flowsheets (Taken 7/22/2023 1959)  Infection Prevention:   environmental surveillance performed   equipment surfaces disinfected   hand hygiene promoted   personal protective equipment utilized   rest/sleep promoted   single patient room provided  Goal: Optimal Comfort and Wellbeing  Outcome: Ongoing, Progressing  Intervention: Monitor Pain and Promote Comfort  Flowsheets (Taken 7/22/2023 1959)  Pain Management Interventions:   care clustered   pain management plan reviewed with patient/caregiver   quiet environment facilitated  Intervention: Provide Person-Centered Care  Flowsheets (Taken 7/22/2023 1959)  Trust Relationship/Rapport:   care explained   choices provided    emotional support provided   empathic listening provided   questions answered   thoughts/feelings acknowledged  Goal: Readiness for Transition of Care  Outcome: Ongoing, Progressing     Problem: Infection  Goal: Absence of Infection Signs and Symptoms  Outcome: Ongoing, Progressing  Intervention: Prevent or Manage Infection  Flowsheets (Taken 7/22/2023 1959)  Infection Management: aseptic technique maintained  Isolation Precautions: precautions maintained     Problem: Device-Related Complication Risk (Hemodialysis)  Goal: Safe, Effective Therapy Delivery  Outcome: Ongoing, Progressing     Problem: Hemodynamic Instability (Hemodialysis)  Goal: Effective Tissue Perfusion  Outcome: Ongoing, Progressing     Problem: Infection (Hemodialysis)  Goal: Absence of Infection Signs and Symptoms  Outcome: Ongoing, Progressing     Problem: Skin Injury Risk Increased  Goal: Skin Health and Integrity  Outcome: Ongoing, Progressing  Intervention: Optimize Skin Protection  Flowsheets (Taken 7/22/2023 1959)  Pressure Reduction Techniques:   frequent weight shift encouraged   weight shift assistance provided  Skin Protection:   incontinence pads utilized   tubing/devices free from skin contact     Problem: Impaired Wound Healing  Goal: Optimal Wound Healing  Outcome: Ongoing, Progressing  Intervention: Promote Wound Healing  Flowsheets (Taken 7/22/2023 1959)  Sleep/Rest Enhancement:   awakenings minimized   room darkened   regular sleep/rest pattern promoted  Activity Management: Up in chair - L3  Pain Management Interventions:   care clustered   pain management plan reviewed with patient/caregiver   quiet environment facilitated     Problem: Fall Injury Risk  Goal: Absence of Fall and Fall-Related Injury  Outcome: Ongoing, Progressing  Intervention: Identify and Manage Contributors  Flowsheets (Taken 7/22/2023 1959)  Self-Care Promotion:   independence encouraged   BADL personal objects within reach   safe use of adaptive equipment  encouraged  Medication Review/Management: medications reviewed  Intervention: Promote Injury-Free Environment  Flowsheets (Taken 7/22/2023 1959)  Safety Promotion/Fall Prevention:   assistive device/personal item within reach   Fall Risk reviewed with patient/family   Fall Risk signage in place   lighting adjusted   medications reviewed   high risk medications identified   nonskid shoes/socks when out of bed   in recliner, wheels locked   instructed to call staff for mobility

## 2023-07-23 NOTE — PLAN OF CARE
Problem: Adult Inpatient Plan of Care  Goal: Plan of Care Review  Outcome: Ongoing, Progressing  Goal: Patient-Specific Goal (Individualized)  Outcome: Ongoing, Progressing  Goal: Absence of Hospital-Acquired Illness or Injury  Outcome: Ongoing, Progressing  Intervention: Identify and Manage Fall Risk  Flowsheets (Taken 7/23/2023 1037)  Safety Promotion/Fall Prevention:   assistive device/personal item within reach   side rails raised x 2   nonskid shoes/socks when out of bed  Intervention: Prevent Skin Injury  Flowsheets (Taken 7/23/2023 1037)  Body Position: position changed independently  Skin Protection:   adhesive use limited   tubing/devices free from skin contact  Intervention: Prevent and Manage VTE (Venous Thromboembolism) Risk  Flowsheets (Taken 7/23/2023 1037)  Activity Management: Walk with assistive devise and /or staff member - L3  VTE Prevention/Management:   ambulation promoted   bleeding risk assessed  Range of Motion: active ROM (range of motion) encouraged  Goal: Optimal Comfort and Wellbeing  Outcome: Ongoing, Progressing  Intervention: Provide Person-Centered Care  Flowsheets (Taken 7/23/2023 1037)  Trust Relationship/Rapport:   care explained   questions answered   thoughts/feelings acknowledged   empathic listening provided   questions encouraged   choices provided  Goal: Readiness for Transition of Care  Outcome: Ongoing, Progressing

## 2023-07-23 NOTE — PROGRESS NOTES
[facility]  Hospital Medicine Progress Note        Chief Complaint: Inpatient follow-up on gross hematuria and Gram-negative sepsis    HPI:   68 y.o. male with a past medical history of essential hypertension, ESRD on HD MWF, anemia, BPH, cervical radiculopathy, and vitamin-D deficiency presented to [facility] on 7/13/2023 for dark stools.  Nursing home reported they noticed melanotic stools yesterday and patient was more confused than normal.  On exam patient reports he has had dark stools for the past week with intermittent abdominal and back pain.  Patient denies chest pain, shortness of breath, fever, chills, nausea, vomiting, and diarrhea.  Initial vital signs in ED were /69, pulse 87, respirations 19, temperature 37° C, and SpO2 98% on room air.  Labs revealed WBC 6.32, RBC 3.24, hemoglobin 10.7, hematocrit 33.3, .8, potassium 5.5, chloride 95, BUN 52.5, creatinine 10.47, and lactic acid 1.1.  UA revealed greater than 100 red blood cells.  Blood culture was obtained.  CT head revealed chronic age-related changes without acute process.  Chest x-ray revealed no acute cardiopulmonary process identified.  CT abdomen and pelvis without contrast revealed moderate stool in the colon which could reflect an element of constipation, mild to moderate bilateral hydroureteronephrosis with superimposed pyeloureteritis not excluded, enlarged prostate, hyperdense material in the dependent urinary bladder which may reflect blood clots, focal sclerotic lesions seen and left iliac and pubic bones suspicious for sclerotic metastases.  Three-way catheter was placed in ED with gross hematuria.  Patient was started on IV Zosyn.  Urology and nephrology were consulted. Patient was admitted to hospital medicine service for further medical management.      Patient was started on continuous bladder irrigation per urology. Blood cultures were positive for E. coli.  Patient was continued on Rocephin.   Patient has persistent pink tinged urine.    Interval Hx:   Patient is sitting up in a chair and asleep.   No new issues have been reported.  Patient is afebrile, on room air, and hemodynamically stable.      Objective/physical exam:  General:  Chronically ill-appearing  male in no acute distress  HENT: normocephalic, atraumatic  Eye: PERRL, EOMI, clear conjunctiva  Neck: full ROM, no thyromegaly, no JVD  Respiratory:  Diminished breath sounds bilaterally  Cardiovascular: regular rate and rhythm  Gastrointestinal: non-distended, positive bowel sounds, non-tender  Genitourinary:  Cheek catheter with pink-tinged urine  Musculoskeletal: no gross deformity  Integumentary: warm, dry, intact, no rashes  Neurological: cranial nerves grossly intact, no focal neurological deficit  Psychiatric: cooperative, flat affect      VITAL SIGNS: 24 HRS MIN & MAX LAST   Temp  Min: 97.9 °F (36.6 °C)  Max: 98.6 °F (37 °C) 98.4 °F (36.9 °C)   BP  Min: 132/56  Max: 172/70 (!) 132/56   Pulse  Min: 57  Max: 67  62   Resp  Min: 18  Max: 20 18   SpO2  Min: 97 %  Max: 100 % 100 %     I have reviewed the following labs:    Recent Labs   Lab 07/18/23  0331 07/19/23  0354 07/20/23  1212   WBC 5.21 4.98 4.42*   RBC 2.61* 2.52* 2.68*   HGB 8.3* 8.0* 8.4*   HCT 25.4* 25.0* 27.1*   MCV 97.3* 99.2* 101.1*   MCH 31.8* 31.7* 31.3*   MCHC 32.7* 32.0* 31.0*   RDW 16.3 15.9 15.9   * 160 221   MPV 11.2* 10.7* 10.4       Recent Labs   Lab 07/18/23  0331 07/19/23  0354 07/20/23  1212   * 130* 126*   K 3.9 4.3 4.3   CO2 28 26 24   BUN 31.3* 40.1* 25.9*   CREATININE 7.11* 9.20* 6.74*   CALCIUM 7.8* 7.7* 8.2*          Microbiology Results (last 7 days)       Procedure Component Value Units Date/Time    Blood Culture [[id number]]  (Normal) Collected: 07/17/23 0839    Order Status: Completed Specimen: Blood Updated: 07/22/23 1200     CULTURE, BLOOD ([facility]) No Growth at 5 days             See below for Radiology    Scheduled Med:    amLODIPine  10 mg Oral Daily    bisacodyL  10 mg Rectal Once    carvediloL  12.5 mg Oral BID    cefTRIAXone (ROCEPHIN) IM WITH LIDOcaine  2 g Intramuscular Q24H    doxazosin  8 mg Oral QHS    epoetin gary  10,000 Units Intravenous Every Mon, Wed, Fri    ferrous sulfate  1 tablet Oral Daily    pantoprazole  40 mg Oral BID    polyethylene glycol  17 g Oral Daily    sevelamer carbonate  800 mg Oral TID    zinc oxide-cod liver oil   Topical (Top) BID        Continuous Infusions:  None.    PRN Meds:  sodium chloride, sodium chloride 0.9%, acetaminophen, bisacodyL, docusate sodium, hyoscyamine, labetaloL, metoprolol, morphine, ondansetron, oxyCODONE-acetaminophen, prochlorperazine, sodium chloride 0.9%, zinc oxide-cod liver oil       Assessment/Plan:  Failure to thrive  Metabolic encephalopathy, intermittent, with multifactorial etiology  Multidrug resistant E coli sepsis  Prostatomegaly with resulting chronic bladder outlet obstruction/obstructive uropathy with moderate bilateral hydronephrosis  Gross hematuria  Suspected pyeloureteritis  Anemia of chronic disease  End-stage renal disease on hemodialysis  Emphysema  Essential hypertension  Coronary artery disease  Cardiomyopathy  Mild gastritis and moderate bulbar duodenitis without any evidence of erosions or ulcerations  Constipation, resolved      Plan:   Hemodialysis and electrolyte management per Nephrology   Continue intravenous antibiotic therapy   Continue physical therapy and occupational therapy as tolerated  Urology recommends discharge with Cheek catheter in place      Critical Care Diagnosis:  Sepsis requiring broad-spectrum intravenous antibiotic therapy  Critical Care Interventions: Hands-on evaluation, review of labs, radiographs, medical records, and discussion with the patient and medical staff in order to assess and manage the high probability of imminent or life-threatening deterioration of cardio-respiratory status requiring vasopressor support  and/or intubation and mechanical ventilation.  Critical Care Time Spent: 35 minutes      VTE prophylaxis:  SCDs    Patient condition:  Guarded with relatively poor long-term prognosis    Anticipated discharge and Disposition:   Skilled nursing facility      All diagnosis and differential diagnosis have been reviewed; assessment and plan has been documented; I have personally reviewed the labs and test results that are presently available; I have reviewed the patients medication list; I have reviewed the consulting providers response and recommendations. I have reviewed or attempted to review medical records based upon their availability    All of the patient's questions have been  addressed and answered. Patient's is agreeable to the above stated plan. I will continue to monitor closely and make adjustments to medical management as needed.  _____________________________________________________________________      Radiology:  I have personally reviewed the following imaging and agree with the radiologist.     X-Ray Abdomen AP 1 View  EXAMINATION  XR ABDOMEN AP 1 VIEW    CLINICAL HISTORY  Vomiting;    TECHNIQUE  A total of 1 AP image(s) of the abdomen.    COMPARISON  14 July 2023    FINDINGS  Lines/tubes/devices: ECG leads overlie the imaged region.    Notable amount of residual stool is present throughout the colon.  A non-obstructed bowel gas pattern is present. No intra-abdominal mass effect is appreciated.   Detection of air-fluid levels and low-volume pneumoperitoneum is limited due to supine technique.    Included lower thoracic cavity and osseous structures are without acute abnormality.    IMPRESSION  1. Similar findings suggestive of constipation.  2. No evidence of new or worsened intra-abdominal process.    Electronically signed by: Bora Irvin  Date:    07/20/2023  Time:    17:29      Cordell Flanagan MD   07/23/2023

## 2023-07-24 NOTE — NURSING
Subjective:      Patient ID: Mirza Nelson Jr. is a 68 y.o. male.    Chief Complaint: GI Problem (Pt reports per aasi with reports of AMS and dark blood in stool noted today per NH staff. -blood thinners. Usually GCS15 per NH staff. On dialysis. Last full run yesterday.)    HPI  Review of Systems   Objective:     Physical Exam   Assessment:     1. ESRD on hemodialysis    2. Symptomatic anemia    3. AMS (altered mental status)    4. Tachycardia    5. Melena    6. Hematuria           Altered Skin Integrity 07/14/23 1700 Sacral spine (Active)   07/14/23 1700   Altered Skin Integrity Present on Admission - Did Patient arrive to the hospital with altered skin?: yes   Side:    Orientation:    Location: Sacral spine   Wound Number:    Is this injury device related?:    Primary Wound Type:    Description of Altered Skin Integrity:    Ankle-Brachial Index:    Pulses:    Removal Indication and Assessment:    Wound Outcome:    (Retired) Wound Length (cm):    (Retired) Wound Width (cm):    (Retired) Depth (cm):    Wound Description (Comments):    Removal Indications:    Wound Image   07/24/23 0900   Description of Altered Skin Integrity Intact skin with non-blanchable redness of localized area 07/21/23 2000   Dressing Appearance Open to air 07/24/23 0900   Drainage Amount None 07/24/23 0900   Appearance Brown;Dry 07/24/23 0900   Tissue loss description Not applicable 07/24/23 0900   Care Cleansed with:;Wound cleanser;Applied:;Skin Barrier 07/24/23 0900   Dressing Foam 07/21/23 0800       Plan:            WOCN consult-67 y/o male in with symptomatic anemia-melena and hematuria.  Also ESRD.   He is awake alert oriented and mobil.   Turns self well in bed.      He has old granulated skin-see photo to coccyx area which he reports has been heeled for months.   Noted.  Skin barrier put in place.  He reports he was doing same at home.  Will check on him next week if still in hospital.

## 2023-07-24 NOTE — NURSING
07/24/23 1445   Post-Hemodialysis Assessment   Rinseback Volume (mL) 250 mL   Blood Volume Processed (Liters) 63.4 L   Dialyzer Clearance Clear   Duration of Treatment 180 minutes   Total UF (mL) 500 mL   Patient Response to Treatment pt tolerated tx well   Post-Hemodialysis Comments pt's dialyzer clotted off after 1.5 hrs, notified FÁTIMA Narayanan NP and she gave the order to resetup, resetup and retested. pt received 500 mL extra for resetup so net  mL, NAD noted, and VSS.

## 2023-07-24 NOTE — PROGRESS NOTES
Nephrology consult follow up note    HPI:      Mirza Nelson Jr. is a 68 y.o. male  with dialysis dependent ESRD.  He dialyzes at [facility] on Monday Wednesday Friday by way of right upper arm AV fistula.  He presented to ED on 07/13/2023 with complaints of dark stool.  He also endorses lightheadedness, back pain, neck pain.  CT of the abdomen and pelvis revealed mild to moderate bilateral hydroureteronephrosis with enlarged prostate with median lobe projecting into the bladder base, hyperdense material independent urinary bladder blood clot.  Cheek catheter was placed and subsequently started on CBI.  Blood cultures drawn 7/14 positive for E coli.  No urine culture.  He is being treated with IV cefepime.    Interval history:     CBI has been discontinued and he has urine draining into catheter bag. He is requesting water and lunch.        Objective:       VITAL SIGNS: 24 HR MIN & MAX LAST    Temp  Min: 97.6 °F (36.4 °C)  Max: 97.9 °F (36.6 °C)  97.8 °F (36.6 °C)        BP  Min: 126/74  Max: 158/63  138/72     Pulse  Min: 56  Max: 71  (!) 56     Resp  Min: 16  Max: 20  18    SpO2  Min: 96 %  Max: 100 %  96 %      GEN: Chronically ill appearing AAM in NAD  EXT: No cyanosis or edema  SKIN: Warm and dry  PSYCH: Awake, alert and appropriately conversant.   : urinary catheter   Dialysis access:  RUE AVF             Component Value Date/Time     (L) 07/24/2023 0348     (L) 07/20/2023 1212     09/27/2018 0800    K 4.7 07/24/2023 0348    K 4.3 07/20/2023 1212    K 3.5 02/24/2021 0609    K 3.9 09/27/2018 0800    CHLORIDE 93 (L) 07/24/2023 0348    CHLORIDE 91 (L) 07/20/2023 1212    CO2 25 07/24/2023 0348    CO2 24 07/20/2023 1212    CO2 34 (H) 09/27/2018 0800    BUN 32.9 (H) 07/24/2023 0348    BUN 25.9 (H) 07/20/2023 1212    BUN 24 (H) 09/27/2018 0800    CREATININE 9.77 (H) 07/24/2023 0348    CREATININE 6.74 (H) 07/20/2023 1212    CREATININE 5.4 (H) 09/27/2018 0800    CALCIUM 7.9 (L) 07/24/2023 0348     CALCIUM 8.2 (L) 07/20/2023 1212    CALCIUM 9.2 09/27/2018 0800    PHOS 6.6 (H) 07/14/2023 0756    PHOS 3.5 09/27/2018 0800            Component Value Date/Time    WBC 5.51 07/24/2023 0348    WBC 4.42 (L) 07/20/2023 1212    WBC 5.35 09/27/2018 0800    HGB 8.2 (L) 07/24/2023 0348    HGB 8.4 (L) 07/20/2023 1212    HGB 11.1 (L) 09/27/2018 0800    HCT 25.8 (L) 07/24/2023 0348    HCT 27.1 (L) 07/20/2023 1212    HCT 33.0 (L) 11/30/2020 0859    HCT 35.1 (L) 09/27/2018 0800     07/24/2023 0348     07/20/2023 1212     09/27/2018 0800       Assessment / Plan:   ESRD on HD  E coli bacteremia-MDRO  Bilateral hydroureteronephrosis with a superimposed pyelo- ureteritis not excluded-chronic indwelling Cheek changed every 3 weeks, now with CBI with gross hematuria  Melena-status post EGD with retained food, mild gastritis, moderate valvular duodenitis, no active erosions or ulceration.    Plan:  Continue dialysis on MWF schedule as tolerated   CM working on  for discharge. Refused SNF. OK for dc from renal standpoint.

## 2023-07-24 NOTE — PLAN OF CARE
Problem: Adult Inpatient Plan of Care  Goal: Plan of Care Review  7/24/2023 1106 by Wolf Jeffers RN  Outcome: Met  Goal: Patient-Specific Goal (Individualized)  7/24/2023 1106 by Wolf Jeffers RN  Outcome: Met  Goal: Absence of Hospital-Acquired Illness or Injury  7/24/2023 1106 by Wolf Jeffers RN  Outcome: Met  Goal: Optimal Comfort and Wellbeing  7/24/2023 1106 by Wolf Jeffers RN  Outcome: Met  Goal: Readiness for Transition of Care  7/24/2023 1106 by Wolf Jeffers RN  Outcome: Met  Problem: Infection  Goal: Absence of Infection Signs and Symptoms  7/24/2023 1106 by Wolf Jeffers RN  Outcome: Met  Problem: Device-Related Complication Risk (Hemodialysis)  Goal: Safe, Effective Therapy Delivery  7/24/2023 1106 by Wolf Jeffers RN  Outcome: Met  Problem: Hemodynamic Instability (Hemodialysis)  Goal: Effective Tissue Perfusion  7/24/2023 1106 by Wolf Jeffers RN  Outcome: Met  Problem: Infection (Hemodialysis)  Goal: Absence of Infection Signs and Symptoms  7/24/2023 1106 by Wolf Jeffers RN  Outcome: Met  Problem: Skin Injury Risk Increased  Goal: Skin Health and Integrity  7/24/2023 1106 by Wolf Jeffers RN  Outcome: Met  Problem: Impaired Wound Healing  Goal: Optimal Wound Healing  7/24/2023 1106 by Wolf Jeffers RN  Outcome: Met  Problem: Fall Injury Risk  Goal: Absence of Fall and Fall-Related Injury  7/24/2023 1106 by Wolf Jeffers RN  Outcome: Met

## 2023-07-24 NOTE — DISCHARGE INSTRUCTIONS
Please return to the ER with any worsening of your symptoms. Call your PCP with any further questions or concerns. Be sure to flush your nielsen daily with 20ccs of sterile water or saline two times daily or in the morning and at night. Attend all scheduled follow up appointments. Home health will follow up with you and call you.

## 2023-07-24 NOTE — PLAN OF CARE
Problem: Adult Inpatient Plan of Care  Goal: Plan of Care Review  Outcome: Ongoing, Progressing  Flowsheets (Taken 7/23/2023 2002)  Plan of Care Reviewed With: patient  Goal: Patient-Specific Goal (Individualized)  Outcome: Ongoing, Progressing  Goal: Absence of Hospital-Acquired Illness or Injury  Outcome: Ongoing, Progressing  Intervention: Identify and Manage Fall Risk  Flowsheets (Taken 7/23/2023 2002)  Safety Promotion/Fall Prevention:   assistive device/personal item within reach   bed alarm set   Fall Risk reviewed with patient/family   Fall Risk signage in place   high risk medications identified   medications reviewed   nonskid shoes/socks when out of bed   side rails raised x 3  Intervention: Prevent Skin Injury  Flowsheets (Taken 7/23/2023 2002)  Body Position:   left   side-lying  Skin Protection:   adhesive use limited   tubing/devices free from skin contact  Intervention: Prevent and Manage VTE (Venous Thromboembolism) Risk  Flowsheets (Taken 7/23/2023 2002)  Activity Management:   Standing - L3   Sitting at edge of bed - L2  VTE Prevention/Management:   ambulation promoted   bleeding precations maintained   bleeding risk assessed  Range of Motion: active ROM (range of motion) encouraged  Intervention: Prevent Infection  Flowsheets (Taken 7/23/2023 2002)  Infection Prevention:   cohorting utilized   environmental surveillance performed   hand hygiene promoted   single patient room provided   rest/sleep promoted  Goal: Optimal Comfort and Wellbeing  Outcome: Ongoing, Progressing  Intervention: Monitor Pain and Promote Comfort  Flowsheets (Taken 7/23/2023 2002)  Pain Management Interventions:   care clustered   quiet environment facilitated   pain management plan reviewed with patient/caregiver  Intervention: Provide Person-Centered Care  Flowsheets (Taken 7/23/2023 2002)  Trust Relationship/Rapport:   care explained   choices provided   emotional support provided   empathic listening provided    questions answered   thoughts/feelings acknowledged  Goal: Readiness for Transition of Care  Outcome: Ongoing, Progressing     Problem: Infection  Goal: Absence of Infection Signs and Symptoms  Outcome: Ongoing, Progressing     Problem: Device-Related Complication Risk (Hemodialysis)  Goal: Safe, Effective Therapy Delivery  Outcome: Ongoing, Progressing     Problem: Hemodynamic Instability (Hemodialysis)  Goal: Effective Tissue Perfusion  Outcome: Ongoing, Progressing     Problem: Infection (Hemodialysis)  Goal: Absence of Infection Signs and Symptoms  Outcome: Ongoing, Progressing     Problem: Skin Injury Risk Increased  Goal: Skin Health and Integrity  Outcome: Ongoing, Progressing  Intervention: Optimize Skin Protection  Flowsheets (Taken 7/23/2023 2002)  Pressure Reduction Techniques:   frequent weight shift encouraged   weight shift assistance provided  Skin Protection:   adhesive use limited   tubing/devices free from skin contact  Head of Bed (HOB) Positioning: HOB at 30-45 degrees     Problem: Impaired Wound Healing  Goal: Optimal Wound Healing  Outcome: Ongoing, Progressing  Intervention: Promote Wound Healing  Flowsheets (Taken 7/23/2023 2002)  Sleep/Rest Enhancement:   awakenings minimized   regular sleep/rest pattern promoted   room darkened  Activity Management:   Standing - L3   Sitting at edge of bed - L2  Pain Management Interventions:   care clustered   quiet environment facilitated   pain management plan reviewed with patient/caregiver     Problem: Fall Injury Risk  Goal: Absence of Fall and Fall-Related Injury  Outcome: Ongoing, Progressing  Intervention: Identify and Manage Contributors  Flowsheets (Taken 7/23/2023 2002)  Self-Care Promotion:   independence encouraged   BADL personal objects within reach   safe use of adaptive equipment encouraged  Medication Review/Management: medications reviewed  Intervention: Promote Injury-Free Environment  Flowsheets (Taken 7/23/2023 2002)  Safety  Promotion/Fall Prevention:   assistive device/personal item within reach   bed alarm set   Fall Risk reviewed with patient/family   Fall Risk signage in place   high risk medications identified   medications reviewed   nonskid shoes/socks when out of bed   side rails raised x 3

## 2023-07-24 NOTE — PT/OT/SLP PROGRESS
Physical Therapy Treatment    Patient Name:  Mirza Nelson Jr.   MRN:  [id number]    Recommendations:     Discharge Recommendations: nursing facility, skilled  Discharge Equipment Recommendations: none  Barriers to discharge: Decreased caregiver support and Impaired mobility    Assessment:     Mirza Nelson Jr. is a 68 y.o. male admitted with a medical diagnosis of Hematuria.  He presents with the following impairments/functional limitations: weakness, impaired endurance, impaired self care skills, impaired functional mobility, gait instability, impaired balance, decreased safety awareness, decreased lower extremity function .  Pt would benefit from SNF therapy services to increase overall independence level assist pt in getting closer to PLOF. Pt refusing placement at this time and is adamant about d/cing home (even after stating he is not currently mobilizing as well as he was prior to hospital stay). Pt states he has both rollator and RW at home. Therapist rec pt use RW upon d/c to ensure safety. Pt states he prefers rollator because it has a seat. Therapist educated pt why RW was safer than rollator. Pt still states he will use rollator at home. CM aware.    Rehab Prognosis: Good; patient would benefit from acute skilled PT services to address these deficits and reach maximum level of function.    Recent Surgery: Procedure(s) (LRB):  EGD (ESOPHAGOGASTRODUODENOSCOPY) (N/A)  EGD, WITH CLOSED BIOPSY 9 Days Post-Op    Plan:     During this hospitalization, patient to be seen 5 x/week to address the identified rehab impairments via gait training, therapeutic activities, therapeutic exercises, neuromuscular re-education and progress toward the following goals:    Plan of Care Expires:  08/18/23    Subjective     Chief Complaint:   Patient/Family Comments/goals:   Pain/Comfort:  Location - Side 1: Bilateral  Location 1: leg  Pain Addressed 1: Reposition, Distraction      Objective:     Communicated with NSG prior to  session.  Patient found HOB elevated with nielsen catheter upon PT entry to room.     General Precautions: Standard, fall  Orthopedic Precautions: N/A  Braces: N/A  Respiratory Status: Room air  Blood Pressure:   Skin Integrity: Visible skin intact      Functional Mobility:  Bed Mobility:     Scooting: contact guard assistance  Supine to Sit: contact guard assistance  Transfers:     Sit to Stand:  contact guard assistance with rolling walker and from EOB 1 trial and bedside chair 2 trials.   Gait: pt amb 50ft/30ft/60ft with RW Ivan. Pt with dragging of BL feet (which was present on eval as well). Pt required seated rest between trials.     Patient left HOB elevated with all lines intact and call button in reach..pt wanting to get B2B at end of tx session.    GOALS:   Multidisciplinary Problems       Physical Therapy Goals          Problem: Physical Therapy    Goal Priority Disciplines Outcome Goal Variances Interventions   Physical Therapy Goal     PT, PT/OT Ongoing, Progressing     Description: Goals to be met by: 23     Patient will increase functional independence with mobility by performin. Supine to sit with Set-up [place]  2. Sit to supine with Set-up [place]  3. Sit to stand transfer with Stand-by Assistance  4. Gait  x 200 feet with Stand-by Assistance using Rolling Walker.                          Time Tracking:     PT Received On: 23  PT Start Time: 824     PT Stop Time: 855  PT Total Time (min): 31 min     Billable Minutes: Gait Training 31    Treatment Type: Treatment  PT/PTA: PTA     Number of PTA visits since last PT visit: 3     2023

## 2023-07-24 NOTE — PLAN OF CARE
"SW was informed by Nely with PT that patient is refusing SNF placement. SW then met with patient and verified the information noted. Patient stated he did not want placement but is okay with going home with home health. SW said okay. SW asked patient how he will be getting home at discharge. Patient initially stated "I don't know" and later added "I guess I will have to call my daughter or son". SW will arrange HH prior to discharge.      JOSE sent referral to [facility] Phone: [phone] via [employer]. JOSE sent discharge summary/orders and AVS via [employer]. Patient was accepted.      Patient's physical address: [address] [place] [address] [address] [place] [address]. Phone: [phone].  "

## 2023-07-24 NOTE — PLAN OF CARE
Problem: Adult Inpatient Plan of Care  Goal: Plan of Care Review  Outcome: Ongoing, Progressing  Flowsheets (Taken 7/24/2023 1105)  Plan of Care Reviewed With: patient  Goal: Patient-Specific Goal (Individualized)  Outcome: Ongoing, Progressing  Goal: Absence of Hospital-Acquired Illness or Injury  Outcome: Ongoing, Progressing  Intervention: Identify and Manage Fall Risk  Flowsheets (Taken 7/24/2023 1105)  Safety Promotion/Fall Prevention:   assistive device/personal item within reach   medications reviewed   nonskid shoes/socks when out of bed   side rails raised x 2  Intervention: Prevent Skin Injury  Flowsheets (Taken 7/24/2023 1105)  Body Position: position changed independently  Skin Protection:   adhesive use limited   incontinence pads utilized   tubing/devices free from skin contact  Intervention: Prevent and Manage VTE (Venous Thromboembolism) Risk  Flowsheets (Taken 7/24/2023 1105)  Activity Management:   Ambulated in garza - L4   Ambulated in room - L4  VTE Prevention/Management:   ambulation promoted   bleeding risk assessed   ROM (active) performed  Range of Motion:   active ROM (range of motion) encouraged   ROM (range of motion) performed  Intervention: Prevent Infection  Flowsheets (Taken 7/24/2023 1105)  Infection Prevention:   rest/sleep promoted   single patient room provided  Goal: Optimal Comfort and Wellbeing  Outcome: Ongoing, Progressing  Intervention: Monitor Pain and Promote Comfort  Flowsheets (Taken 7/24/2023 1105)  Pain Management Interventions:   care clustered   medication offered   pain management plan reviewed with patient/caregiver   pillow support provided   position adjusted  Intervention: Provide Person-Centered Care  Flowsheets (Taken 7/24/2023 1105)  Trust Relationship/Rapport: care explained  Goal: Readiness for Transition of Care  Outcome: Ongoing, Progressing     Problem: Infection  Goal: Absence of Infection Signs and Symptoms  Outcome: Ongoing, Progressing  Intervention:  Prevent or Manage Infection  Flowsheets (Taken 7/24/2023 1105)  Infection Management: aseptic technique maintained     Problem: Device-Related Complication Risk (Hemodialysis)  Goal: Safe, Effective Therapy Delivery  Outcome: Ongoing, Progressing  Intervention: Optimize Device Care and Function  Flowsheets (Taken 7/24/2023 1105)  Medication Review/Management: medications reviewed     Problem: Hemodynamic Instability (Hemodialysis)  Goal: Effective Tissue Perfusion  Outcome: Ongoing, Progressing     Problem: Infection (Hemodialysis)  Goal: Absence of Infection Signs and Symptoms  Outcome: Ongoing, Progressing     Problem: Skin Injury Risk Increased  Goal: Skin Health and Integrity  Outcome: Ongoing, Progressing  Intervention: Optimize Skin Protection  Flowsheets (Taken 7/24/2023 1105)  Pressure Reduction Techniques:   frequent weight shift encouraged   weight shift assistance provided  Pressure Reduction Devices: positioning supports utilized  Skin Protection:   adhesive use limited   incontinence pads utilized   tubing/devices free from skin contact  Head of Bed (HOB) Positioning: HOB at 30-45 degrees  Intervention: Promote and Optimize Oral Intake  Flowsheets (Taken 7/24/2023 1105)  Oral Nutrition Promotion: rest periods promoted     Problem: Impaired Wound Healing  Goal: Optimal Wound Healing  Outcome: Ongoing, Progressing  Intervention: Promote Wound Healing  Flowsheets (Taken 7/24/2023 1105)  Oral Nutrition Promotion: rest periods promoted  Activity Management:   Ambulated in garza - L4   Ambulated in room - L4  Pain Management Interventions:   care clustered   medication offered   pain management plan reviewed with patient/caregiver   pillow support provided   position adjusted     Problem: Fall Injury Risk  Goal: Absence of Fall and Fall-Related Injury  Outcome: Ongoing, Progressing  Intervention: Identify and Manage Contributors  Flowsheets (Taken 7/24/2023 1105)  Self-Care Promotion: independence  encouraged  Medication Review/Management: medications reviewed  Intervention: Promote Injury-Free Environment  Flowsheets (Taken 7/24/2023 1105)  Safety Promotion/Fall Prevention:   assistive device/personal item within reach   medications reviewed   nonskid shoes/socks when out of bed   side rails raised x 2

## 2023-07-25 NOTE — PROGRESS NOTES
C3 nurse spoke with Mirza Nelson Jr.  for a TCC post hospital discharge follow up call. Requested call back. The patient has a scheduled [facility] appointment with Elaina Carolina NP on 08/11/2023 @ 940 am.

## 2023-07-26 NOTE — PROGRESS NOTES
C3 nurse spoke with Mirza Nelson Jr.  for a TCC post hospital discharge follow up call.  The patient has a scheduled [facility] appointment with  Elaina Carolina NP on 08/11/2023 @ 940 am.   Awaiting Dr. Navarrete's office staff to call with a hospital follow up appointment date.

## 2023-07-26 NOTE — PROGRESS NOTES
C3 nurse attempted to contact Mirza Nelson Jr.  for a TCC post hospital discharge follow up call. No answer. No voicemail available. The patient has a scheduled [facility] appointment with  Elaina Carolina NP on 08/11/2023 @ 940 am.

## 2023-07-28 NOTE — TELEPHONE ENCOUNTER
Called pt's daughter to inform her of pt's new appt date and time. Pt's daughter verbalized understanding

## 2023-08-07 PROBLEM — Z09 HOSPITAL DISCHARGE FOLLOW-UP: Status: RESOLVED | Noted: 2023-01-01 | Resolved: 2023-01-01

## 2023-08-08 PROBLEM — R26.81 UNSTEADY GAIT: Status: ACTIVE | Noted: 2023-01-01

## 2023-08-09 NOTE — DISCHARGE SUMMARY
[facility] Medicine Discharge Summary    Admit Date: 7/13/2023  Discharge Date and Time: 7/24/2023 6:09 PM  Admitting Physician:  Team  Discharging Physician: Cordell Flanagan MD.  Primary Care Physician: Elo Flores MD  Consults: Urology, Nephrology, Gastroenterology    Discharge Diagnoses:  Failure to thrive  Metabolic encephalopathy, intermittent, with multifactorial etiology  Multidrug resistant E. coli sepsis  Prostatomegaly with resulting chronic bladder outlet obstruction/obstructive uropathy with moderate bilateral hydroureteronephrosis  Gross hematuria  Suspected pyeloureteritis, chronic indwelling Cheek catheter related  Anemia of chronic disease  End-stage renal disease on hemodialysis  Emphysema  Essential hypertension  Coronary artery disease  Cardiomyopathy  Mild gastritis and moderate bulbar duodenitis without any evidence of erosions or ulcerations  Constipation, resolved    Hospital Course:   Patient is a 68-year-old  male with a past medical history of essential hypertension, ESRD on HD MWF, anemia, BPH, cervical radiculopathy, and vitamin-D deficiency presented to [facility] on 7/13/2023 for dark stools.  Nursing home reported they noticed melanotic stools yesterday and patient was more confused than normal.  On exam patient reports he has had dark stools for the past week with intermittent abdominal and back pain.  Patient denies chest pain, shortness of breath, fever, chills, nausea, vomiting, and diarrhea.    Initial vital signs in ED were /69, pulse 87, respirations 19, temperature 37° C, and an oxygen saturation of 98% on room air.  Labs revealed WBC 6.32, RBC 3.24, hemoglobin 10.7, hematocrit 33.3, .8, potassium 5.5, chloride 95, BUN 52.5, creatinine 10.47, and lactic acid 1.1.  UA revealed greater than 100 red blood cells.  Blood culture was obtained.  CT head revealed chronic age-related changes without acute  process.  Chest x-ray revealed no acute cardiopulmonary process identified.  CT abdomen and pelvis without contrast revealed moderate stool in the colon which could reflect an element of constipation, mild to moderate bilateral hydroureteronephrosis with superimposed pyeloureteritis not excluded, enlarged prostate, hyperdense material in the dependent urinary bladder which may reflect blood clots, focal sclerotic lesions seen and left iliac and pubic bones suspicious for sclerotic metastases.  Three-way catheter was placed in ED with gross hematuria.  Patient was started on IV Zosyn.  Urology, Gastroenterology, and Nephrology were consulted. Patient was admitted to hospital medicine service for further medical management.     Patient underwent an EGD on 07/15/2023 revealing mild gastritis and moderate bulbar duodenitis without any evidence of erosions or ulcerations.    Nephrology managed the patient's end-stage renal disease and electrolytes.      Patient was started on continuous bladder irrigation per urology. Blood cultures were positive for E. coli.  Patient was continued on Rocephin.  Patient has persistent pink tinged urine.  Patient was cleared for discharge by Urology with a Cheek catheter in place after having undergone hemodialysis.  Patient refused skilled nursing facility placement    Patient was seen and examined on the day of discharge.      Vitals:  VITAL SIGNS: 24 HRS MIN & MAX LAST   No data recorded 97.8 °F (36.6 °C)   No data recorded 138/72   No data recorded  (!) 56   No data recorded 18   No data recorded 96 %       Physical Exam:  General:  Chronically ill-appearing  male in no acute distress  HENT: normocephalic, atraumatic  Eye: PERRL, EOMI, clear conjunctiva  Neck: full ROM, no thyromegaly, no JVD  Respiratory:  Diminished breath sounds bilaterally  Cardiovascular: regular rate and rhythm  Gastrointestinal: non-distended, positive bowel sounds, non-tender  Genitourinary:   "Cheek catheter with pink-tinged urine  Musculoskeletal: no gross deformity  Integumentary: warm, dry, intact, no rashes  Neurological: cranial nerves grossly intact, no focal neurological deficit  Psychiatric: cooperative, flat affect       Procedures Performed: No admission procedures for hospital encounter.     Significant Diagnostic Studies: See Full reports for all details    No results for input(s): "WBC", "RBC", "HGB", "HCT", "MCV", "MCH", "MCHC", "RDW", "PLT", "MPV", "GRAN", "LYMPH", "MONO", "BASO", "NRBC" in the last 168 hours.    No results for input(s): "NA", "K", "CL", "CO2", "ANIONGAP", "BUN", "CREATININE", "GLU", "CALCIUM", "PH", "MG", "ALBUMIN", "PROT", "ALKPHOS", "ALT", "AST", "BILITOT" in the last 168 hours.     Microbiology Results (last 7 days)       Procedure Component Value Units Date/Time    Blood Culture [[id number]]  (Normal) Collected: 07/17/23 0839    Order Status: Completed Specimen: Blood Updated: 07/22/23 1200     CULTURE, BLOOD ([facility]) No Growth at 5 days             X-Ray Abdomen AP 1 View  EXAMINATION  XR ABDOMEN AP 1 VIEW    CLINICAL HISTORY  Vomiting;    TECHNIQUE  A total of 1 AP image(s) of the abdomen.    COMPARISON  14 July 2023    FINDINGS  Lines/tubes/devices: ECG leads overlie the imaged region.    Notable amount of residual stool is present throughout the colon.  A non-obstructed bowel gas pattern is present. No intra-abdominal mass effect is appreciated.   Detection of air-fluid levels and low-volume pneumoperitoneum is limited due to supine technique.    Included lower thoracic cavity and osseous structures are without acute abnormality.    IMPRESSION  1. Similar findings suggestive of constipation.  2. No evidence of new or worsened intra-abdominal process.    Electronically signed by: Bora Irvin  Date:    07/20/2023  Time:    17:29         Medication List        CONTINUE taking these medications      amLODIPine 10 MG tablet  Commonly known as: NORVASC  Take 1 tablet (10 mg " total) by mouth once daily.     busPIRone 10 MG tablet  Commonly known as: BUSPAR  TAKE 1 TABLET(10 MG) BY MOUTH EVERY DAY     carvediloL 12.5 MG tablet  Commonly known as: COREG  TAKE 1 TABLET(12.5 MG) BY MOUTH TWICE DAILY     doxazosin 8 MG Tab  Commonly known as: CARDURA     ferrous gluconate 324 MG tablet  Commonly known as: FERGON  Take 1 tablet (324 mg total) by mouth once daily.     flavoxATE 100 mg Tab  Commonly known as: URISPAS     fluticasone propionate 50 mcg/actuation nasal spray  Commonly known as: FLONASE     FOLBIC 2.5-25-2 mg Tab  Generic drug: folic acid-vit B6-vit B12 2.5-25-2 mg     hydrALAZINE 50 MG tablet  Commonly known as: APRESOLINE  TAKE 1 TABLET(50 MG) BY MOUTH THREE TIMES DAILY     HYDROcodone-acetaminophen  mg per tablet  Commonly known as: NORCO  Take 1 tablet by mouth every 8 (eight) hours as needed for Pain.     megestroL 400 mg/10 mL (40 mg/mL) Susp  Commonly known as: MEGACE     naloxone 4 mg/actuation Spry  Commonly known as: NARCAN     pantoprazole 40 MG tablet  Commonly known as: PROTONIX     sevelamer carbonate 800 mg Tab  Commonly known as: RENVELA     traZODone 50 MG tablet  Commonly known as: DESYREL            STOP taking these medications      augmented betamethasone 0.05 % lotion  Commonly known as: DIPROLENE     loratadine 10 mg tablet  Commonly known as: CLARITIN     VELPHORO 500 mg Chew  Generic drug: sucroferric oxyhydroxide     walker Misc               Explained in detail to the patient about the discharge plan, medications, and follow-up visits. Pt understands and agrees with the treatment plan  Discharge Disposition: Home-Health Care [facility]   Discharged Condition: stable     Follow-up Information       Elo Flores MD Follow up on 8/11/2023.    Specialty: Family Medicine  Why: Your appointment is on August 11th on 9:40am, get there 15min early   Seeing NP Mrs. Carolina at [facility] [facility] [address], [facility] [address]  Contact information:  [address]  [address]  [place] [facility] [place] [facility] [place] [address]  [phone]               Olaf Navarrete MD Follow up in 2 week(s).    Specialty: Urology  Why: Dr. Navarrete's office will contact you to set up an appointment  Contact information:  [address] [facility] [address]  [facility] [address]  [phone]                           For further questions contact your urologist or primary care provider    Discharge time 35 minutes    For worsening symptoms, chest pain, shortness of breath, increased abdominal pain, high grade fever, stroke or stroke like symptoms, immediately go to the nearest Emergency Room or call 911 as soon as possible.      Cordell Harrington M.D, on 8/9/2023. at 11:40 AM.

## 2023-08-14 PROBLEM — Z09 HOSPITAL DISCHARGE FOLLOW-UP: Status: ACTIVE | Noted: 2023-01-01

## 2023-08-16 NOTE — PHYSICIAN QUERY
PT Name: Mirza Nelson Jr.  MR #: [id number]     DOCUMENTATION CLARIFICATION     CDS/: Katelyn Crane RN, CDI            Contact information:[email or web address]     This form is a permanent document in the medical record.     Query Date: August 16, 2023    By submitting this query, we are merely seeking further clarification of documentation.  Please utilize your independent clinical judgment when addressing the question(s) below.  The Medical Record contains the following:  Indicators Supporting Clinical Findings Location in Medical Record   x HR         RR          BP        Temp 7/14  Vital Signs (24h Range):  Temp:  [98.6 °F (37 °C)] 98.6 °F (37 °C)  Pulse:  [] 75  Resp:  [16-20] 16  SpO2:  [94 %-98 %] 98 %  BP: (122-178)/() 161/82    7/15  VITAL SIGNS: 24 HRS MIN & MAX   Temp  Min: 97.6 °F (36.4 °C)  Max: 100.3 °F (37.9 °C)  BP  Min: 96/59  Max: 190/96   Pulse  Min: 70  Max: 122    Resp  Min: 16  Max: 22   SpO2  Min: 95 %  Max: 99 %      Vitals    x Lactic Acid          Procalcitonin  07/14/23 02:59 07/14/23 21:02   Lactate, Faheem 1.1 1.3    Lab    x WBC           Bands          CRP     07/13/23 19:34 07/14/23 06:52 07/14/23 17:38 07/15/23 04:49 07/16/23 08:45   WBC 6.32 4.88 5.83 4.13  5.96    Lab    x Culture(s) BLOOD CULTURE   Escherichia coli      Gram Stain Result      2 of 2 bottles positive    Gram Negative Rods    Seen in gram stain of broth only      Enterobacterales  Detected       Escherichia coli Detected    Micro 7/14   x AMS, Confusion, LOC, etc. Nursing home reported they noticed melanotic stools yesterday and patient was more confused than normal    Metabolic encephalopathy, intermittent, with multifactorial etiology    Neurological: cranial nerves grossly intact, no focal neurological deficit  Psychiatric: cooperative, flat affect H&P 7/14      HM PN 7/20    Organ Dysfunction/Failure     x Bacteremia or Sepsis / Septic E coli bacteremia    Multidrug resistant E coli sepsis  Neph PN 7/18     PN 7/20    Known or Suspected Source of Infection documented      (Failed) Outpatient Treatment     x Medication Will switch to rocephin 2mg iv daily for 10 days last dose 7/23/23  PN 7/18   x Treatment IV Cefepime 1 g daily   Blood cultures pending, follow-up results   Urology consulted, appreciate recommendations  GI consulted, appreciate recommendations  Close H&H monitoring   IV Protonix 40 mg BID  Dialysis today to continue MWF schedule  Nephrology consulted, appreciate recommendations  Continue appropriate home medications once med rec updated   Labs in a.m. H&P 7/14   x Other Nursing staff contacted me throughout day with numerous issues including constipation (which has been ongoing since the thirteenth and apparently inadequately addressed), nausea, vomiting, encephalopathy.  Patient is afebrile, on room air, hemodynamically stable  PN 7/20        Provider, please clarify diagnosis or diagnoses associated with above clinical findings.  [  X ] E coli Bacteremia without Sepsis   [   ] Multidrug resistant E coli Sepsis. Please specify clinical support (signs, symptoms, and treatment) for  the confirmed diagnosis: ____________________   [   ] Other Infectious Disease (please specify): __________         Please document in your progress notes daily for the duration of treatment until resolved and include in your discharge summary.

## 2023-08-17 NOTE — TELEPHONE ENCOUNTER
----- Message from Luigi Hawthorne sent at 8/17/2023  9:12 AM CDT -----  .Type:  Needs Medical Advice    Who Called: pt  Symptoms (please be specific):    How long has patient had these symptoms:    Pharmacy name and phone #:    Would the patient rather a call back or a response via [facility]? Call back  Best Call Back Number: [phone]  Additional Information: medication issues

## 2023-08-17 NOTE — TELEPHONE ENCOUNTER
----- Message from Braulio Hawthorne MD sent at 8/17/2023 10:53 AM CDT -----  Patient's PCP is Dr. Flores, please send refill to PCP.

## 2023-08-22 NOTE — PROGRESS NOTES
Patient ID: [id number]     Chief Complaint: Hypertension        HPI:     Mirza Nelson Jr. is a 68 y.o. male here today for a follow up HTN.   - HTN is not controlled with Rx, asymptomatic, he ran out of Coreg 1 months ago, needs new refill, he denies Rx side effects, he has Norvasc and Hydralazine and takes those Rx as prescribed, he does followup with Cardiology (Dr. Perez) and Nephrology (Dr. Angela).   - He has chronic pain, followed by pain management (Dr. Mary), reports that he has had some falls recently, he has home health with [facility], but needs a medical alert device, he doesn't have an appointment for another two months with pain management.   - He has ESRD on HD three times weekly, he is UTD on labs at [facility]. They are considering a kidney transplant.   - Patient is without any other complaints today.         ----------------------------  Anemia  BPH (benign prostatic hyperplasia)  Cervical radiculopathy  Disorder of kidney and ureter  ESRD on hemodialysis  Hypertension  Neck injury  Other chronic pain  Personal history of colonic polyps  Renovascular hypertension  Vitamin D deficiency     Past Surgical History:   Procedure Laterality Date    AV FISTULA PLACEMENT      COLONOSCOPY W/ POLYPECTOMY  02/19/2019    EGD, WITH CLOSED BIOPSY  7/15/2023    Procedure: EGD, WITH CLOSED BIOPSY;  Surgeon: Casey Funes MD;  Location: [facility];  Service: Gastroenterology;;    ESOPHAGOGASTRODUODENOSCOPY N/A 7/15/2023    Procedure: EGD (ESOPHAGOGASTRODUODENOSCOPY);  Surgeon: Casey Funes MD;  Location: [facility];  Service: Gastroenterology;  Laterality: N/A;    MASS EXCISION  2005    near the kidney     NECK SURGERY         Review of patient's allergies indicates:   Allergen Reactions    Iodine      Other reaction(s): difficulty breathing, Facial Swelling    Iodine and iodide containing products Swelling    Shellfish containing products      Other reaction(s): Swelling       Outpatient Medications Marked  as Taking for the 8/22/23 encounter (Office Visit) with Elo Flores MD   Medication Sig Dispense Refill    amLODIPine (NORVASC) 10 MG tablet Take 1 tablet (10 mg total) by mouth once daily. 90 tablet 3    busPIRone (BUSPAR) 10 MG tablet Take 1 tablet (10 mg total) by mouth once daily. 30 tablet 3    doxazosin (CARDURA) 8 MG Tab Take 8 mg by mouth every evening.      ferrous gluconate (FERGON) 324 MG tablet Take 1 tablet (324 mg total) by mouth once daily. 30 tablet 3    flavoxATE (URISPAS) 100 mg Tab Take 200 mg by mouth 4 (four) times daily.      fluticasone (FLONASE) 50 mcg/actuation nasal spray 1 spray by Each Nare route daily as needed for Rhinitis.      folic acid-vit B6-vit B12 2.5-25-2 mg (FOLBIC) 2.5-25-2 mg Tab Take 2.5 tablets by mouth Daily.      hydrALAZINE (APRESOLINE) 50 MG tablet TAKE 1 TABLET(50 MG) BY MOUTH THREE TIMES DAILY 270 tablet 3    HYDROcodone-acetaminophen (NORCO)  mg per tablet Take 1 tablet by mouth every 8 (eight) hours as needed for Pain. 90 tablet 0    megestroL (MEGACE) 400 mg/10 mL (40 mg/mL) Susp Take 40 mg by mouth Daily.      naloxone (NARCAN) 4 mg/actuation Spry CALL 911. SPR CONTENTS OF ONE SPRAYER (0.1ML) INTO ONE NOSTRIL. REPEAT IN 2-3 MIN IF SYMPTOMS OF OPIOID EMERGENCY PERSIST, ALTERNATE NOSTRILS      pantoprazole (PROTONIX) 40 MG tablet Take 40 mg by mouth once daily.      sevelamer carbonate (RENVELA) 800 mg Tab Take 800 mg by mouth 3 (three) times daily.      traZODone (DESYREL) 50 MG tablet Take 50 mg by mouth nightly.      [DISCONTINUED] carvediloL (COREG) 12.5 MG tablet TAKE 1 TABLET(12.5 MG) BY MOUTH TWICE DAILY 180 tablet 3       Social History     Socioeconomic History    Marital status:     Years of education: 12   Tobacco Use    Smoking status: Former     Current packs/day: 0.00     Average packs/day: 0.3 packs/day for 20.0 years (5.0 ttl pk-yrs)     Types: Cigarettes     Start date: 9/27/1996     Quit date: 9/27/2016     Years since  quittin.9    Smokeless tobacco: Never   Substance and Sexual Activity    Alcohol use: No    Drug use: Not Currently     Types: Marijuana    Sexual activity: Not Currently        Family History   Problem Relation Age of Onset    Heart disease Mother     Diabetes Mother     Hypertension Mother     Cancer Father     Prostate cancer Father     Hypertension Father     Cancer Sister     Breast cancer Sister     No Known Problems Sister         Subjective:       Review of Systems:    See HPI for details    Constitutional:  No fever, No chills, No fatigue.    Eye:  No blurring, No visual disturbances.    Ear/Nose/Mouth/Throat:  No decreased hearing, No ear pain, No nasal congestion, No sore throat.    Respiratory:  No shortness of breath, No cough, No wheezing.    Cardiovascular:  No chest pain, No palpitations, No peripheral edema.    Gastrointestinal:  No nausea, No vomiting, No diarrhea, No constipation, No abdominal pain.    Genitourinary:  No dysuria, No hematuria.    Hematology/Lymphatics:  No bruising tendency, No bleeding tendency, No swollen lymph glands.    Endocrine:  No excessive thirst, No polyuria, No excessive hunger.    Musculoskeletal:  Joint pain, Muscle pain, Decreased range of motion.    Integumentary:  No rash, No pruritus.    Neurologic:  No abnormal balance, No confusion, No headache.    Psychiatric:  No anxiety, No depression, Not suicidal, No hallucinations.      All Other ROS: Negative except as stated in HPI.       Objective:     BP (!) 162/70 (BP Location: Left arm, Patient Position: Sitting, BP Method: Medium (Manual)) Comment: pt is in a lot of pain  Pulse 72   Resp 17   Wt 67.2 kg (148 lb 3.2 oz)   SpO2 99%   BMI 23.92 kg/m²     Physical Exam    General:  Alert and oriented, No acute distress.    Eye:  Pupils are equal, round and reactive to light, Extraocular movements are intact, Normal conjunctiva.    HENT:  Normocephalic, Tympanic membranes are clear, Normal hearing, Oral mucosa  is moist, No pharyngeal erythema.         Throat: Pharynx ( Not edematous, No exudate ).    Neck:  Supple, Non-tender, No carotid bruit, No lymphadenopathy, No thyromegaly.    Respiratory:  Lungs are clear to auscultation, Respirations are non-labored, Breath sounds are equal, Symmetrical chest wall expansion, No chest wall tenderness.    Cardiovascular:  Normal rate, Regular rhythm, No murmur, Good pulses equal in all extremities, No edema.    Gastrointestinal:  Soft, Non-tender, Non-distended, Normal bowel sounds, No organomegaly.    Genitourinary:  No costovertebral angle tenderness.    Musculoskeletal:  No tenderness, BUE and BLE with 4/5 motor strength, decreased range of motion, moderate, decreased flexion and extension; bilateral  strength is 4/5.    .      Mobility/ gait: Able to walk with moderate assistance, has a rollator, slow, shuffling.    Neurologic:  No focal deficits, Cranial Nerves II-XII are grossly intact.    Psychiatric:  Cooperative, Appropriate mood & affect, Normal judgment, Non-suicidal.         Mood and affect: Calm.         Behavior: Relaxed.          Assessment:       ICD-10-CM ICD-9-CM   1. Primary hypertension  I10 401.9   2. Other chronic pain  G89.29 338.29   3. Falls frequently  R29.6 V15.88   4. End-stage renal disease on hemodialysis  N18.6 585.6    Z99.2 V45.11        Plan:     Problem List Items Addressed This Visit          Neuro    Other chronic pain (Chronic)    Relevant Orders    Ambulatory referral/consult to Home Health       Cardiac/Vascular    Hypertension - Primary (Chronic)    Relevant Medications    carvediloL (COREG) 12.5 MG tablet     Other Visit Diagnoses       Falls frequently        Relevant Orders    Ambulatory referral/consult to Home Health    End-stage renal disease on hemodialysis        Relevant Orders    Ambulatory referral/consult to Home Health         1. Primary hypertension  - carvediloL (COREG) 12.5 MG tablet; Take 1 tablet (12.5 mg total) by mouth  2 (two) times daily.  Dispense: 180 tablet; Refill: 3  - BP is not controlled. Patient ran out of Coreg, Rx Coreg refilled today. Continue remaining BP Rx as prescribed. Continue followup with Cardiology as scheduled. Keep daily BP log. Will titrate BP Rx until BP is <140/90. Notify M.D. or ER if BP >170/100 or <90/60, chest pain, palpitations, headache, SOB, temp greater than 100.4, or any acute illness.   Continue  Low Sodium Diet (DASH Diet - Less than 2 grams of sodium per day).  Monitor blood pressure daily and log. Report consistent numbers greater than 140/90.  Smoking cessation encouraged to aid in BP reduction.  Maintain healthy weight with goal BMI <30. Exercise 30 minutes per day, 5 days per week.      2. Other chronic pain  - Ambulatory referral/consult to Home Health; Future for life alert device and fall precautions.  - Fall precautions encouraged, continue followup with pain management as scheduled. Notify M.D. or ER if symptoms persist or worsen, temp >100.4, or any acute illness.      3. Falls frequently  - Ambulatory referral/consult to Home Health; Future  - Same as #2.     4. End-stage renal disease on hemodialysis  - Ambulatory referral/consult to Home Health; Future  - Continue followup with Nephrology as scheduled and current treatment plan.       Mirza was seen today for hypertension.    Diagnoses and all orders for this visit:    Primary hypertension  -     carvediloL (COREG) 12.5 MG tablet; Take 1 tablet (12.5 mg total) by mouth 2 (two) times daily.    Other chronic pain  -     Ambulatory referral/consult to Home Health; Future    Falls frequently  -     Ambulatory referral/consult to Home Health; Future    End-stage renal disease on hemodialysis  -     Ambulatory referral/consult to Home Health; Future          Medication List with Changes/Refills   Current Medications    AMLODIPINE (NORVASC) 10 MG TABLET    Take 1 tablet (10 mg total) by mouth once daily.       Start Date: 8/17/2023 End  Date: 8/16/2024    BUSPIRONE (BUSPAR) 10 MG TABLET    Take 1 tablet (10 mg total) by mouth once daily.       Start Date: 8/17/2023 End Date: --    DOXAZOSIN (CARDURA) 8 MG TAB    Take 8 mg by mouth every evening.       Start Date: --        End Date: --    FERROUS GLUCONATE (FERGON) 324 MG TABLET    Take 1 tablet (324 mg total) by mouth once daily.       Start Date: 8/17/2023 End Date: --    FLAVOXATE (URISPAS) 100 MG TAB    Take 200 mg by mouth 4 (four) times daily.       Start Date: 11/22/2021End Date: --    FLUTICASONE (FLONASE) 50 MCG/ACTUATION NASAL SPRAY    1 spray by Each Nare route daily as needed for Rhinitis.       Start Date: --        End Date: --    FOLIC ACID-VIT B6-VIT B12 2.5-25-2 MG (FOLBIC) 2.5-25-2 MG TAB    Take 2.5 tablets by mouth Daily.       Start Date: 2/10/2022 End Date: --    HYDRALAZINE (APRESOLINE) 50 MG TABLET    TAKE 1 TABLET(50 MG) BY MOUTH THREE TIMES DAILY       Start Date: 8/17/2023 End Date: 11/15/2023    HYDROCODONE-ACETAMINOPHEN (NORCO)  MG PER TABLET    Take 1 tablet by mouth every 8 (eight) hours as needed for Pain.       Start Date: 11/4/2022 End Date: --    MEGESTROL (MEGACE) 400 MG/10 ML (40 MG/ML) SUSP    Take 40 mg by mouth Daily.       Start Date: --        End Date: --    NALOXONE (NARCAN) 4 MG/ACTUATION SPRY    CALL 911. SPR CONTENTS OF ONE SPRAYER (0.1ML) INTO ONE NOSTRIL. REPEAT IN 2-3 MIN IF SYMPTOMS OF OPIOID EMERGENCY PERSIST, ALTERNATE NOSTRILS       Start Date: 12/3/2022 End Date: --    PANTOPRAZOLE (PROTONIX) 40 MG TABLET    Take 40 mg by mouth once daily.       Start Date: 3/4/2022  End Date: --    SEVELAMER CARBONATE (RENVELA) 800 MG TAB    Take 800 mg by mouth 3 (three) times daily.       Start Date: --        End Date: --    TRAZODONE (DESYREL) 50 MG TABLET    Take 50 mg by mouth nightly.       Start Date: 1/5/2022  End Date: --   Changed and/or Refilled Medications    Modified Medication Previous Medication    CARVEDILOL (COREG) 12.5 MG TABLET  carvediloL (COREG) 12.5 MG tablet       Take 1 tablet (12.5 mg total) by mouth 2 (two) times daily.    TAKE 1 TABLET(12.5 MG) BY MOUTH TWICE DAILY       Start Date: 8/22/2023 End Date: 8/21/2024    Start Date: 8/15/2022 End Date: 8/22/2023          Follow up in about 1 week (around 8/29/2023) for Blood Pressure Check- Nurse;3 months w/ MD for wellness, HTN F/U;*lab results from [facility].

## 2023-09-07 NOTE — PROGRESS NOTES
Mirza HETAL Nelson . 68 y.o. male is here today for Blood Pressure check.   History of HTN yes.    Review of patient's allergies indicates:   Allergen Reactions    Iodine      Other reaction(s): difficulty breathing, Facial Swelling    Iodine and iodide containing products Swelling    Shellfish containing products      Other reaction(s): Swelling     Creatinine   Date Value Ref Range Status   07/24/2023 9.77 (H) 0.73 - 1.18 mg/dL Final   09/27/2018 5.4 (H) 0.5 - 1.4 mg/dL Final     Sodium   Date Value Ref Range Status   09/27/2018 140 136 - 145 mmol/L Final     Sodium Level   Date Value Ref Range Status   07/24/2023 129 (L) 136 - 145 mmol/L Final     Potassium   Date Value Ref Range Status   02/24/2021 3.5 3.5 - 5.1 mmol/L Final   09/27/2018 3.9 3.5 - 5.1 mmol/L Final     Potassium Level   Date Value Ref Range Status   07/24/2023 4.7 3.5 - 5.1 mmol/L Final   ]  Patient verifies taking blood pressure medications on a regular basis at the same time of the day.     Current Outpatient Medications:     amLODIPine (NORVASC) 10 MG tablet, Take 1 tablet (10 mg total) by mouth once daily., Disp: 90 tablet, Rfl: 3    busPIRone (BUSPAR) 10 MG tablet, Take 1 tablet (10 mg total) by mouth once daily., Disp: 30 tablet, Rfl: 3    carvediloL (COREG) 12.5 MG tablet, Take 1 tablet (12.5 mg total) by mouth 2 (two) times daily., Disp: 180 tablet, Rfl: 3    doxazosin (CARDURA) 8 MG Tab, Take 8 mg by mouth every evening., Disp: , Rfl:     ferrous gluconate (FERGON) 324 MG tablet, Take 1 tablet (324 mg total) by mouth once daily., Disp: 30 tablet, Rfl: 3    flavoxATE (URISPAS) 100 mg Tab, Take 200 mg by mouth 4 (four) times daily., Disp: , Rfl:     fluticasone (FLONASE) 50 mcg/actuation nasal spray, 1 spray by Each Nare route daily as needed for Rhinitis., Disp: , Rfl:     folic acid-vit B6-vit B12 2.5-25-2 mg (FOLBIC) 2.5-25-2 mg Tab, Take 2.5 tablets by mouth Daily., Disp: , Rfl:     hydrALAZINE (APRESOLINE) 50 MG tablet, TAKE 1 TABLET(50  MG) BY MOUTH THREE TIMES DAILY, Disp: 270 tablet, Rfl: 3    HYDROcodone-acetaminophen (NORCO)  mg per tablet, Take 1 tablet by mouth every 8 (eight) hours as needed for Pain., Disp: 90 tablet, Rfl: 0    megestroL (MEGACE) 400 mg/10 mL (40 mg/mL) Susp, Take 40 mg by mouth Daily., Disp: , Rfl:     naloxone (NARCAN) 4 mg/actuation Spry, CALL 911. SPR CONTENTS OF ONE SPRAYER (0.1ML) INTO ONE NOSTRIL. REPEAT IN 2-3 MIN IF SYMPTOMS OF OPIOID EMERGENCY PERSIST, ALTERNATE NOSTRILS, Disp: , Rfl:     pantoprazole (PROTONIX) 40 MG tablet, Take 40 mg by mouth once daily., Disp: , Rfl:     sevelamer carbonate (RENVELA) 800 mg Tab, Take 800 mg by mouth 3 (three) times daily., Disp: , Rfl:     traZODone (DESYREL) 50 MG tablet, Take 50 mg by mouth nightly., Disp: , Rfl:   Does patient have record of home blood pressure readings yes. Readings have been averaging 158/78.   Last dose of blood pressure medication was taken at this morning.  Patient is asymptomatic.   Complains of pain in back and neck, wearing a brace.    170/84 P 60  Blood pressure reading after 15 minutes was 169/76, Pulse 52. 3rd 173/74 P 60  Dr. Flores notified.

## 2023-09-07 NOTE — TELEPHONE ENCOUNTER
Patient came in today for a Blood Pressure check. He reports that his BP at home this am after taking his medication was 158/78. Ashu here was 170/84, pulse 60. BP with pump was 169/76, pulse 52. 3rd check was 173/74, Pulse 62. Please advise. Patient complains he having a lot of back pain and is wearing a back brace.  Denies any other symptoms.

## 2023-09-07 NOTE — TELEPHONE ENCOUNTER
Called the patient with recommendation and the need to schedule a nurse visit in 1-2 weeks for blood pressure check. Verbalized understanding.

## 2023-09-14 NOTE — TELEPHONE ENCOUNTER
Called pt and he voiced his bp is 150/90 and then 147/100. He took his meds this morning. Also voiced hes in a lot of pain. Pt took something for the pain about 20 minutes ago he took narco. Pt wanted to let you know.

## 2023-09-14 NOTE — TELEPHONE ENCOUNTER
----- Message from Derrick Rankin sent at 9/14/2023 10:28 AM CDT -----  .Type:  Needs Medical Advice    Who Called: Sana Arce with [employer]   Symptoms (please be specific):    How long has patient had these symptoms:    Pharmacy name and phone #:    Would the patient rather a call back or a response via [facility]?   Best Call Back Number: [phone] Patient's phone #    Additional Information: She was to report to the nurse that elevated blood pressure 150/90 and then 147/100. Please call her back.

## 2023-09-18 NOTE — TELEPHONE ENCOUNTER
----- Message from Zaira Amador sent at 9/18/2023 11:17 AM CDT -----  Regarding: Nurse Visit  .Type:  Needs Medical Advice    Who Called: Pt  Symptoms (please be specific):    How long has patient had these symptoms:    Pharmacy name and phone #:    Would the patient rather a call back or a response via [facility]? Call back  Best Call Back Number: [phone]  Additional Information: Pt can't make appt on tomorrow, would like to know if nurse can give a call to reschedule.

## 2023-10-09 NOTE — PLAN OF CARE
Problem: Adult Inpatient Plan of Care  Goal: Plan of Care Review  Outcome: Ongoing, Progressing  Flowsheets (Taken 4/26/2023 0053)  Plan of Care Reviewed With: patient  Goal: Patient-Specific Goal (Individualized)  Outcome: Ongoing, Progressing  Goal: Absence of Hospital-Acquired Illness or Injury  Outcome: Ongoing, Progressing  Intervention: Identify and Manage Fall Risk  Flowsheets (Taken 4/26/2023 0053)  Safety Promotion/Fall Prevention:   assistive device/personal item within reach   medications reviewed   lighting adjusted   instructed to call staff for mobility  Intervention: Prevent Skin Injury  Flowsheets (Taken 4/26/2023 0053)  Body Position: position changed independently  Intervention: Prevent and Manage VTE (Venous Thromboembolism) Risk  Flowsheets (Taken 4/26/2023 0053)  Range of Motion:   active ROM (range of motion) encouraged   ROM (range of motion) performed  Intervention: Prevent Infection  Flowsheets (Taken 4/26/2023 0053)  Infection Prevention:   equipment surfaces disinfected   hand hygiene promoted   personal protective equipment utilized   rest/sleep promoted   single patient room provided   visitors restricted/screened  Goal: Optimal Comfort and Wellbeing  Outcome: Ongoing, Progressing  Intervention: Monitor Pain and Promote Comfort  Flowsheets (Taken 4/26/2023 0053)  Pain Management Interventions:   care clustered   medication offered   pillow support provided   quiet environment facilitated  Intervention: Provide Person-Centered Care  Flowsheets (Taken 4/26/2023 0053)  Trust Relationship/Rapport:   emotional support provided   empathic listening provided   questions answered   questions encouraged   reassurance provided   thoughts/feelings acknowledged        Activity as tolerated.

## 2023-11-13 PROBLEM — Z09 HOSPITAL DISCHARGE FOLLOW-UP: Status: RESOLVED | Noted: 2023-01-01 | Resolved: 2023-01-01

## 2024-01-01 ENCOUNTER — TELEPHONE (OUTPATIENT)
Dept: FAMILY MEDICINE | Facility: CLINIC | Age: 69
End: 2024-01-01
Payer: MEDICARE

## 2024-01-01 ENCOUNTER — HOSPITAL ENCOUNTER (INPATIENT)
Facility: HOSPITAL | Age: 69
LOS: 9 days | DRG: 698 | End: 2024-03-28
Attending: EMERGENCY MEDICINE | Admitting: INTERNAL MEDICINE
Payer: MEDICARE

## 2024-01-01 ENCOUNTER — HOSPITAL ENCOUNTER (INPATIENT)
Facility: HOSPITAL | Age: 69
LOS: 11 days | Discharge: NURSING, REHAB OR LONG-TERM CARE | DRG: 698 | End: 2024-03-11
Attending: EMERGENCY MEDICINE | Admitting: INTERNAL MEDICINE
Payer: MEDICARE

## 2024-01-01 ENCOUNTER — LAB REQUISITION (OUTPATIENT)
Dept: LAB | Facility: HOSPITAL | Age: 69
End: 2024-01-01
Payer: MEDICARE

## 2024-01-01 VITALS
DIASTOLIC BLOOD PRESSURE: 63 MMHG | BODY MASS INDEX: 23.46 KG/M2 | HEIGHT: 66 IN | WEIGHT: 146 LBS | HEART RATE: 110 BPM | WEIGHT: 144.63 LBS | TEMPERATURE: 99 F | OXYGEN SATURATION: 54 % | HEART RATE: 67 BPM | TEMPERATURE: 98 F | SYSTOLIC BLOOD PRESSURE: 132 MMHG | HEIGHT: 66 IN | SYSTOLIC BLOOD PRESSURE: 121 MMHG | RESPIRATION RATE: 25 BRPM | BODY MASS INDEX: 23.24 KG/M2 | OXYGEN SATURATION: 100 % | DIASTOLIC BLOOD PRESSURE: 54 MMHG | RESPIRATION RATE: 16 BRPM

## 2024-01-01 DIAGNOSIS — Z91.199 NONCOMPLIANCE: ICD-10-CM

## 2024-01-01 DIAGNOSIS — Z99.2 END-STAGE RENAL DISEASE ON HEMODIALYSIS: ICD-10-CM

## 2024-01-01 DIAGNOSIS — R07.9 CHEST PAIN: ICD-10-CM

## 2024-01-01 DIAGNOSIS — R26.81 UNSTEADY GAIT: ICD-10-CM

## 2024-01-01 DIAGNOSIS — N18.9 CHRONIC RENAL FAILURE, UNSPECIFIED CKD STAGE: ICD-10-CM

## 2024-01-01 DIAGNOSIS — I10 BENIGN ESSENTIAL HTN: ICD-10-CM

## 2024-01-01 DIAGNOSIS — R45.851 SUICIDAL IDEATION: ICD-10-CM

## 2024-01-01 DIAGNOSIS — R29.6 FALLS FREQUENTLY: ICD-10-CM

## 2024-01-01 DIAGNOSIS — E46 MALNUTRITION, UNSPECIFIED TYPE: ICD-10-CM

## 2024-01-01 DIAGNOSIS — Z99.2 ESRD (END STAGE RENAL DISEASE) ON DIALYSIS: Primary | ICD-10-CM

## 2024-01-01 DIAGNOSIS — Z86.79 HISTORY OF CORONARY ARTERY DISEASE: ICD-10-CM

## 2024-01-01 DIAGNOSIS — R29.898 LEG WEAKNESS, BILATERAL: ICD-10-CM

## 2024-01-01 DIAGNOSIS — R53.81 PHYSICAL DECONDITIONING: ICD-10-CM

## 2024-01-01 DIAGNOSIS — R53.1 WEAKNESS GENERALIZED: Primary | ICD-10-CM

## 2024-01-01 DIAGNOSIS — E87.5 HYPERKALEMIA: ICD-10-CM

## 2024-01-01 DIAGNOSIS — R26.81 UNSTEADY GAIT: Primary | ICD-10-CM

## 2024-01-01 DIAGNOSIS — F05 ACUTE CONFUSIONAL STATE: ICD-10-CM

## 2024-01-01 DIAGNOSIS — Z99.2 ESRD ON HEMODIALYSIS: Chronic | ICD-10-CM

## 2024-01-01 DIAGNOSIS — N18.6 ESRD ON HEMODIALYSIS: Chronic | ICD-10-CM

## 2024-01-01 DIAGNOSIS — N18.6 ESRD (END STAGE RENAL DISEASE) ON DIALYSIS: Primary | ICD-10-CM

## 2024-01-01 DIAGNOSIS — Z51.5 COMFORT MEASURES ONLY STATUS: ICD-10-CM

## 2024-01-01 DIAGNOSIS — I10 PRIMARY HYPERTENSION: ICD-10-CM

## 2024-01-01 DIAGNOSIS — D64.9 CHRONIC ANEMIA: ICD-10-CM

## 2024-01-01 DIAGNOSIS — M54.16 BILATERAL LUMBAR RADICULOPATHY: Primary | ICD-10-CM

## 2024-01-01 DIAGNOSIS — R29.898 WEAKNESS OF BOTH LOWER EXTREMITIES: ICD-10-CM

## 2024-01-01 DIAGNOSIS — E78.5 HYPERLIPIDEMIA, UNSPECIFIED: ICD-10-CM
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
## 2024-01-01 DIAGNOSIS — N18.6 END-STAGE RENAL DISEASE ON HEMODIALYSIS: ICD-10-CM

## 2024-01-01 DIAGNOSIS — N39.0 ACUTE URINARY TRACT INFECTION: ICD-10-CM

## 2024-01-01 LAB
ABORH RETYPE: NORMAL
ABS NEUT (OLG): 2.9 X10(3)/MCL (ref 2.1–9.2)
ABS NEUT (OLG): 5.9 X10(3)/MCL (ref 2.1–9.2)
ABS NEUT (OLG): 7.29 X10(3)/MCL (ref 2.1–9.2)
ABS NEUT (OLG): 7.38 X10(3)/MCL (ref 2.1–9.2)
ALBUMIN SERPL-MCNC: 1.6 G/DL (ref 3.4–4.8)
ALBUMIN SERPL-MCNC: 1.7 G/DL (ref 3.4–4.8)
ALBUMIN SERPL-MCNC: 1.7 G/DL (ref 3.4–4.8)
ALBUMIN SERPL-MCNC: 1.8 G/DL (ref 3.4–4.8)
ALBUMIN SERPL-MCNC: 1.9 G/DL (ref 3.4–4.8)
ALBUMIN SERPL-MCNC: 2.4 G/DL (ref 3.4–4.8)
ALBUMIN SERPL-MCNC: 2.4 G/DL (ref 3.4–4.8)
ALBUMIN SERPL-MCNC: 2.5 G/DL (ref 3.4–4.8)
ALBUMIN SERPL-MCNC: 2.6 G/DL (ref 3.4–4.8)
ALBUMIN SERPL-MCNC: 2.7 G/DL (ref 3.4–4.8)
ALBUMIN SERPL-MCNC: 2.9 G/DL (ref 3.4–4.8)
ALBUMIN SERPL-MCNC: 3 G/DL (ref 3.4–4.8)
ALBUMIN SERPL-MCNC: 3.1 G/DL (ref 3.4–4.8)
ALBUMIN/GLOB SERPL: 0.3 RATIO (ref 1.1–2)
ALBUMIN/GLOB SERPL: 0.3 RATIO (ref 1.1–2)
ALBUMIN/GLOB SERPL: 0.4 RATIO (ref 1.1–2)
ALBUMIN/GLOB SERPL: 0.5 RATIO (ref 1.1–2)
ALBUMIN/GLOB SERPL: 0.6 RATIO (ref 1.1–2)
ALBUMIN/GLOB SERPL: 0.7 RATIO (ref 1.1–2)
ALBUMIN/GLOB SERPL: 0.8 RATIO (ref 1.1–2)
ALBUMIN/GLOB SERPL: 0.8 RATIO (ref 1.1–2)
ALLENS TEST BLOOD GAS (OHS): YES
ALP SERPL-CCNC: 38 UNIT/L (ref 40–150)
ALP SERPL-CCNC: 41 UNIT/L (ref 40–150)
ALP SERPL-CCNC: 42 UNIT/L (ref 40–150)
ALP SERPL-CCNC: 43 UNIT/L (ref 40–150)
ALP SERPL-CCNC: 45 UNIT/L (ref 40–150)
ALP SERPL-CCNC: 46 UNIT/L (ref 40–150)
ALP SERPL-CCNC: 49 UNIT/L (ref 40–150)
ALP SERPL-CCNC: 50 UNIT/L (ref 40–150)
ALP SERPL-CCNC: 51 UNIT/L (ref 40–150)
ALP SERPL-CCNC: 53 UNIT/L (ref 40–150)
ALP SERPL-CCNC: 55 UNIT/L (ref 40–150)
ALP SERPL-CCNC: 56 UNIT/L (ref 40–150)
ALP SERPL-CCNC: 57 UNIT/L (ref 40–150)
ALT SERPL-CCNC: 10 UNIT/L (ref 0–55)
ALT SERPL-CCNC: 10 UNIT/L (ref 0–55)
ALT SERPL-CCNC: 12 UNIT/L (ref 0–55)
ALT SERPL-CCNC: 17 UNIT/L (ref 0–55)
ALT SERPL-CCNC: 19 UNIT/L (ref 0–55)
ALT SERPL-CCNC: 20 UNIT/L (ref 0–55)
ALT SERPL-CCNC: 7 UNIT/L (ref 0–55)
ALT SERPL-CCNC: 8 UNIT/L (ref 0–55)
ALT SERPL-CCNC: 9 UNIT/L (ref 0–55)
ALT SERPL-CCNC: 9 UNIT/L (ref 0–55)
AMPHET UR QL SCN: NEGATIVE
ANION GAP SERPL CALC-SCNC: 14 MEQ/L
ANION GAP SERPL CALC-SCNC: 14 MEQ/L
ANION GAP SERPL CALC-SCNC: 18 MEQ/L
ANION GAP SERPL CALC-SCNC: 8 MEQ/L
ANION GAP SERPL CALC-SCNC: 9 MEQ/L
ANISOCYTOSIS BLD QL SMEAR: ABNORMAL
APAP SERPL-MCNC: <17.4 UG/ML (ref 17.4–30)
APPEARANCE UR: ABNORMAL
APPEARANCE UR: ABNORMAL
APTT PPP: 36 SECONDS (ref 23.2–33.7)
AST SERPL-CCNC: 11 UNIT/L (ref 5–34)
AST SERPL-CCNC: 11 UNIT/L (ref 5–34)
AST SERPL-CCNC: 12 UNIT/L (ref 5–34)
AST SERPL-CCNC: 12 UNIT/L (ref 5–34)
AST SERPL-CCNC: 13 UNIT/L (ref 5–34)
AST SERPL-CCNC: 13 UNIT/L (ref 5–34)
AST SERPL-CCNC: 15 UNIT/L (ref 5–34)
AST SERPL-CCNC: 17 UNIT/L (ref 5–34)
AST SERPL-CCNC: 17 UNIT/L (ref 5–34)
AST SERPL-CCNC: 18 UNIT/L (ref 5–34)
AST SERPL-CCNC: 18 UNIT/L (ref 5–34)
AST SERPL-CCNC: 22 UNIT/L (ref 5–34)
AST SERPL-CCNC: 26 UNIT/L (ref 5–34)
AST SERPL-CCNC: 34 UNIT/L (ref 5–34)
AST SERPL-CCNC: 9 UNIT/L (ref 5–34)
BACTERIA #/AREA URNS AUTO: ABNORMAL /HPF
BACTERIA #/AREA URNS AUTO: ABNORMAL /HPF
BACTERIA BLD CULT: NORMAL
BACTERIA BLD CULT: NORMAL
BACTERIA UR CULT: ABNORMAL
BACTERIA UR CULT: ABNORMAL
BARBITURATE SCN PRESENT UR: NEGATIVE
BASE EXCESS BLD CALC-SCNC: -0.1 MMOL/L (ref -2–2)
BASE EXCESS BLD CALC-SCNC: -0.4 MMOL/L (ref -2–2)
BASE EXCESS BLD CALC-SCNC: 2.4 MMOL/L
BASE EXCESS BLD CALC-SCNC: 2.9 MMOL/L (ref -2–2)
BASE EXCESS BLD CALC-SCNC: 3.1 MMOL/L (ref -2–2)
BASE EXCESS BLD CALC-SCNC: 3.4 MMOL/L (ref -2–2)
BASE EXCESS BLD CALC-SCNC: 4.1 MMOL/L (ref -2–2)
BASE EXCESS BLD CALC-SCNC: 5.3 MMOL/L (ref -2–2)
BASOPHILS # BLD AUTO: 0.02 X10(3)/MCL
BASOPHILS # BLD AUTO: 0.03 X10(3)/MCL
BASOPHILS # BLD AUTO: 0.04 X10(3)/MCL
BASOPHILS # BLD AUTO: 0.04 X10(3)/MCL
BASOPHILS # BLD AUTO: 0.05 X10(3)/MCL
BASOPHILS # BLD AUTO: 0.06 X10(3)/MCL
BASOPHILS # BLD AUTO: 0.07 X10(3)/MCL
BASOPHILS # BLD AUTO: 0.07 X10(3)/MCL
BASOPHILS NFR BLD AUTO: 0.3 %
BASOPHILS NFR BLD AUTO: 0.5 %
BASOPHILS NFR BLD AUTO: 0.6 %
BASOPHILS NFR BLD AUTO: 0.6 %
BASOPHILS NFR BLD AUTO: 1 %
BASOPHILS NFR BLD AUTO: 1.1 %
BASOPHILS NFR BLD AUTO: 1.2 %
BASOPHILS NFR BLD AUTO: 1.5 %
BENZODIAZ UR QL SCN: NEGATIVE
BILIRUB SERPL-MCNC: 0.4 MG/DL
BILIRUB SERPL-MCNC: 0.4 MG/DL
BILIRUB SERPL-MCNC: 0.5 MG/DL
BILIRUB SERPL-MCNC: 0.6 MG/DL
BILIRUB SERPL-MCNC: 0.7 MG/DL
BILIRUB SERPL-MCNC: 0.7 MG/DL
BILIRUB SERPL-MCNC: 0.8 MG/DL
BILIRUB SERPL-MCNC: 1.1 MG/DL
BILIRUB SERPL-MCNC: 1.1 MG/DL
BILIRUB SERPL-MCNC: 1.2 MG/DL
BILIRUB SERPL-MCNC: 1.3 MG/DL
BILIRUB UR QL STRIP.AUTO: NEGATIVE
BILIRUB UR QL STRIP.AUTO: NEGATIVE
BIPAP(E) BLOOD GAS (OHS): 8 CM H2O
BIPAP(E) BLOOD GAS (OHS): 8 CM H2O
BIPAP(I) BLOOD GAS (OHS): 14 CM H2O
BIPAP(I) BLOOD GAS (OHS): 14 CM H2O
BLOOD GAS SAMPLE TYPE (OHS): ABNORMAL
BUN SERPL-MCNC: 18.6 MG/DL (ref 8.4–25.7)
BUN SERPL-MCNC: 26.3 MG/DL (ref 8.4–25.7)
BUN SERPL-MCNC: 27.4 MG/DL (ref 8.4–25.7)
BUN SERPL-MCNC: 33.5 MG/DL (ref 8.4–25.7)
BUN SERPL-MCNC: 34.2 MG/DL (ref 8.4–25.7)
BUN SERPL-MCNC: 36.8 MG/DL (ref 8.4–25.7)
BUN SERPL-MCNC: 37.5 MG/DL (ref 8.4–25.7)
BUN SERPL-MCNC: 39.7 MG/DL (ref 8.4–25.7)
BUN SERPL-MCNC: 41.5 MG/DL (ref 8.4–25.7)
BUN SERPL-MCNC: 42.8 MG/DL (ref 8.4–25.7)
BUN SERPL-MCNC: 43.1 MG/DL (ref 8.4–25.7)
BUN SERPL-MCNC: 44.8 MG/DL (ref 8.4–25.7)
BUN SERPL-MCNC: 46 MG/DL (ref 8.4–25.7)
BUN SERPL-MCNC: 48 MG/DL (ref 8.4–25.7)
BUN SERPL-MCNC: 49.8 MG/DL (ref 8.4–25.7)
BUN SERPL-MCNC: 52.6 MG/DL (ref 8.4–25.7)
BUN SERPL-MCNC: 55.7 MG/DL (ref 8.4–25.7)
BUN SERPL-MCNC: 60.4 MG/DL (ref 8.4–25.7)
BUN SERPL-MCNC: 65.2 MG/DL (ref 8.4–25.7)
BUN SERPL-MCNC: 80.9 MG/DL (ref 8.4–25.7)
BURR CELLS (OLG): ABNORMAL
CA-I BLD-SCNC: 1.03 MMOL/L (ref 1.12–1.23)
CA-I BLD-SCNC: 1.08 MMOL/L (ref 1.12–1.23)
CA-I BLD-SCNC: 1.09 MMOL/L (ref 1.12–1.23)
CA-I BLD-SCNC: 1.11 MMOL/L (ref 1.12–1.23)
CA-I BLD-SCNC: 1.13 MMOL/L (ref 1.12–1.23)
CA-I BLD-SCNC: 1.15 MMOL/L (ref 1.12–1.23)
CALCIUM SERPL-MCNC: 7.6 MG/DL (ref 8.8–10)
CALCIUM SERPL-MCNC: 7.6 MG/DL (ref 8.8–10)
CALCIUM SERPL-MCNC: 7.7 MG/DL (ref 8.8–10)
CALCIUM SERPL-MCNC: 7.9 MG/DL (ref 8.8–10)
CALCIUM SERPL-MCNC: 8 MG/DL (ref 8.8–10)
CALCIUM SERPL-MCNC: 8 MG/DL (ref 8.8–10)
CALCIUM SERPL-MCNC: 8.2 MG/DL (ref 8.8–10)
CALCIUM SERPL-MCNC: 8.5 MG/DL (ref 8.8–10)
CALCIUM SERPL-MCNC: 8.6 MG/DL (ref 8.8–10)
CALCIUM SERPL-MCNC: 8.8 MG/DL (ref 8.8–10)
CALCIUM SERPL-MCNC: 9 MG/DL (ref 8.8–10)
CALCIUM SERPL-MCNC: 9.1 MG/DL (ref 8.8–10)
CANNABINOIDS UR QL SCN: NEGATIVE
CHLORIDE SERPL-SCNC: 100 MMOL/L (ref 98–107)
CHLORIDE SERPL-SCNC: 101 MMOL/L (ref 98–107)
CHLORIDE SERPL-SCNC: 103 MMOL/L (ref 98–107)
CHLORIDE SERPL-SCNC: 104 MMOL/L (ref 98–107)
CHLORIDE SERPL-SCNC: 104 MMOL/L (ref 98–107)
CHLORIDE SERPL-SCNC: 97 MMOL/L (ref 98–107)
CHLORIDE SERPL-SCNC: 98 MMOL/L (ref 98–107)
CHLORIDE SERPL-SCNC: 99 MMOL/L (ref 98–107)
CHOLEST SERPL-MCNC: 107 MG/DL
CHOLEST/HDLC SERPL: 3 {RATIO} (ref 0–5)
CO2 BLDA-SCNC: 24.5 MMOL/L
CO2 BLDA-SCNC: 26.1 MMOL/L
CO2 BLDA-SCNC: 27.6 MMOL/L
CO2 BLDA-SCNC: 29.2 MMOL/L
CO2 BLDA-SCNC: 29.8 MMOL/L
CO2 BLDA-SCNC: 29.8 MMOL/L
CO2 BLDA-SCNC: 30.9 MMOL/L
CO2 BLDA-SCNC: 32.2 MMOL/L
CO2 SERPL-SCNC: 18 MMOL/L (ref 23–31)
CO2 SERPL-SCNC: 18 MMOL/L (ref 23–31)
CO2 SERPL-SCNC: 19 MMOL/L (ref 23–31)
CO2 SERPL-SCNC: 19 MMOL/L (ref 23–31)
CO2 SERPL-SCNC: 20 MMOL/L (ref 23–31)
CO2 SERPL-SCNC: 21 MMOL/L (ref 23–31)
CO2 SERPL-SCNC: 21 MMOL/L (ref 23–31)
CO2 SERPL-SCNC: 22 MMOL/L (ref 23–31)
CO2 SERPL-SCNC: 22 MMOL/L (ref 23–31)
CO2 SERPL-SCNC: 24 MMOL/L (ref 23–31)
CO2 SERPL-SCNC: 25 MMOL/L (ref 23–31)
CO2 SERPL-SCNC: 25 MMOL/L (ref 23–31)
CO2 SERPL-SCNC: 26 MMOL/L (ref 23–31)
CO2 SERPL-SCNC: 27 MMOL/L (ref 23–31)
COCAINE UR QL SCN: NEGATIVE
COHGB MFR BLDA: 2 % (ref 0.5–1.5)
COHGB MFR BLDA: 2.4 % (ref 0.5–1.5)
COHGB MFR BLDA: 2.5 % (ref 0.5–1.5)
COHGB MFR BLDA: 2.6 % (ref 0.5–1.5)
COHGB MFR BLDA: 2.8 % (ref 0.5–1.5)
COHGB MFR BLDA: 3.3 % (ref 0.5–1.5)
COHGB MFR BLDA: 3.3 % (ref 0.5–1.5)
COLOR UR AUTO: ABNORMAL
COLOR UR AUTO: ABNORMAL
CREAT SERPL-MCNC: 4.7 MG/DL (ref 0.73–1.18)
CREAT SERPL-MCNC: 4.86 MG/DL (ref 0.73–1.18)
CREAT SERPL-MCNC: 5.36 MG/DL (ref 0.73–1.18)
CREAT SERPL-MCNC: 5.43 MG/DL (ref 0.73–1.18)
CREAT SERPL-MCNC: 5.66 MG/DL (ref 0.73–1.18)
CREAT SERPL-MCNC: 6.16 MG/DL (ref 0.73–1.18)
CREAT SERPL-MCNC: 6.17 MG/DL (ref 0.73–1.18)
CREAT SERPL-MCNC: 6.4 MG/DL (ref 0.73–1.18)
CREAT SERPL-MCNC: 6.94 MG/DL (ref 0.73–1.18)
CREAT SERPL-MCNC: 7.42 MG/DL (ref 0.73–1.18)
CREAT SERPL-MCNC: 7.45 MG/DL (ref 0.73–1.18)
CREAT SERPL-MCNC: 7.73 MG/DL (ref 0.73–1.18)
CREAT SERPL-MCNC: 7.81 MG/DL (ref 0.73–1.18)
CREAT SERPL-MCNC: 7.9 MG/DL (ref 0.73–1.18)
CREAT SERPL-MCNC: 8.02 MG/DL (ref 0.73–1.18)
CREAT SERPL-MCNC: 8.23 MG/DL (ref 0.73–1.18)
CREAT SERPL-MCNC: 8.49 MG/DL (ref 0.73–1.18)
CREAT SERPL-MCNC: 9.03 MG/DL (ref 0.73–1.18)
CREAT SERPL-MCNC: 9.21 MG/DL (ref 0.73–1.18)
CREAT SERPL-MCNC: 9.62 MG/DL (ref 0.73–1.18)
CREAT/UREA NIT SERPL: 3
CREAT/UREA NIT SERPL: 5
DEPRECATED CALCIDIOL+CALCIFEROL SERPL-MC: 10 NG/ML (ref 30–80)
DRAWN BY BLOOD GAS (OHS): ABNORMAL
EOSINOPHIL # BLD AUTO: 0.01 X10(3)/MCL (ref 0–0.9)
EOSINOPHIL # BLD AUTO: 0.07 X10(3)/MCL (ref 0–0.9)
EOSINOPHIL # BLD AUTO: 0.07 X10(3)/MCL (ref 0–0.9)
EOSINOPHIL # BLD AUTO: 0.1 X10(3)/MCL (ref 0–0.9)
EOSINOPHIL # BLD AUTO: 0.11 X10(3)/MCL (ref 0–0.9)
EOSINOPHIL # BLD AUTO: 0.13 X10(3)/MCL (ref 0–0.9)
EOSINOPHIL # BLD AUTO: 0.13 X10(3)/MCL (ref 0–0.9)
EOSINOPHIL # BLD AUTO: 0.18 X10(3)/MCL (ref 0–0.9)
EOSINOPHIL # BLD AUTO: 0.22 X10(3)/MCL (ref 0–0.9)
EOSINOPHIL # BLD AUTO: 0.22 X10(3)/MCL (ref 0–0.9)
EOSINOPHIL # BLD AUTO: 0.23 X10(3)/MCL (ref 0–0.9)
EOSINOPHIL # BLD AUTO: 0.24 X10(3)/MCL (ref 0–0.9)
EOSINOPHIL NFR BLD AUTO: 0.1 %
EOSINOPHIL NFR BLD AUTO: 0.5 %
EOSINOPHIL NFR BLD AUTO: 0.6 %
EOSINOPHIL NFR BLD AUTO: 1.3 %
EOSINOPHIL NFR BLD AUTO: 1.3 %
EOSINOPHIL NFR BLD AUTO: 1.9 %
EOSINOPHIL NFR BLD AUTO: 2.1 %
EOSINOPHIL NFR BLD AUTO: 3.2 %
EOSINOPHIL NFR BLD AUTO: 3.4 %
EOSINOPHIL NFR BLD AUTO: 3.9 %
EOSINOPHIL NFR BLD AUTO: 4.5 %
EOSINOPHIL NFR BLD AUTO: 5.4 %
EOSINOPHIL NFR BLD MANUAL: 0.21 X10(3)/MCL (ref 0–0.9)
EOSINOPHIL NFR BLD MANUAL: 3 %
ERYTHROCYTE [DISTWIDTH] IN BLOOD BY AUTOMATED COUNT: 17.2 % (ref 11.5–17)
ERYTHROCYTE [DISTWIDTH] IN BLOOD BY AUTOMATED COUNT: 17.3 % (ref 11.5–17)
ERYTHROCYTE [DISTWIDTH] IN BLOOD BY AUTOMATED COUNT: 17.7 % (ref 11.5–17)
ERYTHROCYTE [DISTWIDTH] IN BLOOD BY AUTOMATED COUNT: 17.9 % (ref 11.5–17)
ERYTHROCYTE [DISTWIDTH] IN BLOOD BY AUTOMATED COUNT: 17.9 % (ref 11.5–17)
ERYTHROCYTE [DISTWIDTH] IN BLOOD BY AUTOMATED COUNT: 18 % (ref 11.5–17)
ERYTHROCYTE [DISTWIDTH] IN BLOOD BY AUTOMATED COUNT: 18.2 % (ref 11.5–17)
ERYTHROCYTE [DISTWIDTH] IN BLOOD BY AUTOMATED COUNT: 18.5 % (ref 11.5–17)
ERYTHROCYTE [DISTWIDTH] IN BLOOD BY AUTOMATED COUNT: 18.6 % (ref 11.5–17)
ERYTHROCYTE [DISTWIDTH] IN BLOOD BY AUTOMATED COUNT: 18.8 % (ref 11.5–17)
ERYTHROCYTE [DISTWIDTH] IN BLOOD BY AUTOMATED COUNT: 18.8 % (ref 11.5–17)
EST. AVERAGE GLUCOSE BLD GHB EST-MCNC: NORMAL MG/DL
ETHANOL SERPL-MCNC: <10 MG/DL
FENTANYL UR QL SCN: NEGATIVE
FLOW (OHS): 60 LPM
FOLATE SERPL-MCNC: 4.4 NG/ML (ref 7–31.4)
GFR SERPLBLD CREATININE-BSD FMLA CKD-EPI: 10 MLS/MIN/1.73/M2
GFR SERPLBLD CREATININE-BSD FMLA CKD-EPI: 11 MLS/MIN/1.73/M2
GFR SERPLBLD CREATININE-BSD FMLA CKD-EPI: 11 MLS/MIN/1.73/M2
GFR SERPLBLD CREATININE-BSD FMLA CKD-EPI: 12 MLS/MIN/1.73/M2
GFR SERPLBLD CREATININE-BSD FMLA CKD-EPI: 13 MLS/MIN/1.73/M2
GFR SERPLBLD CREATININE-BSD FMLA CKD-EPI: 5 MLS/MIN/1.73/M2
GFR SERPLBLD CREATININE-BSD FMLA CKD-EPI: 6 MLS/MIN/1.73/M2
GFR SERPLBLD CREATININE-BSD FMLA CKD-EPI: 7 MLS/MIN/1.73/M2
GFR SERPLBLD CREATININE-BSD FMLA CKD-EPI: 8 MLS/MIN/1.73/M2
GFR SERPLBLD CREATININE-BSD FMLA CKD-EPI: 9 MLS/MIN/1.73/M2
GLOBULIN SER-MCNC: 3.4 GM/DL (ref 2.4–3.5)
GLOBULIN SER-MCNC: 3.6 GM/DL (ref 2.4–3.5)
GLOBULIN SER-MCNC: 3.8 GM/DL (ref 2.4–3.5)
GLOBULIN SER-MCNC: 3.8 GM/DL (ref 2.4–3.5)
GLOBULIN SER-MCNC: 3.9 GM/DL (ref 2.4–3.5)
GLOBULIN SER-MCNC: 4.1 GM/DL (ref 2.4–3.5)
GLOBULIN SER-MCNC: 4.3 GM/DL (ref 2.4–3.5)
GLOBULIN SER-MCNC: 4.3 GM/DL (ref 2.4–3.5)
GLOBULIN SER-MCNC: 4.5 GM/DL (ref 2.4–3.5)
GLOBULIN SER-MCNC: 4.6 GM/DL (ref 2.4–3.5)
GLOBULIN SER-MCNC: 4.7 GM/DL (ref 2.4–3.5)
GLOBULIN SER-MCNC: 4.8 GM/DL (ref 2.4–3.5)
GLOBULIN SER-MCNC: 4.9 GM/DL (ref 2.4–3.5)
GLUCOSE SERPL-MCNC: 102 MG/DL (ref 82–115)
GLUCOSE SERPL-MCNC: 103 MG/DL (ref 82–115)
GLUCOSE SERPL-MCNC: 104 MG/DL (ref 82–115)
GLUCOSE SERPL-MCNC: 107 MG/DL (ref 82–115)
GLUCOSE SERPL-MCNC: 109 MG/DL (ref 82–115)
GLUCOSE SERPL-MCNC: 110 MG/DL (ref 82–115)
GLUCOSE SERPL-MCNC: 148 MG/DL (ref 82–115)
GLUCOSE SERPL-MCNC: 32 MG/DL (ref 82–115)
GLUCOSE SERPL-MCNC: 63 MG/DL (ref 82–115)
GLUCOSE SERPL-MCNC: 70 MG/DL (ref 82–115)
GLUCOSE SERPL-MCNC: 77 MG/DL (ref 82–115)
GLUCOSE SERPL-MCNC: 78 MG/DL (ref 82–115)
GLUCOSE SERPL-MCNC: 84 MG/DL (ref 82–115)
GLUCOSE SERPL-MCNC: 85 MG/DL (ref 82–115)
GLUCOSE SERPL-MCNC: 91 MG/DL (ref 82–115)
GLUCOSE SERPL-MCNC: 95 MG/DL (ref 82–115)
GLUCOSE SERPL-MCNC: 96 MG/DL (ref 82–115)
GLUCOSE SERPL-MCNC: 99 MG/DL (ref 82–115)
GLUCOSE UR QL STRIP.AUTO: NORMAL
GLUCOSE UR QL STRIP.AUTO: NORMAL
GROUP & RH: NORMAL
HBA1C MFR BLD: <4 %
HBV SURFACE AB SER QL IA: NORMAL
HBV SURFACE AB SERPL IA-ACNC: 5.3 MIU/ML
HBV SURFACE AG SERPL QL IA: NONREACTIVE
HBV SURFACE AG SERPL QL IA: NONREACTIVE
HCO3 BLDA-SCNC: 23.5 MMOL/L (ref 22–26)
HCO3 BLDA-SCNC: 24.8 MMOL/L (ref 22–26)
HCO3 BLDA-SCNC: 26.4 MMOL/L (ref 22–26)
HCO3 BLDA-SCNC: 27.9 MMOL/L (ref 22–26)
HCO3 BLDA-SCNC: 28.5 MMOL/L (ref 22–26)
HCO3 BLDA-SCNC: 28.7 MMOL/L (ref 22–26)
HCO3 BLDA-SCNC: 29.3 MMOL/L (ref 22–26)
HCO3 BLDA-SCNC: 30.4 MMOL/L (ref 22–26)
HCT VFR BLD AUTO: 26.4 % (ref 42–52)
HCT VFR BLD AUTO: 26.4 % (ref 42–52)
HCT VFR BLD AUTO: 26.7 % (ref 42–52)
HCT VFR BLD AUTO: 26.9 % (ref 42–52)
HCT VFR BLD AUTO: 27.2 % (ref 42–52)
HCT VFR BLD AUTO: 27.9 % (ref 42–52)
HCT VFR BLD AUTO: 28.1 % (ref 42–52)
HCT VFR BLD AUTO: 28.5 % (ref 42–52)
HCT VFR BLD AUTO: 28.8 % (ref 42–52)
HCT VFR BLD AUTO: 29.6 % (ref 42–52)
HCT VFR BLD AUTO: 29.7 % (ref 42–52)
HCT VFR BLD AUTO: 29.9 % (ref 42–52)
HCT VFR BLD AUTO: 29.9 % (ref 42–52)
HCT VFR BLD AUTO: 30 % (ref 42–52)
HCT VFR BLD AUTO: 31.1 % (ref 42–52)
HCT VFR BLD AUTO: 31.2 % (ref 42–52)
HCT VFR BLD AUTO: 31.6 % (ref 42–52)
HCT VFR BLD AUTO: 34.4 % (ref 42–52)
HDLC SERPL-MCNC: 35 MG/DL (ref 35–60)
HGB BLD-MCNC: 10 G/DL (ref 14–18)
HGB BLD-MCNC: 11 G/DL (ref 14–18)
HGB BLD-MCNC: 8.2 G/DL (ref 14–18)
HGB BLD-MCNC: 8.2 G/DL (ref 14–18)
HGB BLD-MCNC: 8.4 G/DL (ref 14–18)
HGB BLD-MCNC: 8.6 G/DL (ref 14–18)
HGB BLD-MCNC: 8.7 G/DL (ref 14–18)
HGB BLD-MCNC: 8.8 G/DL (ref 14–18)
HGB BLD-MCNC: 8.8 G/DL (ref 14–18)
HGB BLD-MCNC: 9 G/DL (ref 14–18)
HGB BLD-MCNC: 9.1 G/DL (ref 14–18)
HGB BLD-MCNC: 9.1 G/DL (ref 14–18)
HGB BLD-MCNC: 9.2 G/DL (ref 14–18)
HGB BLD-MCNC: 9.2 G/DL (ref 14–18)
HGB BLD-MCNC: 9.4 G/DL (ref 14–18)
HGB BLD-MCNC: 9.6 G/DL (ref 14–18)
HGB BLD-MCNC: 9.7 G/DL (ref 14–18)
HGB BLD-MCNC: 9.8 G/DL (ref 14–18)
IMM GRANULOCYTES # BLD AUTO: 0.01 X10(3)/MCL (ref 0–0.04)
IMM GRANULOCYTES # BLD AUTO: 0.01 X10(3)/MCL (ref 0–0.04)
IMM GRANULOCYTES # BLD AUTO: 0.02 X10(3)/MCL (ref 0–0.04)
IMM GRANULOCYTES # BLD AUTO: 0.03 X10(3)/MCL (ref 0–0.04)
IMM GRANULOCYTES # BLD AUTO: 0.05 X10(3)/MCL (ref 0–0.04)
IMM GRANULOCYTES # BLD AUTO: 0.08 X10(3)/MCL (ref 0–0.04)
IMM GRANULOCYTES # BLD AUTO: 0.08 X10(3)/MCL (ref 0–0.04)
IMM GRANULOCYTES # BLD AUTO: 0.1 X10(3)/MCL (ref 0–0.04)
IMM GRANULOCYTES NFR BLD AUTO: 0.2 %
IMM GRANULOCYTES NFR BLD AUTO: 0.4 %
IMM GRANULOCYTES NFR BLD AUTO: 0.5 %
IMM GRANULOCYTES NFR BLD AUTO: 0.6 %
IMM GRANULOCYTES NFR BLD AUTO: 0.6 %
IMM GRANULOCYTES NFR BLD AUTO: 0.7 %
IMM GRANULOCYTES NFR BLD AUTO: 0.7 %
IMM GRANULOCYTES NFR BLD AUTO: 0.8 %
INDIRECT COOMBS: NORMAL
INHALED O2 CONCENTRATION: 30 %
INHALED O2 CONCENTRATION: 35 %
INHALED O2 CONCENTRATION: 50 %
INHALED O2 CONCENTRATION: 60 %
INHALED O2 CONCENTRATION: 65 %
INR PPP: 1.4
INSTRUMENT WBC (OLG): 3.45 X10(3)/MCL
INSTRUMENT WBC (OLG): 6.86 X10(3)/MCL
INSTRUMENT WBC (OLG): 7.92 X10(3)/MCL
INSTRUMENT WBC (OLG): 8.29 X10(3)/MCL
KETONES UR QL STRIP.AUTO: NEGATIVE
KETONES UR QL STRIP.AUTO: NEGATIVE
LDLC SERPL CALC-MCNC: 67 MG/DL (ref 50–140)
LEUKOCYTE ESTERASE UR QL STRIP.AUTO: 500
LEUKOCYTE ESTERASE UR QL STRIP.AUTO: 500
LPM (OHS): 5
LYMPHOCYTES # BLD AUTO: 0.31 X10(3)/MCL (ref 0.6–4.6)
LYMPHOCYTES # BLD AUTO: 0.4 X10(3)/MCL (ref 0.6–4.6)
LYMPHOCYTES # BLD AUTO: 0.42 X10(3)/MCL (ref 0.6–4.6)
LYMPHOCYTES # BLD AUTO: 0.46 X10(3)/MCL (ref 0.6–4.6)
LYMPHOCYTES # BLD AUTO: 0.49 X10(3)/MCL (ref 0.6–4.6)
LYMPHOCYTES # BLD AUTO: 0.5 X10(3)/MCL (ref 0.6–4.6)
LYMPHOCYTES # BLD AUTO: 0.55 X10(3)/MCL (ref 0.6–4.6)
LYMPHOCYTES # BLD AUTO: 0.57 X10(3)/MCL (ref 0.6–4.6)
LYMPHOCYTES # BLD AUTO: 0.59 X10(3)/MCL (ref 0.6–4.6)
LYMPHOCYTES # BLD AUTO: 0.61 X10(3)/MCL (ref 0.6–4.6)
LYMPHOCYTES # BLD AUTO: 0.62 X10(3)/MCL (ref 0.6–4.6)
LYMPHOCYTES # BLD AUTO: 0.88 X10(3)/MCL (ref 0.6–4.6)
LYMPHOCYTES NFR BLD AUTO: 10.1 %
LYMPHOCYTES NFR BLD AUTO: 10.3 %
LYMPHOCYTES NFR BLD AUTO: 11.3 %
LYMPHOCYTES NFR BLD AUTO: 12.2 %
LYMPHOCYTES NFR BLD AUTO: 14.8 %
LYMPHOCYTES NFR BLD AUTO: 3.6 %
LYMPHOCYTES NFR BLD AUTO: 3.6 %
LYMPHOCYTES NFR BLD AUTO: 4.1 %
LYMPHOCYTES NFR BLD AUTO: 5.1 %
LYMPHOCYTES NFR BLD AUTO: 5.8 %
LYMPHOCYTES NFR BLD AUTO: 7.1 %
LYMPHOCYTES NFR BLD AUTO: 8.8 %
LYMPHOCYTES NFR BLD MANUAL: 0.07 X10(3)/MCL
LYMPHOCYTES NFR BLD MANUAL: 0.24 X10(3)/MCL
LYMPHOCYTES NFR BLD MANUAL: 0.25 X10(3)/MCL
LYMPHOCYTES NFR BLD MANUAL: 0.41 X10(3)/MCL
LYMPHOCYTES NFR BLD MANUAL: 2 %
LYMPHOCYTES NFR BLD MANUAL: 3 %
LYMPHOCYTES NFR BLD MANUAL: 3 %
LYMPHOCYTES NFR BLD MANUAL: 6 %
MACROCYTES BLD QL SMEAR: ABNORMAL
MAGNESIUM SERPL-MCNC: 1.8 MG/DL (ref 1.6–2.6)
MAGNESIUM SERPL-MCNC: 1.9 MG/DL (ref 1.6–2.6)
MAGNESIUM SERPL-MCNC: 2 MG/DL (ref 1.6–2.6)
MAGNESIUM SERPL-MCNC: 2 MG/DL (ref 1.6–2.6)
MAGNESIUM SERPL-MCNC: 2.1 MG/DL (ref 1.6–2.6)
MAGNESIUM SERPL-MCNC: 2.1 MG/DL (ref 1.6–2.6)
MAGNESIUM SERPL-MCNC: 2.3 MG/DL (ref 1.6–2.6)
MCH RBC QN AUTO: 31.4 PG (ref 27–31)
MCH RBC QN AUTO: 31.8 PG (ref 27–31)
MCH RBC QN AUTO: 31.8 PG (ref 27–31)
MCH RBC QN AUTO: 31.9 PG (ref 27–31)
MCH RBC QN AUTO: 32.3 PG (ref 27–31)
MCH RBC QN AUTO: 32.7 PG (ref 27–31)
MCH RBC QN AUTO: 32.9 PG (ref 27–31)
MCH RBC QN AUTO: 33 PG (ref 27–31)
MCH RBC QN AUTO: 33.1 PG (ref 27–31)
MCH RBC QN AUTO: 33.1 PG (ref 27–31)
MCH RBC QN AUTO: 33.2 PG (ref 27–31)
MCH RBC QN AUTO: 33.3 PG (ref 27–31)
MCH RBC QN AUTO: 33.3 PG (ref 27–31)
MCH RBC QN AUTO: 33.5 PG (ref 27–31)
MCH RBC QN AUTO: 33.7 PG (ref 27–31)
MCH RBC QN AUTO: 33.8 PG (ref 27–31)
MCH RBC QN AUTO: 34 PG (ref 27–31)
MCH RBC QN AUTO: 34 PG (ref 27–31)
MCHC RBC AUTO-ENTMCNC: 30.3 G/DL (ref 33–36)
MCHC RBC AUTO-ENTMCNC: 30.6 G/DL (ref 33–36)
MCHC RBC AUTO-ENTMCNC: 30.7 G/DL (ref 33–36)
MCHC RBC AUTO-ENTMCNC: 30.9 G/DL (ref 33–36)
MCHC RBC AUTO-ENTMCNC: 31 G/DL (ref 33–36)
MCHC RBC AUTO-ENTMCNC: 31.1 G/DL (ref 33–36)
MCHC RBC AUTO-ENTMCNC: 31.2 G/DL (ref 33–36)
MCHC RBC AUTO-ENTMCNC: 31.3 G/DL (ref 33–36)
MCHC RBC AUTO-ENTMCNC: 31.4 G/DL (ref 33–36)
MCHC RBC AUTO-ENTMCNC: 31.5 G/DL (ref 33–36)
MCHC RBC AUTO-ENTMCNC: 31.6 G/DL (ref 33–36)
MCHC RBC AUTO-ENTMCNC: 32 G/DL (ref 33–36)
MCHC RBC AUTO-ENTMCNC: 32.3 G/DL (ref 33–36)
MCHC RBC AUTO-ENTMCNC: 32.4 G/DL (ref 33–36)
MCHC RBC AUTO-ENTMCNC: 32.6 G/DL (ref 33–36)
MCHC RBC AUTO-ENTMCNC: 32.8 G/DL (ref 33–36)
MCV RBC AUTO: 101.1 FL (ref 80–94)
MCV RBC AUTO: 102.3 FL (ref 80–94)
MCV RBC AUTO: 102.6 FL (ref 80–94)
MCV RBC AUTO: 102.7 FL (ref 80–94)
MCV RBC AUTO: 103.6 FL (ref 80–94)
MCV RBC AUTO: 104.5 FL (ref 80–94)
MCV RBC AUTO: 104.7 FL (ref 80–94)
MCV RBC AUTO: 105.9 FL (ref 80–94)
MCV RBC AUTO: 106.2 FL (ref 80–94)
MCV RBC AUTO: 106.5 FL (ref 80–94)
MCV RBC AUTO: 107.2 FL (ref 80–94)
MCV RBC AUTO: 108.4 FL (ref 80–94)
MCV RBC AUTO: 108.5 FL (ref 80–94)
MCV RBC AUTO: 108.9 FL (ref 80–94)
MCV RBC AUTO: 111.5 FL (ref 80–94)
MCV RBC AUTO: 99.4 FL (ref 80–94)
MDMA UR QL SCN: NEGATIVE
MECH RR (OHS): 20 B/MIN
MECH RR (OHS): 24 B/MIN
METAMYELOCYTES NFR BLD MANUAL: 2 %
METAMYELOCYTES NFR BLD MANUAL: 5 %
METHGB MFR BLDA: 0.8 % (ref 0.4–1.5)
METHGB MFR BLDA: 0.9 % (ref 0.4–1.5)
METHGB MFR BLDA: 0.9 % (ref 0.4–1.5)
METHGB MFR BLDA: 1 % (ref 0.4–1.5)
METHGB MFR BLDA: 1.1 % (ref 0.4–1.5)
METHGB MFR BLDA: 1.1 % (ref 0.4–1.5)
METHGB MFR BLDA: 1.3 % (ref 0.4–1.5)
MODE (OHS): ABNORMAL
MODE (OHS): AC
MONOCYTES # BLD AUTO: 0.4 X10(3)/MCL (ref 0.1–1.3)
MONOCYTES # BLD AUTO: 0.43 X10(3)/MCL (ref 0.1–1.3)
MONOCYTES # BLD AUTO: 0.44 X10(3)/MCL (ref 0.1–1.3)
MONOCYTES # BLD AUTO: 0.47 X10(3)/MCL (ref 0.1–1.3)
MONOCYTES # BLD AUTO: 0.47 X10(3)/MCL (ref 0.1–1.3)
MONOCYTES # BLD AUTO: 0.49 X10(3)/MCL (ref 0.1–1.3)
MONOCYTES # BLD AUTO: 0.49 X10(3)/MCL (ref 0.1–1.3)
MONOCYTES # BLD AUTO: 0.51 X10(3)/MCL (ref 0.1–1.3)
MONOCYTES # BLD AUTO: 0.6 X10(3)/MCL (ref 0.1–1.3)
MONOCYTES # BLD AUTO: 0.63 X10(3)/MCL (ref 0.1–1.3)
MONOCYTES NFR BLD AUTO: 10.1 %
MONOCYTES NFR BLD AUTO: 10.7 %
MONOCYTES NFR BLD AUTO: 3.6 %
MONOCYTES NFR BLD AUTO: 4.2 %
MONOCYTES NFR BLD AUTO: 6.1 %
MONOCYTES NFR BLD AUTO: 6.2 %
MONOCYTES NFR BLD AUTO: 6.2 %
MONOCYTES NFR BLD AUTO: 6.5 %
MONOCYTES NFR BLD AUTO: 6.9 %
MONOCYTES NFR BLD AUTO: 7.1 %
MONOCYTES NFR BLD AUTO: 8.2 %
MONOCYTES NFR BLD AUTO: 8.2 %
MONOCYTES NFR BLD MANUAL: 0.25 X10(3)/MCL (ref 0.1–1.3)
MONOCYTES NFR BLD MANUAL: 0.34 X10(3)/MCL (ref 0.1–1.3)
MONOCYTES NFR BLD MANUAL: 0.4 X10(3)/MCL (ref 0.1–1.3)
MONOCYTES NFR BLD MANUAL: 0.41 X10(3)/MCL (ref 0.1–1.3)
MONOCYTES NFR BLD MANUAL: 12 %
MONOCYTES NFR BLD MANUAL: 3 %
MONOCYTES NFR BLD MANUAL: 5 %
MONOCYTES NFR BLD MANUAL: 5 %
MRSA PCR SCRN (OHS): DETECTED
NEUTROPHILS # BLD AUTO: 12.4 X10(3)/MCL (ref 2.1–9.2)
NEUTROPHILS # BLD AUTO: 2.76 X10(3)/MCL (ref 2.1–9.2)
NEUTROPHILS # BLD AUTO: 3.56 X10(3)/MCL (ref 2.1–9.2)
NEUTROPHILS # BLD AUTO: 3.96 X10(3)/MCL (ref 2.1–9.2)
NEUTROPHILS # BLD AUTO: 4.19 X10(3)/MCL (ref 2.1–9.2)
NEUTROPHILS # BLD AUTO: 4.79 X10(3)/MCL (ref 2.1–9.2)
NEUTROPHILS # BLD AUTO: 5.49 X10(3)/MCL (ref 2.1–9.2)
NEUTROPHILS # BLD AUTO: 5.93 X10(3)/MCL (ref 2.1–9.2)
NEUTROPHILS # BLD AUTO: 6.51 X10(3)/MCL (ref 2.1–9.2)
NEUTROPHILS # BLD AUTO: 7.74 X10(3)/MCL (ref 2.1–9.2)
NEUTROPHILS # BLD AUTO: 8.82 X10(3)/MCL (ref 2.1–9.2)
NEUTROPHILS # BLD AUTO: 9.9 X10(3)/MCL (ref 2.1–9.2)
NEUTROPHILS NFR BLD AUTO: 67.1 %
NEUTROPHILS NFR BLD AUTO: 73.3 %
NEUTROPHILS NFR BLD AUTO: 75.6 %
NEUTROPHILS NFR BLD AUTO: 76.1 %
NEUTROPHILS NFR BLD AUTO: 78.3 %
NEUTROPHILS NFR BLD AUTO: 79.7 %
NEUTROPHILS NFR BLD AUTO: 84.4 %
NEUTROPHILS NFR BLD AUTO: 85.4 %
NEUTROPHILS NFR BLD AUTO: 87 %
NEUTROPHILS NFR BLD AUTO: 88.8 %
NEUTROPHILS NFR BLD AUTO: 88.9 %
NEUTROPHILS NFR BLD AUTO: 91.3 %
NEUTROPHILS NFR BLD MANUAL: 84 %
NEUTROPHILS NFR BLD MANUAL: 86 %
NEUTROPHILS NFR BLD MANUAL: 89 %
NEUTROPHILS NFR BLD MANUAL: 92 %
NITRITE UR QL STRIP.AUTO: NEGATIVE
NITRITE UR QL STRIP.AUTO: NEGATIVE
NRBC BLD AUTO-RTO: 0 %
O2 HB BLOOD GAS (OHS): 84.9 % (ref 94–97)
O2 HB BLOOD GAS (OHS): 95.2 % (ref 94–97)
O2 HB BLOOD GAS (OHS): 95.5 % (ref 94–97)
O2 HB BLOOD GAS (OHS): 96.1 % (ref 94–97)
O2 HB BLOOD GAS (OHS): 96.1 % (ref 94–97)
O2 HB BLOOD GAS (OHS): 96.3 % (ref 94–97)
O2 HB BLOOD GAS (OHS): 96.6 % (ref 94–97)
OHS QRS DURATION: 76 MS
OHS QRS DURATION: 88 MS
OHS QTC CALCULATION: 465 MS
OHS QTC CALCULATION: 474 MS
OPIATES UR QL SCN: NEGATIVE
OXYGEN DEVICE BLOOD GAS (OHS): ABNORMAL
OXYHGB MFR BLDA: 10 G/DL (ref 12–16)
OXYHGB MFR BLDA: 11.2 G/DL (ref 12–16)
OXYHGB MFR BLDA: 7.7 G/DL (ref 12–16)
OXYHGB MFR BLDA: 8.4 G/DL (ref 12–16)
OXYHGB MFR BLDA: 9.5 G/DL (ref 12–16)
OXYHGB MFR BLDA: 9.5 G/DL (ref 12–16)
OXYHGB MFR BLDA: 9.6 G/DL (ref 12–16)
PCO2 BLDA: 33 MMHG (ref 35–45)
PCO2 BLDA: 36 MMHG (ref 35–45)
PCO2 BLDA: 38 MMHG (ref 35–45)
PCO2 BLDA: 41 MMHG (ref 35–45)
PCO2 BLDA: 42 MMHG (ref 35–45)
PCO2 BLDA: 43 MMHG (ref 35–45)
PCO2 BLDA: 53 MMHG (ref 35–45)
PCO2 BLDA: 59 MMHG (ref 35–45)
PCP UR QL: NEGATIVE
PEEP RESPIRATORY: 5 CMH2O
PH BLDA: 7.32 [PH] (ref 7.35–7.45)
PH BLDA: 7.35 [PH] (ref 7.35–7.45)
PH BLDA: 7.38 [PH] (ref 7.35–7.45)
PH BLDA: 7.42 [PH] (ref 7.35–7.45)
PH BLDA: 7.45 [PH] (ref 7.35–7.45)
PH BLDA: 7.45 [PH] (ref 7.35–7.45)
PH BLDA: 7.46 [PH] (ref 7.35–7.45)
PH BLDA: 7.51 [PH] (ref 7.35–7.45)
PH UR STRIP.AUTO: 7 [PH]
PH UR STRIP.AUTO: 8 [PH]
PH UR: 8 [PH] (ref 3–11)
PHOSPHATE SERPL-MCNC: 3.3 MG/DL (ref 2.3–4.7)
PHOSPHATE SERPL-MCNC: 3.8 MG/DL (ref 2.3–4.7)
PHOSPHATE SERPL-MCNC: 4.5 MG/DL (ref 2.3–4.7)
PHOSPHATE SERPL-MCNC: 4.7 MG/DL (ref 2.3–4.7)
PHOSPHATE SERPL-MCNC: 4.8 MG/DL (ref 2.3–4.7)
PHOSPHATE SERPL-MCNC: 5.1 MG/DL (ref 2.3–4.7)
PHOSPHATE SERPL-MCNC: 5.6 MG/DL (ref 2.3–4.7)
PHOSPHATE SERPL-MCNC: 6.4 MG/DL (ref 2.3–4.7)
PLATELET # BLD AUTO: 116 X10(3)/MCL (ref 130–400)
PLATELET # BLD AUTO: 143 X10(3)/MCL (ref 130–400)
PLATELET # BLD AUTO: 157 X10(3)/MCL (ref 130–400)
PLATELET # BLD AUTO: 161 X10(3)/MCL (ref 130–400)
PLATELET # BLD AUTO: 163 X10(3)/MCL (ref 130–400)
PLATELET # BLD AUTO: 171 X10(3)/MCL (ref 130–400)
PLATELET # BLD AUTO: 178 X10(3)/MCL (ref 130–400)
PLATELET # BLD AUTO: 178 X10(3)/MCL (ref 130–400)
PLATELET # BLD AUTO: 187 X10(3)/MCL (ref 130–400)
PLATELET # BLD AUTO: 187 X10(3)/MCL (ref 130–400)
PLATELET # BLD AUTO: 193 X10(3)/MCL (ref 130–400)
PLATELET # BLD AUTO: 225 X10(3)/MCL (ref 130–400)
PLATELET # BLD AUTO: 46 X10(3)/MCL (ref 130–400)
PLATELET # BLD AUTO: 53 X10(3)/MCL (ref 130–400)
PLATELET # BLD AUTO: 57 X10(3)/MCL (ref 130–400)
PLATELET # BLD AUTO: 65 X10(3)/MCL (ref 130–400)
PLATELET # BLD AUTO: 74 X10(3)/MCL (ref 130–400)
PLATELET # BLD AUTO: 97 X10(3)/MCL (ref 130–400)
PLATELET # BLD EST: ABNORMAL 10*3/UL
PLATELETS.RETICULATED NFR BLD AUTO: 2.7 % (ref 0.9–11.2)
PLATELETS.RETICULATED NFR BLD AUTO: 3.4 % (ref 0.9–11.2)
PLATELETS.RETICULATED NFR BLD AUTO: 4.5 % (ref 0.9–11.2)
PLATELETS.RETICULATED NFR BLD AUTO: 6.2 % (ref 0.9–11.2)
PLATELETS.RETICULATED NFR BLD AUTO: 7.4 % (ref 0.9–11.2)
PLATELETS.RETICULATED NFR BLD AUTO: 7.5 % (ref 0.9–11.2)
PLATELETS.RETICULATED NFR BLD AUTO: 9.1 % (ref 0.9–11.2)
PMV BLD AUTO: 10.1 FL (ref 7.4–10.4)
PMV BLD AUTO: 10.1 FL (ref 7.4–10.4)
PMV BLD AUTO: 10.3 FL (ref 7.4–10.4)
PMV BLD AUTO: 10.4 FL (ref 7.4–10.4)
PMV BLD AUTO: 10.4 FL (ref 7.4–10.4)
PMV BLD AUTO: 10.5 FL (ref 7.4–10.4)
PMV BLD AUTO: 10.5 FL (ref 7.4–10.4)
PMV BLD AUTO: 10.6 FL (ref 7.4–10.4)
PMV BLD AUTO: 10.7 FL (ref 7.4–10.4)
PMV BLD AUTO: 11 FL (ref 7.4–10.4)
PMV BLD AUTO: 11.2 FL (ref 7.4–10.4)
PMV BLD AUTO: 11.5 FL (ref 7.4–10.4)
PMV BLD AUTO: 11.6 FL (ref 7.4–10.4)
PMV BLD AUTO: 12 FL (ref 7.4–10.4)
PMV BLD AUTO: 12.1 FL (ref 7.4–10.4)
PMV BLD AUTO: 13.1 FL (ref 7.4–10.4)
PMV BLD AUTO: 13.4 FL (ref 7.4–10.4)
PMV BLD AUTO: ABNORMAL FL
PO2 BLDA: 111 MMHG (ref 80–100)
PO2 BLDA: 121 MMHG (ref 80–100)
PO2 BLDA: 123 MMHG (ref 80–100)
PO2 BLDA: 133 MMHG (ref 80–100)
PO2 BLDA: 59 MMHG (ref 80–100)
PO2 BLDA: 78 MMHG (ref 80–100)
PO2 BLDA: 79 MMHG (ref 80–100)
PO2 BLDA: 95 MMHG (ref 80–100)
POCT GLUCOSE: 109 MG/DL (ref 70–110)
POCT GLUCOSE: 109 MG/DL (ref 70–110)
POCT GLUCOSE: 110 MG/DL (ref 70–110)
POCT GLUCOSE: 119 MG/DL (ref 70–110)
POCT GLUCOSE: 123 MG/DL (ref 70–110)
POCT GLUCOSE: 168 MG/DL (ref 70–110)
POCT GLUCOSE: 33 MG/DL (ref 70–110)
POCT GLUCOSE: 55 MG/DL (ref 70–110)
POCT GLUCOSE: 64 MG/DL (ref 70–110)
POCT GLUCOSE: 87 MG/DL (ref 70–110)
POCT GLUCOSE: 94 MG/DL (ref 70–110)
POCT GLUCOSE: 97 MG/DL (ref 70–110)
POIKILOCYTOSIS BLD QL SMEAR: ABNORMAL
POTASSIUM BLOOD GAS (OHS): 3.8 MMOL/L (ref 3.5–5)
POTASSIUM BLOOD GAS (OHS): 3.8 MMOL/L (ref 3.5–5)
POTASSIUM BLOOD GAS (OHS): 4 MMOL/L (ref 3.5–5)
POTASSIUM BLOOD GAS (OHS): 4.4 MMOL/L (ref 3.5–5)
POTASSIUM BLOOD GAS (OHS): 4.6 MMOL/L (ref 3.5–5)
POTASSIUM BLOOD GAS (OHS): 4.7 MMOL/L (ref 3.5–5)
POTASSIUM BLOOD GAS (OHS): 4.8 MMOL/L (ref 3.5–5)
POTASSIUM BLOOD GAS (OHS): 4.8 MMOL/L (ref 3.5–5)
POTASSIUM SERPL-SCNC: 3.7 MMOL/L (ref 3.5–5.1)
POTASSIUM SERPL-SCNC: 3.8 MMOL/L (ref 3.5–5.1)
POTASSIUM SERPL-SCNC: 3.9 MMOL/L (ref 3.5–5.1)
POTASSIUM SERPL-SCNC: 3.9 MMOL/L (ref 3.5–5.1)
POTASSIUM SERPL-SCNC: 4 MMOL/L (ref 3.5–5.1)
POTASSIUM SERPL-SCNC: 4.1 MMOL/L (ref 3.5–5.1)
POTASSIUM SERPL-SCNC: 4.2 MMOL/L (ref 3.5–5.1)
POTASSIUM SERPL-SCNC: 4.4 MMOL/L (ref 3.5–5.1)
POTASSIUM SERPL-SCNC: 4.5 MMOL/L (ref 3.5–5.1)
POTASSIUM SERPL-SCNC: 4.6 MMOL/L (ref 3.5–5.1)
POTASSIUM SERPL-SCNC: 4.7 MMOL/L (ref 3.5–5.1)
POTASSIUM SERPL-SCNC: 4.8 MMOL/L (ref 3.5–5.1)
POTASSIUM SERPL-SCNC: 4.8 MMOL/L (ref 3.5–5.1)
POTASSIUM SERPL-SCNC: 5.2 MMOL/L (ref 3.5–5.1)
POTASSIUM SERPL-SCNC: 5.3 MMOL/L (ref 3.5–5.1)
POTASSIUM SERPL-SCNC: 5.4 MMOL/L (ref 3.5–5.1)
POTASSIUM SERPL-SCNC: 7 MMOL/L (ref 3.5–5.1)
PROT SERPL-MCNC: 5.5 GM/DL (ref 5.8–7.6)
PROT SERPL-MCNC: 5.5 GM/DL (ref 5.8–7.6)
PROT SERPL-MCNC: 5.8 GM/DL (ref 5.8–7.6)
PROT SERPL-MCNC: 5.9 GM/DL (ref 5.8–7.6)
PROT SERPL-MCNC: 6 GM/DL (ref 5.8–7.6)
PROT SERPL-MCNC: 6.4 GM/DL (ref 5.8–7.6)
PROT SERPL-MCNC: 6.5 GM/DL (ref 5.8–7.6)
PROT SERPL-MCNC: 6.5 GM/DL (ref 5.8–7.6)
PROT SERPL-MCNC: 6.7 GM/DL (ref 5.8–7.6)
PROT SERPL-MCNC: 7.2 GM/DL (ref 5.8–7.6)
PROT SERPL-MCNC: 7.3 GM/DL (ref 5.8–7.6)
PROT SERPL-MCNC: 7.7 GM/DL (ref 5.8–7.6)
PROT UR QL STRIP.AUTO: ABNORMAL
PROT UR QL STRIP.AUTO: ABNORMAL
PROTHROMBIN TIME: 17 SECONDS (ref 12.5–14.5)
PS (OHS): 10 CMH2O
PSA SERPL-MCNC: 4.86 NG/ML
RBC # BLD AUTO: 2.46 X10(6)/MCL (ref 4.7–6.1)
RBC # BLD AUTO: 2.47 X10(6)/MCL (ref 4.7–6.1)
RBC # BLD AUTO: 2.58 X10(6)/MCL (ref 4.7–6.1)
RBC # BLD AUTO: 2.61 X10(6)/MCL (ref 4.7–6.1)
RBC # BLD AUTO: 2.69 X10(6)/MCL (ref 4.7–6.1)
RBC # BLD AUTO: 2.69 X10(6)/MCL (ref 4.7–6.1)
RBC # BLD AUTO: 2.72 X10(6)/MCL (ref 4.7–6.1)
RBC # BLD AUTO: 2.74 X10(6)/MCL (ref 4.7–6.1)
RBC # BLD AUTO: 2.74 X10(6)/MCL (ref 4.7–6.1)
RBC # BLD AUTO: 2.75 X10(6)/MCL (ref 4.7–6.1)
RBC # BLD AUTO: 2.76 X10(6)/MCL (ref 4.7–6.1)
RBC # BLD AUTO: 2.76 X10(6)/MCL (ref 4.7–6.1)
RBC # BLD AUTO: 2.86 X10(6)/MCL (ref 4.7–6.1)
RBC # BLD AUTO: 2.91 X10(6)/MCL (ref 4.7–6.1)
RBC # BLD AUTO: 2.93 X10(6)/MCL (ref 4.7–6.1)
RBC # BLD AUTO: 2.97 X10(6)/MCL (ref 4.7–6.1)
RBC # BLD AUTO: 3.18 X10(6)/MCL (ref 4.7–6.1)
RBC # BLD AUTO: 3.24 X10(6)/MCL (ref 4.7–6.1)
RBC #/AREA URNS AUTO: >100 /HPF
RBC #/AREA URNS AUTO: >100 /HPF
RBC MORPH BLD: ABNORMAL
RBC UR QL AUTO: ABNORMAL
RBC UR QL AUTO: ABNORMAL
RENAL EPI CELLS #/AREA UR COMP ASSIST: ABNORMAL /HPF
SAMPLE SITE BLOOD GAS (OHS): ABNORMAL
SAO2 % BLDA: 88.7 %
SAO2 % BLDA: 96 %
SAO2 % BLDA: 96.7 %
SAO2 % BLDA: 97.2 %
SAO2 % BLDA: 98.4 %
SAO2 % BLDA: 98.8 %
SAO2 % BLDA: 98.9 %
SAO2 % BLDA: 98.9 %
SODIUM BLOOD GAS (OHS): 129 MMOL/L (ref 137–145)
SODIUM BLOOD GAS (OHS): 131 MMOL/L (ref 137–145)
SODIUM BLOOD GAS (OHS): 131 MMOL/L (ref 137–145)
SODIUM BLOOD GAS (OHS): 132 MMOL/L (ref 137–145)
SODIUM BLOOD GAS (OHS): 132 MMOL/L (ref 137–145)
SODIUM BLOOD GAS (OHS): 133 MMOL/L (ref 137–145)
SODIUM BLOOD GAS (OHS): 133 MMOL/L (ref 137–145)
SODIUM BLOOD GAS (OHS): 135 MMOL/L (ref 137–145)
SODIUM SERPL-SCNC: 133 MMOL/L (ref 136–145)
SODIUM SERPL-SCNC: 134 MMOL/L (ref 136–145)
SODIUM SERPL-SCNC: 134 MMOL/L (ref 136–145)
SODIUM SERPL-SCNC: 135 MMOL/L (ref 136–145)
SODIUM SERPL-SCNC: 136 MMOL/L (ref 136–145)
SODIUM SERPL-SCNC: 137 MMOL/L (ref 136–145)
SODIUM SERPL-SCNC: 137 MMOL/L (ref 136–145)
SODIUM SERPL-SCNC: 138 MMOL/L (ref 136–145)
SODIUM SERPL-SCNC: 139 MMOL/L (ref 136–145)
SODIUM SERPL-SCNC: 140 MMOL/L (ref 136–145)
SP GR UR STRIP.AUTO: 1.01 (ref 1–1.03)
SP GR UR STRIP.AUTO: 1.02 (ref 1–1.03)
SPECIFIC GRAVITY, URINE AUTO (.000) (OHS): 1.02 (ref 1–1.03)
SPECIMEN OUTDATE: NORMAL
SPONT RR (OHS): 45 B/MIN
SPONT RR (OHS): 45 B/MIN
SPONT+MECH VT ON VENT: 500 ML
SQUAMOUS #/AREA URNS LPF: ABNORMAL /HPF
SQUAMOUS #/AREA URNS LPF: ABNORMAL /HPF
TOXIC GRANULES BLD QL SMEAR: ABNORMAL
TRIGL SERPL-MCNC: 25 MG/DL (ref 34–140)
TROPONIN I SERPL-MCNC: 0.03 NG/ML (ref 0–0.04)
TROPONIN I SERPL-MCNC: 0.29 NG/ML (ref 0–0.04)
TROPONIN I SERPL-MCNC: 0.38 NG/ML (ref 0–0.04)
UROBILINOGEN UR STRIP-ACNC: NORMAL
UROBILINOGEN UR STRIP-ACNC: NORMAL
VIT B12 SERPL-MCNC: 637 PG/ML (ref 213–816)
VLDLC SERPL CALC-MCNC: 5 MG/DL
WBC # SPEC AUTO: 10.14 X10(3)/MCL (ref 4.5–11.5)
WBC # SPEC AUTO: 11.16 X10(3)/MCL (ref 4.5–11.5)
WBC # SPEC AUTO: 13.59 X10(3)/MCL (ref 4.5–11.5)
WBC # SPEC AUTO: 3.45 X10(3)/MCL (ref 4.5–11.5)
WBC # SPEC AUTO: 4.11 X10(3)/MCL (ref 4.5–11.5)
WBC # SPEC AUTO: 4.25 X10(3)/MCL (ref 4.5–11.5)
WBC # SPEC AUTO: 4.86 X10(3)/MCL (ref 4.5–11.5)
WBC # SPEC AUTO: 5.24 X10(3)/MCL (ref 4.5–11.5)
WBC # SPEC AUTO: 5.25 X10(3)/MCL (ref 4.5–11.5)
WBC # SPEC AUTO: 5.67 X10(3)/MCL (ref 4.5–11.5)
WBC # SPEC AUTO: 6.12 X10(3)/MCL (ref 4.5–11.5)
WBC # SPEC AUTO: 6.86 X10(3)/MCL (ref 4.5–11.5)
WBC # SPEC AUTO: 6.94 X10(3)/MCL (ref 4.5–11.5)
WBC # SPEC AUTO: 7.21 X10(3)/MCL (ref 4.5–11.5)
WBC # SPEC AUTO: 7.72 X10(3)/MCL (ref 4.5–11.5)
WBC # SPEC AUTO: 7.92 X10(3)/MCL (ref 4.5–11.5)
WBC # SPEC AUTO: 8.29 X10(3)/MCL (ref 4.5–11.5)
WBC # SPEC AUTO: 8.71 X10(3)/MCL (ref 4.5–11.5)
WBC #/AREA URNS AUTO: >100 /HPF
WBC #/AREA URNS AUTO: >100 /HPF
WBC CLUMPS UR QL AUTO: ABNORMAL
WBC VACUOLES (OHS): ABNORMAL

## 2024-01-01 PROCEDURE — 80100016 HC MAINTENANCE HEMODIALYSIS

## 2024-01-01 PROCEDURE — 36600 WITHDRAWAL OF ARTERIAL BLOOD: CPT

## 2024-01-01 PROCEDURE — 25000003 PHARM REV CODE 250

## 2024-01-01 PROCEDURE — 25000003 PHARM REV CODE 250: Performed by: INTERNAL MEDICINE

## 2024-01-01 PROCEDURE — 97530 THERAPEUTIC ACTIVITIES: CPT | Mod: CQ

## 2024-01-01 PROCEDURE — 27100171 HC OXYGEN HIGH FLOW UP TO 24 HOURS

## 2024-01-01 PROCEDURE — A4216 STERILE WATER/SALINE, 10 ML: HCPCS

## 2024-01-01 PROCEDURE — 84484 ASSAY OF TROPONIN QUANT: CPT | Performed by: INTERNAL MEDICINE

## 2024-01-01 PROCEDURE — 11000001 HC ACUTE MED/SURG PRIVATE ROOM

## 2024-01-01 PROCEDURE — 20000000 HC ICU ROOM

## 2024-01-01 PROCEDURE — 85025 COMPLETE CBC W/AUTO DIFF WBC: CPT | Performed by: INTERNAL MEDICINE

## 2024-01-01 PROCEDURE — 25000003 PHARM REV CODE 250: Performed by: NURSE PRACTITIONER

## 2024-01-01 PROCEDURE — 94760 N-INVAS EAR/PLS OXIMETRY 1: CPT

## 2024-01-01 PROCEDURE — 94003 VENT MGMT INPAT SUBQ DAY: CPT

## 2024-01-01 PROCEDURE — 0BH17EZ INSERTION OF ENDOTRACHEAL AIRWAY INTO TRACHEA, VIA NATURAL OR ARTIFICIAL OPENING: ICD-10-PCS | Performed by: HOSPITALIST

## 2024-01-01 PROCEDURE — 82306 VITAMIN D 25 HYDROXY: CPT | Performed by: STUDENT IN AN ORGANIZED HEALTH CARE EDUCATION/TRAINING PROGRAM

## 2024-01-01 PROCEDURE — 83735 ASSAY OF MAGNESIUM: CPT | Performed by: STUDENT IN AN ORGANIZED HEALTH CARE EDUCATION/TRAINING PROGRAM

## 2024-01-01 PROCEDURE — S0179 MEGESTROL 20 MG: HCPCS | Performed by: NURSE PRACTITIONER

## 2024-01-01 PROCEDURE — 80053 COMPREHEN METABOLIC PANEL: CPT | Performed by: STUDENT IN AN ORGANIZED HEALTH CARE EDUCATION/TRAINING PROGRAM

## 2024-01-01 PROCEDURE — 27000207 HC ISOLATION

## 2024-01-01 PROCEDURE — 80048 BASIC METABOLIC PNL TOTAL CA: CPT | Performed by: INTERNAL MEDICINE

## 2024-01-01 PROCEDURE — 81001 URINALYSIS AUTO W/SCOPE: CPT | Performed by: EMERGENCY MEDICINE

## 2024-01-01 PROCEDURE — 63600175 PHARM REV CODE 636 W HCPCS: Performed by: NURSE PRACTITIONER

## 2024-01-01 PROCEDURE — 99900026 HC AIRWAY MAINTENANCE (STAT)

## 2024-01-01 PROCEDURE — 25000003 PHARM REV CODE 250: Mod: JZ,JG | Performed by: EMERGENCY MEDICINE

## 2024-01-01 PROCEDURE — 94760 N-INVAS EAR/PLS OXIMETRY 1: CPT | Mod: XB

## 2024-01-01 PROCEDURE — 21400001 HC TELEMETRY ROOM

## 2024-01-01 PROCEDURE — 99900031 HC PATIENT EDUCATION (STAT)

## 2024-01-01 PROCEDURE — 25000003 PHARM REV CODE 250: Performed by: STUDENT IN AN ORGANIZED HEALTH CARE EDUCATION/TRAINING PROGRAM

## 2024-01-01 PROCEDURE — 82607 VITAMIN B-12: CPT | Performed by: STUDENT IN AN ORGANIZED HEALTH CARE EDUCATION/TRAINING PROGRAM

## 2024-01-01 PROCEDURE — 25000003 PHARM REV CODE 250: Performed by: HOSPITALIST

## 2024-01-01 PROCEDURE — 85027 COMPLETE CBC AUTOMATED: CPT | Performed by: STUDENT IN AN ORGANIZED HEALTH CARE EDUCATION/TRAINING PROGRAM

## 2024-01-01 PROCEDURE — 27200966 HC CLOSED SUCTION SYSTEM

## 2024-01-01 PROCEDURE — 87040 BLOOD CULTURE FOR BACTERIA: CPT | Performed by: INTERNAL MEDICINE

## 2024-01-01 PROCEDURE — 82803 BLOOD GASES ANY COMBINATION: CPT

## 2024-01-01 PROCEDURE — 63600175 PHARM REV CODE 636 W HCPCS: Performed by: STUDENT IN AN ORGANIZED HEALTH CARE EDUCATION/TRAINING PROGRAM

## 2024-01-01 PROCEDURE — 63600175 PHARM REV CODE 636 W HCPCS: Performed by: INTERNAL MEDICINE

## 2024-01-01 PROCEDURE — 63600175 PHARM REV CODE 636 W HCPCS: Performed by: HOSPITALIST

## 2024-01-01 PROCEDURE — 94761 N-INVAS EAR/PLS OXIMETRY MLT: CPT | Mod: XB

## 2024-01-01 PROCEDURE — 99900035 HC TECH TIME PER 15 MIN (STAT)

## 2024-01-01 PROCEDURE — 51798 US URINE CAPACITY MEASURE: CPT

## 2024-01-01 PROCEDURE — 87077 CULTURE AEROBIC IDENTIFY: CPT | Performed by: NURSE PRACTITIONER

## 2024-01-01 PROCEDURE — 85007 BL SMEAR W/DIFF WBC COUNT: CPT | Performed by: STUDENT IN AN ORGANIZED HEALTH CARE EDUCATION/TRAINING PROGRAM

## 2024-01-01 PROCEDURE — 80053 COMPREHEN METABOLIC PANEL: CPT | Performed by: FAMILY MEDICINE

## 2024-01-01 PROCEDURE — 25000003 PHARM REV CODE 250: Performed by: EMERGENCY MEDICINE

## 2024-01-01 PROCEDURE — 85025 COMPLETE CBC W/AUTO DIFF WBC: CPT | Performed by: NURSE PRACTITIONER

## 2024-01-01 PROCEDURE — 84100 ASSAY OF PHOSPHORUS: CPT | Performed by: STUDENT IN AN ORGANIZED HEALTH CARE EDUCATION/TRAINING PROGRAM

## 2024-01-01 PROCEDURE — 85025 COMPLETE CBC W/AUTO DIFF WBC: CPT | Performed by: STUDENT IN AN ORGANIZED HEALTH CARE EDUCATION/TRAINING PROGRAM

## 2024-01-01 PROCEDURE — 87340 HEPATITIS B SURFACE AG IA: CPT | Performed by: NURSE PRACTITIONER

## 2024-01-01 PROCEDURE — 80100014 HC HEMODIALYSIS 1:1

## 2024-01-01 PROCEDURE — 87086 URINE CULTURE/COLONY COUNT: CPT | Performed by: EMERGENCY MEDICINE

## 2024-01-01 PROCEDURE — 85025 COMPLETE CBC W/AUTO DIFF WBC: CPT | Performed by: FAMILY MEDICINE

## 2024-01-01 PROCEDURE — G0390 TRAUMA RESPONS W/HOSP CRITI: HCPCS

## 2024-01-01 PROCEDURE — 80053 COMPREHEN METABOLIC PANEL: CPT | Performed by: NURSE PRACTITIONER

## 2024-01-01 PROCEDURE — 63600175 PHARM REV CODE 636 W HCPCS: Mod: JZ,JG | Performed by: HOSPITALIST

## 2024-01-01 PROCEDURE — C1751 CATH, INF, PER/CENT/MIDLINE: HCPCS

## 2024-01-01 PROCEDURE — 97162 PT EVAL MOD COMPLEX 30 MIN: CPT

## 2024-01-01 PROCEDURE — 93005 ELECTROCARDIOGRAM TRACING: CPT

## 2024-01-01 PROCEDURE — 36410 VNPNXR 3YR/> PHY/QHP DX/THER: CPT

## 2024-01-01 PROCEDURE — 5A1955Z RESPIRATORY VENTILATION, GREATER THAN 96 CONSECUTIVE HOURS: ICD-10-PCS | Performed by: HOSPITALIST

## 2024-01-01 PROCEDURE — 94640 AIRWAY INHALATION TREATMENT: CPT

## 2024-01-01 PROCEDURE — 94002 VENT MGMT INPAT INIT DAY: CPT

## 2024-01-01 PROCEDURE — 51702 INSERT TEMP BLADDER CATH: CPT

## 2024-01-01 PROCEDURE — 5A1D70Z PERFORMANCE OF URINARY FILTRATION, INTERMITTENT, LESS THAN 6 HOURS PER DAY: ICD-10-PCS | Performed by: INTERNAL MEDICINE

## 2024-01-01 PROCEDURE — 25000242 PHARM REV CODE 250 ALT 637 W/ HCPCS: Performed by: EMERGENCY MEDICINE

## 2024-01-01 PROCEDURE — 76937 US GUIDE VASCULAR ACCESS: CPT

## 2024-01-01 PROCEDURE — 85610 PROTHROMBIN TIME: CPT | Performed by: EMERGENCY MEDICINE

## 2024-01-01 PROCEDURE — 82746 ASSAY OF FOLIC ACID SERUM: CPT | Performed by: STUDENT IN AN ORGANIZED HEALTH CARE EDUCATION/TRAINING PROGRAM

## 2024-01-01 PROCEDURE — 81001 URINALYSIS AUTO W/SCOPE: CPT | Performed by: NURSE PRACTITIONER

## 2024-01-01 PROCEDURE — 84484 ASSAY OF TROPONIN QUANT: CPT | Performed by: NURSE PRACTITIONER

## 2024-01-01 PROCEDURE — 63600175 PHARM REV CODE 636 W HCPCS

## 2024-01-01 PROCEDURE — 90935 HEMODIALYSIS ONE EVALUATION: CPT

## 2024-01-01 PROCEDURE — 84100 ASSAY OF PHOSPHORUS: CPT | Performed by: EMERGENCY MEDICINE

## 2024-01-01 PROCEDURE — 27000190 HC CPAP FULL FACE MASK W/VALVE

## 2024-01-01 PROCEDURE — 25000242 PHARM REV CODE 250 ALT 637 W/ HCPCS: Performed by: NURSE PRACTITIONER

## 2024-01-01 PROCEDURE — 63600175 PHARM REV CODE 636 W HCPCS: Mod: JZ,JG | Performed by: NURSE PRACTITIONER

## 2024-01-01 PROCEDURE — 85025 COMPLETE CBC W/AUTO DIFF WBC: CPT | Performed by: EMERGENCY MEDICINE

## 2024-01-01 PROCEDURE — 85730 THROMBOPLASTIN TIME PARTIAL: CPT | Performed by: EMERGENCY MEDICINE

## 2024-01-01 PROCEDURE — 80061 LIPID PANEL: CPT | Performed by: FAMILY MEDICINE

## 2024-01-01 PROCEDURE — 63600175 PHARM REV CODE 636 W HCPCS: Performed by: EMERGENCY MEDICINE

## 2024-01-01 PROCEDURE — 86706 HEP B SURFACE ANTIBODY: CPT | Performed by: NURSE PRACTITIONER

## 2024-01-01 PROCEDURE — 84484 ASSAY OF TROPONIN QUANT: CPT | Performed by: EMERGENCY MEDICINE

## 2024-01-01 PROCEDURE — 94660 CPAP INITIATION&MGMT: CPT | Mod: XB

## 2024-01-01 PROCEDURE — 94761 N-INVAS EAR/PLS OXIMETRY MLT: CPT

## 2024-01-01 PROCEDURE — 99285 EMERGENCY DEPT VISIT HI MDM: CPT | Mod: 25

## 2024-01-01 PROCEDURE — 80048 BASIC METABOLIC PNL TOTAL CA: CPT | Performed by: NURSE PRACTITIONER

## 2024-01-01 PROCEDURE — 96374 THER/PROPH/DIAG INJ IV PUSH: CPT

## 2024-01-01 PROCEDURE — 80048 BASIC METABOLIC PNL TOTAL CA: CPT

## 2024-01-01 PROCEDURE — 80053 COMPREHEN METABOLIC PANEL: CPT | Performed by: INTERNAL MEDICINE

## 2024-01-01 PROCEDURE — 80053 COMPREHEN METABOLIC PANEL: CPT | Performed by: EMERGENCY MEDICINE

## 2024-01-01 PROCEDURE — 87641 MR-STAPH DNA AMP PROBE: CPT | Performed by: INTERNAL MEDICINE

## 2024-01-01 PROCEDURE — 93010 ELECTROCARDIOGRAM REPORT: CPT | Mod: ,,, | Performed by: INTERNAL MEDICINE

## 2024-01-01 PROCEDURE — 96375 TX/PRO/DX INJ NEW DRUG ADDON: CPT

## 2024-01-01 PROCEDURE — 83735 ASSAY OF MAGNESIUM: CPT | Performed by: EMERGENCY MEDICINE

## 2024-01-01 PROCEDURE — 99233 SBSQ HOSP IP/OBS HIGH 50: CPT | Mod: ,,, | Performed by: NURSE PRACTITIONER

## 2024-01-01 PROCEDURE — 97116 GAIT TRAINING THERAPY: CPT | Mod: CQ

## 2024-01-01 PROCEDURE — 80143 DRUG ASSAY ACETAMINOPHEN: CPT | Performed by: EMERGENCY MEDICINE

## 2024-01-01 PROCEDURE — 82077 ASSAY SPEC XCP UR&BREATH IA: CPT | Performed by: EMERGENCY MEDICINE

## 2024-01-01 PROCEDURE — 80307 DRUG TEST PRSMV CHEM ANLYZR: CPT | Performed by: EMERGENCY MEDICINE

## 2024-01-01 PROCEDURE — 27000221 HC OXYGEN, UP TO 24 HOURS

## 2024-01-01 PROCEDURE — 84100 ASSAY OF PHOSPHORUS: CPT | Performed by: INTERNAL MEDICINE

## 2024-01-01 PROCEDURE — 5A1D70Z PERFORMANCE OF URINARY FILTRATION, INTERMITTENT, LESS THAN 6 HOURS PER DAY: ICD-10-PCS | Performed by: STUDENT IN AN ORGANIZED HEALTH CARE EDUCATION/TRAINING PROGRAM

## 2024-01-01 PROCEDURE — 96372 THER/PROPH/DIAG INJ SC/IM: CPT | Performed by: EMERGENCY MEDICINE

## 2024-01-01 PROCEDURE — 85027 COMPLETE CBC AUTOMATED: CPT | Performed by: INTERNAL MEDICINE

## 2024-01-01 PROCEDURE — 97166 OT EVAL MOD COMPLEX 45 MIN: CPT

## 2024-01-01 PROCEDURE — 83735 ASSAY OF MAGNESIUM: CPT | Performed by: NURSE PRACTITIONER

## 2024-01-01 PROCEDURE — 83036 HEMOGLOBIN GLYCOSYLATED A1C: CPT | Performed by: FAMILY MEDICINE

## 2024-01-01 PROCEDURE — 84100 ASSAY OF PHOSPHORUS: CPT | Performed by: NURSE PRACTITIONER

## 2024-01-01 PROCEDURE — 84153 ASSAY OF PSA TOTAL: CPT | Performed by: FAMILY MEDICINE

## 2024-01-01 RX ORDER — MICONAZOLE NITRATE 2 %
POWDER (GRAM) TOPICAL 2 TIMES DAILY
Status: DISCONTINUED | OUTPATIENT
Start: 2024-01-01 | End: 2024-01-01 | Stop reason: HOSPADM

## 2024-01-01 RX ORDER — HYDROCODONE BITARTRATE AND ACETAMINOPHEN 10; 325 MG/1; MG/1
1 TABLET ORAL EVERY 8 HOURS PRN
Status: DISCONTINUED | OUTPATIENT
Start: 2024-01-01 | End: 2024-01-01

## 2024-01-01 RX ORDER — POLYETHYLENE GLYCOL 3350 17 G/17G
17 POWDER, FOR SOLUTION ORAL 2 TIMES DAILY PRN
Status: DISCONTINUED | OUTPATIENT
Start: 2024-01-01 | End: 2024-01-01 | Stop reason: HOSPADM

## 2024-01-01 RX ORDER — METHOCARBAMOL 500 MG/1
500 TABLET, FILM COATED ORAL NIGHTLY PRN
COMMUNITY

## 2024-01-01 RX ORDER — CALCIUM GLUCONATE 20 MG/ML
1 INJECTION, SOLUTION INTRAVENOUS EVERY 10 MIN PRN
Status: DISCONTINUED | OUTPATIENT
Start: 2024-01-01 | End: 2024-01-01 | Stop reason: HOSPADM

## 2024-01-01 RX ORDER — NIFEDIPINE 60 MG/1
60 TABLET, EXTENDED RELEASE ORAL DAILY
Status: ON HOLD | COMMUNITY
End: 2024-01-01 | Stop reason: HOSPADM

## 2024-01-01 RX ORDER — TAMSULOSIN HYDROCHLORIDE 0.4 MG/1
0.4 CAPSULE ORAL DAILY
Status: DISCONTINUED | OUTPATIENT
Start: 2024-01-01 | End: 2024-01-01 | Stop reason: HOSPADM

## 2024-01-01 RX ORDER — ASPIRIN 81 MG/1
81 TABLET ORAL DAILY
COMMUNITY
End: 2024-01-01 | Stop reason: CLARIF

## 2024-01-01 RX ORDER — MUPIROCIN 20 MG/G
OINTMENT TOPICAL 2 TIMES DAILY
Status: COMPLETED | OUTPATIENT
Start: 2024-01-01 | End: 2024-01-01

## 2024-01-01 RX ORDER — FAMOTIDINE 10 MG/ML
20 INJECTION INTRAVENOUS DAILY
Status: DISCONTINUED | OUTPATIENT
Start: 2024-01-01 | End: 2024-01-01 | Stop reason: HOSPADM

## 2024-01-01 RX ORDER — AMOXICILLIN 250 MG
1 CAPSULE ORAL 2 TIMES DAILY PRN
Qty: 60 TABLET | Refills: 0 | Status: SHIPPED | OUTPATIENT
Start: 2024-01-01

## 2024-01-01 RX ORDER — NALOXONE HCL 0.4 MG/ML
0.02 VIAL (ML) INJECTION
Status: DISCONTINUED | OUTPATIENT
Start: 2024-01-01 | End: 2024-01-01 | Stop reason: HOSPADM

## 2024-01-01 RX ORDER — ASCORBIC ACID 250 MG
500 TABLET ORAL 2 TIMES DAILY
Status: DISCONTINUED | OUTPATIENT
Start: 2024-01-01 | End: 2024-01-01 | Stop reason: HOSPADM

## 2024-01-01 RX ORDER — SODIUM CHLORIDE 9 MG/ML
INJECTION, SOLUTION INTRAVENOUS ONCE
Status: CANCELLED | OUTPATIENT
Start: 2024-01-01 | End: 2024-01-01

## 2024-01-01 RX ORDER — MICONAZOLE NITRATE 2 %
POWDER (GRAM) TOPICAL 2 TIMES DAILY
Qty: 85 G | Refills: 0 | Status: SHIPPED | OUTPATIENT
Start: 2024-01-01

## 2024-01-01 RX ORDER — ACETAMINOPHEN 325 MG/1
650 TABLET ORAL EVERY 6 HOURS PRN
Status: DISCONTINUED | OUTPATIENT
Start: 2024-01-01 | End: 2024-01-01 | Stop reason: HOSPADM

## 2024-01-01 RX ORDER — METHOCARBAMOL 500 MG/1
500 TABLET, FILM COATED ORAL 3 TIMES DAILY
Status: DISCONTINUED | OUTPATIENT
Start: 2024-01-01 | End: 2024-01-01 | Stop reason: HOSPADM

## 2024-01-01 RX ORDER — GLYCOPYRROLATE 0.2 MG/ML
0.2 INJECTION INTRAMUSCULAR; INTRAVENOUS 4 TIMES DAILY PRN
Status: DISCONTINUED | OUTPATIENT
Start: 2024-01-01 | End: 2024-01-01 | Stop reason: HOSPADM

## 2024-01-01 RX ORDER — ALUMINUM HYDROXIDE, MAGNESIUM HYDROXIDE, AND SIMETHICONE 1200; 120; 1200 MG/30ML; MG/30ML; MG/30ML
30 SUSPENSION ORAL 4 TIMES DAILY PRN
Qty: 354 ML | Refills: 0 | Status: SHIPPED | OUTPATIENT
Start: 2024-01-01

## 2024-01-01 RX ORDER — ALBUTEROL SULFATE 0.83 MG/ML
10 SOLUTION RESPIRATORY (INHALATION)
Status: COMPLETED | OUTPATIENT
Start: 2024-01-01 | End: 2024-01-01

## 2024-01-01 RX ORDER — SIMETHICONE 80 MG
1 TABLET,CHEWABLE ORAL 4 TIMES DAILY PRN
Status: DISCONTINUED | OUTPATIENT
Start: 2024-01-01 | End: 2024-01-01 | Stop reason: HOSPADM

## 2024-01-01 RX ORDER — LORAZEPAM 2 MG/ML
1 INJECTION INTRAMUSCULAR EVERY 4 HOURS PRN
Status: DISCONTINUED | OUTPATIENT
Start: 2024-01-01 | End: 2024-01-01 | Stop reason: HOSPADM

## 2024-01-01 RX ORDER — CALCIUM GLUCONATE 20 MG/ML
1 INJECTION, SOLUTION INTRAVENOUS
Status: COMPLETED | OUTPATIENT
Start: 2024-01-01 | End: 2024-01-01

## 2024-01-01 RX ORDER — ASCORBIC ACID 500 MG
500 TABLET ORAL 2 TIMES DAILY
COMMUNITY

## 2024-01-01 RX ORDER — AMOXICILLIN 250 MG
1 CAPSULE ORAL 2 TIMES DAILY PRN
Status: DISCONTINUED | OUTPATIENT
Start: 2024-01-01 | End: 2024-01-01 | Stop reason: HOSPADM

## 2024-01-01 RX ORDER — SODIUM CHLORIDE 9 MG/ML
INJECTION, SOLUTION INTRAVENOUS
Status: CANCELLED | OUTPATIENT
Start: 2024-01-01

## 2024-01-01 RX ORDER — MORPHINE SULFATE 4 MG/ML
4 INJECTION, SOLUTION INTRAMUSCULAR; INTRAVENOUS
Status: DISCONTINUED | OUTPATIENT
Start: 2024-01-01 | End: 2024-01-01 | Stop reason: HOSPADM

## 2024-01-01 RX ORDER — FOLIC ACID 1 MG/1
1 TABLET ORAL DAILY
Status: DISCONTINUED | OUTPATIENT
Start: 2024-01-01 | End: 2024-01-01 | Stop reason: HOSPADM

## 2024-01-01 RX ORDER — CLOPIDOGREL BISULFATE 75 MG/1
75 TABLET ORAL DAILY
COMMUNITY

## 2024-01-01 RX ORDER — SERTRALINE HYDROCHLORIDE 50 MG/1
50 TABLET, FILM COATED ORAL DAILY
Status: DISCONTINUED | OUTPATIENT
Start: 2024-01-01 | End: 2024-01-01

## 2024-01-01 RX ORDER — DEXMEDETOMIDINE HYDROCHLORIDE 4 UG/ML
0-1.4 INJECTION, SOLUTION INTRAVENOUS CONTINUOUS
Status: DISCONTINUED | OUTPATIENT
Start: 2024-01-01 | End: 2024-01-01 | Stop reason: HOSPADM

## 2024-01-01 RX ORDER — HYDRALAZINE HYDROCHLORIDE 25 MG/1
25 TABLET, FILM COATED ORAL 3 TIMES DAILY
Status: DISCONTINUED | OUTPATIENT
Start: 2024-01-01 | End: 2024-01-01

## 2024-01-01 RX ORDER — HYDROMORPHONE HYDROCHLORIDE 2 MG/ML
1 INJECTION, SOLUTION INTRAMUSCULAR; INTRAVENOUS; SUBCUTANEOUS
Status: DISCONTINUED | OUTPATIENT
Start: 2024-01-01 | End: 2024-01-01 | Stop reason: HOSPADM

## 2024-01-01 RX ORDER — MORPHINE SULFATE 4 MG/ML
2 INJECTION, SOLUTION INTRAMUSCULAR; INTRAVENOUS EVERY 4 HOURS PRN
Status: DISCONTINUED | OUTPATIENT
Start: 2024-01-01 | End: 2024-01-01

## 2024-01-01 RX ORDER — HYDRALAZINE HYDROCHLORIDE 20 MG/ML
10 INJECTION INTRAMUSCULAR; INTRAVENOUS EVERY 6 HOURS PRN
Status: DISCONTINUED | OUTPATIENT
Start: 2024-01-01 | End: 2024-01-01 | Stop reason: HOSPADM

## 2024-01-01 RX ORDER — NAPROXEN SODIUM 220 MG/1
81 TABLET, FILM COATED ORAL DAILY
COMMUNITY

## 2024-01-01 RX ORDER — ACETAMINOPHEN 325 MG/1
650 TABLET ORAL EVERY 6 HOURS PRN
Qty: 30 TABLET | Refills: 0 | Status: SHIPPED | OUTPATIENT
Start: 2024-01-01

## 2024-01-01 RX ORDER — TALC
6 POWDER (GRAM) TOPICAL NIGHTLY PRN
Status: DISCONTINUED | OUTPATIENT
Start: 2024-01-01 | End: 2024-01-01 | Stop reason: HOSPADM

## 2024-01-01 RX ORDER — SODIUM BICARBONATE 325 MG/1
1300 TABLET ORAL 2 TIMES DAILY
COMMUNITY

## 2024-01-01 RX ORDER — FOLIC ACID 1 MG/1
1 TABLET ORAL DAILY
Qty: 30 TABLET | Refills: 0 | Status: SHIPPED | OUTPATIENT
Start: 2024-01-01

## 2024-01-01 RX ORDER — POLYETHYLENE GLYCOL 3350 17 G/17G
17 POWDER, FOR SOLUTION ORAL DAILY PRN
Qty: 30 PACKET | Refills: 0 | Status: SHIPPED | OUTPATIENT
Start: 2024-01-01

## 2024-01-01 RX ORDER — SODIUM CHLORIDE 9 MG/ML
INJECTION, SOLUTION INTRAVENOUS CONTINUOUS
Status: DISCONTINUED | OUTPATIENT
Start: 2024-01-01 | End: 2024-01-01

## 2024-01-01 RX ORDER — SODIUM CHLORIDE 0.9 % (FLUSH) 0.9 %
10 SYRINGE (ML) INJECTION EVERY 6 HOURS
Status: DISCONTINUED | OUTPATIENT
Start: 2024-01-01 | End: 2024-01-01 | Stop reason: HOSPADM

## 2024-01-01 RX ORDER — SODIUM CHLORIDE 0.9 % (FLUSH) 0.9 %
10 SYRINGE (ML) INJECTION
Status: DISCONTINUED | OUTPATIENT
Start: 2024-01-01 | End: 2024-01-01 | Stop reason: HOSPADM

## 2024-01-01 RX ORDER — LANOLIN ALCOHOL/MO/W.PET/CERES
1 CREAM (GRAM) TOPICAL 2 TIMES DAILY
Status: DISCONTINUED | OUTPATIENT
Start: 2024-01-01 | End: 2024-01-01 | Stop reason: HOSPADM

## 2024-01-01 RX ORDER — HYDRALAZINE HYDROCHLORIDE 50 MG/1
50 TABLET, FILM COATED ORAL EVERY 12 HOURS
Status: DISCONTINUED | OUTPATIENT
Start: 2024-01-01 | End: 2024-01-01 | Stop reason: HOSPADM

## 2024-01-01 RX ORDER — AMLODIPINE BESYLATE 10 MG/1
10 TABLET ORAL DAILY
Qty: 90 TABLET | Refills: 3 | Status: SHIPPED | OUTPATIENT
Start: 2024-01-01

## 2024-01-01 RX ORDER — PROCHLORPERAZINE EDISYLATE 5 MG/ML
5 INJECTION INTRAMUSCULAR; INTRAVENOUS EVERY 6 HOURS PRN
Status: DISCONTINUED | OUTPATIENT
Start: 2024-01-01 | End: 2024-01-01 | Stop reason: HOSPADM

## 2024-01-01 RX ORDER — ATORVASTATIN CALCIUM 40 MG/1
40 TABLET, FILM COATED ORAL NIGHTLY
COMMUNITY

## 2024-01-01 RX ORDER — ERGOCALCIFEROL 1.25 MG/1
50000 CAPSULE ORAL
Status: DISCONTINUED | OUTPATIENT
Start: 2024-01-01 | End: 2024-01-01 | Stop reason: HOSPADM

## 2024-01-01 RX ORDER — MEGESTROL ACETATE 40 MG/ML
80 SUSPENSION ORAL 2 TIMES DAILY
Status: DISCONTINUED | OUTPATIENT
Start: 2024-01-01 | End: 2024-01-01 | Stop reason: HOSPADM

## 2024-01-01 RX ORDER — FLUTICASONE PROPIONATE 50 MCG
1 SPRAY, SUSPENSION (ML) NASAL DAILY
Status: DISCONTINUED | OUTPATIENT
Start: 2024-01-01 | End: 2024-01-01 | Stop reason: HOSPADM

## 2024-01-01 RX ORDER — ALUMINUM HYDROXIDE, MAGNESIUM HYDROXIDE, AND SIMETHICONE 1200; 120; 1200 MG/30ML; MG/30ML; MG/30ML
30 SUSPENSION ORAL 4 TIMES DAILY PRN
Status: DISCONTINUED | OUTPATIENT
Start: 2024-01-01 | End: 2024-01-01 | Stop reason: HOSPADM

## 2024-01-01 RX ORDER — ENOXAPARIN SODIUM 100 MG/ML
30 INJECTION SUBCUTANEOUS EVERY 24 HOURS
Status: DISCONTINUED | OUTPATIENT
Start: 2024-01-01 | End: 2024-01-01

## 2024-01-01 RX ORDER — TAMSULOSIN HYDROCHLORIDE 0.4 MG/1
0.4 CAPSULE ORAL 2 TIMES DAILY
COMMUNITY

## 2024-01-01 RX ORDER — MORPHINE SULFATE 10 MG/ML
10 INJECTION INTRAMUSCULAR; INTRAVENOUS; SUBCUTANEOUS ONCE AS NEEDED
Status: COMPLETED | OUTPATIENT
Start: 2024-01-01 | End: 2024-01-01

## 2024-01-01 RX ORDER — ATORVASTATIN CALCIUM 40 MG/1
40 TABLET, FILM COATED ORAL NIGHTLY
Status: DISCONTINUED | OUTPATIENT
Start: 2024-01-01 | End: 2024-01-01 | Stop reason: HOSPADM

## 2024-01-01 RX ORDER — HALOPERIDOL 5 MG/ML
5 INJECTION INTRAMUSCULAR ONCE
Status: COMPLETED | OUTPATIENT
Start: 2024-01-01 | End: 2024-01-01

## 2024-01-01 RX ORDER — CARVEDILOL 12.5 MG/1
12.5 TABLET ORAL 2 TIMES DAILY
Status: DISCONTINUED | OUTPATIENT
Start: 2024-01-01 | End: 2024-01-01 | Stop reason: HOSPADM

## 2024-01-01 RX ORDER — MUPIROCIN 20 MG/G
OINTMENT TOPICAL 2 TIMES DAILY
Status: DISPENSED | OUTPATIENT
Start: 2024-01-01 | End: 2024-01-01

## 2024-01-01 RX ORDER — AMLODIPINE BESYLATE 5 MG/1
10 TABLET ORAL DAILY
Status: DISCONTINUED | OUTPATIENT
Start: 2024-01-01 | End: 2024-01-01 | Stop reason: HOSPADM

## 2024-01-01 RX ORDER — PANTOPRAZOLE SODIUM 40 MG/1
40 TABLET, DELAYED RELEASE ORAL DAILY
Status: DISCONTINUED | OUTPATIENT
Start: 2024-01-01 | End: 2024-01-01 | Stop reason: HOSPADM

## 2024-01-01 RX ORDER — MORPHINE SULFATE 4 MG/ML
2 INJECTION, SOLUTION INTRAMUSCULAR; INTRAVENOUS ONCE
Status: COMPLETED | OUTPATIENT
Start: 2024-01-01 | End: 2024-01-01

## 2024-01-01 RX ORDER — HYDROCODONE BITARTRATE AND ACETAMINOPHEN 10; 325 MG/1; MG/1
1 TABLET ORAL EVERY 6 HOURS PRN
Status: DISCONTINUED | OUTPATIENT
Start: 2024-01-01 | End: 2024-01-01

## 2024-01-01 RX ORDER — SEVELAMER CARBONATE 800 MG/1
800 TABLET, FILM COATED ORAL DAILY
Start: 2024-01-01

## 2024-01-01 RX ORDER — CLOPIDOGREL BISULFATE 75 MG/1
75 TABLET ORAL DAILY
Status: DISCONTINUED | OUTPATIENT
Start: 2024-01-01 | End: 2024-01-01 | Stop reason: HOSPADM

## 2024-01-01 RX ORDER — ONDANSETRON HYDROCHLORIDE 2 MG/ML
4 INJECTION, SOLUTION INTRAVENOUS EVERY 4 HOURS PRN
Status: DISCONTINUED | OUTPATIENT
Start: 2024-01-01 | End: 2024-01-01 | Stop reason: HOSPADM

## 2024-01-01 RX ORDER — HEPARIN SODIUM 5000 [USP'U]/ML
5000 INJECTION, SOLUTION INTRAVENOUS; SUBCUTANEOUS EVERY 8 HOURS
Status: DISCONTINUED | OUTPATIENT
Start: 2024-01-01 | End: 2024-01-01

## 2024-01-01 RX ORDER — HALOPERIDOL 5 MG/ML
5 INJECTION INTRAMUSCULAR
Status: COMPLETED | OUTPATIENT
Start: 2024-01-01 | End: 2024-01-01

## 2024-01-01 RX ORDER — PROPOFOL 10 MG/ML
0-50 INJECTION, EMULSION INTRAVENOUS CONTINUOUS
Status: DISCONTINUED | OUTPATIENT
Start: 2024-01-01 | End: 2024-01-01 | Stop reason: HOSPADM

## 2024-01-01 RX ORDER — DIPHENHYDRAMINE HYDROCHLORIDE 50 MG/ML
12.5 INJECTION INTRAMUSCULAR; INTRAVENOUS
Status: COMPLETED | OUTPATIENT
Start: 2024-01-01 | End: 2024-01-01

## 2024-01-01 RX ORDER — NAPROXEN SODIUM 220 MG/1
81 TABLET, FILM COATED ORAL DAILY
Status: DISCONTINUED | OUTPATIENT
Start: 2024-01-01 | End: 2024-01-01 | Stop reason: HOSPADM

## 2024-01-01 RX ORDER — SODIUM BICARBONATE 650 MG/1
1300 TABLET ORAL 2 TIMES DAILY
Status: DISCONTINUED | OUTPATIENT
Start: 2024-01-01 | End: 2024-01-01 | Stop reason: HOSPADM

## 2024-01-01 RX ORDER — OXYCODONE AND ACETAMINOPHEN 10; 325 MG/1; MG/1
1 TABLET ORAL EVERY 4 HOURS PRN
Status: DISCONTINUED | OUTPATIENT
Start: 2024-01-01 | End: 2024-01-01 | Stop reason: HOSPADM

## 2024-01-01 RX ORDER — ERGOCALCIFEROL 1.25 MG/1
50000 CAPSULE ORAL
Qty: 4 CAPSULE | Refills: 1 | Status: SHIPPED | OUTPATIENT
Start: 2024-01-01 | End: 2024-05-05

## 2024-01-01 RX ORDER — ONDANSETRON HYDROCHLORIDE 2 MG/ML
4 INJECTION, SOLUTION INTRAVENOUS EVERY 6 HOURS PRN
Status: DISCONTINUED | OUTPATIENT
Start: 2024-01-01 | End: 2024-01-01 | Stop reason: HOSPADM

## 2024-01-01 RX ORDER — HYDROCODONE BITARTRATE AND ACETAMINOPHEN 10; 325 MG/1; MG/1
1 TABLET ORAL EVERY 4 HOURS PRN
Status: DISCONTINUED | OUTPATIENT
Start: 2024-01-01 | End: 2024-01-01

## 2024-01-01 RX ADMIN — MICONAZOLE NITRATE: 20 POWDER TOPICAL at 08:03

## 2024-01-01 RX ADMIN — CLOPIDOGREL BISULFATE 75 MG: 75 TABLET ORAL at 09:03

## 2024-01-01 RX ADMIN — FERROUS SULFATE TAB 325 MG (65 MG ELEMENTAL FE) 1 EACH: 325 (65 FE) TAB at 08:03

## 2024-01-01 RX ADMIN — ASPIRIN 81 MG CHEWABLE TABLET 81 MG: 81 TABLET CHEWABLE at 09:03

## 2024-01-01 RX ADMIN — WHITE PETROLATUM: 1.75 OINTMENT TOPICAL at 08:03

## 2024-01-01 RX ADMIN — PANTOPRAZOLE SODIUM 40 MG: 40 TABLET, DELAYED RELEASE ORAL at 09:03

## 2024-01-01 RX ADMIN — DEXMEDETOMIDINE HYDROCHLORIDE 1.4 MCG/KG/HR: 400 INJECTION INTRAVENOUS at 12:03

## 2024-01-01 RX ADMIN — Medication 500 MG: at 10:03

## 2024-01-01 RX ADMIN — PIPERACILLIN AND TAZOBACTAM 4.5 G: 4; .5 INJECTION, POWDER, LYOPHILIZED, FOR SOLUTION INTRAVENOUS; PARENTERAL at 08:03

## 2024-01-01 RX ADMIN — MUPIROCIN: 20 OINTMENT TOPICAL at 09:03

## 2024-01-01 RX ADMIN — DEXMEDETOMIDINE HYDROCHLORIDE 0.8 MCG/KG/HR: 400 INJECTION INTRAVENOUS at 04:03

## 2024-01-01 RX ADMIN — Medication 6 MG: at 08:03

## 2024-01-01 RX ADMIN — HYDRALAZINE HYDROCHLORIDE 25 MG: 25 TABLET, FILM COATED ORAL at 03:03

## 2024-01-01 RX ADMIN — CARVEDILOL 12.5 MG: 12.5 TABLET, FILM COATED ORAL at 09:03

## 2024-01-01 RX ADMIN — DEXTROSE MONOHYDRATE 250 ML: 100 INJECTION, SOLUTION INTRAVENOUS at 03:03

## 2024-01-01 RX ADMIN — MUPIROCIN: 20 OINTMENT TOPICAL at 08:03

## 2024-01-01 RX ADMIN — FLUTICASONE PROPIONATE 50 MCG: 50 SPRAY, METERED NASAL at 08:03

## 2024-01-01 RX ADMIN — HYDRALAZINE HYDROCHLORIDE 50 MG: 50 TABLET ORAL at 08:03

## 2024-01-01 RX ADMIN — ACETAMINOPHEN 650 MG: 325 TABLET, FILM COATED ORAL at 03:03

## 2024-01-01 RX ADMIN — SODIUM CHLORIDE, PRESERVATIVE FREE 10 ML: 5 INJECTION INTRAVENOUS at 05:03

## 2024-01-01 RX ADMIN — PIPERACILLIN AND TAZOBACTAM 4.5 G: 4; .5 INJECTION, POWDER, LYOPHILIZED, FOR SOLUTION INTRAVENOUS; PARENTERAL at 09:03

## 2024-01-01 RX ADMIN — PROPOFOL 30 MCG/KG/MIN: 10 INJECTION, EMULSION INTRAVENOUS at 01:03

## 2024-01-01 RX ADMIN — FLUTICASONE PROPIONATE 50 MCG: 50 SPRAY, METERED NASAL at 12:03

## 2024-01-01 RX ADMIN — PANTOPRAZOLE SODIUM 40 MG: 40 TABLET, DELAYED RELEASE ORAL at 08:03

## 2024-01-01 RX ADMIN — SODIUM CHLORIDE, PRESERVATIVE FREE 10 ML: 5 INJECTION INTRAVENOUS at 11:03

## 2024-01-01 RX ADMIN — FOLIC ACID 1 MG: 1 TABLET ORAL at 08:03

## 2024-01-01 RX ADMIN — DEXMEDETOMIDINE HYDROCHLORIDE 0.6 MCG/KG/HR: 400 INJECTION INTRAVENOUS at 06:03

## 2024-01-01 RX ADMIN — SODIUM BICARBONATE 650 MG TABLET 1300 MG: at 08:03

## 2024-01-01 RX ADMIN — HYDROCODONE BITARTRATE AND ACETAMINOPHEN 1 TABLET: 10; 325 TABLET ORAL at 03:03

## 2024-01-01 RX ADMIN — CLOPIDOGREL BISULFATE 75 MG: 75 TABLET ORAL at 08:03

## 2024-01-01 RX ADMIN — HYDROCODONE BITARTRATE AND ACETAMINOPHEN 1 TABLET: 10; 325 TABLET ORAL at 11:03

## 2024-01-01 RX ADMIN — SODIUM BICARBONATE 650 MG TABLET 1300 MG: at 09:03

## 2024-01-01 RX ADMIN — TAMSULOSIN HYDROCHLORIDE 0.4 MG: 0.4 CAPSULE ORAL at 08:03

## 2024-01-01 RX ADMIN — PIPERACILLIN AND TAZOBACTAM 4.5 G: 4; .5 INJECTION, POWDER, LYOPHILIZED, FOR SOLUTION INTRAVENOUS; PARENTERAL at 10:03

## 2024-01-01 RX ADMIN — SODIUM CHLORIDE, PRESERVATIVE FREE 10 ML: 5 INJECTION INTRAVENOUS at 03:03

## 2024-01-01 RX ADMIN — CEFTRIAXONE SODIUM 1 G: 1 INJECTION, POWDER, FOR SOLUTION INTRAMUSCULAR; INTRAVENOUS at 08:03

## 2024-01-01 RX ADMIN — CARVEDILOL 12.5 MG: 12.5 TABLET, FILM COATED ORAL at 08:03

## 2024-01-01 RX ADMIN — Medication: at 09:03

## 2024-01-01 RX ADMIN — MICONAZOLE NITRATE: 20 POWDER TOPICAL at 09:03

## 2024-01-01 RX ADMIN — SODIUM CHLORIDE, PRESERVATIVE FREE 10 ML: 5 INJECTION INTRAVENOUS at 01:03

## 2024-01-01 RX ADMIN — PROPOFOL 20 MCG/KG/MIN: 10 INJECTION, EMULSION INTRAVENOUS at 09:03

## 2024-01-01 RX ADMIN — HYDROCODONE BITARTRATE AND ACETAMINOPHEN 1 TABLET: 10; 325 TABLET ORAL at 05:03

## 2024-01-01 RX ADMIN — HYDROCODONE BITARTRATE AND ACETAMINOPHEN 1 TABLET: 10; 325 TABLET ORAL at 09:03

## 2024-01-01 RX ADMIN — DOXYCYCLINE 100 MG: 100 INJECTION, POWDER, LYOPHILIZED, FOR SOLUTION INTRAVENOUS at 09:03

## 2024-01-01 RX ADMIN — Medication 500 MG: at 08:03

## 2024-01-01 RX ADMIN — PROPOFOL 10 MCG/KG/MIN: 10 INJECTION, EMULSION INTRAVENOUS at 10:03

## 2024-01-01 RX ADMIN — Medication 500 MG: at 09:03

## 2024-01-01 RX ADMIN — DEXMEDETOMIDINE HYDROCHLORIDE 0.8 MCG/KG/HR: 400 INJECTION INTRAVENOUS at 06:03

## 2024-01-01 RX ADMIN — ATORVASTATIN CALCIUM 40 MG: 40 TABLET, FILM COATED ORAL at 08:03

## 2024-01-01 RX ADMIN — ASPIRIN 81 MG CHEWABLE TABLET 81 MG: 81 TABLET CHEWABLE at 08:03

## 2024-01-01 RX ADMIN — MEGESTROL ACETATE 80 MG: 40 SUSPENSION ORAL at 08:03

## 2024-01-01 RX ADMIN — AMLODIPINE BESYLATE 10 MG: 5 TABLET ORAL at 09:03

## 2024-01-01 RX ADMIN — SODIUM CHLORIDE, PRESERVATIVE FREE 10 ML: 5 INJECTION INTRAVENOUS at 12:03

## 2024-01-01 RX ADMIN — ASPIRIN 81 MG CHEWABLE TABLET 81 MG: 81 TABLET CHEWABLE at 10:03

## 2024-01-01 RX ADMIN — AZITHROMYCIN MONOHYDRATE 500 MG: 500 INJECTION, POWDER, LYOPHILIZED, FOR SOLUTION INTRAVENOUS at 02:03

## 2024-01-01 RX ADMIN — ACETAMINOPHEN 650 MG: 325 TABLET, FILM COATED ORAL at 12:03

## 2024-01-01 RX ADMIN — ACETAMINOPHEN 650 MG: 325 TABLET, FILM COATED ORAL at 09:03

## 2024-01-01 RX ADMIN — DEXTROSE MONOHYDRATE 250 ML: 100 INJECTION, SOLUTION INTRAVENOUS at 08:03

## 2024-01-01 RX ADMIN — PROPOFOL 5 MCG/KG/MIN: 10 INJECTION, EMULSION INTRAVENOUS at 03:03

## 2024-01-01 RX ADMIN — DEXMEDETOMIDINE HYDROCHLORIDE 0.5 MCG/KG/HR: 400 INJECTION INTRAVENOUS at 01:03

## 2024-01-01 RX ADMIN — LORAZEPAM 1 MG: 2 INJECTION INTRAMUSCULAR; INTRAVENOUS at 09:03

## 2024-01-01 RX ADMIN — DEXTROSE MONOHYDRATE 500 ML: 100 INJECTION, SOLUTION INTRAVENOUS at 01:03

## 2024-01-01 RX ADMIN — CLOPIDOGREL BISULFATE 75 MG: 75 TABLET ORAL at 12:03

## 2024-01-01 RX ADMIN — ACETAMINOPHEN 325 MG: 325 SUPPOSITORY RECTAL at 04:03

## 2024-01-01 RX ADMIN — FERROUS SULFATE TAB 325 MG (65 MG ELEMENTAL FE) 1 EACH: 325 (65 FE) TAB at 10:03

## 2024-01-01 RX ADMIN — SODIUM CHLORIDE: 9 INJECTION, SOLUTION INTRAVENOUS at 09:03

## 2024-01-01 RX ADMIN — ONDANSETRON 4 MG: 2 INJECTION INTRAMUSCULAR; INTRAVENOUS at 04:03

## 2024-01-01 RX ADMIN — FAMOTIDINE 20 MG: 10 INJECTION, SOLUTION INTRAVENOUS at 08:03

## 2024-01-01 RX ADMIN — PIPERACILLIN AND TAZOBACTAM 4.5 G: 4; .5 INJECTION, POWDER, LYOPHILIZED, FOR SOLUTION INTRAVENOUS; PARENTERAL at 05:03

## 2024-01-01 RX ADMIN — WHITE PETROLATUM: 1.75 OINTMENT TOPICAL at 09:03

## 2024-01-01 RX ADMIN — GLYCOPYRROLATE 0.2 MG: 0.2 INJECTION INTRAMUSCULAR; INTRAVENOUS at 02:03

## 2024-01-01 RX ADMIN — PROPOFOL 20 MCG/KG/MIN: 10 INJECTION, EMULSION INTRAVENOUS at 08:03

## 2024-01-01 RX ADMIN — MEGESTROL ACETATE 80 MG: 40 SUSPENSION ORAL at 09:03

## 2024-01-01 RX ADMIN — AMLODIPINE BESYLATE 10 MG: 5 TABLET ORAL at 08:03

## 2024-01-01 RX ADMIN — ERYTHROPOIETIN 20000 UNITS: 20000 INJECTION, SOLUTION INTRAVENOUS; SUBCUTANEOUS at 10:03

## 2024-01-01 RX ADMIN — SODIUM CHLORIDE, PRESERVATIVE FREE 10 ML: 5 INJECTION INTRAVENOUS at 06:03

## 2024-01-01 RX ADMIN — DOXYCYCLINE 100 MG: 100 INJECTION, POWDER, LYOPHILIZED, FOR SOLUTION INTRAVENOUS at 10:03

## 2024-01-01 RX ADMIN — CARVEDILOL 12.5 MG: 12.5 TABLET, FILM COATED ORAL at 12:03

## 2024-01-01 RX ADMIN — MORPHINE SULFATE 4 MG: 4 INJECTION, SOLUTION INTRAMUSCULAR; INTRAVENOUS at 03:03

## 2024-01-01 RX ADMIN — DEXMEDETOMIDINE HYDROCHLORIDE 0.6 MCG/KG/HR: 400 INJECTION INTRAVENOUS at 09:03

## 2024-01-01 RX ADMIN — MEGESTROL ACETATE 80 MG: 40 SUSPENSION ORAL at 10:03

## 2024-01-01 RX ADMIN — HYDROCODONE BITARTRATE AND ACETAMINOPHEN 1 TABLET: 10; 325 TABLET ORAL at 12:03

## 2024-01-01 RX ADMIN — AZITHROMYCIN MONOHYDRATE 500 MG: 500 INJECTION, POWDER, LYOPHILIZED, FOR SOLUTION INTRAVENOUS at 12:03

## 2024-01-01 RX ADMIN — HYDROCODONE BITARTRATE AND ACETAMINOPHEN 1 TABLET: 10; 325 TABLET ORAL at 11:02

## 2024-01-01 RX ADMIN — FERROUS SULFATE TAB 325 MG (65 MG ELEMENTAL FE) 1 EACH: 325 (65 FE) TAB at 12:03

## 2024-01-01 RX ADMIN — DEXMEDETOMIDINE HYDROCHLORIDE 0.2 MCG/KG/HR: 400 INJECTION INTRAVENOUS at 01:03

## 2024-01-01 RX ADMIN — MORPHINE SULFATE 4 MG: 4 INJECTION, SOLUTION INTRAMUSCULAR; INTRAVENOUS at 09:03

## 2024-01-01 RX ADMIN — FERROUS SULFATE TAB 325 MG (65 MG ELEMENTAL FE) 1 EACH: 325 (65 FE) TAB at 09:03

## 2024-01-01 RX ADMIN — MORPHINE SULFATE 10 MG: 10 INJECTION INTRAVENOUS at 01:03

## 2024-01-01 RX ADMIN — HYDRALAZINE HYDROCHLORIDE 25 MG: 25 TABLET, FILM COATED ORAL at 09:03

## 2024-01-01 RX ADMIN — HYDROCODONE BITARTRATE AND ACETAMINOPHEN 1 TABLET: 10; 325 TABLET ORAL at 08:03

## 2024-01-01 RX ADMIN — DEXMEDETOMIDINE HYDROCHLORIDE 0.6 MCG/KG/HR: 400 INJECTION INTRAVENOUS at 12:03

## 2024-01-01 RX ADMIN — ATORVASTATIN CALCIUM 40 MG: 40 TABLET, FILM COATED ORAL at 09:03

## 2024-01-01 RX ADMIN — HYDRALAZINE HYDROCHLORIDE 50 MG: 50 TABLET ORAL at 03:03

## 2024-01-01 RX ADMIN — AMLODIPINE BESYLATE 10 MG: 5 TABLET ORAL at 12:03

## 2024-01-01 RX ADMIN — HYDROCODONE BITARTRATE AND ACETAMINOPHEN 1 TABLET: 10; 325 TABLET ORAL at 10:03

## 2024-01-01 RX ADMIN — HYDRALAZINE HYDROCHLORIDE 25 MG: 25 TABLET, FILM COATED ORAL at 08:03

## 2024-01-01 RX ADMIN — HALOPERIDOL LACTATE 5 MG: 5 INJECTION, SOLUTION INTRAMUSCULAR at 11:03

## 2024-01-01 RX ADMIN — FLUTICASONE PROPIONATE 50 MCG: 50 SPRAY, METERED NASAL at 10:03

## 2024-01-01 RX ADMIN — CEFTRIAXONE 1 G: 1 INJECTION, POWDER, FOR SOLUTION INTRAMUSCULAR; INTRAVENOUS at 08:03

## 2024-01-01 RX ADMIN — Medication 6 MG: at 10:03

## 2024-01-01 RX ADMIN — ERGOCALCIFEROL 50000 UNITS: 1.25 CAPSULE ORAL at 12:03

## 2024-01-01 RX ADMIN — DEXMEDETOMIDINE HYDROCHLORIDE 0.4 MCG/KG/HR: 400 INJECTION INTRAVENOUS at 08:03

## 2024-01-01 RX ADMIN — METHOCARBAMOL 500 MG: 500 TABLET ORAL at 08:03

## 2024-01-01 RX ADMIN — OXYCODONE AND ACETAMINOPHEN 1 TABLET: 10; 325 TABLET ORAL at 04:03

## 2024-01-01 RX ADMIN — TAMSULOSIN HYDROCHLORIDE 0.4 MG: 0.4 CAPSULE ORAL at 10:03

## 2024-01-01 RX ADMIN — CARVEDILOL 12.5 MG: 12.5 TABLET, FILM COATED ORAL at 10:03

## 2024-01-01 RX ADMIN — PROPOFOL 30 MCG/KG/MIN: 10 INJECTION, EMULSION INTRAVENOUS at 12:03

## 2024-01-01 RX ADMIN — FLUTICASONE PROPIONATE 50 MCG: 50 SPRAY, METERED NASAL at 09:03

## 2024-01-01 RX ADMIN — PIPERACILLIN AND TAZOBACTAM 4.5 G: 4; .5 INJECTION, POWDER, LYOPHILIZED, FOR SOLUTION INTRAVENOUS; PARENTERAL at 12:03

## 2024-01-01 RX ADMIN — FOLIC ACID 1 MG: 1 TABLET ORAL at 10:03

## 2024-01-01 RX ADMIN — CEFTRIAXONE SODIUM 1 G: 1 INJECTION, POWDER, FOR SOLUTION INTRAMUSCULAR; INTRAVENOUS at 10:03

## 2024-01-01 RX ADMIN — HYDROCODONE BITARTRATE AND ACETAMINOPHEN 1 TABLET: 10; 325 TABLET ORAL at 07:03

## 2024-01-01 RX ADMIN — MORPHINE SULFATE 2 MG: 4 INJECTION, SOLUTION INTRAMUSCULAR; INTRAVENOUS at 09:03

## 2024-01-01 RX ADMIN — TAMSULOSIN HYDROCHLORIDE 0.4 MG: 0.4 CAPSULE ORAL at 12:03

## 2024-01-01 RX ADMIN — MORPHINE SULFATE 2 MG: 4 INJECTION, SOLUTION INTRAMUSCULAR; INTRAVENOUS at 01:03

## 2024-01-01 RX ADMIN — SODIUM CHLORIDE, PRESERVATIVE FREE 10 ML: 5 INJECTION INTRAVENOUS at 08:03

## 2024-01-01 RX ADMIN — FOLIC ACID 1 MG: 1 TABLET ORAL at 09:03

## 2024-01-01 RX ADMIN — HYDROCODONE BITARTRATE AND ACETAMINOPHEN 1 TABLET: 10; 325 TABLET ORAL at 04:03

## 2024-01-01 RX ADMIN — SODIUM BICARBONATE 650 MG TABLET 1300 MG: at 12:03

## 2024-01-01 RX ADMIN — AZITHROMYCIN MONOHYDRATE 500 MG: 500 INJECTION, POWDER, LYOPHILIZED, FOR SOLUTION INTRAVENOUS at 01:03

## 2024-01-01 RX ADMIN — DOXYCYCLINE 100 MG: 100 INJECTION, POWDER, LYOPHILIZED, FOR SOLUTION INTRAVENOUS at 11:03

## 2024-01-01 RX ADMIN — DIPHENHYDRAMINE HYDROCHLORIDE 12.5 MG: 50 INJECTION INTRAMUSCULAR; INTRAVENOUS at 11:03

## 2024-01-01 RX ADMIN — Medication 500 MG: at 12:03

## 2024-01-01 RX ADMIN — ERGOCALCIFEROL 50000 UNITS: 1.25 CAPSULE ORAL at 09:03

## 2024-01-01 RX ADMIN — FAMOTIDINE 20 MG: 10 INJECTION, SOLUTION INTRAVENOUS at 09:03

## 2024-01-01 RX ADMIN — HALOPERIDOL LACTATE 5 MG: 5 INJECTION, SOLUTION INTRAMUSCULAR at 09:03

## 2024-01-01 RX ADMIN — LORAZEPAM 1 MG: 2 INJECTION INTRAMUSCULAR; INTRAVENOUS at 02:03

## 2024-01-01 RX ADMIN — HYDRALAZINE HYDROCHLORIDE 25 MG: 25 TABLET, FILM COATED ORAL at 10:03

## 2024-01-01 RX ADMIN — FOLIC ACID 1 MG: 1 TABLET ORAL at 12:03

## 2024-01-01 RX ADMIN — AZITHROMYCIN MONOHYDRATE 500 MG: 500 INJECTION, POWDER, LYOPHILIZED, FOR SOLUTION INTRAVENOUS at 03:03

## 2024-01-01 RX ADMIN — MUPIROCIN: 20 OINTMENT TOPICAL at 10:03

## 2024-01-01 RX ADMIN — DEXMEDETOMIDINE HYDROCHLORIDE 0.2 MCG/KG/HR: 400 INJECTION INTRAVENOUS at 08:03

## 2024-01-01 RX ADMIN — PANTOPRAZOLE SODIUM 40 MG: 40 TABLET, DELAYED RELEASE ORAL at 10:03

## 2024-01-01 RX ADMIN — Medication 10 ML: at 10:03

## 2024-01-01 RX ADMIN — TAMSULOSIN HYDROCHLORIDE 0.4 MG: 0.4 CAPSULE ORAL at 09:03

## 2024-01-01 RX ADMIN — PANTOPRAZOLE SODIUM 40 MG: 40 TABLET, DELAYED RELEASE ORAL at 12:03

## 2024-01-01 RX ADMIN — METHOCARBAMOL 500 MG: 500 TABLET ORAL at 03:03

## 2024-01-01 RX ADMIN — Medication: at 08:03

## 2024-01-01 RX ADMIN — SERTRALINE HYDROCHLORIDE 50 MG: 50 TABLET ORAL at 03:03

## 2024-01-01 RX ADMIN — CEFTRIAXONE SODIUM 1 G: 1 INJECTION, POWDER, FOR SOLUTION INTRAMUSCULAR; INTRAVENOUS at 09:02

## 2024-01-01 RX ADMIN — OXYCODONE AND ACETAMINOPHEN 1 TABLET: 10; 325 TABLET ORAL at 07:03

## 2024-01-01 RX ADMIN — SODIUM BICARBONATE 650 MG TABLET 1300 MG: at 10:03

## 2024-01-01 RX ADMIN — ASPIRIN 81 MG CHEWABLE TABLET 81 MG: 81 TABLET CHEWABLE at 12:03

## 2024-01-01 RX ADMIN — OXYCODONE AND ACETAMINOPHEN 1 TABLET: 10; 325 TABLET ORAL at 08:03

## 2024-01-01 RX ADMIN — PROPOFOL 25 MCG/KG/MIN: 10 INJECTION, EMULSION INTRAVENOUS at 02:03

## 2024-01-01 RX ADMIN — PROPOFOL 5 MCG/KG/MIN: 10 INJECTION, EMULSION INTRAVENOUS at 09:03

## 2024-01-01 RX ADMIN — MEGESTROL ACETATE 80 MG: 40 SUSPENSION ORAL at 12:03

## 2024-01-01 RX ADMIN — ALBUTEROL SULFATE 10 MG: 2.5 SOLUTION RESPIRATORY (INHALATION) at 12:03

## 2024-01-01 RX ADMIN — OXYCODONE AND ACETAMINOPHEN 1 TABLET: 10; 325 TABLET ORAL at 09:03

## 2024-01-01 RX ADMIN — Medication 10 ML: at 12:03

## 2024-01-01 RX ADMIN — CLOPIDOGREL BISULFATE 75 MG: 75 TABLET ORAL at 10:03

## 2024-01-01 RX ADMIN — HEPARIN SODIUM 5000 UNITS: 5000 INJECTION, SOLUTION INTRAVENOUS; SUBCUTANEOUS at 09:03

## 2024-01-01 RX ADMIN — SODIUM CHLORIDE, PRESERVATIVE FREE 10 ML: 5 INJECTION INTRAVENOUS at 02:03

## 2024-01-01 RX ADMIN — ACETAMINOPHEN 325 MG: 325 SUPPOSITORY RECTAL at 12:03

## 2024-01-01 RX ADMIN — AMLODIPINE BESYLATE 10 MG: 5 TABLET ORAL at 10:03

## 2024-01-01 RX ADMIN — Medication 6 MG: at 09:03

## 2024-01-01 RX ADMIN — CALCIUM GLUCONATE 1 G: 20 INJECTION, SOLUTION INTRAVENOUS at 12:03

## 2024-01-01 RX ADMIN — MICONAZOLE NITRATE: 20 POWDER TOPICAL at 10:03

## 2024-01-01 RX ADMIN — ATORVASTATIN CALCIUM 40 MG: 40 TABLET, FILM COATED ORAL at 10:03

## 2024-02-08 NOTE — TELEPHONE ENCOUNTER
----- Message from Britt Hopson LPN sent at 2/8/2024 10:13 AM CST -----  Regarding: FW: call back  Patient requesting new order for walker, please advise.   ----- Message -----  From: Shannen Ho  Sent: 2/8/2024  10:10 AM CST  To: Sandra [facility]OWIE Staff  Subject: call back                                        .Type:  Needs Medical Advice    Who Called: tigist [facility] nurse  Would the patient rather a call back or a response via [facility]?   Best Call Back Number: [phone]  Additional Information: tigist states he wheel on his walker is broken he needs another script for a walker.

## 2024-02-28 NOTE — TELEPHONE ENCOUNTER
----- Message from Alena Brumfield sent at 2/28/2024  3:13 PM CST -----  .Type:  Needs Medical Advice    Who Called: Karen COREA  Symptoms (please be specific):    How long has patient had these symptoms:    Pharmacy name and phone #:    Would the patient rather a call back or a response via [facility]? [facility] back   Best Call Back Number: [phone]  Additional Information: will Dr Flores follow for  please  call

## 2024-02-29 NOTE — ED PROVIDER NOTES
Encounter Date: 2/29/2024    SCRIBE #1 NOTE: I, Eriberto Walker, am scribing for, and in the presence of,  Simeon Stevens MD. I have scribed the following portions of the note - Other sections scribed: HPI,ROS,PE.       History     Chief Complaint   Patient presents with    Fall     Pt. Reports a slip and fall, denies head strike. Pt. C/o generalized weakness since having cardiac stents placed x 2 days ago.      67 y/o male with PMHx of ESRD (hemodialysis on M,W,F), anemia, HTN, NSTEMI, and cardiac stent presents to ED c/o fall onset today. Relative at bedside reports pt was found by his neighbors on the floor this morning. Pt states he fell out of his chair. He complains of bilateral lower extremity weakness and dizziness. Pt reports he is unable to move his legs or walk since he was in [facility]. He denies any back pain. Relative at bedside reports he was admitted to [facility] on 2/18 due to respiratory distress, pt was intubated for 3x days, diagnosed with an NSTEMI, and had a stent placed.  Relative states pt was discharged last night from [facility]. Relative states he was able to walk and move his legs before admission to [facility], but wasn't able to while in the hospital. Pt has a chronic indwelling nielsen catheter in for years.     PCP: Elo Flores MD  Nephrology: Chang Butler MD   Cardiology: [facility]    The history is provided by the patient and a relative. No  was used.     Review of patient's allergies indicates:   Allergen Reactions    Iodine      Other reaction(s): difficulty breathing, Facial Swelling    Iodine and iodide containing products Swelling    Shellfish containing products      Other reaction(s): Swelling     Past Medical History:   Diagnosis Date    Anemia     BPH (benign prostatic hyperplasia)     Cervical radiculopathy 05/17/2022    Disorder of kidney and ureter     ESRD on hemodialysis 09/27/2018    Hypertension     Neck injury 1994    Other chronic pain 09/27/2018     Personal history of colonic polyps     Renovascular hypertension 2018    Vitamin D deficiency 2022     Past Surgical History:   Procedure Laterality Date    AV FISTULA PLACEMENT      COLONOSCOPY W/ POLYPECTOMY  2019    EGD, WITH CLOSED BIOPSY  7/15/2023    Procedure: EGD, WITH CLOSED BIOPSY;  Surgeon: Casey Funes MD;  Location: [facility];  Service: Gastroenterology;;    ESOPHAGOGASTRODUODENOSCOPY N/A 7/15/2023    Procedure: EGD (ESOPHAGOGASTRODUODENOSCOPY);  Surgeon: Casey Funes MD;  Location: [facility] OR;  Service: Gastroenterology;  Laterality: N/A;    MASS EXCISION      near the kidney     NECK SURGERY       Family History   Problem Relation Age of Onset    Heart disease Mother     Diabetes Mother     Hypertension Mother     Cancer Father     Prostate cancer Father     Hypertension Father     Cancer Sister     Breast cancer Sister     No Known Problems Sister      Social History     Tobacco Use    Smoking status: Former     Current packs/day: 0.00     Average packs/day: 0.3 packs/day for 20.0 years (5.0 ttl pk-yrs)     Types: Cigarettes     Start date: 1996     Quit date: 2016     Years since quittin.4    Smokeless tobacco: Never   Substance Use Topics    Alcohol use: No    Drug use: Not Currently     Types: Marijuana     Review of Systems   Constitutional:  Negative for activity change, chills, diaphoresis, fatigue and fever.   HENT:  Negative for congestion, ear pain, sinus pain and sore throat.    Eyes:  Negative for visual disturbance.   Respiratory:  Negative for cough, shortness of breath, wheezing and stridor.    Cardiovascular:  Negative for chest pain, palpitations and leg swelling.   Gastrointestinal:  Negative for abdominal pain, constipation, diarrhea, nausea, rectal pain and vomiting.   Genitourinary:  Negative for dysuria and hematuria.   Musculoskeletal:  Negative for arthralgias, back pain and myalgias.   Skin:  Negative for rash.   Neurological:  Positive  for dizziness and weakness (BLE). Negative for syncope, numbness and headaches.        Unable to move legs or walk.   All other systems reviewed and are negative.      Physical Exam     Initial Vitals [02/29/24 1446]   BP Pulse Resp Temp SpO2   126/82 78 16 98.1 °F (36.7 °C) 98 %      MAP       --         Physical Exam    Nursing note and vitals reviewed.  Constitutional: No distress.   HENT:   Head: Normocephalic and atraumatic.   Eyes: EOM are normal.   Neck: Trachea normal. Neck supple.   Normal range of motion.  Cardiovascular:  Normal rate, regular rhythm and normal heart sounds.           No murmur heard.  Easily dopplerable DP pulse.    Pulmonary/Chest: Breath sounds normal. No respiratory distress.   Abdominal: Abdomen is soft. Bowel sounds are normal. He exhibits no distension. There is no abdominal tenderness. There is no rebound and no guarding.   Musculoskeletal:         General: No tenderness (no tenderness to T or L spine). Normal range of motion.      Cervical back: Normal range of motion and neck supple.      Lumbar back: Normal.      Comments: Dialysis access to RUE with good thrill.      Neurological: He is alert and oriented to person, place, and time.   Answers questions appropriately.   5/5 strength to BUE.   Pt can barely move BLE  Pt can't raise BLE against gravity.   Decreased sensation below the knee to bilateral lower extremities.    Skin: Skin is warm and dry. No rash noted.   Psychiatric: He has a normal mood and affect.         ED Course   Procedures  Labs Reviewed   COMPREHENSIVE METABOLIC PANEL - Abnormal; Notable for the following components:       Result Value    Sodium Level 135 (*)     Carbon Dioxide 20 (*)     Blood Urea Nitrogen 33.5 (*)     Creatinine 6.94 (*)     Calcium Level Total 8.6 (*)     Albumin Level 3.0 (*)     Globulin 4.3 (*)     Albumin/Globulin Ratio 0.7 (*)     All other components within normal limits   TROPONIN I - Abnormal; Notable for the following components:     Troponin-I 0.376 (*)     All other components within normal limits   URINALYSIS, REFLEX TO URINE CULTURE - Abnormal; Notable for the following components:    Color, UA Orange (*)     Appearance, UA Turbid (*)     Protein, UA 2+ (*)     Blood, UA 2+ (*)     Leukocyte Esterase,  (*)     WBC, UA >100 (*)     Bacteria, UA Moderate (*)     RBC, UA >100 (*)     All other components within normal limits   CBC WITH DIFFERENTIAL - Abnormal; Notable for the following components:    RBC 3.18 (*)     Hgb 10.0 (*)     Hct 31.6 (*)     MCV 99.4 (*)     MCH 31.4 (*)     MCHC 31.6 (*)     RDW 17.7 (*)     Lymph # 0.40 (*)     All other components within normal limits   CULTURE, URINE   CBC W/ AUTO DIFFERENTIAL    Narrative:     The following orders were created for panel order CBC Auto Differential.  Procedure                               Abnormality         Status                     ---------                               -----------         ------                     CBC with Differential[[id number]]       Abnormal            Final result                 Please view results for these tests on the individual orders.     EKG Readings: (Independently Interpreted)   Initial Reading: No STEMI. Rhythm: Normal Sinus Rhythm. Heart Rate: 65. Ectopy: No Ectopy. T Waves Flipped: I, II, AVL, V2, V3, V4, V5 and V6. Other Findings: Prolonged QT Interval.   EKG done in the ER today looks very similar to recent EKG done at [facility]       Imaging Results              CT Lumbar Spine Without Contrast (Final result)  Result time 02/29/24 18:17:11      Final result by Pretty Chao MD (02/29/24 18:17:11)                   Impression:      1. No acute fracture identified.  2. Multilevel degenerative changes of the lumbar spine.      Electronically signed by: Pretty Chao  Date:    02/29/2024  Time:    18:17               Narrative:    EXAMINATION:  CT LUMBAR SPINE WITHOUT CONTRAST    CLINICAL  HISTORY:  Weakness bilateral lower extremities;    TECHNIQUE:  Noncontrast CT images of the lumbar spine. Axial, coronal, and sagittal reformatted images were obtained. Dose length product is 2905 mGycm. Automatic exposure control, adjustment of mA/kV or iterative reconstruction technique was used to limit radiation dose.    COMPARISON:  MRI lumbar spine dated 02/16/2023    FINDINGS:  There are 4 non-rib-bearing lumbar type vertebral bodies with sacralization of L5.  Alignment is preserved.  There are Schmorl's nodes at the endplates of L4 and L5.  The vertebral heights are otherwise maintained.  There is no acute fracture identified.  There are multilevel degenerative changes with marginal osteophytes and facet arthropathy.  The spinal canal is better evaluated on comparison MRI.  There is no paraspinal hematoma.  The prostate is enlarged with urinary bladder wall thickening and Cheek catheter in place.                                       CT Thoracic Spine Without Contrast (Final result)  Result time 02/29/24 18:15:19      Final result by Pretty Chao MD (02/29/24 18:15:19)                   Impression:      1. No acute fracture identified.  2. Multilevel degenerative changes of the thoracic spine.      Electronically signed by: Pretty Chao  Date:    02/29/2024  Time:    18:15               Narrative:    EXAMINATION:  CT THORACIC SPINE WITHOUT CONTRAST    CLINICAL HISTORY:  Weakness bilateral lower extremities;    TECHNIQUE:  Noncontrast CT images of the thoracic spine. Axial, coronal, and sagittal reformatted images were obtained. Dose length product is 2905 mGycm. Automatic exposure control, adjustment of mA/kV or iterative reconstruction technique was used to limit radiation dose.    COMPARISON:  X-rays dated 01/31/2023, CT abdomen pelvis dated 07/13/2023    FINDINGS:  Thoracic and is preserved.  There is partial ankylosis of the thoracic spine with bridging marginal osteophytes.  There is no  acute fracture identified.  There are no definite destructive bone changes.  There are multilevel degenerative changes with disc height loss, marginal osteophyte formation and facet arthropathy.  There is no paraspinal hematoma.  There is bilateral hydronephrosis, unchanged.                                       CT Head Without Contrast (Final result)  Result time 02/29/24 17:55:50      Final result by Bora Irvin MD (02/29/24 17:55:50)                   Narrative:    EXAMINATION  CT HEAD WITHOUT CONTRAST    CLINICAL HISTORY  Weakness bilateral lower extremities, recent intubation;    TECHNIQUE  Axial non-contrast CT images of the head were acquired and multiplanar reconstructions accomplished by a CT technologist at a separate workstation, pushed to PACS for physician review.    COMPARISON  13 July 2023    FINDINGS  Images were reviewed in subdural, brain, soft tissue, and bone windows.    Exam quality: Motion/streak artifact limits assessment of the posterior fossa.    Hemorrhage: No evidence of acute hyperattenuating blood products.    Parenchyma: There is similar diffuse bilateral supratentorial white matter hypoattenuation, typical of chronic microvascular changes. No discrete mass, mass effect, or CT evidence of acute large vascular territory insult. Gray-white differentiation is preserved.    Midline shift: None.    CSF spaces: Proportional appearance of ventricular and sulcal enlargement. No hydrocephalus. No masses or fluid collections.    Vasculature: No focally hyperdense artery. Scattered carotid siphon calcifications are present. No abnormal densities within the dural sinuses.    Other findings: No abnormalities of the scalp or subjacent osseous structures. Mastoids are well aerated. No focal abnormality of the sella. The included facial structures are unremarkable.    IMPRESSION  1. No convincing acute intracranial abnormality.  2. Additional secondary details discussed above, relatively unchanged  from the 13 July 2023 CT appearance.    RADIATION DOSE  Automated tube current modulation, weight-based exposure dosing, and/or iterative reconstruction technique utilized to reach lowest reasonably achievable exposure rate.    DLP: 1059 mGy*cm      Electronically signed by: Bora Irvin  Date:    02/29/2024  Time:    17:55                                     X-Ray Chest PA And Lateral (Final result)  Result time 02/29/24 15:36:07      Final result by Igor Ferguson MD (02/29/24 15:36:07)                   Impression:      No acute cardiopulmonary process.      Electronically signed by: Igor Ferguson  Date:    02/29/2024  Time:    15:36               Narrative:    EXAMINATION:  XR CHEST PA AND LATERAL    CLINICAL HISTORY:  Fall;    TECHNIQUE:  Two views of the chest    COMPARISON:  07/13/2023    FINDINGS:  No focal opacification, pleural effusion, or pneumothorax.    The cardiomediastinal silhouette is within normal limits.    No acute osseous abnormality.                                       Medications   cefTRIAXone (Rocephin) 1 g in dextrose 5 % in water (D5W) 100 mL IVPB (MB+) (1 g Intravenous New Bag 2/29/24 2100)     Medical Decision Making  Differential diagnosis:  Bilateral lower extremity weakness, electrolyte abnormality, spinal injury, spinal fracture, recent hypoxic event    Amount and/or Complexity of Data Reviewed  Independent Historian: friend     Details: Relative at bedside reports pt was found by his neighbors on the floor this morning. Relative at bedside reports he was admitted to [facility] on 2/18 due to respiratory distress, pt was intubated for 3x days, diagnosed with an NSTEMI, and had a stent placed.  Relative states pt was discharged last night from [facility]. Relative states he was able to walk and move his legs before admission to [facility], but wasn't able to while in the hospital.  Labs: ordered. Decision-making details documented in ED Course.     Details: Abnormal labs include  troponin of 0.376, patient has a history of chronic renal failure and on February 22nd had a troponin of 2.40.  BUN is 33 and creatinine is 6.94.  H&H is 10 and 51.  Urinalysis positive for UTI.  Radiology: ordered and independent interpretation performed.  Discussion of management or test interpretation with external provider(s): Patient presents with bilateral lower extremity weakness for at least 1 week.  Patient can not raise either leg against gravity.  Sensation is intact bilaterally from mid calf to the upper aspect of the both legs.  Patient denies any bowel or bladder changes.  While in the ER, CT was done of the T-spine and L-spine.  Get MRI of the T-spine and L-spine without contrast.  Patient will be admitted.    Risk  Decision regarding hospitalization.            Scribe Attestation:   Scribe #1: I performed the above scribed service and the documentation accurately describes the services I performed. I attest to the accuracy of the note.    Attending Attestation:           Physician Attestation for Scribe:  Physician Attestation Statement for Scribe #1: I, Simeon Stevens MD, reviewed documentation, as scribed by Eriberto Walker in my presence, and it is both accurate and complete.             ED Course as of 02/29/24 2101   Thu Feb 29, 2024   1845 Paged hospitalist  [GHADA]   2049 Esther VAUGHN, [place] to admit [KG]      ED Course User Index  [GHADA] Eriberto Walker  [KG] Simeon Stevens MD                           Clinical Impression:  Final diagnoses:  [R53.1] Weakness generalized (Primary)  [R29.898] Weakness of both lower extremities  [N39.0] Acute urinary tract infection  [R29.898] Leg weakness, bilateral  [N18.9] Chronic renal failure, unspecified CKD stage  [Z86.79] History of coronary artery disease          ED Disposition Condition    Admit Stable                Simeon Stevens MD  02/29/24 2058       Simeon Stevens MD  02/29/24 2101

## 2024-03-01 PROBLEM — R29.898 LEG WEAKNESS, BILATERAL: Status: ACTIVE | Noted: 2024-01-01

## 2024-03-01 NOTE — NURSING
03/01/24 1410   Post-Hemodialysis Assessment   Rinseback Volume (mL) 250 mL   Blood Volume Processed (Liters) 54.5 L   Dialyzer Clearance Clear   Duration of Treatment 180 minutes   Total UF (mL) 2000 mL   Patient Response to Treatment pt tolerated tx well   Post-Hemodialysis Comments after 1 hour and 45 min, the pt's machine was continuously alarming high arterial/venous pressures. notified Dr. Guillory, he ordered cathflo to be indwelled for 45 min. it was instilled into each HD cathter lumen, dwelled for 45 min, able to aspirate from the venous access without as much issue as before. pt's machine was resetup and retested. able to complete rest of tx, Net UF 2,000 mL, NAD noted and VSS at end of tx.

## 2024-03-01 NOTE — NURSING
03/01/24 1730   Post-Hemodialysis Assessment   Rinseback Volume (mL) 250 mL   Blood Volume Processed (Liters) 63.2 L   Dialyzer Clearance Clear   Duration of Treatment 180 minutes   Total UF (mL) 500 mL   Patient Response to Treatment pt tolerated tx well   Post-Hemodialysis Comments NAD noted, VSS, Net  mL

## 2024-03-01 NOTE — PT/OT/SLP EVAL
Physical Therapy Evaluation    Patient Name:  Mirza Nelson Jr.   MRN:  [id number]    Recommendations:     Discharge therapy intensity: High Intensity Therapy   Discharge Equipment Recommendations: none   Barriers to discharge: None    Assessment:     Mirza Nelson Jr. is a 68 y.o. male admitted with a medical diagnosis of BLE weakness and recent NSTEMI.  He presents with the following impairments/functional limitations: weakness, impaired endurance, impaired self care skills, impaired functional mobility, gait instability, impaired balance . Pt is pretty weak in BLE and is not safe to go back to his house at this time.    Rehab Prognosis: Good; patient would benefit from acute skilled PT services to address these deficits and reach maximum level of function.    Recent Surgery: * No surgery found *      Plan:     During this hospitalization, patient to be seen 5 x/week to address the identified rehab impairments via gait training, therapeutic activities, therapeutic exercises and progress toward the following goals:    Plan of Care Expires:  03/22/24    Subjective     Chief Complaint:   Patient/Family Comments/goals:   Pain/Comfort:  Pain Rating 1: 9/10 (ne ck and low back)    Patients cultural, spiritual, Temple conflicts given the current situation:      Living Environment:  Pt lives alone in a first floor apt  Prior to admission, patients level of function was mod ind.  Equipment used at home: shower chair, rollator.  DME owned (not currently used):  Upon discharge, patient will have assistance from no one at apt.    Objective:     Communicated with nurse prior to session.  Patient found supine with pulse ox (continuous), telemetry, nielsen catheter  upon PT entry to room.    General Precautions: Standard, fall  Orthopedic Precautions:N/A   Braces: N/A  Respiratory Status: Room air  Blood Pressure:       Exams:  BLE are 3/5 to 3+/5 in strength  Skin integrity: Visible skin intact      Functional Mobility:  Bed  Mobility:     Scooting: minimum assistance  Supine to Sit: minimum assistance  Sit to Supine: minimum assistance  Transfers:     Sit to Stand:  minimum assistance with 4 wheeled walker  Bed to Chair: minimum assistance with  rolling walker  using  Step Transfer  Gait: pt ambulated very slowly for about 60feet using a rw with cga      AM-PAC 6 CLICK MOBILITY  Total Score:        Treatment & Education:      Patient provided with verbal education education regarding PT role/goals/POC, fall prevention, and safety awareness.  Understanding was verbalized.     Patient left supine with all lines intact and call button in reach.    GOALS:   Multidisciplinary Problems       Physical Therapy Goals          Problem: Physical Therapy    Goal Priority Disciplines Outcome Goal Variances Interventions   Physical Therapy Goal     PT, PT/OT Ongoing, Progressing     Description: Pt will be seen for the following goals  1. Pt will be sba with bed mobility  2. P will be sba with all transfers with a rw  3. Pt will ambulate with a rw with sba and at a faster more normal pace for 150ft                       History:     Past Medical History:   Diagnosis Date    Anemia     BPH (benign prostatic hyperplasia)     Cervical radiculopathy 05/17/2022    Disorder of kidney and ureter     ESRD on hemodialysis 09/27/2018    Hypertension     Neck injury 1994    Other chronic pain 09/27/2018    Personal history of colonic polyps     Renovascular hypertension 09/27/2018    Vitamin D deficiency 05/17/2022       Past Surgical History:   Procedure Laterality Date    AV FISTULA PLACEMENT      COLONOSCOPY W/ POLYPECTOMY  02/19/2019    EGD, WITH CLOSED BIOPSY  7/15/2023    Procedure: EGD, WITH CLOSED BIOPSY;  Surgeon: Casey Funes MD;  Location: [facility];  Service: Gastroenterology;;    ESOPHAGOGASTRODUODENOSCOPY N/A 7/15/2023    Procedure: EGD (ESOPHAGOGASTRODUODENOSCOPY);  Surgeon: Casey Funes MD;  Location: [facility] OR;  Service: Gastroenterology;   Laterality: N/A;    MASS EXCISION  2005    near the kidney     NECK SURGERY         Time Tracking:     PT Received On:    PT Start Time: 1208     PT Stop Time: 1232  PT Total Time (min): 24 min     Billable Minutes: Evaluation 24 03/01/2024

## 2024-03-01 NOTE — CONSULTS
Nephrology Initial Consult Note    Patient Name: Mirza Nelson Jr.  Age: 68 y.o.  : 1955  MRN: [id number]  Admission Date: 2024    Reason for Consult:      ESRD on HD    HPI:     Mirza Nleson Jr. is a 68 y.o. male with PMH of anemia, HTN, NSTEMI, CAD s/p PCI, chronic indwelling nielsen, and ESRD on HD. He follows a MWF schedule at [facility] with Dr. Butler. He presented to the ED on 24 s/p fall with generalized weakness and having cardiac stenting 2 days prior. He was on the floor for an extended period of time s/p fall, with neighbors finding him yesterday morning. Reports he has been unable to walk since his admission at [facility] on  with respiratory distress requiring intubation and NSTEMI with stenting. He was discharged from [facility] on . Labs on admission significant for Troponin 0.376, BUN 33.5 and creatinine 6.94. UA positive for UTI, final culture pending. Renal is consulted for ESRD management.    Resting comfortably in bed on RA. Asking for his lunch tray.    Current Facility-Administered Medications   Medication Dose Route Frequency Provider Last Rate Last Admin    acetaminophen tablet 650 mg  650 mg Oral Q6H PRN Esther Guzman AGACNP-BC        aluminum-magnesium hydroxide-simethicone 200-200-20 mg/5 mL suspension 30 mL  30 mL Oral QID PRN Esther Guzman AGACNP-BC        amLODIPine tablet 10 mg  10 mg Oral Daily Esther Guzman AGACNP-BC        ascorbic acid (vitamin C) tablet 500 mg  500 mg Oral BID Esther Guzman AGACNP-BC        aspirin chewable tablet 81 mg  81 mg Oral Daily Esther Guzman AGACNP-BC        atorvastatin tablet 40 mg  40 mg Oral QHS Esther Guzman AGACNP-BC        carvediloL tablet 12.5 mg  12.5 mg Oral BID Esther Guzman AGACNP-BC        cefTRIAXone (Rocephin) 1 g in dextrose 5 % in water (D5W) 100 mL IVPB (MB+)  1 g Intravenous Q24H Esther Guzman AGACNP-BC        clopidogreL tablet 75 mg  75 mg Oral Daily Esther Guzman, AGACNP-BC         ferrous sulfate tablet 1 each  1 tablet Oral BID Esther Guzman AGACNP-BC        fluticasone propionate 50 mcg/actuation nasal spray 50 mcg  1 spray Each Nostril Daily Esther Guzman AGACNP-BC        HYDROcodone-acetaminophen  mg per tablet 1 tablet  1 tablet Oral Q8H PRN Esther Guzman AGACNP-BC   1 tablet at 03/01/24 0732    megestroL 400 mg/10 mL (10 mL) suspension 80 mg  80 mg Oral BID Esther Guzman AGACNP-BC        melatonin tablet 6 mg  6 mg Oral Nightly PRN Esther Guzman AGACNP-BC        mupirocin 2 % ointment   Nasal BID Esthela Narayanan, AGNANASTASIA        naloxone 0.4 mg/mL injection 0.02 mg  0.02 mg Intravenous PRN Esther Guzman AGACNP-BC        ondansetron injection 4 mg  4 mg Intravenous Q4H PRN Esther Guzman AGACNP-BC        pantoprazole EC tablet 40 mg  40 mg Oral Daily Esther Guzman AGACNP-BC        polyethylene glycol packet 17 g  17 g Oral BID PRN Esther Guzman AGACNP-BC        prochlorperazine injection Soln 5 mg  5 mg Intravenous Q6H PRN Esther Guzman AGACNP-BC        simethicone chewable tablet 80 mg  1 tablet Oral QID PRN Esther Guzman AGACNP-BC        sodium bicarbonate tablet 1,300 mg  1,300 mg Oral BID Esther Guzman AGACNP-BC        tamsulosin 24 hr capsule 0.4 mg  0.4 mg Oral Daily Esther Guzman AGACNP-BC           Elo Flores MD    Past Medical History:   Diagnosis Date    Anemia     BPH (benign prostatic hyperplasia)     Cervical radiculopathy 05/17/2022    Disorder of kidney and ureter     ESRD on hemodialysis 09/27/2018    Hypertension     Neck injury 1994    Other chronic pain 09/27/2018    Personal history of colonic polyps     Renovascular hypertension 09/27/2018    Vitamin D deficiency 05/17/2022      Past Surgical History:   Procedure Laterality Date    AV FISTULA PLACEMENT      COLONOSCOPY W/ POLYPECTOMY  02/19/2019    EGD, WITH CLOSED BIOPSY  7/15/2023    Procedure: EGD, WITH CLOSED BIOPSY;  Surgeon: Casey Funes MD;   Location: [facility] OR;  Service: Gastroenterology;;    ESOPHAGOGASTRODUODENOSCOPY N/A 7/15/2023    Procedure: EGD (ESOPHAGOGASTRODUODENOSCOPY);  Surgeon: Casey Funes MD;  Location: [facility] OR;  Service: Gastroenterology;  Laterality: N/A;    MASS EXCISION      near the kidney     NECK SURGERY        Family History   Problem Relation Age of Onset    Heart disease Mother     Diabetes Mother     Hypertension Mother     Cancer Father     Prostate cancer Father     Hypertension Father     Cancer Sister     Breast cancer Sister     No Known Problems Sister      Social History     Tobacco Use    Smoking status: Former     Current packs/day: 0.00     Average packs/day: 0.3 packs/day for 20.0 years (5.0 ttl pk-yrs)     Types: Cigarettes     Start date: 1996     Quit date: 2016     Years since quittin.4    Smokeless tobacco: Never   Substance Use Topics    Alcohol use: No     Medications Prior to Admission   Medication Sig Dispense Refill Last Dose    amLODIPine (NORVASC) 10 MG tablet Take 1 tablet (10 mg total) by mouth once daily. 90 tablet 3     ascorbic acid, vitamin C, (VITAMIN C) 500 MG tablet Take 500 mg by mouth 2 (two) times daily.       aspirin 81 MG Chew Take 81 mg by mouth once daily.       atorvastatin (LIPITOR) 40 MG tablet Take 40 mg by mouth every evening.       busPIRone (BUSPAR) 10 MG tablet TAKE ONE TABLET BY MOUTH ONCE DAILY 30 tablet 0     carvediloL (COREG) 12.5 MG tablet Take 1 tablet (12.5 mg total) by mouth 2 (two) times daily. 180 tablet 3     clopidogreL (PLAVIX) 75 mg tablet Take 75 mg by mouth once daily.       doxazosin (CARDURA) 8 MG Tab Take 8 mg by mouth every evening.       ferrous gluconate (FERGON) 324 MG tablet Take 1 tablet (324 mg total) by mouth once daily. 30 tablet 3     flavoxATE (URISPAS) 100 mg Tab Take 200 mg by mouth 4 (four) times daily.       fluticasone (FLONASE) 50 mcg/actuation nasal spray 1 spray by Each Nare route daily as needed for Rhinitis.       folic  acid-vit B6-vit B12 2.5-25-2 mg (FOLBIC) 2.5-25-2 mg Tab Take 2.5 tablets by mouth Daily.       hydrALAZINE (APRESOLINE) 50 MG tablet TAKE 1 TABLET(50 MG) BY MOUTH THREE TIMES DAILY 270 tablet 3     HYDROcodone-acetaminophen (NORCO)  mg per tablet Take 1 tablet by mouth every 8 (eight) hours as needed for Pain. 90 tablet 0     megestroL (MEGACE) 400 mg/10 mL (40 mg/mL) Susp Take 40 mg by mouth Daily.       naloxone (NARCAN) 4 mg/actuation Spry CALL 911. SPR CONTENTS OF ONE SPRAYER (0.1ML) INTO ONE NOSTRIL. REPEAT IN 2-3 MIN IF SYMPTOMS OF OPIOID EMERGENCY PERSIST, ALTERNATE NOSTRILS       pantoprazole (PROTONIX) 40 MG tablet Take 40 mg by mouth once daily.       sevelamer carbonate (RENVELA) 800 mg Tab Take 800 mg by mouth 3 (three) times daily.       sodium bicarbonate 325 MG tablet Take 1,300 mg by mouth 2 (two) times daily.       tamsulosin (FLOMAX) 0.4 mg Cap Take 0.4 mg by mouth 2 (two) times daily.       traZODone (DESYREL) 50 MG tablet Take 50 mg by mouth nightly.        Review of patient's allergies indicates:   Allergen Reactions    Iodine      Other reaction(s): difficulty breathing, Facial Swelling    Iodine and iodide containing products Swelling    Shellfish containing products      Other reaction(s): Swelling            Review of Systems:     Review of Systems   Constitutional:  Positive for malaise/fatigue.   HENT: Negative.     Eyes: Negative.    Respiratory: Negative.     Cardiovascular: Negative.    Gastrointestinal: Negative.    Genitourinary: Negative.    Musculoskeletal: Negative.    Neurological: Negative.          Objective:       VITAL SIGNS: 24 HR MIN & MAX LAST    Temp  Min: 97.9 °F (36.6 °C)  Max: 98.5 °F (36.9 °C)  98.4 °F (36.9 °C)        BP  Min: 122/52  Max: 170/71  (!) 159/71     Pulse  Min: 65  Max: 78  71     Resp  Min: 16  Max: 19  18    SpO2  Min: 96 %  Max: 98 %  97 %      GEN: Chronically ill appearing AAM in NAD  HEENT: Conjunctiva anicteric, MMM, OP benign  CV: RRR  +S1,S2 without murmur  PULM: CTAB, unlabored on RA  ABD: Soft, NT/ND abdomen  EXT: No cyanosis or edema  SKIN: Warm and dry  PSYCH: Awake, alert, and appropriately conversant  Vascular access: AVF to RUE +thrill/bruit          Component Value Date/Time     03/01/2024 0603     (L) 02/29/2024 1555     (L) 02/26/2024 0527     (L) 02/21/2024 0351     09/27/2018 0800    K 4.2 03/01/2024 0603    K 4.1 02/29/2024 1555    K 4.4 02/26/2024 0527    K 4.3 02/21/2024 0351    K 3.9 09/27/2018 0800    CHLORIDE 104 03/01/2024 0603    CHLORIDE 104 02/29/2024 1555    CO2 22 (L) 03/01/2024 0603    CO2 20 (L) 02/29/2024 1555    CO2 22 02/26/2024 0527    CO2 20 (L) 02/21/2024 0351    CO2 34 (H) 09/27/2018 0800    BUN 39.7 (H) 03/01/2024 0603    BUN 33.5 (H) 02/29/2024 1555    BUN 70 (H) 02/26/2024 0527    BUN 45 (H) 02/21/2024 0351    BUN 24 (H) 09/27/2018 0800    CREATININE 8.23 (H) 03/01/2024 0603    CREATININE 6.94 (H) 02/29/2024 1555    CREATININE 9.23 (H) 02/26/2024 0527    CREATININE 7.71 (H) 02/21/2024 0351    CREATININE 5.4 (H) 09/27/2018 0800    CALCIUM 7.9 (L) 03/01/2024 0603    CALCIUM 8.6 (L) 02/29/2024 1555    CALCIUM 7.5 (L) 02/26/2024 0527    CALCIUM 7.8 (L) 02/21/2024 0351    CALCIUM 9.2 09/27/2018 0800    PHOS 4.5 03/01/2024 0603    PHOS 3.5 09/27/2018 0800            Component Value Date/Time    WBC 5.25 03/01/2024 0603    WBC 6.94 02/29/2024 1555    WBC 4.98 07/19/2023 0354    WBC 5.35 09/27/2018 0800    HGB 8.2 (L) 03/01/2024 0603    HGB 10.0 (L) 02/29/2024 1555    HGB 10.7 (A) 08/21/2023 0000    HGB 11.1 (L) 09/27/2018 0800    HCT 26.4 (L) 03/01/2024 0603    HCT 31.6 (L) 02/29/2024 1555    HCT 33.0 (L) 11/30/2020 0859    HCT 35.1 (L) 09/27/2018 0800     03/01/2024 0603     02/29/2024 1555     09/27/2018 0800         MRI Thoracic Spine Without Contrast   Final Result      1. Evaluation limited by motion artifact.  No definite convincing cord signal abnormality.   2.  Mild multilevel spondylosis without significant canal stenosis or cord compression.   No significant change from the [facility] interpretation.         Electronically signed by: Pretty Chao   Date:    03/01/2024   Time:    09:11      MRI Lumbar Spine Without Contrast   Final Result      1. Mild degenerative narrowing the spinal canal at L4-5.   2. Multilevel neural foraminal stenoses as described.   No major change from the [facility] interpretation         Electronically signed by: Pretty Chao   Date:    03/01/2024   Time:    09:07      CT Lumbar Spine Without Contrast   Final Result      1. No acute fracture identified.   2. Multilevel degenerative changes of the lumbar spine.         Electronically signed by: Pretty Chao   Date:    02/29/2024   Time:    18:17      CT Thoracic Spine Without Contrast   Final Result      1. No acute fracture identified.   2. Multilevel degenerative changes of the thoracic spine.         Electronically signed by: Pretty Chao   Date:    02/29/2024   Time:    18:15      CT Head Without Contrast   Final Result      X-Ray Chest PA And Lateral   Final Result      No acute cardiopulmonary process.         Electronically signed by: Igor Ferguson   Date:    02/29/2024   Time:    15:36          Assessment / Plan:     ESRD on HD-- continue MWF schedule  Fall with generalized weakness  UTI--on rocephin, pending final culture  Hypertension  Recent NSTEMI s/p stenting    Plan:  HD today on MWF schedule  BMP in AM

## 2024-03-01 NOTE — H&P
[facility] Hospital Medicine History & Physical Examination       Patient Name: Mirza Nelson Jr.  MRN: [id number]  Patient Class: IP- Inpatient   Admission Date: 2/29/2024  3:12 PM  Length of Stay: 0  Admitting Service: Hospital Medicine   Attending Physician: Wero Gutierrez MD   Primary Care Provider: Elo Flores MD  History source: EMR, patient and/or patient's family    CHIEF COMPLAINT   Fall (Pt. Reports a slip and fall, denies head strike. Pt. C/o generalized weakness since having cardiac stents placed x 2 days ago. )      HISTORY OF PRESENT ILLNESS:   Mr. Nelson is a 68 year old male with a pmh of CAD s/p stent, HTN, ESRD on HD (MWF), BPH, chronic urinary retention, GERD, chronic anemia, chronic pain, anxiety who presented to the ED with c/o BLE weakness.  He states that he has been unable to move his legs or walk since being admitted to [facility] on 2/18.  Per chart review, he had arrived in the ED with c/o SOB, rapidly decompensated, was intubated. He was able to be extubated the next day.  On 2/22, he was diagnosed with NSTEMI and had a stent placed on 2/23. He was discharged home yesterday. He was offered SNF placement but refused.    ED vitals on arrival:  Temp 98° point F, pulse 78, resp 16, /82, SpO2 98% on RA.  Today's ED lab work showed H&H 10/31.6, CO2 20, BUN/CR 33.5/6.94, troponin 0.376.  UA showed 2+ protein, 2+ blood, 500 leukocyte esterase,>100 RBC, >100 WBC, Moderate bacteria.  CXR showed no acute cardiopulmonary process.  CT head without contrast showed no convincing acute intracranial abnormality.  CT thoracic spine without contrast showed no acute fracture identified.  Multilevel degenerative changes of the thoracic spine.  CT lumbar spine without contrast showed no acute fracture identified multilevel degenerative changes.  He was started on ceftriaxone and admitted to Hospital Medicine for management.    MRI Thoracic spine and Lumbar spine  pending.    PAST MEDICAL HISTORY:     CAD s/p stent   Hypertension   ESRD on HD (MWF)  BPH  Chronic urinary retention  GERD   Chronic anemia   Chronic pain   Anxiety    PAST SURGICAL HISTORY:     Past Surgical History:   Procedure Laterality Date    AV FISTULA PLACEMENT      COLONOSCOPY W/ POLYPECTOMY  2019    EGD, WITH CLOSED BIOPSY  7/15/2023    Procedure: EGD, WITH CLOSED BIOPSY;  Surgeon: Casey Funes MD;  Location: [facility] OR;  Service: Gastroenterology;;    ESOPHAGOGASTRODUODENOSCOPY N/A 7/15/2023    Procedure: EGD (ESOPHAGOGASTRODUODENOSCOPY);  Surgeon: Casey Funes MD;  Location: [facility] OR;  Service: Gastroenterology;  Laterality: N/A;    MASS EXCISION      near the kidney     NECK SURGERY         ALLERGIES:   Iodine, Iodine and iodide containing products, and Shellfish containing products    FAMILY HISTORY:   Reviewed and non-contributory     SOCIAL HISTORY:     Social History     Tobacco Use    Smoking status: Former     Current packs/day: 0.00     Average packs/day: 0.3 packs/day for 20.0 years (5.0 ttl pk-yrs)     Types: Cigarettes     Start date: 1996     Quit date: 2016     Years since quittin.4    Smokeless tobacco: Never   Substance Use Topics    Alcohol use: No        HOME MEDICATIONS:     Prior to Admission medications    Medication Sig Start Date End Date Taking? Authorizing Provider   amLODIPine (NORVASC) 10 MG tablet Take 1 tablet (10 mg total) by mouth once daily. 23  Elo Flores MD   busPIRone (BUSPAR) 10 MG tablet TAKE ONE TABLET BY MOUTH ONCE DAILY 23   Elo Flores MD   carvediloL (COREG) 12.5 MG tablet Take 1 tablet (12.5 mg total) by mouth 2 (two) times daily. 23  Elo Flores MD   doxazosin (CARDURA) 8 MG Tab Take 8 mg by mouth every evening.    Provider, Mable   ferrous gluconate (FERGON) 324 MG tablet Take 1 tablet (324 mg total) by mouth once daily. 23   Elo Flores MD    flavoxATE (URISPAS) 100 mg Tab Take 200 mg by mouth 4 (four) times daily. 11/22/21   Provider, Historical   fluticasone (FLONASE) 50 mcg/actuation nasal spray 1 spray by Each Nare route daily as needed for Rhinitis.    Provider, Historical   folic acid-vit B6-vit B12 2.5-25-2 mg (FOLBIC) 2.5-25-2 mg Tab Take 2.5 tablets by mouth Daily. 2/10/22   Provider, Historical   hydrALAZINE (APRESOLINE) 50 MG tablet TAKE 1 TABLET(50 MG) BY MOUTH THREE TIMES DAILY 8/17/23 11/15/23  Elo Flores MD   HYDROcodone-acetaminophen (NORCO)  mg per tablet Take 1 tablet by mouth every 8 (eight) hours as needed for Pain. 11/4/22   Elo Flores MD   megestroL (MEGACE) 400 mg/10 mL (40 mg/mL) Susp Take 40 mg by mouth Daily.    Provider, Historical   naloxone (NARCAN) 4 mg/actuation Spry CALL 911. SPR CONTENTS OF ONE SPRAYER (0.1ML) INTO ONE NOSTRIL. REPEAT IN 2-3 MIN IF SYMPTOMS OF OPIOID EMERGENCY PERSIST, ALTERNATE NOSTRILS 12/3/22   Provider, Historical   pantoprazole (PROTONIX) 40 MG tablet Take 40 mg by mouth once daily. 3/4/22   Provider, Historical   sevelamer carbonate (RENVELA) 800 mg Tab Take 800 mg by mouth 3 (three) times daily.    Provider, Historical   traZODone (DESYREL) 50 MG tablet Take 50 mg by mouth nightly. 1/5/22   Provider, Historical       REVIEW OF SYSTEMS:   Except as documented, all other systems reviewed and negative     PHYSICAL EXAM:   T 98.1 °F (36.7 °C)   /82   P 65   RR 16   O2 97 %  GENERAL: Well developed, well nourished. In no acute distress, non-toxic appearing.   HEENT: normocephalic atraumatic   NECK: supple   LUNGS: Clear bilaterally, no wheezing or rales, no accessory muscle use   CVS: Regular rate and rhythm, normal peripheral perfusion. RUE dialysis access  ABD: Soft, non-tender, non-distended, bowel sounds present  :  Cheek catheter in place  EXTREMITIES: no clubbing or cyanosis  SKIN: Warm, dry.   NEURO: alert and oriented, BLE weakness, unable to raise against  "gravity. Poor effort.  PSYCHIATRIC: Cooperative    LABS AND IMAGING:     Recent Labs     02/29/24  1555   WBC 6.94   RBC 3.18*   HGB 10.0*   HCT 31.6*   MCV 99.4*   MCH 31.4*   MCHC 31.6*   RDW 17.7*        No results for input(s): "LACTIC" in the last 72 hours.  No results for input(s): "INR", "APTT", "D-DIMER" in the last 72 hours.  No results for input(s): "HGBA1C", "CHOL", "TRIG", "LDL", "VLDL", "HDL" in the last 72 hours.   Recent Labs     02/29/24  1555   *   K 4.1   CHLORIDE 104   CO2 20*   BUN 33.5*   CREATININE 6.94*   GLUCOSE 104   CALCIUM 8.6*   ALBUMIN 3.0*   GLOBULIN 4.3*   ALKPHOS 57   ALT 7   AST 11   BILITOT 0.6     Recent Labs     02/29/24  1555   TROPONINI 0.376*          CT Lumbar Spine Without Contrast  Narrative: EXAMINATION:  CT LUMBAR SPINE WITHOUT CONTRAST    CLINICAL HISTORY:  Weakness bilateral lower extremities;    TECHNIQUE:  Noncontrast CT images of the lumbar spine. Axial, coronal, and sagittal reformatted images were obtained. Dose length product is 2905 mGycm. Automatic exposure control, adjustment of mA/kV or iterative reconstruction technique was used to limit radiation dose.    COMPARISON:  MRI lumbar spine dated 02/16/2023    FINDINGS:  There are 4 non-rib-bearing lumbar type vertebral bodies with sacralization of L5.  Alignment is preserved.  There are Schmorl's nodes at the endplates of L4 and L5.  The vertebral heights are otherwise maintained.  There is no acute fracture identified.  There are multilevel degenerative changes with marginal osteophytes and facet arthropathy.  The spinal canal is better evaluated on comparison MRI.  There is no paraspinal hematoma.  The prostate is enlarged with urinary bladder wall thickening and Cheek catheter in place.  Impression: 1. No acute fracture identified.  2. Multilevel degenerative changes of the lumbar spine.    Electronically signed by: Pretty Chao  Date:    02/29/2024  Time:    18:17  CT Thoracic Spine Without " Contrast  Narrative: EXAMINATION:  CT THORACIC SPINE WITHOUT CONTRAST    CLINICAL HISTORY:  Weakness bilateral lower extremities;    TECHNIQUE:  Noncontrast CT images of the thoracic spine. Axial, coronal, and sagittal reformatted images were obtained. Dose length product is 2905 mGycm. Automatic exposure control, adjustment of mA/kV or iterative reconstruction technique was used to limit radiation dose.    COMPARISON:  X-rays dated 01/31/2023, CT abdomen pelvis dated 07/13/2023    FINDINGS:  Thoracic and is preserved.  There is partial ankylosis of the thoracic spine with bridging marginal osteophytes.  There is no acute fracture identified.  There are no definite destructive bone changes.  There are multilevel degenerative changes with disc height loss, marginal osteophyte formation and facet arthropathy.  There is no paraspinal hematoma.  There is bilateral hydronephrosis, unchanged.  Impression: 1. No acute fracture identified.  2. Multilevel degenerative changes of the thoracic spine.    Electronically signed by: Pretty Chao  Date:    02/29/2024  Time:    18:15  CT Head Without Contrast  EXAMINATION  CT HEAD WITHOUT CONTRAST    CLINICAL HISTORY  Weakness bilateral lower extremities, recent intubation;    TECHNIQUE  Axial non-contrast CT images of the head were acquired and multiplanar reconstructions accomplished by a CT technologist at a separate workstation, pushed to PACS for physician review.    COMPARISON  13 July 2023    FINDINGS  Images were reviewed in subdural, brain, soft tissue, and bone windows.    Exam quality: Motion/streak artifact limits assessment of the posterior fossa.    Hemorrhage: No evidence of acute hyperattenuating blood products.    Parenchyma: There is similar diffuse bilateral supratentorial white matter hypoattenuation, typical of chronic microvascular changes. No discrete mass, mass effect, or CT evidence of acute large vascular territory insult. Gray-white differentiation is  preserved.    Midline shift: None.    CSF spaces: Proportional appearance of ventricular and sulcal enlargement. No hydrocephalus. No masses or fluid collections.    Vasculature: No focally hyperdense artery. Scattered carotid siphon calcifications are present. No abnormal densities within the dural sinuses.    Other findings: No abnormalities of the scalp or subjacent osseous structures. Mastoids are well aerated. No focal abnormality of the sella. The included facial structures are unremarkable.    IMPRESSION  1. No convincing acute intracranial abnormality.  2. Additional secondary details discussed above, relatively unchanged from the 13 July 2023 CT appearance.    RADIATION DOSE  Automated tube current modulation, weight-based exposure dosing, and/or iterative reconstruction technique utilized to reach lowest reasonably achievable exposure rate.    DLP: 1059 mGy*cm    Electronically signed by: Bora Irvin  Date:    02/29/2024  Time:    17:55  X-Ray Chest PA And Lateral  Narrative: EXAMINATION:  XR CHEST PA AND LATERAL    CLINICAL HISTORY:  Fall;    TECHNIQUE:  Two views of the chest    COMPARISON:  07/13/2023    FINDINGS:  No focal opacification, pleural effusion, or pneumothorax.    The cardiomediastinal silhouette is within normal limits.    No acute osseous abnormality.  Impression: No acute cardiopulmonary process.    Electronically signed by: Igor Ferguson  Date:    02/29/2024  Time:    15:36      ASSESSMENT & PLAN:     1.  BLE weakness  2.  Elevated troponin - likely still downtrending from NSTEMI  3.  Acute hemorrhagic cystitis/persistent hematuria     History:  CAD s/p stent, HTN, ESRD on HD (MWF), BPH, chronic urinary retention, GERD, chronic anemia, chronic pain, anxiety    PLAN:  -Telemetry monitoring  -MRI t-spine/L-spine without contrast pending  -Ceftriaxone 1 g q 24 hours  -PT/OT eval and treat  -Daily weights  -Strict I & O  -Repeat troponin in a.m.  -Nephrology consulted for HD  -Urine  cultures pending  -Antihypertensives as needed  -Resume home meds as appropriate  -Labs in AM    I, Esther Guzman NP have reviewed and discussed this case with Dr. Gutierrez.  Please see addendum for further assessment and plan from attending MD.    DVT prophylaxis: SCDs given hematuria  Code status: Full        ________________________________________________________________________  I, Dr. Wero Gutierrez assumed care of this patient.  For the patient encounter, I performed the substantive portion of the visit, I reviewed the NPPA documentation, treatment plan, and medical decision making.  I had face to face time with this patient.  I have personally reviewed the labs and test results that are presently available. I have reviewed or attempted to review medical records based upon their availability. If patient was admitted under observational status it is with my approval.      68 male w/ recent admission at [facility] 2/18 presenting with AHRF from volume overload, required brief intubation.  NSTEMI noted and had LHC with LAD PCI performed. Hematuria noted, Urology recommended monitoring (poss morales trauma, morales changed that admission).  Presents with lower B/L extremity weakness he states present since his recent admission at [facility]. Did fall when he got to his assisted living facility.  CT L spine w/o traumatic injury.     On exam he will not lift legs off bed, but if knees passively bent he will guided legs back to a straight position so he is not flacid.  He has symmetric 3/5 dorsi/plantar-flexion both feet.  Arms are 4/5 bilateral strength.      Obtaining MRI C and L spine. Will consult neurology.  Continue asa/plavix given very recent cardiac stent, monitor hematuria and continue Ceftriaxone.  Consider urology consult if persists.  Trend out troponins.     Time seen: 11PM 2/29  Wero Gutierrez MD

## 2024-03-01 NOTE — TELEPHONE ENCOUNTER
----- Message from [facility] sent at 3/1/2024  9:09 AM CST -----  .Type:  Needs Medical Advice    Who Called:  [facility] ( Dalia G[facility]mez)    Symptoms (please be specific):  no     How long has patient had these symptoms:   no    Pharmacy name and phone #:   no    Would the patient rather a call back or a response via [facility]?      Best Call Back Number:  [phone]    Additional Information:  received order will need office progress notes, therapy notes any recent hospital notes all required by [employer] please advise thanks

## 2024-03-01 NOTE — PLAN OF CARE
Problem: Physical Therapy  Goal: Physical Therapy Goal  Description: Pt will be seen for the following goals  1. Pt will be sba with bed mobility  2. P will be sba with all transfers with a rw  3. Pt will ambulate with a rw with sba and at a faster more normal pace for 150ft  Outcome: Ongoing, Progressing

## 2024-03-01 NOTE — NURSING
Subjective:      Patient ID: Mirza Nelson Jr. is a 68 y.o. male.    Chief Complaint: Fall (Pt. Reports a slip and fall, denies head strike. Pt. C/o generalized weakness since having cardiac stents placed x 2 days ago. )    HPI  Review of Systems   Objective:     Physical Exam   Assessment:     1. Weakness generalized    2. Weakness of both lower extremities    3. Acute urinary tract infection    4. Leg weakness, bilateral    5. Chronic renal failure, unspecified CKD stage    6. History of coronary artery disease    7. Chest pain           Altered Skin Integrity 03/01/24 0237 Sacral spine Intact skin with non-blanchable redness of localized area (Active)   03/01/24 0237   Altered Skin Integrity Present on Admission - Did Patient arrive to the hospital with altered skin?: yes   Side:    Orientation:    Location: Sacral spine   Wound Number:    Is this injury device related?: No   Primary Wound Type:    Description of Altered Skin Integrity: Intact skin with non-blanchable redness of localized area   Ankle-Brachial Index:    Pulses:    Removal Indication and Assessment:    Wound Outcome:    (Retired) Wound Length (cm):    (Retired) Wound Width (cm):    (Retired) Depth (cm):    Wound Description (Comments):    Removal Indications:    Wound Image   03/01/24 1100   Description of Altered Skin Integrity Intact skin with non-blanchable redness of localized area 03/01/24 0200   Dressing Appearance Open to air 03/01/24 1100   Drainage Amount None 03/01/24 1100   Drainage Characteristics/Odor No odor 03/01/24 1100   Appearance Brown;Dry 03/01/24 1100   Tissue loss description Not applicable 03/01/24 1100       Plan:          69 y/o male in with bilat leg weakness a few days--a UTI--and reports cardiac stents a few days also. .  His also ESRD on dialysis.    He is awake alert oriented and turns self in bed for buttock assessment for which we have a consult.   See photo.   Granulated scar tissue.    Educated and instructed on  need to turn q2hrs while in bed on calling the nurse for needs.   Antifungal powder ordered to asssit with keeping pt dryer.    Will recheck on him next week.

## 2024-03-01 NOTE — NURSING
Nurses Note -- 4 Eyes      3/1/2024   2:55 AM      Skin assessed during: Admit      [] No Altered Skin Integrity Present    []Prevention Measures Documented      [x] Yes- Altered Skin Integrity Present or Discovered   [x] LDA Added if Not in Epic (Describe Wound)   [x] New Altered Skin Integrity was Present on Admit and Documented in LDA   [x] Wound Image Taken    Wound Care Consulted? Yes    Attending Nurse:  Jaden Ya RN    Second RN/Staff Member: Beronica Galo RN

## 2024-03-01 NOTE — PROGRESS NOTES
Inpatient Nutrition Evaluation    Admit Date: 2/29/2024   Total duration of encounter: 1 day   Patient Age: 68 y.o.    Nutrition Recommendation/Prescription     - Continue Renal Dialysis Diet as ordered  - Add Novasource Renal once daily -- provides 475 kcals and 22 g Pro per ONS    Nutrition Assessment     Chart Review    Reason Seen: malnutrition screening tool (MST)    Malnutrition Screening Tool Results   Have you recently lost weight without trying?: Unsure  Have you been eating poorly because of a decreased appetite?: Yes   MST Score: 3   Diagnosis:  BLE weakness  2.  Elevated troponin - likely still downtrending from NSTEMI  3.  Acute hemorrhagic cystitis/persistent hematuria     Relevant Medical History:   CAD s/p stent, HTN, ESRD on HD (MWF), BPH, chronic urinary retention, GERD, chronic anemia, chronic pain, anxiety     Scheduled Medications:  amLODIPine, 10 mg, Daily  ascorbic acid (vitamin C), 500 mg, BID  aspirin, 81 mg, Daily  atorvastatin, 40 mg, QHS  carvediloL, 12.5 mg, BID  cefTRIAXone (Rocephin) IV (PEDS and ADULTS), 1 g, Q24H  clopidogreL, 75 mg, Daily  ferrous sulfate, 1 tablet, BID  fluticasone propionate, 1 spray, Daily  megestroL, 80 mg, BID  miconazole NITRATE 2 %, , BID  mupirocin, , BID  pantoprazole, 40 mg, Daily  sodium bicarbonate, 1,300 mg, BID  tamsulosin, 0.4 mg, Daily    Continuous Infusions:   PRN Medications: acetaminophen, aluminum-magnesium hydroxide-simethicone, HYDROcodone-acetaminophen, melatonin, naloxone, ondansetron, polyethylene glycol, prochlorperazine, simethicone    Recent Labs   Lab 02/26/24  0527 02/29/24  1555 03/01/24  0603   * 135* 136   K 4.4 4.1 4.2   CALCIUM 7.5* 8.6* 7.9*   PHOS  --   --  4.5   MG  --   --  1.90   CHLORIDE  --  104 104   CO2 22 20* 22*   BUN 70* 33.5* 39.7*   CREATININE 9.23* 6.94* 8.23*   EGFRNORACEVR  --  8 7   GLUCOSE  --  104 78*   BILITOT  --  0.6 0.5   ALKPHOS  --  57 49   ALT  --  7 7   AST  --  11 9   ALBUMIN 2.6* 3.0* 2.6*   WBC  " --  6.94 5.25   HGB  --  10.0* 8.2*   HCT  --  31.6* 26.4*     Nutrition Orders:  Diet Renal On Dialysis      Appetite/Oral Intake: fair/25-50% of meals  Factors Affecting Nutritional Intake: decreased appetite  Food/Religion/Cultural Preferences: none reported  Food Allergies: none reported  Last Bowel Movement: 24  Wound(s):     Altered Skin Integrity 24 0237 Sacral spine Intact skin with non-blanchable redness of localized area-Tissue loss description: Not applicable     Comments    3/1/24: Pt with poor appetite since admit. PTA pt states he was eating well, no issues reported. UBW is around 138 lbs, no unintended wt loss reported. Pt agreeable to trial ONS.     Anthropometrics    Height: 5' 6" (167.6 cm),    Last Weight: 59.9 kg (132 lb) (24 0540), Weight Method: Bed Scale  BMI (Calculated): 21.3  BMI Classification: underweight (BMI less than 22 if >65 years of age)        Ideal Body Weight (IBW), Male: 142 lb     % Ideal Body Weight, Male (lb): 96.48 %                 Usual Body Weight (UBW), k.7 kg  % Usual Body Weight: 95.69     Usual Weight Provided By: patient    Wt Readings from Last 5 Encounters:   24 59.9 kg (132 lb)   23 67.2 kg (148 lb 3.2 oz)   23 72.6 kg (160 lb)   23 65.8 kg (145 lb)   23 67.6 kg (149 lb 1.6 oz)     Weight Change(s) Since Admission:   Wt Readings from Last 1 Encounters:   24 0540 59.9 kg (132 lb)   24 0219 59.9 kg (132 lb)   24 1446 62.1 kg (137 lb)   Admit Weight: 62.1 kg (137 lb) (24 1446), Weight Method: Bed Scale    Patient Education     Not applicable.    Nutrition Goals & Monitoring     Dietitian will monitor: food and beverage intake and weight    Nutrition Risk/Follow-Up: low (follow-up in 5-7 days)  Patients assigned 'low nutrition risk' status do not qualify for a full nutritional assessment but will be monitored and re-evaluated in a 5-7 day time period. Please consult if re-evaluation needed " sooner.

## 2024-03-01 NOTE — PT/OT/SLP EVAL
"Occupational Therapy  Evaluation    Name: Mirza Nelsno Jr.  MRN: [id number]  Admitting Diagnosis: fall, generalized weakness  Recent Surgery: * No surgery found *      Recommendations:     Discharge therapy intensity: High Intensity Therapy   Discharge Equipment Recommendations:  to be determined by next level of care  Barriers to discharge:   (ongoing medical needs)    Assessment:     Mirza Nelson Jr. is a 68 y.o. male with a medical diagnosis of fall, generalized weakness with PMH of CAD, HTN, ESDR (HD on MWF).  He presents with the following performance deficits affecting function: weakness, gait instability, decreased upper extremity function, impaired balance, impaired endurance, decreased safety awareness, impaired self care skills, impaired functional mobility, pain.     Patient slightly agitated upon eval due to full body pain. Required MIN-CGA for bed mobility and functional mobility to/from toilet. Impaired orientation to date. Since patient lives alone he would benefit form high-intensity therapy upon dc to improve ADL independence.     Rehab Prognosis: Good; patient would benefit from acute skilled OT services to address these deficits and reach maximum level of function.       Plan:     Patient to be seen 3 x/week to address the above listed problems via self-care/home management, therapeutic activities, therapeutic exercises  Plan of Care Expires: 03/29/24  Plan of Care Reviewed with: patient    Subjective     Chief Complaint: "just pain all over"  Patient/Family Comments/goals: to return home    Occupational Profile:  Living Environment: Patient lives by himself with 0STE.   Previous level of function: He reports being independent prior to admittance. Someone helps him cook and clean.   Roles and Routines: none stated  Equipment Used at Home: shower chair, rollator  Assistance upon Discharge: Pt will not have assistance upon dc.     Pain/Comfort:  Pain Rating 1: 10/10 ("back and low neck")  Pain " Addressed 1: Distraction    Patients cultural, spiritual, Temple conflicts given the current situation: no    Objective:     OT communicated with RN prior to session.      Patient was found supine with peripheral IV, pressure relief boots, SCD, telemetry, pulse ox (continuous), nielsen catheter upon OT entry to room.    General Precautions: Standard, fall  Orthopedic Precautions: N/A  Braces: N/A    Vital Signs: Respiratory Status: on room air    Bed Mobility:    Patient completed Rolling/Turning to Right with minimum assistance  Patient completed Supine to Sit with minimum assistance  Patient completed Sit to Supine with minimum assistance    Functional Mobility/Transfers:  Patient completed Sit <> Stand Transfer with contact guard assistance  with  rolling walker   Patient completed Toilet Transfer Step Transfer technique with contact guard assistance with  rolling walker  Functional Mobility: Required CGA to perform functional mobility to/from toilet.     Activities of Daily Living:  Toileting: dependence nielsen catheter    Functional Cognition:  Orientation: oriented to Person and Place    Visual Perceptual Skills:  Intact    Upper Extremity Function:  Right Upper Extremity:   Range of Motion: shoulder flexion to ~110*; reported pain with movement due to former dialysis port site; distally WFL  Strength: 4-/5    Left Upper Extremity:  Range of Motion: WFL  Strength: 4-/5    Balance:   Static sitting balance: WFL    Therapeutic Positioning  Risk for acquired pressure injuries is increased due to relative decrease in mobility d/t hospitalization  and altered skin already present.    OT interventions performed during the course of today's session:   Therapeutic positioning was provided at the conclusion of session to offload all bony prominences for the prevention and/or reduction of pressure injuries    Skin assessment: all bony prominences were assessed    Findings: known area of altered skin integrity at sacral  spine    OT recommendations for therapeutic positioning throughout hospitalization:   Follow [facility] Pressure Injury Prevention Protocol    Patient Education:  Patient provided with verbal education education regarding OT role/goals/POC, fall prevention, and safety awareness.  Understanding was verbalized, however additional teaching warranted.     Patient left  right side lying with wedge  with call button in reach    GOALS:   Multidisciplinary Problems       Occupational Therapy Goals          Problem: Occupational Therapy    Goal Priority Disciplines Outcome Interventions   Occupational Therapy Goal     OT, PT/OT Ongoing, Progressing    Description: Goals to be met by 3/29/2024    Pt will complete grooming standing at sink with LRAD with SBA.  Pt will complete UB dressing with SBA.  Pt will complete LB dressing with SBA using LRAD.  Pt will complete toileting with SBA using LRAD.  Pt will complete functional mobility to/from toilet and toilet transfer with SBA using LRAD.                         History:     Past Medical History:   Diagnosis Date    Anemia     BPH (benign prostatic hyperplasia)     Cervical radiculopathy 05/17/2022    Disorder of kidney and ureter     ESRD on hemodialysis 09/27/2018    Hypertension     Neck injury 1994    Other chronic pain 09/27/2018    Personal history of colonic polyps     Renovascular hypertension 09/27/2018    Vitamin D deficiency 05/17/2022         Past Surgical History:   Procedure Laterality Date    AV FISTULA PLACEMENT      COLONOSCOPY W/ POLYPECTOMY  02/19/2019    EGD, WITH CLOSED BIOPSY  7/15/2023    Procedure: EGD, WITH CLOSED BIOPSY;  Surgeon: Casey Funes MD;  Location: [facility];  Service: Gastroenterology;;    ESOPHAGOGASTRODUODENOSCOPY N/A 7/15/2023    Procedure: EGD (ESOPHAGOGASTRODUODENOSCOPY);  Surgeon: Casey Funes MD;  Location: [facility] OR;  Service: Gastroenterology;  Laterality: N/A;    MASS EXCISION  2005    near the kidney     NECK SURGERY         Time  Tracking:     OT Date of Treatment:    OT Start Time: 0941  OT Stop Time: 1012  OT Total Time (min): 31 min    Billable Minutes:Evaluation moderate complexity    3/1/2024

## 2024-03-01 NOTE — PLAN OF CARE
Problem: Occupational Therapy  Goal: Occupational Therapy Goal  Description: Goals to be met by 3/29/2024    Pt will complete grooming standing at sink with LRAD with SBA.  Pt will complete UB dressing with SBA.  Pt will complete LB dressing with SBA using LRAD.  Pt will complete toileting with SBA using LRAD.  Pt will complete functional mobility to/from toilet and toilet transfer with SBA using LRAD.    Outcome: Ongoing, Progressing

## 2024-03-02 NOTE — PROCEDURES
"Mirza Nelson Jr. is a 68 y.o. male patient.    Temp: 98.5 °F (36.9 °C) (03/02/24 0448)  Pulse: 63 (03/02/24 0448)  Resp: 18 (03/02/24 0448)  BP: 131/67 (03/02/24 0448)  SpO2: 98 % (03/02/24 0448)  Weight: 59.9 kg (132 lb) (03/01/24 0540)  Height: 5' 6" (167.6 cm) (02/29/24 1446)    PICC  Date/Time: 3/2/2024 5:41 AM  Performed by: Diego Regan RN  Consent Done: Yes  Time out: Immediately prior to procedure a time out was called to verify the correct patient, procedure, equipment, support staff and site/side marked as required  Indications: med administration and vascular access  Anesthesia: local infiltration  Local anesthetic: lidocaine 1% without epinephrine  Anesthetic Total (mL): 5  Preparation: skin prepped with ChloraPrep  Skin prep agent dried: skin prep agent completely dried prior to procedure  Sterile barriers: all five maximum sterile barriers used - cap, mask, sterile gown, sterile gloves, and large sterile sheet  Hand hygiene: hand hygiene performed prior to central venous catheter insertion  Location details: left brachial  Catheter type: single lumen  Catheter size: 4 Fr  Catheter Length: 13cm    Ultrasound guidance: yes  Vessel Caliber: medium and patent, compressibility normal  Needle advanced into vessel with real time Ultrasound guidance.  Guidewire confirmed in vessel.  Sterile sheath used.  Number of attempts: 1  Post-procedure: blood return through all ports, chlorhexidine patch and sterile dressing applied    Assessment: free fluid flow          Name Diego regan RN  3/2/2024    "

## 2024-03-02 NOTE — PLAN OF CARE
Problem: Adult Inpatient Plan of Care  Goal: Plan of Care Review  Outcome: Ongoing, Progressing  Goal: Patient-Specific Goal (Individualized)  Outcome: Ongoing, Progressing  Goal: Absence of Hospital-Acquired Illness or Injury  Outcome: Ongoing, Progressing  Goal: Optimal Comfort and Wellbeing  Outcome: Ongoing, Progressing  Goal: Readiness for Transition of Care  Outcome: Ongoing, Progressing     Problem: Skin Injury Risk Increased  Goal: Skin Health and Integrity  Outcome: Ongoing, Progressing     Problem: Fall Injury Risk  Goal: Absence of Fall and Fall-Related Injury  Outcome: Ongoing, Progressing     Problem: Device-Related Complication Risk (Hemodialysis)  Goal: Safe, Effective Therapy Delivery  Outcome: Ongoing, Progressing     Problem: Hemodynamic Instability (Hemodialysis)  Goal: Effective Tissue Perfusion  Outcome: Ongoing, Progressing     Problem: Infection (Hemodialysis)  Goal: Absence of Infection Signs and Symptoms  Outcome: Ongoing, Progressing     Problem: Infection  Goal: Absence of Infection Signs and Symptoms  Outcome: Ongoing, Progressing

## 2024-03-02 NOTE — PROGRESS NOTES
[facility] Hospital Medicine Progress Note        Chief Complaint: Inpatient Follow-up for     HPI:   Mr. Nelson is a 68 year old male with a pmh of CAD s/p stent, HTN, ESRD on HD (MWF), BPH, chronic urinary retention, GERD, chronic anemia, chronic pain, anxiety who presented to the ED with c/o BLE weakness.  He states that he has been unable to move his legs or walk since being admitted to [facility] on 2/18.  Per chart review, he had arrived in the ED with c/o SOB, rapidly decompensated, was intubated. He was able to be extubated the next day.  On 2/22, he was diagnosed with NSTEMI and had a stent placed on 2/23. He was discharged home yesterday. He was offered SNF placement but refused.     ED vitals on arrival:  Temp 98° point F, pulse 78, resp 16, /82, SpO2 98% on RA.  Today's ED lab work showed H&H 10/31.6, CO2 20, BUN/CR 33.5/6.94, troponin 0.376.  UA showed 2+ protein, 2+ blood, 500 leukocyte esterase,>100 RBC, >100 WBC, Moderate bacteria.  CXR showed no acute cardiopulmonary process.  CT head without contrast showed no convincing acute intracranial abnormality.  CT thoracic spine without contrast showed no acute fracture identified.  Multilevel degenerative changes of the thoracic spine.  CT lumbar spine without contrast showed no acute fracture identified multilevel degenerative changes.  He was started on ceftriaxone and admitted to Hospital Medicine for management.     MRI Thoracic spine and Lumbar spine pending.    Interval Hx:   Patient seen and examined by bedside.  Slightly agitated this a.m..  Patient is a poor historian.  Unable to accurately give me details of event to bring him to the hospital and what is going on.  When asked about recent hospitalization at [facility] he is a number to give me many details.    Case was discussed with patient's nurse and  on the floor.    Objective/physical exam:  General: In no acute distress, afebrile  Chest:  Clear to auscultation bilaterally  Heart: RRR, +S1, S2, no appreciable murmur  Abdomen: Soft, nontender, BS +  MSK: Warm, no lower extremity edema, no clubbing or cyanosis  Neurologic: Alert and oriented x4, Cranial nerve II-XII intact, Strength 5/5 in all 4 extremities    VITAL SIGNS: 24 HRS MIN & MAX LAST   Temp  Min: 97.9 °F (36.6 °C)  Max: 98.6 °F (37 °C) 98.6 °F (37 °C)   BP  Min: 122/52  Max: 170/71 (!) 168/69   Pulse  Min: 70  Max: 72  72   Resp  Min: 18  Max: 19 18   SpO2  Min: 96 %  Max: 99 % 99 %     I have reviewed the following labs:  Recent Labs   Lab 02/29/24  1555 03/01/24  0603   WBC 6.94 5.25   RBC 3.18* 2.58*   HGB 10.0* 8.2*   HCT 31.6* 26.4*   MCV 99.4* 102.3*   MCH 31.4* 31.8*   MCHC 31.6* 31.1*   RDW 17.7* 18.0*    171   MPV 10.3 10.7*     Recent Labs   Lab 02/26/24  0527 02/29/24  1555 03/01/24  0603   * 135* 136   K 4.4 4.1 4.2   CL 99*  --   --    CO2 22 20* 22*   ANIONGAP 9  --   --    BUN 70* 33.5* 39.7*   CREATININE 9.23* 6.94* 8.23*   CALCIUM 7.5* 8.6* 7.9*   MG  --   --  1.90   ALBUMIN 2.6* 3.0* 2.6*   ALKPHOS  --  57 49   ALT  --  7 7   AST  --  11 9   BILITOT  --  0.6 0.5     Microbiology Results (last 7 days)       Procedure Component Value Units Date/Time    Urine culture [[id number]]  (Abnormal) Collected: 02/29/24 1840    Order Status: Completed Specimen: Urine Updated: 03/01/24 0918     Urine Culture >/= 100,000 colonies/ml Gram-negative Rods             See below for Radiology    Scheduled Med:   amLODIPine  10 mg Oral Daily    ascorbic acid (vitamin C)  500 mg Oral BID    aspirin  81 mg Oral Daily    atorvastatin  40 mg Oral QHS    carvediloL  12.5 mg Oral BID    cefTRIAXone (Rocephin) IV (PEDS and ADULTS)  1 g Intravenous Q24H    clopidogreL  75 mg Oral Daily    ferrous sulfate  1 tablet Oral BID    fluticasone propionate  1 spray Each Nostril Daily    megestroL  80 mg Oral BID    miconazole NITRATE 2 %   Topical (Top) BID    mupirocin   Nasal BID    pantoprazole   40 mg Oral Daily    sodium bicarbonate  1,300 mg Oral BID    tamsulosin  0.4 mg Oral Daily      Continuous Infusions:     PRN Meds:  acetaminophen, aluminum-magnesium hydroxide-simethicone, HYDROcodone-acetaminophen, melatonin, naloxone, ondansetron, polyethylene glycol, prochlorperazine, simethicone     Assessment/Plan:  Fall, secondary to bilateral lower extremity weakness  Elevated troponin, likely still downtrending from NSTEMI   Persistent hematuria   Recent CAD status post recent stents   ESRD on HD  BPH   Chronic urinary retention with Cheek catheter   Chronic anemia  Chronic back pain    Patient was recently admitted at [facility] from volume overload required brief intubation.  NSTEMI was noted inpatient had LAD PCF-patient in hospital and recommended monitoring for the hematuria.  Consult PT and OT  Imaging largely unremarkable, MRI of the thoracic spine shows mild multilevel spondylosis without significant canal stenosis or cord compression   MRI of the lumbar spine shows mild degenerative narrowing of the spinal canal at L4 and L5 and multilevel neural foraminal stenosis   CT head unremarkable   Cheek catheter currently clear and no hematuria present   Due to recent stents patient is high-risk for InStent thrombosis if antiplatelets stopped, continue antiplatelet at this time  Urine culture growing greater than 100,000 colonies Gram-negative rods, continue Rocephin, day 2  Nephrology consulted for assistance with dialysis   Monitor hemoglobin closely   Further recs based on clinical course   Labs in a.m.    VTE prophylaxis:     Patient condition:  Stable/Fair/Guarded/ Serious/ Critical    Anticipated discharge and Disposition:   Possible SNF versus rehab      All diagnosis and differential diagnosis have been reviewed; assessment and plan has been documented; I have personally reviewed the labs and test results that are presently available; I have reviewed the patients medication list; I have  reviewed the consulting providers response and recommendations. I have reviewed or attempted to review medical records based upon their availability    All of the patient's questions have been  addressed and answered. Patient's is agreeable to the above stated plan. I will continue to monitor closely and make adjustments to medical management as needed.  _____________________________________________________________________    Nutrition Status:    Radiology:  I have personally reviewed the following imaging and agree with the radiologist.     MRI Thoracic Spine Without Contrast  Narrative: EXAMINATION:  MRI THORACIC SPINE WITHOUT CONTRAST    CLINICAL HISTORY:  Lower extremity weakness;    TECHNIQUE:  Multiplanar multisequence MR images of the thoracic spine are obtained without contrast.    COMPARISON:  CT thoracic spine dated 02/29/2024    FINDINGS:  Evaluation is limited by motion artifact.  Thoracic alignment is preserved.  The vertebral body heights are maintained.  There are mild multilevel degenerate endplate changes.    There is no definite convincing cord signal abnormality.  There is no evidence of an epidural fluid collection.    There are multilevel degenerative changes with disc bulges and facet hypertrophy.  There is no evidence of significant canal stenosis or cord compression.  The neural foramina appear patent.    The posterior paraspinal soft tissues are unremarkable.  Small bilateral pleural effusions are noted.  Impression: 1. Evaluation limited by motion artifact.  No definite convincing cord signal abnormality.  2. Mild multilevel spondylosis without significant canal stenosis or cord compression.  No significant change from the [facility] interpretation.    Electronically signed by: Pretty Chao  Date:    03/01/2024  Time:    09:11  MRI Lumbar Spine Without Contrast  Narrative: EXAMINATION:  MRI LUMBAR SPINE WITHOUT CONTRAST    CLINICAL HISTORY:  Lower extremity  weakness;    TECHNIQUE:  Multiplanar multisequence MR images of the lumbar spine are obtained without contrast.    COMPARISON:  CT lumbar spine dated 02/29/2024    FINDINGS:  Evaluation is limited by motion artifact.  There are 5 non-rib-bearing lumbar type vertebra bodies.  Alignment is normal.  The vertebral body heights are maintained.  There are degenerative endplate changes at L4-5.    The conus terminates at the level of L2.  It is normal in signal and contour.    Disc spaces, spinal canal and neural foramina are as follows:    L1-L2: Disc bulge with mild narrowing of the spinal canal.  Mild bilateral neural foraminal stenosis.    L2-L3: No disc herniation.  No significant spinal canal stenosis.  Mild bilateral neural foraminal stenosis.    L3-L4: No disc herniation.  Bilateral facet hypertrophy.  No significant spinal canal stenosis.  Mild bilateral foraminal stenosis.    L4-L5: Disc bulge and facet hypertrophy with mild narrowing of the spinal canal.  Moderate right and mild left neural foraminal stenosis.    L5-S1: No disc herniation.  Bilateral facet hypertrophy.  No significant spinal canal stenosis.  Moderate right and mild left neural foraminal stenosis.    No significant abnormality within the visualized paraspinous musculature.  Hydronephrosis is again noted.  Impression: 1. Mild degenerative narrowing the spinal canal at L4-5.  2. Multilevel neural foraminal stenoses as described.  No major change from the [facility] interpretation    Electronically signed by: Pretty Chao  Date:    03/01/2024  Time:    09:07    Jania Cabello DO  Department of Hospital Medicine  [facility]  03/01/2024

## 2024-03-02 NOTE — PLAN OF CARE
Problem: Adult Inpatient Plan of Care  Goal: Plan of Care Review  Outcome: Ongoing, Progressing  Goal: Patient-Specific Goal (Individualized)  Outcome: Ongoing, Progressing  Goal: Absence of Hospital-Acquired Illness or Injury  Outcome: Ongoing, Progressing  Goal: Optimal Comfort and Wellbeing  Outcome: Ongoing, Progressing  Goal: Readiness for Transition of Care  Outcome: Ongoing, Progressing     Problem: Impaired Wound Healing  Goal: Optimal Wound Healing  Outcome: Ongoing, Progressing     Problem: Skin Injury Risk Increased  Goal: Skin Health and Integrity  Outcome: Ongoing, Progressing     Problem: Fall Injury Risk  Goal: Absence of Fall and Fall-Related Injury  Outcome: Ongoing, Progressing     Problem: Device-Related Complication Risk (Hemodialysis)  Goal: Safe, Effective Therapy Delivery  Outcome: Ongoing, Progressing     Problem: Hemodynamic Instability (Hemodialysis)  Goal: Effective Tissue Perfusion  Outcome: Ongoing, Progressing     Problem: Infection (Hemodialysis)  Goal: Absence of Infection Signs and Symptoms  Outcome: Ongoing, Progressing

## 2024-03-02 NOTE — PROGRESS NOTES
[facility] Hospital Medicine Progress Note        Chief Complaint: Inpatient Follow-up for     HPI:   Mr. Nelson is a 68 year old male with a pmh of CAD s/p stent, HTN, ESRD on HD (MWF), BPH, chronic urinary retention, GERD, chronic anemia, chronic pain, anxiety who presented to the ED with c/o BLE weakness.  He states that he has been unable to move his legs or walk since being admitted to [facility] on 2/18.  Per chart review, he had arrived in the ED with c/o SOB, rapidly decompensated, was intubated. He was able to be extubated the next day.  On 2/22, he was diagnosed with NSTEMI and had a stent placed on 2/23. He was discharged home yesterday. He was offered SNF placement but refused.     ED vitals on arrival:  Temp 98° point F, pulse 78, resp 16, /82, SpO2 98% on RA.  Today's ED lab work showed H&H 10/31.6, CO2 20, BUN/CR 33.5/6.94, troponin 0.376.  UA showed 2+ protein, 2+ blood, 500 leukocyte esterase,>100 RBC, >100 WBC, Moderate bacteria.  CXR showed no acute cardiopulmonary process.  CT head without contrast showed no convincing acute intracranial abnormality.  CT thoracic spine without contrast showed no acute fracture identified.  Multilevel degenerative changes of the thoracic spine.  CT lumbar spine without contrast showed no acute fracture identified multilevel degenerative changes.  He was started on ceftriaxone and admitted to Hospital Medicine for management.     MRI Thoracic spine and Lumbar spine pending.    Interval Hx:   Patient seen and examined by bedside.  Poor historian.  No acute overnight events.    Case was discussed with patient's nurse and  on the floor.    Objective/physical exam:  General: In no acute distress, afebrile  Chest: Clear to auscultation bilaterally  Heart: RRR, +S1, S2, no appreciable murmur  Abdomen: Soft, nontender, BS +  MSK: Warm, no lower extremity edema, no clubbing or cyanosis  Neurologic: Alert and oriented x4, Cranial  nerve II-XII intact, 5/5 in right lower extremity however 2/5 in left lower extremity sensation also decrease in left lower extremity.    VITAL SIGNS: 24 HRS MIN & MAX LAST   Temp  Min: 98.5 °F (36.9 °C)  Max: 99.4 °F (37.4 °C) 98.9 °F (37.2 °C)   BP  Min: 131/67  Max: 168/69 (!) 161/77   Pulse  Min: 63  Max: 86  65   Resp  Min: 18  Max: 18 18   SpO2  Min: 95 %  Max: 99 % 96 %     I have reviewed the following labs:  Recent Labs   Lab 02/29/24 1555 03/01/24  0603 03/02/24  0351   WBC 6.94 5.25 5.67   RBC 3.18* 2.58* 2.76*   HGB 10.0* 8.2* 8.8*   HCT 31.6* 26.4* 27.9*   MCV 99.4* 102.3* 101.1*   MCH 31.4* 31.8* 31.9*   MCHC 31.6* 31.1* 31.5*   RDW 17.7* 18.0* 18.2*    171 193   MPV 10.3 10.7* 10.1       Recent Labs   Lab 02/26/24  0527 02/29/24  1555 03/01/24  0603 03/02/24  0351   * 135* 136 135*   K 4.4 4.1 4.2 4.1   CL 99*  --   --   --    CO2 22 20* 22* 26   ANIONGAP 9  --   --   --    BUN 70* 33.5* 39.7* 18.6   CREATININE 9.23* 6.94* 8.23* 5.43*   CALCIUM 7.5* 8.6* 7.9* 8.0*   MG  --   --  1.90  --    ALBUMIN 2.6* 3.0* 2.6*  --    ALKPHOS  --  57 49  --    ALT  --  7 7  --    AST  --  11 9  --    BILITOT  --  0.6 0.5  --        Microbiology Results (last 7 days)       Procedure Component Value Units Date/Time    Urine culture [[id number]]  (Abnormal) Collected: 02/29/24 1840    Order Status: Completed Specimen: Urine Updated: 03/01/24 0918     Urine Culture >/= 100,000 colonies/ml Gram-negative Rods             See below for Radiology    Scheduled Med:   amLODIPine  10 mg Oral Daily    ascorbic acid (vitamin C)  500 mg Oral BID    aspirin  81 mg Oral Daily    atorvastatin  40 mg Oral QHS    carvediloL  12.5 mg Oral BID    cefTRIAXone (Rocephin) IV (PEDS and ADULTS)  1 g Intravenous Q24H    clopidogreL  75 mg Oral Daily    ferrous sulfate  1 tablet Oral BID    fluticasone propionate  1 spray Each Nostril Daily    megestroL  80 mg Oral BID    miconazole NITRATE 2 %   Topical (Top) BID    mupirocin    Nasal BID    pantoprazole  40 mg Oral Daily    sodium bicarbonate  1,300 mg Oral BID    sodium chloride 0.9%  10 mL Intravenous Q6H    tamsulosin  0.4 mg Oral Daily      Continuous Infusions:     PRN Meds:  acetaminophen, aluminum-magnesium hydroxide-simethicone, HYDROcodone-acetaminophen, melatonin, naloxone, ondansetron, polyethylene glycol, prochlorperazine, simethicone, Flushing PICC/Midline Protocol **AND** sodium chloride 0.9% **AND** sodium chloride 0.9%     Assessment/Plan:  Fall, secondary to bilateral lower extremity weakness  Elevated troponin, likely still downtrending from NSTEMI   Persistent hematuria   Recent CAD status post recent stents   ESRD on HD  BPH   Chronic urinary retention with Cheek catheter   Chronic anemia  Chronic back pain    Patient was recently admitted at [facility] from volume overload required brief intubation.  NSTEMI was noted inpatient had LAD PCF-patient in hospital and recommended monitoring for the hematuria.  Consult PT and OT, recommend high intensity therapy  Imaging largely unremarkable, MRI of the thoracic spine shows mild multilevel spondylosis without significant canal stenosis or cord compression   MRI of the lumbar spine shows mild degenerative narrowing of the spinal canal at L4 and L5 and multilevel neural foraminal stenosis   Unable to identify true cause of bilateral lower extremity extremis with left side greater than right  CT head unremarkable   Cheek catheter currently clear and no hematuria present, was recently replaced by Urology on 02/18 previous hospitalization  Due to recent stents patient is high-risk for InStent thrombosis if antiplatelets stopped, continue antiplatelet at this time  Urine culture growing greater than 100,000 colonies Gram-negative rods, continue Rocephin, day 3  Nephrology consulted for assistance with dialysis   Monitor hemoglobin closely   We will obtain vitamin B12, folate and vitamin-D to further assess lower extremity  weakness  Hematuria almost completely resolved  Further recs based on clinical course   Labs in a.m.    VTE prophylaxis:  SCDs secondary to hematuria    Patient condition:  Stable/Fair/Guarded/ Serious/ Critical    Anticipated discharge and Disposition:   Possible SNF versus rehab      All diagnosis and differential diagnosis have been reviewed; assessment and plan has been documented; I have personally reviewed the labs and test results that are presently available; I have reviewed the patients medication list; I have reviewed the consulting providers response and recommendations. I have reviewed or attempted to review medical records based upon their availability    All of the patient's questions have been  addressed and answered. Patient's is agreeable to the above stated plan. I will continue to monitor closely and make adjustments to medical management as needed.  _____________________________________________________________________    Nutrition Status:    Radiology:  I have personally reviewed the following imaging and agree with the radiologist.     MRI Thoracic Spine Without Contrast  Narrative: EXAMINATION:  MRI THORACIC SPINE WITHOUT CONTRAST    CLINICAL HISTORY:  Lower extremity weakness;    TECHNIQUE:  Multiplanar multisequence MR images of the thoracic spine are obtained without contrast.    COMPARISON:  CT thoracic spine dated 02/29/2024    FINDINGS:  Evaluation is limited by motion artifact.  Thoracic alignment is preserved.  The vertebral body heights are maintained.  There are mild multilevel degenerate endplate changes.    There is no definite convincing cord signal abnormality.  There is no evidence of an epidural fluid collection.    There are multilevel degenerative changes with disc bulges and facet hypertrophy.  There is no evidence of significant canal stenosis or cord compression.  The neural foramina appear patent.    The posterior paraspinal soft tissues are unremarkable.  Small bilateral  pleural effusions are noted.  Impression: 1. Evaluation limited by motion artifact.  No definite convincing cord signal abnormality.  2. Mild multilevel spondylosis without significant canal stenosis or cord compression.  [facility].    Electronically signed by: Pretty Chao  Date:    03/01/2024  Time:    09:11  MRI Lumbar Spine Without Contrast  Narrative: EXAMINATION:  MRI LUMBAR SPINE WITHOUT CONTRAST    CLINICAL HISTORY:  Lower extremity weakness;    TECHNIQUE:  Multiplanar multisequence MR images of the lumbar spine are obtained without contrast.    COMPARISON:  CT lumbar spine dated 02/29/2024    FINDINGS:  Evaluation is limited by motion artifact.  There are 5 non-rib-bearing lumbar type vertebra bodies.  Alignment is normal.  The vertebral body heights are maintained.  There are degenerative endplate changes at L4-5.    The conus terminates at the level of L2.  It is normal in signal and contour.    Disc spaces, spinal canal and neural foramina are as follows:    L1-L2: Disc bulge with mild narrowing of the spinal canal.  Mild bilateral neural foraminal stenosis.    L2-L3: No disc herniation.  No significant spinal canal stenosis.  Mild bilateral neural foraminal stenosis.    L3-L4: No disc herniation.  Bilateral facet hypertrophy.  No significant spinal canal stenosis.  Mild bilateral foraminal stenosis.    L4-L5: Disc bulge and facet hypertrophy with mild narrowing of the spinal canal.  Moderate right and mild left neural foraminal stenosis.    L5-S1: No disc herniation.  Bilateral facet hypertrophy.  No significant spinal canal stenosis.  Moderate right and mild left neural foraminal stenosis.    No significant abnormality within the visualized paraspinous musculature.  Hydronephrosis is again noted.  Impression: 1. Mild degenerative narrowing the spinal canal at L4-5.  2. Multilevel neural foraminal stenoses as described.  No major change from the  [facility] interpretation    Electronically signed by: Pretty Chao  Date:    03/01/2024  Time:    09:07    Jania Cabello DO  Department of Hospital Medicine  [facility]  03/02/2024

## 2024-03-04 NOTE — NURSING
Night shift nurse could not get morning weight d/t pt refusing to get back in bed. Tried to get pt in bed at shift change to weigh, but pt is still refusing. Before pt. Goes to dialysis I will weigh pt once in bed.   Pt weight has been charted. 140.7 lbs.

## 2024-03-04 NOTE — PLAN OF CARE
Went to complete discharge planning assessment with patient. Patient did not want to participate in assessment at this time. Will follow-up.

## 2024-03-04 NOTE — NURSING
03/04/24 1257   Post-Hemodialysis Assessment   Blood Volume Processed (Liters) 60.9 L   Dialyzer Clearance Clear   Duration of Treatment 180 minutes   Total UF (mL) 500 mL   Patient Response to Treatment Pt tolerated HD tx well; NAD noted/VSS. Total tx length 3hrs; resulting in 500ml net UF goal per MD.   Post-Hemodialysis Comments Pt deaccessed per P and P

## 2024-03-04 NOTE — PT/OT/SLP PROGRESS
Physical Therapy      Patient Name:  Mirza Nelson .   MRN:  [id number]    Patient not seen today secondary to Dialysis. Will follow-up when schedule allows.

## 2024-03-04 NOTE — PROGRESS NOTES
[facility] Hospital Medicine Progress Note        Chief Complaint: Inpatient Follow-up for     HPI:   Mr. Nelson is a 68 year old male with a pmh of CAD s/p stent, HTN, ESRD on HD (MWF), BPH, chronic urinary retention, GERD, chronic anemia, chronic pain, anxiety who presented to the ED with c/o BLE weakness.  He states that he has been unable to move his legs or walk since being admitted to [facility] on 2/18.  Per chart review, he had arrived in the ED with c/o SOB, rapidly decompensated, was intubated. He was able to be extubated the next day.  On 2/22, he was diagnosed with NSTEMI and had a stent placed on 2/23. He was discharged home yesterday. He was offered SNF placement but refused.     ED vitals on arrival:  Temp 98° point F, pulse 78, resp 16, /82, SpO2 98% on RA.  Today's ED lab work showed H&H 10/31.6, CO2 20, BUN/CR 33.5/6.94, troponin 0.376.  UA showed 2+ protein, 2+ blood, 500 leukocyte esterase,>100 RBC, >100 WBC, Moderate bacteria.  CXR showed no acute cardiopulmonary process.  CT head without contrast showed no convincing acute intracranial abnormality.  CT thoracic spine without contrast showed no acute fracture identified.  Multilevel degenerative changes of the thoracic spine.  CT lumbar spine without contrast showed no acute fracture identified multilevel degenerative changes.  He was started on ceftriaxone and admitted to Hospital Medicine for management.     MRI Thoracic spine showed no definite convincing spinal cord abnormalities.  Mild multilevel spondylosis without significant canal stenosis or cord compression.  MRI of the lumbar spine showed mild degenerative narrowing at the spinal canal at L4-L5 with multilevel neuroforaminal stenosis.  No significant abnormality otherwise seen.    Interval Hx:   Patient seen and examined by bedside.  No acute overnight events.  Reviewed lab work with patient and patient understood.  Had no further questions.    Case was  discussed with patient's nurse and  on the floor.    Objective/physical exam:  General: In no acute distress, afebrile  Chest: Clear to auscultation bilaterally  Heart: RRR, +S1, S2, no appreciable murmur  Abdomen: Soft, nontender, BS +  MSK: Warm, no lower extremity edema, no clubbing or cyanosis  Neurologic: Alert and oriented x4, Cranial nerve II-XII intact, 5/5 in right lower extremity however 2/5 in left lower extremity sensation also decrease in left lower extremity.    VITAL SIGNS: 24 HRS MIN & MAX LAST   Temp  Min: 97.6 °F (36.4 °C)  Max: 98.8 °F (37.1 °C) 98.4 °F (36.9 °C)   BP  Min: 105/60  Max: 138/68 129/72   Pulse  Min: 58  Max: 67  (!) 59   Resp  Min: 17  Max: 19 18   SpO2  Min: 98 %  Max: 100 % 99 %     I have reviewed the following labs:  Recent Labs   Lab 02/29/24  1555 03/01/24  0603 03/02/24  0351   WBC 6.94 5.25 5.67   RBC 3.18* 2.58* 2.76*   HGB 10.0* 8.2* 8.8*   HCT 31.6* 26.4* 27.9*   MCV 99.4* 102.3* 101.1*   MCH 31.4* 31.8* 31.9*   MCHC 31.6* 31.1* 31.5*   RDW 17.7* 18.0* 18.2*    171 193   MPV 10.3 10.7* 10.1       Recent Labs   Lab 02/26/24  0527 02/29/24  1555 03/01/24  0603 03/02/24  0351   * 135* 136 135*   K 4.4 4.1 4.2 4.1   CL 99*  --   --   --    CO2 22 20* 22* 26   ANIONGAP 9  --   --   --    BUN 70* 33.5* 39.7* 18.6   CREATININE 9.23* 6.94* 8.23* 5.43*   CALCIUM 7.5* 8.6* 7.9* 8.0*   MG  --   --  1.90  --    ALBUMIN 2.6* 3.0* 2.6*  --    ALKPHOS  --  57 49  --    ALT  --  7 7  --    AST  --  11 9  --    BILITOT  --  0.6 0.5  --        Microbiology Results (last 7 days)       Procedure Component Value Units Date/Time    Urine culture [[id number]]  (Abnormal)  (Susceptibility) Collected: 02/29/24 1840    Order Status: Completed Specimen: Urine Updated: 03/03/24 0624     Urine Culture >/= 100,000 colonies/ml Escherichia coli ESBL             See below for Radiology    Scheduled Med:   amLODIPine  10 mg Oral Daily    ascorbic acid (vitamin C)  500 mg Oral BID     aspirin  81 mg Oral Daily    atorvastatin  40 mg Oral QHS    carvediloL  12.5 mg Oral BID    cefTRIAXone (Rocephin) IV (PEDS and ADULTS)  1 g Intravenous Q24H    clopidogreL  75 mg Oral Daily    ergocalciferol  50,000 Units Oral Q7 Days    ferrous sulfate  1 tablet Oral BID    fluticasone propionate  1 spray Each Nostril Daily    folic acid  1 mg Oral Daily    megestroL  80 mg Oral BID    miconazole NITRATE 2 %   Topical (Top) BID    mupirocin   Nasal BID    pantoprazole  40 mg Oral Daily    sodium bicarbonate  1,300 mg Oral BID    sodium chloride 0.9%  10 mL Intravenous Q6H    tamsulosin  0.4 mg Oral Daily      Continuous Infusions:     PRN Meds:  acetaminophen, aluminum-magnesium hydroxide-simethicone, HYDROcodone-acetaminophen, melatonin, naloxone, ondansetron, polyethylene glycol, prochlorperazine, simethicone, Flushing PICC/Midline Protocol **AND** sodium chloride 0.9% **AND** sodium chloride 0.9%     Assessment/Plan:  Fall, secondary to bilateral lower extremity weakness  Elevated troponin, likely still downtrending from NSTEMI   Persistent hematuria, resolved  Recent CAD status post recent stents   ESRD on HD  BPH   Chronic urinary retention with Cheek catheter   Chronic anemia  Chronic back pain  Vitamin-D deficiency   Folic acid deficiency    Patient was recently admitted at [facility] from volume overload required brief intubation.  NSTEMI was noted inpatient had LAD PCF-patient in hospital and recommended monitoring for the hematuria.  Consult PT and OT, recommend high intensity therapy  Imaging largely unremarkable, MRI of the thoracic spine shows mild multilevel spondylosis without significant canal stenosis or cord compression   MRI of the lumbar spine shows mild degenerative narrowing of the spinal canal at L4 and L5 and multilevel neural foraminal stenosis   CT head unremarkable   Cheek catheter currently clear and no hematuria present, was recently replaced by Urology on 02/18 previous  hospitalization  Due to recent stents patient is high-risk for InStent thrombosis if antiplatelets stopped, continue antiplatelet at this time  Urine culture growing greater than 100,000 colonies Gram-negative rods, urine culture shows ESBL E coli, we will DC Rocephin per sensitivities and start Zosyn per sensitivities, day 1  Nephrology consulted for assistance with dialysis   Monitor hemoglobin closely   Vitamin B12 normal however significantly vitamin-D deficient  Start vitamin-D supplements 52221 IU weekly   Also has  folic acid deficiency, start folic acid daily  Hematuria almost completely resolved  Further recs based on clinical course   Patient will likely need rehab or SNF placement  Labs in a.m.    VTE prophylaxis:  SCDs secondary to hematuria    Patient condition:  Stable/Fair/Guarded/ Serious/ Critical    Anticipated discharge and Disposition:   Possible SNF versus rehab      All diagnosis and differential diagnosis have been reviewed; assessment and plan has been documented; I have personally reviewed the labs and test results that are presently available; I have reviewed the patients medication list; I have reviewed the consulting providers response and recommendations. I have reviewed or attempted to review medical records based upon their availability    All of the patient's questions have been  addressed and answered. Patient's is agreeable to the above stated plan. I will continue to monitor closely and make adjustments to medical management as needed.  _____________________________________________________________________    Nutrition Status:    Radiology:  I have personally reviewed the following imaging and agree with the radiologist.     MRI Thoracic Spine Without Contrast  Narrative: EXAMINATION:  MRI THORACIC SPINE WITHOUT CONTRAST    CLINICAL HISTORY:  Lower extremity weakness;    TECHNIQUE:  Multiplanar multisequence MR images of the thoracic spine are obtained without  contrast.    COMPARISON:  CT thoracic spine dated 02/29/2024    FINDINGS:  Evaluation is limited by motion artifact.  Thoracic alignment is preserved.  The vertebral body heights are maintained.  There are mild multilevel degenerate endplate changes.    There is no definite convincing cord signal abnormality.  There is no evidence of an epidural fluid collection.    There are multilevel degenerative changes with disc bulges and facet hypertrophy.  There is no evidence of significant canal stenosis or cord compression.  The neural foramina appear patent.    The posterior paraspinal soft tissues are unremarkable.  Small bilateral pleural effusions are noted.  Impression: 1. Evaluation limited by motion artifact.  No definite convincing cord signal abnormality.  2. Mild multilevel spondylosis without significant canal stenosis or cord compression.  [facility] significant [facility] the [facility].    Electronically signed by: Pretty Chao  Date:    03/01/2024  Time:    09:11  MRI Lumbar Spine Without Contrast  Narrative: EXAMINATION:  MRI LUMBAR SPINE WITHOUT CONTRAST    CLINICAL HISTORY:  Lower extremity weakness;    TECHNIQUE:  Multiplanar multisequence MR images of the lumbar spine are obtained without contrast.    COMPARISON:  CT lumbar spine dated 02/29/2024    FINDINGS:  Evaluation is limited by motion artifact.  There are 5 non-rib-bearing lumbar type vertebra bodies.  Alignment is normal.  The vertebral body heights are maintained.  There are degenerative endplate changes at L4-5.    The conus terminates at the level of L2.  It is normal in signal and contour.    Disc spaces, spinal canal and neural foramina are as follows:    L1-L2: Disc bulge with mild narrowing of the spinal canal.  Mild bilateral neural foraminal stenosis.    L2-L3: No disc herniation.  No significant spinal canal stenosis.  Mild bilateral neural foraminal stenosis.    L3-L4: No disc herniation.  Bilateral facet hypertrophy.  No  significant spinal canal stenosis.  Mild bilateral foraminal stenosis.    L4-L5: Disc bulge and facet hypertrophy with mild narrowing of the spinal canal.  Moderate right and mild left neural foraminal stenosis.    L5-S1: No disc herniation.  Bilateral facet hypertrophy.  No significant spinal canal stenosis.  Moderate right and mild left neural foraminal stenosis.    No significant abnormality within the visualized paraspinous musculature.  Hydronephrosis is again noted.  Impression: 1. Mild degenerative narrowing the spinal canal at L4-5.  2. Multilevel neural foraminal stenoses as described.  No major change from the [facility] interpretation    Electronically signed by: Pretty Chao  Date:    03/01/2024  Time:    09:07    Jania Cabello DO  Department of Hospital Medicine  [facility]  03/03/2024

## 2024-03-04 NOTE — PROGRESS NOTES
[facility] - 9th Floor Med Surg  Nephrology  Progress Note    Patient Name: Mirza Nelson Jr.  MRN: [id number]  Admission Date: 2/29/2024  Hospital Length of Stay: 4 days  Attending Provider: Wero Gutierrez MD   Primary Care Physician: Elo Flores MD  Principal Problem:Leg weakness, bilateral      Subjective:   Mirza Nelson Jr. is a 68 y.o. male with PMH of anemia, HTN, NSTEMI, CAD s/p PCI, chronic indwelling nielsen, and ESRD on HD. He follows a MWF schedule at [facility] with Dr. Butler. He presented to the ED on 2/29/24 s/p fall with generalized weakness and having cardiac stenting 2 days prior. He was on the floor for an extended period of time s/p fall, with neighbors finding him yesterday morning. Reports he has been unable to walk since his admission at [facility] on 2/18 with respiratory distress requiring intubation and NSTEMI with stenting. He was discharged from [facility] on 2/28. Labs on admission significant for Troponin 0.376, BUN 33.5 and creatinine 6.94. UA positive for UTI, final culture pending. Renal is consulted for ESRD management.       Interval History:   3/4/23 - - Urine culture final ESBL E Coli. He is on Rocephin and Zosyn. He is currently on dialysis now resting comfortably.     Review of patient's allergies indicates:   Allergen Reactions    Iodine      Other reaction(s): difficulty breathing, Facial Swelling    Iodine and iodide containing products Swelling    Shellfish containing products      Other reaction(s): Swelling     Current Facility-Administered Medications   Medication Frequency    acetaminophen tablet 650 mg Q6H PRN    aluminum-magnesium hydroxide-simethicone 200-200-20 mg/5 mL suspension 30 mL QID PRN    amLODIPine tablet 10 mg Daily    ascorbic acid (vitamin C) tablet 500 mg BID    aspirin chewable tablet 81 mg Daily    atorvastatin tablet 40 mg QHS    carvediloL tablet 12.5 mg BID    cefTRIAXone (Rocephin) 1 g in dextrose 5 % in water (D5W) 100 mL IVPB  (MB+) Q24H    clopidogreL tablet 75 mg Daily    ergocalciferol capsule 50,000 Units Q7 Days    ferrous sulfate tablet 1 each BID    fluticasone propionate 50 mcg/actuation nasal spray 50 mcg Daily    folic acid tablet 1 mg Daily    HYDROcodone-acetaminophen  mg per tablet 1 tablet Q6H PRN    megestroL 400 mg/10 mL (10 mL) suspension 80 mg BID    melatonin tablet 6 mg Nightly PRN    miconazole NITRATE 2 % top powder BID    mupirocin 2 % ointment BID    naloxone 0.4 mg/mL injection 0.02 mg PRN    ondansetron injection 4 mg Q4H PRN    pantoprazole EC tablet 40 mg Daily    piperacillin-tazobactam (ZOSYN) 4.5 g in dextrose 5 % in water (D5W) 100 mL IVPB (MB+) Q12H    polyethylene glycol packet 17 g BID PRN    prochlorperazine injection Soln 5 mg Q6H PRN    simethicone chewable tablet 80 mg QID PRN    sodium bicarbonate tablet 1,300 mg BID    sodium chloride 0.9% flush 10 mL Q6H    And    sodium chloride 0.9% flush 10 mL PRN    tamsulosin 24 hr capsule 0.4 mg Daily       Objective:     Vital Signs (Most Recent):  Temp: 99.3 °F (37.4 °C) (03/04/24 0733)  Pulse: 60 (03/04/24 0733)  Resp: 18 (03/04/24 0424)  BP: (!) 144/90 (03/04/24 0819)  SpO2: 98 % (03/04/24 0733) Vital Signs (24h Range):  Temp:  [97.6 °F (36.4 °C)-99.3 °F (37.4 °C)] 99.3 °F (37.4 °C)  Pulse:  [57-67] 60  Resp:  [16-19] 18  SpO2:  [97 %-100 %] 98 %  BP: (105-155)/(60-93) 144/90     Weight: 63.8 kg (140 lb 11.2 oz) (03/04/24 0819)  Body mass index is 22.71 kg/m².  Body surface area is 1.72 meters squared.    I/O last 3 completed shifts:  In: 150.9 [IV Piggyback:150.9]  Out: -     Physical Exam  Constitutional:       General: He is not in acute distress.  HENT:      Head: Atraumatic.      Nose: Nose normal.      Mouth/Throat:      Mouth: Mucous membranes are moist.   Eyes:      Extraocular Movements: Extraocular movements intact.      Conjunctiva/sclera: Conjunctivae normal.   Cardiovascular:      Rate and Rhythm: Regular rhythm. Bradycardia present.       Pulses: Normal pulses.      Heart sounds: Murmur heard.   Pulmonary:      Breath sounds: Normal breath sounds.   Abdominal:      Palpations: Abdomen is soft.   Genitourinary:     Comments: Urinary catheter with yellow urine   Musculoskeletal:         General: No swelling.      Cervical back: Neck supple.      Comments: WONG AVF   Skin:     General: Skin is warm.   Neurological:      Mental Status: He is alert and oriented to person, place, and time.   Psychiatric:         Mood and Affect: Mood normal.         Behavior: Behavior normal.         Significant Labs:sureBMP:   Recent Labs   Lab 03/01/24  0603 03/02/24  0351 03/04/24  0610      < > 137   K 4.2   < > 4.1   CO2 22*   < > 25   BUN 39.7*   < > 48.0*   CREATININE 8.23*   < > 9.21*   CALCIUM 7.9*   < > 7.9*   MG 1.90  --   --     < > = values in this interval not displayed.     CBC:   Recent Labs   Lab 03/04/24  0610   WBC 4.25*   RBC 2.61*   HGB 8.6*   HCT 26.4*      .1*   MCH 33.0*   MCHC 32.6*     Microbiology Results (last 7 days)       Procedure Component Value Units Date/Time    Urine culture [[id number]]  (Abnormal)  (Susceptibility) Collected: 02/29/24 1840    Order Status: Completed Specimen: Urine Updated: 03/03/24 0624     Urine Culture >/= 100,000 colonies/ml Escherichia coli ESBL          Specimen (24h ago, onward)      None          Recent Labs   Lab 02/29/24 1840   PROTEINUA 2+*   BACTERIA Moderate*   LEUKOCYTESUR 500*   UROBILINOGEN Normal       Significant Imaging:  No new imaging     Assessment/Plan:   ESRD on HD-- continue MWF schedule  Fall with generalized weakness  Chronic indwelling urinary catheter changed every 3 weeks - Follows with Dr Navarrete   UTI-- ESBL E Coli on Rocephin and Zosyn  Hypertension  Recent NSTEMI s/p stenting 2/23 with Dr Garcia at [facility]     Plan:  Continue dialysis on MWF schedule   OK to dc from renal standpoint     KARLOS Preston  Nephrology  [facility] - 9th Floor [facility]  Surg

## 2024-03-05 NOTE — PLAN OF CARE
Problem: Adult Inpatient Plan of Care  Goal: Plan of Care Review  Outcome: Ongoing, Progressing  Goal: Patient-Specific Goal (Individualized)  Outcome: Ongoing, Progressing  Goal: Absence of Hospital-Acquired Illness or Injury  Outcome: Ongoing, Progressing  Goal: Optimal Comfort and Wellbeing  Outcome: Ongoing, Progressing  Goal: Readiness for Transition of Care  Outcome: Ongoing, Progressing     Problem: Impaired Wound Healing  Goal: Optimal Wound Healing  Outcome: Ongoing, Progressing     Problem: Skin Injury Risk Increased  Goal: Skin Health and Integrity  Outcome: Ongoing, Progressing     Problem: Fall Injury Risk  Goal: Absence of Fall and Fall-Related Injury  Outcome: Ongoing, Progressing     Problem: Device-Related Complication Risk (Hemodialysis)  Goal: Safe, Effective Therapy Delivery  Outcome: Ongoing, Progressing     Problem: Device-Related Complication Risk (Hemodialysis)  Goal: Safe, Effective Therapy Delivery  Outcome: Ongoing, Progressing     Problem: Hemodynamic Instability (Hemodialysis)  Goal: Effective Tissue Perfusion  Outcome: Ongoing, Progressing     Problem: Infection (Hemodialysis)  Goal: Absence of Infection Signs and Symptoms  Outcome: Ongoing, Progressing     Problem: Infection  Goal: Absence of Infection Signs and Symptoms  Outcome: Ongoing, Progressing

## 2024-03-05 NOTE — PROGRESS NOTES
[facility] Hospital Medicine Progress Note        Chief Complaint: Inpatient Follow-up for     HPI:   Mr. Nelson is a 68 year old male with a pmh of CAD s/p stent, HTN, ESRD on HD (MWF), BPH, chronic urinary retention, GERD, chronic anemia, chronic pain, anxiety who presented to the ED with c/o BLE weakness.  He states that he has been unable to move his legs or walk since being admitted to [facility] on 2/18.  Per chart review, he had arrived in the ED with c/o SOB, rapidly decompensated, was intubated. He was able to be extubated the next day.  On 2/22, he was diagnosed with NSTEMI and had a stent placed on 2/23. He was discharged home yesterday. He was offered SNF placement but refused.     ED vitals on arrival:  Temp 98° point F, pulse 78, resp 16, /82, SpO2 98% on RA.  Today's ED lab work showed H&H 10/31.6, CO2 20, BUN/CR 33.5/6.94, troponin 0.376.  UA showed 2+ protein, 2+ blood, 500 leukocyte esterase,>100 RBC, >100 WBC, Moderate bacteria.  CXR showed no acute cardiopulmonary process.  CT head without contrast showed no convincing acute intracranial abnormality.  CT thoracic spine without contrast showed no acute fracture identified.  Multilevel degenerative changes of the thoracic spine.  CT lumbar spine without contrast showed no acute fracture identified multilevel degenerative changes.  He was started on ceftriaxone and admitted to Hospital Medicine for management.     MRI Thoracic spine showed no definite convincing spinal cord abnormalities.  Mild multilevel spondylosis without significant canal stenosis or cord compression.  MRI of the lumbar spine showed mild degenerative narrowing at the spinal canal at L4-L5 with multilevel neuroforaminal stenosis.  No significant abnormality otherwise seen.    Interval Hx:   Seen and examined by bedside.  No acute overnight events.  Sitting up in bed doing well.  No acute concerns at this time.    Case was discussed with patient's  nurse and  on the floor.    Objective/physical exam:  General: In no acute distress, afebrile  Chest: Clear to auscultation bilaterally  Heart: RRR, +S1, S2, no appreciable murmur  Abdomen: Soft, nontender, BS +  MSK: Warm, no lower extremity edema, no clubbing or cyanosis  Neurologic: Alert and oriented x4, Cranial nerve II-XII intact, 5/5 in right lower extremity however 2/5 in left lower extremity sensation also decrease in left lower extremity.    VITAL SIGNS: 24 HRS MIN & MAX LAST   Temp  Min: 97.6 °F (36.4 °C)  Max: 99.6 °F (37.6 °C) 99 °F (37.2 °C)   BP  Min: 141/89  Max: 161/73 (!) 150/76   Pulse  Min: 63  Max: 80  68   Resp  Min: 14  Max: 18 16   SpO2  Min: 93 %  Max: 99 % 96 %     I have reviewed the following labs:  Recent Labs   Lab 03/01/24  0603 03/02/24  0351 03/04/24  0610   WBC 5.25 5.67 4.25*   RBC 2.58* 2.76* 2.61*   HGB 8.2* 8.8* 8.6*   HCT 26.4* 27.9* 26.4*   .3* 101.1* 101.1*   MCH 31.8* 31.9* 33.0*   MCHC 31.1* 31.5* 32.6*   RDW 18.0* 18.2* 17.9*    193 187   MPV 10.7* 10.1 10.5*       Recent Labs   Lab 02/29/24  1555 03/01/24  0603 03/02/24  0351 03/04/24  0610   * 136 135* 137   K 4.1 4.2 4.1 4.1   CO2 20* 22* 26 25   BUN 33.5* 39.7* 18.6 48.0*   CREATININE 6.94* 8.23* 5.43* 9.21*   CALCIUM 8.6* 7.9* 8.0* 7.9*   MG  --  1.90  --   --    ALBUMIN 3.0* 2.6*  --   --    ALKPHOS 57 49  --   --    ALT 7 7  --   --    AST 11 9  --   --    BILITOT 0.6 0.5  --   --        Microbiology Results (last 7 days)       Procedure Component Value Units Date/Time    Urine culture [[id number]]  (Abnormal)  (Susceptibility) Collected: 02/29/24 1840    Order Status: Completed Specimen: Urine Updated: 03/03/24 0624     Urine Culture >/= 100,000 colonies/ml Escherichia coli ESBL             See below for Radiology    Scheduled Med:   amLODIPine  10 mg Oral Daily    ascorbic acid (vitamin C)  500 mg Oral BID    aspirin  81 mg Oral Daily    atorvastatin  40 mg Oral QHS    carvediloL   12.5 mg Oral BID    clopidogreL  75 mg Oral Daily    ergocalciferol  50,000 Units Oral Q7 Days    ferrous sulfate  1 tablet Oral BID    fluticasone propionate  1 spray Each Nostril Daily    folic acid  1 mg Oral Daily    megestroL  80 mg Oral BID    miconazole NITRATE 2 %   Topical (Top) BID    mupirocin   Nasal BID    pantoprazole  40 mg Oral Daily    piperacillin-tazobactam (Zosyn) IV (PEDS and ADULTS) (extended infusion is not appropriate)  4.5 g Intravenous Q12H    sodium bicarbonate  1,300 mg Oral BID    sodium chloride 0.9%  10 mL Intravenous Q6H    tamsulosin  0.4 mg Oral Daily      Continuous Infusions:     PRN Meds:  acetaminophen, aluminum-magnesium hydroxide-simethicone, HYDROcodone-acetaminophen, melatonin, naloxone, ondansetron, polyethylene glycol, prochlorperazine, simethicone, Flushing PICC/Midline Protocol **AND** sodium chloride 0.9% **AND** sodium chloride 0.9%     Assessment/Plan:  Fall, secondary to bilateral lower extremity weakness  Elevated troponin, likely still downtrending from NSTEMI   Persistent hematuria, resolved  Recent CAD status post recent stents   ESRD on HD  BPH   Chronic urinary retention with Cheek catheter   Chronic anemia  Chronic back pain  Vitamin-D deficiency   Folic acid deficiency    Patient was recently admitted at [facility] from volume overload required brief intubation.  NSTEMI was noted inpatient had LAD PCF-patient in hospital and recommended monitoring for the hematuria.  Consult PT and OT, recommend high intensity therapy  Imaging largely unremarkable, MRI of the thoracic spine shows mild multilevel spondylosis without significant canal stenosis or cord compression   MRI of the lumbar spine shows mild degenerative narrowing of the spinal canal at L4 and L5 and multilevel neural foraminal stenosis   CT head unremarkable   Cheek catheter currently clear and no hematuria present, was recently replaced by Urology on 02/18 previous hospitalization  Due to  recent stents patient is high-risk for InStent thrombosis if antiplatelets stopped, continue antiplatelet at this time  Urine culture growing greater than 100,000 colonies Gram-negative rods, urine culture shows ESBL E coli, we will DC Rocephin per sensitivities and start Zosyn per sensitivities, day 6  Nephrology consulted for assistance with dialysis   Monitor hemoglobin closely   Vitamin B12 normal however significantly vitamin-D deficient  Start vitamin-D supplements 49978 IU weekly   Also has  folic acid deficiency, start folic acid daily  Hematuria almost completely resolved  Further recs based on clinical course   Patient will likely need rehab or SNF placement, awaiting placement  Referrals have been sent,  Plan for dialysis tomorrow  Labs in a.m.    VTE prophylaxis:  SCDs secondary to hematuria    Patient condition:  Stable/Fair/Guarded/ Serious/ Critical    Anticipated discharge and Disposition:   Possible SNF versus rehab      All diagnosis and differential diagnosis have been reviewed; assessment and plan has been documented; I have personally reviewed the labs and test results that are presently available; I have reviewed the patients medication list; I have reviewed the consulting providers response and recommendations. I have reviewed or attempted to review medical records based upon their availability    All of the patient's questions have been  addressed and answered. Patient's is agreeable to the above stated plan. I will continue to monitor closely and make adjustments to medical management as needed.  _____________________________________________________________________    Nutrition Status:    Radiology:  I have personally reviewed the following imaging and agree with the radiologist.     MRI Thoracic Spine Without Contrast  Narrative: EXAMINATION:  MRI THORACIC SPINE WITHOUT CONTRAST    CLINICAL HISTORY:  Lower extremity weakness;    TECHNIQUE:  Multiplanar multisequence MR images of the  thoracic spine are obtained without contrast.    COMPARISON:  CT thoracic spine dated 02/29/2024    FINDINGS:  Evaluation is limited by motion artifact.  Thoracic alignment is preserved.  The vertebral body heights are maintained.  There are mild multilevel degenerate endplate changes.    There is no definite convincing cord signal abnormality.  There is no evidence of an epidural fluid collection.    There are multilevel degenerative changes with disc bulges and facet hypertrophy.  There is no evidence of significant canal stenosis or cord compression.  The neural foramina appear patent.    The posterior paraspinal soft tissues are unremarkable.  Small bilateral pleural effusions are noted.  Impression: 1. Evaluation limited by motion artifact.  No definite convincing cord signal abnormality.  2. Mild multilevel spondylosis without significant canal stenosis or cord compression.  No significant change from the [facility] interpretation.    Electronically signed by: Pretty Chao  Date:    03/01/2024  Time:    09:11  MRI Lumbar Spine Without Contrast  Narrative: EXAMINATION:  MRI LUMBAR SPINE WITHOUT CONTRAST    CLINICAL HISTORY:  Lower extremity weakness;    TECHNIQUE:  Multiplanar multisequence MR images of the lumbar spine are obtained without contrast.    COMPARISON:  CT lumbar spine dated 02/29/2024    FINDINGS:  Evaluation is limited by motion artifact.  There are 5 non-rib-bearing lumbar type vertebra bodies.  Alignment is normal.  The vertebral body heights are maintained.  There are degenerative endplate changes at L4-5.    The conus terminates at the level of L2.  It is normal in signal and contour.    Disc spaces, spinal canal and neural foramina are as follows:    L1-L2: Disc bulge with mild narrowing of the spinal canal.  Mild bilateral neural foraminal stenosis.    L2-L3: No disc herniation.  No significant spinal canal stenosis.  Mild bilateral neural foraminal stenosis.    L3-L4: No disc  herniation.  Bilateral facet hypertrophy.  No significant spinal canal stenosis.  Mild bilateral foraminal stenosis.    L4-L5: Disc bulge and facet hypertrophy with mild narrowing of the spinal canal.  Moderate right and mild left neural foraminal stenosis.    L5-S1: No disc herniation.  Bilateral facet hypertrophy.  No significant spinal canal stenosis.  Moderate right and mild left neural foraminal stenosis.    No significant abnormality within the visualized paraspinous musculature.  Hydronephrosis is again noted.  Impression: 1. Mild degenerative narrowing the spinal canal at L4-5.  2. Multilevel neural foraminal stenoses as described.  No major change from the [facility] interpretation    Electronically signed by: Pretty Chao  Date:    03/01/2024  Time:    09:07    Jania Cabello DO  Department of Hospital Medicine  [facility]  03/05/2024

## 2024-03-05 NOTE — PLAN OF CARE
03/05/24 1105   Discharge Reassessment   Assessment Type Discharge Planning Reassessment   Discharge Plan discussed with: Patient   Discharge Plan A Skilled Nursing Facility   Discharge Plan B Home Health   Post-Acute Status   Post-Acute Authorization Placement   Post-Acute Placement Status Referrals Sent  (DAYAMI MATA)

## 2024-03-05 NOTE — PROGRESS NOTES
[facility] Hospital Medicine Progress Note        Chief Complaint: Inpatient Follow-up for     HPI:   Mr. Nelson is a 68 year old male with a pmh of CAD s/p stent, HTN, ESRD on HD (MWF), BPH, chronic urinary retention, GERD, chronic anemia, chronic pain, anxiety who presented to the ED with c/o BLE weakness.  He states that he has been unable to move his legs or walk since being admitted to [facility] on 2/18.  Per chart review, he had arrived in the ED with c/o SOB, rapidly decompensated, was intubated. He was able to be extubated the next day.  On 2/22, he was diagnosed with NSTEMI and had a stent placed on 2/23. He was discharged home yesterday. He was offered SNF placement but refused.     ED vitals on arrival:  Temp 98° point F, pulse 78, resp 16, /82, SpO2 98% on RA.  Today's ED lab work showed H&H 10/31.6, CO2 20, BUN/CR 33.5/6.94, troponin 0.376.  UA showed 2+ protein, 2+ blood, 500 leukocyte esterase,>100 RBC, >100 WBC, Moderate bacteria.  CXR showed no acute cardiopulmonary process.  CT head without contrast showed no convincing acute intracranial abnormality.  CT thoracic spine without contrast showed no acute fracture identified.  Multilevel degenerative changes of the thoracic spine.  CT lumbar spine without contrast showed no acute fracture identified multilevel degenerative changes.  He was started on ceftriaxone and admitted to Hospital Medicine for management.     MRI Thoracic spine showed no definite convincing spinal cord abnormalities.  Mild multilevel spondylosis without significant canal stenosis or cord compression.  MRI of the lumbar spine showed mild degenerative narrowing at the spinal canal at L4-L5 with multilevel neuroforaminal stenosis.  No significant abnormality otherwise seen.    Interval Hx:   Patient seen and examined by bedside.  In dialysis.  No acute overnight events.  Denies any acute concerns at this time    Case was discussed with patient's nurse  and  on the floor.    Objective/physical exam:  General: In no acute distress, afebrile  Chest: Clear to auscultation bilaterally  Heart: RRR, +S1, S2, no appreciable murmur  Abdomen: Soft, nontender, BS +  MSK: Warm, no lower extremity edema, no clubbing or cyanosis  Neurologic: Alert and oriented x4, Cranial nerve II-XII intact, 5/5 in right lower extremity however 2/5 in left lower extremity sensation also decrease in left lower extremity.    VITAL SIGNS: 24 HRS MIN & MAX LAST   Temp  Min: 97.6 °F (36.4 °C)  Max: 99.6 °F (37.6 °C) 99 °F (37.2 °C)   BP  Min: 141/89  Max: 161/73 (!) 150/76   Pulse  Min: 63  Max: 80  68   Resp  Min: 14  Max: 18 16   SpO2  Min: 93 %  Max: 99 % 96 %     I have reviewed the following labs:  Recent Labs   Lab 03/01/24  0603 03/02/24  0351 03/04/24  0610   WBC 5.25 5.67 4.25*   RBC 2.58* 2.76* 2.61*   HGB 8.2* 8.8* 8.6*   HCT 26.4* 27.9* 26.4*   .3* 101.1* 101.1*   MCH 31.8* 31.9* 33.0*   MCHC 31.1* 31.5* 32.6*   RDW 18.0* 18.2* 17.9*    193 187   MPV 10.7* 10.1 10.5*       Recent Labs   Lab 02/29/24  1555 03/01/24  0603 03/02/24  0351 03/04/24  0610   * 136 135* 137   K 4.1 4.2 4.1 4.1   CO2 20* 22* 26 25   BUN 33.5* 39.7* 18.6 48.0*   CREATININE 6.94* 8.23* 5.43* 9.21*   CALCIUM 8.6* 7.9* 8.0* 7.9*   MG  --  1.90  --   --    ALBUMIN 3.0* 2.6*  --   --    ALKPHOS 57 49  --   --    ALT 7 7  --   --    AST 11 9  --   --    BILITOT 0.6 0.5  --   --        Microbiology Results (last 7 days)       Procedure Component Value Units Date/Time    Urine culture [[id number]]  (Abnormal)  (Susceptibility) Collected: 02/29/24 1840    Order Status: Completed Specimen: Urine Updated: 03/03/24 0624     Urine Culture >/= 100,000 colonies/ml Escherichia coli ESBL             See below for Radiology    Scheduled Med:   amLODIPine  10 mg Oral Daily    ascorbic acid (vitamin C)  500 mg Oral BID    aspirin  81 mg Oral Daily    atorvastatin  40 mg Oral QHS    carvediloL  12.5 mg  Oral BID    clopidogreL  75 mg Oral Daily    ergocalciferol  50,000 Units Oral Q7 Days    ferrous sulfate  1 tablet Oral BID    fluticasone propionate  1 spray Each Nostril Daily    folic acid  1 mg Oral Daily    megestroL  80 mg Oral BID    miconazole NITRATE 2 %   Topical (Top) BID    mupirocin   Nasal BID    pantoprazole  40 mg Oral Daily    piperacillin-tazobactam (Zosyn) IV (PEDS and ADULTS) (extended infusion is not appropriate)  4.5 g Intravenous Q12H    sodium bicarbonate  1,300 mg Oral BID    sodium chloride 0.9%  10 mL Intravenous Q6H    tamsulosin  0.4 mg Oral Daily      Continuous Infusions:     PRN Meds:  acetaminophen, aluminum-magnesium hydroxide-simethicone, HYDROcodone-acetaminophen, melatonin, naloxone, ondansetron, polyethylene glycol, prochlorperazine, simethicone, Flushing PICC/Midline Protocol **AND** sodium chloride 0.9% **AND** sodium chloride 0.9%     Assessment/Plan:  Fall, secondary to bilateral lower extremity weakness  Elevated troponin, likely still downtrending from NSTEMI   Persistent hematuria, resolved  Recent CAD status post recent stents   ESRD on HD  BPH   Chronic urinary retention with Cheek catheter   Chronic anemia  Chronic back pain  Vitamin-D deficiency   Folic acid deficiency    Patient was recently admitted at [facility] from volume overload required brief intubation.  NSTEMI was noted inpatient had LAD PCF-patient in hospital and recommended monitoring for the hematuria.  Consult PT and OT, recommend high intensity therapy  Imaging largely unremarkable, MRI of the thoracic spine shows mild multilevel spondylosis without significant canal stenosis or cord compression   MRI of the lumbar spine shows mild degenerative narrowing of the spinal canal at L4 and L5 and multilevel neural foraminal stenosis   CT head unremarkable   Cheek catheter currently clear and no hematuria present, was recently replaced by Urology on 02/18 previous hospitalization  Due to recent stents  patient is high-risk for InStent thrombosis if antiplatelets stopped, continue antiplatelet at this time  Urine culture growing greater than 100,000 colonies Gram-negative rods, urine culture shows ESBL E coli, we will DC Rocephin per sensitivities and start Zosyn per sensitivities, day 6  Nephrology consulted for assistance with dialysis   Monitor hemoglobin closely   Vitamin B12 normal however significantly vitamin-D deficient  Start vitamin-D supplements 46481 IU weekly   Also has  folic acid deficiency, start folic acid daily  Hematuria almost completely resolved  Further recs based on clinical course   Patient will likely need rehab or SNF placement, awaiting placement  Labs in a.m.    VTE prophylaxis:  SCDs secondary to hematuria    Patient condition:  Stable/Fair/Guarded/ Serious/ Critical    Anticipated discharge and Disposition:   Possible SNF versus rehab      All diagnosis and differential diagnosis have been reviewed; assessment and plan has been documented; I have personally reviewed the labs and test results that are presently available; I have reviewed the patients medication list; I have reviewed the consulting providers response and recommendations. I have reviewed or attempted to review medical records based upon their availability    All of the patient's questions have been  addressed and answered. Patient's is agreeable to the above stated plan. I will continue to monitor closely and make adjustments to medical management as needed.  _____________________________________________________________________    Nutrition Status:    Radiology:  I have personally reviewed the following imaging and agree with the radiologist.     MRI Thoracic Spine Without Contrast  Narrative: EXAMINATION:  MRI THORACIC SPINE WITHOUT CONTRAST    CLINICAL HISTORY:  Lower extremity weakness;    TECHNIQUE:  Multiplanar multisequence MR images of the thoracic spine are obtained without contrast.    COMPARISON:  CT thoracic  spine dated 02/29/2024    FINDINGS:  Evaluation is limited by motion artifact.  Thoracic alignment is preserved.  The vertebral body heights are maintained.  There are mild multilevel degenerate endplate changes.    There is no definite convincing cord signal abnormality.  There is no evidence of an epidural fluid collection.    There are multilevel degenerative changes with disc bulges and facet hypertrophy.  There is no evidence of significant canal stenosis or cord compression.  The neural foramina appear patent.    The posterior paraspinal soft tissues are unremarkable.  Small bilateral pleural effusions are noted.  Impression: 1. Evaluation limited by motion artifact.  No definite convincing cord signal abnormality.  2. Mild multilevel spondylosis without significant canal stenosis or cord compression.  No significant change from the [facility] interpretation.    Electronically signed by: Pretty Chao  Date:    03/01/2024  Time:    09:11  MRI Lumbar Spine Without Contrast  Narrative: EXAMINATION:  MRI LUMBAR SPINE WITHOUT CONTRAST    CLINICAL HISTORY:  Lower extremity weakness;    TECHNIQUE:  Multiplanar multisequence MR images of the lumbar spine are obtained without contrast.    COMPARISON:  CT lumbar spine dated 02/29/2024    FINDINGS:  Evaluation is limited by motion artifact.  There are 5 non-rib-bearing lumbar type vertebra bodies.  Alignment is normal.  The vertebral body heights are maintained.  There are degenerative endplate changes at L4-5.    The conus terminates at the level of L2.  It is normal in signal and contour.    Disc spaces, spinal canal and neural foramina are as follows:    L1-L2: Disc bulge with mild narrowing of the spinal canal.  Mild bilateral neural foraminal stenosis.    L2-L3: No disc herniation.  No significant spinal canal stenosis.  Mild bilateral neural foraminal stenosis.    L3-L4: No disc herniation.  Bilateral facet hypertrophy.  No significant spinal canal stenosis.   Mild bilateral foraminal stenosis.    L4-L5: Disc bulge and facet hypertrophy with mild narrowing of the spinal canal.  Moderate right and mild left neural foraminal stenosis.    L5-S1: No disc herniation.  Bilateral facet hypertrophy.  No significant spinal canal stenosis.  Moderate right and mild left neural foraminal stenosis.    No significant abnormality within the visualized paraspinous musculature.  Hydronephrosis is again noted.  Impression: 1. Mild degenerative narrowing the spinal canal at L4-5.  2. Multilevel neural foraminal stenoses as described.  No major change from the [facility] interpretation    Electronically signed by: Pretty Chao  Date:    03/01/2024  Time:    09:07    Jania Cabello DO  Department of Hospital Medicine  [facility]  03/05/2024

## 2024-03-05 NOTE — PROGRESS NOTES
[facility] - 9th Floor Med Surg  Nephrology  Progress Note    Patient Name: Mirza Nelson Jr.  MRN: [id number]  Admission Date: 2/29/2024  Hospital Length of Stay: 5 days  Attending Provider: Wero Gutierrez MD   Primary Care Physician: Elo Flores MD  Principal Problem:Leg weakness, bilateral      Subjective:   Mirza Nelson Jr. is a 68 y.o. male with PMH of anemia, HTN, NSTEMI, CAD s/p PCI, chronic indwelling nielsen, and ESRD on HD. He follows a MWF schedule at [facility] with Dr. Butler. He presented to the ED on 2/29/24 s/p fall with generalized weakness and having cardiac stenting 2 days prior. He was on the floor for an extended period of time s/p fall, with neighbors finding him yesterday morning. Reports he has been unable to walk since his admission at [facility] on 2/18 with respiratory distress requiring intubation and NSTEMI with stenting. He was discharged from [facility] on 2/28. Labs on admission significant for Troponin 0.376, BUN 33.5 and creatinine 6.94. UA positive for UTI, final culture pending. Renal is consulted for ESRD management.       Interval History:   3/4/24 - - Urine culture final ESBL E Coli. He is on Rocephin and Zosyn. He is currently on dialysis now resting comfortably.   03/05/2024  Sitting up in his recliner.  Awake alert oriented and comfortable and voices no new complaints.  Denies any shortness of breath.  Reports that he is eating fairly well.  Also reports that he is bowels are moving.    Review of patient's allergies indicates:   Allergen Reactions    Iodine      Other reaction(s): difficulty breathing, Facial Swelling    Iodine and iodide containing products Swelling    Shellfish containing products      Other reaction(s): Swelling     Current Facility-Administered Medications   Medication Frequency    acetaminophen tablet 650 mg Q6H PRN    aluminum-magnesium hydroxide-simethicone 200-200-20 mg/5 mL suspension 30 mL QID PRN    amLODIPine tablet 10 mg Daily     ascorbic acid (vitamin C) tablet 500 mg BID    aspirin chewable tablet 81 mg Daily    atorvastatin tablet 40 mg QHS    carvediloL tablet 12.5 mg BID    clopidogreL tablet 75 mg Daily    [START ON 3/6/2024] epoetin gary injection 20,000 Units Every Mon, Wed, Fri    ergocalciferol capsule 50,000 Units Q7 Days    ferrous sulfate tablet 1 each BID    fluticasone propionate 50 mcg/actuation nasal spray 50 mcg Daily    folic acid tablet 1 mg Daily    HYDROcodone-acetaminophen  mg per tablet 1 tablet Q6H PRN    megestroL 400 mg/10 mL (10 mL) suspension 80 mg BID    melatonin tablet 6 mg Nightly PRN    miconazole NITRATE 2 % top powder BID    mupirocin 2 % ointment BID    naloxone 0.4 mg/mL injection 0.02 mg PRN    ondansetron injection 4 mg Q4H PRN    pantoprazole EC tablet 40 mg Daily    piperacillin-tazobactam (ZOSYN) 4.5 g in dextrose 5 % in water (D5W) 100 mL IVPB (MB+) Q12H    polyethylene glycol packet 17 g BID PRN    prochlorperazine injection Soln 5 mg Q6H PRN    simethicone chewable tablet 80 mg QID PRN    sodium bicarbonate tablet 1,300 mg BID    sodium chloride 0.9% flush 10 mL Q6H    And    sodium chloride 0.9% flush 10 mL PRN    tamsulosin 24 hr capsule 0.4 mg Daily       Objective:     Vital Signs (Most Recent):  Temp: 98.8 °F (37.1 °C) (03/05/24 1400)  Pulse: 65 (03/05/24 1400)  Resp: 16 (03/05/24 1037)  BP: 135/87 (03/05/24 1400)  SpO2: 98 % (03/05/24 1400) Vital Signs (24h Range):  Temp:  [98.3 °F (36.8 °C)-99.6 °F (37.6 °C)] 98.8 °F (37.1 °C)  Pulse:  [63-71] 65  Resp:  [14-18] 16  SpO2:  [94 %-99 %] 98 %  BP: (135-159)/(63-89) 135/87     Weight: 63.8 kg (140 lb 11.2 oz) (03/04/24 0819)  Body mass index is 22.71 kg/m².  Body surface area is 1.72 meters squared.    I/O last 3 completed shifts:  In: -   Out: 500 [Other:500]    Physical Exam  Constitutional:       General: He is not in acute distress.  HENT:      Head: Atraumatic.      Nose: Nose normal.      Mouth/Throat:      Mouth: Mucous  membranes are moist.   Eyes:      Extraocular Movements: Extraocular movements intact.      Conjunctiva/sclera: Conjunctivae normal.   Cardiovascular:      Rate and Rhythm: Regular rhythm.      Pulses: Normal pulses.   Pulmonary:      Breath sounds: Normal breath sounds.   Abdominal:      General: Bowel sounds are normal.      Palpations: Abdomen is soft.   Genitourinary:     Comments: Urinary catheter with yellow urine   Musculoskeletal:         General: No swelling.      Cervical back: Neck supple.      Comments: WONG AVF   Skin:     General: Skin is warm.   Neurological:      Mental Status: He is alert and oriented to person, place, and time.   Psychiatric:         Mood and Affect: Mood normal.         Behavior: Behavior normal.         Significant Labs:sureBMP:   Recent Labs   Lab 03/01/24  0603 03/02/24  0351 03/04/24  0610      < > 137   K 4.2   < > 4.1   CO2 22*   < > 25   BUN 39.7*   < > 48.0*   CREATININE 8.23*   < > 9.21*   CALCIUM 7.9*   < > 7.9*   MG 1.90  --   --     < > = values in this interval not displayed.       CBC:   Recent Labs   Lab 03/04/24  0610   WBC 4.25*   RBC 2.61*   HGB 8.6*   HCT 26.4*      .1*   MCH 33.0*   MCHC 32.6*       Microbiology Results (last 7 days)       Procedure Component Value Units Date/Time    Urine culture [[id number]]  (Abnormal)  (Susceptibility) Collected: 02/29/24 1840    Order Status: Completed Specimen: Urine Updated: 03/03/24 0624     Urine Culture >/= 100,000 colonies/ml Escherichia coli ESBL          Specimen (24h ago, onward)      None          Recent Labs   Lab 02/29/24  1840   PROTEINUA 2+*   BACTERIA Moderate*   LEUKOCYTESUR 500*   UROBILINOGEN Normal         Significant Imaging:  No new imaging     Assessment/Plan:   ESRD on HD-- continue MWF schedule  Fall with generalized weakness  Chronic indwelling urinary catheter changed every 3 weeks - Follows with Dr Navarrete   UTI-- ESBL E Coli on Rocephin and Zosyn  Hypertension  Recent NSTEMI  s/p stenting 2/23 with Dr Garcia at [facility]  Anemia of chronic kidney disease  Vitamin-D deficiency     Plan:  Continue dialysis on MWF schedule   Start Retacrit 76305 units every Monday Wednesday Friday    Chang Butler MD  Nephrology  [facility] - [facility] Med Surg

## 2024-03-05 NOTE — PT/OT/SLP PROGRESS
Occupational Therapy      Patient Name:  Mirza Nelson .   MRN:  [id number]    Patient not seen today secondary to refusing tx. Will follow-up as schedule allows.    3/5/2024

## 2024-03-06 NOTE — PLAN OF CARE
[facility] unable to accept patient due to HD chair time. Patient agreeable to referral to [facility].

## 2024-03-06 NOTE — PROGRESS NOTES
[facility] - 9th Floor Med Surg  Nephrology  Progress Note    Patient Name: Mirza Nelson Jr.  MRN: [id number]  Admission Date: 2/29/2024  Hospital Length of Stay: 6 days  Attending Provider: Wero Gutierrez MD   Primary Care Physician: Elo Flores MD  Principal Problem:Leg weakness, bilateral      Subjective:   Mirza Nelson Jr. is a 68 y.o. male with PMH of anemia, HTN, NSTEMI, CAD s/p PCI, chronic indwelling nielsen, and ESRD on HD. He follows a MWF schedule at [facility] with Dr. Butler. He presented to the ED on 2/29/24 s/p fall with generalized weakness and having cardiac stenting 2 days prior. He was on the floor for an extended period of time s/p fall, with neighbors finding him yesterday morning. Reports he has been unable to walk since his admission at [facility] on 2/18 with respiratory distress requiring intubation and NSTEMI with stenting. He was discharged from [facility] on 2/28. Labs on admission significant for Troponin 0.376, BUN 33.5 and creatinine 6.94. UA positive for UTI, final culture pending. Renal is consulted for ESRD management.       Interval History:   3/4/24 - - Urine culture final ESBL E Coli. He is on Rocephin and Zosyn. He is currently on dialysis now resting comfortably.     03/05/2024 - Sitting up in his recliner.  Awake alert oriented and comfortable and voices no new complaints.  Denies any shortness of breath.  Reports that he is eating fairly well.  Also reports that he is bowels are moving.    3/6 - Patient on dialysis endorsing BLE and back pain which he reports is chronic in nature.     Review of patient's allergies indicates:   Allergen Reactions    Iodine      Other reaction(s): difficulty breathing, Facial Swelling    Iodine and iodide containing products Swelling    Shellfish containing products      Other reaction(s): Swelling     Current Facility-Administered Medications   Medication Frequency    acetaminophen tablet 650 mg Q6H PRN    aluminum-magnesium  hydroxide-simethicone 200-200-20 mg/5 mL suspension 30 mL QID PRN    amLODIPine tablet 10 mg Daily    ascorbic acid (vitamin C) tablet 500 mg BID    aspirin chewable tablet 81 mg Daily    atorvastatin tablet 40 mg QHS    carvediloL tablet 12.5 mg BID    clopidogreL tablet 75 mg Daily    epoetin gary injection 20,000 Units Every Mon, Wed, Fri    ergocalciferol capsule 50,000 Units Q7 Days    ferrous sulfate tablet 1 each BID    fluticasone propionate 50 mcg/actuation nasal spray 50 mcg Daily    folic acid tablet 1 mg Daily    HYDROcodone-acetaminophen  mg per tablet 1 tablet Q6H PRN    megestroL 400 mg/10 mL (10 mL) suspension 80 mg BID    melatonin tablet 6 mg Nightly PRN    miconazole NITRATE 2 % top powder BID    mupirocin 2 % ointment BID    naloxone 0.4 mg/mL injection 0.02 mg PRN    ondansetron injection 4 mg Q4H PRN    pantoprazole EC tablet 40 mg Daily    piperacillin-tazobactam (ZOSYN) 4.5 g in dextrose 5 % in water (D5W) 100 mL IVPB (MB+) Q12H    polyethylene glycol packet 17 g BID PRN    prochlorperazine injection Soln 5 mg Q6H PRN    simethicone chewable tablet 80 mg QID PRN    sodium bicarbonate tablet 1,300 mg BID    sodium chloride 0.9% flush 10 mL Q6H    And    sodium chloride 0.9% flush 10 mL PRN    tamsulosin 24 hr capsule 0.4 mg Daily       Objective:     Vital Signs (Most Recent):  Temp: 99.3 °F (37.4 °C) (03/06/24 0700)  Pulse: 67 (03/06/24 0700)  Resp: 18 (03/06/24 0700)  BP: (!) 168/88 (03/06/24 0700)  SpO2: 95 % (03/06/24 0700) Vital Signs (24h Range):  Temp:  [98.4 °F (36.9 °C)-99.3 °F (37.4 °C)] 99.3 °F (37.4 °C)  Pulse:  [65-74] 67  Resp:  [16-20] 18  SpO2:  [95 %-99 %] 95 %  BP: (103-168)/(64-88) 168/88     Weight: 63.8 kg (140 lb 11.2 oz) (03/04/24 0819)  Body mass index is 22.71 kg/m².  Body surface area is 1.72 meters squared.    I/O last 3 completed shifts:  In: -   Out: 650 [Urine:650]    Physical Exam  Constitutional:       General: He is not in acute distress.  HENT:       Head: Atraumatic.      Nose: Nose normal.      Mouth/Throat:      Mouth: Mucous membranes are moist.   Eyes:      Extraocular Movements: Extraocular movements intact.      Conjunctiva/sclera: Conjunctivae normal.   Cardiovascular:      Rate and Rhythm: Regular rhythm.      Pulses: Normal pulses.   Pulmonary:      Breath sounds: Normal breath sounds.   Abdominal:      General: Bowel sounds are normal.      Palpations: Abdomen is soft.   Genitourinary:     Comments: Urinary catheter with yellow urine   Musculoskeletal:         General: No swelling.      Cervical back: Neck supple.      Comments: WONG AVF   Skin:     General: Skin is warm.   Neurological:      Mental Status: He is alert and oriented to person, place, and time.   Psychiatric:         Mood and Affect: Mood normal.         Behavior: Behavior normal.         Significant Labs:sureBMP:   Recent Labs   Lab 03/01/24  0603 03/02/24  0351 03/06/24  0445      < > 136   K 4.2   < > 3.9   CO2 22*   < > 26   BUN 39.7*   < > 43.1*   CREATININE 8.23*   < > 8.49*   CALCIUM 7.9*   < > 7.6*   MG 1.90  --   --     < > = values in this interval not displayed.       CBC:   Recent Labs   Lab 03/06/24  0445   WBC 4.86   RBC 2.74*   HGB 8.7*   HCT 28.1*      .6*   MCH 31.8*   MCHC 31.0*       Microbiology Results (last 7 days)       Procedure Component Value Units Date/Time    Urine culture [[id number]]  (Abnormal)  (Susceptibility) Collected: 02/29/24 1840    Order Status: Completed Specimen: Urine Updated: 03/03/24 0624     Urine Culture >/= 100,000 colonies/ml Escherichia coli ESBL          Specimen (24h ago, onward)      None          Recent Labs   Lab 02/29/24 1840   PROTEINUA 2+*   BACTERIA Moderate*   LEUKOCYTESUR 500*   UROBILINOGEN Normal         Significant Imaging:  No new imaging     Assessment/Plan:   ESRD on HD-- continue MWF schedule  Fall with generalized weakness  Chronic indwelling urinary catheter changed every 3 weeks - Follows with   [facility]   UTI-- ESBL E Coli on Rocephin and Zosyn  Hypertension  Recent NSTEMI s/p stenting 2/23 with Dr Garcia at [facility]  Anemia of chronic kidney disease  Vitamin-D deficiency     Plan:  Continue dialysis on MWF schedule   Start Retacrit 01773 units every Monday Wednesday Friday  Awaiting SNF versus Rehab     KARLOS Preston  Nephrology  [facility] - 9th Floor Med Surg

## 2024-03-06 NOTE — PLAN OF CARE
Spoke to Lianna richardson [facility]. She stated that the patient's referral had been marked as accepted by their central intake, but she had not yet had a chance to review. She stated that she would take a look at it and give me a call back in a few minutes. I did let her know that the patient is ready for discharge, we have received his 142, and he has a managed medicare policy, so they will need to submit for ins auth. Awaiting a call back at this time.

## 2024-03-06 NOTE — PROGRESS NOTES
[facility] Hospital Medicine Progress Note        Chief Complaint: Inpatient Follow-up for     HPI:   Mr. Nelson is a 68 year old male with a pmh of CAD s/p stent, HTN, ESRD on HD (MWF), BPH, chronic urinary retention, GERD, chronic anemia, chronic pain, anxiety who presented to the ED with c/o BLE weakness.  He states that he has been unable to move his legs or walk since being admitted to [facility] on 2/18.  Per chart review, he had arrived in the ED with c/o SOB, rapidly decompensated, was intubated. He was able to be extubated the next day.  On 2/22, he was diagnosed with NSTEMI and had a stent placed on 2/23. He was discharged home yesterday. He was offered SNF placement but refused.     ED vitals on arrival:  Temp 98° point F, pulse 78, resp 16, /82, SpO2 98% on RA.  Today's ED lab work showed H&H 10/31.6, CO2 20, BUN/CR 33.5/6.94, troponin 0.376.  UA showed 2+ protein, 2+ blood, 500 leukocyte esterase,>100 RBC, >100 WBC, Moderate bacteria.  CXR showed no acute cardiopulmonary process.  CT head without contrast showed no convincing acute intracranial abnormality.  CT thoracic spine without contrast showed no acute fracture identified.  Multilevel degenerative changes of the thoracic spine.  CT lumbar spine without contrast showed no acute fracture identified multilevel degenerative changes.  He was started on ceftriaxone and admitted to Hospital Medicine for management.     MRI Thoracic spine showed no definite convincing spinal cord abnormalities.  Mild multilevel spondylosis without significant canal stenosis or cord compression.  MRI of the lumbar spine showed mild degenerative narrowing at the spinal canal at L4-L5 with multilevel neuroforaminal stenosis.  No significant abnormality otherwise seen.    Interval Hx:   Patient seen and examined by bedside.  No acute overnight events.  No acute concerns at this time.  Getting dialysis and doing well.  Awaiting placement    Case  was discussed with patient's nurse and  on the floor.    Objective/physical exam:  General: In no acute distress, afebrile  Chest: Clear to auscultation bilaterally  Heart: RRR, +S1, S2, no appreciable murmur  Abdomen: Soft, nontender, BS +  MSK: Warm, no lower extremity edema, no clubbing or cyanosis  Neurologic: Alert and oriented x4, Cranial nerve II-XII intact, 5/5 in right lower extremity however 2/5 in left lower extremity sensation also decrease in left lower extremity.    VITAL SIGNS: 24 HRS MIN & MAX LAST   Temp  Min: 98.4 °F (36.9 °C)  Max: 99.3 °F (37.4 °C) 99.3 °F (37.4 °C)   BP  Min: 103/64  Max: 168/88 (!) 160/70   Pulse  Min: 65  Max: 74  67   Resp  Min: 16  Max: 20 16   SpO2  Min: 95 %  Max: 99 % 95 %     I have reviewed the following labs:  Recent Labs   Lab 03/02/24  0351 03/04/24  0610 03/06/24  0445   WBC 5.67 4.25* 4.86   RBC 2.76* 2.61* 2.74*   HGB 8.8* 8.6* 8.7*   HCT 27.9* 26.4* 28.1*   .1* 101.1* 102.6*   MCH 31.9* 33.0* 31.8*   MCHC 31.5* 32.6* 31.0*   RDW 18.2* 17.9* 17.9*    187 178   MPV 10.1 10.5* 10.1       Recent Labs   Lab 02/29/24  1555 03/01/24  0603 03/02/24  0351 03/04/24  0610 03/06/24  0445   * 136 135* 137 136   K 4.1 4.2 4.1 4.1 3.9   CO2 20* 22* 26 25 26   BUN 33.5* 39.7* 18.6 48.0* 43.1*   CREATININE 6.94* 8.23* 5.43* 9.21* 8.49*   CALCIUM 8.6* 7.9* 8.0* 7.9* 7.6*   MG  --  1.90  --   --   --    ALBUMIN 3.0* 2.6*  --   --  2.5*   ALKPHOS 57 49  --   --  45   ALT 7 7  --   --  8   AST 11 9  --   --  12   BILITOT 0.6 0.5  --   --  0.7       Microbiology Results (last 7 days)       Procedure Component Value Units Date/Time    Urine culture [[id number]]  (Abnormal)  (Susceptibility) Collected: 02/29/24 1840    Order Status: Completed Specimen: Urine Updated: 03/03/24 0624     Urine Culture >/= 100,000 colonies/ml Escherichia coli ESBL             See below for Radiology    Scheduled Med:   amLODIPine  10 mg Oral Daily    ascorbic acid (vitamin C)   500 mg Oral BID    aspirin  81 mg Oral Daily    atorvastatin  40 mg Oral QHS    carvediloL  12.5 mg Oral BID    clopidogreL  75 mg Oral Daily    epoetin gary (PROCRIT) injection  20,000 Units Subcutaneous Every Mon, Wed, Fri    ergocalciferol  50,000 Units Oral Q7 Days    ferrous sulfate  1 tablet Oral BID    fluticasone propionate  1 spray Each Nostril Daily    folic acid  1 mg Oral Daily    megestroL  80 mg Oral BID    miconazole NITRATE 2 %   Topical (Top) BID    pantoprazole  40 mg Oral Daily    piperacillin-tazobactam (Zosyn) IV (PEDS and ADULTS) (extended infusion is not appropriate)  4.5 g Intravenous Q12H    sodium bicarbonate  1,300 mg Oral BID    sodium chloride 0.9%  10 mL Intravenous Q6H    tamsulosin  0.4 mg Oral Daily      Continuous Infusions:     PRN Meds:  acetaminophen, aluminum-magnesium hydroxide-simethicone, HYDROcodone-acetaminophen, melatonin, naloxone, ondansetron, polyethylene glycol, prochlorperazine, simethicone, Flushing PICC/Midline Protocol **AND** sodium chloride 0.9% **AND** sodium chloride 0.9%     Assessment/Plan:  Fall, secondary to bilateral lower extremity weakness  Elevated troponin, likely still downtrending from NSTEMI   Persistent hematuria, resolved  Recent CAD status post recent stents   ESRD on HD  BPH   Chronic urinary retention with Cheek catheter   Chronic anemia  Chronic back pain  Vitamin-D deficiency   Folic acid deficiency    Patient was recently admitted at [facility] from volume overload required brief intubation.  NSTEMI was noted inpatient had LAD PCF-patient in hospital and recommended monitoring for the hematuria.  Consult PT and OT, recommend high intensity therapy  Imaging largely unremarkable, MRI of the thoracic spine shows mild multilevel spondylosis without significant canal stenosis or cord compression   MRI of the lumbar spine shows mild degenerative narrowing of the spinal canal at L4 and L5 and multilevel neural foraminal stenosis   CT head  unremarkable   Cheek catheter currently clear and no hematuria present, was recently replaced by Urology on 02/18 previous hospitalization  Due to recent stents patient is high-risk for InStent thrombosis if antiplatelets stopped, continue antiplatelet at this time  Urine culture growing greater than 100,000 colonies Gram-negative rods, urine culture shows ESBL E coli, we will DC Rocephin per sensitivities and start Zosyn per sensitivities, day 7, likely we will DC tomorrow  Nephrology consulted for assistance with dialysis   Monitor hemoglobin closely   Vitamin B12 normal however significantly vitamin-D deficient  Start vitamin-D supplements 92596 IU weekly   Also has  folic acid deficiency, start folic acid daily  Hematuria almost completely resolved  Further recs based on clinical course   Patient will likely need rehab or SNF placement, awaiting placement  Referrals have been sent,  Labs in a.m.    VTE prophylaxis:  SCDs secondary to hematuria    Patient condition:  Stable/Fair/Guarded/ Serious/ Critical    Anticipated discharge and Disposition:   Possible SNF versus rehab      All diagnosis and differential diagnosis have been reviewed; assessment and plan has been documented; I have personally reviewed the labs and test results that are presently available; I have reviewed the patients medication list; I have reviewed the consulting providers response and recommendations. I have reviewed or attempted to review medical records based upon their availability    All of the patient's questions have been  addressed and answered. Patient's is agreeable to the above stated plan. I will continue to monitor closely and make adjustments to medical management as needed.  _____________________________________________________________________    Nutrition Status:    Radiology:  I have personally reviewed the following imaging and agree with the radiologist.     MRI Thoracic Spine Without Contrast  Narrative:  EXAMINATION:  MRI THORACIC SPINE WITHOUT CONTRAST    CLINICAL HISTORY:  Lower extremity weakness;    TECHNIQUE:  Multiplanar multisequence MR images of the thoracic spine are obtained without contrast.    COMPARISON:  CT thoracic spine dated 02/29/2024    FINDINGS:  Evaluation is limited by motion artifact.  Thoracic alignment is preserved.  The vertebral body heights are maintained.  There are mild multilevel degenerate endplate changes.    There is no definite convincing cord signal abnormality.  There is no evidence of an epidural fluid collection.    There are multilevel degenerative changes with disc bulges and facet hypertrophy.  There is no evidence of significant canal stenosis or cord compression.  The neural foramina appear patent.    The posterior paraspinal soft tissues are unremarkable.  Small bilateral pleural effusions are noted.  Impression: 1. Evaluation limited by motion artifact.  No definite convincing cord signal abnormality.  2. Mild multilevel spondylosis without significant canal stenosis or cord compression.  No significant change from the [facility] interpretation.    Electronically signed by: Pretty Chao  Date:    03/01/2024  Time:    09:11  MRI Lumbar Spine Without Contrast  Narrative: EXAMINATION:  MRI LUMBAR SPINE WITHOUT CONTRAST    CLINICAL HISTORY:  Lower extremity weakness;    TECHNIQUE:  Multiplanar multisequence MR images of the lumbar spine are obtained without contrast.    COMPARISON:  CT lumbar spine dated 02/29/2024    FINDINGS:  Evaluation is limited by motion artifact.  There are 5 non-rib-bearing lumbar type vertebra bodies.  Alignment is normal.  The vertebral body heights are maintained.  There are degenerative endplate changes at L4-5.    The conus terminates at the level of L2.  It is normal in signal and contour.    Disc spaces, spinal canal and neural foramina are as follows:    L1-L2: Disc bulge with mild narrowing of the spinal canal.  Mild bilateral neural  foraminal stenosis.    L2-L3: No disc herniation.  No significant spinal canal stenosis.  Mild bilateral neural foraminal stenosis.    L3-L4: No disc herniation.  Bilateral facet hypertrophy.  No significant spinal canal stenosis.  Mild bilateral foraminal stenosis.    L4-L5: Disc bulge and facet hypertrophy with mild narrowing of the spinal canal.  Moderate right and mild left neural foraminal stenosis.    L5-S1: No disc herniation.  Bilateral facet hypertrophy.  No significant spinal canal stenosis.  Moderate right and mild left neural foraminal stenosis.    No significant abnormality within the visualized paraspinous musculature.  Hydronephrosis is again noted.  Impression: 1. Mild degenerative narrowing the spinal canal at L4-5.  2. Multilevel neural foraminal stenoses as described.  No major change from the [facility] interpretation    Electronically signed by: Pretty Chao  Date:    03/01/2024  Time:    09:07    Jania Cabello DO  Department of Hospital Medicine  [facility]  03/06/2024

## 2024-03-06 NOTE — PHYSICIAN QUERY
PT Name: Mirza Nelson Jr.  MR #: [id number]    DOCUMENTATION CLARIFICATION     CDS/: Almita Morales RN              Contact information: [email or web address]    This form is a permanent document in the medical record.     Query Date: March 6, 2024    By submitting this query, we are merely seeking further clarification of documentation. Please utilize your independent clinical judgment when addressing the question(s) below.    Supporting Clinical Findings   Location in Medical Record   Chronic indwelling urinary catheter changed every 3 weeks     UTI-- ESBL E Coli on Rocephin and Zosyn 3/6 Renal MD PN       Provider, please clarify if there is any clinical correlation between ____UTI _____ and ____Chronic indwelling urinary catheter_____.           Are the conditions:      [ x ] Due to or associated with each other   [  ] Unrelated to each other   [  ] Other explanation (Please Specify): ______________   [  ] Clinically Undetermined                                                                               Please document in your progress notes daily for the duration of treatment until resolved and include in your discharge summary.

## 2024-03-07 NOTE — PLAN OF CARE
[facility] unable to accept patient. Reached out to Loraine, HD coordinator, regarding NH unable to accommodate patient's current 6 am chair time. Loraine stated she will reach out to [facility] regarding chair time change. Will follow-up.     Loraine able to get patient new chair time at [facility] @ 12:30, [facility] able to accept with new chair time. Submitting for insurance authorization.

## 2024-03-07 NOTE — PT/OT/SLP PROGRESS
Physical Therapy Treatment    Patient Name:  Mirza Nelson Jr.   MRN:  [id number]    Recommendations:     Discharge therapy intensity: High Intensity Therapy   Discharge Equipment Recommendations: none  Barriers to discharge: Impaired mobility and Ongoing medical needs    Assessment:     Mirza Nelson Jr. is a 68 y.o. male admitted with a medical diagnosis of  BLE weakness and recent NSTEMI .  He presents with the following impairments/functional limitations: weakness, impaired self care skills, impaired functional mobility, gait instability, impaired balance, decreased upper extremity function, decreased lower extremity function .    Rehab Prognosis: Good and Fair; patient would benefit from acute skilled PT services to address these deficits and reach maximum level of function.    Recent Surgery: * No surgery found *      Plan:     During this hospitalization, patient to be seen 5 x/week to address the identified rehab impairments via gait training, therapeutic activities, therapeutic exercises and progress toward the following goals:    Plan of Care Expires:  03/22/24    Subjective     Chief Complaint: fatigue  Patient/Family Comments/goals: to go to rehab  Pain/Comfort:  Pain Rating 1: 0/10      Objective:     Communicated with pts nurse prior to session.  Patient found up in chair with   upon PT entry to room.     General Precautions: Standard, fall  Orthopedic Precautions: N/A  Braces: N/A  Respiratory Status: Room air  Blood Pressure: 158/79  Skin Integrity: Visible skin intact      Functional Mobility:  Transfers:     Sit to Stand:  contact guard assistance and minimum assistance with no AD, rolling walker, and x 10 reps w/ emphasis on forward lean and loading legs  Bed to Chair: contact guard assistance with  rolling walker  using  Step Transfer  Gait: Pt ambulated in room 30 ft x 2 trials, CGA, w/ CGA and cues for posture, having tendency to round upper body.  Speed and quality of gait improved second  trial  Balance: standing, balance w/ support, SBA and CGA/ Min A w/o support static and Min A w/ dynamic reach.    Education:  Patient provided with verbal education and demonstrations education regarding fall prevention and safety awareness.  Understanding was verbalized, however additional teaching warranted.     Patient left up in chair with all lines intact, call button in reach, and nurse notified    GOALS:   Multidisciplinary Problems       Physical Therapy Goals          Problem: Physical Therapy    Goal Priority Disciplines Outcome Goal Variances Interventions   Physical Therapy Goal     PT, PT/OT Ongoing, Progressing     Description: Pt will be seen for the following goals  1. Pt will be sba with bed mobility  2. P will be sba with all transfers with a rw  3. Pt will ambulate with a rw with sba and at a faster more normal pace for 150ft                       Time Tracking:     PT Received On: 03/07/24  PT Start Time: 1038     PT Stop Time: 1105  PT Total Time (min): 27 min     Billable Minutes: Gait Training 13 and Therapeutic Activity 14    Treatment Type: Treatment        Number of PTA visits since last PT visit: 1 03/07/2024

## 2024-03-07 NOTE — PROGRESS NOTES
[facility] - 9th Floor Med Surg  Nephrology  Progress Note    Patient Name: Mirza Nelson Jr.  MRN: [id number]  Admission Date: 2/29/2024  Hospital Length of Stay: 7 days  Attending Provider: Wero Gutierrez MD   Primary Care Physician: Elo Flores MD  Principal Problem:Leg weakness, bilateral      Subjective:   Mirza Nelson Jr. is a 68 y.o. male with PMH of anemia, HTN, NSTEMI, CAD s/p PCI, chronic indwelling nielsen, and ESRD on HD. He follows a MWF schedule at [facility] with Dr. Butler. He presented to the ED on 2/29/24 s/p fall with generalized weakness and having cardiac stenting 2 days prior. He was on the floor for an extended period of time s/p fall, with neighbors finding him yesterday morning. Reports he has been unable to walk since his admission at [facility] on 2/18 with respiratory distress requiring intubation and NSTEMI with stenting. He was discharged from [facility] on 2/28. Labs on admission significant for Troponin 0.376, BUN 33.5 and creatinine 6.94. UA positive for UTI, final culture pending. Renal is consulted for ESRD management.       Interval History:   3/4/24 - - Urine culture final ESBL E Coli. He is on Rocephin and Zosyn. He is currently on dialysis now resting comfortably.     03/05/2024 - Sitting up in his recliner.  Awake alert oriented and comfortable and voices no new complaints.  Denies any shortness of breath.  Reports that he is eating fairly well.  Also reports that he is bowels are moving.    3/6 - Patient on dialysis endorsing BLE and back pain which he reports is chronic in nature.     3/7/23 -  and dialysis coordinator working together to get him accepted to rehab     Review of patient's allergies indicates:   Allergen Reactions    Iodine      Other reaction(s): difficulty breathing, Facial Swelling    Iodine and iodide containing products Swelling    Shellfish containing products      Other reaction(s): Swelling     Current Facility-Administered  Medications   Medication Frequency    acetaminophen tablet 650 mg Q6H PRN    aluminum-magnesium hydroxide-simethicone 200-200-20 mg/5 mL suspension 30 mL QID PRN    amLODIPine tablet 10 mg Daily    ascorbic acid (vitamin C) tablet 500 mg BID    aspirin chewable tablet 81 mg Daily    atorvastatin tablet 40 mg QHS    carvediloL tablet 12.5 mg BID    clopidogreL tablet 75 mg Daily    epoetin gary injection 20,000 Units Every Mon, Wed, Fri    ergocalciferol capsule 50,000 Units Q7 Days    ferrous sulfate tablet 1 each BID    fluticasone propionate 50 mcg/actuation nasal spray 50 mcg Daily    folic acid tablet 1 mg Daily    HYDROcodone-acetaminophen  mg per tablet 1 tablet Q6H PRN    megestroL 400 mg/10 mL (10 mL) suspension 80 mg BID    melatonin tablet 6 mg Nightly PRN    miconazole NITRATE 2 % top powder BID    naloxone 0.4 mg/mL injection 0.02 mg PRN    ondansetron injection 4 mg Q4H PRN    pantoprazole EC tablet 40 mg Daily    piperacillin-tazobactam (ZOSYN) 4.5 g in dextrose 5 % in water (D5W) 100 mL IVPB (MB+) Q12H    polyethylene glycol packet 17 g BID PRN    prochlorperazine injection Soln 5 mg Q6H PRN    simethicone chewable tablet 80 mg QID PRN    sodium bicarbonate tablet 1,300 mg BID    sodium chloride 0.9% flush 10 mL Q6H    And    sodium chloride 0.9% flush 10 mL PRN    tamsulosin 24 hr capsule 0.4 mg Daily       Objective:     Vital Signs (Most Recent):  Temp: 98.7 °F (37.1 °C) (03/07/24 1046)  Pulse: 66 (03/07/24 1046)  Resp: 20 (03/07/24 1041)  BP: (!) 161/93 (03/07/24 1046)  SpO2: 99 % (03/07/24 1046) Vital Signs (24h Range):  Temp:  [98.6 °F (37 °C)-98.9 °F (37.2 °C)] 98.7 °F (37.1 °C)  Pulse:  [61-70] 66  Resp:  [18-20] 20  SpO2:  [96 %-99 %] 99 %  BP: (129-166)/(66-99) 161/93     Weight: 63.8 kg (140 lb 11.2 oz) (03/04/24 0819)  Body mass index is 22.71 kg/m².  Body surface area is 1.72 meters squared.    I/O last 3 completed shifts:  In: 240 [P.O.:240]  Out: 1050 [Urine:1050]    Physical  Exam  Constitutional:       General: He is not in acute distress.  HENT:      Head: Atraumatic.      Nose: Nose normal.      Mouth/Throat:      Mouth: Mucous membranes are moist.   Eyes:      Extraocular Movements: Extraocular movements intact.      Conjunctiva/sclera: Conjunctivae normal.   Cardiovascular:      Rate and Rhythm: Regular rhythm.      Pulses: Normal pulses.   Pulmonary:      Breath sounds: Normal breath sounds.   Abdominal:      General: Bowel sounds are normal.      Palpations: Abdomen is soft.   Genitourinary:     Comments: Urinary catheter with yellow urine   Musculoskeletal:         General: No swelling.      Cervical back: Neck supple.      Comments: WONG AVF   Skin:     General: Skin is warm.   Neurological:      Mental Status: He is alert and oriented to person, place, and time.   Psychiatric:         Mood and Affect: Mood normal.         Behavior: Behavior normal.         Significant Labs:sureBMP:   Recent Labs   Lab 03/01/24  0603 03/02/24  0351 03/06/24  0445      < > 136   K 4.2   < > 3.9   CO2 22*   < > 26   BUN 39.7*   < > 43.1*   CREATININE 8.23*   < > 8.49*   CALCIUM 7.9*   < > 7.6*   MG 1.90  --   --     < > = values in this interval not displayed.       CBC:   Recent Labs   Lab 03/06/24  0445   WBC 4.86   RBC 2.74*   HGB 8.7*   HCT 28.1*      .6*   MCH 31.8*   MCHC 31.0*       Microbiology Results (last 7 days)       Procedure Component Value Units Date/Time    Urine culture [[id number]]  (Abnormal)  (Susceptibility) Collected: 02/29/24 1840    Order Status: Completed Specimen: Urine Updated: 03/03/24 0624     Urine Culture >/= 100,000 colonies/ml Escherichia coli ESBL          Specimen (24h ago, onward)      None          Recent Labs   Lab 02/29/24 1840   PROTEINUA 2+*   BACTERIA Moderate*   LEUKOCYTESUR 500*   UROBILINOGEN Normal         Significant Imaging:  No new imaging     Assessment/Plan:   ESRD on HD-- continue MWF schedule  Fall with generalized  weakness  Chronic indwelling urinary catheter changed every 3 weeks - Follows with Dr Navarrete   UTI-- ESBL E Coli on Rocephin and Zosyn  Hypertension  Recent NSTEMI s/p stenting 2/23 with Dr Garcia at [facility]  Anemia of chronic kidney disease  Vitamin-D deficiency     Plan:  Continue dialysis on MWF schedule with [facility]   Awaiting SNF versus Rehab     KARLOS Preston  Nephrology  [facility] - 9th Floor Med Surg

## 2024-03-07 NOTE — PROGRESS NOTES
[facility] Hospital Medicine Progress Note        Chief Complaint: Inpatient Follow-up for  c/o BLE weakness    HPI:   Mr. Nelson is a 68 year old male with a pmh of CAD s/p stent, HTN, ESRD on HD (MWF), BPH, chronic urinary retention, GERD, chronic anemia, chronic pain, anxiety who presented to the ED with c/o BLE weakness.  He states that he has been unable to move his legs or walk since being admitted to [facility] on 2/18.  Per chart review, he had arrived in the ED with c/o SOB, rapidly decompensated, was intubated. He was able to be extubated the next day.  On 2/22, he was diagnosed with NSTEMI and had a stent placed on 2/23. He was discharged home yesterday. He was offered SNF placement but refused.     ED vitals on arrival:  Temp 98° point F, pulse 78, resp 16, /82, SpO2 98% on RA.  Today's ED lab work showed H&H 10/31.6, CO2 20, BUN/CR 33.5/6.94, troponin 0.376.  UA showed 2+ protein, 2+ blood, 500 leukocyte esterase,>100 RBC, >100 WBC, Moderate bacteria.  CXR showed no acute cardiopulmonary process.  CT head without contrast showed no convincing acute intracranial abnormality.  CT thoracic spine without contrast showed no acute fracture identified.  Multilevel degenerative changes of the thoracic spine.  CT lumbar spine without contrast showed no acute fracture identified multilevel degenerative changes.  He was started on ceftriaxone and admitted to Hospital Medicine for management.     MRI Thoracic spine showed no definite convincing spinal cord abnormalities.  Mild multilevel spondylosis without significant canal stenosis or cord compression.  MRI of the lumbar spine showed mild degenerative narrowing at the spinal canal at L4-L5 with multilevel neuroforaminal stenosis.  No significant abnormality otherwise seen.    Interval Hx:   No acute overnight events. Awaiting placement    Patient was seen and examined at bedside, complaints of chronic low back pain, without any  fevers or chills    Chart was reviewed, afebrile , intermittent elevations in blood pressure noted, most recent blood work reviewed.  Hemoglobin 8.7      Objective/physical exam:  General: In no acute distress, afebrile  Chest: Clear to auscultation bilaterally  Heart: RRR, +S1, S2, no appreciable murmur  Abdomen: Soft, nontender, BS +  MSK: Warm, no lower extremity edema, no clubbing or cyanosis  Neurologic: Alert and oriented x4, Cranial nerve II-XII intact, 5/5 in right lower extremity however 2/5 in left lower extremity sensation also decrease in left lower extremity.    VITAL SIGNS: 24 HRS MIN & MAX LAST   Temp  Min: 98.6 °F (37 °C)  Max: 98.9 °F (37.2 °C) 98.7 °F (37.1 °C)   BP  Min: 129/66  Max: 166/77 (!) 161/93   Pulse  Min: 61  Max: 70  66   Resp  Min: 18  Max: 20 20   SpO2  Min: 96 %  Max: 99 % 99 %     I have reviewed the following labs:  Recent Labs   Lab 03/02/24  0351 03/04/24  0610 03/06/24  0445   WBC 5.67 4.25* 4.86   RBC 2.76* 2.61* 2.74*   HGB 8.8* 8.6* 8.7*   HCT 27.9* 26.4* 28.1*   .1* 101.1* 102.6*   MCH 31.9* 33.0* 31.8*   MCHC 31.5* 32.6* 31.0*   RDW 18.2* 17.9* 17.9*    187 178   MPV 10.1 10.5* 10.1     Recent Labs   Lab 02/29/24  1555 03/01/24  0603 03/02/24  0351 03/04/24  0610 03/06/24  0445   * 136 135* 137 136   K 4.1 4.2 4.1 4.1 3.9   CO2 20* 22* 26 25 26   BUN 33.5* 39.7* 18.6 48.0* 43.1*   CREATININE 6.94* 8.23* 5.43* 9.21* 8.49*   CALCIUM 8.6* 7.9* 8.0* 7.9* 7.6*   MG  --  1.90  --   --   --    ALBUMIN 3.0* 2.6*  --   --  2.5*   ALKPHOS 57 49  --   --  45   ALT 7 7  --   --  8   AST 11 9  --   --  12   BILITOT 0.6 0.5  --   --  0.7     Microbiology Results (last 7 days)       Procedure Component Value Units Date/Time    Urine culture [[id number]]  (Abnormal)  (Susceptibility) Collected: 02/29/24 1840    Order Status: Completed Specimen: Urine Updated: 03/03/24 0624     Urine Culture >/= 100,000 colonies/ml Escherichia coli ESBL             See below for  Radiology    Scheduled Med:   amLODIPine  10 mg Oral Daily    ascorbic acid (vitamin C)  500 mg Oral BID    aspirin  81 mg Oral Daily    atorvastatin  40 mg Oral QHS    carvediloL  12.5 mg Oral BID    clopidogreL  75 mg Oral Daily    epoetin gary (PROCRIT) injection  20,000 Units Subcutaneous Every Mon, Wed, Fri    ergocalciferol  50,000 Units Oral Q7 Days    ferrous sulfate  1 tablet Oral BID    fluticasone propionate  1 spray Each Nostril Daily    folic acid  1 mg Oral Daily    megestroL  80 mg Oral BID    miconazole NITRATE 2 %   Topical (Top) BID    pantoprazole  40 mg Oral Daily    piperacillin-tazobactam (Zosyn) IV (PEDS and ADULTS) (extended infusion is not appropriate)  4.5 g Intravenous Q12H    sodium bicarbonate  1,300 mg Oral BID    sodium chloride 0.9%  10 mL Intravenous Q6H    tamsulosin  0.4 mg Oral Daily      Continuous Infusions:     PRN Meds:  acetaminophen, aluminum-magnesium hydroxide-simethicone, HYDROcodone-acetaminophen, melatonin, naloxone, ondansetron, polyethylene glycol, prochlorperazine, simethicone, Flushing PICC/Midline Protocol **AND** sodium chloride 0.9% **AND** sodium chloride 0.9%     Assessment/Plan:  Fall, secondary to bilateral lower extremity weakness  Elevated troponin, likely still downtrending from NSTEMI   Persistent hematuria, resolved  Recent CAD status post recent stents   UTI  ESRD on HD  BPH   Chronic urinary retention with Cheek catheter   Chronic anemia,Folic acid deficiency  Vitamin-D deficiency   Chronic back pain          Plan:    Imaging largely unremarkable, MRI of the thoracic spine shows mild multilevel spondylosis without significant canal stenosis or cord compression .MRI of the lumbar spine shows mild degenerative narrowing of the spinal canal at L4 and L5 and multilevel neural foraminal stenosis .Consulted PT and OT, recommend high intensity therapy, Analgesics PRN  Cheek catheter currently clear and no hematuria present, was recently replaced by Urology on  02/18 previous hospitalization  Due to recent stents patient is high-risk for InStent thrombosis if antiplatelets stopped, continue antiplatelet at this time  Completing antibiotics, On Zosyn last day  Nephrology consulted for assistance with dialysis   Monitor H and H, added folic acid  Supplementing Vit D  Patient will likely need rehab or SNF placement, awaiting placement  Continue supportive care, monitor blood pressure       VTE prophylaxis:  SCDs secondary to hematuria      Anticipated discharge and Disposition:   Possible SNF versus rehab      All diagnosis and differential diagnosis have been reviewed; assessment and plan has been documented; I have personally reviewed the labs and test results that are presently available; I have reviewed the patients medication list; I have reviewed the consulting providers response and recommendations. I have reviewed or attempted to review medical records based upon their availability    All of the patient's questions have been  addressed and answered. Patient's is agreeable to the above stated plan. I will continue to monitor closely and make adjustments to medical management as needed.  _____________________________________________________________________    Nutrition Status:    Radiology:  I have personally reviewed the following imaging and agree with the radiologist.     MRI Thoracic Spine Without Contrast  Narrative: EXAMINATION:  MRI THORACIC SPINE WITHOUT CONTRAST    CLINICAL HISTORY:  Lower extremity weakness;    TECHNIQUE:  Multiplanar multisequence MR images of the thoracic spine are obtained without contrast.    COMPARISON:  CT thoracic spine dated 02/29/2024    FINDINGS:  Evaluation is limited by motion artifact.  Thoracic alignment is preserved.  The vertebral body heights are maintained.  There are mild multilevel degenerate endplate changes.    There is no definite convincing cord signal abnormality.  There is no evidence of an epidural fluid  collection.    There are multilevel degenerative changes with disc bulges and facet hypertrophy.  There is no evidence of significant canal stenosis or cord compression.  The neural foramina appear patent.    The posterior paraspinal soft tissues are unremarkable.  Small bilateral pleural effusions are noted.  Impression: 1. Evaluation limited by motion artifact.  No definite convincing cord signal abnormality.  2. Mild multilevel spondylosis without significant canal stenosis or cord compression.  No significant change from the [facility] interpretation.    Electronically signed by: Pretty Chao  Date:    03/01/2024  Time:    09:11  MRI Lumbar Spine Without Contrast  Narrative: EXAMINATION:  MRI LUMBAR SPINE WITHOUT CONTRAST    CLINICAL HISTORY:  Lower extremity weakness;    TECHNIQUE:  Multiplanar multisequence MR images of the lumbar spine are obtained without contrast.    COMPARISON:  CT lumbar spine dated 02/29/2024    FINDINGS:  Evaluation is limited by motion artifact.  There are 5 non-rib-bearing lumbar type vertebra bodies.  Alignment is normal.  The vertebral body heights are maintained.  There are degenerative endplate changes at L4-5.    The conus terminates at the level of L2.  It is normal in signal and contour.    Disc spaces, spinal canal and neural foramina are as follows:    L1-L2: Disc bulge with mild narrowing of the spinal canal.  Mild bilateral neural foraminal stenosis.    L2-L3: No disc herniation.  No significant spinal canal stenosis.  Mild bilateral neural foraminal stenosis.    L3-L4: No disc herniation.  Bilateral facet hypertrophy.  No significant spinal canal stenosis.  Mild bilateral foraminal stenosis.    L4-L5: Disc bulge and facet hypertrophy with mild narrowing of the spinal canal.  Moderate right and mild left neural foraminal stenosis.    L5-S1: No disc herniation.  Bilateral facet hypertrophy.  No significant spinal canal stenosis.  Moderate right and mild left neural  foraminal stenosis.    No significant abnormality within the visualized paraspinous musculature.  Hydronephrosis is again noted.  Impression: 1. Mild degenerative narrowing the spinal canal at L4-5.  2. Multilevel neural foraminal stenoses as described.  No major change from the [facility] interpretation    Electronically signed by: Pretty Chao  Date:    03/01/2024  Time:    09:07    Rosio Seo MD  Department of Hospital Medicine  [facility]  03/07/2024

## 2024-03-07 NOTE — PT/OT/SLP PROGRESS
Occupational Therapy      Patient Name:  Mirza Nelson Jr.   MRN:  [id number]    Patient not seen today secondary to refusing Tx. Encouragement given and benefits of therapy explained. Patient still unwilling to participate. Will follow-up as schedule allows.    3/7/2024

## 2024-03-08 NOTE — PROGRESS NOTES
Completed 3hr HD   No fluid Removal  Received Retacrit 20,000units with HD.       03/08/24 1019        Hemodialysis AV Fistula Right upper arm   No placement date or time found.   Present Prior to Hospital Arrival?: Yes  Hemodialysis Catheter Type: Tunneled catheter  Location: Right upper arm  Additional Comments: clotted, not in use   Needle Size 15ga   Patency Present;Thrill;Bruit   Status Deaccessed   Flows Good   Dressing Status Clean;Dry;Intact   Site Condition No complications   Dressing Gauze   Post-Hemodialysis Assessment   Blood Volume Processed (Liters) 62 L   Dialyzer Clearance Clear   Duration of Treatment 180 minutes   Total UF (mL) 0 mL   Net Fluid Removal 0   Patient Response to Treatment tolrated well.

## 2024-03-08 NOTE — PROGRESS NOTES
[facility] Hospital Medicine Progress Note        Chief Complaint: Inpatient Follow-up for  c/o BLE weakness    HPI:   Mr. Nelson is a 68 year old male with a pmh of CAD s/p stent, HTN, ESRD on HD (MWF), BPH, chronic urinary retention, GERD, chronic anemia, chronic pain, anxiety who presented to the ED with c/o BLE weakness.  He states that he has been unable to move his legs or walk since being admitted to [facility] on 2/18.  Per chart review, he had arrived in the ED with c/o SOB, rapidly decompensated, was intubated. He was able to be extubated the next day.  On 2/22, he was diagnosed with NSTEMI and had a stent placed on 2/23. He was discharged home yesterday. He was offered SNF placement but refused.     ED vitals on arrival:  Temp 98° point F, pulse 78, resp 16, /82, SpO2 98% on RA.  Today's ED lab work showed H&H 10/31.6, CO2 20, BUN/CR 33.5/6.94, troponin 0.376.  UA showed 2+ protein, 2+ blood, 500 leukocyte esterase,>100 RBC, >100 WBC, Moderate bacteria.  CXR showed no acute cardiopulmonary process.  CT head without contrast showed no convincing acute intracranial abnormality.  CT thoracic spine without contrast showed no acute fracture identified.  Multilevel degenerative changes of the thoracic spine.  CT lumbar spine without contrast showed no acute fracture identified multilevel degenerative changes.  He was started on ceftriaxone and admitted to Hospital Medicine for management.     MRI Thoracic spine showed no definite convincing spinal cord abnormalities.  Mild multilevel spondylosis without significant canal stenosis or cord compression.  MRI of the lumbar spine showed mild degenerative narrowing at the spinal canal at L4-L5 with multilevel neuroforaminal stenosis.  No significant abnormality otherwise seen.    Interval Hx:   No acute overnight events. Awaiting placement.    Patient was seen and examined at bedside, no complaints reported, without any fevers or  chills    Chart was reviewed, afebrile ,stable, most recent blood work reviewed.  Hemoglobin 8.8      Objective/physical exam:  General: In no acute distress, afebrile  Chest: Clear to auscultation bilaterally  Heart:  +S1, S2, no appreciable murmur  Abdomen: Soft, nontender, BS +  MSK: Warm, no lower extremity edema, no clubbing or cyanosis  Neurologic: Alert and oriented x4, Cranial nerve II-XII intact, 5/5 in right lower extremity however 2/5 in left lower extremity sensation also decrease in left lower extremity.    VITAL SIGNS: 24 HRS MIN & MAX LAST   Temp  Min: 97.4 °F (36.3 °C)  Max: 99.7 °F (37.6 °C) 99.7 °F (37.6 °C)   BP  Min: 126/74  Max: 151/68 (!) 129/58   Pulse  Min: 59  Max: 67  61   Resp  Min: 17  Max: 20 18   SpO2  Min: 96 %  Max: 98 % 97 %     I have reviewed the following labs:  Recent Labs   Lab 03/04/24 0610 03/06/24 0445 03/08/24  0531   WBC 4.25* 4.86 4.11*   RBC 2.61* 2.74* 2.69*   HGB 8.6* 8.7* 8.8*   HCT 26.4* 28.1* 27.2*   .1* 102.6* 101.1*   MCH 33.0* 31.8* 32.7*   MCHC 32.6* 31.0* 32.4*   RDW 17.9* 17.9* 17.7*    178 161   MPV 10.5* 10.1 10.6*     Recent Labs   Lab 03/04/24 0610 03/06/24 0445 03/08/24  0531    136 136   K 4.1 3.9 3.7   CO2 25 26 24   BUN 48.0* 43.1* 42.8*   CREATININE 9.21* 8.49* 7.81*   CALCIUM 7.9* 7.6* 7.9*   ALBUMIN  --  2.5*  --    ALKPHOS  --  45  --    ALT  --  8  --    AST  --  12  --    BILITOT  --  0.7  --      Microbiology Results (last 7 days)       Procedure Component Value Units Date/Time    Urine culture [[id number]]  (Abnormal)  (Susceptibility) Collected: 02/29/24 1840    Order Status: Completed Specimen: Urine Updated: 03/03/24 0624     Urine Culture >/= 100,000 colonies/ml Escherichia coli ESBL             See below for Radiology    Scheduled Med:   amLODIPine  10 mg Oral Daily    ascorbic acid (vitamin C)  500 mg Oral BID    aspirin  81 mg Oral Daily    atorvastatin  40 mg Oral QHS    carvediloL  12.5 mg Oral BID    clopidogreL   75 mg Oral Daily    epoetin gary (PROCRIT) injection  20,000 Units Subcutaneous Every Mon, Wed, Fri    ergocalciferol  50,000 Units Oral Q7 Days    ferrous sulfate  1 tablet Oral BID    fluticasone propionate  1 spray Each Nostril Daily    folic acid  1 mg Oral Daily    hydrALAZINE  25 mg Oral TID    megestroL  80 mg Oral BID    miconazole NITRATE 2 %   Topical (Top) BID    pantoprazole  40 mg Oral Daily    sodium bicarbonate  1,300 mg Oral BID    sodium chloride 0.9%  10 mL Intravenous Q6H    tamsulosin  0.4 mg Oral Daily      Continuous Infusions:     PRN Meds:  acetaminophen, aluminum-magnesium hydroxide-simethicone, HYDROcodone-acetaminophen, melatonin, naloxone, ondansetron, polyethylene glycol, prochlorperazine, simethicone, Flushing PICC/Midline Protocol **AND** sodium chloride 0.9% **AND** sodium chloride 0.9%     Assessment/Plan:  Fall, secondary to bilateral lower extremity weakness  Elevated troponin, likely still downtrending from NSTEMI   Persistent hematuria, resolved  Recent CAD status post recent stents   UTI  ESRD on HD  BPH   Chronic urinary retention with Cheek catheter   Chronic anemia,Folic acid deficiency  Vitamin-D deficiency   Chronic back pain          Plan:  Imaging largely unremarkable, MRI of the thoracic spine shows mild multilevel spondylosis without significant canal stenosis or cord compression .MRI of the lumbar spine shows mild degenerative narrowing of the spinal canal at L4 and L5 and multilevel neural foraminal stenosis .Consulted PT and OT, recommend high intensity therapy, Analgesics PRN  Cheek catheter currently clear and no hematuria present, was recently replaced by Urology on 02/18 previous hospitalization  Due to recent stents patient is high-risk for InStent thrombosis if antiplatelets stopped, continue antiplatelet at this time  Completed IV antibiotics  Nephrology consulted for assistance with dialysis   Monitor H and H, added folic acid  Supplementing Vit D  Patient  will likely need rehab or SNF placement, awaiting placement  Continue supportive care, monitor blood pressure       VTE prophylaxis:  SCDs secondary to hematuria      Anticipated discharge and Disposition:  Placement pending      All diagnosis and differential diagnosis have been reviewed; assessment and plan has been documented; I have personally reviewed the labs and test results that are presently available; I have reviewed the patients medication list; I have reviewed the consulting providers response and recommendations. I have reviewed or attempted to review medical records based upon their availability    All of the patient's questions have been  addressed and answered. Patient's is agreeable to the above stated plan. I will continue to monitor closely and make adjustments to medical management as needed.  _____________________________________________________________________    Nutrition Status:    Radiology:  I have personally reviewed the following imaging and agree with the radiologist.     MRI Thoracic Spine Without Contrast  Narrative: EXAMINATION:  MRI THORACIC SPINE WITHOUT CONTRAST    CLINICAL HISTORY:  Lower extremity weakness;    TECHNIQUE:  Multiplanar multisequence MR images of the thoracic spine are obtained without contrast.    COMPARISON:  CT thoracic spine dated 02/29/2024    FINDINGS:  Evaluation is limited by motion artifact.  Thoracic alignment is preserved.  The vertebral body heights are maintained.  There are mild multilevel degenerate endplate changes.    There is no definite convincing cord signal abnormality.  There is no evidence of an epidural fluid collection.    There are multilevel degenerative changes with disc bulges and facet hypertrophy.  There is no evidence of significant canal stenosis or cord compression.  The neural foramina appear patent.    The posterior paraspinal soft tissues are unremarkable.  Small bilateral pleural effusions are noted.  Impression: 1. Evaluation  limited by motion artifact.  No definite convincing cord signal abnormality.  2. Mild multilevel spondylosis without significant canal stenosis or cord compression.  No significant change from the [facility] interpretation.    Electronically signed by: Pretty Chao  Date:    03/01/2024  Time:    09:11  MRI Lumbar Spine Without Contrast  Narrative: EXAMINATION:  MRI LUMBAR SPINE WITHOUT CONTRAST    CLINICAL HISTORY:  Lower extremity weakness;    TECHNIQUE:  Multiplanar multisequence MR images of the lumbar spine are obtained without contrast.    COMPARISON:  CT lumbar spine dated 02/29/2024    FINDINGS:  Evaluation is limited by motion artifact.  There are 5 non-rib-bearing lumbar type vertebra bodies.  Alignment is normal.  The vertebral body heights are maintained.  There are degenerative endplate changes at L4-5.    The conus terminates at the level of L2.  It is normal in signal and contour.    Disc spaces, spinal canal and neural foramina are as follows:    L1-L2: Disc bulge with mild narrowing of the spinal canal.  Mild bilateral neural foraminal stenosis.    L2-L3: No disc herniation.  No significant spinal canal stenosis.  Mild bilateral neural foraminal stenosis.    L3-L4: No disc herniation.  Bilateral facet hypertrophy.  No significant spinal canal stenosis.  Mild bilateral foraminal stenosis.    L4-L5: Disc bulge and facet hypertrophy with mild narrowing of the spinal canal.  Moderate right and mild left neural foraminal stenosis.    L5-S1: No disc herniation.  Bilateral facet hypertrophy.  No significant spinal canal stenosis.  Moderate right and mild left neural foraminal stenosis.    No significant abnormality within the visualized paraspinous musculature.  Hydronephrosis is again noted.  Impression: 1. Mild degenerative narrowing the spinal canal at L4-5.  2. Multilevel neural foraminal stenoses as described.  No major change from the [facility] interpretation    Electronically signed by: Pretty  Zhanna  Date:    03/01/2024  Time:    09:07    Rosio Seo MD  [facility]  [facility]  03/08/2024

## 2024-03-08 NOTE — PT/OT/SLP PROGRESS
Occupational Therapy      Patient Name:  Mirza Nelson .   MRN:  [id number]    Patient not seen today secondary to declining therapy . Will follow-up as schedule allows.    3/8/2024

## 2024-03-08 NOTE — PROGRESS NOTES
[facility] - 9th Floor Med Surg  Nephrology  Progress Note    Patient Name: Mirza Nelson Jr.  MRN: [id number]  Admission Date: 2/29/2024  Hospital Length of Stay: 8 days  Attending Provider: Rosio Seo MD   Primary Care Physician: Elo Flores MD  Principal Problem:Leg weakness, bilateral      Subjective:   Mirza Nelson Jr. is a 68 y.o. male with PMH of anemia, HTN, NSTEMI, CAD s/p PCI, chronic indwelling nielsen, and ESRD on HD. He follows a MWF schedule at [facility] with Dr. Butler. He presented to the ED on 2/29/24 s/p fall with generalized weakness and having cardiac stenting 2 days prior. He was on the floor for an extended period of time s/p fall, with neighbors finding him yesterday morning. Reports he has been unable to walk since his admission at [facility] on 2/18 with respiratory distress requiring intubation and NSTEMI with stenting. He was discharged from [facility] on 2/28. Labs on admission significant for Troponin 0.376, BUN 33.5 and creatinine 6.94. UA positive for UTI, final culture pending. Renal is consulted for ESRD management.       Interval History:   Urine culture final ESBL E Coli. He is on Rocephin and Zosyn. CM and dialysis coordinator working together to get him accepted to rehab. Patient on dialysis now without distress. Makes fair urine still with 950 cc over the past 24 hours. Only complaint today is being hungry     Review of patient's allergies indicates:   Allergen Reactions    Iodine      Other reaction(s): difficulty breathing, Facial Swelling    Iodine and iodide containing products Swelling    Shellfish containing products      Other reaction(s): Swelling     Current Facility-Administered Medications   Medication Frequency    acetaminophen tablet 650 mg Q6H PRN    aluminum-magnesium hydroxide-simethicone 200-200-20 mg/5 mL suspension 30 mL QID PRN    amLODIPine tablet 10 mg Daily    ascorbic acid (vitamin C) tablet 500 mg BID    aspirin chewable tablet 81 mg  Daily    atorvastatin tablet 40 mg QHS    carvediloL tablet 12.5 mg BID    clopidogreL tablet 75 mg Daily    epoetin gary injection 20,000 Units Every Mon, Wed, Fri    ergocalciferol capsule 50,000 Units Q7 Days    ferrous sulfate tablet 1 each BID    fluticasone propionate 50 mcg/actuation nasal spray 50 mcg Daily    folic acid tablet 1 mg Daily    hydrALAZINE tablet 25 mg TID    HYDROcodone-acetaminophen  mg per tablet 1 tablet Q4H PRN    megestroL 400 mg/10 mL (10 mL) suspension 80 mg BID    melatonin tablet 6 mg Nightly PRN    miconazole NITRATE 2 % top powder BID    naloxone 0.4 mg/mL injection 0.02 mg PRN    ondansetron injection 4 mg Q4H PRN    pantoprazole EC tablet 40 mg Daily    polyethylene glycol packet 17 g BID PRN    prochlorperazine injection Soln 5 mg Q6H PRN    simethicone chewable tablet 80 mg QID PRN    sodium bicarbonate tablet 1,300 mg BID    sodium chloride 0.9% flush 10 mL Q6H    And    sodium chloride 0.9% flush 10 mL PRN    tamsulosin 24 hr capsule 0.4 mg Daily       Objective:     Vital Signs (Most Recent):  Temp: 97.4 °F (36.3 °C) (03/08/24 0553)  Pulse: 61 (03/08/24 0553)  Resp: 18 (03/08/24 0553)  BP: (!) 146/74 (03/08/24 0553)  SpO2: 97 % (03/08/24 0553) Vital Signs (24h Range):  Temp:  [97.4 °F (36.3 °C)-98.7 °F (37.1 °C)] 97.4 °F (36.3 °C)  Pulse:  [57-66] 61  Resp:  [17-20] 18  SpO2:  [97 %-100 %] 97 %  BP: (112-161)/(66-93) 146/74     Weight: 64 kg (141 lb 1.5 oz) (03/08/24 0600)  Body mass index is 22.77 kg/m².  Body surface area is 1.73 meters squared.    I/O last 3 completed shifts:  In: 240 [P.O.:240]  Out: 1250 [Urine:1250]    Physical Exam  Constitutional:       General: He is not in acute distress.  HENT:      Head: Atraumatic.      Nose: Nose normal.      Mouth/Throat:      Mouth: Mucous membranes are moist.   Eyes:      Extraocular Movements: Extraocular movements intact.      Conjunctiva/sclera: Conjunctivae normal.   Cardiovascular:      Rate and Rhythm: Regular  "rhythm.      Pulses: Normal pulses.   Pulmonary:      Breath sounds: Normal breath sounds.   Abdominal:      General: Bowel sounds are normal.      Palpations: Abdomen is soft.   Genitourinary:     Comments: Urinary catheter with yellow urine   Musculoskeletal:         General: No swelling.      Cervical back: Neck supple.      Comments: WONG AVF   Skin:     General: Skin is warm.   Neurological:      Mental Status: He is alert and oriented to person, place, and time.   Psychiatric:         Mood and Affect: Mood normal.         Behavior: Behavior normal.         Significant Labs:sureBMP:   Recent Labs   Lab 03/08/24  0531      K 3.7   CO2 24   BUN 42.8*   CREATININE 7.81*   CALCIUM 7.9*       CBC:   Recent Labs   Lab 03/08/24  0531   WBC 4.11*   RBC 2.69*   HGB 8.8*   HCT 27.2*      .1*   MCH 32.7*   MCHC 32.4*       Microbiology Results (last 7 days)       Procedure Component Value Units Date/Time    Urine culture [[id number]]  (Abnormal)  (Susceptibility) Collected: 02/29/24 1840    Order Status: Completed Specimen: Urine Updated: 03/03/24 0624     Urine Culture >/= 100,000 colonies/ml Escherichia coli ESBL          Specimen (24h ago, onward)      None          No results for input(s): "COLORU", "CLARITYU", "SPECGRAV", "PHUR", "PROTEINUA", "GLUCOSEU", "BILIRUBINCON", "BLOODU", "WBCU", "RBCU", "BACTERIA", "MUCUS", "NITRITE", "LEUKOCYTESUR", "UROBILINOGEN", "HYALINECASTS" in the last 168 hours.      Significant Imaging:  No new imaging     Assessment/Plan:   ESRD on HD-- continue MWF schedule  Fall with generalized weakness  Chronic indwelling urinary catheter changed every 3 weeks - Follows with Dr Navarrete   UTI-- ESBL E Coli on Rocephin and Zosyn  Hypertension  Recent NSTEMI s/p stenting 2/23 with Dr Garcia at [facility]  Anemia of chronic kidney disease  Vitamin-D deficiency     Plan:  Continue dialysis on MWF schedule with [facility]   Awaiting SNF versus Rehab     Esthela Narayanan, " AGNP  Nephrology  [facility]

## 2024-03-08 NOTE — PROGRESS NOTES
Inpatient Nutrition Evaluation    Admit Date: 2/29/2024   Total duration of encounter: 8 days   Patient Age: 68 y.o.    Nutrition Recommendation/Prescription     - Continue Renal Dialysis Diet as ordered  - Add Novasource Renal daily -- provides 475 kcals and 22 g Pro per ONS    Nutrition Assessment     Chart Review    Reason Seen: malnutrition screening tool (MST)    Malnutrition Screening Tool Results   Have you recently lost weight without trying?: Unsure  Have you been eating poorly because of a decreased appetite?: Yes   MST Score: 3   Diagnosis:  BLE weakness  2.  Elevated troponin - likely still downtrending from NSTEMI  3.  Acute hemorrhagic cystitis/persistent hematuria     Relevant Medical History:   CAD s/p stent, HTN, ESRD on HD (MWF), BPH, chronic urinary retention, GERD, chronic anemia, chronic pain, anxiety     Scheduled Medications:  amLODIPine, 10 mg, Daily  ascorbic acid (vitamin C), 500 mg, BID  aspirin, 81 mg, Daily  atorvastatin, 40 mg, QHS  carvediloL, 12.5 mg, BID  clopidogreL, 75 mg, Daily  epoetin gary (PROCRIT) injection, 20,000 Units, Every Mon, Wed, Fri  ergocalciferol, 50,000 Units, Q7 Days  ferrous sulfate, 1 tablet, BID  fluticasone propionate, 1 spray, Daily  folic acid, 1 mg, Daily  hydrALAZINE, 25 mg, TID  megestroL, 80 mg, BID  miconazole NITRATE 2 %, , BID  pantoprazole, 40 mg, Daily  sodium bicarbonate, 1,300 mg, BID  sodium chloride 0.9%, 10 mL, Q6H  tamsulosin, 0.4 mg, Daily    Continuous Infusions:   PRN Medications: acetaminophen, aluminum-magnesium hydroxide-simethicone, HYDROcodone-acetaminophen, melatonin, naloxone, ondansetron, polyethylene glycol, prochlorperazine, simethicone, Flushing PICC/Midline Protocol **AND** sodium chloride 0.9% **AND** sodium chloride 0.9%    Recent Labs   Lab 03/02/24  0351 03/04/24  0610 03/06/24  0445 03/08/24  0531   * 137 136 136   K 4.1 4.1 3.9 3.7   CALCIUM 8.0* 7.9* 7.6* 7.9*   PHOS  --   --  5.1*  --    CHLORIDE 101 98 99 98   CO2  "   BUN 18.6 48.0* 43.1* 42.8*   CREATININE 5.43* 9.21* 8.49* 7.81*   EGFRNORACEVR 11 6 6 7   GLUCOSE 96 102 99 91   BILITOT  --   --  0.7  --    ALKPHOS  --   --  45  --    ALT  --   --  8  --    AST  --   --  12  --    ALBUMIN  --   --  2.5*  --    WBC 5.67 4.25* 4.86 4.11*   HGB 8.8* 8.6* 8.7* 8.8*   HCT 27.9* 26.4* 28.1* 27.2*       Nutrition Orders:  Diet Renal On Dialysis      Appetite/Oral Intake: fair/50-75% of meals  Factors Affecting Nutritional Intake: decreased appetite  Food/Buddhism/Cultural Preferences: none reported  Food Allergies: none reported  Last Bowel Movement: 24  Wound(s):     Altered Skin Integrity 24 0237 Sacral spine Intact skin with non-blanchable redness of localized area-Tissue loss description: Not applicable     Comments    3/1/24: Pt with poor appetite since admit. PTA pt states he was eating well, no issues reported. UBW is around 138 lbs, no unintended wt loss reported. Pt agreeable to trial ONS.     3/8/24 appetite improving, pt reports being hungry, tolerating oral diet    Anthropometrics    Height: 5' 6" (167.6 cm),    Last Weight: 64 kg (141 lb 1.5 oz) (24 0600), Weight Method: Bed Scale  BMI (Calculated): 22.8  BMI Classification: underweight (BMI less than 22 if >65 years of age)        Ideal Body Weight (IBW), Male: 142 lb     % Ideal Body Weight, Male (lb): 96.48 %                 Usual Body Weight (UBW), k.7 kg  % Usual Body Weight: 95.69     Usual Weight Provided By: patient    Wt Readings from Last 5 Encounters:   24 64 kg (141 lb 1.5 oz)   23 67.2 kg (148 lb 3.2 oz)   23 72.6 kg (160 lb)   23 65.8 kg (145 lb)   23 67.6 kg (149 lb 1.6 oz)     Weight Change(s) Since Admission:   Wt Readings from Last 1 Encounters:   24 0600 64 kg (141 lb 1.5 oz)   24 0819 63.8 kg (140 lb 11.2 oz)   24 0600 60.6 kg (133 lb 9.6 oz)   24 0540 59.9 kg (132 lb)   24 0219 59.9 kg (132 lb)   24 " 1446 62.1 kg (137 lb)   Admit Weight: 62.1 kg (137 lb) (02/29/24 1446), Weight Method: Bed Scale    Patient Education     Not applicable.    Nutrition Goals & Monitoring     Dietitian will monitor: food and beverage intake and weight    Nutrition Risk/Follow-Up: low (follow-up in 5-7 days)  Patients assigned 'low nutrition risk' status do not qualify for a full nutritional assessment but will be monitored and re-evaluated in a 5-7 day time period. Please consult if re-evaluation needed sooner.

## 2024-03-08 NOTE — NURSING
[facility] - 9th Floor Med Surg  Wound Care    Patient Name:  Mirza Nelson Jr.   MRN:  [id number]  Date: 3/8/2024  Diagnosis: Leg weakness, bilateral    History:     Past Medical History:   Diagnosis Date    Anemia     BPH (benign prostatic hyperplasia)     Cervical radiculopathy 2022    Disorder of kidney and ureter     ESRD on hemodialysis 2018    Hypertension     Neck injury 1994    Other chronic pain 2018    Personal history of colonic polyps     Renovascular hypertension 2018    Vitamin D deficiency 2022       Social History     Socioeconomic History    Marital status:     Years of education: 12   Tobacco Use    Smoking status: Former     Current packs/day: 0.00     Average packs/day: 0.3 packs/day for 20.0 years (5.0 ttl pk-yrs)     Types: Cigarettes     Start date: 1996     Quit date: 2016     Years since quittin.4    Smokeless tobacco: Never   Substance and Sexual Activity    Alcohol use: No    Drug use: Not Currently     Types: Marijuana    Sexual activity: Not Currently       Precautions:     Allergies as of 2024 - Reviewed 2024   Allergen Reaction Noted    Iodine  2022    Iodine and iodide containing products Swelling 2018    Shellfish containing products  2022       WOC Assessment Details/Treatment   Re-eval of buttock issues.  Pt off in dialysis.  Spoke with nurse Sarmiento-will obtain new photo when returns from dialysis.   Orders placed.     2024

## 2024-03-08 NOTE — PT/OT/SLP PROGRESS
Physical Therapy Treatment    Patient Name:  Mirza Nelson Jr.   MRN:  [id number]    Recommendations:     Discharge therapy intensity: High Intensity Therapy   Discharge Equipment Recommendations: none  Barriers to discharge: Impaired mobility    Assessment:     Mirza Nelson Jr. is a 68 y.o. male admitted with a medical diagnosis of  BLE weakness and recent NSTEMI .  He presents with the following impairments/functional limitations: weakness, impaired self care skills, impaired endurance, impaired functional mobility, gait instability, impaired balance, decreased upper extremity function, decreased lower extremity function .    Rehab Prognosis: Good; patient would benefit from acute skilled PT services to address these deficits and reach maximum level of function.    Recent Surgery: * No surgery found *      Plan:     During this hospitalization, patient to be seen 5 x/week to address the identified rehab impairments via gait training, therapeutic activities, therapeutic exercises and progress toward the following goals:    Plan of Care Expires:  03/22/24    Subjective     Chief Complaint: has had neck and back pain for a long time  Patient/Family Comments/goals: to go to in pt rehab  Pain/Comfort:  Pain Rating 1: 6/10  Location 1: neck  Pain Addressed 1: Distraction, Reposition  Pain Rating Post-Intervention 1: 6/10  Pain Rating 2: 7/10  Location - Orientation 2: lower  Location 2: back  Pain Addressed 2: Cessation of Activity, Reposition  Pain Rating Post-Intervention 2: 7/10      Objective:     Communicated with pts nurse prior to session.  Patient found HOB elevated with pressure relief boots, peripheral IV, pulse ox (continuous), SCD, telemetry upon PT entry to room.     General Precautions: Standard, fall  Orthopedic Precautions: N/A  Braces: N/A  Respiratory Status: Room air  Blood Pressure:   Skin Integrity: Visible skin intact      Functional Mobility:  Bed Mobility:     Rolling Right: contact guard  assistance  Scooting: minimum assistance  Supine to Sit: contact guard assistance  Transfers:     Sit to Stand:  contact guard assistance with no AD, rolling walker, and performed 10 reps w/o UE use,  obtaining balance prior to reaching for support  Bed to Chair: contact guard assistance with  rolling walker  using  Step Transfer  Gait: Pt ambulated 100 ft w/ RW, CGA and cues to improve foot clearance, heel strike and step length.  Pt very slow, quality of gait improved w/ cues but needed repeat of cues    Education:  Patient provided with verbal education and demonstrations education regarding fall prevention, safety awareness, and quality of gait .  Understanding was verbalized, however additional teaching warranted.     Patient left up in chair with all lines intact, call button in reach, and nurse notified    GOALS:   Multidisciplinary Problems       Physical Therapy Goals          Problem: Physical Therapy    Goal Priority Disciplines Outcome Goal Variances Interventions   Physical Therapy Goal     PT, PT/OT Ongoing, Progressing     Description: Pt will be seen for the following goals  1. Pt will be sba with bed mobility  2. P will be sba with all transfers with a rw  3. Pt will ambulate with a rw with sba and at a faster more normal pace for 150ft                       Time Tracking:     PT Received On: 03/08/24  PT Start Time: 1313     PT Stop Time: 1336  PT Total Time (min): 23 min     Billable Minutes: Gait Training 10 and Therapeutic Activity 13    Treatment Type: Treatment  PT/PTA: PTA     Number of PTA visits since last PT visit: 2     03/08/2024

## 2024-03-09 NOTE — PROGRESS NOTES
[facility] Hospital Medicine Progress Note        Chief Complaint: Inpatient Follow-up for  c/o BLE weakness    HPI:   Mr. Nelson is a 68 year old male with a pmh of CAD s/p stent, HTN, ESRD on HD (MWF), BPH, chronic urinary retention, GERD, chronic anemia, chronic pain, anxiety who presented to the ED with c/o BLE weakness.  He states that he has been unable to move his legs or walk since being admitted to [facility] on 2/18.  Per chart review, he had arrived in the ED with c/o SOB, rapidly decompensated, was intubated. He was able to be extubated the next day.  On 2/22, he was diagnosed with NSTEMI and had a stent placed on 2/23. He was discharged home yesterday. He was offered SNF placement but refused.     ED vitals on arrival:  Temp 98° point F, pulse 78, resp 16, /82, SpO2 98% on RA.  Today's ED lab work showed H&H 10/31.6, CO2 20, BUN/CR 33.5/6.94, troponin 0.376.  UA showed 2+ protein, 2+ blood, 500 leukocyte esterase,>100 RBC, >100 WBC, Moderate bacteria.  CXR showed no acute cardiopulmonary process.  CT head without contrast showed no convincing acute intracranial abnormality.  CT thoracic spine without contrast showed no acute fracture identified.  Multilevel degenerative changes of the thoracic spine.  CT lumbar spine without contrast showed no acute fracture identified multilevel degenerative changes.  He was started on ceftriaxone and admitted to Hospital Medicine for management.     MRI Thoracic spine showed no definite convincing spinal cord abnormalities.  Mild multilevel spondylosis without significant canal stenosis or cord compression.  MRI of the lumbar spine showed mild degenerative narrowing at the spinal canal at L4-L5 with multilevel neuroforaminal stenosis.  No significant abnormality otherwise seen.    Interval Hx:   No acute overnight events. Awaiting placement.Completed antibiotics    Patient was seen and examined at bedside, requests adjusting pain  medications, without any fevers or chills    Chart was reviewed, afebrile ,stable, most recent blood work reviewed.  Hemoglobin 8.8      Objective/physical exam:  General: In no acute distress, afebrile  Chest: Clear to auscultation bilaterally  Heart:  +S1, S2, no appreciable murmur  Abdomen: Soft, nontender, BS +  MSK: Warm, no lower extremity edema, no clubbing or cyanosis  Neurologic: Alert and oriented x4, Cranial nerve II-XII intact, 5/5 in right lower extremity however 2/5 in left lower extremity sensation also decrease in left lower extremity.    VITAL SIGNS: 24 HRS MIN & MAX LAST   Temp  Min: 98.4 °F (36.9 °C)  Max: 99.7 °F (37.6 °C) 99.3 °F (37.4 °C)   BP  Min: 99/60  Max: 151/68 127/72   Pulse  Min: 61  Max: 70  69   Resp  Min: 16  Max: 17 16   SpO2  Min: 96 %  Max: 98 % 96 %     I have reviewed the following labs:  Recent Labs   Lab 03/04/24  0610 03/06/24  0445 03/08/24  0531   WBC 4.25* 4.86 4.11*   RBC 2.61* 2.74* 2.69*   HGB 8.6* 8.7* 8.8*   HCT 26.4* 28.1* 27.2*   .1* 102.6* 101.1*   MCH 33.0* 31.8* 32.7*   MCHC 32.6* 31.0* 32.4*   RDW 17.9* 17.9* 17.7*    178 161   MPV 10.5* 10.1 10.6*     Recent Labs   Lab 03/06/24 0445 03/08/24  0531 03/09/24  0519    136 139   K 3.9 3.7 3.8   CO2 26 24 27   BUN 43.1* 42.8* 26.3*   CREATININE 8.49* 7.81* 5.66*   CALCIUM 7.6* 7.9* 7.7*   ALBUMIN 2.5*  --   --    ALKPHOS 45  --   --    ALT 8  --   --    AST 12  --   --    BILITOT 0.7  --   --      Microbiology Results (last 7 days)       Procedure Component Value Units Date/Time    Urine culture [[id number]]  (Abnormal)  (Susceptibility) Collected: 02/29/24 1840    Order Status: Completed Specimen: Urine Updated: 03/03/24 0624     Urine Culture >/= 100,000 colonies/ml Escherichia coli ESBL             See below for Radiology    Scheduled Med:   amLODIPine  10 mg Oral Daily    ascorbic acid (vitamin C)  500 mg Oral BID    aspirin  81 mg Oral Daily    atorvastatin  40 mg Oral QHS    carvediloL   12.5 mg Oral BID    clopidogreL  75 mg Oral Daily    epoetin gary (PROCRIT) injection  20,000 Units Subcutaneous Every Mon, Wed, Fri    ergocalciferol  50,000 Units Oral Q7 Days    ferrous sulfate  1 tablet Oral BID    fluticasone propionate  1 spray Each Nostril Daily    folic acid  1 mg Oral Daily    hydrALAZINE  25 mg Oral TID    megestroL  80 mg Oral BID    miconazole NITRATE 2 %   Topical (Top) BID    pantoprazole  40 mg Oral Daily    sodium bicarbonate  1,300 mg Oral BID    sodium chloride 0.9%  10 mL Intravenous Q6H    tamsulosin  0.4 mg Oral Daily      Continuous Infusions:     PRN Meds:  acetaminophen, aluminum-magnesium hydroxide-simethicone, HYDROcodone-acetaminophen, melatonin, naloxone, ondansetron, polyethylene glycol, prochlorperazine, simethicone, Flushing PICC/Midline Protocol **AND** sodium chloride 0.9% **AND** sodium chloride 0.9%     Assessment/Plan:  Fall, secondary to bilateral lower extremity weakness  Elevated troponin, likely still downtrending from NSTEMI   Persistent hematuria, resolved  Recent CAD status post recent stents   UTI  ESRD on HD  BPH   Chronic urinary retention with Cheek catheter   Chronic anemia,Folic acid deficiency  Vitamin-D deficiency   Chronic back pain          Plan:  Imaging largely unremarkable, MRI of the thoracic spine shows mild multilevel spondylosis without significant canal stenosis or cord compression .MRI of the lumbar spine shows mild degenerative narrowing of the spinal canal at L4 and L5 and multilevel neural foraminal stenosis .Consulted PT and OT, recommend high intensity therapy, Analgesics PRN, if worsening, f/u Neurosurgery  Cheek catheter currently clear and no hematuria present, was recently replaced by Urology on 02/18 previous hospitalization  Due to recent stents patient is high-risk for InStent thrombosis if antiplatelets stopped, continue antiplatelet at this time  Completed IV antibiotics  Nephrology consulted for assistance with dialysis    Monitor H and H, added folic acid  Supplementing Vit D  Patient will likely need rehab or SNF placement, awaiting placement  Continue supportive care, monitor blood pressure , controlled with current regimen      VTE prophylaxis:  SCDs secondary to hematuria      Anticipated discharge and Disposition:  Placement pending, outpatient neurosurgery      All diagnosis and differential diagnosis have been reviewed; assessment and plan has been documented; I have personally reviewed the labs and test results that are presently available; I have reviewed the patients medication list; I have reviewed the consulting providers response and recommendations. I have reviewed or attempted to review medical records based upon their availability    All of the patient's questions have been  addressed and answered. Patient's is agreeable to the above stated plan. I will continue to monitor closely and make adjustments to medical management as needed.  _____________________________________________________________________    Nutrition Status:    Radiology:  I have personally reviewed the following imaging and agree with the radiologist.     MRI Thoracic Spine Without Contrast  Narrative: EXAMINATION:  MRI THORACIC SPINE WITHOUT CONTRAST    CLINICAL HISTORY:  Lower extremity weakness;    TECHNIQUE:  Multiplanar multisequence MR images of the thoracic spine are obtained without contrast.    COMPARISON:  CT thoracic spine dated 02/29/2024    FINDINGS:  Evaluation is limited by motion artifact.  Thoracic alignment is preserved.  The vertebral body heights are maintained.  There are mild multilevel degenerate endplate changes.    There is no definite convincing cord signal abnormality.  There is no evidence of an epidural fluid collection.    There are multilevel degenerative changes with disc bulges and facet hypertrophy.  There is no evidence of significant canal stenosis or cord compression.  The neural foramina appear patent.    The  posterior paraspinal soft tissues are unremarkable.  Small bilateral pleural effusions are noted.  Impression: 1. Evaluation limited by motion artifact.  No definite convincing cord signal abnormality.  2. Mild multilevel spondylosis without significant canal stenosis or cord compression.  No significant change from the [facility] interpretation.    Electronically signed by: Pretty Chao  Date:    03/01/2024  Time:    09:11  MRI Lumbar Spine Without Contrast  Narrative: EXAMINATION:  MRI LUMBAR SPINE WITHOUT CONTRAST    CLINICAL HISTORY:  Lower extremity weakness;    TECHNIQUE:  Multiplanar multisequence MR images of the lumbar spine are obtained without contrast.    COMPARISON:  CT lumbar spine dated 02/29/2024    FINDINGS:  Evaluation is limited by motion artifact.  There are 5 non-rib-bearing lumbar type vertebra bodies.  Alignment is normal.  The vertebral body heights are maintained.  There are degenerative endplate changes at L4-5.    The conus terminates at the level of L2.  It is normal in signal and contour.    Disc spaces, spinal canal and neural foramina are as follows:    L1-L2: Disc bulge with mild narrowing of the spinal canal.  Mild bilateral neural foraminal stenosis.    L2-L3: No disc herniation.  No significant spinal canal stenosis.  Mild bilateral neural foraminal stenosis.    L3-L4: No disc herniation.  Bilateral facet hypertrophy.  No significant spinal canal stenosis.  Mild bilateral foraminal stenosis.    L4-L5: Disc bulge and facet hypertrophy with mild narrowing of the spinal canal.  Moderate right and mild left neural foraminal stenosis.    L5-S1: No disc herniation.  Bilateral facet hypertrophy.  No significant spinal canal stenosis.  Moderate right and mild left neural foraminal stenosis.    No significant abnormality within the visualized paraspinous musculature.  Hydronephrosis is again noted.  Impression: 1. Mild degenerative narrowing the spinal canal at L4-5.  2. Multilevel  neural foraminal stenoses as described.  No major change from the [facility] interpretation    Electronically signed by: Pretty Chao  Date:    03/01/2024  Time:    09:07    Rosio Seo MD  Department of [facility]  [facility]  03/09/2024

## 2024-03-10 NOTE — PROGRESS NOTES
[facility] Hospital Medicine Progress Note        Chief Complaint: Inpatient Follow-up for  c/o BLE weakness    HPI:   Mr. Nelson is a 68 year old male with a pmh of CAD s/p stent, HTN, ESRD on HD (MWF), BPH, chronic urinary retention, GERD, chronic anemia, chronic pain, anxiety who presented to the ED with c/o BLE weakness.  He states that he has been unable to move his legs or walk since being admitted to [facility] on 2/18.  Per chart review, he had arrived in the ED with c/o SOB, rapidly decompensated, was intubated. He was able to be extubated the next day.  On 2/22, he was diagnosed with NSTEMI and had a stent placed on 2/23. He was discharged home yesterday. He was offered SNF placement but refused.     ED vitals on arrival:  Temp 98° point F, pulse 78, resp 16, /82, SpO2 98% on RA.  Today's ED lab work showed H&H 10/31.6, CO2 20, BUN/CR 33.5/6.94, troponin 0.376.  UA showed 2+ protein, 2+ blood, 500 leukocyte esterase,>100 RBC, >100 WBC, Moderate bacteria.  CXR showed no acute cardiopulmonary process.  CT head without contrast showed no convincing acute intracranial abnormality.  CT thoracic spine without contrast showed no acute fracture identified.  Multilevel degenerative changes of the thoracic spine.  CT lumbar spine without contrast showed no acute fracture identified multilevel degenerative changes.  He was started on ceftriaxone and admitted to Hospital Medicine for management.     MRI Thoracic spine showed no definite convincing spinal cord abnormalities.  Mild multilevel spondylosis without significant canal stenosis or cord compression.  MRI of the lumbar spine showed mild degenerative narrowing at the spinal canal at L4-L5 with multilevel neuroforaminal stenosis.  No significant abnormality otherwise seen.    Interval Hx:   No acute overnight events. Awaiting placement.Completed antibiotics    Patient was seen and examined at bedside, without any fevers or chills, no  major concerns, works with therapy    Chart was reviewed, afebrile ,stable, elevations in blood pressure noted likely from underlying pain, most recent blood work reviewed.  No leukocytosis ;Hemoglobin 9.2    Objective/physical exam:  General: In no acute distress, afebrile  Chest: Clear to auscultation bilaterally  Heart:  +S1, S2, no appreciable murmur  Abdomen: Soft, nontender, BS +  MSK: Warm, no lower extremity edema, no clubbing or cyanosis  Neurologic: Alert and oriented x4, Cranial nerve II-XII intact, 5/5 in right lower extremity however 2/5 in left lower extremity sensation also decrease in left lower extremity.    VITAL SIGNS: 24 HRS MIN & MAX LAST   Temp  Min: 97.6 °F (36.4 °C)  Max: 99 °F (37.2 °C) 98.5 °F (36.9 °C)   BP  Min: 113/71  Max: 159/89 (!) 159/89   Pulse  Min: 61  Max: 73  63   Resp  Min: 16  Max: 18 16   SpO2  Min: 96 %  Max: 99 % 99 %     I have reviewed the following labs:  Recent Labs   Lab 03/06/24 0445 03/08/24  0531 03/10/24  0437   WBC 4.86 4.11* 5.24   RBC 2.74* 2.69* 2.75*   HGB 8.7* 8.8* 9.2*   HCT 28.1* 27.2* 28.5*   .6* 101.1* 103.6*   MCH 31.8* 32.7* 33.5*   MCHC 31.0* 32.4* 32.3*   RDW 17.9* 17.7* 18.2*    161 163   MPV 10.1 10.6* 11.0*     Recent Labs   Lab 03/06/24 0445 03/08/24  0531 03/09/24  0519 03/10/24  0437    136 139 138   K 3.9 3.7 3.8 4.0   CO2 26 24 27 26   BUN 43.1* 42.8* 26.3* 37.5*   CREATININE 8.49* 7.81* 5.66* 7.42*   CALCIUM 7.6* 7.9* 7.7* 7.6*   ALBUMIN 2.5*  --   --  2.4*   ALKPHOS 45  --   --  43   ALT 8  --   --  17   AST 12  --   --  17   BILITOT 0.7  --   --  0.6     Microbiology Results (last 7 days)       ** No results found for the last 168 hours. **             See below for Radiology    Scheduled Med:   amLODIPine  10 mg Oral Daily    ascorbic acid (vitamin C)  500 mg Oral BID    aspirin  81 mg Oral Daily    atorvastatin  40 mg Oral QHS    carvediloL  12.5 mg Oral BID    clopidogreL  75 mg Oral Daily    epoetin gary (PROCRIT)  injection  20,000 Units Subcutaneous Every Mon, Wed, Fri    ergocalciferol  50,000 Units Oral Q7 Days    ferrous sulfate  1 tablet Oral BID    fluticasone propionate  1 spray Each Nostril Daily    folic acid  1 mg Oral Daily    hydrALAZINE  25 mg Oral TID    megestroL  80 mg Oral BID    miconazole NITRATE 2 %   Topical (Top) BID    pantoprazole  40 mg Oral Daily    sodium bicarbonate  1,300 mg Oral BID    sodium chloride 0.9%  10 mL Intravenous Q6H    tamsulosin  0.4 mg Oral Daily      Continuous Infusions:     PRN Meds:  acetaminophen, aluminum-magnesium hydroxide-simethicone, melatonin, naloxone, ondansetron, oxyCODONE-acetaminophen, polyethylene glycol, prochlorperazine, senna-docusate 8.6-50 mg, simethicone, Flushing PICC/Midline Protocol **AND** sodium chloride 0.9% **AND** sodium chloride 0.9%     Assessment/Plan:  Fall, secondary to bilateral lower extremity weakness  Elevated troponin, likely still downtrending from NSTEMI   Persistent hematuria, resolved  Recent CAD status post recent stents   UTI  ESRD on HD  BPH   Chronic urinary retention with Cheek catheter   Chronic anemia,Folic acid deficiency  Vitamin-D deficiency   Chronic back pain          Plan:  Imaging largely unremarkable, MRI of the thoracic spine shows mild multilevel spondylosis without significant canal stenosis or cord compression .MRI of the lumbar spine shows mild degenerative narrowing of the spinal canal at L4 and L5 and multilevel neural foraminal stenosis .Consulted PT and OT, recommend high intensity therapy, Analgesics PRN,muscle relaxants, if worsening exam or neuro deficits, f/u Neurosurgery  Cheek catheter currently clear and no hematuria present, was recently replaced by Urology on 02/18 previous hospitalization  Due to recent stents patient is high-risk for InStent thrombosis if antiplatelets stopped, continue antiplatelet at this time  Completed IV antibiotics  Nephrology consulted for assistance with dialysis   Monitor H and  H, added folic acid  Supplementing Vit D  Patient will likely need rehab or SNF placement, awaiting placement  Continue supportive care and appropriate home meds, monitor blood pressure , controlled with current regimen, adjust as needed      VTE prophylaxis:  SCDs secondary to hematuria      Anticipated discharge and Disposition:  Placement pending, outpatient neurosurgery      All diagnosis and differential diagnosis have been reviewed; assessment and plan has been documented; I have personally reviewed the labs and test results that are presently available; I have reviewed the patients medication list; I have reviewed the consulting providers response and recommendations. I have reviewed or attempted to review medical records based upon their availability    All of the patient's questions have been  addressed and answered. Patient's is agreeable to the above stated plan. I will continue to monitor closely and make adjustments to medical management as needed.  _____________________________________________________________________    Nutrition Status:    Radiology:  I have personally reviewed the following imaging and agree with the radiologist.     MRI Thoracic Spine Without Contrast  Narrative: EXAMINATION:  MRI THORACIC SPINE WITHOUT CONTRAST    CLINICAL HISTORY:  Lower extremity weakness;    TECHNIQUE:  Multiplanar multisequence MR images of the thoracic spine are obtained without contrast.    COMPARISON:  CT thoracic spine dated 02/29/2024    FINDINGS:  Evaluation is limited by motion artifact.  Thoracic alignment is preserved.  The vertebral body heights are maintained.  There are mild multilevel degenerate endplate changes.    There is no definite convincing cord signal abnormality.  There is no evidence of an epidural fluid collection.    There are multilevel degenerative changes with disc bulges and facet hypertrophy.  There is no evidence of significant canal stenosis or cord compression.  The neural  foramina appear patent.    The posterior paraspinal soft tissues are unremarkable.  Small bilateral pleural effusions are noted.  Impression: 1. Evaluation limited by motion artifact.  No definite convincing cord signal abnormality.  2. Mild multilevel spondylosis without significant canal stenosis or cord compression.  No significant change from the [facility] interpretation.    Electronically signed by: Pretty Chao  Date:    03/01/2024  Time:    09:11  MRI Lumbar Spine Without Contrast  Narrative: EXAMINATION:  MRI LUMBAR SPINE WITHOUT CONTRAST    CLINICAL HISTORY:  Lower extremity weakness;    TECHNIQUE:  Multiplanar multisequence MR images of the lumbar spine are obtained without contrast.    COMPARISON:  CT lumbar spine dated 02/29/2024    FINDINGS:  Evaluation is limited by motion artifact.  There are 5 non-rib-bearing lumbar type vertebra bodies.  Alignment is normal.  The vertebral body heights are maintained.  There are degenerative endplate changes at L4-5.    The conus terminates at the level of L2.  It is normal in signal and contour.    Disc spaces, spinal canal and neural foramina are as follows:    L1-L2: Disc bulge with mild narrowing of the spinal canal.  Mild bilateral neural foraminal stenosis.    L2-L3: No disc herniation.  No significant spinal canal stenosis.  Mild bilateral neural foraminal stenosis.    L3-L4: No disc herniation.  Bilateral facet hypertrophy.  No significant spinal canal stenosis.  Mild bilateral foraminal stenosis.    L4-L5: Disc bulge and facet hypertrophy with mild narrowing of the spinal canal.  Moderate right and mild left neural foraminal stenosis.    L5-S1: No disc herniation.  Bilateral facet hypertrophy.  No significant spinal canal stenosis.  Moderate right and mild left neural foraminal stenosis.    No significant abnormality within the visualized paraspinous musculature.  Hydronephrosis is again noted.  Impression: 1. Mild degenerative narrowing the spinal  canal at L4-5.  2. Multilevel neural foraminal stenoses as described.  No major change from the [facility] interpretation    Electronically signed by: Pretty Chao  Date:    03/01/2024  Time:    09:07    Rosio Seo MD  Department of [facility]  [facility]  03/10/2024

## 2024-03-10 NOTE — PLAN OF CARE
Problem: Adult Inpatient Plan of Care  Goal: Plan of Care Review  3/9/2024 2247 by Beronica Galo RN  Outcome: Ongoing, Progressing  3/9/2024 2018 by Beronica Galo RN  Outcome: Ongoing, Progressing  3/9/2024 2017 by Beronica Galo RN  Outcome: Ongoing, Progressing  Goal: Patient-Specific Goal (Individualized)  3/9/2024 2247 by Beronica Galo RN  Outcome: Ongoing, Progressing  3/9/2024 2018 by Beronica Galo RN  Outcome: Ongoing, Progressing  3/9/2024 2017 by Beronica Galo RN  Outcome: Ongoing, Progressing  Goal: Absence of Hospital-Acquired Illness or Injury  3/9/2024 2247 by Beronica Galo RN  Outcome: Ongoing, Progressing  3/9/2024 2018 by Beronica Galo RN  Outcome: Ongoing, Progressing  3/9/2024 2017 by Beronica Galo RN  Outcome: Ongoing, Progressing  Goal: Optimal Comfort and Wellbeing  3/9/2024 2247 by Beronica Galo RN  Outcome: Ongoing, Progressing  3/9/2024 2018 by Beronica Galo RN  Outcome: Ongoing, Progressing  3/9/2024 2017 by Beronica Galo RN  Outcome: Ongoing, Progressing  Goal: Readiness for Transition of Care  3/9/2024 2247 by Beronica Galo RN  Outcome: Ongoing, Progressing  3/9/2024 2018 by Beronica Galo RN  Outcome: Ongoing, Progressing  3/9/2024 2017 by Beronica Galo RN  Outcome: Ongoing, Progressing     Problem: Skin Injury Risk Increased  Goal: Skin Health and Integrity  3/9/2024 2247 by Beronica Galo RN  Outcome: Ongoing, Progressing  3/9/2024 2018 by Beronica Galo RN  Outcome: Ongoing, Progressing  3/9/2024 2017 by Beronica Galo RN  Outcome: Ongoing, Progressing     Problem: Fall Injury Risk  Goal: Absence of Fall and Fall-Related Injury  3/9/2024 2247 by Beronica Galo RN  Outcome: Ongoing, Progressing  3/9/2024 2018 by Beronica Galo RN  Outcome: Ongoing, Progressing  3/9/2024 2017 by Beronica Galo, RN  Outcome: Ongoing, Progressing     Problem: Device-Related Complication Risk (Hemodialysis)  Goal: Safe, Effective Therapy  Delivery  3/9/2024 2247 by Beronica Galo RN  Outcome: Ongoing, Progressing  3/9/2024 2018 by Beronica Galo RN  Outcome: Ongoing, Progressing  3/9/2024 2017 by Beronica Galo RN  Outcome: Ongoing, Progressing     Problem: Hemodynamic Instability (Hemodialysis)  Goal: Effective Tissue Perfusion  3/9/2024 2247 by Beronica Galo RN  Outcome: Ongoing, Progressing  3/9/2024 2018 by Beronica Galo RN  Outcome: Ongoing, Progressing  3/9/2024 2017 by Beronica Galo RN  Outcome: Ongoing, Progressing     Problem: Infection (Hemodialysis)  Goal: Absence of Infection Signs and Symptoms  3/9/2024 2247 by Beronica Galo RN  Outcome: Ongoing, Progressing  3/9/2024 2018 by Beronica Galo RN  Outcome: Ongoing, Progressing  3/9/2024 2017 by Beronica Galo RN  Outcome: Ongoing, Progressing     Problem: Infection  Goal: Absence of Infection Signs and Symptoms  3/9/2024 2247 by Beronica Galo RN  Outcome: Ongoing, Progressing  3/9/2024 2018 by Beronica Galo RN  Outcome: Ongoing, Progressing  3/9/2024 2017 by Beronica Galo RN  Outcome: Ongoing, Progressing

## 2024-03-10 NOTE — PLAN OF CARE
Problem: Adult Inpatient Plan of Care  Goal: Plan of Care Review  Outcome: Ongoing, Progressing  Goal: Patient-Specific Goal (Individualized)  Outcome: Ongoing, Progressing  Goal: Absence of Hospital-Acquired Illness or Injury  Outcome: Ongoing, Progressing  Goal: Optimal Comfort and Wellbeing  Outcome: Ongoing, Progressing  Goal: Readiness for Transition of Care  Outcome: Ongoing, Progressing     Problem: Impaired Wound Healing  Goal: Optimal Wound Healing  Outcome: Ongoing, Progressing     Problem: Skin Injury Risk Increased  Goal: Skin Health and Integrity  Outcome: Ongoing, Progressing     Problem: Fall Injury Risk  Goal: Absence of Fall and Fall-Related Injury  Outcome: Ongoing, Progressing     Problem: Infection  Goal: Absence of Infection Signs and Symptoms  Outcome: Ongoing, Progressing     Problem: Infection (Hemodialysis)  Goal: Absence of Infection Signs and Symptoms  Outcome: Ongoing, Progressing     Problem: Hemodynamic Instability (Hemodialysis)  Goal: Effective Tissue Perfusion  Outcome: Ongoing, Progressing

## 2024-03-10 NOTE — PLAN OF CARE
Problem: Adult Inpatient Plan of Care  Goal: Plan of Care Review  3/10/2024 0754 by Mariah Lewis RN  Outcome: Ongoing, Progressing  3/10/2024 0754 by Mariah Lewis RN  Outcome: Ongoing, Progressing  Goal: Patient-Specific Goal (Individualized)  3/10/2024 0754 by Mariah Lewis RN  Outcome: Ongoing, Progressing  3/10/2024 0754 by Mariah Lewis RN  Outcome: Ongoing, Progressing  Goal: Absence of Hospital-Acquired Illness or Injury  3/10/2024 0754 by Mariah Lewis RN  Outcome: Ongoing, Progressing  3/10/2024 0754 by Mariah Lewis RN  Outcome: Ongoing, Progressing  Goal: Optimal Comfort and Wellbeing  3/10/2024 0754 by Mariah Lewis RN  Outcome: Ongoing, Progressing  3/10/2024 0754 by Mariah Lewis RN  Outcome: Ongoing, Progressing  Goal: Readiness for Transition of Care  3/10/2024 0754 by Mariah Lewis RN  Outcome: Ongoing, Progressing  3/10/2024 0754 by Mariah Lewis RN  Outcome: Ongoing, Progressing     Problem: Impaired Wound Healing  Goal: Optimal Wound Healing  3/10/2024 0754 by Mariah Lewis RN  Outcome: Ongoing, Progressing  3/10/2024 0754 by Mariah Lewis RN  Outcome: Ongoing, Progressing     Problem: Skin Injury Risk Increased  Goal: Skin Health and Integrity  3/10/2024 0754 by Mariah Lewis RN  Outcome: Ongoing, Progressing  3/10/2024 0754 by Mariah Lewis RN  Outcome: Ongoing, Progressing     Problem: Fall Injury Risk  Goal: Absence of Fall and Fall-Related Injury  3/10/2024 0754 by Mariah Lewis RN  Outcome: Ongoing, Progressing  3/10/2024 0754 by Mariah Lewis RN  Outcome: Ongoing, Progressing     Problem: Device-Related Complication Risk (Hemodialysis)  Goal: Safe, Effective Therapy Delivery  3/10/2024 0754 by Mariah Lewis RN  Outcome: Ongoing, Progressing  3/10/2024 0754 by Mariah Lewis RN  Outcome: Ongoing, Progressing     Problem: Hemodynamic Instability (Hemodialysis)  Goal: Effective Tissue Perfusion  3/10/2024 0754 by Mariah Lewis RN  Outcome: Ongoing, Progressing  3/10/2024 0754 by Joshua  Mariah RN  Outcome: Ongoing, Progressing     Problem: Infection (Hemodialysis)  Goal: Absence of Infection Signs and Symptoms  Outcome: Ongoing, Progressing     Problem: Infection  Goal: Absence of Infection Signs and Symptoms  Outcome: Ongoing, Progressing

## 2024-03-11 NOTE — PROGRESS NOTES
[facility] - 9th Floor Med Surg  Nephrology  Progress Note    Patient Name: Mirza Nelson Jr.  MRN: [id number]  Admission Date: 2/29/2024  Hospital Length of Stay: 11 days  Attending Provider: Rosio Seo MD   Primary Care Physician: Elo Flores MD  Principal Problem:Leg weakness, bilateral      Subjective:   Mirza Nelson Jr. is a 68 y.o. male with PMH of anemia, HTN, NSTEMI, CAD s/p PCI, chronic indwelling nielsen, and ESRD on HD. He follows a MWF schedule at [facility] with Dr. Butler. He presented to the ED on 2/29/24 s/p fall with generalized weakness and having cardiac stenting 2 days prior. He was on the floor for an extended period of time s/p fall, with neighbors finding him yesterday morning. Reports he has been unable to walk since his admission at [facility] on 2/18 with respiratory distress requiring intubation and NSTEMI with stenting. He was discharged from [facility] on 2/28. Labs on admission significant for Troponin 0.376, BUN 33.5 and creatinine 6.94. UA positive for UTI, final culture pending. Renal is consulted for ESRD management.     Interval History:   Urine culture final ESBL E Coli. He is on Rocephin and Zosyn. CM and dialysis coordinator working together to get him accepted to rehab. Patient on dialysis now without complaints. VSS.     Review of patient's allergies indicates:   Allergen Reactions    Iodine      Other reaction(s): difficulty breathing, Facial Swelling    Iodine and iodide containing products Swelling    Shellfish containing products      Other reaction(s): Swelling     Current Facility-Administered Medications   Medication Frequency    acetaminophen tablet 650 mg Q6H PRN    aluminum-magnesium hydroxide-simethicone 200-200-20 mg/5 mL suspension 30 mL QID PRN    amLODIPine tablet 10 mg Daily    ascorbic acid (vitamin C) tablet 500 mg BID    aspirin chewable tablet 81 mg Daily    atorvastatin tablet 40 mg QHS    carvediloL tablet 12.5 mg BID    clopidogreL tablet  75 mg Daily    epoetin gary injection 20,000 Units Every Mon, Wed, Fri    ergocalciferol capsule 50,000 Units Q7 Days    ferrous sulfate tablet 1 each BID    fluticasone propionate 50 mcg/actuation nasal spray 50 mcg Daily    folic acid tablet 1 mg Daily    hydrALAZINE injection 10 mg Q6H PRN    hydrALAZINE tablet 50 mg Q12H    megestroL 400 mg/10 mL (10 mL) suspension 80 mg BID    melatonin tablet 6 mg Nightly PRN    methocarbamoL tablet 500 mg TID    miconazole NITRATE 2 % top powder BID    naloxone 0.4 mg/mL injection 0.02 mg PRN    ondansetron injection 4 mg Q4H PRN    oxyCODONE-acetaminophen  mg per tablet 1 tablet Q4H PRN    pantoprazole EC tablet 40 mg Daily    polyethylene glycol packet 17 g BID PRN    prochlorperazine injection Soln 5 mg Q6H PRN    senna-docusate 8.6-50 mg per tablet 1 tablet BID PRN    simethicone chewable tablet 80 mg QID PRN    sodium bicarbonate tablet 1,300 mg BID    sodium chloride 0.9% flush 10 mL Q6H    And    sodium chloride 0.9% flush 10 mL PRN    tamsulosin 24 hr capsule 0.4 mg Daily       Objective:     Vital Signs (Most Recent):  Temp: 98.6 °F (37 °C) (03/11/24 0700)  Pulse: 65 (03/11/24 0700)  Resp: 16 (03/11/24 0355)  BP: 118/66 (03/11/24 0700)  SpO2: 100 % (03/11/24 0700) Vital Signs (24h Range):  Temp:  [97.7 °F (36.5 °C)-98.6 °F (37 °C)] 98.6 °F (37 °C)  Pulse:  [59-70] 65  Resp:  [16-19] 16  SpO2:  [98 %-100 %] 100 %  BP: (118-156)/(61-82) 118/66     Weight: 65.6 kg (144 lb 10 oz) (03/11/24 0355)  Body mass index is 23.34 kg/m².  Body surface area is 1.75 meters squared.    I/O last 3 completed shifts:  In: 460 [P.O.:460]  Out: 1600 [Urine:1600]    Physical Exam  Constitutional:       General: He is not in acute distress.  HENT:      Head: Atraumatic.      Nose: Nose normal.      Mouth/Throat:      Mouth: Mucous membranes are moist.   Eyes:      Extraocular Movements: Extraocular movements intact.      Conjunctiva/sclera: Conjunctivae normal.   Cardiovascular:       "Rate and Rhythm: Regular rhythm.      Pulses: Normal pulses.   Pulmonary:      Breath sounds: Normal breath sounds.   Abdominal:      General: Bowel sounds are normal.      Palpations: Abdomen is soft.   Genitourinary:     Comments: Urinary catheter with yellow urine   Musculoskeletal:         General: No swelling.      Cervical back: Neck supple.      Comments: WONG AVF   Skin:     General: Skin is warm.   Neurological:      Mental Status: He is alert and oriented to person, place, and time.   Psychiatric:         Mood and Affect: Mood normal.         Behavior: Behavior normal.         Significant Labs:sureBMP:   Recent Labs   Lab 03/11/24  0506      K 4.8   CO2 18*   BUN 52.6*   CREATININE 9.03*   CALCIUM 8.5*       CBC:   Recent Labs   Lab 03/11/24  0506   WBC 7.21   RBC 2.91*   HGB 9.8*   HCT 29.9*      .7*   MCH 33.7*   MCHC 32.8*       Microbiology Results (last 7 days)       ** No results found for the last 168 hours. **          Specimen (24h ago, onward)      None          No results for input(s): "COLORU", "CLARITYU", "SPECGRAV", "PHUR", "PROTEINUA", "GLUCOSEU", "BILIRUBINCON", "BLOODU", "WBCU", "RBCU", "BACTERIA", "MUCUS", "NITRITE", "LEUKOCYTESUR", "UROBILINOGEN", "HYALINECASTS" in the last 168 hours.      Significant Imaging:  No new imaging     Assessment/Plan:   ESRD on HD-- continue MWF schedule  Fall with generalized weakness  Chronic indwelling urinary catheter changed every 3 weeks - Follows with Dr Navarrete   UTI-- ESBL E Coli on Rocephin and Zosyn  Hypertension  Recent NSTEMI s/p stenting 2/23 with Dr Garcia at [facility]  Anemia of chronic kidney disease  Vitamin-D deficiency     Plan:  Continue dialysis on MWF schedule with [facility]   Awaiting acceptance to [facility] for rehab     KARLOS Preston  Nephrology  [facility] - 9th Floor Med Surg  "

## 2024-03-11 NOTE — PLAN OF CARE
03/11/24 1446   Final Note   Assessment Type Final Discharge Note   Anticipated Discharge Disposition SNF   Post-Acute Status   Post-Acute Authorization Placement   Post-Acute Placement Status Set-up Complete/Auth obtained  ([facility])     [facility] to provide transport.

## 2024-03-11 NOTE — NURSING
03/11/24 1211   Post-Hemodialysis Assessment   Blood Volume Processed (Liters) 62.1 L   Dialyzer Clearance Lightly streaked   Duration of Treatment 180 minutes   Total UF (mL) 126 mL   Patient Response to Treatment tolerated well

## 2024-03-17 NOTE — DISCHARGE SUMMARY
[facility] Hospital Medicine Discharge Summary    Admit Date: 2/29/2024  Discharge Date and Time: 3/11/24  Admitting Physician:  Team  Discharging Physician: Rosio Seo MD.  Primary Care Physician: Elo Flores MD  Consults: Nephrology    Discharge Diagnoses:  Fall, secondary to bilateral lower extremity weakness  Elevated troponinlikely still downtrending from NSTEMI  Persistent hematuria, resolved  Recent CAD status post recent stents   UTI  ESRD on HD  BPH   Chronic urinary retention with Cheek catheter   Chronic anemia,Folic acid deficiency  Vitamin-D deficiency   Chronic back pain    Hospital Course:   Mr. Nelson is a 68 year old male with a pmh of CAD s/p stent, HTN, ESRD on HD (MWF), BPH, chronic urinary retention, GERD, chronic anemia, chronic pain, anxiety who presented to the ED with c/o BLE weakness.  He states that he has been unable to move his legs or walk since being admitted to [facility] on 2/18.  Per chart review, he had arrived in the ED with c/o SOB, rapidly decompensated, was intubated. He was able to be extubated the next day.  On 2/22, he was diagnosed with NSTEMI and had a stent placed on 2/23. He was discharged home . He was offered SNF placement but refused.     ED vitals on arrival:  Temp 98° point F, pulse 78, resp 16, /82, SpO2 98% on RA.  Today's ED lab work showed H&H 10/31.6, CO2 20, BUN/CR 33.5/6.94, troponin 0.376.  UA showed 2+ protein, 2+ blood, 500 leukocyte esterase,>100 RBC, >100 WBC, Moderate bacteria.  CXR showed no acute cardiopulmonary process.  CT head without contrast showed no convincing acute intracranial abnormality.  CT thoracic spine without contrast showed no acute fracture identified.  Multilevel degenerative changes of the thoracic spine.  CT lumbar spine without contrast showed no acute fracture identified multilevel degenerative changes.  He was started on ceftriaxone and admitted to Hospital Medicine for  management.     MRI Thoracic spine showed no definite convincing spinal cord abnormalities.  Mild multilevel spondylosis without significant canal stenosis or cord compression.  MRI of the lumbar spine showed mild degenerative narrowing at the spinal canal at L4-L5 with multilevel neuroforaminal stenosis.  No significant abnormality otherwise seen. .Consulted PT and OT, recommend high intensity therapy. Recommended NSGY outpatient if symptoms worsen. Pt remained without focal deficits      Eventually placement secured.Pt was seen and examined on the day of discharge. Patient was stable on discharge,all questions were answered and emphasized the importance of follow ups.Urology to follow up outpatient     Vitals:  VITAL SIGNS: 24 HRS MIN & MAX LAST   No data recorded 99 °F (37.2 °C)   No data recorded (!) 132/54   No data recorded  67   No data recorded 16   No data recorded 100 %       Physical Exam:  General: In no acute distress, afebrile  Chest: Clear to auscultation bilaterally  Heart:  +S1, S2, no appreciable murmur  Abdomen: Soft, nontender, BS +  MSK: Warm, no lower extremity edema, no clubbing or cyanosis  Neurologic: Alert and oriented x4, able to move all extremities    Procedures Performed:see full chart    Significant Diagnostic Studies: See Full reports for all details    Recent Labs   Lab 03/11/24  0506 03/13/24  0405   WBC 7.21 6.12   RBC 2.91* 2.86*   HGB 9.8* 9.4*   HCT 29.9* 29.9*   .7* 104.5*   MCH 33.7* 32.9*   MCHC 32.8* 31.4*   RDW 18.2* 18.8*    225   MPV 10.5* 10.4       Recent Labs   Lab 03/11/24  0506 03/13/24  0405    139   K 4.8 4.5   CO2 18* 25   BUN 52.6* 46.0*   CREATININE 9.03* 8.02*   CALCIUM 8.5* 8.2*   ALBUMIN 2.7* 2.9*   ALKPHOS 56 55   ALT 20 19   AST 26 15   BILITOT 0.6 0.7        Microbiology Results (last 7 days)       ** No results found for the last 168 hours. **             MRI Thoracic Spine Without Contrast  Narrative: EXAMINATION:  MRI THORACIC SPINE  WITHOUT CONTRAST    CLINICAL HISTORY:  Lower extremity weakness;    TECHNIQUE:  Multiplanar multisequence MR images of the thoracic spine are obtained without contrast.    COMPARISON:  CT thoracic spine dated 02/29/2024    FINDINGS:  Evaluation is limited by motion artifact.  Thoracic alignment is preserved.  The vertebral body heights are maintained.  There are mild multilevel degenerate endplate changes.    There is no definite convincing cord signal abnormality.  There is no evidence of an epidural fluid collection.    There are multilevel degenerative changes with disc bulges and facet hypertrophy.  There is no evidence of significant canal stenosis or cord compression.  The neural foramina appear patent.    The posterior paraspinal soft tissues are unremarkable.  Small bilateral pleural effusions are noted.  Impression: 1. Evaluation limited by motion artifact.  No definite convincing cord signal abnormality.  2. Mild multilevel spondylosis without significant canal stenosis or cord compression.  No significant change from the [facility] interpretation.    Electronically signed by: Pretty Chao  Date:    03/01/2024  Time:    09:11  MRI Lumbar Spine Without Contrast  Narrative: EXAMINATION:  MRI LUMBAR SPINE WITHOUT CONTRAST    CLINICAL HISTORY:  Lower extremity weakness;    TECHNIQUE:  Multiplanar multisequence MR images of the lumbar spine are obtained without contrast.    COMPARISON:  CT lumbar spine dated 02/29/2024    FINDINGS:  Evaluation is limited by motion artifact.  There are 5 non-rib-bearing lumbar type vertebra bodies.  Alignment is normal.  The vertebral body heights are maintained.  There are degenerative endplate changes at L4-5.    The conus terminates at the level of L2.  It is normal in signal and contour.    Disc spaces, spinal canal and neural foramina are as follows:    L1-L2: Disc bulge with mild narrowing of the spinal canal.  Mild bilateral neural foraminal stenosis.    L2-L3: No disc  herniation.  No significant spinal canal stenosis.  Mild bilateral neural foraminal stenosis.    L3-L4: No disc herniation.  Bilateral facet hypertrophy.  No significant spinal canal stenosis.  Mild bilateral foraminal stenosis.    L4-L5: Disc bulge and facet hypertrophy with mild narrowing of the spinal canal.  Moderate right and mild left neural foraminal stenosis.    L5-S1: No disc herniation.  Bilateral facet hypertrophy.  No significant spinal canal stenosis.  Moderate right and mild left neural foraminal stenosis.    No significant abnormality within the visualized paraspinous musculature.  Hydronephrosis is again noted.  Impression: 1. Mild degenerative narrowing the spinal canal at L4-5.  2. Multilevel neural foraminal stenoses as described.  No major change from the [facility] interpretation    Electronically signed by: Pretty Chao  Date:    03/01/2024  Time:    09:07         Medication List        START taking these medications      acetaminophen 325 MG tablet  Commonly known as: TYLENOL  Take 2 tablets (650 mg total) by mouth every 6 (six) hours as needed for Pain or Temperature greater than (100.4).     aluminum-magnesium hydroxide-simethicone 200-200-20 mg/5 mL Susp  Commonly known as: MAALOX  Take 30 mLs by mouth 4 (four) times daily as needed (heartburn).     epoetin gary 20,000 unit/mL injection  Commonly known as: PROCRIT  Inject 1 mL (20,000 Units total) into the skin every Mon, Wed, Fri. Dialysis with [facility],prescription  per Nephrology     ergocalciferol 50,000 unit Cap  Commonly known as: ERGOCALCIFEROL  Take 1 capsule (50,000 Units total) by mouth every 7 days. for 8 doses     folic acid 1 MG tablet  Commonly known as: FOLVITE  Take 1 tablet (1 mg total) by mouth once daily.     miconazole NITRATE 2 % 2 % top powder  Commonly known as: MICOTIN  Apply topically 2 (two) times daily.     polyethylene glycol 17 gram Pwpk  Commonly known as: GLYCOLAX  Take 17 g by mouth daily as needed for  Constipation.     senna-docusate 8.6-50 mg 8.6-50 mg per tablet  Commonly known as: PERICOLACE  Take 1 tablet by mouth 2 (two) times daily as needed for Constipation.            CHANGE how you take these medications      amLODIPine 10 MG tablet  Commonly known as: NORVASC  Take 1 tablet (10 mg total) by mouth once daily. Avoid SBP<100  What changed: additional instructions     carvediloL 12.5 MG tablet  Commonly known as: COREG  Take 1 tablet (12.5 mg total) by mouth 2 (two) times daily.  What changed:   how much to take  when to take this     hydrALAZINE 50 MG tablet  Commonly known as: APRESOLINE  TAKE 1 TABLET(50 MG) BY MOUTH THREE TIMES DAILY  What changed: how much to take     sevelamer carbonate 800 mg Tab  Commonly known as: RENVELA  Take 1 tablet (800 mg total) by mouth once daily.  What changed: when to take this            CONTINUE taking these medications      aspirin 81 MG Chew     atorvastatin 40 MG tablet  Commonly known as: LIPITOR     clopidogreL 75 mg tablet  Commonly known as: PLAVIX     ferrous gluconate 324 MG tablet  Commonly known as: FERGON  Take 1 tablet (324 mg total) by mouth once daily.     flavoxATE 100 mg Tab  Commonly known as: URISPAS     fluticasone propionate 50 mcg/actuation nasal spray  Commonly known as: FLONASE     FOLBIC 2.5-25-2 mg Tab  Generic drug: folic acid-vit B6-vit B12 2.5-25-2 mg     HYDROcodone-acetaminophen  mg per tablet  Commonly known as: NORCO  Take 1 tablet by mouth every 8 (eight) hours as needed for Pain.     megestroL 400 mg/10 mL (40 mg/mL) Susp  Commonly known as: MEGACE     methocarbamoL 500 MG Tab  Commonly known as: ROBAXIN     naloxone 4 mg/actuation Spry  Commonly known as: NARCAN     pantoprazole 40 MG tablet  Commonly known as: PROTONIX     sodium bicarbonate 325 MG tablet     tamsulosin 0.4 mg Cap  Commonly known as: FLOMAX     traZODone 50 MG tablet  Commonly known as: DESYREL     VITAMIN C 500 MG tablet  Generic drug: ascorbic acid (vitamin  C)            STOP taking these medications      busPIRone 10 MG tablet  Commonly known as: BUSPAR     doxazosin 8 MG Tab  Commonly known as: CARDURA     NIFEdipine 60 MG Tbsr  Commonly known as: ADALAT CC               Where to Get Your Medications        These medications were sent to [facility] - [place] - [address], [place] [address]      Phone: [phone]   acetaminophen 325 MG tablet  aluminum-magnesium hydroxide-simethicone 200-200-20 mg/5 mL Susp  amLODIPine 10 MG tablet  ergocalciferol 50,000 unit Cap  folic acid 1 MG tablet  miconazole NITRATE 2 % 2 % top powder  polyethylene glycol 17 gram Pwpk  senna-docusate 8.6-50 mg 8.6-50 mg per tablet       Information about where to get these medications is not yet available    Ask your nurse or doctor about these medications  epoetin gary 20,000 unit/mL injection  sevelamer carbonate 800 mg Tab          Explained in detail to the patient about the discharge plan, medications, and follow-up visits. Pt understands and agrees with the treatment plan  Discharge Disposition: Skilled Nursing Facility   Discharged Condition: stable  Diet-    Medications Per DC med rec  Activities as tolerated   Follow-up Information       Elo Flores MD Follow up.    Specialty: Family Medicine  Why: neurosurgery referral  Contact information:  [address] [facility]  [address]  [facility] [address] [phone]               Olaf Navarrete MD Follow up.    Specialty: Urology  Why: March 14th, at 1pm  Contact information:  [facility] [address]  [facility] [address] [phone]                           For further questions contact hospitalist office    Discharge time 33 minutes    For worsening symptoms, chest pain, shortness of breath, increased abdominal pain, high grade fever, stroke or stroke like symptoms, immediately go to the nearest Emergency Room or call 911 as soon as possible.      Rosio Seo.  M.D, on 3/17/2024. at 1:04 PM.

## 2024-03-19 NOTE — ED PROVIDER NOTES
Encounter Date: 3/19/2024    SCRIBE #1 NOTE: I, Ruma Hui, am scribing for, and in the presence of,  Deanna Mckeon MD. I have scribed the following portions of the note - Other sections scribed: HPI, ROS, PE.       History   No chief complaint on file.    68 year old male with a pmhx of ESRD presents to the ED from [facility] as a level 2 trauma via EMS for a fall onset this morning.  Per EMS, the patient has [place] at [place] for seven days, and has a history of confusion and high fall risk.  EMS reports that the patient fell at 0400 this morning, and then had a second witnessed fall at 0600 this morning.  EMS reports that the patient has been refusing his dialysis as well as bunching up his catheter.  EMS states that the patient is on Plavix.  EMS reports that the patient reported neck and lower back pain.  Per the nursing home paperwork, the patient has been noncompliant and stating that he will kill himself, as well as showing symptoms of confusion.  Upon arrival, the patient denies any pain, and reports that he is not confused stating his birthday is 1955.  The patient denies hitting his head or being suicidal.  The patient's nephrologist is Dr. Butler, and his PCP is Dr. Elo Flores.    The history is provided by the patient, the EMS personnel and the nursing home. No  was used.     Review of patient's allergies indicates:  Not on File  No past medical history on file.  No past surgical history on file.  No family history on file.     Review of Systems   Constitutional:  Negative for fatigue and fever.   Eyes:  Negative for pain.   Respiratory:  Negative for chest tightness, shortness of breath and wheezing.    Cardiovascular:  Negative for chest pain.   Gastrointestinal:  Negative for abdominal pain, constipation, diarrhea, nausea and vomiting.   Genitourinary:  Negative for dysuria.   Musculoskeletal:  Negative for back pain and neck  pain.   Skin:  Negative for rash.   Allergic/Immunologic: Negative for environmental allergies, food allergies and immunocompromised state.   Neurological:  Negative for dizziness, syncope and speech difficulty.   Hematological:  Does not bruise/bleed easily.   Psychiatric/Behavioral:  Negative for confusion, sleep disturbance and suicidal ideas.        Physical Exam     Initial Vitals [03/19/24 1039]   BP Pulse Resp Temp SpO2   (!) 186/88 79 19 98.1 °F (36.7 °C) 99 %      MAP       --         Physical Exam    Nursing note and vitals reviewed.  Constitutional: He appears well-developed and well-nourished. Airway: Normal. He is not diaphoretic. No distress.   HENT:   Head: Normocephalic.   Nose: Nose normal.   Mouth/Throat: Oropharynx is clear and moist.   Eyes: Conjunctivae and EOM are normal. Pupils are equal, round, and reactive to light.   Neck: Trachea normal.   Cardiovascular:  Normal rate and normal heart sounds.     Exam reveals no gallop and no friction rub.       No murmur heard.  Pulmonary/Chest: Breath sounds normal. No respiratory distress. He has no wheezes. He has no rhonchi. He has no rales. He exhibits no tenderness.   Abdominal: Abdomen is soft. Bowel sounds are normal. He exhibits no distension and no mass. There is no abdominal tenderness. There is no rebound.   Musculoskeletal:         General: No tenderness or edema. Normal range of motion.      Cervical back: No tenderness.      Thoracic back: No tenderness.      Lumbar back: No tenderness.      Comments: No spinal step off     Neurological: He is alert and oriented to person, place, and time. No cranial nerve deficit. GCS eye subscore is 4. GCS verbal subscore is 5. GCS motor subscore is 6.   Skin: Skin is warm and dry. Capillary refill takes less than 2 seconds. No rash and no abscess noted. No erythema. No pallor.   Psychiatric: He expresses no suicidal ideation.         ED Course   Critical Care    Date/Time: 3/19/2024 12:36 PM    Performed  by: Deanna Mckeon MD  Authorized by: Deanna Mckeon MD  Direct patient critical care time: 55 minutes  Total critical care time (exclusive of procedural time) : 55 minutes  Critical care was necessary to treat or prevent imminent or life-threatening deterioration of the following conditions: trauma, metabolic crisis and renal failure.  Critical care was time spent personally by me on the following activities: development of treatment plan with patient or surrogate, discussions with consultants, evaluation of patient's response to treatment, examination of patient, obtaining history from patient or surrogate, ordering and performing treatments and interventions, ordering and review of laboratory studies, ordering and review of radiographic studies, pulse oximetry, review of old charts and re-evaluation of patient's condition.        Labs Reviewed   COMPREHENSIVE METABOLIC PANEL - Abnormal; Notable for the following components:       Result Value    Potassium Level 7.0 (*)     Chloride 97 (*)     Blood Urea Nitrogen 49.8 (*)     Creatinine 9.62 (*)     Protein Total 7.7 (*)     Albumin Level 3.1 (*)     Globulin 4.6 (*)     Albumin/Globulin Ratio 0.7 (*)     All other components within normal limits   PROTIME-INR - Abnormal; Notable for the following components:    PT 17.0 (*)     INR 1.4 (*)     All other components within normal limits   APTT - Abnormal; Notable for the following components:    PTT 36.0 (*)     All other components within normal limits   URINALYSIS, REFLEX TO URINE CULTURE - Abnormal; Notable for the following components:    Color, UA Dark-Brown (*)     Appearance, UA Turbid (*)     Protein, UA 3+ (*)     Blood, UA 3+ (*)     Leukocyte Esterase,  (*)     WBC, UA >100 (*)     WBC Clumps, UA Many (*)     Bacteria, UA Many (*)     Renal Epithelial Cells, UA Trace (*)     RBC, UA >100 (*)     All other components within normal limits   CBC WITH DIFFERENTIAL - Abnormal; Notable for the  following components:    RBC 3.24 (*)     Hgb 11.0 (*)     Hct 34.4 (*)     .2 (*)     MCH 34.0 (*)     MCHC 32.0 (*)     RDW 18.6 (*)     Lymph # 0.31 (*)     All other components within normal limits   PHOSPHORUS - Abnormal; Notable for the following components:    Phosphorus Level 5.6 (*)     All other components within normal limits   ACETAMINOPHEN LEVEL - Abnormal; Notable for the following components:    Acetaminophen Level <17.4 (*)     All other components within normal limits   DRUG SCREEN, URINE (BEAKER) - Normal    Narrative:     Cut off concentrations:    Amphetamines - 1000 ng/ml  Barbiturates - 200 ng/ml  Benzodiazepine - 200 ng/ml  Cannabinoids (THC) - 50 ng/ml  Cocaine - 300 ng/ml  Fentanyl - 1.0 ng/ml  MDMA - 500 ng/ml  Opiates - 300 ng/ml   Phencyclidine (PCP) - 25 ng/ml    Specimen submitted for drug analysis and tested for pH and specific gravity in order to evaluate sample integrity. Suspect tampering if specific gravity is <1.003 and/or pH is not within the range of 4.5 - 8.0  False negatives may result form substances such as bleach added to urine.  False positives may result for the presence of a substance with similar chemical structure to the drug or its metabolite.    This test provides only a PRELIMINARY analytical test result. A more specific alternate chemical method must be used in order to obtain a confirmed analytical result. Gas chromatography/mass spectrometry (GC/MS) is the preferred confirmatory method. Other chemical confirmation methods are available. Clinical consideration and professional judgement should be applied to any drug of abuse test result, particularly when preliminary positive results are used.    Positive results will be confirmed only at the physicians request. Unconfirmed screening results are to be used only for medical purposes (treatment).        ALCOHOL,MEDICAL (ETHANOL) - Normal   MAGNESIUM - Normal   TROPONIN I - Normal   HEPATITIS B SURFACE ANTIGEN  - Normal   CULTURE, URINE   CBC W/ AUTO DIFFERENTIAL    Narrative:     The following orders were created for panel order CBC auto differential.  Procedure                               Abnormality         Status                     ---------                               -----------         ------                     CBC with Differential[[id number]]       Abnormal            Final result                 Please view results for these tests on the individual orders.   TYPE & SCREEN   ABORH RETYPE   POCT GLUCOSE   ISTAT CHEM8   POCT GLUCOSE MONITORING CONTINUOUS     EKG Readings: (Independently Interpreted)   Initial Reading: No STEMI. Previous EKG: Compared with most recent EKG Rhythm: Sinus Tachycardia. Heart Rate: 105. Ectopy: No Ectopy. Conduction: Normal. ST Segments: Normal ST Segments. T Waves Flipped: V3 and V4. Axis: Normal. Clinical Impression: Left Ventricular Hypertrophy (LDH) and Sinus Tachycardia   Performed at 1049     ECG Results              EKG 12-lead (Final result)        Collection Time Result Time QRS Duration OHS QTC Calculation    03/19/24 10:49:08 03/19/24 15:09:51 76 465                     Final result by Interface, Lab In [facility] (03/19/24 15:09:57)                   Narrative:    Test Reason : W19.XXXA,    Vent. Rate : 105 BPM     Atrial Rate : 105 BPM     P-R Int : 120 ms          QRS Dur : 076 ms      QT Int : 352 ms       P-R-T Axes : 056 -29 044 degrees     QTc Int : 465 ms    Sinus tachycardia  Minimal voltage criteria for LVH, may be normal variant ( R in aVL )  T wave abnormality, consider anterior ischemia  Abnormal ECG  No previous ECGs available  Confirmed by Igor Ceballos MD ([id number]) on 3/19/2024 3:09:46 PM    Referred By:             Confirmed By:Igor Ceballos MD                                  Imaging Results              CT Head Without Contrast (Final result)  Result time 03/19/24 11:23:49      Final result by Jd Esquivel MD (03/19/24 11:23:49)                    Impression:      No acute intracranial abnormality.      Electronically signed by: Jd Esquivel MD  Date:    03/19/2024  Time:    11:23               Narrative:    EXAMINATION:  CT HEAD WITHOUT CONTRAST    CLINICAL HISTORY:  Trauma;    TECHNIQUE:  Axial images of the head were obtained without IV contrast administration.  Coronal and sagittal reconstructions were provided.  Three dimensional and MIP images were obtained and evaluated.  Total DLP was 1366 mGy-cm. Dose lowering technique and automated exposure control were utilized for this exam.    COMPARISON:  None    FINDINGS:  There is normal brain formation.  There is normal gray-white matter differentiation.  There is no hemorrhage, hydrocephalus, or midline shift.  There is no cytotoxic or vasogenic edema.  There is no intra or extra-axial fluid collection.  There is no herniation.    The calvarium is intact.  There is no fracture.  The bilateral orbits are normal.  The paranasal sinuses and mastoid air cells are normally developed and free of disease.                                       CT Cervical Spine Without Contrast (Final result)  Result time 03/19/24 11:24:55      Final result by Jd Esquivel MD (03/19/24 11:24:55)                   Impression:      Multilevel degenerative change with out fracture or static subluxation of the cervical spine.      Electronically signed by: Jd Esquivel MD  Date:    03/19/2024  Time:    11:24               Narrative:    EXAMINATION:  CT CERVICAL SPINE WITHOUT CONTRAST    CLINICAL HISTORY:  Trauma;    TECHNIQUE:  Axial images of the cervical spine were obtained without IV contrast administration.  Coronal and sagittal reconstructions were provided.  Three dimensional and MIP images were obtained and evaluated.  Total DLP was 1366 mGy-cm. Dose lowering technique and automated exposure control were utilized for this exam.    COMPARISON:  None    FINDINGS:  There is normal sagittal alignment.  There is auto fusion of the C5-C7  vertebral bodies and posterior elements.  There are postoperative changes following C3-C5 laminectomy.  There is no spondylolisthesis.  There is multilevel degenerative change.    The included portions of the posterior fossa are normal.  The craniocervical junction is symmetric.  The atlantoaxial articulation is normal.  There is no fracture.    The airway is patent.  There is no cervical lymphadenopathy.  The visualized lung apices are clear.                                       X-Ray Pelvis Routine AP (Final result)  Result time 03/19/24 11:05:09      Final result by Nori Faith MD (03/19/24 11:05:09)                   Impression:      No acute osseous abnormality.      Electronically signed by: Nori Faith  Date:    03/19/2024  Time:    11:05               Narrative:    EXAMINATION:  XR PELVIS ROUTINE AP    CLINICAL HISTORY:  r/o bleeding or hemorrhage;    TECHNIQUE:  AP view of the pelvis was performed.    COMPARISON:  None.    FINDINGS:  No appreciable fracture. No dislocation.    Presumed bladder catheter in place.                                       X-Ray Chest 1 View (Final result)  Result time 03/19/24 11:06:10      Final result by Nori Faith MD (03/19/24 11:06:10)                   Impression:      Enlarged cardiac silhouette with vascular congestion.      Electronically signed by: Nori Faith  Date:    03/19/2024  Time:    11:06               Narrative:    EXAMINATION:  XR CHEST 1 VIEW    CLINICAL HISTORY:  r/o bleeding or hemorrhage;    TECHNIQUE:  Single frontal view of the chest was performed.    COMPARISON:  None    FINDINGS:  LINES AND TUBES: EKG/telemetry leads overlie the chest.    MEDIASTINUM AND BONITA: Cardiac silhouette is enlarged. Aortic atherosclerosis.  Vascular stent at the superior mediastinum.    LUNGS: Vascular congestion.    PLEURA:No pleural effusion. No pneumothorax.    OTHER: No acute osseous abnormality.                                    X-Rays:    Independently Interpreted Readings:   Chest X-Ray: Normal heart size.  No infiltrates.  No acute abnormalities.     Medications   calcium gluconate 1 g in NS IVPB (premixed) (0 g Intravenous Stopped 3/19/24 1221)     And   calcium gluconate 1 g in NS IVPB (premixed) (has no administration in time range)   dextrose 10% bolus 500 mL 500 mL (500 mLs Intravenous New Bag 3/19/24 1315)     And   dextrose 10% bolus 250 mL 250 mL (has no administration in time range)     And   insulin regular injection 7.26 Units 0.0726 mL (has no administration in time range)   mupirocin 2 % ointment (has no administration in time range)   piperacillin-tazobactam (ZOSYN) 4.5 g in dextrose 5 % in water (D5W) 100 mL IVPB (MB+) (has no administration in time range)   haloperidol lactate injection 5 mg (5 mg Intramuscular Given 3/19/24 1130)   diphenhydrAMINE injection 12.5 mg (12.5 mg Intravenous Given 3/19/24 1129)   albuterol nebulizer solution 10 mg (10 mg Nebulization Given 3/19/24 1246)   morphine injection 2 mg (2 mg Intravenous Given 3/19/24 1347)     Medical Decision Making  The differential diagnoses includes but is not limited to: intracranial hemorrhage, C-spine injury, volume overload, depression, metabolic encephalopathy, uremia, electrolyte derangement, UTI.  Cbc, cmp, trop, coags, ua, EKG, cxr, ct head and ct neck ordered and reviewed  Stable anemia with esrd and hyperkalemia noted  Uti also noted  Reviewed previous cultures, zosyn given-may be colonized but with decilne in mental status felt should cover  Hyperkalemia shifted and brought emergently to hd  Suicidal threats, pec in place    Problems Addressed:  Benign essential HTN: chronic illness or injury  Chronic anemia: chronic illness or injury  ESRD (end stage renal disease) on dialysis: chronic illness or injury with exacerbation, progression, or side effects of treatment  Fall: acute illness or injury that poses a threat to life or bodily functions  Hyperkalemia:  acute illness or injury that poses a threat to life or bodily functions  Noncompliance: acute illness or injury that poses a threat to life or bodily functions  Suicidal ideation: acute illness or injury that poses a threat to life or bodily functions    Amount and/or Complexity of Data Reviewed  Independent Historian: EMS     Details: Per EMS, the patient has been at [facility] for seven days, and has a history of confusion and high fall risk.  EMS reports that the patient fell at 0400 this morning, and then had a second witnessed fall at 0600 this morning.  EMS reports that the patient has been refusing his dialysis as well as bunching up his catheter.  EMS states that the patient is on Plavix.  EMS reports that the patient reported neck and lower back pain.  Per the nursing home paperwork, the patient has been noncompliant and stating that he will kill himself, as well as showing symptoms of confusion.   External Data Reviewed: labs and notes.     Details: Previous admit and recent labs  Labs: ordered. Decision-making details documented in ED Course.  Radiology: ordered and independent interpretation performed. Decision-making details documented in ED Course.  ECG/medicine tests: ordered and independent interpretation performed. Decision-making details documented in ED Course.     Details: Initial Reading: No STEMI. Previous EKG: Compared with most recent EKG Rhythm: Sinus Tachycardia. Heart Rate: 105. Ectopy: No Ectopy. Conduction: Normal. ST Segments: Normal ST Segments. T Waves Flipped: V3 and V4. Axis: Normal. Clinical Impression: Left Ventricular Hypertrophy (LDH) and Sinus Tachycardia   Performed at 1049     Risk  OTC drugs.  Prescription drug management.  Decision regarding hospitalization.    Critical Care  Total time providing critical care: 55 minutes            Scribe Attestation:   Scribe #1: I performed the above scribed service and the documentation accurately describes the services I  "performed. I attest to the accuracy of the note.  Comments: Attending:   Physician Attestation Statement for Scribe #1: IDeanna MD, personally performed the services described in this documentation. All medical record entries made by the scribe were at my direction and in my presence.  I have reviewed the chart and agree that the record reflects my personal performance and is accurate and complete.        Attending Attestation:           Physician Attestation for Scribe:  Physician Attestation Statement for Scribe #1: I, Deanna Mckeon MD, reviewed documentation, as scribed by Ruma Hui in my presence, and it is both accurate and complete.             ED Course as of 03/19/24 1600   Tue Mar 19, 2024   1049 Pt is denying si and denying reporting si and states taht he skipped hd because "it's cold in there if they gave me a blanket then I'd go" [BS]   1050 Pt is chandler abbott jr [birth year] [BS]   1053 Per chart review has h/o unsteady gait, physical deconditioning, esrd, uti with retention [BS]   1053 Outpt labs yesterday without severe electrolyte derangement, stable anemia.  Per nursing home records they report he has been noncompliant with staff and verbally aggressive reporting that he will kill himself refusing assistance from staff with ADLs sometimes refusing food exhibiting signs of confusion since admission [BS]   1058 EKG performed at 10:49 a.m. rate of 105 sinus tachycardia with LVH and anterior T-wave inversions [BS]   1101 EKG is unchanged from previous [BS]   1103 Nephrologist is dr aguila [BS]   1103 PCP is paulino singh md. Physician at [facility] is dr hogue [BS]   1112 Pt placed in a collar, he is gcs 15 with orientation questions but intermittently nonsensical [BS]   1129 Collar cleared. Pulling at his nielsen [BS]   1136 PEC placed at 1132 [BS]   1144 Phosphorus Level(!): 5.6 [BS]   1144 Troponin I: 0.032 [BS]   1201 Consult to nephrology, hyperkalemia protocol ordered [BS] "   1203 Paged on-call for Dr. Butler [MB]   1240 Pt to go up to hd emergently, calling to admit pt, will place routine psych consult [BS]   1242 Consult to dr guan on call for dr hogue [BS]      ED Course User Index  [BS] Deanna Mckeon MD  [MB] Ruma Hui                           Clinical Impression:  Final diagnoses:  [W19.XXXA] Fall  [N18.6, Z99.2] ESRD (end stage renal disease) on dialysis (Primary)  [Z91.199] Noncompliance  [I10] Benign essential HTN  [D64.9] Chronic anemia  [R45.851] Suicidal ideation  [E87.5] Hyperkalemia          ED Disposition Condition    Admit Stable                Deanna Mckeon MD  03/19/24 1600

## 2024-03-19 NOTE — CONSULTS
[facility] - Emergency Dept  General Surgery  Consult Note    Consults  Subjective:     Chief Complaint/Reason for Admission: Fall, on Plavix    History of Present Illness:   68 year old male with ESRD, confusion, and high fall risk presents for onset of fall x2 this morning. Patient is on Plavix. EMS reports patient reported neck and lower back pain. Per nursing paperwork, patient has been showing symptoms of confusion and and being suicidal. However on presentation patient was able to state his birthday and denies and suicidal ideations.     No current facility-administered medications on file prior to encounter.     No current outpatient medications on file prior to encounter.       Review of patient's allergies indicates:  Not on File    No past medical history on file.  No past surgical history on file.  Family History    None       Tobacco Use    Smoking status: Not on file    Smokeless tobacco: Not on file   Substance and Sexual Activity    Alcohol use: Not on file    Drug use: Not on file    Sexual activity: Not on file     Review of Systems   Musculoskeletal:         Neck pain and back pain   Psychiatric/Behavioral:  Negative for suicidal ideas.      Objective:     Vital Signs (Most Recent):  Temp: 97.3 °F (36.3 °C) (03/19/24 1120)  Pulse: (!) 116 (03/19/24 1246)  Resp: 18 (03/19/24 1246)  BP: (!) 166/92 (03/19/24 1120)  SpO2: 96 % (03/19/24 1246) Vital Signs (24h Range):  Temp:  [97.3 °F (36.3 °C)-98.1 °F (36.7 °C)] 97.3 °F (36.3 °C)  Pulse:  [] 116  Resp:  [18-22] 18  SpO2:  [19 %-99 %] 96 %  BP: (159-186)/(79-92) 166/92     Weight: 72.6 kg (160 lb)  Body mass index is 25.82 kg/m².    No intake or output data in the 24 hours ending 03/19/24 1305    Physical Exam  HENT:      Head: Normocephalic and atraumatic.   Cardiovascular:      Rate and Rhythm: Normal rate and regular rhythm.   Pulmonary:      Effort: Pulmonary effort is normal.      Breath sounds: Normal breath sounds.    Abdominal:      Tenderness: There is no abdominal tenderness.   Neurological:      Mental Status: He is alert.      Comments: No cervical, thoracic, lumbar spinale tenderness. No step offs   Psychiatric:      Comments: GCS 15         Significant Labs:  All pertinent labs from the last 24 hours have been reviewed.    Significant Diagnostics:  I have reviewed all pertinent imaging results/findings within the past 24 hours.    Assessment/Plan:     68 year old with ESRD and high risk fall presents for fall x 2 today. Physical exam was benign. VSS. Imaging without any evidence of TBI or acute fracture or subluxation. Patient cleared for dialysis today from trauma standpoint.     Thank you for your consult. I will sign off. Please contact us if you have any additional questions.    Claude Mondragon MD  General Surgery  [facility] - Emergency Dept

## 2024-03-19 NOTE — NURSING
03/19/24 1705   Post-Hemodialysis Assessment   Blood Volume Processed (Liters) 60.3 L   Dialyzer Clearance Clear   Duration of Treatment 180 minutes   Total UF (mL) 2000 mL   Patient Response to Treatment Pt tolerated HD tx well; NAD noted/VSS. Total tx length 3hrs; resulting in 2 liter UF goal per NP orders.   Post-Hemodialysis Comments Pt deaccessed per P and P

## 2024-03-19 NOTE — CONSULTS
[facility] - Emergency Dept  Nephrology  Consult Note    Patient Name: Sandra Guerra  MRN: [id number]  Admission Date: 3/19/2024  Hospital Length of Stay: 0 days  Attending Provider: Isidro Dunne MD   Primary Care Physician: NILESH Pena MD  Principal Problem:<principal problem not specified>    Consults  Subjective:     HPI: Mr Mirza Nelson ( 1955) is a 68-year-old AAM with dialysis dependent ESRD followed by Dr Butler. Reportedly he has missed dialysis for a week. Patient presented as a trauma secondary to fall at home. He was noted to have critical hyperkalemia with potassium of 7 for which he is undergoing emergent HD. Patient is disoriented with bilateral upper extremity tremors. Due to confusion and attempts to pull at lines he has a sitter at bedside. Of note patient was recently hospitalized with ESBL E coli UTI and discharged to [facility] on 3/11.     No past medical history on file.    No past surgical history on file.    Review of patient's allergies indicates:  Not on File  Current Facility-Administered Medications   Medication Frequency    calcium gluconate 1 g in NS IVPB (premixed) Q10 Min PRN    dextrose 10% bolus 500 mL 500 mL Once    And    dextrose 10% bolus 250 mL 250 mL PRN    And    insulin regular injection 7.26 Units 0.0726 mL Once    morphine injection 2 mg Once    mupirocin 2 % ointment BID    piperacillin-tazobactam (ZOSYN) 4.5 g in dextrose 5 % in water (D5W) 100 mL IVPB (MB+) ED 1 Time     No current outpatient medications on file.     Family History    None       Tobacco Use    Smoking status: Not on file    Smokeless tobacco: Not on file   Substance and Sexual Activity    Alcohol use: Not on file    Drug use: Not on file    Sexual activity: Not on file     Review of Systems   Unable to perform ROS: Acuity of condition     Objective:     Vital Signs (Most Recent):  Temp: 97.3 °F (36.3 °C) (24 1120)  Pulse: (!) 116  (03/19/24 1246)  Resp: 18 (03/19/24 1246)  BP: (!) 166/92 (03/19/24 1120)  SpO2: 96 % (03/19/24 1246) Vital Signs (24h Range):  Temp:  [97.3 °F (36.3 °C)-98.1 °F (36.7 °C)] 97.3 °F (36.3 °C)  Pulse:  [] 116  Resp:  [18-22] 18  SpO2:  [19 %-99 %] 96 %  BP: (159-186)/(79-92) 166/92     Weight: 72.6 kg (160 lb) (03/19/24 1039)  Body mass index is 25.82 kg/m².  Body surface area is 1.84 meters squared.    No intake/output data recorded.    Physical Exam  Constitutional:       General: He is not in acute distress.     Appearance: He is ill-appearing.   Cardiovascular:      Rate and Rhythm: Normal rate and regular rhythm.      Pulses: Normal pulses.      Heart sounds: Normal heart sounds.   Pulmonary:      Effort: Pulmonary effort is normal.      Comments: Oxymask at 4 LPM, SpO2 100%  Abdominal:      General: There is no distension.      Palpations: Abdomen is soft.   Genitourinary:     Comments: Urinary catheter   Musculoskeletal:         General: No swelling.   Skin:     General: Skin is warm and dry.   Neurological:      Mental Status: He is alert.      Comments: Oriented to self and rexognizes staff but agitated by mittens. BUE tremors          Significant Labs:  BMP:   Recent Labs   Lab 03/19/24  1053      K 7.0*   CO2 24   BUN 49.8*   CREATININE 9.62*   CALCIUM 9.0   MG 1.90     CBC:   Recent Labs   Lab 03/19/24  1053   WBC 8.71   RBC 3.24*   HGB 11.0*   HCT 34.4*      .2*   MCH 34.0*   MCHC 32.0*     Microbiology Results (last 7 days)       Procedure Component Value Units Date/Time    Urine culture [[id number]] Collected: 03/19/24 1132    Order Status: Sent Specimen: Urine Updated: 03/19/24 1214          Specimen (24h ago, onward)      None          Recent Labs   Lab 03/19/24  1132   PROTEINUA 3+*   BACTERIA Many*   LEUKOCYTESUR 500*   UROBILINOGEN Normal       Significant Imaging:  CT Cervical Spine Without Contrast [[id number]] Resulted: 03/19/24 1124   Order Status: Completed Updated:  03/19/24 1127   Narrative:     EXAMINATION:  CT CERVICAL SPINE WITHOUT CONTRAST    CLINICAL HISTORY:  Trauma;    TECHNIQUE:  Axial images of the cervical spine were obtained without IV contrast administration.  Coronal and sagittal reconstructions were provided.  Three dimensional and MIP images were obtained and evaluated.  Total DLP was 1366 mGy-cm. Dose lowering technique and automated exposure control were utilized for this exam.    COMPARISON:  None    FINDINGS:  There is normal sagittal alignment.  There is auto fusion of the C5-C7 vertebral bodies and posterior elements.  There are postoperative changes following C3-C5 laminectomy.  There is no spondylolisthesis.  There is multilevel degenerative change.    The included portions of the posterior fossa are normal.  The craniocervical junction is symmetric.  The atlantoaxial articulation is normal.  There is no fracture.    The airway is patent.  There is no cervical lymphadenopathy.  The visualized lung apices are clear.   Impression:       Multilevel degenerative change with out fracture or static subluxation of the cervical spine.      Electronically signed by: Jd Esquivel MD  Date: 03/19/2024  Time: 11:24       CT Head Without Contrast [[id number]] Resulted: 03/19/24 1123   Order Status: Completed Updated: 03/19/24 1126   Narrative:     EXAMINATION:  CT HEAD WITHOUT CONTRAST    CLINICAL HISTORY:  Trauma;    TECHNIQUE:  Axial images of the head were obtained without IV contrast administration.  Coronal and sagittal reconstructions were provided.  Three dimensional and MIP images were obtained and evaluated.  Total DLP was 1366 mGy-cm. Dose lowering technique and automated exposure control were utilized for this exam.    COMPARISON:  None    FINDINGS:  There is normal brain formation.  There is normal gray-white matter differentiation.  There is no hemorrhage, hydrocephalus, or midline shift.  There is no cytotoxic or vasogenic edema.  There is no intra or  extra-axial fluid collection.  There is no herniation.    The calvarium is intact.  There is no fracture.  The bilateral orbits are normal.  The paranasal sinuses and mastoid air cells are normally developed and free of disease.   Impression:       No acute intracranial abnormality.      Electronically signed by: Jd Esquivel MD  Date: 03/19/2024  Time: 11:23       Assessment/Plan:   ESRD on HD - [facility], MWF, [facility]  Dialysis noncompliance with critical hyperkalemia and pulmonary vascular congestion on CXR  Trauma fall at home   Altered mental status   Chronic indwelling nielsen catheter changed every three weeks per Dr Navarrete   Frequent UTI - Most recently ESBL E Coli 2/29   CAD s/p PCI   Chronic back pain     -Patient will continue three hour dialysis now and again tomorrow to continue MWF schedule   -Follow results of urine culture       KARLOS Preston  Nephrology  [facility] - Emergency Dept

## 2024-03-19 NOTE — NURSING
Nurses Note -- 4 Eyes      3/19/2024   6:02 PM      Skin assessed during: Admit      [] No Altered Skin Integrity Present    []Prevention Measures Documented      [x] Yes- Altered Skin Integrity Present or Discovered   [] LDA Added if Not in Epic (Describe Wound)   [x] New Altered Skin Integrity was Present on Admit and Documented in LDA   [x] Wound Image Taken    Wound Care Consulted? Yes    Attending Nurse: Miguel Phillips RN    Second RN/Staff Member:   Desi Espinal RN

## 2024-03-19 NOTE — H&P
[facility] - Emergency Dept    History & Physical      Patient Name: Sandra Guerra  MRN: [id number]  Admission Date: 3/19/2024  Attending Physician: Isidro Dunne MD  Primary Care Provider: NILESH Pena MD         Patient information was obtained from patient and ER records.    Subjective:     Principal Problem:<principal problem not specified>    Chief Complaint: No chief complaint on file.       HPI: 68 year old male with ESRD, confusion, and high fall risk presents for onset of fall x2 this morning. Patient is on Plavix. EMS reports patient reported neck and lower back pain. Per nursing paperwork, patient has been showing symptoms of confusion and and being suicidal. However on presentation patient was able to state his birthday and denies and suicidal ideations.  Patient has been refusing dialysis.  Has been trying to clamp his urinary catheter.  And his repeatedly been talking about committing suicide.  He has been PEC'd as a precaution.  Nephrology has been consulted.  Possible UTI has been empirically started on Zosyn.    No past medical history on file.    No past surgical history on file.    Review of patient's allergies indicates:  Not on File    No current facility-administered medications on file prior to encounter.     No current outpatient medications on file prior to encounter.     Family History    None       Tobacco Use    Smoking status: Not on file    Smokeless tobacco: Not on file   Substance and Sexual Activity    Alcohol use: Not on file    Drug use: Not on file    Sexual activity: Not on file     Review of Systems   Unable to perform ROS: Mental status change     Objective:     Vital Signs (Most Recent):  Temp: 97.3 °F (36.3 °C) (03/19/24 1120)  Pulse: (!) 116 (03/19/24 1246)  Resp: 20 (03/19/24 1347)  BP: (!) 166/92 (03/19/24 1120)  SpO2: 96 % (03/19/24 1246) Vital Signs (24h Range):  Temp:  [97.3 °F (36.3 °C)-98.1 °F (36.7 °C)] 97.3 °F (36.3 °C)  Pulse:   [] 116  Resp:  [18-22] 20  SpO2:  [19 %-99 %] 96 %  BP: (159-186)/(79-92) 166/92     Weight: 72.6 kg (160 lb)  Body mass index is 25.82 kg/m².    Physical Exam  HENT:      Head: Normocephalic and atraumatic.      Right Ear: External ear normal.      Left Ear: External ear normal.      Mouth/Throat:      Mouth: Mucous membranes are dry.   Cardiovascular:      Rate and Rhythm: Normal rate and regular rhythm.      Pulses: Normal pulses.      Heart sounds: Normal heart sounds.   Pulmonary:      Effort: Pulmonary effort is normal.      Breath sounds: Normal breath sounds.   Abdominal:      General: Bowel sounds are normal.      Palpations: Abdomen is soft.   Musculoskeletal:      Cervical back: Neck supple.   Skin:     General: Skin is warm and dry.   Neurological:      Comments: I can not properly performed at this moment.            Significant Labs: All pertinent labs within the past 24 hours have been reviewed.  BMP:   Recent Labs   Lab 03/19/24  1053      K 7.0*   CO2 24   BUN 49.8*   CREATININE 9.62*   CALCIUM 9.0   MG 1.90     CBC:   Recent Labs   Lab 03/19/24  1053   WBC 8.71   HGB 11.0*   HCT 34.4*        CMP:   Recent Labs   Lab 03/19/24  1053      K 7.0*   CO2 24   BUN 49.8*   CREATININE 9.62*   CALCIUM 9.0   ALBUMIN 3.1*   BILITOT 1.3   ALKPHOS 53   AST 34   ALT 12       Significant Imaging: I have reviewed all pertinent imaging results/findings within the past 24 hours.  CT: I have reviewed all pertinent results/findings within the past 24 hours and my personal findings are:  No intracranial bleeding.  CXR: I have reviewed all pertinent results/findings within the past 24 hours and my personal findings are:  No fractures of the spine.    Assessment/Plan:     There are no hospital problems to display for this patient.    VTE Risk Mitigation (From admission, onward)      None        Patient will be dialyzed today and probably every day for the next 2 or 3 days.  Psychiatry has been  consulted for evaluation.  The pec will stand until coronary has a chance to evaluate patient determine if the CC is needed.  Cultures have been acquired.      Isidro Dunne MD  Department of Hospital Medicine   [facility] - Emergency Dept

## 2024-03-20 PROBLEM — F05 ACUTE CONFUSIONAL STATE: Status: ACTIVE | Noted: 2024-01-01

## 2024-03-20 NOTE — PROGRESS NOTES
[facility] - 5th Floor [facility] Medicine  Progress Note    Patient Name: Mirza Nelson Jr.  MRN: [id number]  Patient Class: IP- Inpatient   Admission Date: 3/19/2024  Length of Stay: 1 days  Attending Physician: Isidro Dunne MD  Primary Care Provider: NILESH Pena MD        Subjective:     Principal Problem:<principal problem not specified>  HPI: 68 year old male with ESRD, confusion, and high fall risk presents for onset of fall x2 this morning. Patient is on Plavix. EMS reports patient reported neck and lower back pain. Per nursing paperwork, patient has been showing symptoms of confusion and and being suicidal. However on presentation patient was able to state his birthday and denies and suicidal ideations.  Patient has been refusing dialysis.  Has been trying to clamp his urinary catheter.  And his repeatedly been talking about committing suicide.  He has been PEC'd as a precaution.  Nephrology has been consulted.  Possible UTI has been empirically started on Zosyn.   Interval History:  Patient less confused today.  Was asking for some Tylenol for headaches and aches and pains.  Was dialyzed yesterday.  Continues to be PEC'd.    Review of Systems   All other systems reviewed and are negative.    Objective:     Vital Signs (Most Recent):  Temp: (!) 100.5 °F (38.1 °C) (03/20/24 0407)  Pulse: (!) 115 (03/20/24 0407)  Resp: 18 (03/20/24 0407)  BP: 131/69 (03/20/24 0407)  SpO2: 96 % (03/20/24 0939) Vital Signs (24h Range):  Temp:  [97.3 °F (36.3 °C)-100.9 °F (38.3 °C)] 100.5 °F (38.1 °C)  Pulse:  [] 115  Resp:  [16-22] 18  SpO2:  [19 %-99 %] 96 %  BP: (125-186)/(69-96) 131/69     Weight: 72.6 kg (160 lb)  Body mass index is 25.82 kg/m².    Intake/Output Summary (Last 24 hours) at 3/20/2024 0980  Last data filed at 3/19/2024 1705  Gross per 24 hour   Intake --   Output 2000 ml   Net -2000 ml      Physical Exam  HENT:      Head: Normocephalic and atraumatic.      Right Ear:  External ear normal.      Left Ear: External ear normal.      Mouth/Throat:      Mouth: Mucous membranes are dry.   Eyes:      Extraocular Movements: Extraocular movements intact.   Cardiovascular:      Rate and Rhythm: Normal rate and regular rhythm.      Pulses: Normal pulses.      Heart sounds: Normal heart sounds.   Pulmonary:      Effort: Pulmonary effort is normal.      Breath sounds: Normal breath sounds.   Abdominal:      General: Bowel sounds are normal.      Palpations: Abdomen is soft.   Musculoskeletal:      Cervical back: Neck supple.   Skin:     General: Skin is warm and dry.   Neurological:      General: No focal deficit present.      Mental Status: He is alert.           Overview/Hospital Course:  Patient dialyzed yesterday.  We will probably need dialysis today.  Still under PC.  Hyperkalemia has improved with dialysis.  BUN and creatinine have improved with dialysis.  I do expect him to be dialyzed again today.  And then possible discharge depending on his mental status.  Naveed still has not evaluated patient.  Patient is still under PVC.    Significant Labs: All pertinent labs within the past 24 hours have been reviewed.  BMP:   Recent Labs   Lab 03/19/24  1053 03/20/24  0830    140   K 7.0* 5.4*   CO2 24 24   BUN 49.8* 36.8*   CREATININE 9.62* 7.90*   CALCIUM 9.0 8.5*   MG 1.90  --      CBC:   Recent Labs   Lab 03/19/24  1053   WBC 8.71   HGB 11.0*   HCT 34.4*        CMP:   Recent Labs   Lab 03/19/24  1053 03/20/24  0830    140   K 7.0* 5.4*   CO2 24 24   BUN 49.8* 36.8*   CREATININE 9.62* 7.90*   CALCIUM 9.0 8.5*   ALBUMIN 3.1*  --    BILITOT 1.3  --    ALKPHOS 53  --    AST 34  --    ALT 12  --        Significant Imaging: I have reviewed all pertinent imaging results/findings within the past 24 hours.    Assessment/Plan:      There are no hospital problems to display for this patient.    VTE Risk Mitigation (From admission, onward)      None               Isidro Dunne  MD  Department of Hospital Medicine   [facility] - 5th Floor Med Surg

## 2024-03-20 NOTE — PROGRESS NOTES
Nephrology follow up progress note    HPI:      Mirza Nelson Jr. is a 68 y.o. male is a chronic hemodialysis patient on Monday Wednesday Friday and has been missing his dialysis therapy quite frequently.  Patient was admitted yesterday and was found to be hyperkalemic confused and complaining of lot of pain.  He was dialyzed and ultrafilter.    This morning he is very much confused and disoriented and does not follow any command accept he does say a few words when his name is called.  The caregiver person is in the room and she tried to feed him and patient will not open his mouth and will not eat or drink anything , a teaspoonful of diet Coke was given to him , he did not have any problem swallowing it.      Interval history:          Review of Systems:       Past medical, family, surgical, and social history reviewed and unchanged from initial consult note.     Objective:       VITAL SIGNS: 24 HR MIN & MAX LAST    Temp  Min: 97.3 °F (36.3 °C)  Max: 100.9 °F (38.3 °C)  (!) 100.5 °F (38.1 °C)        BP  Min: 125/94  Max: 166/96  131/69     Pulse  Min: 92  Max: 118  (!) 115     Resp  Min: 16  Max: 20  18    SpO2  Min: 95 %  Max: 97 %  96 %      GEN:  Moderately developed and nourished.  No respiratory distress noted.  HEENT: Conjunctiva anicteric, pupils reactive.  Extraocular movements intact.  No JVD.  CV: RRR without rub or gallop.  PULM: CTAB, unlabored  ABD: Soft, NT/ND abdomen with NABS  EXT: No cyanosis or edema  SKIN: Warm and dry  PSYCH: Awake, confused disoriented.  Vascular access:  Right upper extremity AV graft.          Component Value Date/Time     03/20/2024 0830     03/19/2024 1053    K 5.4 (H) 03/20/2024 0830    K 7.0 (HH) 03/19/2024 1053    CHLORIDE 98 03/20/2024 0830    CHLORIDE 97 (L) 03/19/2024 1053    CO2 24 03/20/2024 0830    CO2 24 03/19/2024 1053    BUN 36.8 (H) 03/20/2024 0830    BUN 49.8 (H) 03/19/2024 1053    CREATININE 7.90 (H) 03/20/2024 0830    CREATININE 9.62 (H)  03/19/2024 1053    CALCIUM 8.5 (L) 03/20/2024 0830    CALCIUM 9.0 03/19/2024 1053    PHOS 5.6 (H) 03/19/2024 1053            Component Value Date/Time    WBC 8.71 03/19/2024 1053    HGB 11.0 (L) 03/19/2024 1053    HCT 34.4 (L) 03/19/2024 1053     03/19/2024 1053       Imaging reviewed      Assessment / Plan:   ESRD.  Patient was dialyzed yesterday.  Will give him dialysis today again.  And put him back on Monday Wednesday Friday schedule.  Hyperkalemia improved  Pulmonary vascular congestion improved  Altered mental status unchanged  UTI  Coronary artery disease  Chronic back pain      Recommendations  I spoke to patient's daughter Ms. Heather Berger at [phone].  Patient's condition detailed to her.  She informed me that he has a lonely man.  Also his attention seeker.  Also his a pain pill addicted.  I detailed Ms. Romero about patient's confusion disorientation and his refusal to eat or drink anything.  She plans to talk to her other siblings then let us know if patient needs to continue on dialysis and about the code status and she is also thinking about hospice care.  In the meantime will dialyze him today and put him back on Monday Wednesday Friday.  If he does not eat or drink anything then family has to make a decision about his nutritional support and or hospice care and withdrawal of the care and only comfort care.

## 2024-03-20 NOTE — PLAN OF CARE
Problem: Infection  Goal: Absence of Infection Signs and Symptoms  Outcome: Ongoing, Progressing     Problem: Device-Related Complication Risk (Hemodialysis)  Goal: Safe, Effective Therapy Delivery  Outcome: Ongoing, Progressing     Problem: Hemodynamic Instability (Hemodialysis)  Goal: Effective Tissue Perfusion  Outcome: Ongoing, Progressing     Problem: Infection (Hemodialysis)  Goal: Absence of Infection Signs and Symptoms  Outcome: Ongoing, Progressing     Problem: Adult Inpatient Plan of Care  Goal: Plan of Care Review  Outcome: Ongoing, Progressing  Goal: Patient-Specific Goal (Individualized)  Outcome: Ongoing, Progressing  Goal: Absence of Hospital-Acquired Illness or Injury  Outcome: Ongoing, Progressing  Goal: Optimal Comfort and Wellbeing  Outcome: Ongoing, Progressing  Goal: Readiness for Transition of Care  Outcome: Ongoing, Progressing     Problem: Impaired Wound Healing  Goal: Optimal Wound Healing  Outcome: Ongoing, Progressing

## 2024-03-20 NOTE — AI DETERIORATION ALERT
Artificial Intelligence Notification  [facility]  [address] [place] [address]  Phone: [phone]    This documentation was triggered by an Artificial Intelligence Notification:    Admit Date: 3/19/2024   LOS: 0  Code Status: Full Code  : 1955  Age: 68 y.o.  Weight:   Wt Readings from Last 1 Encounters:   24 72.6 kg (160 lb)        Sex: male  Bed: 533/533 A  MRN: [id number]  Attending Physician: Isidro Dunne MD     Date of Alert: 2024  Time AI Alert Received:             Vitals:    24 2308   BP: (!) 166/96   Pulse: (!) 137   Resp:    Temp: (!) 100.9 °F (38.3 °C)     SpO2: 95 %      Artificial Intelligence alert discussed with Provider:     Name: [email or web address]   Date/Time of Provider Notification:       Patient Condition: NH patient brought in as a fall & was cleared of any traumatic injuries. History of ESRD on HD, recently refusing to have HD done at NH - dialysis was done today upon admission. Attending MD has started antibiotics d/t possibility of UTI. Appropriate specialties have been consulted. Pt just recently spiked first fever since admission - would closely monitor for more febrile episodes.

## 2024-03-20 NOTE — CONSULTS
"3/20/2024  Mirza Nelson Jr.   1955   [id number]            Psychiatry Initial Consult Note    Date of Admission: 3/19/2024 10:34 AM    Current Legal Status: Physician's Emergency Certificate    Chief Complaint: Psychiatric consult for "suicidal threats, refusing interventions"    SUBJECTIVE:   History of Present Illness:   Mirza Nelson Jr. is a 68 y.o. male with a history of ESRD, GERD, BPH, HLD, MDD, and anxiety disorder NOS who presented to ED on 03/19/24 from [facility] the [facility] as a level 2 trauma after a fall. Reportedly fell at 0400 and then had a second witnessed fall at 0600. Reported that he had been refusing his dialysis as well as bunching up his catheter. Physicians emergency certificate completed for verbally abusive with staff and suicidal threats. Nursing home paperwork reports "resident stated resident that he will kill himself".     Seen at the bedside today with sitter present. He is guarded and provides minimal answers. Attempted to obtain history on three separate occasions today. Would answer some questions, but mostly refuses to answer. He does report recent depressed mood and states it was "one time". He does report recent threats of suicide, but denies at present time. Spoke to patient's daughter Heather who reports no known history of psychiatric hospitalization. Reports chronic opiate pain medication use and reports episodes of finding him run out of medications early.         Past Psychiatric History:   Previous Psychiatric Hospitalizations: Denies  Previous Medication Trials: Unable to obtain  Previous Suicide Attempts: Denies   Outpatient psychiatrist: Unable to obtain    Past Medical/Surgical History:    ESRD, GERD, BPH, HLD, MDD, and anxiety disorder NOS    Family Psychiatric History:   Denies    Allergies:   Review of patient's allergies indicates:  Not on File    Substance Abuse History:   Tobacco: Unable to obtain  Alcohol: Unable to obtain  Illicit Substances: UDS " negative  Treatment: Unable to obtain      Current Medications:   Home Psychiatric Meds: Trazodone 50mg PO QHS;     Scheduled Meds:    insulin regular  0.1 Units/kg Intravenous Once    mupirocin   Nasal BID      PRN Meds: acetaminophen, acetaminophen, [COMPLETED] calcium gluconate IVPB **AND** calcium gluconate IVPB, [COMPLETED] dextrose 10% **AND** dextrose 10% **AND** insulin regular   Psychotherapeutics (From admission, onward)      None              Social History:  Housing Status: [facility]  Relationship Status/Sexual Orientation:   Children: 3  Education: High School Graduate  Employment Status/Info: Not currently working    history: Denies  History of physical/sexual abuse: Unable to obtain   Access to gun: Unable to obtain       Legal History:   Past Charges/Incarcerations: Daughter reports no known history   Pending charges: Unable to obtain      OBJECTIVE:       Vitals   Vitals:    03/20/24 0407   BP: 131/69   Pulse: (!) 115   Resp: 18   Temp: (!) 100.5 °F (38.1 °C)        Labs/Imaging/Studies:   Recent Results (from the past 48 hour(s))   EKG 12-lead    Collection Time: 03/19/24 10:49 AM   Result Value Ref Range    QRS Duration 76 ms    [facility]HS QTC Calculation 465 ms   Comprehensive metabolic panel    Collection Time: 03/19/24 10:53 AM   Result Value Ref Range    Sodium Level 139 136 - 145 mmol/L    Potassium Level 7.0 (HH) 3.5 - 5.1 mmol/L    Chloride 97 (L) 98 - 107 mmol/L    Carbon Dioxide 24 23 - 31 mmol/L    Glucose Level 84 82 - 115 mg/dL    Blood Urea Nitrogen 49.8 (H) 8.4 - 25.7 mg/dL    Creatinine 9.62 (H) 0.73 - 1.18 mg/dL    Calcium Level Total 9.0 8.8 - 10.0 mg/dL    Protein Total 7.7 (H) 5.8 - 7.6 gm/dL    Albumin Level 3.1 (L) 3.4 - 4.8 g/dL    Globulin 4.6 (H) 2.4 - 3.5 gm/dL    Albumin/Globulin Ratio 0.7 (L) 1.1 - 2.0 ratio    Bilirubin Total 1.3 <=1.5 mg/dL    Alkaline Phosphatase 53 40 - 150 unit/L    Alanine Aminotransferase 12 0 - 55 unit/L    Aspartate  Aminotransferase 34 5 - 34 unit/L    eGFR 5 mls/min/1.73/m2   Protime-INR    Collection Time: 03/19/24 10:53 AM   Result Value Ref Range    PT 17.0 (H) 12.5 - 14.5 seconds    INR 1.4 (H) <=1.3   APTT    Collection Time: 03/19/24 10:53 AM   Result Value Ref Range    PTT 36.0 (H) 23.2 - 33.7 seconds   Type & Screen    Collection Time: 03/19/24 10:53 AM   Result Value Ref Range    Group & Rh O POS     Indirect Leonel GEL NEG     Specimen Outdate 03/22/2024 23:59    Ethanol    Collection Time: 03/19/24 10:53 AM   Result Value Ref Range    Ethanol Level <10.0 <=10.0 mg/dL   CBC with Differential    Collection Time: 03/19/24 10:53 AM   Result Value Ref Range    WBC 8.71 4.50 - 11.50 x10(3)/mcL    RBC 3.24 (L) 4.70 - 6.10 x10(6)/mcL    Hgb 11.0 (L) 14.0 - 18.0 g/dL    Hct 34.4 (L) 42.0 - 52.0 %    .2 (H) 80.0 - 94.0 fL    MCH 34.0 (H) 27.0 - 31.0 pg    MCHC 32.0 (L) 33.0 - 36.0 g/dL    RDW 18.6 (H) 11.5 - 17.0 %    Platelet 178 130 - 400 x10(3)/mcL    MPV 10.4 7.4 - 10.4 fL    Neut % 88.9 %    Lymph % 3.6 %    Mono % 6.9 %    Eos % 0.1 %    Basophil % 0.3 %    Lymph # 0.31 (L) 0.6 - 4.6 x10(3)/mcL    Neut # 7.74 2.1 - 9.2 x10(3)/mcL    Mono # 0.60 0.1 - 1.3 x10(3)/mcL    Eos # 0.01 0 - 0.9 x10(3)/mcL    Baso # 0.03 <=0.2 x10(3)/mcL    IG# 0.02 0 - 0.04 x10(3)/mcL    IG% 0.2 %    NRBC% 0.0 %   Magnesium    Collection Time: 03/19/24 10:53 AM   Result Value Ref Range    Magnesium Level 1.90 1.60 - 2.60 mg/dL   Phosphorus    Collection Time: 03/19/24 10:53 AM   Result Value Ref Range    Phosphorus Level 5.6 (H) 2.3 - 4.7 mg/dL   Acetaminophen Level    Collection Time: 03/19/24 10:53 AM   Result Value Ref Range    Acetaminophen Level <17.4 (L) 17.4 - 30.0 ug/ml   ABORH RETYPE    Collection Time: 03/19/24 10:53 AM   Result Value Ref Range    ABORH Retype O POS    Troponin I    Collection Time: 03/19/24 10:53 AM   Result Value Ref Range    Troponin-I 0.032 0.000 - 0.045 ng/mL   Hepatitis B Surface Antigen    Collection  Time: 03/19/24 10:53 AM   Result Value Ref Range    Hepatitis B Surface Antigen Nonreactive Nonreactive   Urinalysis, Reflex to Urine Culture    Collection Time: 03/19/24 11:32 AM    Specimen: Urine   Result Value Ref Range    Color, UA Dark-Brown (A) Yellow, Light-Yellow, Colorless, Straw, Dark-Yellow    Appearance, UA Turbid (A) Clear    Specific Gravity, UA 1.016 1.005 - 1.030    pH, UA 8.0 5.0 - 8.5    Protein, UA 3+ (A) Negative    Glucose, UA Normal Negative, Normal    Ketones, UA Negative Negative    Blood, UA 3+ (A) Negative    Bilirubin, UA Negative Negative    Urobilinogen, UA Normal 0.2, 1.0, Normal    Nitrites, UA Negative Negative    Leukocyte Esterase,  (A) Negative    WBC, UA >100 (A) None Seen, 0-2, 3-5, 0-5 /HPF    WBC Clumps, UA Many (A) None Seen, 0-5    Bacteria, UA Many (A) None Seen, Trace /HPF    Squamous Epithelial Cells, UA Trace None Seen /HPF    Renal Epithelial Cells, UA Trace (A) None Seen /HPF    RBC, UA >100 (A) None Seen, 0-2, 3-5, 0-5 /HPF   Drug Screen, Urine    Collection Time: 03/19/24 11:32 AM   Result Value Ref Range    Amphetamines, Urine Negative Negative    Barbituates, Urine Negative Negative    Benzodiazepine, Urine Negative Negative    Cannabinoids, Urine Negative Negative    Cocaine, Urine Negative Negative    Fentanyl, Urine Negative Negative    MDMA, Urine Negative Negative    Opiates, Urine Negative Negative    Phencyclidine, Urine Negative Negative    pH, Urine 8.0 3.0 - 11.0    Specific Gravity, Urine Auto 1.016 1.001 - 1.035   POCT glucose    Collection Time: 03/19/24  1:25 PM   Result Value Ref Range    POCT Glucose 97 70 - 110 mg/dL   Basic Metabolic Panel    Collection Time: 03/20/24  8:30 AM   Result Value Ref Range    Sodium Level 140 136 - 145 mmol/L    Potassium Level 5.4 (H) 3.5 - 5.1 mmol/L    Chloride 98 98 - 107 mmol/L    Carbon Dioxide 24 23 - 31 mmol/L    Glucose Level 63 (L) 82 - 115 mg/dL    Blood Urea Nitrogen 36.8 (H) 8.4 - 25.7 mg/dL     "Creatinine 7.90 (H) 0.73 - 1.18 mg/dL    BUN/Creatinine Ratio 5     Calcium Level Total 8.5 (L) 8.8 - 10.0 mg/dL    Anion Gap 18.0 mEq/L    eGFR 7 mls/min/1.73/m2      No results found for: "PHENYTOIN", "PHENOBARB", "VALPROATE", "CBMZ"        Psychiatric Mental Status Exam:  General Appearance: appears stated age, dressed in hospital garb, lying in bed, in no acute distress  Arousal: drowsy  Behavior: reluctant to participate, minimal responses, uncooperative  Movements and Motor Activity: no tics, no tremors, no akathisia, no dystonia, no evidence of tardive dyskinesia  Orientation: Unwilling to Participate  Speech: slowed, delayed  Mood: Depressed  Affect: dysthymic  Thought Process: Unwilling to Participate  Associations: Unwilling to Participate  Thought Content and Perceptions: no suicidal ideation, no homicidal ideation  Recent and Remote Memory: Unwilling to Participate; per interview/observation with patient  Attention and Concentration: Unwilling to Participate; per interview/observation with patient  Fund of Knowledge: Unwilling to Participate; based on history, vocabulary, fund of knowledge, syntax, grammar, and content  Insight: questionable; based on understanding of severity of illness and HPI  Judgment: poor; based on patient's behavior and HPI          ASSESSMENT/PLAN:   Diagnoses:  MOOD DISORDERS; Major Depressive Disorder, Recurrent: Moderate (F33.1)         No past medical history on file.       Problem lists and Management Plans:  Sertraline 50mg PO QD  Continue PEC and 1:1  Will continue to follow      Usman Barrett   "

## 2024-03-20 NOTE — PLAN OF CARE
Problem: Infection  Goal: Absence of Infection Signs and Symptoms  3/19/2024 2253 by Agnieszka Hui LPN  Outcome: Ongoing, Progressing  3/19/2024 2252 by Agnieszka Hui LPN  Outcome: Ongoing, Progressing     Problem: Device-Related Complication Risk (Hemodialysis)  Goal: Safe, Effective Therapy Delivery  3/19/2024 2253 by Agnieszka Hui LPN  Outcome: Ongoing, Progressing  3/19/2024 2252 by Agnieszka Hui LPN  Outcome: Ongoing, Progressing     Problem: Hemodynamic Instability (Hemodialysis)  Goal: Effective Tissue Perfusion  3/19/2024 2253 by Agnieszka Hui LPN  Outcome: Ongoing, Progressing  3/19/2024 2252 by Agnieszka Hui LPN  Outcome: Ongoing, Progressing     Problem: Infection (Hemodialysis)  Goal: Absence of Infection Signs and Symptoms  3/19/2024 2253 by Agnieszka Hui LPN  Outcome: Ongoing, Progressing  3/19/2024 2252 by Agnieszka Hui LPN  Outcome: Ongoing, Progressing     Problem: Adult Inpatient Plan of Care  Goal: Plan of Care Review  3/19/2024 2253 by Agnieszka Hui LPN  Outcome: Ongoing, Progressing  3/19/2024 2252 by Agnieszka Hui LPN  Outcome: Ongoing, Progressing  Goal: Patient-Specific Goal (Individualized)  3/19/2024 2253 by Agnieszka Hui LPN  Outcome: Ongoing, Progressing  3/19/2024 2252 by Agnieszka Hui LPN  Outcome: Ongoing, Progressing  Goal: Absence of Hospital-Acquired Illness or Injury  3/19/2024 2253 by Agnieszka Hui LPN  Outcome: Ongoing, Progressing  3/19/2024 2252 by Agnieszka Hui LPN  Outcome: Ongoing, Progressing  Goal: Optimal Comfort and Wellbeing  3/19/2024 2253 by Agnieszka Hui LPN  Outcome: Ongoing, Progressing  3/19/2024 2252 by Agnieszka Hui LPN  Outcome: Ongoing, Progressing  Goal: Readiness for Transition of Care  3/19/2024 2253 by Agnieszka Hui LPN  Outcome: Ongoing, Progressing  3/19/2024 2252 by Agnieszka Hui LPN  Outcome: Ongoing, Progressing     Problem: Impaired Wound  Healing  Goal: Optimal Wound Healing  3/19/2024 2253 by Agnieszka Hui LPN  Outcome: Ongoing, Progressing  3/19/2024 2252 by Agnieszka Hui LPN  Outcome: Ongoing, Progressing

## 2024-03-20 NOTE — PROGRESS NOTES
[facility] - 5th Floor Med Surg  Wound Care    Patient Name:  Mirza Nelson Jr.   MRN:  [id number]  Date: 3/20/2024  Diagnosis: Acute confusional state    History:     No past medical history on file.    Social History     Socioeconomic History    Marital status:        Precautions:     Allergies as of 03/19/2024    (Not on File)       [facility] Assessment Details/Treatment        03/20/24 1209   WOCN Assessment   Visit Date 03/20/24   Visit Time 1209   Consult Type New   [facility] Special[facility] Wound   Intervention assessed;chart review;orders   Teaching on-going   Skin Interventions   Device Skin Pressure Protection absorbent pad utilized/changed   Pressure Reduction Devices specialty bed utilized        Altered Skin Integrity 03/19/24 1804 midline Sacral spine #1  Partial thickness tissue loss. Shallow open ulcer with a red or pink wound bed, without slough. Intact or Open/Ruptured Serum-filled blister.   Date First Assessed/Time First Assessed: 03/19/24 1804   Altered Skin Integrity Present on Admission - Did Patient arrive to the hospital with altered skin?: yes  Orientation: midline  Location: Sacral spine  Wound Number: #1  Primary Wound Type: (c) ...   Wound Image    Dressing Appearance Dry;Intact;Clean   Drainage Amount None   Drainage Characteristics/Odor No odor   Appearance Pink;Epithelialization   Tissue loss description Not applicable   Periwound Area Scar tissue;Ecchymotic   Wound Edges   (closed)   Care Cleansed with:;Sterile normal saline   Dressing Applied;Foam     Wound care consulted for sacrum. One on One with sitter at bedside.  Treatment recommendations Sacrum: Cleanse with NS. Apply sacral foam dressing. Change daily and monitor. BLE: Apply aquaphor BID. Keep areas clean and dry, no adult briefs while in bed. Nursing to continue with turning every two hours, wedge, and floating heels.  Head of bed elevated, bed in lowest position, and call bell within reach. SHAWN mattress ordered. Will  follow up.    03/20/2024

## 2024-03-21 PROBLEM — E46 MALNUTRITION: Status: ACTIVE | Noted: 2024-01-01

## 2024-03-21 NOTE — PROGRESS NOTES
Pharmacist Renal Dose Adjustment Note    Mirza Nelson Jr. is a 68 y.o. male being treated with the medication Zosyn    Patient Data:    Vital Signs (Most Recent):  Temp: 99 °F (37.2 °C) (03/21/24 0900)  Pulse: 104 (03/21/24 0900)  Resp: (!) 42 (03/21/24 0900)  BP: 106/68 (03/21/24 0900)  SpO2: 100 % (03/21/24 0900) Vital Signs (72h Range):  Temp:  [97.3 °F (36.3 °C)-100.9 °F (38.3 °C)]   Pulse:  []   Resp:  [16-52]   BP: ()/(58-96)   SpO2:  [19 %-100 %]      Recent Labs   Lab 03/19/24  1053 03/20/24  0830 03/21/24  0411   CREATININE 9.62* 7.90* 6.16*     Serum creatinine: 6.16 mg/dL (H) 03/21/24 0411  Estimated creatinine clearance: 10.5 mL/min (A)    Medication: Zosyn dose: 3.375 g frequency q8h will be changed to medication: Zosyn dose:4.5 g frequency: q12h    Pharmacist's Name: José Miguel Hardy  Pharmacist's Extension: [phone]

## 2024-03-21 NOTE — AI DETERIORATION ALERT
Artificial Intelligence Notification  [facility]  [address] [place] [address]  Phone: [phone]    This documentation was triggered by an Artificial Intelligence Notification:    Admit Date: 3/19/2024   LOS: 2  Code Status: Full Code  : 1955  Age: 68 y.o.  Weight:   Wt Readings from Last 1 Encounters:   24 72.6 kg (160 lb)        Sex: male  Bed: 533/[facility] A  MRN: [id number]  Attending Physician: Isidro Dunne MD     Date of Alert: 2024  Time AI Alert Received: 0830            Vitals:    24 0700   BP: 115/73   Pulse: 106   Resp: (!) 41   Temp: 99.4 °F (37.4 °C)     SpO2: 100 %      Artificial Intelligence alert discussed with Provider:     Name:    Date/Time of Provider Notification:       Patient Condition: Pt minimally responsive & tachypneic. ABGs ordered, pulm consulted. CBG 55,d10 bolus given. Renal& Pulm NP assessed pt, will treat hypoglycemia, adjust abx coverage for uti, & consult urology to do nielsen exchange. Goals of care discussion pending.

## 2024-03-21 NOTE — ASSESSMENT & PLAN NOTE
Malnutrition Type:  Context: chronic illness  Level: moderate    Related to (etiology):   Chronic illness    Signs and Symptoms (as evidenced by):   Muscle and fat loss    Malnutrition Characteristic Summary:  Weight Loss (Malnutrition):  (unable to determine, no wt hx in EMR)  Energy Intake (Malnutrition):  (unable to determine)  Subcutaneous Fat (Malnutrition): mild depletion  Muscle Mass (Malnutrition): severe depletion  Fluid Accumulation (Malnutrition):  (does not meet criteria)  Hand  Strength, Left (Malnutrition): unable to determine  Hand  Strength, Right (Malnutrition): unable to determine      Interventions/Recommendations (treatment strategy):   Oral diet advancement as medically appropriate or if not feasible, enteral nutrition for nutrition support    Nutrition Diagnosis Status:   New

## 2024-03-21 NOTE — CONSULTS
Inpatient consult to Palliative Care  Consult performed by: Darrius Daley MD  Consult ordered by: Estella Heaton FNP  Reason for consult: Goals of Care      Patient Name: Mirza Nelson Jr.   MRN: [id number]   Admission Date: 3/19/2024   Hospital Length of Stay: 2   Attending Provider: Darrius Daley MD   Consulting Provider: Fátima Abad, XAVIN-RN, MLS-HCL  Reason for Consult: Goals of Care  Primary Care Physician: NILESH Pena MD     Principal Problem: Acute confusional state     Patient information was obtained from ER records and primary team.      Final diagnoses:  [W19.XXXA] Fall  [N18.6, Z99.2] ESRD (end stage renal disease) on dialysis (Primary)  [Z91.199] Noncompliance  [I10] Benign essential HTN  [D64.9] Chronic anemia  [R45.851] Suicidal ideation  [E87.5] Hyperkalemia       Assessment/Plan:     Palliative team will review family's understanding of the seriousness of the illness and its expected prognosis and develop goals of care and treatment preferences on 03/22 at bedside.  Spoke to Palliative Team Physician/NP - reviewed patient's current status and plan of care in detail. The following recommendations/orders were given:     Continue Current Treatment, Palliative Will reach out to family on 03/22 to discuss goals of care    Advance Care Planning     Date: 03/22/2024    Code Status  In light of the patients advanced and life limiting illness,I reviewed the patient's  chart and a LaPOST was located in the patient's nursing home records signed on 3/11/2024 stating FULL CODE, CPR, Treatment and Election of Tube   in a voluntary conversation about the patient's preferences for care  at the very end of life. The patient wishes to have a natural, peaceful death.  Along those lines, the patient does not wish to have CPR or other invasive treatments performed when his heart and/or breathing stops. I communicated to the patient that a LaPOST form was completed to reflect other EOL preferences of  "the patient such as completed at the nursing home signed by the patient . Patient's daughter reported to the medical team to follow the directions of the patient's LaPOST until her brother (the patient's son arrives and he will make all decisions on patient behalf).       Palliative Team will continue to provided emotional support, education, and make adjustments to plan of care to meet patient's needs throughout hospital stay.         Objective:   /75   Pulse (!) 117   Temp 99.3 °F (37.4 °C)   Resp (!) 42   Ht 5' 6" (1.676 m)   Wt 72.6 kg (160 lb)   SpO2 100%   BMI 25.82 kg/m²      PAINAD: NA    Physical Exam  Constitutional:       Interventions: He is sedated and intubated.   Pulmonary:      Effort: He is intubated.        Review of Symptoms      Symptom Assessment (ESAS 0-10 Scale)  Pain:  0  Dyspnea:  0  Anxiety:  0  Nausea:  0  Depression:  0  Anorexia:  0  Fatigue:  0  Insomnia:  0  Restlessness:  0  Agitation:  0         Living Arrangements:  Lives in nursing home    Psychosocial/Cultural:   See Palliative Psychosocial Note: Yes  **Primary  to Follow**  Palliative Care  Consult: No      Advance Care Planning   Advance Directives:   LaPOST: Yes      Decision Making:  Patient unable to communicate due to disease severity/cognitive impairment  Goals of Care: The family endorses that what is most important right now is to focus on curative/life-prolongation (regardless of treatment burdens)    Accordingly, we have decided that the best plan to meet the patient's goals includes continuing with treatment             FAMILY CONTACTS: Heather Berger Daughter    Caregiver burden formerly assessed: Yes        XAVI GossN-RN, MLS-HCL  Palliative Medicine  [facility] - Palliative Team    "

## 2024-03-21 NOTE — CONSULTS
[facility] - 5th Floor Med Surg  Pulmonary Critical Care Note    Patient Name: Mirza Nelson Jr.  MRN: [id number]  Admission Date: 3/19/2024  Hospital Length of Stay: 2 days  Code Status: No Order  Attending Provider: Isidro Dunne MD  Primary Care Provider: NILESH Pena MD     Subjective:     HPI:   This is a 68-year-old male with a history of end-stage renal disease on HD MWF, GERD, BPH, hyperlipidemia, MDD and anxiety who presented to the ED on 03/19/2024 from leave the [facility] after a fall.  Patient was reportedly showing symptoms of confusion as well as suicidal ideations.  He was reportedly refusing dialysis. He was just recently admitted and discharged on 03/17/2024 during that time he was treated for ESBL E coli UTI with Rocephin and Zosyn.  Of note, he was also hospitalized on 2/18/2024 at [facility].  He was diagnosed with a NSTEMI and underwent left heart catheterization with PTCA with stent to the LAD on 02/23.  He was  followed by Urology at that time due to hematuria and has a chronic indwelling catheter.  He was also intubated during the stay for respiratory failure. (Chart is marked for merge, these records are in his previous chart).  Initial chest x-ray in the ED showed enlarged cardiac silhouette with vascular congestion.  Urine culture from 03/19 currently growing Gram-negative rods.  Blood cultures collected on 03/20 remain in process.  Patient reportedly had an aspiration event yesterday in dialysis following an episode of emesis.  His oxygen requirements have increased since that time.  His mentation has also declined.  CTA of head obtained overnight with no acute intracranial abnormalities.  ABGs obtained overnight revealed a pH of 7.35, pCO2 of 53, PO2 59, bicarb 29.  He was placed on BiPAP.  Pulmonary was consulted for hypoxemic hypercapnia along with possible aspiration pneumonia.  Upon arrival to assess patient, the rapid response team was present.  Patient  remains tachypneic with an labored breathing.  He responds to pain however does not communicate.  CABG revealed a glucose of 55 and he was started on a D10 infusion.  Repeat ABGs on BiPAP 14/8 and 65% revealed a pH of 7.45, pCO2 of 41, PO2 of 123, bicarb of 28.  Per report patient was awake oriented as of yesterday.    Hospital Course/Significant events:      24 Hour Interval History:      No past medical history on file.    No past surgical history on file.    Social History     Socioeconomic History    Marital status:      Social Determinants of Health     Financial Resource Strain: Low Risk  (3/20/2024)    Overall Financial Resource Strain (CARDIA)     Difficulty of Paying Living Expenses: Not hard at all   Food Insecurity: No Food Insecurity (3/20/2024)    Hunger Vital Sign     Worried About Running Out of Food in the Last Year: Never true     Ran Out of Food in the Last Year: Never true   Transportation Needs: No Transportation Needs (3/20/2024)    PRAPARE - Transportation     Lack of Transportation (Medical): No     Lack of Transportation (Non-Medical): No   Stress: No Stress Concern Present (3/20/2024)    [employer] [place] [employer] - Occupational Stress Questionnaire     Feeling of Stress : Not at all   Housing Stability: Low Risk  (3/20/2024)    Housing Stability Vital Sign     Unable to Pay for Housing in the Last Year: No     Number of Places Lived in the Last Year: 1     Unstable Housing in the Last Year: No           No current outpatient medications    Current Inpatient Medications   cefTRIAXone (Rocephin) IV (PEDS and ADULTS)  1 g Intravenous Q24H    insulin regular  0.1 Units/kg Intravenous Once    mupirocin   Nasal BID    sertraline  50 mg Oral Daily    white petrolatum   Topical (Top) BID       Current Intravenous Infusions   sodium chloride 0.9% Stopped (03/20/24 2200)         Review of Systems   Unable to perform ROS: Mental status change          Objective:        Intake/Output Summary (Last 24 hours) at 3/21/2024 0905  Last data filed at 3/20/2024 1850  Gross per 24 hour   Intake 223 ml   Output 0 ml   Net 223 ml         Vital Signs (Most Recent):  Temp: 99.4 °F (37.4 °C) (03/21/24 0700)  Pulse: 106 (03/21/24 0700)  Resp: (!) 41 (03/21/24 0700)  BP: 115/73 (03/21/24 0700)  SpO2: 100 % (03/21/24 0700)  Body mass index is 25.82 kg/m².  Weight: 72.6 kg (160 lb) Vital Signs (24h Range):  Temp:  [98 °F (36.7 °C)-99.7 °F (37.6 °C)] 99.4 °F (37.4 °C)  Pulse:  [] 106  Resp:  [20-52] 41  SpO2:  [92 %-100 %] 100 %  BP: ()/(58-73) 115/73     Physical Exam  Constitutional:       Appearance: He is ill-appearing.      Interventions: Face mask in place.   Cardiovascular:      Rate and Rhythm: Regular rhythm. Tachycardia present.   Pulmonary:      Effort: Tachypnea, accessory muscle usage and respiratory distress present.      Comments: Coarse breath sounds bilaterally  Genitourinary:     Comments: Bladder tenderness; chronic indwelling foloey  Neurological:      Mental Status: He is lethargic.           Lines/Drains/Airways       Drain  Duration                  Urethral Catheter 03/20/24 1 day              Peripheral Intravenous Line  Duration                  Peripheral IV - Single Lumen 03/20/24 22 G Left Wrist 1 day                    Significant Labs:    Lab Results   Component Value Date    WBC 3.45 (L) 03/21/2024    WBC 3.45 03/21/2024    HGB 9.1 (L) 03/21/2024    HCT 30.0 (L) 03/21/2024    .5 (H) 03/21/2024     (L) 03/21/2024           BMP  Lab Results   Component Value Date     03/21/2024    K 3.9 03/21/2024    CHLORIDE 101 03/21/2024    CO2 22 (L) 03/21/2024    BUN 27.4 (H) 03/21/2024    CREATININE 6.16 (H) 03/21/2024    CALCIUM 8.2 (L) 03/21/2024    AGAP 18.0 03/20/2024         ABG  Recent Labs   Lab 03/21/24  0050   PH 7.350   PO2 59.0*   PCO2 53.0*   HCO3 29.3*   POCBASEDEF 2.90*       Mechanical Ventilation Support:  Oxygen Concentration  (%): 65 (03/21/24 0400)      Significant Imaging:  I have reviewed the pertinent imaging within the past 24 hours.        Assessment/Plan:     Assessment  Acute hypoxic and hypercapnic respiratory failure  Altered mental status  Pulmonary vascular congestion per CXR  Possible aspiration event  UTI with chronic indwelling folowy  ESRD on MWF HD  Hypoglycemia      Plan  Repeat ABG obtained, patient is currently compensated. FiO2 was weaned.   Currently receiving D10 for hypoglycemia  CXR overnight with what appears to be increasing vascular congestion  Urology was notified of patient and will be coming exchange nielsen. Last urine culture with ESBL E. Coli, likely needs to switch antibiotics. He is currently only on rocephin. Will add Zosyn for possible aspiration pneumonia and E. Coli coverage.  Urine and blood cultures pending  HD per nephrology  Tried to call daughter to discuss goals of care. She did not answer, will try again. He is a high potential for further decline and possible upgrade to ICU. Will continue to monitor closely         WES Peterson  Pulmonary Critical Care Medicine  [facility] - 5th Floor Med Surg  DOS: 03/21/2024

## 2024-03-21 NOTE — PROGRESS NOTES
Inpatient Nutrition Assessment    Admit Date: 3/19/2024   Total duration of encounter: 2 days   Patient Age: 68 y.o.    Nutrition Recommendation/Prescription     Oral diet as medically appropriate. Recommend renal, dialysis diet.     If aggressive nutrition support warranted, recommend enteral nutrition to best meet patient's estimated energy needs.   Tube feeding recommendations:     Novasource with goal rate of 55ml/hr would provide: 2200kcals (101% est needs), 99g protein (100% est needs), 792ml free water.     Communication of Recommendations: reviewed with nurse    Nutrition Assessment     Malnutrition Assessment/Nutrition-Focused Physical Exam    Malnutrition Context: chronic illness (03/21/24 1020)  Malnutrition Level: moderate (03/21/24 1020)  Energy Intake (Malnutrition):  (unable to determine) (03/21/24 1020)  Weight Loss (Malnutrition):  (unable to determine, no wt hx in EMR) (03/21/24 1020)  Subcutaneous Fat (Malnutrition): mild depletion (03/21/24 1020)     Upper Arm Region (Subcutaneous Fat Loss): mild depletion     Muscle Mass (Malnutrition): severe depletion (03/21/24 1020)  [place] Region (Muscle Loss): mild depletion  Clavicle Bone Region (Muscle Loss): severe depletion  Clavicle and Acromion Bone Region (Muscle Loss): severe depletion                 Fluid Accumulation (Malnutrition):  (does not meet criteria) (03/21/24 1022)  Hand  Strength, Left (Malnutrition): unable to determine (03/21/24 1022)  Hand  Strength, Right (Malnutrition): unable to determine (03/21/24 1022)  A minimum of two characteristics is recommended for diagnosis of either severe or non-severe malnutrition.    Chart Review    Reason Seen: continuous nutrition monitoring and malnutrition screening tool (MST)    Malnutrition Screening Tool Results   Have you recently lost weight without trying?: Yes: Unsure how much  Have you been eating poorly because of a decreased appetite?: No   MST Score: 2   Diagnosis:  Acute  hypoxic and hypercapnic respiratory failure  Altered mental status  Pulmonary vascular congestion per CXR  Possible aspiration event  UTI with chronic indwelling folowy  ESRD on MWF HD  Hypoglycemia    Relevant Medical History: HD MWF, GERD, BPH, hyperlipidemia, MDD and anxiety     Scheduled Medications:  cefTRIAXone (Rocephin) IV (PEDS and ADULTS), 1 g, Q24H  insulin regular, 0.1 Units/kg, Once  mupirocin, , BID  piperacillin-tazobactam (Zosyn) IV (PEDS and ADULTS) (extended infusion is not appropriate), 3.375 g, Q6H  piperacillin-tazobactam (Zosyn) IV (PEDS and ADULTS) (extended infusion is not appropriate), 4.5 g, Once  sertraline, 50 mg, Daily  white petrolatum, , BID    Continuous Infusions:  sodium chloride 0.9%, Last Rate: Stopped (03/20/24 2200)    PRN Medications: acetaminophen, acetaminophen, [COMPLETED] calcium gluconate IVPB **AND** calcium gluconate IVPB, ondansetron    Calorie Containing IV Medications: no significant kcals from medications at this time    Recent Labs   Lab 03/19/24  1053 03/20/24  0830 03/21/24  0411    140 138   K 7.0* 5.4* 3.9   CALCIUM 9.0 8.5* 8.2*   PHOS 5.6*  --   --    MG 1.90  --   --    CHLORIDE 97* 98 101   CO2 24 24 22*   BUN 49.8* 36.8* 27.4*   CREATININE 9.62* 7.90* 6.16*   EGFRNORACEVR 5 7 9   GLUCOSE 84 63* 70*   BILITOT 1.3  --  1.1   ALKPHOS 53  --  38*   ALT 12  --  9   AST 34  --  18   ALBUMIN 3.1*  --  2.4*   WBC 8.71  --  3.45  3.45*   HGB 11.0*  --  9.1*   HCT 34.4*  --  30.0*     Nutrition Orders:  Diet Adult Regular    Appetite/Oral Intake: poor/0-25% of meals  Factors Affecting Nutritional Intake: impaired cognitive status/motor control and respiratory status  Food/Sikh/Cultural Preferences: none reported  Food Allergies: none reported  Last Bowel Movement: 03/19/24  Wound(s):     Altered Skin Integrity 03/19/24 1804 midline Sacral spine #1  Partial thickness tissue loss. Shallow open ulcer with a red or pink wound bed, without slough. Intact or  "Open/Ruptured Serum-filled blister.-Tissue loss description: Not applicable     Comments    3/21/24 Poor intake since admit. Currently on BiPAP, report of possible aspiration in dialysis post-emesis. Muscle and fat loss observed. Unable to obtain any subjective history on weight or appetite prior to admit. Will leave tube feeding recommendations above if aggressive nutrition therapy warranted.     Anthropometrics    Height: 5' 6" (167.6 cm), Height Method: Stated  Last Weight: 72.6 kg (160 lb) (03/19/24 2200), Weight Method: Bed Scale  BMI (Calculated): 25.8  BMI Classification: overweight (BMI 25-29.9)        Ideal Body Weight (IBW), Male: 142 lb     % Ideal Body Weight, Male (lb): 112.68 %                          Usual Weight Provided By: unable to obtain usual weight    Wt Readings from Last 5 Encounters:   03/19/24 72.6 kg (160 lb)     Weight Change(s) Since Admission:   Wt Readings from Last 1 Encounters:   03/19/24 2200 72.6 kg (160 lb)   03/19/24 1039 72.6 kg (160 lb)   Admit Weight: 72.6 kg (160 lb) (03/19/24 1039), Weight Method: Stated    Estimated Needs    Weight Used For Calorie Calculations: 72.6 kg (160 lb 0.9 oz)  Energy Calorie Requirements (kcal): 2178kcals/d (30kcals/kg)  Energy Need Method: Kcal/kg  Weight Used For Protein Calculations: 72.6 kg (160 lb 0.9 oz)  Protein Requirements: 95-109g/d (1.3-1.5g/kg)  Fluid Requirements (mL): 1000ml + UOP (on dialysis)    Enteral Nutrition     Patient not receiving enteral nutrition at this time.    Parenteral Nutrition     Patient not receiving parenteral nutrition support at this time.    Evaluation of Received Nutrient Intake    Calories: not meeting estimated needs  Protein: not meeting estimated needs    Patient Education     Not applicable.    Nutrition Diagnosis     PES: Inadequate oral intake related to acute illness as evidenced by <50% EER since admit. (new)     PES: Moderate starvation related malnutrition related to chronic illness as evidenced " by mild fat depletion and severe muscle depletion. (new)    Nutrition Interventions     Intervention(s): general/healthful diet, modified composition of enteral nutrition, modified rate of enteral nutrition, commercial beverage, and collaboration with other providers    Goal: Meet greater than 80% of nutritional needs by follow-up. (new)  Goal: Maintain weight throughout hospitalization. (new)    Nutrition Goals & Monitoring     Dietitian will monitor: energy intake, weight change, electrolyte/renal panel, and glucose/endocrine profile    Nutrition Risk/Follow-Up: high (follow-up in 1-4 days)   Please consult if re-assessment needed sooner.

## 2024-03-21 NOTE — NURSING
Report called to Jd in ICU, discussed plan of care, requirements, and pt code status. Pt remains on Bipap 50% resting in bed. IV KVO, pt clean and dry. Family notified of transfer.

## 2024-03-21 NOTE — NURSING
Artificial Intelligence Notification  [facility]  [address]  [place] [address]  Phone: [phone]     This documentation was triggered by an Artificial Intelligence Notification.     Admit Date: 3/19/2024   LOS: 2  Code Status: Full Code  : 1955  Age: 68 y.o.  Weight:   Wt Readings from Last 1 Encounters:   24 72.6 kg (160 lb)        Sex: male  Bed: 533/533 A  MRN: [id number]  Attending Physician: Isidro Dunne MD     Date of Alert: 2024  Time AI Alert Received: 0000             Vitals:    24 2350   BP:    Pulse:    Resp: (!) 52   Temp:        Artificial Intelligence alert discussed with Provider:     Name: Agnieszka and Dr. Hackett   Date/Time of Provider Notification: 0035      Patient Condition: Upon arrival patient is tachypnic and gurgling and not very responsive. Ordering ABGs and chest x-ray now. CT head yesterday was negative. Received fluid in HD due to patient vomiting and having multiple bowel movements, definitely sounds overloaded. Will snog him and put him on bipap and then come back to reasses.

## 2024-03-21 NOTE — PROCEDURES
[facility] - 7th Floor ICU  Endotracheal Intubation  Procedure Note    SUMMARY     Date of Procedure: 3/21/2024    Procedure:  Oral endotracheal intubation    Provider: Darrius Daley MD    Assisting Provider:  None    Indication: respiratory failure.    Pre-Procedure Diagnosis:  Respiratory failure    Post-Procedure Diagnosis:  Respiratory failure    Anesthesia:  Etomidate 20 mg IV push followed by rocuronium 50 mg IV push    Technical Procedures Used:  Patient orally intubated with 7.5 ET tube.    Description of the Findings of the Procedure:     Sedation: etomidate.  Paralytic: rocuronium.  Lidocaine: no.  Atropine: no.  Equipment: Benjamin 3 laryngoscope blade.  Cricoid Pressure: no.  Number of attempts: 1.  ETT location confirmed by ETCO2 monitor.    Significant Surgical Tasks Conducted by the Assistant(s), if Applicable:  None    Complications: None; patient tolerated the procedure well.    Estimated Blood Loss (EBL): Minimal           Implants:  None    Specimens:  None       Condition: Critical        Disposition: ICU - intubated and hemodynamically stable.    Attestation: I was present and scrubbed for the entire procedure.

## 2024-03-21 NOTE — PLAN OF CARE
03/21/24 1316   Discharge Assessment   Assessment Type Discharge Planning Assessment   Confirmed/corrected address, phone number and insurance Yes   Confirmed Demographics Correct on Facesheet   Source of Information family   Communicated MEHRAN with patient/caregiver Date not available/Unable to determine   Reason For Admission Suicidal ideation   People in Home alone   Facility Arrived From: [facility]   Do you expect to return to your current living situation? Other (see comments)  (TBD, patient is currently critical)   Do you have help at home or someone to help you manage your care at home? No   Prior to hospitilization cognitive status: Unable to Assess   Current cognitive status: Unable to Assess   Walking or Climbing Stairs Difficulty yes   Walking or Climbing Stairs ambulation difficulty, requires equipment   Mobility Management RW,Rollator   Dressing/Bathing Difficulty no   Home Accessibility wheelchair accessible   Home Layout Able to live on 1st floor   Equipment Currently Used at Home walker, rolling;rollator   Do you currently have service(s) that help you manage your care at home? No   Do you take prescription medications? Yes   Do you have prescription coverage? Yes   Coverage [employer]   Who is going to help you get home at discharge? TBD   How do you get to doctors appointments? health plan transportation   Are you on dialysis? Yes   Dialysis Name and Scheduled days [facility]   Do you take coumadin? No   Discharge Plan A Hospice/home;Inpatient Hospice   Discharge Plan B Skilled Nursing Facility   DME Needed Upon Discharge  other (see comments)  (tbd)   Discharge Plan discussed with: Adult children  (Heather)   Transition of Care Barriers Mental illness  (Currently under PEC)     Patient arrived from [facility] and was being skilled at the facility. Patient unable to complete dc assessment due to critical status. Contacted patient's daughter Heather, she explains her and patient are not  close and she is not comfortable making medical decisions on patient's behalf. Informs CM her brother will be in town tomorrow and jania make any medical decisions if necessary. Patient has life insurance policy with [employer], per Heather. Patient does have a LAPost in his chart that was signed on 3/11/24. CM will continue to follow patient for needs, at this time dc disposition is unclear.

## 2024-03-21 NOTE — CONSULTS
Mirza Nelson . 1955  [id number]  3/21/2024    CONSULTING PHYSICIAN: Esthela Narayanan NP    REASON FOR CONSULTATION: suprapubic discomfort, chronic nielsen ? Not functioning    HPI: The patient is a 68-year-old male who is known to Dr. Navarrete with chronic indwelling Nielsen and BPH.  Additional past medical history includes hypertension, ESRD on HD MWF.  He was recently admitted and treated for ESBL E coli UTI, discharge on 03/11/2024.  The patient was seen in the office on 03/14/2024 and Nielsen catheter was exchanged over guidewire.  Plan was to return in 5 weeks for Nielsen exchange.  He presented to the emergency room on 03/19/[facility]24 [facility] following a fall.  He was reportedly having some confusion and suicidal ideations and refusing dialysis.  Urine culture from 03/19/2024 with Gram-negative rods.  Blood cultures with no growth at 24hrs.  Urology was called due to suprapubic discomfort with minimal UOP from nielsen catheter, bladder scan with about 200ml urine. No improvement with Uop after catheter irrigated. Labs this morning WBC 3.45, H&H 9.1/30.0, Bun/Cr 27.4/6.16.    His nielsen was exchanged yesterday. He is on BiPAP, plans for transfer to ICU. Sitter at bedside. He is lethargic, does not interact during visit.    No past medical history on file.  No past surgical history on file.  No family history on file.     Current Facility-Administered Medications   Medication Dose Route Frequency Provider Last Rate Last Admin    0.9%  NaCl infusion   Intravenous Continuous Esthela Narayanan, KARLOS   Held at 03/20/24 2200    acetaminophen suppository 325 mg  325 mg Rectal Q4H PRN Isidro Dunne MD   325 mg at 03/20/24 0454    acetaminophen tablet 650 mg  650 mg Oral Q6H PRN Isidro Dunne MD   650 mg at 03/20/24 0955    calcium gluconate 1 g in NS IVPB (premixed)  1 g Intravenous Q10 Min PRN Deanna Mckeon MD        cefTRIAXone (Rocephin) 1 g in dextrose 5 % in water (D5W) 100 mL  IVPB (MB+)  1 g Intravenous Q24H Isidro Dunne  mL/hr at 03/21/24 0834 1 g at 03/21/24 0834    insulin regular injection 7.26 Units 0.0726 mL  0.1 Units/kg Intravenous Once Deanna Mckeon MD        mupirocin 2 % ointment   Nasal BID Esthela Narayanan S, AGNP   Given at 03/21/24 0831    ondansetron injection 4 mg  4 mg Intravenous Q6H PRN Isidro Dunne MD        sertraline tablet 50 mg  50 mg Oral Daily Usman Barrett NP   50 mg at 03/20/24 1525    white petrolatum 41 % ointment   Topical (Top) BID Isidro Dunne MD   Given at 03/21/24 0831     Review of patient's allergies indicates:  Not on File    ROS: Unable to obtain s/t AMS    PHYSICAL EXAM:  Vitals:    03/21/24 0216 03/21/24 0345 03/21/24 0504 03/21/24 0700   BP: 98/64  108/72 115/73   Pulse: 96 95 105 106   Resp:  (!) 44 (!) 42 (!) 41   Temp:   99.7 °F (37.6 °C) 99.4 °F (37.4 °C)   TempSrc:       SpO2: 98% 100% 100% 100%   Weight:       Height:           Intake/Output Summary (Last 24 hours) at 3/21/2024 0939  Last data filed at 3/20/2024 1850  Gross per 24 hour   Intake 223 ml   Output 0 ml   Net 223 ml       GEN:  NAD  HEENT: normocephalic, atraumatic  CV: RRR  RESP: on BiPAP  ABD: soft, NTND  : Nielsen balloon deflated and catheter inserted all the way to hub without difficulty; hand irrigated nielsen, some sediment/mucous clot obtained initially followed by minimal cloudy yellow urine.   NEURO: lethargic    LABS:  Recent Results (from the past 24 hour(s))   POCT glucose    Collection Time: 03/20/24  9:06 PM   Result Value Ref Range    POCT Glucose 64 (L) 70 - 110 mg/dL   POCT glucose    Collection Time: 03/20/24  9:25 PM   Result Value Ref Range    POCT Glucose 94 70 - 110 mg/dL   RT Blood Gas    Collection Time: 03/21/24 12:50 AM   Result Value Ref Range    Sample Type Arterial Blood     Sample site Right Radial Artery     Drawn by yennifer rt     pH, Blood gas 7.350 7.350 - 7.450    pCO2, Blood gas 53.0 (HH) 35.0 - 45.0 mmHg    pO2,  Blood gas 59.0 (L) 80.0 - 100.0 mmHg    Sodium, Blood Gas 135 (L) 137 - 145 mmol/L    Potassium, Blood Gas 3.8 3.5 - 5.0 mmol/L    Calcium Level Ionized 1.08 (L) 1.12 - 1.23 mmol/L    TOC2, Blood gas 30.9 mmol/L    Base Excess, Blood gas 2.90 (H) -2.00 - 2.00 mmol/L    sO2, Blood gas 88.7 %    HCO3, Blood gas 29.3 (H) 22.0 - 26.0 mmol/L    THb, Blood gas 10.0 (L) 12 - 16 g/dL    O2 Hb, Blood Gas 84.9 (L) 94.0 - 97.0 %    CO Hgb 3.3 (H) 0.5 - 1.5 %    Met Hgb 1.3 0.4 - 1.5 %    Allens Test Yes     Oxygen Device, Blood gas Oxy Mask     LPM 5.0    Comprehensive Metabolic Panel    Collection Time: 03/21/24  4:11 AM   Result Value Ref Range    Sodium Level 138 136 - 145 mmol/L    Potassium Level 3.9 3.5 - 5.1 mmol/L    Chloride 101 98 - 107 mmol/L    Carbon Dioxide 22 (L) 23 - 31 mmol/L    Glucose Level 70 (L) 82 - 115 mg/dL    Blood Urea Nitrogen 27.4 (H) 8.4 - 25.7 mg/dL    Creatinine 6.16 (H) 0.73 - 1.18 mg/dL    Calcium Level Total 8.2 (L) 8.8 - 10.0 mg/dL    Protein Total 6.0 5.8 - 7.6 gm/dL    Albumin Level 2.4 (L) 3.4 - 4.8 g/dL    Globulin 3.6 (H) 2.4 - 3.5 gm/dL    Albumin/Globulin Ratio 0.7 (L) 1.1 - 2.0 ratio    Bilirubin Total 1.1 <=1.5 mg/dL    Alkaline Phosphatase 38 (L) 40 - 150 unit/L    Alanine Aminotransferase 9 0 - 55 unit/L    Aspartate Aminotransferase 18 5 - 34 unit/L    eGFR 9 mls/min/1.73/m2   CBC with Differential    Collection Time: 03/21/24  4:11 AM   Result Value Ref Range    WBC 3.45 (L) 4.50 - 11.50 x10(3)/mcL    RBC 2.69 (L) 4.70 - 6.10 x10(6)/mcL    Hgb 9.1 (L) 14.0 - 18.0 g/dL    Hct 30.0 (L) 42.0 - 52.0 %    .5 (H) 80.0 - 94.0 fL    MCH 33.8 (H) 27.0 - 31.0 pg    MCHC 30.3 (L) 33.0 - 36.0 g/dL    RDW 18.5 (H) 11.5 - 17.0 %    Platelet 116 (L) 130 - 400 x10(3)/mcL    MPV 11.2 (H) 7.4 - 10.4 fL    NRBC% 0.0 %    IPF 4.5 0.9 - 11.2 %   Manual Differential    Collection Time: 03/21/24  4:11 AM   Result Value Ref Range    WBC 3.45 x10(3)/mcL    Neutrophils % 84 %    Lymphs % 2 %     Monocytes % 12 %    Metamyelocytes % 2 (H) <=0 %    Neutrophils Abs 2.898 2.1 - 9.2 x10(3)/mcL    Lymphs Abs 0.069 (L) 0.6 - 4.6 x10(3)/mcL    Monocytes Abs 0.414 0.1 - 1.3 x10(3)/mcL    Platelets Decreased (A) Normal, Adequate    RBC Morph Abnormal (A) Normal    Poikilocytosis 1+ (A) (none)    Anisocytosis 1+ (A) (none)    Macrocytosis 2+ (A) (none)    [place] Cells 1+ (A) (none)    Toxic Granulation 1+ (A) (none)    Vacuolated Grans 2+ (A) (none)   POCT glucose    Collection Time: 03/21/24  8:55 AM   Result Value Ref Range    POCT Glucose 55 (L) 70 - 110 mg/dL   RT Blood Gas    Collection Time: 03/21/24  9:03 AM   Result Value Ref Range    Sample Type Arterial Blood     Sample site Left Radial Artery     Drawn by sd rrt     pH, Blood gas 7.450 7.350 - 7.450    pCO2, Blood gas 41.0 35.0 - 45.0 mmHg    pO2, Blood gas 123.0 (H) 80.0 - 100.0 mmHg    Sodium, Blood Gas 132 (L) 137 - 145 mmol/L    Potassium, Blood Gas 4.4 3.5 - 5.0 mmol/L    Calcium Level Ionized 1.08 (L) 1.12 - 1.23 mmol/L    TOC2, Blood gas 29.8 mmol/L    Base Excess, Blood gas 4.10 (H) -2.00 - 2.00 mmol/L    sO2, Blood gas 98.9 %    HCO3, Blood gas 28.5 (H) 22.0 - 26.0 mmol/L    THb, Blood gas 9.6 (L) 12 - 16 g/dL    O2 Hb, Blood Gas 96.6 94.0 - 97.0 %    CO Hgb 2.4 (H) 0.5 - 1.5 %    Met Hgb 0.9 0.4 - 1.5 %    Allens Test Yes     MODE BiPAP     FIO2, Blood gas 65 %    Spont RR 45 b/min    BiPAP (I) 14 cm H2O    BiPAP (E) 8 cm H2O       IMAGING:  No urologic imaging      ASSESSMENT:  -BPH with chronic indwelling nielsen, exchanged yesterday  -UTI - UC with GNRl; h/o ESBL e coli, currently on Zosyn  -AMS  -respiratory failure on BiPAP, possible aspiration  -ESRD on HD    PLAN:  -Nielsen catheter in place and irrigates appropriately. Irrigate as needed to maintain patency  -Additional recs per attending/consultants.  -Please call as needed with any urologic issues.       Kiki Marin NP

## 2024-03-21 NOTE — NURSING
03/20/24 1850   Post-Hemodialysis Assessment   Blood Volume Processed (Liters) 63.3 L   Dialyzer Clearance Clear   Duration of Treatment 180 minutes   Additional Fluid Intake (mL) 223 mL   Total UF (mL) 0 mL   Patient Response to Treatment pt tolerated tx well   Post-Hemodialysis Comments pt's sister presented and said that she brought him his favorite food because she was told he was refusing to eat. she fed him chicken and rice dressing. the pt started to vomit shortly after finishing his meal, and then had 3 BMs. he was cleaned each time and placed in a brief. notified FÁTIMA Narayanan NP. she ordered to give him 500 mL bolus of NS, net UF +223 mL, NAD noted and VSS.

## 2024-03-21 NOTE — PROGRESS NOTES
3/21/2024  Mirza Nelson Jr.   1955   [id number]       Psychiatry Consult Progress Note     SUBJECTIVE:   Mirza Nelson Jr. is a 68 y.o. male seen for follow-up for suicidal threats. Overnight had decreased oxygen saturations and CXR revealed pleural effusion. Currently on bipap and waiting on ICU bed. Seen at bedside with 1:1 sitter present. He responds to being shook, but unable to participate in interview.       Current Medications:   Scheduled Meds:    azithromycin  500 mg Intravenous Q24H    insulin regular  0.1 Units/kg Intravenous Once    mupirocin   Nasal BID    piperacillin-tazobactam (Zosyn) IV (PEDS and ADULTS) (extended infusion is not appropriate)  4.5 g Intravenous Q12H    sertraline  50 mg Oral Daily    white petrolatum   Topical (Top) BID      PRN Meds: acetaminophen, acetaminophen, [COMPLETED] calcium gluconate IVPB **AND** calcium gluconate IVPB, ondansetron   Psychotherapeutics (From admission, onward)      Start     Stop Route Frequency Ordered    03/20/24 1530  sertraline tablet 50 mg         -- Oral Daily 03/20/24 1417            Allergies:   Review of patient's allergies indicates:  Not on File     OBJECTIVE:   Vitals   Vitals:    03/21/24 0900   BP: 106/68   Pulse: 104   Resp: (!) 42   Temp: 99 °F (37.2 °C)        Labs/Imaging/Studies:   Recent Results (from the past 36 hour(s))   Blood Culture    Collection Time: 03/20/24  6:50 AM    Specimen: Blood   Result Value Ref Range    CULTURE, BLOOD ([facility]) No Growth At 24 Hours    Blood Culture    Collection Time: 03/20/24  7:47 AM    Specimen: Blood   Result Value Ref Range    CULTURE, BLOOD ([facility]) No Growth At 24 Hours    Basic Metabolic Panel    Collection Time: 03/20/24  8:30 AM   Result Value Ref Range    Sodium Level 140 136 - 145 mmol/L    Potassium Level 5.4 (H) 3.5 - 5.1 mmol/L    Chloride 98 98 - 107 mmol/L    Carbon Dioxide 24 23 - 31 mmol/L    Glucose Level 63 (L) 82 - 115 mg/dL    Blood Urea Nitrogen 36.8 (H) 8.4 - 25.7 mg/dL     Creatinine 7.90 (H) 0.73 - 1.18 mg/dL    BUN/Creatinine Ratio 5     Calcium Level Total 8.5 (L) 8.8 - 10.0 mg/dL    Anion Gap 18.0 mEq/L    eGFR 7 mls/min/1.73/m2   POCT glucose    Collection Time: 03/20/24  9:06 PM   Result Value Ref Range    POCT Glucose 64 (L) 70 - 110 mg/dL   POCT glucose    Collection Time: 03/20/24  9:25 PM   Result Value Ref Range    POCT Glucose 94 70 - 110 mg/dL   RT Blood Gas    Collection Time: 03/21/24 12:50 AM   Result Value Ref Range    Sample Type Arterial Blood     Sample site Right Radial Artery     Drawn by yennifer rt     pH, Blood gas 7.350 7.350 - 7.450    pCO2, Blood gas 53.0 (HH) 35.0 - 45.0 mmHg    pO2, Blood gas 59.0 (L) 80.0 - 100.0 mmHg    Sodium, Blood Gas 135 (L) 137 - 145 mmol/L    Potassium, Blood Gas 3.8 3.5 - 5.0 mmol/L    Calcium Level Ionized 1.08 (L) 1.12 - 1.23 mmol/L    TOC2, Blood gas 30.9 mmol/L    Base Excess, Blood gas 2.90 (H) -2.00 - 2.00 mmol/L    sO2, Blood gas 88.7 %    HCO3, Blood gas 29.3 (H) 22.0 - 26.0 mmol/L    THb, Blood gas 10.0 (L) 12 - 16 g/dL    O2 Hb, Blood Gas 84.9 (L) 94.0 - 97.0 %    CO Hgb 3.3 (H) 0.5 - 1.5 %    Met Hgb 1.3 0.4 - 1.5 %    Allens Test Yes     Oxygen Device, Blood gas Oxy Mask     LPM 5.0    Comprehensive Metabolic Panel    Collection Time: 03/21/24  4:11 AM   Result Value Ref Range    Sodium Level 138 136 - 145 mmol/L    Potassium Level 3.9 3.5 - 5.1 mmol/L    Chloride 101 98 - 107 mmol/L    Carbon Dioxide 22 (L) 23 - 31 mmol/L    Glucose Level 70 (L) 82 - 115 mg/dL    Blood Urea Nitrogen 27.4 (H) 8.4 - 25.7 mg/dL    Creatinine 6.16 (H) 0.73 - 1.18 mg/dL    Calcium Level Total 8.2 (L) 8.8 - 10.0 mg/dL    Protein Total 6.0 5.8 - 7.6 gm/dL    Albumin Level 2.4 (L) 3.4 - 4.8 g/dL    Globulin 3.6 (H) 2.4 - 3.5 gm/dL    Albumin/Globulin Ratio 0.7 (L) 1.1 - 2.0 ratio    Bilirubin Total 1.1 <=1.5 mg/dL    Alkaline Phosphatase 38 (L) 40 - 150 unit/L    Alanine Aminotransferase 9 0 - 55 unit/L    Aspartate Aminotransferase 18 5 - 34  unit/L    eGFR 9 mls/min/1.73/m2   CBC with Differential    Collection Time: 03/21/24  4:11 AM   Result Value Ref Range    WBC 3.45 (L) 4.50 - 11.50 x10(3)/mcL    RBC 2.69 (L) 4.70 - 6.10 x10(6)/mcL    Hgb 9.1 (L) 14.0 - 18.0 g/dL    Hct 30.0 (L) 42.0 - 52.0 %    .5 (H) 80.0 - 94.0 fL    MCH 33.8 (H) 27.0 - 31.0 pg    MCHC 30.3 (L) 33.0 - 36.0 g/dL    RDW 18.5 (H) 11.5 - 17.0 %    Platelet 116 (L) 130 - 400 x10(3)/mcL    MPV 11.2 (H) 7.4 - 10.4 fL    NRBC% 0.0 %    IPF 4.5 0.9 - 11.2 %   Manual Differential    Collection Time: 03/21/24  4:11 AM   Result Value Ref Range    WBC 3.45 x10(3)/mcL    Neutrophils % 84 %    Lymphs % 2 %    Monocytes % 12 %    Metamyelocytes % 2 (H) <=0 %    Neutrophils Abs 2.898 2.1 - 9.2 x10(3)/mcL    Lymphs Abs 0.069 (L) 0.6 - 4.6 x10(3)/mcL    Monocytes Abs 0.414 0.1 - 1.3 x10(3)/mcL    Platelets Decreased (A) Normal, Adequate    RBC Morph Abnormal (A) Normal    Poikilocytosis 1+ (A) (none)    Anisocytosis 1+ (A) (none)    Macrocytosis 2+ (A) (none)    [place] Cells 1+ (A) (none)    Toxic Granulation 1+ (A) (none)    Vacuolated Grans 2+ (A) (none)   POCT glucose    Collection Time: 03/21/24  8:55 AM   Result Value Ref Range    POCT Glucose 55 (L) 70 - 110 mg/dL   RT Blood Gas    Collection Time: 03/21/24  9:03 AM   Result Value Ref Range    Sample Type Arterial Blood     Sample site Left Radial Artery     Drawn by sd rrt     pH, Blood gas 7.450 7.350 - 7.450    pCO2, Blood gas 41.0 35.0 - 45.0 mmHg    pO2, Blood gas 123.0 (H) 80.0 - 100.0 mmHg    Sodium, Blood Gas 132 (L) 137 - 145 mmol/L    Potassium, Blood Gas 4.4 3.5 - 5.0 mmol/L    Calcium Level Ionized 1.08 (L) 1.12 - 1.23 mmol/L    TOC2, Blood gas 29.8 mmol/L    Base Excess, Blood gas 4.10 (H) -2.00 - 2.00 mmol/L    sO2, Blood gas 98.9 %    HCO3, Blood gas 28.5 (H) 22.0 - 26.0 mmol/L    THb, Blood gas 9.6 (L) 12 - 16 g/dL    O2 Hb, Blood Gas 96.6 94.0 - 97.0 %    CO Hgb 2.4 (H) 0.5 - 1.5 %    Met Hgb 0.9 0.4 - 1.5 %    Faizan  Test Yes     MODE BiPAP     FIO2, Blood gas 65 %    Spont RR 45 b/min    BiPAP (I) 14 cm H2O    BiPAP (E) 8 cm H2O   POCT glucose    Collection Time: 03/21/24  9:41 AM   Result Value Ref Range    POCT Glucose 94 70 - 110 mg/dL   POCT glucose    Collection Time: 03/21/24 10:50 AM   Result Value Ref Range    POCT Glucose 87 70 - 110 mg/dL   RT Blood Gas    Collection Time: 03/21/24 12:30 PM   Result Value Ref Range    Sample Type Arterial Blood     Sample site Left Radial Artery     Drawn by sd rrt     pH, Blood gas 7.420 7.350 - 7.450    pCO2, Blood gas 43.0 35.0 - 45.0 mmHg    pO2, Blood gas 111.0 (H) 80.0 - 100.0 mmHg    Sodium, Blood Gas 133 (L) 137 - 145 mmol/L    Potassium, Blood Gas 4.8 3.5 - 5.0 mmol/L    Calcium Level Ionized 1.09 (L) 1.12 - 1.23 mmol/L    TOC2, Blood gas 29.2 mmol/L    Base Excess, Blood gas 3.10 (H) -2.00 - 2.00 mmol/L    sO2, Blood gas 98.4 %    HCO3, Blood gas 27.9 (H) 22.0 - 26.0 mmol/L    THb, Blood gas 9.5 (L) 12 - 16 g/dL    O2 Hb, Blood Gas 96.1 94.0 - 97.0 %    CO Hgb 2.6 (H) 0.5 - 1.5 %    Met Hgb 0.8 0.4 - 1.5 %    Allens Test Yes     MODE BiPAP     FIO2, Blood gas 50 %    Spont RR 45 b/min    BiPAP (I) 14 cm H2O    BiPAP (E) 8 cm H2O            Psychiatric Mental Status Exam:  General Appearance: appears stated age, dressed in hospital garb, lying in bed, Bipap in place  Arousal: obtunded  Behavior: unable to participate  Movements and Motor Activity: no tics, no tremors, no akathisia, no dystonia, no evidence of tardive dyskinesia  Orientation: Unable to Assess  Speech:  Unable to assess  Mood: Guarded  Affect: calm  Thought Process: Unable to Assess  Associations: Unable to Assess  Thought Content and Perceptions: Unable to Assess  Recent and Remote Memory: Unable to Assess; per interview/observation with patient  Attention and Concentration: Unable to Assess; per interview/observation with patient  Fund of Knowledge: Unable to Assess; based on history, vocabulary, fund of  knowledge, syntax, grammar, and content  Insight:  Unable to assess ; based on understanding of severity of illness and HPI  Judgment:  Unable to assess ; based on patient's behavior and HPI    ASSESSMENT/PLAN:   Diagnosis:  Major Depressive Disorder, Recurrent: Moderate (F33.1)      No past medical history on file.     Recommendations:  Continue PEC due to recent suicidal threats  Will continue to follow and reassess          Usman Barrett

## 2024-03-21 NOTE — PROGRESS NOTES
[facility] - 5th Floor [facility] Medicine  Progress Note    Patient Name: Mirza Nelson Jr.  MRN: [id number]  Patient Class: IP- Inpatient   Admission Date: 3/19/2024  Length of Stay: 2 days  Attending Physician: Isidro Dunne MD  Primary Care Provider: NILESH Pena MD        Subjective:     Principal Problem:Acute confusional state    Interval History:  The patient yesterday had a incidents vomitus.  I gave Zofran in the afternoon.  During the night his saturations dropped.  He is requiring more oxygen.  Chest x-ray shows some pleural effusion.  Impossible congestion in the vascular area.  ABGs do show a high CO2 low PO2.  I think patient may have aspirated from the vomiting.    Review of Systems   All other systems reviewed and are negative.    Objective:     Vital Signs (Most Recent):  Temp: 99.4 °F (37.4 °C) (03/21/24 0700)  Pulse: 106 (03/21/24 0700)  Resp: (!) 41 (03/21/24 0700)  BP: 115/73 (03/21/24 0700)  SpO2: 100 % (03/21/24 0700) Vital Signs (24h Range):  Temp:  [98 °F (36.7 °C)-99.7 °F (37.6 °C)] 99.4 °F (37.4 °C)  Pulse:  [] 106  Resp:  [20-52] 41  SpO2:  [92 %-100 %] 100 %  BP: ()/(58-73) 115/73     Weight: 72.6 kg (160 lb)  Body mass index is 25.82 kg/m².    Intake/Output Summary (Last 24 hours) at 3/21/2024 0752  Last data filed at 3/20/2024 1850  Gross per 24 hour   Intake 223 ml   Output 0 ml   Net 223 ml      Physical Exam  HENT:      Head: Normocephalic and atraumatic.      Mouth/Throat:      Mouth: Mucous membranes are dry.   Cardiovascular:      Rate and Rhythm: Normal rate and regular rhythm.      Pulses: Normal pulses.      Heart sounds: Normal heart sounds.   Pulmonary:      Effort: Pulmonary effort is normal.      Breath sounds: Normal breath sounds.   Abdominal:      General: Bowel sounds are normal.      Palpations: Abdomen is soft.   Musculoskeletal:      Cervical back: Neck supple.   Neurological:      Mental Status: He is alert. Mental  status is at baseline.           Overview/Hospital Course:  I am going to consult Pulmonary for assistance.  I will start him empirically on Rocephin.  Continue with antiemetics as needed.  Sensorium seems to be clearing up.  Patient is still under pec for now.  He is full code.    Significant Labs: All pertinent labs within the past 24 hours have been reviewed.  ABGs:   Recent Labs   Lab 03/21/24  0050   PH 7.350   PCO2 53.0*   HCO3 29.3*   COHB 3.3*   METHB 1.3   PO2 59.0*     BMP:   Recent Labs   Lab 03/19/24  1053 03/20/24  0830 03/21/24  0411      < > 138   K 7.0*   < > 3.9   CO2 24   < > 22*   BUN 49.8*   < > 27.4*   CREATININE 9.62*   < > 6.16*   CALCIUM 9.0   < > 8.2*   MG 1.90  --   --     < > = values in this interval not displayed.     CBC:   Recent Labs   Lab 03/19/24  1053 03/21/24  0411   WBC 8.71 3.45*   HGB 11.0* 9.1*   HCT 34.4* 30.0*    116*     CMP:   Recent Labs   Lab 03/19/24  1053 03/20/24  0830 03/21/24  0411    140 138   K 7.0* 5.4* 3.9   CO2 24 24 22*   BUN 49.8* 36.8* 27.4*   CREATININE 9.62* 7.90* 6.16*   CALCIUM 9.0 8.5* 8.2*   ALBUMIN 3.1*  --  2.4*   BILITOT 1.3  --  1.1   ALKPHOS 53  --  38*   AST 34  --  18   ALT 12  --  9       Significant Imaging: I have reviewed all pertinent imaging results/findings within the past 24 hours.  CXR: I have reviewed all pertinent results/findings within the past 24 hours and my personal findings are:  Pulmonary congestion.  Possible aspiration    Assessment/Plan:      Active Diagnoses:    Diagnosis Date Noted POA    PRINCIPAL PROBLEM:  Acute confusional state [F05] 03/20/2024 Unknown      Problems Resolved During this Admission:     VTE Risk Mitigation (From admission, onward)      None               Isidro Dunne MD  Department of Hospital Medicine   [facility] - 5th Floor Med Surg

## 2024-03-22 NOTE — PROGRESS NOTES
[facility] - 5th Floor Med Surg  Nephrology  Progress Note    Patient Name: Mirza Nelson Jr.  MRN: [id number]  Admission Date: 3/19/2024  Hospital Length of Stay: 3 days  Attending Provider: Darrius Daley MD   Primary Care Physician: NILESH Pena MD  Principal Problem:Acute confusional state      Subjective:     HPI: Mr Mirza Nelson ( 1955) is a 68-year-old AAM with dialysis dependent ESRD followed by Dr Butler. Reportedly he has missed dialysis for a week. Patient presented as a trauma secondary to fall at home. He was noted to have critical hyperkalemia with potassium of 7 for which he is undergoing emergent HD. Patient is disoriented with bilateral upper extremity tremors. Due to confusion and attempts to pull at lines he has a sitter at bedside. Of note patient was recently hospitalized with ESBL E coli UTI and discharged to [facility] on 3/11.      Interval History: Patient was dialyzed both Tuesday and Wednesday with no improvement in mentation. Sister fed him some food on dialysis yesterday which he seemed to tolerate well until he vomited a bit later. There was no suspicion of aspiration at that time. Throughout the night he has required high levels of oxygen and is now on BiPAP. Urine catheter does not seem to be draining. He historically has made decent amount of urine daily. Bladder scan 230 mL. He is mostly unresponsive until bladder is palpated then he grimaces in pain.     2024   Now patient is on the ventilator.  On Precedex.  Comfortable.  NG tube is in place also.  NGT to the suction.  Nurses reported that there is a plan to start tube feedings on him today.  Chronically needed indwelling Cheek catheter is in place.    Review of patient's allergies indicates:  Not on File  Current Facility-Administered Medications   Medication Frequency    acetaminophen suppository 325 mg Q4H PRN    acetaminophen tablet 650 mg Q6H PRN    azithromycin 500 mg in  dextrose 5 % 250 mL IVPB (ready to mix) Q24H    calcium gluconate 1 g in NS IVPB (premixed) Q10 Min PRN    dexmedetomidine (PRECEDEX) 400mcg/100mL 0.9% NaCL infusion Continuous    dextrose 10% bolus 250 mL 250 mL bolus from bag    famotidine (PF) injection 20 mg Daily    heparin (porcine) injection 5,000 Units Q8H    insulin regular injection 7.26 Units 0.0726 mL Once    mupirocin 2 % ointment BID    ondansetron injection 4 mg Q6H PRN    piperacillin-tazobactam (ZOSYN) 4.5 g in dextrose 5 % in water (D5W) 100 mL IVPB (MB+) Q12H    propofol (DIPRIVAN) 10 mg/mL infusion Continuous    white petrolatum 41 % ointment BID       Objective:     Vital Signs (Most Recent):  Temp: 98.6 °F (37 °C) (03/22/24 0400)  Pulse: 89 (03/22/24 0600)  Resp: (!) 29 (03/22/24 0600)  BP: 118/60 (03/22/24 0600)  SpO2: 100 % (03/22/24 0745) Vital Signs (24h Range):  Temp:  [98.5 °F (36.9 °C)-100.7 °F (38.2 °C)] 98.6 °F (37 °C)  Pulse:  [] 89  Resp:  [26-42] 29  SpO2:  [96 %-100 %] 100 %  BP: ()/(49-75) 118/60     Weight: 72.6 kg (160 lb) (03/19/24 2200)  Body mass index is 25.82 kg/m².  Body surface area is 1.84 meters squared.    I/O last 3 completed shifts:  In: 977.9 [I.V.:112.7; Other:200; IV Piggyback:665.2]  Out: 250 [Urine:250]    Physical Exam  Constitutional:       General: He is not in acute distress.     Appearance: He is ill-appearing.   HENT:      Head: Atraumatic.      Mouth/Throat:      Mouth: Mucous membranes are dry.   Cardiovascular:      Rate and Rhythm: Normal rate.   Pulmonary:      Breath sounds: Normal breath sounds.      Comments: On ventilator now  Abdominal:      General: Bowel sounds are normal. There is no distension.   Genitourinary:     Comments: Urinary catheter  Musculoskeletal:         General: No swelling.      Cervical back: Neck supple.   Skin:     General: Skin is warm.   Neurological:      Comments: Sedated with Precedex         Significant Labs:sureBMP:   Recent Labs   Lab 03/22/24  0214   NA  138   K 5.2*   CO2 21*   BUN 41.5*   CREATININE 7.45*   CALCIUM 8.8   MG 1.80       CBC:   Recent Labs   Lab 03/22/24  0427   WBC 8.29  8.29   RBC 2.47*   HGB 8.4*   HCT 26.9*   PLT 74*   .9*   MCH 34.0*   MCHC 31.2*       Microbiology Results (last 7 days)       Procedure Component Value Units Date/Time    Blood Culture [[id number]]  (Normal) Collected: 03/20/24 0650    Order Status: Completed Specimen: Blood Updated: 03/22/24 0900     CULTURE, BLOOD ([facility]) No Growth At 48 Hours    Blood Culture [[id number]]  (Normal) Collected: 03/20/24 0747    Order Status: Completed Specimen: Blood Updated: 03/22/24 0900     CULTURE, BLOOD ([facility]) No Growth At 48 Hours    Urine culture [[id number]]  (Abnormal)  (Susceptibility) Collected: 03/19/24 1132    Order Status: Completed Specimen: Urine Updated: 03/22/24 0742     Urine Culture >/= 100,000 colonies/ml Escherichia coli ESBL          Specimen (24h ago, onward)      None          Recent Labs   Lab 03/19/24  1132   PROTEINUA 3+*   BACTERIA Many*   LEUKOCYTESUR 500*   UROBILINOGEN Normal         Significant Imaging:  CT Head Without Contrast [[id number]] Resulted: 03/21/24 0617   Order Status: Completed Updated: 03/21/24 0619   Narrative:     EXAMINATION:  CT HEAD WITHOUT CONTRAST    CLINICAL HISTORY:  Mental status change, unknown cause;    TECHNIQUE:  Axial images of the head were obtained without IV contrast administration.  Coronal and sagittal reconstructions were provided.  Three dimensional and MIP images were obtained and evaluated.  Total DLP was 3233 mGy-cm. Dose lowering technique and automated exposure control were utilized for this exam.    COMPARISON:  CT of the head 03/19/2024.    FINDINGS:  There is normal brain formation.  There is normal gray-white matter differentiation.  There is mild periventricular and subcortical white matter hypodensity.  There is no hemorrhage, hydrocephalus, or midline shift.  There is no cytotoxic or vasogenic edema.  There is  no intra or extra-axial fluid collection.  There is no herniation.    The calvarium is intact.  There is no fracture.  The bilateral orbits are normal.  The paranasal sinuses and mastoid air cells are normally developed and free of disease.   Impression:       No acute intracranial abnormality.    [place] concordance      Electronically signed by: Jd Esquivel MD  Date: 03/21/2024  Time: 06:17       Assessment/Plan:     ESRD on HD - [facility], MWF, [facility]  Dialysis noncompliance with critical hyperkalemia and pulmonary vascular congestion on CXR  Trauma fall at home   Altered mental status   Chronic indwelling nielsen catheter changed every three weeks per Dr Navarrete   Frequent UTI - Most recently ESBL E Coli 2/29   CAD s/p PCI   Chronic back pain      Recommendations  Hemodialysis today  Agree with nutritional support  Vent weaning as per intensivist      Chang Butler MD  Nephrology  [facility] - 5th Floor Med Surg

## 2024-03-22 NOTE — NURSING
Nurses Note -- 4 Eyes      3/22/2024   10:00 AM      Skin assessed during: Daily Assessment      [] No Altered Skin Integrity Present    [x]Prevention Measures Documented      [x] Yes- Altered Skin Integrity Present or Discovered   [] LDA Added if Not in Epic (Describe Wound)   [] New Altered Skin Integrity was Present on Admit and Documented in LDA   [] Wound Image Taken    Wound Care Consulted? Yes    Attending Nurse:  RAMANA Swanson    Second RN/Staff Member:  Nurse Hellen Bruce

## 2024-03-22 NOTE — PROGRESS NOTES
[facility] - 5th Floor Med Surg  Pulmonary Critical Care Note    Patient Name: Mirza Nelson Jr.  MRN: [id number]  Admission Date: 3/19/2024  Hospital Length of Stay: 3 days  Code Status: Full Code  Attending Provider: Darrius Daley MD  Primary Care Provider: NILESH Pena MD     Subjective:     HPI:   This is a 68-year-old male with a history of end-stage renal disease on HD MWF, GERD, BPH, hyperlipidemia, MDD and anxiety who presented to the ED on 03/19/2024 from leave the [facility] after a fall.  Patient was reportedly showing symptoms of confusion as well as suicidal ideations.  He was reportedly refusing dialysis. He was just recently admitted and discharged on 03/17/2024 during that time he was treated for ESBL E coli UTI with Rocephin and Zosyn.  Of note, he was also hospitalized on 2/18/2024 at [facility].  He was diagnosed with a NSTEMI and underwent left heart catheterization with PTCA with stent to the LAD on 02/23.  He was  followed by Urology at that time due to hematuria and has a chronic indwelling catheter.  He was also intubated during the stay for respiratory failure. (Chart is marked for merge, these records are in his previous chart).  Initial chest x-ray in the ED showed enlarged cardiac silhouette with vascular congestion.  Urine culture from 03/19 currently growing Gram-negative rods.  Blood cultures collected on 03/20 remain in process.  Patient reportedly had an aspiration event yesterday in dialysis following an episode of emesis.  His oxygen requirements have increased since that time.  His mentation has also declined.  CTA of head obtained overnight with no acute intracranial abnormalities.  ABGs obtained overnight revealed a pH of 7.35, pCO2 of 53, PO2 59, bicarb 29.  He was placed on BiPAP.  Pulmonary was consulted for hypoxemic hypercapnia along with possible aspiration pneumonia.  Upon arrival to assess patient, the rapid response team was present.  Patient  remains tachypneic with an labored breathing.  He responds to pain however does not communicate.  CABG revealed a glucose of 55 and he was started on a D10 infusion.  Repeat ABGs on BiPAP 14/8 and 65% revealed a pH of 7.45, pCO2 of 41, PO2 of 123, bicarb of 28.  Per report patient was awake oriented as of yesterday.    Hospital Course/Significant events:  3/21: Intubated    24 Hour Interval History:  Remains intubated and mechanically ventilated.    No past medical history on file.    No past surgical history on file.    Social History     Socioeconomic History    Marital status:      Social Determinants of Health     Financial Resource Strain: Low Risk  (3/20/2024)    Overall Financial Resource Strain (CARDIA)     Difficulty of Paying Living Expenses: Not hard at all   Food Insecurity: No Food Insecurity (3/20/2024)    Hunger Vital Sign     Worried About Running Out of Food in the Last Year: Never true     Ran Out of Food in the Last Year: Never true   Transportation Needs: No Transportation Needs (3/20/2024)    PRAPARE - Transportation     Lack of Transportation (Medical): No     Lack of Transportation (Non-Medical): No   Stress: No Stress Concern Present (3/20/2024)    [employer] - Occupational Stress Questionnaire     Feeling of Stress : Not at all   Housing Stability: Low Risk  (3/20/2024)    Housing Stability Vital Sign     Unable to Pay for Housing in the Last Year: No     Number of Places Lived in the Last Year: 1     Unstable Housing in the Last Year: No           No current outpatient medications    Current Inpatient Medications   azithromycin  500 mg Intravenous Q24H    famotidine (PF)  20 mg Intravenous Daily    heparin (porcine)  5,000 Units Subcutaneous Q8H    insulin regular  0.1 Units/kg Intravenous Once    mupirocin   Nasal BID    piperacillin-tazobactam (Zosyn) IV (PEDS and ADULTS) (extended infusion is not appropriate)  4.5 g Intravenous Q12H    white  petrolatum   Topical (Top) BID       Current Intravenous Infusions   dexmedeTOMIDine (Precedex) infusion (titrating) 0.5 mcg/kg/hr (03/22/24 0953)    propofoL Stopped (03/22/24 0304)         Review of Systems   Unable to perform ROS: Mental status change          Objective:       Intake/Output Summary (Last 24 hours) at 3/22/2024 1122  Last data filed at 3/22/2024 1000  Gross per 24 hour   Intake 977.1 ml   Output 290 ml   Net 687.1 ml           Vital Signs (Most Recent):  Temp: (!) 100.6 °F (38.1 °C) (03/22/24 1000)  Pulse: 85 (03/22/24 1030)  Resp: (!) 27 (03/22/24 1000)  BP: 118/67 (03/22/24 1000)  SpO2: 98 % (03/22/24 1030)  Body mass index is 23.57 kg/m².  Weight: 66.2 kg (146 lb) Vital Signs (24h Range):  Temp:  [98.5 °F (36.9 °C)-100.8 °F (38.2 °C)] 100.6 °F (38.1 °C)  Pulse:  [] 85  Resp:  [26-42] 27  SpO2:  [96 %-100 %] 98 %  BP: ()/(49-82) 118/67     Physical Exam  Constitutional:       General: He is not in acute distress.     Appearance: He is not toxic-appearing.      Comments: Intubated and sedated   HENT:      Head: Normocephalic and atraumatic.   Eyes:      Extraocular Movements: Extraocular movements intact.      Pupils: Pupils are equal, round, and reactive to light.   Cardiovascular:      Rate and Rhythm: Normal rate and regular rhythm.      Pulses: Normal pulses.      Heart sounds: No murmur heard.     No gallop.   Pulmonary:      Effort: No respiratory distress.      Breath sounds: No stridor. No wheezing, rhonchi or rales.   Abdominal:      General: Abdomen is flat.      Palpations: Abdomen is soft.   Musculoskeletal:         General: No swelling or deformity.      Left lower leg: No edema.   Skin:     General: Skin is warm.      Coloration: Skin is not jaundiced.      Findings: No bruising.   Neurological:      Comments: Patient sedated and therefore unable to accurately exam           Lines/Drains/Airways       Drain  Duration                  Urethral Catheter 03/20/24 2 days          NG/OG Tube 03/21/24 1700 [place] sump 16 Fr. Right mouth <1 day              Airway  Duration                  Airway - Non-Surgical 03/21/24 1625 Endotracheal Tube <1 day              Peripheral Intravenous Line  Duration                  Hemodialysis AV Fistula Right upper arm -- days         Peripheral IV - Single Lumen 03/21/24 1700 20 G Anterior;Left;Proximal Upper Arm <1 day         Peripheral IV - Single Lumen 03/21/24 1715 20 G Left Antecubital <1 day         Peripheral IV - Single Lumen 03/21/24 1730 Anterior;Left Forearm <1 day                    Significant Labs:    Lab Results   Component Value Date    WBC 8.29 03/22/2024    WBC 8.29 03/22/2024    HGB 8.4 (L) 03/22/2024    HCT 26.9 (L) 03/22/2024    .9 (H) 03/22/2024    PLT 74 (L) 03/22/2024           BMP  Lab Results   Component Value Date     03/22/2024    K 5.2 (H) 03/22/2024    CHLORIDE 100 03/22/2024    CO2 21 (L) 03/22/2024    BUN 41.5 (H) 03/22/2024    CREATININE 7.45 (H) 03/22/2024    CALCIUM 8.8 03/22/2024    AGAP 18.0 03/20/2024         ABG  Recent Labs   Lab 03/22/24  0625   PH 7.510*   PO2 79.0*   PCO2 36.0   HCO3 28.7*   POCBASEDEF 5.30*         Mechanical Ventilation Support:  Vent Mode: A/C (03/22/24 1030)  Ventilator Initiated: Yes (03/21/24 1634)  Set Rate: 20 BPM (03/22/24 1030)  Vt Set: 500 mL (03/22/24 1030)  PEEP/CPAP: 5 cmH20 (03/22/24 1030)  Oxygen Concentration (%): 30 (03/22/24 1030)  Peak Airway Pressure: 32 cmH20 (03/22/24 1030)  Total Ve: 13.8 L/m (03/22/24 1030)  F/VT Ratio<105 (RSBI): (!) 65.71 (03/22/24 0400)      Significant Imaging:  I have reviewed the pertinent imaging within the past 24 hours.  Chest x-ray without change.  Bilateral infiltrates.      Assessment/Plan:     Assessment  Acute hypoxic and hypercapnic respiratory failure.  Intubated March 21st  Altered mental status  Pulmonary vascular congestion per CXR  Possible aspiration event  E coli UTI with chronic indwelling nielsen  ESRD on MWF  HD  Hypoglycemia  Thrombocytopenia      Plan  Continue full mechanical ventilation support.  Continue Zosyn to cover both the UTI and pneumonia.  Dialysis per Nephrology  We will hold heparin and monitor platelet count  Begin enteral tube feeds    DVT prophylaxis:  Subcu heparin will be held with developing thrombocytopenia.  GI prophylaxis:  Pepcid    33 minutes of critical care time was spent reviewing medical records, direct patient contact, coordinating treatment with nursing and respiratory therapy and/or discussing the case with family members       Darrius Daley MD  Pulmonary Critical Care Medicine  [facility] - 5th Floor Med Surg  DOS: 03/22/2024

## 2024-03-22 NOTE — NURSING
Nurses Note -- 4 Eyes      3/22/2024   6:21 AM      Skin assessed during: Daily Assessment      [] No Altered Skin Integrity Present    [x]Prevention Measures Documented      [x] Yes- Altered Skin Integrity Present or Discovered   [] LDA Added if Not in Epic (Describe Wound)   [] New Altered Skin Integrity was Present on Admit and Documented in LDA   [] Wound Image Taken    Wound Care Consulted? No    Attending Nurse:  RAMANA Russell    Second RN/Staff Member:   RAMANA Walker

## 2024-03-22 NOTE — PROGRESS NOTES
"Advance Care Planning     I notified patient's daughter to discuss goals of care, reports that her brother, Stephanie Nelson, is currently on his way to the hospital from [place] and she has deferred all decision making on behalf of the patient to him. She provided the patient's son phone number [phone]. Patient's daughter reports the patient is , with three children in which one is estranged and no known way to get in touch with them. We reviewed next of kin and decision making for the patient, in which she states "my brother is to make all the decisions regarding my father's care". Verbalized understanding and medical/palliative care team updated on legal next of kin and decision maker.     I notified the patient's son, Stephanie, and updated him on the patient's current condition and medical prognosis. He confirmed he has been updated on the patient's status and verbalized understanding and accepts that medical decisions have been deferred to him by his sister. States that he is currently driving to the hospital from [place] and is about an hour and half away and wishes to see patient and speak to medical team before making and decisions. Verbalized understanding and informed him that medical team respects request. Informed palliative team will speak to him once he arrives to the facility regarding his father's goals of care and establish plan of care.     Date: 03/22/2024    Today a voluntary meeting took place: other (conference room) ICU Conference room     Patient Participation: Patient is unable to participate     Attendees (Name and  Relationship to patient):  Patient's son Stephanie Nelson [phone] , and patient's 3 sisters and a cousin. Patient's daughter Heather on phone at start of the meeting and stated "Stephanie I can't hear how he is doing, you are to make all of the decisions"    Staff attendees (Name and  Role): Fátima RN-CHPN    ACP Conversation (General): Understanding of current condition I " "introduced my self and the role of the palliative team within the hospital setting. I reviewed the patient's current status and the family 's understanding of his current condition. We discussed the patient's stay at the nursing facility and family reports that patient normally does not refused dialysis and that he was "faithful with is treatment". Patient's sister's report over the last few weeks he was reporting that he was tired and stating that it was stating to take a lot out of him.     We did discuss patient's current code status, in which patient's son and family requesting for patient to be a DNR, stating "they do not want the patient to have to suffer and go through being shocked or compressions". I explained in detail that treatment the patient is currently receiving will continue and the DNR only means that should the patient's breathing or heart stop that it would be allowed for the patient to have a nautral passing, in which the family verbalized understanding and in agreement. We did discuss possible scenario that could arise in the near future such as trach/peg, however that is not needed to be decided today and medical team would discuss at time of should patient not be able to be weaned off the ventilator. Family reports that they believe patient would not want to have a trach or peg placed. Informed would continue to discuss scenario should it arise.       Code Status: DNR; status confirmed/order placed in chart    ACP Documents: Other Documents (specify): Patient had an [place]POST, family electing DNR status    Goals of care: The family endorses that what is most important right now is to focus on improvement in condition but with limits to invasive therapies    Accordingly, we have decided that the best plan to meet the patient's goals includes continuing with treatment and no further escalation in treatment      Recommendations/  Follow-up tasks: The patient and health care agent were provided the " following recommendations CODE STATUS DNR         [facility]  I engaged the family in a voluntary conversation about advance care planning and we specifically addressed what the goals of care would be moving forward, in light of the patient's change in clinical status, specifically current condition.  We did specifically address the patient's likely prognosis, which is poor.  We explored the patient's values and preferences for future care.  The family endorses that what is most important right now is to focus on improvement in condition but with limits to invasive therapies    Accordingly, we have decided that the best plan to meet the patient's goals includes continuing with treatment and no further escalation in treatment    Advance Care Planning     Date: 03/22/2024    Code Status  In light of the patients advanced and life limiting illness,I engaged the the family in a voluntary conversation about the patient's preferences for care  at the very end of life. The patient wishes to have a natural, peaceful death.  Along those lines, the patient does not wish to have CPR or other invasive treatments performed when his heart and/or breathing stops. I communicated to the family that a DNR order would be placed in his medical record to reflect this preference

## 2024-03-22 NOTE — PROGRESS NOTES
Inpatient Nutrition Assessment    Admit Date: 3/19/2024   Total duration of encounter: 3 days   Patient Age: 68 y.o.    Nutrition Recommendation/Prescription     Tube feeding:  Novasource Renal goal rate 50 ml/hr and ProSource TF20 once daily to provide  2080 kcal/d, 109% needs  110 g protein/d, 100% needs  720 ml free water/d  (calculations based on estimated 20 hr/d run time)     Communication of Recommendations: reviewed with nurse    Nutrition Assessment     Malnutrition Assessment/Nutrition-Focused Physical Exam    Malnutrition Context: chronic illness (03/21/24 1020)  Malnutrition Level: moderate (03/21/24 1020)  Energy Intake (Malnutrition):  (unable to determine) (03/21/24 1020)  Weight Loss (Malnutrition):  (unable to determine, no wt hx in EMR) (03/21/24 1020)  Subcutaneous Fat (Malnutrition): mild depletion (03/21/24 1020)     Upper Arm Region (Subcutaneous Fat Loss): mild depletion     Muscle Mass (Malnutrition): severe depletion (03/21/24 1020)  Mandaen Region (Muscle Loss): mild depletion  Clavicle Bone Region (Muscle Loss): severe depletion  Clavicle and Acromion Bone Region (Muscle Loss): severe depletion                 Fluid Accumulation (Malnutrition):  (does not meet criteria) (03/21/24 1022)  Hand  Strength, Left (Malnutrition): unable to determine (03/21/24 1022)  Hand  Strength, Right (Malnutrition): unable to determine (03/21/24 1022)  A minimum of two characteristics is recommended for diagnosis of either severe or non-severe malnutrition.    Chart Review    Reason Seen: follow-up    Malnutrition Screening Tool Results   Have you recently lost weight without trying?: Yes: Unsure how much  Have you been eating poorly because of a decreased appetite?: No   MST Score: 2   Diagnosis:  Acute hypoxic and hypercapnic respiratory failure  Altered mental status  Pulmonary vascular congestion per CXR  Possible aspiration event  UTI with chronic indwelling folowy  ESRD on MWF  HD  Hypoglycemia    Relevant Medical History: HD MWF, GERD, BPH, hyperlipidemia, MDD and anxiety     Scheduled Medications:  azithromycin, 500 mg, Q24H  famotidine (PF), 20 mg, Daily  heparin (porcine), 5,000 Units, Q8H  insulin regular, 0.1 Units/kg, Once  mupirocin, , BID  piperacillin-tazobactam (Zosyn) IV (PEDS and ADULTS) (extended infusion is not appropriate), 4.5 g, Q12H  white petrolatum, , BID    Continuous Infusions:  dexmedeTOMIDine (Precedex) infusion (titrating), Last Rate: 0.5 mcg/kg/hr (03/22/24 0953)  propofoL, Last Rate: Stopped (03/22/24 0304)    PRN Medications: acetaminophen, acetaminophen, [COMPLETED] calcium gluconate IVPB **AND** calcium gluconate IVPB, dextrose 10%, ondansetron    Calorie Containing IV Medications: no significant kcals from medications at this time    Recent Labs   Lab 03/19/24  1053 03/20/24  0830 03/21/24  0411 03/22/24  0214 03/22/24  0427    140 138 138  --    K 7.0* 5.4* 3.9 5.2*  --    CALCIUM 9.0 8.5* 8.2* 8.8  --    PHOS 5.6*  --   --  5.1*  --    MG 1.90  --   --  1.80  --    CHLORIDE 97* 98 101 100  --    CO2 24 24 22* 21*  --    BUN 49.8* 36.8* 27.4* 41.5*  --    CREATININE 9.62* 7.90* 6.16* 7.45*  --    EGFRNORACEVR 5 7 9 7  --    GLUCOSE 84 63* 70* 32*  --    BILITOT 1.3  --  1.1 1.2  --    ALKPHOS 53  --  38* 41  --    ALT 12  --  9 10  --    AST 34  --  18 18  --    ALBUMIN 3.1*  --  2.4* 1.9*  --    WBC 8.71  --  3.45  3.45*  --  8.29  8.29   HGB 11.0*  --  9.1*  --  8.4*   HCT 34.4*  --  30.0*  --  26.9*     Nutrition Orders:  No diet orders on file    Appetite/Oral Intake: NPO/not applicable  Factors Affecting Nutritional Intake: on mechanical ventilation  Food/Advent/Cultural Preferences: none reported  Food Allergies: none reported  Last Bowel Movement: 03/21/24  Wound(s):     Altered Skin Integrity 03/19/24 1804 midline Sacral spine #1  Partial thickness tissue loss. Shallow open ulcer with a red or pink wound bed, without slough. Intact or  "Open/Ruptured Serum-filled blister.-Tissue loss description: Not applicable     Comments    3/21/24 Poor intake since admit. Currently on BiPAP, report of possible aspiration in dialysis post-emesis. Muscle and fat loss observed. Unable to obtain any subjective history on weight or appetite prior to admit. Will leave tube feeding recommendations above if aggressive nutrition therapy warranted.     3/22/24 Patient transferred to ICU and intubated, plans to start tube feeding, will provide orders with Novasource Renal given electrolytes abnormalities.    Anthropometrics    Height: 5' 6" (167.6 cm), Height Method: Stated  Last Weight: 66.2 kg (146 lb) (24 1021), Weight Method: Bed Scale  BMI (Calculated): 23.6  BMI Classification: overweight (BMI 25-29.9)        Ideal Body Weight (IBW), Male: 142 lb     % Ideal Body Weight, Male (lb): 112.68 %                          Usual Weight Provided By: unable to obtain usual weight    Wt Readings from Last 5 Encounters:   24 66.2 kg (146 lb)     Weight Change(s) Since Admission:   (3/22) admission weight (72.6 kg) documented as 'stated', bed weight (66.2 kg) taken during rounds - appears accurate but will continue to monitor for trends  Wt Readings from Last 1 Encounters:   24 1021 66.2 kg (146 lb)   24 2200 72.6 kg (160 lb)   24 1039 72.6 kg (160 lb)   Admit Weight: 72.6 kg (160 lb) (24 1039), Weight Method: Stated    Estimated Needs    Weight Used For Calorie Calculations: 66.2 kg (146 lb)  Energy Calorie Requirements (kcal): 1,911.31  Energy Need Method: [place]  Weight Used For Protein Calculations: 66.2 kg (146 lb)  Protein Requirements: 100-120 g, 1.5-1.8 g/kg  Fluid Requirements (mL): 1000 + urine output (dialysis)  Total Ve: 13.4 L/m; Temp (24hrs), Av.5 °F (37.5 °C), Min:98.5 °F (36.9 °C), Max:100.8 °F (38.2 °C)  Vtot (L/Min) for [place] Equation Calculation: 13.4; Max Temp for [place] Equation Calculation: 100.8 °F  Last " Updated: 3/22     Enteral Nutrition Patient not receiving enteral nutrition at this time.    Parenteral Nutrition Patient not receiving parenteral nutrition support at this time.    Evaluation of Received Nutrient Intake    Calories: not meeting estimated needs  Protein: not meeting estimated needs    Patient Education Not applicable.    Nutrition Diagnosis     PES: Inadequate oral intake related to inability to consume sufficient nutrients as evidenced by less than 80% needs met. (active)     PES: Moderate starvation related malnutrition related to chronic illness as evidenced by mild fat depletion and severe muscle depletion. (active)    Nutrition Interventions     Intervention(s): modified composition of enteral nutrition, modified rate of enteral nutrition, commercial food, and collaboration with other providers    Goal: Meet greater than 80% of nutritional needs by follow-up. (goal not met)  Goal: Maintain weight throughout hospitalization. (goal progressing)    Nutrition Goals & Monitoring     Dietitian will monitor: energy intake, enteral nutrition intake, weight, weight change, electrolyte/renal panel, glucose/endocrine profile, and gastrointestinal profile    Nutrition Risk/Follow-Up: high (follow-up in 1-4 days)   Please consult if re-assessment needed sooner.

## 2024-03-22 NOTE — PROGRESS NOTES
3/22/2024  Mirza Nelson Jr.   1955   [id number]       Psychiatry Consult Progress Note     SUBJECTIVE:   Mirza Nelson Jr. is a 68 y.o. male seen for follow-up for suicidal ideations. Intubated and sedated today. Does grimace when lightly shaken.       Current Medications:   Scheduled Meds:    azithromycin  500 mg Intravenous Q24H    famotidine (PF)  20 mg Intravenous Daily    insulin regular  0.1 Units/kg Intravenous Once    mupirocin   Nasal BID    piperacillin-tazobactam (Zosyn) IV (PEDS and ADULTS) (extended infusion is not appropriate)  4.5 g Intravenous Q12H    white petrolatum   Topical (Top) BID      PRN Meds: acetaminophen, acetaminophen, [COMPLETED] calcium gluconate IVPB **AND** calcium gluconate IVPB, dextrose 10%, ondansetron   Psychotherapeutics (From admission, onward)      Start     Stop Route Frequency Ordered    03/21/24 1745  dexmedetomidine (PRECEDEX) 400mcg/100mL 0.9% NaCL infusion        Question Answer Comment   Begin at (in mcg/kg/hr): 0.2    Titrate by (in mcg/kg/hr): 0.2    Titrate interval: (in minutes) 10    To maintain a RASS goal of: RASS (-3) Moderate sedation - Movement or eye opening to voice (but no eye contact)    Maximum dose of (in mcg/kg/hr): 1.4        -- IV Continuous 03/21/24 1639            Allergies:   Review of patient's allergies indicates:  Not on File     OBJECTIVE:   Vitals   Vitals:    03/22/24 1130   BP: 117/64   Pulse: 83   Resp:    Temp:         Labs/Imaging/Studies:   Recent Results (from the past 36 hour(s))   RT Blood Gas    Collection Time: 03/21/24 12:50 AM   Result Value Ref Range    Sample Type Arterial Blood     Sample site Right Radial Artery     Drawn by yeninfer rt     pH, Blood gas 7.350 7.350 - 7.450    pCO2, Blood gas 53.0 (HH) 35.0 - 45.0 mmHg    pO2, Blood gas 59.0 (L) 80.0 - 100.0 mmHg    Sodium, Blood Gas 135 (L) 137 - 145 mmol/L    Potassium, Blood Gas 3.8 3.5 - 5.0 mmol/L    Calcium Level Ionized 1.08 (L) 1.12 - 1.23 mmol/L    TOC2, Blood gas 30.9  mmol/L    Base Excess, Blood gas 2.90 (H) -2.00 - 2.00 mmol/L    sO2, Blood gas 88.7 %    HCO3, Blood gas 29.3 (H) 22.0 - 26.0 mmol/L    THb, Blood gas 10.0 (L) 12 - 16 g/dL    O2 Hb, Blood Gas 84.9 (L) 94.0 - 97.0 %    CO Hgb 3.3 (H) 0.5 - 1.5 %    Met Hgb 1.3 0.4 - 1.5 %    Allens Test Yes     Oxygen Device, Blood gas Oxy Mask     LPM 5.0    Comprehensive Metabolic Panel    Collection Time: 03/21/24  4:11 AM   Result Value Ref Range    Sodium Level 138 136 - 145 mmol/L    Potassium Level 3.9 3.5 - 5.1 mmol/L    Chloride 101 98 - 107 mmol/L    Carbon Dioxide 22 (L) 23 - 31 mmol/L    Glucose Level 70 (L) 82 - 115 mg/dL    Blood Urea Nitrogen 27.4 (H) 8.4 - 25.7 mg/dL    Creatinine 6.16 (H) 0.73 - 1.18 mg/dL    Calcium Level Total 8.2 (L) 8.8 - 10.0 mg/dL    Protein Total 6.0 5.8 - 7.6 gm/dL    Albumin Level 2.4 (L) 3.4 - 4.8 g/dL    Globulin 3.6 (H) 2.4 - 3.5 gm/dL    Albumin/Globulin Ratio 0.7 (L) 1.1 - 2.0 ratio    Bilirubin Total 1.1 <=1.5 mg/dL    Alkaline Phosphatase 38 (L) 40 - 150 unit/L    Alanine Aminotransferase 9 0 - 55 unit/L    Aspartate Aminotransferase 18 5 - 34 unit/L    eGFR 9 mls/min/1.73/m2   CBC with Differential    Collection Time: 03/21/24  4:11 AM   Result Value Ref Range    WBC 3.45 (L) 4.50 - 11.50 x10(3)/mcL    RBC 2.69 (L) 4.70 - 6.10 x10(6)/mcL    Hgb 9.1 (L) 14.0 - 18.0 g/dL    Hct 30.0 (L) 42.0 - 52.0 %    .5 (H) 80.0 - 94.0 fL    MCH 33.8 (H) 27.0 - 31.0 pg    MCHC 30.3 (L) 33.0 - 36.0 g/dL    RDW 18.5 (H) 11.5 - 17.0 %    Platelet 116 (L) 130 - 400 x10(3)/mcL    MPV 11.2 (H) 7.4 - 10.4 fL    NRBC% 0.0 %    IPF 4.5 0.9 - 11.2 %   Manual Differential    Collection Time: 03/21/24  4:11 AM   Result Value Ref Range    WBC 3.45 x10(3)/mcL    Neutrophils % 84 %    Lymphs % 2 %    Monocytes % 12 %    Metamyelocytes % 2 (H) <=0 %    Neutrophils Abs 2.898 2.1 - 9.2 x10(3)/mcL    Lymphs Abs 0.069 (L) 0.6 - 4.6 x10(3)/mcL    Monocytes Abs 0.414 0.1 - 1.3 x10(3)/mcL    Platelets Decreased  (A) Normal, Adequate    RBC Morph Abnormal (A) Normal    Poikilocytosis 1+ (A) (none)    Anisocytosis 1+ (A) (none)    Macrocytosis 2+ (A) (none)    [place] Cells 1+ (A) (none)    Toxic Granulation 1+ (A) (none)    Vacuolated Grans 2+ (A) (none)   POCT glucose    Collection Time: 03/21/24  8:55 AM   Result Value Ref Range    POCT Glucose 55 (L) 70 - 110 mg/dL   RT Blood Gas    Collection Time: 03/21/24  9:03 AM   Result Value Ref Range    Sample Type Arterial Blood     Sample site Left Radial Artery     Drawn by sd rrt     pH, Blood gas 7.450 7.350 - 7.450    pCO2, Blood gas 41.0 35.0 - 45.0 mmHg    pO2, Blood gas 123.0 (H) 80.0 - 100.0 mmHg    Sodium, Blood Gas 132 (L) 137 - 145 mmol/L    Potassium, Blood Gas 4.4 3.5 - 5.0 mmol/L    Calcium Level Ionized 1.08 (L) 1.12 - 1.23 mmol/L    TOC2, Blood gas 29.8 mmol/L    Base Excess, Blood gas 4.10 (H) -2.00 - 2.00 mmol/L    sO2, Blood gas 98.9 %    HCO3, Blood gas 28.5 (H) 22.0 - 26.0 mmol/L    THb, Blood gas 9.6 (L) 12 - 16 g/dL    O2 Hb, Blood Gas 96.6 94.0 - 97.0 %    CO Hgb 2.4 (H) 0.5 - 1.5 %    Met Hgb 0.9 0.4 - 1.5 %    Allens Test Yes     MODE BiPAP     FIO2, Blood gas 65 %    Spont RR 45 b/min    BiPAP (I) 14 cm H2O    BiPAP (E) 8 cm H2O   POCT glucose    Collection Time: 03/21/24  9:41 AM   Result Value Ref Range    POCT Glucose 94 70 - 110 mg/dL   POCT glucose    Collection Time: 03/21/24 10:50 AM   Result Value Ref Range    POCT Glucose 87 70 - 110 mg/dL   RT Blood Gas    Collection Time: 03/21/24 12:30 PM   Result Value Ref Range    Sample Type Arterial Blood     Sample site Left Radial Artery     Drawn by sd rrt     pH, Blood gas 7.420 7.350 - 7.450    pCO2, Blood gas 43.0 35.0 - 45.0 mmHg    pO2, Blood gas 111.0 (H) 80.0 - 100.0 mmHg    Sodium, Blood Gas 133 (L) 137 - 145 mmol/L    Potassium, Blood Gas 4.8 3.5 - 5.0 mmol/L    Calcium Level Ionized 1.09 (L) 1.12 - 1.23 mmol/L    TOC2, Blood gas 29.2 mmol/L    Base Excess, Blood gas 3.10 (H) -2.00 - 2.00  mmol/L    sO2, Blood gas 98.4 %    HCO3, Blood gas 27.9 (H) 22.0 - 26.0 mmol/L    THb, Blood gas 9.5 (L) 12 - 16 g/dL    O2 Hb, Blood Gas 96.1 94.0 - 97.0 %    CO Hgb 2.6 (H) 0.5 - 1.5 %    Met Hgb 0.8 0.4 - 1.5 %    Allens Test Yes     MODE BiPAP     FIO2, Blood gas 50 %    Spont RR 45 b/min    BiPAP (I) 14 cm H2O    BiPAP (E) 8 cm H2O   MRSA PCR    Collection Time: 03/21/24  1:18 PM   Result Value Ref Range    MRSA PCR SCRN ([facility]) Detected (A) Not Detected   Blood Gas (ABG)    Collection Time: 03/21/24  5:09 PM   Result Value Ref Range    Sample Type Arterial Blood     Sample site Right Radial Artery     Drawn by SS LRT     pH, Blood gas 7.320 (L) 7.350 - 7.450    pCO2, Blood gas 59.0 (HH) 35.0 - 45.0 mmHg    pO2, Blood gas 133.0 (H) 80.0 - 100.0 mmHg    Sodium, Blood Gas 133 (L) 137 - 145 mmol/L    Potassium, Blood Gas 4.8 3.5 - 5.0 mmol/L    Calcium Level Ionized 1.11 (L) 1.12 - 1.23 mmol/L    TOC2, Blood gas 32.2 mmol/L    Base Excess, Blood gas 3.40 (H) -2.00 - 2.00 mmol/L    sO2, Blood gas 98.8 %    HCO3, Blood gas 30.4 (H) 22.0 - 26.0 mmol/L    THb, Blood gas 9.5 (L) 12 - 16 g/dL    O2 Hb, Blood Gas 96.1 94.0 - 97.0 %    CO Hgb 2.5 (H) 0.5 - 1.5 %    Met Hgb 1.0 0.4 - 1.5 %    Allens Test Yes     MODE AC     Oxygen Device, Blood gas Ventilator     FIO2, Blood gas 60 %    Mech Vt 500 ml    Mech RR 20 b/min    PEEP 5.0 cmH2O    Flow 60 LPM   Phosphorus    Collection Time: 03/22/24  2:14 AM   Result Value Ref Range    Phosphorus Level 5.1 (H) 2.3 - 4.7 mg/dL   Magnesium    Collection Time: 03/22/24  2:14 AM   Result Value Ref Range    Magnesium Level 1.80 1.60 - 2.60 mg/dL   Comprehensive Metabolic Panel    Collection Time: 03/22/24  2:14 AM   Result Value Ref Range    Sodium Level 138 136 - 145 mmol/L    Potassium Level 5.2 (H) 3.5 - 5.1 mmol/L    Chloride 100 98 - 107 mmol/L    Carbon Dioxide 21 (L) 23 - 31 mmol/L    Glucose Level 32 (LL) 82 - 115 mg/dL    Blood Urea Nitrogen 41.5 (H) 8.4 - 25.7 mg/dL     Creatinine 7.45 (H) 0.73 - 1.18 mg/dL    Calcium Level Total 8.8 8.8 - 10.0 mg/dL    Protein Total 6.0 5.8 - 7.6 gm/dL    Albumin Level 1.9 (L) 3.4 - 4.8 g/dL    Globulin 4.1 (H) 2.4 - 3.5 gm/dL    Albumin/Globulin Ratio 0.5 (L) 1.1 - 2.0 ratio    Bilirubin Total 1.2 <=1.5 mg/dL    Alkaline Phosphatase 41 40 - 150 unit/L    Alanine Aminotransferase 10 0 - 55 unit/L    Aspartate Aminotransferase 18 5 - 34 unit/L    eGFR 7 mls/min/1.73/m2   POCT glucose    Collection Time: 03/22/24  3:15 AM   Result Value Ref Range    POCT Glucose 33 (LL) 70 - 110 mg/dL   POCT glucose    Collection Time: 03/22/24  3:56 AM   Result Value Ref Range    POCT Glucose 109 70 - 110 mg/dL   CBC with Differential    Collection Time: 03/22/24  4:27 AM   Result Value Ref Range    WBC 8.29 4.50 - 11.50 x10(3)/mcL    RBC 2.47 (L) 4.70 - 6.10 x10(6)/mcL    Hgb 8.4 (L) 14.0 - 18.0 g/dL    Hct 26.9 (L) 42.0 - 52.0 %    .9 (H) 80.0 - 94.0 fL    MCH 34.0 (H) 27.0 - 31.0 pg    MCHC 31.2 (L) 33.0 - 36.0 g/dL    RDW 18.6 (H) 11.5 - 17.0 %    Platelet 74 (L) 130 - 400 x10(3)/mcL    MPV 11.6 (H) 7.4 - 10.4 fL    NRBC% 0.0 %    IPF 3.4 0.9 - 11.2 %   Manual Differential    Collection Time: 03/22/24  4:27 AM   Result Value Ref Range    WBC 8.29 x10(3)/mcL    Neutrophils % 89 %    Lymphs % 3 %    Monocytes % 3 %    Metamyelocytes % 5 (H) <=0 %    Neutrophils Abs 7.3781 2.1 - 9.2 x10(3)/mcL    Lymphs Abs 0.2487 (L) 0.6 - 4.6 x10(3)/mcL    Monocytes Abs 0.2487 0.1 - 1.3 x10(3)/mcL    Platelets Decreased (A) Normal, Adequate    RBC Morph Abnormal (A) Normal    Poikilocytosis 1+ (A) (none)    Anisocytosis 1+ (A) (none)    Macrocytosis 1+ (A) (none)    [place] Cells 1+ (A) (none)   Blood Gas (ABG)    Collection Time: 03/22/24  6:25 AM   Result Value Ref Range    Sample Type Arterial Blood     Sample site Left Radial Artery     Drawn by sd rrt     pH, Blood gas 7.510 (H) 7.350 - 7.450    pCO2, Blood gas 36.0 35.0 - 45.0 mmHg    pO2, Blood gas 79.0 (L) 80.0 -  100.0 mmHg    Sodium, Blood Gas 132 (L) 137 - 145 mmol/L    Potassium, Blood Gas 4.7 3.5 - 5.0 mmol/L    Calcium Level Ionized 1.03 (L) 1.12 - 1.23 mmol/L    TOC2, Blood gas 29.8 mmol/L    Base Excess, Blood gas 5.30 (H) -2.00 - 2.00 mmol/L    sO2, Blood gas 96.7 %    HCO3, Blood gas 28.7 (H) 22.0 - 26.0 mmol/L    THb, Blood gas 7.7 (L) 12 - 16 g/dL    O2 Hb, Blood Gas 95.2 94.0 - 97.0 %    CO Hgb 2.8 (H) 0.5 - 1.5 %    Met Hgb 0.9 0.4 - 1.5 %    Allens Test Yes     MODE AC     Oxygen Device, Blood gas Ventilator     FIO2, Blood gas 35 %    Mech Vt 500 ml    Mech RR 24 b/min    PEEP 5.0 cmH2O            Psychiatric Mental Status Exam:  General Appearance: appears stated age, dressed in hospital garb, lying in bed, Bipap in place  Arousal: Sedated  Behavior: unable to participate  Movements and Motor Activity: no tics, no tremors, no akathisia, no dystonia, no evidence of tardive dyskinesia  Orientation: Unable to Assess  Speech:  Unable to assess  Mood: Sedated  Affect: calm  Thought Process: Unable to Assess  Associations: Unable to Assess  Thought Content and Perceptions: Unable to Assess  Recent and Remote Memory: Unable to Assess; per interview/observation with patient  Attention and Concentration: Unable to Assess; per interview/observation with patient  Fund of Knowledge: Unable to Assess; based on history, vocabulary, fund of knowledge, syntax, grammar, and content  Insight:  Unable to assess ; based on understanding of severity of illness and HPI  Judgment:  Unable to assess ; based on patient's behavior and HPI    ASSESSMENT/PLAN:   Diagnosis:  Major Depressive Disorder, Recurrent: Moderate (F33.1)      No past medical history on file.     Recommendations:  Continue PEC due to recent suicidal threats  Will continue to follow and reassess                Usman Barrett

## 2024-03-22 NOTE — NURSING
03/22/24 1638   Post-Hemodialysis Assessment   Rinseback Volume (mL) 250 mL   Blood Volume Processed (Liters) 62 L   Dialyzer Clearance Clear   Duration of Treatment 180 minutes   Total UF (mL) 1000 mL   Net Fluid Removal 500   Patient Response to Treatment Tolerated well   Post-Hemodialysis Comments Tx ended, Pt reinfused, Hemostasis achieved.

## 2024-03-23 NOTE — PROGRESS NOTES
[facility] - 5th Floor Med Surg  Nephrology  Progress Note    Patient Name: Mirza Nelson Jr.  MRN: [id number]  Admission Date: 3/19/2024  Hospital Length of Stay: 4 days  Attending Provider: Darrius Daley MD   Primary Care Physician: NILESH Pena MD  Principal Problem:Acute confusional state      Subjective:     HPI: Mr Mirza Nelson ( 1955) is a 68-year-old AAM with dialysis dependent ESRD followed by Dr Butler. Reportedly he has missed dialysis for a week. Patient presented as a trauma secondary to fall at home. He was noted to have critical hyperkalemia with potassium of 7 for which he is undergoing emergent HD. Patient is disoriented with bilateral upper extremity tremors. Due to confusion and attempts to pull at lines he has a sitter at bedside. Of note patient was recently hospitalized with ESBL E coli UTI and discharged to [facility] on 3/11.      Interval History: Patient was dialyzed both Tuesday and Wednesday with no improvement in mentation. Sister fed him some food on dialysis yesterday which he seemed to tolerate well until he vomited a bit later. There was no suspicion of aspiration at that time. Throughout the night he has required high levels of oxygen and is now on BiPAP. Urine catheter does not seem to be draining. He historically has made decent amount of urine daily. Bladder scan 230 mL. He is mostly unresponsive until bladder is palpated then he grimaces in pain.     2024 - Now patient is on the ventilator.  On Precedex.  Comfortable.  NG tube is in place also.  NGT to the suction.  Nurses reported that there is a plan to start tube feedings on him today.  Chronically needed indwelling Cheek catheter is in place.    3/23 - He is now on both propofol and Precedex on mechanical ventilation. Tube feeding not tolerated. Placed to suction due to abdominal distention with immediate return of 1L tube feeding.     Review of patient's allergies  indicates:  Not on File  Current Facility-Administered Medications   Medication Frequency    acetaminophen suppository 325 mg Q4H PRN    acetaminophen tablet 650 mg Q6H PRN    azithromycin 500 mg in dextrose 5 % 250 mL IVPB (ready to mix) Q24H    calcium gluconate 1 g in NS IVPB (premixed) Q10 Min PRN    dexmedetomidine (PRECEDEX) 400mcg/100mL 0.9% NaCL infusion Continuous    dextrose 10% bolus 250 mL 250 mL bolus from bag    famotidine (PF) injection 20 mg Daily    insulin regular injection 7.26 Units 0.0726 mL Once    mupirocin 2 % ointment BID    ondansetron injection 4 mg Q6H PRN    piperacillin-tazobactam (ZOSYN) 4.5 g in dextrose 5 % in water (D5W) 100 mL IVPB (MB+) Q12H    propofol (DIPRIVAN) 10 mg/mL infusion Continuous    white petrolatum 41 % ointment BID       Objective:     Vital Signs (Most Recent):  Temp: 99.6 °F (37.6 °C) (03/23/24 0400)  Pulse: 92 (03/23/24 0753)  Resp: (!) 25 (03/23/24 0615)  BP: 133/76 (03/23/24 0600)  SpO2: 100 % (03/23/24 0753) Vital Signs (24h Range):  Temp:  [98.2 °F (36.8 °C)-102.1 °F (38.9 °C)] 99.6 °F (37.6 °C)  Pulse:  [] 92  Resp:  [24-31] 25  SpO2:  [96 %-100 %] 100 %  BP: (102-172)/(55-82) 133/76     Weight: 66.2 kg (146 lb) (03/22/24 1021)  Body mass index is 23.57 kg/m².  Body surface area is 1.76 meters squared.    I/O last 3 completed shifts:  In: 2630.4 [I.V.:358.6; NG/GT:653; IV Piggyback:1618.8]  Out: 3385 [Urine:85; Drains:2300; Other:1000]    Physical Exam  Constitutional:       General: He is not in acute distress.     Appearance: He is ill-appearing.   HENT:      Head: Atraumatic.      Mouth/Throat:      Mouth: Mucous membranes are dry.   Cardiovascular:      Rate and Rhythm: Normal rate.   Pulmonary:      Breath sounds: Normal breath sounds.      Comments: On ventilator now  Abdominal:      General: Bowel sounds are normal. There is no distension.      Comments: NG to suction    Genitourinary:     Comments: Urinary catheter  Musculoskeletal:          General: No swelling.      Cervical back: Neck supple.   Skin:     General: Skin is warm.   Neurological:      Comments: Sedated with Precedex and Propofol          Significant Labs:sureBMP:   Recent Labs   Lab 03/23/24  0151   *   K 4.4   CO2 21*   BUN 34.2*   CREATININE 4.86*   CALCIUM 9.1   MG 1.90       CBC:   Recent Labs   Lab 03/23/24  0151   WBC 7.92  7.92   RBC 2.76*   HGB 9.2*   HCT 29.6*   PLT 53*   .2*   MCH 33.3*   MCHC 31.1*       Microbiology Results (last 7 days)       Procedure Component Value Units Date/Time    Blood Culture [[id number]]  (Normal) Collected: 03/20/24 0650    Order Status: Completed Specimen: Blood Updated: 03/22/24 0900     CULTURE, BLOOD ([facility]) No Growth At 48 Hours    Blood Culture [[id number]]  (Normal) Collected: 03/20/24 0747    Order Status: Completed Specimen: Blood Updated: 03/22/24 0900     CULTURE, BLOOD ([facility]) No Growth At 48 Hours    Urine culture [[id number]]  (Abnormal)  (Susceptibility) Collected: 03/19/24 1132    Order Status: Completed Specimen: Urine Updated: 03/22/24 0742     Urine Culture >/= 100,000 colonies/ml Escherichia coli ESBL          Specimen (24h ago, onward)      None          Recent Labs   Lab 03/19/24  1132   PROTEINUA 3+*   BACTERIA Many*   LEUKOCYTESUR 500*   UROBILINOGEN Normal         Significant Imaging:  CT Head Without Contrast [[id number]] Resulted: 03/21/24 0617   Order Status: Completed Updated: 03/21/24 0619   Narrative:     EXAMINATION:  CT HEAD WITHOUT CONTRAST    CLINICAL HISTORY:  Mental status change, unknown cause;    TECHNIQUE:  Axial images of the head were obtained without IV contrast administration.  Coronal and sagittal reconstructions were provided.  Three dimensional and MIP images were obtained and evaluated.  Total DLP was 3233 mGy-cm. Dose lowering technique and automated exposure control were utilized for this exam.    COMPARISON:  CT of the head 03/19/2024.    FINDINGS:  There is normal brain formation.  There  is normal gray-white matter differentiation.  There is mild periventricular and subcortical white matter hypodensity.  There is no hemorrhage, hydrocephalus, or midline shift.  There is no cytotoxic or vasogenic edema.  There is no intra or extra-axial fluid collection.  There is no herniation.    The calvarium is intact.  There is no fracture.  The bilateral orbits are normal.  The paranasal sinuses and mastoid air cells are normally developed and free of disease.   Impression:       No acute intracranial abnormality.    [place] concordance      Electronically signed by: Jd Esquivel MD  Date: 03/21/2024  Time: 06:17       Assessment/Plan:     ESRD on HD - [facility], [facility]  Dialysis noncompliance with critical hyperkalemia and pulmonary vascular congestion on CXR  Trauma fall at home   Altered mental status   Chronic indwelling nielsen catheter changed every three weeks per Dr Navarrete   Frequent UTI - Most recently ESBL E Coli 2/29   CAD s/p PCI   Chronic back pain      Recommendations  No acute indication for hemodialysis today. He will continue on MWF schedule   Start Epogen for anemia of CKD      KARLOS Preston  Nephrology  [facility] - 5th Floor Med Surg

## 2024-03-23 NOTE — PROGRESS NOTES
[facility] - 5th Floor Med Surg  Pulmonary Critical Care Note    Patient Name: Mirza Nelson Jr.  MRN: [id number]  Admission Date: 3/19/2024  Hospital Length of Stay: 4 days  Code Status: DNR  Attending Provider: Darrius Daley MD  Primary Care Provider: NILESH Pena MD     Subjective:     HPI:   This is a 68-year-old male with a history of end-stage renal disease on HD MWF, GERD, BPH, hyperlipidemia, MDD and anxiety who presented to the ED on 03/19/2024 from leave the [facility] after a fall.  Patient was reportedly showing symptoms of confusion as well as suicidal ideations.  He was reportedly refusing dialysis. He was just recently admitted and discharged on 03/17/2024 during that time he was treated for ESBL E coli UTI with Rocephin and Zosyn.  Of note, he was also hospitalized on 2/18/2024 at [facility].  He was diagnosed with a NSTEMI and underwent left heart catheterization with PTCA with stent to the LAD on 02/23.  He was  followed by Urology at that time due to hematuria and has a chronic indwelling catheter.  He was also intubated during the stay for respiratory failure. (Chart is marked for merge, these records are in his previous chart).  Initial chest x-ray in the ED showed enlarged cardiac silhouette with vascular congestion.  Urine culture from 03/19 currently growing Gram-negative rods.  Blood cultures collected on 03/20 remain in process.  Patient reportedly had an aspiration event yesterday in dialysis following an episode of emesis.  His oxygen requirements have increased since that time.  His mentation has also declined.  CTA of head obtained overnight with no acute intracranial abnormalities.  ABGs obtained overnight revealed a pH of 7.35, pCO2 of 53, PO2 59, bicarb 29.  He was placed on BiPAP.  Pulmonary was consulted for hypoxemic hypercapnia along with possible aspiration pneumonia.  Upon arrival to assess patient, the rapid response team was present.  Patient remains  tachypneic with an labored breathing.  He responds to pain however does not communicate.  CABG revealed a glucose of 55 and he was started on a D10 infusion.  Repeat ABGs on BiPAP 14/8 and 65% revealed a pH of 7.45, pCO2 of 41, PO2 of 123, bicarb of 28.  Per report patient was awake oriented as of yesterday.    Hospital Course/Significant events:  3/21: Intubated    24 Hour Interval History:  Remains intubated and mechanically ventilated.  Overnight had increasing abdominal distention and NG suction remove 1 L of fluid.  This morning on propofol and Precedex.  DNR was placed yesterday.    No past medical history on file.    No past surgical history on file.    Social History     Socioeconomic History    Marital status:      Social Determinants of Health     Financial Resource Strain: Low Risk  (3/20/2024)    Overall Financial Resource Strain (CARDIA)     Difficulty of Paying Living Expenses: Not hard at all   Food Insecurity: No Food Insecurity (3/20/2024)    Hunger Vital Sign     Worried About Running Out of Food in the Last Year: Never true     Ran Out of Food in the Last Year: Never true   Transportation Needs: No Transportation Needs (3/20/2024)    PRAPARE - Transportation     Lack of Transportation (Medical): No     Lack of Transportation (Non-Medical): No   Stress: No Stress Concern Present (3/20/2024)    [employer] - Occupational Stress Questionnaire     Feeling of Stress : Not at all   Housing Stability: Low Risk  (3/20/2024)    Housing Stability Vital Sign     Unable to Pay for Housing in the Last Year: No     Number of Places Lived in the Last Year: 1     Unstable Housing in the Last Year: No           No current outpatient medications    Current Inpatient Medications   azithromycin  500 mg Intravenous Q24H    famotidine (PF)  20 mg Intravenous Daily    insulin regular  0.1 Units/kg Intravenous Once    mupirocin   Nasal BID    piperacillin-tazobactam (Zosyn) IV (PEDS  and ADULTS) (extended infusion is not appropriate)  4.5 g Intravenous Q12H    white petrolatum   Topical (Top) BID       Current Intravenous Infusions   dexmedeTOMIDine (Precedex) infusion (titrating) 0.8 mcg/kg/hr (03/23/24 0621)    propofoL 5 mcg/kg/min (03/23/24 0933)         Review of Systems   Unable to perform ROS: Mental status change          Objective:       Intake/Output Summary (Last 24 hours) at 3/23/2024 1010  Last data filed at 3/23/2024 0621  Gross per 24 hour   Intake 1803.33 ml   Output 3345 ml   Net -1541.67 ml           Vital Signs (Most Recent):  Temp: 99.6 °F (37.6 °C) (03/23/24 0400)  Pulse: 92 (03/23/24 0753)  Resp: (!) 25 (03/23/24 0615)  BP: 133/76 (03/23/24 0600)  SpO2: 100 % (03/23/24 0753)  Body mass index is 23.57 kg/m².  Weight: 66.2 kg (146 lb) Vital Signs (24h Range):  Temp:  [98.2 °F (36.8 °C)-102.1 °F (38.9 °C)] 99.6 °F (37.6 °C)  Pulse:  [71-92] 92  Resp:  [24-31] 25  SpO2:  [96 %-100 %] 100 %  BP: (102-172)/(55-76) 133/76     Physical Exam  Constitutional:       General: He is not in acute distress.     Appearance: He is not toxic-appearing.      Comments: Intubated and sedated   HENT:      Head: Normocephalic and atraumatic.   Eyes:      Extraocular Movements: Extraocular movements intact.      Pupils: Pupils are equal, round, and reactive to light.   Cardiovascular:      Rate and Rhythm: Normal rate and regular rhythm.      Pulses: Normal pulses.      Heart sounds: No murmur heard.     No gallop.   Pulmonary:      Effort: No respiratory distress.      Breath sounds: No stridor. No wheezing, rhonchi or rales.   Abdominal:      General: Abdomen is flat.      Palpations: Abdomen is soft.   Musculoskeletal:         General: No swelling or deformity.      Left lower leg: No edema.   Skin:     General: Skin is warm.      Coloration: Skin is not jaundiced.      Findings: No bruising.   Neurological:      Comments: Patient sedated and therefore unable to accurately exam            Lines/Drains/Airways       Drain  Duration                  Urethral Catheter 03/20/24 3 days         NG/OG Tube 03/21/24 1700 [place] sump 16 Fr. Right mouth 1 day              Airway  Duration                  Airway - Non-Surgical 03/21/24 1625 Endotracheal Tube 1 day              Peripheral Intravenous Line  Duration                  Hemodialysis AV Fistula Right upper arm -- days         Peripheral IV - Single Lumen 03/21/24 1700 20 G Anterior;Left;Proximal Upper Arm 1 day         Peripheral IV - Single Lumen 03/21/24 1715 20 G Left Antecubital 1 day                    Significant Labs:    Lab Results   Component Value Date    WBC 7.92 03/23/2024    WBC 7.92 03/23/2024    HGB 9.2 (L) 03/23/2024    HCT 29.6 (L) 03/23/2024    .2 (H) 03/23/2024    PLT 53 (L) 03/23/2024           BMP  Lab Results   Component Value Date     (L) 03/23/2024    K 4.4 03/23/2024    CHLORIDE 101 03/23/2024    CO2 21 (L) 03/23/2024    BUN 34.2 (H) 03/23/2024    CREATININE 4.86 (H) 03/23/2024    CALCIUM 9.1 03/23/2024    AGAP 18.0 03/20/2024         ABG  Recent Labs   Lab 03/23/24  0259   PH 7.450   PO2 78.0*   PCO2 38.0   HCO3 26.4*   POCBASEDEF 2.40         Mechanical Ventilation Support:  Vent Mode: A/C (03/23/24 0753)  Ventilator Initiated: Yes (03/21/24 1634)  Set Rate: 20 BPM (03/23/24 0753)  Vt Set: 500 mL (03/23/24 0753)  PEEP/CPAP: 5 cmH20 (03/23/24 0753)  Oxygen Concentration (%): 30 (03/23/24 0753)  Peak Airway Pressure: 35 cmH20 (03/23/24 0753)  Total Ve: 12.1 L/m (03/23/24 0500)  F/VT Ratio<105 (RSBI): (!) 52.52 (03/23/24 0500)      Significant Imaging:  I have reviewed the pertinent imaging within the past 24 hours.  Chest x-ray without change.  Bilateral infiltrates.      Assessment/Plan:     Assessment  Acute hypoxic and hypercapnic respiratory failure.  Intubated March 21st  Altered mental status  Pulmonary vascular congestion per CXR  Possible aspiration event  E coli UTI with chronic indwelling  nielsen  ESRD on MWF HD  Hypoglycemia  Thrombocytopenia      Plan  Continue full mechanical ventilation support.  Continue Zosyn to cover both the UTI and pneumonia.  Dialysis per Nephrology  We will hold heparin and monitor platelet count  Begin enteral tube feeds    DVT prophylaxis:  Subcu heparin will be held with developing thrombocytopenia.  GI prophylaxis:  Pepcid    33 minutes of critical care time was spent reviewing medical records, direct patient contact, coordinating treatment with nursing and respiratory therapy and/or discussing the case with family members       Darrius Daley MD  Pulmonary Critical Care Medicine  [facility]   DOS: 03/23/2024

## 2024-03-23 NOTE — PROGRESS NOTES
"3/23/2024 3:40 PM   Mirza Nelson Jr.   1955   [id number]        Psychiatry Progress Note     SUBJECTIVE:   Mirza Nelson Jr. is a 68 y.o. male with a history of ESRD, GERD, BPH, HLD, MDD, and anxiety disorder NOS who presented to ED on 03/19/24 from [facility] as a level 2 trauma after a fall. Reportedly fell at 0400 and then had a second witnessed fall at 0600. Reported that he had been refusing his dialysis as well as bunching up his catheter. Physicians emergency certificate completed for verbally abusive with staff and suicidal threats. Nursing home paperwork reports "resident stated resident that he will kill himself".      Patient seen today for follow-up for suicidal ideations prior to this admission. He is intubated and sedated on propofol and Precedex, which is being weaned down. DNR was placed yesterday. Not tolerating tube feedings.     Current Medications:   Scheduled Meds:    azithromycin  500 mg Intravenous Q24H    famotidine (PF)  20 mg Intravenous Daily    insulin regular  0.1 Units/kg Intravenous Once    mupirocin   Nasal BID    piperacillin-tazobactam (Zosyn) IV (PEDS and ADULTS) (extended infusion is not appropriate)  4.5 g Intravenous Q12H    white petrolatum   Topical (Top) BID      PRN Meds: acetaminophen, acetaminophen, [COMPLETED] calcium gluconate IVPB **AND** calcium gluconate IVPB, dextrose 10%, ondansetron   Psychotherapeutics (From admission, onward)      Start     Stop Route Frequency Ordered    03/21/24 2325  dexmedetomidine (PRECEDEX) 400mcg/100mL 0.9% NaCL infusion        Question Answer Comment   Begin at (in mcg/kg/hr): 0.2    Titrate by (in mcg/kg/hr): 0.2    Titrate interval: (in minutes) 10    To maintain a RASS goal of: RASS (-3) Moderate sedation - Movement or eye opening to voice (but no eye contact)    Maximum dose of (in mcg/kg/hr): 1.4        -- IV Continuous 03/21/24 8184            Allergies:   Review of patient's allergies indicates:  Not on File "     OBJECTIVE:   Vitals   Vitals:    03/23/24 1445   BP:    Pulse: 73   Resp: (!) 29   Temp:         Labs/Imaging/Studies:   Recent Results (from the past 36 hour(s))   POCT glucose    Collection Time: 03/22/24  3:56 AM   Result Value Ref Range    POCT Glucose 109 70 - 110 mg/dL   CBC with Differential    Collection Time: 03/22/24  4:27 AM   Result Value Ref Range    WBC 8.29 4.50 - 11.50 x10(3)/mcL    RBC 2.47 (L) 4.70 - 6.10 x10(6)/mcL    Hgb 8.4 (L) 14.0 - 18.0 g/dL    Hct 26.9 (L) 42.0 - 52.0 %    .9 (H) 80.0 - 94.0 fL    MCH 34.0 (H) 27.0 - 31.0 pg    MCHC 31.2 (L) 33.0 - 36.0 g/dL    RDW 18.6 (H) 11.5 - 17.0 %    Platelet 74 (L) 130 - 400 x10(3)/mcL    MPV 11.6 (H) 7.4 - 10.4 fL    NRBC% 0.0 %    IPF 3.4 0.9 - 11.2 %   Manual Differential    Collection Time: 03/22/24  4:27 AM   Result Value Ref Range    WBC 8.29 x10(3)/mcL    Neutrophils % 89 %    Lymphs % 3 %    Monocytes % 3 %    Metamyelocytes % 5 (H) <=0 %    Neutrophils Abs 7.3781 2.1 - 9.2 x10(3)/mcL    Lymphs Abs 0.2487 (L) 0.6 - 4.6 x10(3)/mcL    Monocytes Abs 0.2487 0.1 - 1.3 x10(3)/mcL    Platelets Decreased (A) Normal, Adequate    RBC Morph Abnormal (A) Normal    Poikilocytosis 1+ (A) (none)    Anisocytosis 1+ (A) (none)    Macrocytosis 1+ (A) (none)    Sanford Cells 1+ (A) (none)   Blood Gas (ABG)    Collection Time: 03/22/24  6:25 AM   Result Value Ref Range    Sample Type Arterial Blood     Sample site Left Radial Artery     Drawn by sd rrt     pH, Blood gas 7.510 (H) 7.350 - 7.450    pCO2, Blood gas 36.0 35.0 - 45.0 mmHg    pO2, Blood gas 79.0 (L) 80.0 - 100.0 mmHg    Sodium, Blood Gas 132 (L) 137 - 145 mmol/L    Potassium, Blood Gas 4.7 3.5 - 5.0 mmol/L    Calcium Level Ionized 1.03 (L) 1.12 - 1.23 mmol/L    TOC2, Blood gas 29.8 mmol/L    Base Excess, Blood gas 5.30 (H) -2.00 - 2.00 mmol/L    sO2, Blood gas 96.7 %    HCO3, Blood gas 28.7 (H) 22.0 - 26.0 mmol/L    THb, Blood gas 7.7 (L) 12 - 16 g/dL    O2 Hb, Blood Gas 95.2 94.0 - 97.0 %     CO Hgb 2.8 (H) 0.5 - 1.5 %    Met Hgb 0.9 0.4 - 1.5 %    Allens Test Yes     MODE AC     Oxygen Device, Blood gas Ventilator     FIO2, Blood gas 35 %    Mech Vt 500 ml    Mech RR 24 b/min    PEEP 5.0 cmH2O   POCT glucose    Collection Time: 03/22/24  4:06 PM   Result Value Ref Range    POCT Glucose 119 (H) 70 - 110 mg/dL   Phosphorus    Collection Time: 03/23/24  1:51 AM   Result Value Ref Range    Phosphorus Level 3.3 2.3 - 4.7 mg/dL   Magnesium    Collection Time: 03/23/24  1:51 AM   Result Value Ref Range    Magnesium Level 1.90 1.60 - 2.60 mg/dL   Comprehensive Metabolic Panel    Collection Time: 03/23/24  1:51 AM   Result Value Ref Range    Sodium Level 135 (L) 136 - 145 mmol/L    Potassium Level 4.4 3.5 - 5.1 mmol/L    Chloride 101 98 - 107 mmol/L    Carbon Dioxide 21 (L) 23 - 31 mmol/L    Glucose Level 103 82 - 115 mg/dL    Blood Urea Nitrogen 34.2 (H) 8.4 - 25.7 mg/dL    Creatinine 4.86 (H) 0.73 - 1.18 mg/dL    Calcium Level Total 9.1 8.8 - 10.0 mg/dL    Protein Total 6.5 5.8 - 7.6 gm/dL    Albumin Level 1.8 (L) 3.4 - 4.8 g/dL    Globulin 4.7 (H) 2.4 - 3.5 gm/dL    Albumin/Globulin Ratio 0.4 (L) 1.1 - 2.0 ratio    Bilirubin Total 1.1 <=1.5 mg/dL    Alkaline Phosphatase 43 40 - 150 unit/L    Alanine Aminotransferase 10 0 - 55 unit/L    Aspartate Aminotransferase 17 5 - 34 unit/L    eGFR 12 mls/min/1.73/m2   CBC with Differential    Collection Time: 03/23/24  1:51 AM   Result Value Ref Range    WBC 7.92 4.50 - 11.50 x10(3)/mcL    RBC 2.76 (L) 4.70 - 6.10 x10(6)/mcL    Hgb 9.2 (L) 14.0 - 18.0 g/dL    Hct 29.6 (L) 42.0 - 52.0 %    .2 (H) 80.0 - 94.0 fL    MCH 33.3 (H) 27.0 - 31.0 pg    MCHC 31.1 (L) 33.0 - 36.0 g/dL    RDW 18.6 (H) 11.5 - 17.0 %    Platelet 53 (L) 130 - 400 x10(3)/mcL    MPV 13.4 (H) 7.4 - 10.4 fL    NRBC% 0.0 %    IPF 6.2 0.9 - 11.2 %   Manual Differential    Collection Time: 03/23/24  1:51 AM   Result Value Ref Range    WBC 7.92 x10(3)/mcL    Neutrophils % 92 %    Lymphs % 3 %     Monocytes % 5 %    Neutrophils Abs 7.2864 2.1 - 9.2 x10(3)/mcL    Lymphs Abs 0.2376 (L) 0.6 - 4.6 x10(3)/mcL    Monocytes Abs 0.396 0.1 - 1.3 x10(3)/mcL    Platelets Decreased (A) Normal, Adequate    RBC Morph Abnormal (A) Normal    Anisocytosis 1+ (A) (none)    Macrocytosis 2+ (A) (none)   Blood Gas (ABG)    Collection Time: 03/23/24  2:59 AM   Result Value Ref Range    Sample Type Arterial Blood     Sample site Left Radial Artery     Drawn by reina, rt     pH, Blood gas 7.450 7.350 - 7.450    pCO2, Blood gas 38.0 35.0 - 45.0 mmHg    pO2, Blood gas 78.0 (L) 80.0 - 100.0 mmHg    Sodium, Blood Gas 131 (L) 137 - 145 mmol/L    Potassium, Blood Gas 4.0 3.5 - 5.0 mmol/L    Calcium Level Ionized 1.15 1.12 - 1.23 mmol/L    TOC2, Blood gas 27.6 mmol/L    Base Excess, Blood gas 2.40 mmol/L    sO2, Blood gas 96.0 %    HCO3, Blood gas 26.4 (H) 22.0 - 26.0 mmol/L    Allens Test Yes     MODE AC     Oxygen Device, Blood gas Ventilator     FIO2, Blood gas 30 %    Mech Vt 500 ml    Mech RR 20 b/min    PEEP 5.0 cmH2O   POCT glucose    Collection Time: 03/23/24  9:14 AM   Result Value Ref Range    POCT Glucose 94 70 - 110 mg/dL        Psychiatric Mental Status Exam:  General Appearance: appears stated age, dressed in hospital garb, lying in bed, Bipap in place  Arousal: Sedated  Behavior: unable to participate  Movements and Motor Activity: no tics, no tremors, no akathisia, no dystonia, no evidence of tardive dyskinesia  Orientation: Unable to Assess  Speech:  Unable to assess  Mood: Sedated  Affect: calm  Thought Process: Unable to Assess  Associations: Unable to Assess  Thought Content and Perceptions: Unable to Assess  Recent and Remote Memory: Unable to Assess; per interview/observation with patient  Attention and Concentration: Unable to Assess; per interview/observation with patient  Fund of Knowledge: Unable to Assess; based on history, vocabulary, fund of knowledge, syntax, grammar, and content  Insight:  Unable to assess ;  based on understanding of severity of illness and HPI  Judgment:  Unable to assess ; based on patient's behavior and HPI    ASSESSMENT/PLAN:   Diagnosis:  Major Depressive Disorder, Recurrent: Moderate (F33.1)      No past medical history on file.     Plan:  Continue PEC/CEC due to recent suicidal threats  Psych will continue to follow and reassess      Nigel Pop, GONZALESP-BC  3/23/2024

## 2024-03-23 NOTE — NURSING
Nurses Note -- 4 Eyes      3/23/2024   4:47 AM      Skin assessed during: Daily Assessment      [] No Altered Skin Integrity Present    [x]Prevention Measures Documented      [x] Yes- Altered Skin Integrity Present or Discovered   [] LDA Added if Not in Epic (Describe Wound)   [] New Altered Skin Integrity was Present on Admit and Documented in LDA   [] Wound Image Taken    Wound Care Consulted? Yes    Attending Nurse:  RAMANA Miller     Second RN/Staff Member:   RAMANA Ruvalcaba

## 2024-03-23 NOTE — NURSING
Nurses Note -- 4 Eyes      3/23/2024   4:35 PM      Skin assessed during: Daily Assessment      [] No Altered Skin Integrity Present    []Prevention Measures Documented      [x] Yes- Altered Skin Integrity Present or Discovered   [] LDA Added if Not in Epic (Describe Wound)   [] New Altered Skin Integrity was Present on Admit and Documented in LDA   [] Wound Image Taken    Wound Care Consulted? Yes    Attending Nurse:  Corinne Garner RN     Second RN/Staff Member:   Hailey Cowan RN

## 2024-03-23 NOTE — PLAN OF CARE
Problem: Infection  Goal: Absence of Infection Signs and Symptoms  Outcome: Ongoing, Progressing     Problem: Device-Related Complication Risk (Hemodialysis)  Goal: Safe, Effective Therapy Delivery  Outcome: Ongoing, Progressing     Problem: Hemodynamic Instability (Hemodialysis)  Goal: Effective Tissue Perfusion  Outcome: Ongoing, Progressing

## 2024-03-23 NOTE — LOPA/MORA/SWTA/AOC/AEB
[facility] ([facility])  Notification of Referral  [facility] Contact # [phone]        Thank you for the referral of this patient to determine suitability for organ, tissue, and eye donation.  A chart review has been conducted (date):2024 at (time) 11:04 AM.    ? Potential candidate for organ donation - [facility] following patient. Any changes in patients condition, discussion of withdrawing the vent or brain death exams, or family mention of donation immediately call [phone]. Refer all organ referrals within 1 hour of meeting the clinical triger of a patient with a neurological, anoxic, or life threatening injury and ONE of the following:    * GCS </= 8    * Loss of 2 or more brain stem reflexes    * Hypothermic Protocol Initiated    * Withdrawal of support discussion regardless of GCS    * Family mention of Donation    ? Potential for candidate for tissue and eye donation- call [facility] at [phone] within 2 hours of death for screening as a potential tissue and/or eye donor.      ? Potential candidate for eye donation - call [facility] at [phone] within 2 hours of death for screening as a potential eye donor.    ? NOT a candidate for organ/tissue/eye donation- call [facility] at [phone] within 2 hours of death to report the time of death.    ? Potential candidate for donation/ Referral Closed- Any changes in patients condition, GCS of 5 or less, discussion of withdrawing the vent, brain death exams, or family mention of donation immediately call [phone].      Screened by: Ermias Low    [facility] Referral Number:  [id number]    Suitable for:  [x]Organ  [x] Tissue  [x] Eye  []Not suitable for Donation    Rule out Reason:     Patient Name: Mirza Nelson Jr.                   68 y.o. male  Patient MRN: [id number]  : 1955  DOD:  TOD:  Cause of Death:     [x]Vented Patient  [facility] representative to approach family if appropriate  []DNR status obtained Referral of critical care  patients when family initiates Do Not Resuscitate  []Cardiac Death   Clinical Support Center to approach family via telephone if appropriate  []Donor Registry  Patient is listed in the Donor Registry    Completed by: Ermias Low

## 2024-03-24 NOTE — OP NOTE
PREOP DIAGNOSIS:  Nielsen not drainig    POSTOP DIAGNOSIS:  Same    PROCEDURE:  Cystourethroscopy    SURGEON:  Dr. Fuller    ANESTHESIA:  Local lidocaine    COMPLICATIONS:  None.    SPECIMENS:  None    EBL:  miminal    INDICATIONS:  68M who presents now for cystourethroscopy further evaluation  of the lower urinary tract.    SUMMARY:     The patient was placed in the supine position. The patient was prepped with and draped in the usual sterile fashion. Cystoscopy was then performed using a 16 Fr flexible cystoscope. There was blood and false passages in the bulbar and prostate urethra which were negoiated with the scope. Once in the bladder and wire was placed and the scope removed. Then a nielsen was placed over the wire.    10ml in balloon      CONDITION:  Stable    ASSESSMENT/PLAN:  See note    03/24/2024  9:39 AM

## 2024-03-24 NOTE — PROGRESS NOTES
[facility] - 5th Floor Med Surg  Nephrology  Progress Note    Patient Name: Mirza Nelson Jr.  MRN: [id number]  Admission Date: 3/19/2024  Hospital Length of Stay: 5 days  Attending Provider: Darrius Daley MD   Primary Care Physician: NILESH Pena MD  Principal Problem:Acute confusional state      Subjective:     HPI: Mr Mirza Nelson ( 1955) is a 68-year-old AAM with dialysis dependent ESRD followed by Dr Butler. Reportedly he has missed dialysis for a week. Patient presented as a trauma secondary to fall at home. He was noted to have critical hyperkalemia with potassium of 7 for which he is undergoing emergent HD. Patient is disoriented with bilateral upper extremity tremors. Due to confusion and attempts to pull at lines he has a sitter at bedside. Of note patient was recently hospitalized with ESBL E coli UTI and discharged to [facility] on 3/11.      Interval History: Patient was dialyzed both Tuesday and Wednesday with no improvement in mentation. He vomiting during dialysis on Thursday without suspicion of aspiration at the time. He did however require escalation of oxygen therapy overnight. Subsequently transferred to ICU and intubated secondary to respiratory failure. He is being treated for PNA and UTI with Zosyn. Urine culture repeated 3/19 showing ESBL E coli. Continuous output via OG.         Review of patient's allergies indicates:  Not on File  Current Facility-Administered Medications   Medication Frequency    acetaminophen suppository 325 mg Q4H PRN    acetaminophen tablet 650 mg Q6H PRN    azithromycin 500 mg in dextrose 5 % 250 mL IVPB (ready to mix) Q24H    calcium gluconate 1 g in NS IVPB (premixed) Q10 Min PRN    dexmedetomidine (PRECEDEX) 400mcg/100mL 0.9% NaCL infusion Continuous    dextrose 10% bolus 250 mL 250 mL bolus from bag    famotidine (PF) injection 20 mg Daily    insulin regular injection 7.26 Units 0.0726 mL Once    mupirocin 2 % ointment  BID    ondansetron injection 4 mg Q6H PRN    propofol (DIPRIVAN) 10 mg/mL infusion Continuous    white petrolatum 41 % ointment BID       Objective:     Vital Signs (Most Recent):  Temp: 98.5 °F (36.9 °C) (03/24/24 0400)  Pulse: 71 (03/24/24 0841)  Resp: (!) 24 (03/24/24 0600)  BP: (!) 142/61 (03/24/24 0600)  SpO2: 100 % (03/24/24 0841) Vital Signs (24h Range):  Temp:  [98.5 °F (36.9 °C)-101.4 °F (38.6 °C)] 98.5 °F (36.9 °C)  Pulse:  [57-99] 71  Resp:  [21-34] 24  SpO2:  [92 %-100 %] 100 %  BP: (117-157)/(61-89) 142/61     Weight: 66.2 kg (146 lb) (03/22/24 1021)  Body mass index is 23.57 kg/m².  Body surface area is 1.76 meters squared.    I/O last 3 completed shifts:  In: 2229.9 [I.V.:417.6; NG/GT:573; IV Piggyback:1239.3]  Out: 2020 [Urine:120; Drains:1900]    Physical Exam  Constitutional:       General: He is not in acute distress.     Appearance: He is ill-appearing.   HENT:      Head: Atraumatic.      Mouth/Throat:      Mouth: Mucous membranes are dry.   Cardiovascular:      Rate and Rhythm: Normal rate.   Pulmonary:      Breath sounds: Normal breath sounds.      Comments: Mechanical ventilation   Abdominal:      General: Bowel sounds are normal. There is no distension.      Comments: NG to suction with moderate volume output    Genitourinary:     Comments: Urinary catheter  Musculoskeletal:         General: No swelling.      Cervical back: Neck supple.   Skin:     General: Skin is warm.   Neurological:      Comments: Sedated with Precedex and Propofol          Significant Labs:sureBMP:   Recent Labs   Lab 03/24/24  0236      K 4.8   CO2 20*   BUN 60.4*   CREATININE 6.40*   CALCIUM 8.2*   MG 2.10       CBC:   Recent Labs   Lab 03/24/24  0235   WBC 6.86  6.86   RBC 2.74*   HGB 9.1*   HCT 29.7*   PLT 46*   .4*   MCH 33.2*   MCHC 30.6*       Microbiology Results (last 7 days)       Procedure Component Value Units Date/Time    Blood Culture [[id number]]  (Normal) Collected: 03/20/24 0650    Order  Status: Completed Specimen: Blood Updated: 03/24/24 0901     CULTURE, BLOOD ([facility]) No Growth At 96 Hours    Blood Culture [[id number]]  (Normal) Collected: 03/20/24 0747    Order Status: Completed Specimen: Blood Updated: 03/24/24 0901     CULTURE, BLOOD ([facility]) No Growth At 96 Hours    Urine culture [[id number]]  (Abnormal)  (Susceptibility) Collected: 03/19/24 1132    Order Status: Completed Specimen: Urine Updated: 03/22/24 0742     Urine Culture >/= 100,000 colonies/ml Escherichia coli ESBL          Specimen (24h ago, onward)      None          Recent Labs   Lab 03/19/24  1132   PROTEINUA 3+*   BACTERIA Many*   LEUKOCYTESUR 500*   UROBILINOGEN Normal         Significant Imaging:  No new imaging     Assessment/Plan:     ESRD on HD - [facility], [facility], [facility]  Dialysis noncompliance with critical hyperkalemia and pulmonary vascular congestion on CXR  Trauma fall at home   Altered mental status   Chronic indwelling nielsen catheter changed every three weeks per Dr Navarrete   Frequent UTI - Most recently ESBL E Coli 2/29   CAD s/p PCI   Chronic back pain      Recommendations  No acute indication for hemodialysis today. He will continue on MWF schedule   Repeat lab in the morning prior to dialysis       KARLOS Preston  Nephrology  [facility] - 5th Floor Med Surg

## 2024-03-24 NOTE — PROGRESS NOTES
"3/24/2024 4:44 PM   Mirza Nelson Jr.   1955   [id number]        Psychiatry Progress Note     SUBJECTIVE:   Mirza Nelson Jr. is a 68 y.o. male with a history of ESRD, GERD, BPH, HLD, MDD, and anxiety disorder NOS who presented to ED on 03/19/24 from [facility] the [facility] as a level 2 trauma after a fall. Reportedly fell at 0400 and then had a second witnessed fall at 0600. Reported that he had been refusing his dialysis as well as bunching up his catheter. Physicians emergency certificate completed for verbally abusive with staff and suicidal threats. Nursing home paperwork reports "resident stated resident that he will kill himself".       Patient seen today for follow-up for suicidal ideations prior to this admission. He continues to be intubated and sedated as he failed his spontaneous breathing test this AM. DNR was placed on Friday.        Current Medications:   Scheduled Meds:    azithromycin  500 mg Intravenous Q24H    doxycycline IV (PEDS and ADULTS)  100 mg Intravenous Q12H    famotidine (PF)  20 mg Intravenous Daily    insulin regular  0.1 Units/kg Intravenous Once    white petrolatum   Topical (Top) BID      PRN Meds: acetaminophen, acetaminophen, [COMPLETED] calcium gluconate IVPB **AND** calcium gluconate IVPB, dextrose 10%, ondansetron   Psychotherapeutics (From admission, onward)      Start     Stop Route Frequency Ordered    03/21/24 3595  dexmedetomidine (PRECEDEX) 400mcg/100mL 0.9% NaCL infusion        Question Answer Comment   Begin at (in mcg/kg/hr): 0.2    Titrate by (in mcg/kg/hr): 0.2    Titrate interval: (in minutes) 10    To maintain a RASS goal of: RASS (-3) Moderate sedation - Movement or eye opening to voice (but no eye contact)    Maximum dose of (in mcg/kg/hr): 1.4        -- IV Continuous 03/21/24 1389            Allergies:   Review of patient's allergies indicates:  Not on File     OBJECTIVE:   Vitals   Vitals:    03/24/24 1600   BP: (!) 138/52   Pulse: 63   Resp: (!) 25 "   Temp:         Labs/Imaging/Studies:   Recent Results (from the past 36 hour(s))   POCT glucose    Collection Time: 03/23/24  9:14 AM   Result Value Ref Range    POCT Glucose 94 70 - 110 mg/dL   POCT glucose    Collection Time: 03/23/24  5:46 PM   Result Value Ref Range    POCT Glucose 109 70 - 110 mg/dL   CBC with Differential    Collection Time: 03/24/24  2:35 AM   Result Value Ref Range    WBC 6.86 4.50 - 11.50 x10(3)/mcL    RBC 2.74 (L) 4.70 - 6.10 x10(6)/mcL    Hgb 9.1 (L) 14.0 - 18.0 g/dL    Hct 29.7 (L) 42.0 - 52.0 %    .4 (H) 80.0 - 94.0 fL    MCH 33.2 (H) 27.0 - 31.0 pg    MCHC 30.6 (L) 33.0 - 36.0 g/dL    RDW 18.8 (H) 11.5 - 17.0 %    Platelet 46 (L) 130 - 400 x10(3)/mcL    MPV      NRBC% 0.0 %    IPF 7.5 0.9 - 11.2 %   Manual Differential    Collection Time: 03/24/24  2:35 AM   Result Value Ref Range    WBC 6.86 x10(3)/mcL    Neutrophils % 86 %    Lymphs % 6 %    Monocytes % 5 %    Eosinophils % 3 %    Neutrophils Abs 5.8996 2.1 - 9.2 x10(3)/mcL    Lymphs Abs 0.4116 (L) 0.6 - 4.6 x10(3)/mcL    Monocytes Abs 0.343 0.1 - 1.3 x10(3)/mcL    Eosinophils Abs 0.2058 0 - 0.9 x10(3)/mcL    Platelets Decreased (A) Normal, Adequate    RBC Morph Abnormal (A) Normal    Poikilocytosis 2+ (A) (none)    Anisocytosis 2+ (A) (none)    Macrocytosis 2+ (A) (none)    Valentina Cells 2+ (A) (none)   Comprehensive Metabolic Panel    Collection Time: 03/24/24  2:36 AM   Result Value Ref Range    Sodium Level 136 136 - 145 mmol/L    Potassium Level 4.8 3.5 - 5.1 mmol/L    Chloride 101 98 - 107 mmol/L    Carbon Dioxide 20 (L) 23 - 31 mmol/L    Glucose Level 110 82 - 115 mg/dL    Blood Urea Nitrogen 60.4 (H) 8.4 - 25.7 mg/dL    Creatinine 6.40 (H) 0.73 - 1.18 mg/dL    Calcium Level Total 8.2 (L) 8.8 - 10.0 mg/dL    Protein Total 5.5 (L) 5.8 - 7.6 gm/dL    Albumin Level 1.7 (L) 3.4 - 4.8 g/dL    Globulin 3.8 (H) 2.4 - 3.5 gm/dL    Albumin/Globulin Ratio 0.4 (L) 1.1 - 2.0 ratio    Bilirubin Total 0.8 <=1.5 mg/dL    Alkaline  Phosphatase 42 40 - 150 unit/L    Alanine Aminotransferase 8 0 - 55 unit/L    Aspartate Aminotransferase 13 5 - 34 unit/L    eGFR 9 mls/min/1.73/m2   Magnesium    Collection Time: 03/24/24  2:36 AM   Result Value Ref Range    Magnesium Level 2.10 1.60 - 2.60 mg/dL   Phosphorus    Collection Time: 03/24/24  2:36 AM   Result Value Ref Range    Phosphorus Level 4.7 2.3 - 4.7 mg/dL   Blood Gas (ABG)    Collection Time: 03/24/24  3:55 AM   Result Value Ref Range    Sample Type Arterial Blood     Sample site Left Radial Artery     Drawn by reina, rt     pH, Blood gas 7.460 (H) 7.350 - 7.450    pCO2, Blood gas 33.0 (L) 35.0 - 45.0 mmHg    pO2, Blood gas 121.0 (H) 80.0 - 100.0 mmHg    Sodium, Blood Gas 131 (L) 137 - 145 mmol/L    Potassium, Blood Gas 4.6 3.5 - 5.0 mmol/L    Calcium Level Ionized 1.08 (L) 1.12 - 1.23 mmol/L    TOC2, Blood gas 24.5 mmol/L    Base Excess, Blood gas -0.10 -2.00 - 2.00 mmol/L    sO2, Blood gas 98.9 %    HCO3, Blood gas 23.5 22.0 - 26.0 mmol/L    THb, Blood gas 8.4 (L) 12 - 16 g/dL    O2 Hb, Blood Gas 96.3 94.0 - 97.0 %    CO Hgb 3.3 (H) 0.5 - 1.5 %    Met Hgb 1.1 0.4 - 1.5 %    MODE AC     Oxygen Device, Blood gas Ventilator     FIO2, Blood gas 30.0 %    Mech Vt 500 ml    Mech RR 20 b/min    PEEP 5.0 cmH2O   POCT glucose    Collection Time: 03/24/24  3:01 PM   Result Value Ref Range    POCT Glucose 123 (H) 70 - 110 mg/dL        Psychiatric Mental Status Exam:  General Appearance: appears stated age, dressed in hospital garb, lying in bed, Bipap in place  Arousal: Sedated  Behavior: unable to participate  Movements and Motor Activity: no tics, no tremors, no akathisia, no dystonia, no evidence of tardive dyskinesia  Orientation: Unable to Assess  Speech:  Unable to assess  Mood: Sedated  Affect: calm  Thought Process: Unable to Assess  Associations: Unable to Assess  Thought Content and Perceptions: Unable to Assess  Recent and Remote Memory: Unable to Assess; per interview/observation with  patient  Attention and Concentration: Unable to Assess; per interview/observation with patient  Fund of Knowledge: Unable to Assess; based on history, vocabulary, fund of knowledge, syntax, grammar, and content  Insight:  Unable to assess ; based on understanding of severity of illness and HPI  Judgment:  Unable to assess ; based on patient's behavior and HPI    ASSESSMENT/PLAN:   Diagnosis:  Major Depressive Disorder, Recurrent: Moderate (F33.1)     No past medical history on file.     Plan:  Continue PEC/CEC due to recent suicidal threats and verbal aggression with staff. However, consider rescinding due to his current medical status.    Psych will continue to follow and reassess       Nigel Pop, RENITA-BC  3/24/2024

## 2024-03-24 NOTE — PROGRESS NOTES
[facility] - 5th [facility] Med Surg  Pulmonary Critical Care Note    Patient Name: Mirza Nelson Jr.  MRN: [id number]  Admission Date: 3/19/2024  Hospital Length of Stay: 5 days  Code Status: DNR  Attending Provider: Darrius Daley MD  Primary Care Provider: NILESH Pena MD     Subjective:     HPI:   This is a 68-year-old male with a history of end-stage renal disease on HD MWF, GERD, BPH, hyperlipidemia, MDD and anxiety who presented to the ED on 03/19/2024 from leave the [facility] after a fall.  Patient was reportedly showing symptoms of confusion as well as suicidal ideations.  He was reportedly refusing dialysis. He was just recently admitted and discharged on 03/17/2024 during that time he was treated for ESBL E coli UTI with Rocephin and Zosyn.  Of note, he was also hospitalized on 2/18/2024 at [facility].  He was diagnosed with a NSTEMI and underwent left heart catheterization with PTCA with stent to the LAD on 02/23.  He was  followed by Urology at that time due to hematuria and has a chronic indwelling catheter.  He was also intubated during the stay for respiratory failure. (Chart is marked for merge, these records are in his previous chart).  Initial chest x-ray in the ED showed enlarged cardiac silhouette with vascular congestion.  Urine culture from 03/19 currently growing Gram-negative rods.  Blood cultures collected on 03/20 remain in process.  Patient reportedly had an aspiration event yesterday in dialysis following an episode of emesis.  His oxygen requirements have increased since that time.  His mentation has also declined.  CTA of head obtained overnight with no acute intracranial abnormalities.  ABGs obtained overnight revealed a pH of 7.35, pCO2 of 53, PO2 59, bicarb 29.  He was placed on BiPAP.  Pulmonary was consulted for hypoxemic hypercapnia along with possible aspiration pneumonia.  Upon arrival to assess patient, the rapid response team was present.  Patient remains  tachypneic with an labored breathing.  He responds to pain however does not communicate.  CABG revealed a glucose of 55 and he was started on a D10 infusion.  Repeat ABGs on BiPAP 14/8 and 65% revealed a pH of 7.45, pCO2 of 41, PO2 of 123, bicarb of 28.  Per report patient was awake oriented as of yesterday.    Hospital Course/Significant events:  3/21: Intubated    24 Hour Interval History:  Remains intubated and mechanically ventilated.   This morning however he is awake and alert and following some simple commands.  He is on low-dose Precedex for sedation.  Minimal sputum production.    No past medical history on file.    No past surgical history on file.    Social History     Socioeconomic History    Marital status:      Social Determinants of Health     Financial Resource Strain: Low Risk  (3/20/2024)    Overall Financial Resource Strain (CARDIA)     Difficulty of Paying Living Expenses: Not hard at all   Food Insecurity: No Food Insecurity (3/20/2024)    Hunger Vital Sign     Worried About Running Out of Food in the Last Year: Never true     Ran Out of Food in the Last Year: Never true   Transportation Needs: No Transportation Needs (3/20/2024)    PRAPARE - Transportation     Lack of Transportation (Medical): No     Lack of Transportation (Non-Medical): No   Stress: No Stress Concern Present (3/20/2024)    [employer] [place] [employer] - Occupational Stress Questionnaire     Feeling of Stress : Not at all   Housing Stability: Low Risk  (3/20/2024)    Housing Stability Vital Sign     Unable to Pay for Housing in the Last Year: No     Number of Places Lived in the Last Year: 1     Unstable Housing in the Last Year: No           No current outpatient medications    Current Inpatient Medications   azithromycin  500 mg Intravenous Q24H    famotidine (PF)  20 mg Intravenous Daily    insulin regular  0.1 Units/kg Intravenous Once    mupirocin   Nasal BID    piperacillin-tazobactam (Zosyn) IV (PEDS  and ADULTS) (extended infusion is not appropriate)  4.5 g Intravenous Q12H    white petrolatum   Topical (Top) BID       Current Intravenous Infusions   dexmedeTOMIDine (Precedex) infusion (titrating) 0.8 mcg/kg/hr (03/24/24 0617)    propofoL 20 mcg/kg/min (03/24/24 0617)         Review of Systems   Unable to perform ROS: Mental status change          Objective:       Intake/Output Summary (Last 24 hours) at 3/24/2024 0900  Last data filed at 3/24/2024 0617  Gross per 24 hour   Intake 914.44 ml   Output 425 ml   Net 489.44 ml           Vital Signs (Most Recent):  Temp: 98.5 °F (36.9 °C) (03/24/24 0400)  Pulse: 71 (03/24/24 0841)  Resp: (!) 24 (03/24/24 0600)  BP: (!) 142/61 (03/24/24 0600)  SpO2: 100 % (03/24/24 0841)  Body mass index is 23.57 kg/m².  Weight: 66.2 kg (146 lb) Vital Signs (24h Range):  Temp:  [98.5 °F (36.9 °C)-101.4 °F (38.6 °C)] 98.5 °F (36.9 °C)  Pulse:  [57-99] 71  Resp:  [21-34] 24  SpO2:  [92 %-100 %] 100 %  BP: (117-157)/(61-89) 142/61     Physical Exam  Constitutional:       General: He is not in acute distress.     Appearance: He is not toxic-appearing.      Comments: Intubated and sedated   HENT:      Head: Normocephalic and atraumatic.   Eyes:      Extraocular Movements: Extraocular movements intact.      Pupils: Pupils are equal, round, and reactive to light.   Cardiovascular:      Rate and Rhythm: Normal rate and regular rhythm.      Pulses: Normal pulses.      Heart sounds: No murmur heard.     No gallop.   Pulmonary:      Effort: No respiratory distress.      Breath sounds: No stridor. No wheezing, rhonchi or rales.   Abdominal:      General: Abdomen is flat.      Palpations: Abdomen is soft.   Musculoskeletal:         General: No swelling or deformity.      Left lower leg: No edema.   Skin:     General: Skin is warm.      Coloration: Skin is not jaundiced.      Findings: No bruising.   Neurological:      Comments: Patient sedated and therefore unable to accurately exam            Lines/Drains/Airways       Drain  Duration                  Urethral Catheter 03/20/24 4 days         NG/OG Tube 03/21/24 1700 [place] sump 16 Fr. Right mouth 2 days              Airway  Duration                  Airway - Non-Surgical 03/21/24 1625 Endotracheal Tube 2 days              Peripheral Intravenous Line  Duration                  Hemodialysis AV Fistula Right upper arm -- days         Peripheral IV - Single Lumen 03/21/24 1700 20 G Anterior;Left;Proximal Upper Arm 2 days         Peripheral IV - Single Lumen 03/21/24 1715 20 G Left Antecubital 2 days                    Significant Labs:    Lab Results   Component Value Date    WBC 6.86 03/24/2024    WBC 6.86 03/24/2024    HGB 9.1 (L) 03/24/2024    HCT 29.7 (L) 03/24/2024    .4 (H) 03/24/2024    PLT 46 (L) 03/24/2024           BMP  Lab Results   Component Value Date     03/24/2024    K 4.8 03/24/2024    CHLORIDE 101 03/24/2024    CO2 20 (L) 03/24/2024    BUN 60.4 (H) 03/24/2024    CREATININE 6.40 (H) 03/24/2024    CALCIUM 8.2 (L) 03/24/2024    AGAP 18.0 03/20/2024         ABG  Recent Labs   Lab 03/24/24  0355   PH 7.460*   PO2 121.0*   PCO2 33.0*   HCO3 23.5   POCBASEDEF -0.10         Mechanical Ventilation Support:  Vent Mode: CPAP / PSV (03/24/24 0841)  Ventilator Initiated: Yes (03/21/24 1634)  Set Rate: 20 BPM (03/24/24 0400)  Vt Set: 500 mL (03/24/24 0400)  Pressure Support: 10 cmH20 (03/24/24 0841)  PEEP/CPAP: 5 cmH20 (03/24/24 0841)  Oxygen Concentration (%): 30 (03/24/24 0841)  Peak Airway Pressure: 17 cmH20 (03/24/24 0841)  Total Ve: 11.6 L/m (03/24/24 0841)  F/VT Ratio<105 (RSBI): (!) 50.28 (03/24/24 0400)      Significant Imaging:  I have reviewed the pertinent imaging within the past 24 hours.    No new imaging      Assessment/Plan:     Assessment  Acute hypoxic and hypercapnic respiratory failure.  Intubated March 21st  Altered mental status  Pulmonary vascular congestion per CXR  Possible aspiration event  E coli UTI with  chronic indwelling nielsen  ESRD on MWF HD  Thrombocytopenia      Plan   Patient placed on CPAP this morning.  He is done fair with that.  Oxygenation remains good as does ventilation however he is somewhat tachypneic and required increasing Precedex.  We will discuss with family when they arrive about their desires as apparently the patient has indicated in the past he did not wish life support measures.   DNR per family request.    With thrombocytopenia we will discontinue Zosyn  Dialysis per Nephrology  We will hold heparin and monitor platelet count  Begin enteral tube feeds    DVT prophylaxis:  Subcu heparin will be held with developing thrombocytopenia.  GI prophylaxis:  Pepcid    33 minutes of critical care time was spent reviewing medical records, direct patient contact, coordinating treatment with nursing and respiratory therapy and/or discussing the case with family members       Darrius Daley MD  Pulmonary Critical Care Medicine  [facility]   DOS: 03/24/2024

## 2024-03-24 NOTE — PLAN OF CARE
Problem: Infection  Goal: Absence of Infection Signs and Symptoms  Outcome: Ongoing, Progressing     Problem: Device-Related Complication Risk (Hemodialysis)  Goal: Safe, Effective Therapy Delivery  Outcome: Ongoing, Progressing     Problem: Hemodynamic Instability (Hemodialysis)  Goal: Effective Tissue Perfusion  Outcome: Ongoing, Progressing     Problem: Infection (Hemodialysis)  Goal: Absence of Infection Signs and Symptoms  Outcome: Ongoing, Progressing

## 2024-03-24 NOTE — PROGRESS NOTES
UROLOGY FOLLOW-UP PROGRESS NOTE      SUBJECTIVE  Mirza Nelson Jr. is a 68 y.o. year old male hospital day 5 with No chief complaint on file.       Urology reconsulted due to nielsen issues    VITAL SIGNS: 24 HR MIN & MAX LAST    Temp  Min: 98.5 °F (36.9 °C)  Max: 101.4 °F (38.6 °C)  98.5 °F (36.9 °C)        BP  Min: 117/62  Max: 157/82  (!) 142/61     Pulse  Min: 57  Max: 99  71     Resp  Min: 21  Max: 33  (!) 24    SpO2  Min: 92 %  Max: 100 %  100 %      Vitals:    03/24/24 0430 03/24/24 0500 03/24/24 0600 03/24/24 0841   BP: (!) 141/70 136/75 (!) 142/61    Pulse: 74 67 67 71   Resp: (!) 28 (!) 26 (!) 24    Temp:       TempSrc:       SpO2: 100% 100% 100% 100%   Weight:       Height:           Intake/Output:    Intake/Output Summary (Last 24 hours) at 3/24/2024 0935  Last data filed at 3/24/2024 0800  Gross per 24 hour   Intake 914.44 ml   Output 450 ml   Net 464.44 ml    I/O last 3 completed shifts:  In: 2229.9 [I.V.:417.6; NG/GT:573; IV Piggyback:1239.3]  Out: 2020 [Urine:120; Drains:1900] No diet orders on file     @[facility]@      IMAGING  na    PHYSICAL EXAMINATION    Constitutional:  Well developed, well nourished, no acute distress, non-toxic appearance   Respiratory:  Non-labored, no wheezing, clear  Cardiac:  RRR, no murmurs rubs gallops  Abdomen:    :  nielsen  Extremities:  No clubbing, cyanosis or edema.  TEDS and SCDs applied      ASSESSMENT  Hospital Day :5  Active Hospital Problems    Diagnosis  POA    *Acute confusional state [F05]  Yes    Malnutrition [E46]  Yes         Patient meets ASPEN criteria for moderate malnutrition of chronic illness per RD assessment as evidenced by:  Energy Intake (Malnutrition):  (unable to determine)  Weight Loss (Malnutrition):  (unable to determine, no wt hx in EMR)  Subcutaneous Fat (Malnutrition): mild depletion  Muscle Mass (Malnutrition): severe depletion  Fluid Accumulation (Malnutrition):  (does not meet criteria)  Hand  Strength, Left (Malnutrition): unable to  determine  Hand  Strength, Right (Malnutrition): unable to determine  A minimum of two characteristics is recommended for diagnosis of either severe or non-severe malnutrition.         Resolved Hospital Problems   No resolved problems to display.       Pt of Dr. Navarrete chronic morales    Cheek replaced iwkacie scope and wire

## 2024-03-24 NOTE — NURSING
Nurses Note -- 4 Eyes      3/24/2024   10:48 AM      Skin assessed during: Daily Assessment      [] No Altered Skin Integrity Present    []Prevention Measures Documented      [x] Yes- Altered Skin Integrity Present or Discovered   [] LDA Added if Not in Epic (Describe Wound)   [] New Altered Skin Integrity was Present on Admit and Documented in LDA   [] Wound Image Taken    Wound Care Consulted? Yes    Attending Nurse:  Corinne Garner RN     Second RN/Staff Member:   Hailey Ferrer Rn

## 2024-03-25 NOTE — PROGRESS NOTES
[facility] Med S[facility]  Nephrology  Progress Note    Patient Name: Mirza Nelson Jr.  MRN: [id number]  Admission Date: 3/19/2024  Hospital Length of Stay: 6 days  Attending Provider: Darrius Daley MD   Primary Care Physician: NILESH Pena MD  Principal Problem:Acute confusional state      Subjective:     HPI: Mr Mirza Nelson ( 1955) is a 68-year-old AAM with dialysis dependent ESRD followed by Dr Butler. Reportedly he has missed dialysis for a week. Patient presented as a trauma secondary to fall at home. He was noted to have critical hyperkalemia with potassium of 7 for which he is undergoing emergent HD. Patient is disoriented with bilateral upper extremity tremors. Due to confusion and attempts to pull at lines he has a sitter at bedside. Of note patient was recently hospitalized with ESBL E coli UTI and discharged to [facility] on 3/11.      Interval History: Patient was dialyzed both Tuesday and Wednesday last week with no improvement in mentation. He vomiting during dialysis on Thursday without suspicion of aspiration at the time. He did however require escalation of oxygen therapy overnight. Subsequently transferred to ICU and intubated secondary to respiratory failure. He is being treated for PNA and UTI with Zosyn. Urine culture repeated 3/19 showing ESBL E coli.     Pending dialysis this morning. Sedation being weaned as well as ventilator. Catheter exchanged per urology.         Review of patient's allergies indicates:  Not on File  Current Facility-Administered Medications   Medication Frequency    acetaminophen suppository 325 mg Q4H PRN    acetaminophen tablet 650 mg Q6H PRN    azithromycin 500 mg in dextrose 5 % 250 mL IVPB (ready to mix) Q24H    calcium gluconate 1 g in NS IVPB (premixed) Q10 Min PRN    dexmedetomidine (PRECEDEX) 400mcg/100mL 0.9% NaCL infusion Continuous    dextrose 10% bolus 250 mL 250 mL bolus from bag    doxycycline 100 mg in  dextrose 5 % in water (D5W) 100 mL IVPB (MB+) Q12H    famotidine (PF) injection 20 mg Daily    insulin regular injection 7.26 Units 0.0726 mL Once    ondansetron injection 4 mg Q6H PRN    propofol (DIPRIVAN) 10 mg/mL infusion Continuous    white petrolatum 41 % ointment BID       Objective:     Vital Signs (Most Recent):  Temp: 98.3 °F (36.8 °C) (03/25/24 0400)  Pulse: 61 (03/25/24 0615)  Resp: 20 (03/25/24 0615)  BP: (!) 108/53 (03/25/24 0600)  SpO2: 96 % (03/25/24 0615) Vital Signs (24h Range):  Temp:  [98.3 °F (36.8 °C)-99 °F (37.2 °C)] 98.3 °F (36.8 °C)  Pulse:  [57-75] 61  Resp:  [19-38] 20  SpO2:  [92 %-100 %] 96 %  BP: (100-157)/(47-85) 108/53     Weight: 66.2 kg (146 lb) (03/22/24 1021)  Body mass index is 23.57 kg/m².  Body surface area is 1.76 meters squared.    I/O last 3 completed shifts:  In: 1212.5 [I.V.:569.2; NG/GT:16; IV Piggyback:627.3]  Out: 560 [Urine:110; Drains:450]    Physical Exam  Constitutional:       General: He is not in acute distress.     Appearance: He is ill-appearing.   HENT:      Head: Atraumatic.      Mouth/Throat:      Mouth: Mucous membranes are dry.   Cardiovascular:      Rate and Rhythm: Normal rate.   Pulmonary:      Breath sounds: Normal breath sounds.      Comments: Mechanical ventilation   Abdominal:      General: Bowel sounds are normal. There is no distension.      Comments: NG to suction with moderate volume output    Genitourinary:     Comments: Urinary catheter  Musculoskeletal:         General: No swelling.      Cervical back: Neck supple.   Skin:     General: Skin is warm.   Neurological:      Comments: Sedated          Significant Labs:sureBMP:   Recent Labs   Lab 03/25/24  0143      K 5.3*   CO2 20*   BUN 80.9*   CREATININE 7.73*   CALCIUM 8.2*   MG 2.30       CBC:   Recent Labs   Lab 03/25/24  0144   WBC 7.72   RBC 2.46*   HGB 8.2*   HCT 26.7*   PLT 57*   .5*   MCH 33.3*   MCHC 30.7*       Microbiology Results (last 7 days)       Procedure Component  Value Units Date/Time    Blood Culture [[id number]]  (Normal) Collected: 03/20/24 0650    Order Status: Completed Specimen: Blood Updated: 03/25/24 0900     CULTURE, BLOOD ([facility]) No Growth at 5 days    Blood Culture [[id number]]  (Normal) Collected: 03/20/24 0747    Order Status: Completed Specimen: Blood Updated: 03/25/24 0900     CULTURE, BLOOD ([facility]) No Growth at 5 days    Urine culture [[id number]]  (Abnormal)  (Susceptibility) Collected: 03/19/24 1132    Order Status: Completed Specimen: Urine Updated: 03/22/24 0742     Urine Culture >/= 100,000 colonies/ml Escherichia coli ESBL          Specimen (24h ago, onward)      None          Recent Labs   Lab 03/19/24  1132   PROTEINUA 3+*   BACTERIA Many*   LEUKOCYTESUR 500*   UROBILINOGEN Normal         Significant Imaging:  No new imaging     Assessment/Plan:     ESRD on HD - [facility], MWF, [facility]  Dialysis noncompliance with critical hyperkalemia and pulmonary vascular congestion on CXR  Acute respiratory failure s/t suspected aspiration requiring intubation   Trauma fall at home   Altered mental status   Chronic indwelling nielsen catheter changed every three weeks per Dr Navarrete   Frequent UTI - Most recently ESBL E Coli 2/29   CAD s/p PCI   Chronic back pain      Recommendations  Continue dialysis on MWF schedule   Follow blood cultures. Negative at 96 hours. Continue antibiotics per ICU team       KARLOS Preston  Nephrology  [facility] - 5th Floor Med Surg

## 2024-03-25 NOTE — PROGRESS NOTES
Inpatient Nutrition Assessment    Admit Date: 3/19/2024   Total duration of encounter: 6 days   Patient Age: 68 y.o.    Nutrition Recommendation/Prescription     Tube feeding:  Novasource Renal goal rate 50 ml/hr and ProSource TF20 once daily to provide  2080 kcal/d, 109% needs  110 g protein/d, 100% needs  720 ml free water/d  (calculations based on estimated 20 hr/d run time)     Communication of Recommendations: reviewed with nurse    Nutrition Assessment     Malnutrition Assessment/Nutrition-Focused Physical Exam    Malnutrition Context: chronic illness (03/21/24 1020)  Malnutrition Level: moderate (03/21/24 1020)  Energy Intake (Malnutrition):  (unable to determine) (03/21/24 1020)  Weight Loss (Malnutrition):  (unable to determine, no wt hx in EMR) (03/21/24 1020)  Subcutaneous Fat (Malnutrition): mild depletion (03/21/24 1020)     Upper Arm Region (Subcutaneous Fat Loss): mild depletion     Muscle Mass (Malnutrition): severe depletion (03/21/24 1020)  Mandaen Region (Muscle Loss): mild depletion  Clavicle Bone Region (Muscle Loss): severe depletion  Clavicle and Acromion Bone Region (Muscle Loss): severe depletion                 Fluid Accumulation (Malnutrition):  (does not meet criteria) (03/21/24 1022)  Hand  Strength, Left (Malnutrition): unable to determine (03/21/24 1022)  Hand  Strength, Right (Malnutrition): unable to determine (03/21/24 1022)  A minimum of two characteristics is recommended for diagnosis of either severe or non-severe malnutrition.    Chart Review    Reason Seen: follow-up    Malnutrition Screening Tool Results   Have you recently lost weight without trying?: Yes: Unsure how much  Have you been eating poorly because of a decreased appetite?: No   MST Score: 2   Diagnosis:  Acute hypoxic and hypercapnic respiratory failure  Altered mental status  Pulmonary vascular congestion per CXR  Possible aspiration event  UTI with chronic indwelling folowy  ESRD on MWF  HD  Hypoglycemia    Relevant Medical History: HD MWF, GERD, BPH, hyperlipidemia, MDD and anxiety     Scheduled Medications:  azithromycin, 500 mg, Q24H  doxycycline IV (PEDS and ADULTS), 100 mg, Q12H  famotidine (PF), 20 mg, Daily  insulin regular, 0.1 Units/kg, Once  white petrolatum, , BID    Continuous Infusions:  dexmedeTOMIDine (Precedex) infusion (titrating), Last Rate: Stopped (03/25/24 0450)  propofoL, Last Rate: 20 mcg/kg/min (03/25/24 0835)    PRN Medications: acetaminophen, acetaminophen, [COMPLETED] calcium gluconate IVPB **AND** calcium gluconate IVPB, dextrose 10%, morphine, ondansetron    Calorie Containing IV Medications: Diprivan @ 8.7 ml/hr (provides 230 kcal/d)    Recent Labs   Lab 03/19/24  1053 03/20/24  0830 03/21/24  0411 03/22/24  0214 03/22/24  0427 03/23/24  0151 03/24/24  0235 03/24/24  0236 03/25/24  0143 03/25/24  0144    140 138 138  --  135*  --  136 136  --    K 7.0* 5.4* 3.9 5.2*  --  4.4  --  4.8 5.3*  --    CALCIUM 9.0 8.5* 8.2* 8.8  --  9.1  --  8.2* 8.2*  --    PHOS 5.6*  --   --  5.1*  --  3.3  --  4.7 6.4*  --    MG 1.90  --   --  1.80  --  1.90  --  2.10 2.30  --    CHLORIDE 97* 98 101 100  --  101  --  101 100  --    CO2 24 24 22* 21*  --  21*  --  20* 20*  --    BUN 49.8* 36.8* 27.4* 41.5*  --  34.2*  --  60.4* 80.9*  --    CREATININE 9.62* 7.90* 6.16* 7.45*  --  4.86*  --  6.40* 7.73*  --    EGFRNORACEVR 5 7 9 7  --  12  --  9 7  --    GLUCOSE 84 63* 70* 32*  --  103  --  110 107  --    BILITOT 1.3  --  1.1 1.2  --  1.1  --  0.8 0.5  --    ALKPHOS 53  --  38* 41  --  43  --  42 43  --    ALT 12  --  9 10  --  10  --  8 8  --    AST 34  --  18 18  --  17  --  13 12  --    ALBUMIN 3.1*  --  2.4* 1.9*  --  1.8*  --  1.7* 1.6*  --    WBC 8.71  --  3.45  3.45*  --  8.29  8.29 7.92  7.92 6.86  6.86  --   --  7.72   HGB 11.0*  --  9.1*  --  8.4* 9.2* 9.1*  --   --  8.2*   HCT 34.4*  --  30.0*  --  26.9* 29.6* 29.7*  --   --  26.7*       Nutrition Orders:  No diet orders  "on file  Tube Feedings/Formulas 50; 1,000; Novasource Renal - Unflavored; OG; ProSource TF20; 1 time daily; 30; Every 4 hours     Appetite/Oral Intake: NPO/not applicable  Factors Affecting Nutritional Intake: on mechanical ventilation  Food/[place]/Cultural Preferences: none reported  Food Allergies: none reported  Last Bowel Movement: 03/25/24  Wound(s):     Altered Skin Integrity 03/19/24 1804 midline Sacral spine #1  Partial thickness tissue loss. Shallow open ulcer with a red or pink wound bed, without slough. Intact or Open/Ruptured Serum-filled blister.-Tissue loss description: Not applicable     Comments    3/21/24 Poor intake since admit. Currently on BiPAP, report of possible aspiration in dialysis post-emesis. Muscle and fat loss observed. Unable to obtain any subjective history on weight or appetite prior to admit. Will leave tube feeding recommendations above if aggressive nutrition therapy warranted.     3/22/24 Patient transferred to ICU and intubated, plans to start tube feeding, will provide orders with Novasource Renal given electrolytes abnormalities.    3/25/24 Patient remains on ventilator, tube feeding at 10 ml/hr (goal 50 ml/hr). Nurse reports patient vomited a couple of days ago so tube feeding was turned down, reports will increase to 25 ml/hr now and likely advance as tolerated.    Anthropometrics    Height: 5' 6" (167.6 cm), Height Method: Stated  Last Weight: 66.2 kg (146 lb) (03/22/24 1021), Weight Method: Bed Scale  BMI (Calculated): 23.6  BMI Classification: overweight (BMI 25-29.9)        Ideal Body Weight (IBW), Male: 142 lb     % Ideal Body Weight, Male (lb): 112.68 %                          Usual Weight Provided By: unable to obtain usual weight    Wt Readings from Last 5 Encounters:   03/22/24 66.2 kg (146 lb)     Weight Change(s) Since Admission:   (3/22) admission weight (72.6 kg) documented as 'stated', bed weight (66.2 kg) taken during rounds - appears accurate but will " continue to monitor for trends  Wt Readings from Last 1 Encounters:   24 1021 66.2 kg (146 lb)   24 2200 72.6 kg (160 lb)   24 1039 72.6 kg (160 lb)   Admit Weight: 72.6 kg (160 lb) (24 1039), Weight Method: Stated    Estimated Needs    Weight Used For Calorie Calculations: 66.2 kg (146 lb)  Energy Calorie Requirements (kcal): 1,911.31  Energy Need Method: [place]  Weight Used For Protein Calculations: 66.2 kg (146 lb)  Protein Requirements: 100-120 g, 1.5-1.8 g/kg  Fluid Requirements (mL): 1000 + urine output (dialysis)  Total Ve: 12.3 L/m; Temp (24hrs), Av.6 °F (37 °C), Min:97.7 °F (36.5 °C), Max:99 °F (37.2 °C)  Vtot (L/Min) for Dev State Equation Calculation: 13.4; Max Temp for [place] State Equation Calculation: 100.8 °F  Last Updated: 3/22     Enteral Nutrition     Formula: Novasource Renal  Rate/Volume: 10 ml/hr  Water Flushes: 15 ml every 2 hours  Additives/Modulars: none at this time  Route: orogastric tube  Method: continuous  Total Nutrition Provided by Tube Feeding, Additives, and Flushes:  Calories Provided  400 kcal/d, 21% needs   Protein Provided  18 g/d, 18% needs   Fluid Provided  294 ml/d   Continuous feeding calculations based on estimated 20 hr/d run time unless otherwise stated.    Parenteral Nutrition Patient not receiving parenteral nutrition support at this time.    Evaluation of Received Nutrient Intake    Calories: not meeting estimated needs  Protein: not meeting estimated needs    Patient Education Not applicable.    Nutrition Diagnosis     PES: Inadequate oral intake related to inability to consume sufficient nutrients as evidenced by less than 80% needs met. (active)     PES: Moderate starvation related malnutrition related to chronic illness as evidenced by mild fat depletion and severe muscle depletion. (active)    Nutrition Interventions     Intervention(s): modified rate of enteral nutrition, commercial food, and collaboration with other  providers    Goal: Meet greater than 80% of nutritional needs by follow-up. (goal not met)  Goal: Maintain weight throughout hospitalization. (goal progressing)    Nutrition Goals & Monitoring     Dietitian will monitor: energy intake, enteral nutrition intake, weight, weight change, electrolyte/renal panel, glucose/endocrine profile, and gastrointestinal profile    Nutrition Risk/Follow-Up: high (follow-up in 1-4 days)   Please consult if re-assessment needed sooner.

## 2024-03-25 NOTE — NURSING
03/25/24 1549   Post-Hemodialysis Assessment   Blood Volume Processed (Liters) 60.8 L   Dialyzer Clearance Clear   Duration of Treatment 180 minutes   Total UF (mL) 500 mL   Patient Response to Treatment Pt tolerated HD tx well; NAD noted/VSS. Total tx length 3hrs; resulting in 500ml goal per MD orders.   Post-Hemodialysis Comments Pt deaccessed per P and P

## 2024-03-25 NOTE — NURSING
Nurses Note -- 4 Eyes      3/25/2024   5:29 AM      Skin assessed during: Daily Assessment      [] No Altered Skin Integrity Present    [x]Prevention Measures Documented      [x] Yes- Altered Skin Integrity Present or Discovered   [] LDA Added if Not in Epic (Describe Wound)   [] New Altered Skin Integrity was Present on Admit and Documented in LDA   [] Wound Image Taken    Wound Care Consulted? Yes    Attending Nurse:  RAMANA Miller     Second RN/Staff Member:   RAMANA Pabon

## 2024-03-25 NOTE — PROGRESS NOTES
"3/25/2024  Mirza Nelson Jr.   1955   [id number]       Psychiatry Consult Progress Note     SUBJECTIVE:   Mirza Nelson Jr. is a 68 y.o. male with a history of ESRD, GERD, BPH, HLD, MDD, and anxiety disorder NOS who presented to ED on 03/19/24 from [facility] as a level 2 trauma after a fall. Reportedly fell at 0400 and then had a second witnessed fall at 0600. Reported that he had been refusing his dialysis as well as bunching up his catheter. Physicians emergency certificate completed for verbally abusive with staff and suicidal threats. Nursing home paperwork reports "resident stated resident that he will kill himself".     Seen at the bedside where he remains intubated and sedated. Was placed on DNR and Friday. Failed weaning attempts from the ventilator yesterday.        Current Medications:   Scheduled Meds:    azithromycin  500 mg Intravenous Q24H    doxycycline IV (PEDS and ADULTS)  100 mg Intravenous Q12H    famotidine (PF)  20 mg Intravenous Daily    insulin regular  0.1 Units/kg Intravenous Once    white petrolatum   Topical (Top) BID      PRN Meds: acetaminophen, acetaminophen, [COMPLETED] calcium gluconate IVPB **AND** calcium gluconate IVPB, dextrose 10%, morphine, ondansetron   Psychotherapeutics (From admission, onward)      Start     Stop Route Frequency Ordered    03/21/24 1745  dexmedetomidine (PRECEDEX) 400mcg/100mL 0.9% NaCL infusion        Question Answer Comment   Begin at (in mcg/kg/hr): 0.2    Titrate by (in mcg/kg/hr): 0.2    Titrate interval: (in minutes) 10    To maintain a RASS goal of: RASS (-3) Moderate sedation - Movement or eye opening to voice (but no eye contact)    Maximum dose of (in mcg/kg/hr): 1.4        -- IV Continuous 03/21/24 1639            Allergies:   Review of patient's allergies indicates:  Not on File     OBJECTIVE:   Vitals   Vitals:    03/25/24 0952   BP:    Pulse:    Resp: (!) 22   Temp:         Labs/Imaging/Studies:   Recent Results (from the " past 36 hour(s))   CBC with Differential    Collection Time: 03/24/24  2:35 AM   Result Value Ref Range    WBC 6.86 4.50 - 11.50 x10(3)/mcL    RBC 2.74 (L) 4.70 - 6.10 x10(6)/mcL    Hgb 9.1 (L) 14.0 - 18.0 g/dL    Hct 29.7 (L) 42.0 - 52.0 %    .4 (H) 80.0 - 94.0 fL    MCH 33.2 (H) 27.0 - 31.0 pg    MCHC 30.6 (L) 33.0 - 36.0 g/dL    RDW 18.8 (H) 11.5 - 17.0 %    Platelet 46 (L) 130 - 400 x10(3)/mcL    MPV      NRBC% 0.0 %    IPF 7.5 0.9 - 11.2 %   Manual Differential    Collection Time: 03/24/24  2:35 AM   Result Value Ref Range    WBC 6.86 x10(3)/mcL    Neutrophils % 86 %    Lymphs % 6 %    Monocytes % 5 %    Eosinophils % 3 %    Neutrophils Abs 5.8996 2.1 - 9.2 x10(3)/mcL    Lymphs Abs 0.4116 (L) 0.6 - 4.6 x10(3)/mcL    Monocytes Abs 0.343 0.1 - 1.3 x10(3)/mcL    Eosinophils Abs 0.2058 0 - 0.9 x10(3)/mcL    Platelets Decreased (A) Normal, Adequate    RBC Morph Abnormal (A) Normal    Poikilocytosis 2+ (A) (none)    Anisocytosis 2+ (A) (none)    Macrocytosis 2+ (A) (none)    [place] Cells 2+ (A) (none)   Comprehensive Metabolic Panel    Collection Time: 03/24/24  2:36 AM   Result Value Ref Range    Sodium Level 136 136 - 145 mmol/L    Potassium Level 4.8 3.5 - 5.1 mmol/L    Chloride 101 98 - 107 mmol/L    Carbon Dioxide 20 (L) 23 - 31 mmol/L    Glucose Level 110 82 - 115 mg/dL    Blood Urea Nitrogen 60.4 (H) 8.4 - 25.7 mg/dL    Creatinine 6.40 (H) 0.73 - 1.18 mg/dL    Calcium Level Total 8.2 (L) 8.8 - 10.0 mg/dL    Protein Total 5.5 (L) 5.8 - 7.6 gm/dL    Albumin Level 1.7 (L) 3.4 - 4.8 g/dL    Globulin 3.8 (H) 2.4 - 3.5 gm/dL    Albumin/Globulin Ratio 0.4 (L) 1.1 - 2.0 ratio    Bilirubin Total 0.8 <=1.5 mg/dL    Alkaline Phosphatase 42 40 - 150 unit/L    Alanine Aminotransferase 8 0 - 55 unit/L    Aspartate Aminotransferase 13 5 - 34 unit/L    eGFR 9 mls/min/1.73/m2   Magnesium    Collection Time: 03/24/24  2:36 AM   Result Value Ref Range    Magnesium Level 2.10 1.60 - 2.60 mg/dL   Phosphorus    Collection  Time: 03/24/24  2:36 AM   Result Value Ref Range    Phosphorus Level 4.7 2.3 - 4.7 mg/dL   Blood Gas (ABG)    Collection Time: 03/24/24  3:55 AM   Result Value Ref Range    Sample Type Arterial Blood     Sample site Left Radial Artery     Drawn by reina, rt     pH, Blood gas 7.460 (H) 7.350 - 7.450    pCO2, Blood gas 33.0 (L) 35.0 - 45.0 mmHg    pO2, Blood gas 121.0 (H) 80.0 - 100.0 mmHg    Sodium, Blood Gas 131 (L) 137 - 145 mmol/L    Potassium, Blood Gas 4.6 3.5 - 5.0 mmol/L    Calcium Level Ionized 1.08 (L) 1.12 - 1.23 mmol/L    TOC2, Blood gas 24.5 mmol/L    Base Excess, Blood gas -0.10 -2.00 - 2.00 mmol/L    sO2, Blood gas 98.9 %    HCO3, Blood gas 23.5 22.0 - 26.0 mmol/L    THb, Blood gas 8.4 (L) 12 - 16 g/dL    O2 Hb, Blood Gas 96.3 94.0 - 97.0 %    CO Hgb 3.3 (H) 0.5 - 1.5 %    Met Hgb 1.1 0.4 - 1.5 %    MODE AC     Oxygen Device, Blood gas Ventilator     FIO2, Blood gas 30.0 %    Mech Vt 500 ml    Mech RR 20 b/min    PEEP 5.0 cmH2O   POCT glucose    Collection Time: 03/24/24  3:01 PM   Result Value Ref Range    POCT Glucose 123 (H) 70 - 110 mg/dL   Phosphorus    Collection Time: 03/25/24  1:43 AM   Result Value Ref Range    Phosphorus Level 6.4 (H) 2.3 - 4.7 mg/dL   Magnesium    Collection Time: 03/25/24  1:43 AM   Result Value Ref Range    Magnesium Level 2.30 1.60 - 2.60 mg/dL   Comprehensive Metabolic Panel    Collection Time: 03/25/24  1:43 AM   Result Value Ref Range    Sodium Level 136 136 - 145 mmol/L    Potassium Level 5.3 (H) 3.5 - 5.1 mmol/L    Chloride 100 98 - 107 mmol/L    Carbon Dioxide 20 (L) 23 - 31 mmol/L    Glucose Level 107 82 - 115 mg/dL    Blood Urea Nitrogen 80.9 (H) 8.4 - 25.7 mg/dL    Creatinine 7.73 (H) 0.73 - 1.18 mg/dL    Calcium Level Total 8.2 (L) 8.8 - 10.0 mg/dL    Protein Total 5.5 (L) 5.8 - 7.6 gm/dL    Albumin Level 1.6 (L) 3.4 - 4.8 g/dL    Globulin 3.9 (H) 2.4 - 3.5 gm/dL    Albumin/Globulin Ratio 0.4 (L) 1.1 - 2.0 ratio    Bilirubin Total 0.5 <=1.5 mg/dL    Alkaline  Phosphatase 43 40 - 150 unit/L    Alanine Aminotransferase 8 0 - 55 unit/L    Aspartate Aminotransferase 12 5 - 34 unit/L    eGFR 7 mls/min/1.73/m2   CBC with Differential    Collection Time: 03/25/24  1:44 AM   Result Value Ref Range    WBC 7.72 4.50 - 11.50 x10(3)/mcL    RBC 2.46 (L) 4.70 - 6.10 x10(6)/mcL    Hgb 8.2 (L) 14.0 - 18.0 g/dL    Hct 26.7 (L) 42.0 - 52.0 %    .5 (H) 80.0 - 94.0 fL    MCH 33.3 (H) 27.0 - 31.0 pg    MCHC 30.7 (L) 33.0 - 36.0 g/dL    RDW 18.6 (H) 11.5 - 17.0 %    Platelet 57 (L) 130 - 400 x10(3)/mcL    MPV 12.1 (H) 7.4 - 10.4 fL    Neut % 84.4 %    Lymph % 7.1 %    Mono % 6.1 %    Eos % 1.3 %    Basophil % 0.5 %    Lymph # 0.55 (L) 0.6 - 4.6 x10(3)/mcL    Neut # 6.51 2.1 - 9.2 x10(3)/mcL    Mono # 0.47 0.1 - 1.3 x10(3)/mcL    Eos # 0.10 0 - 0.9 x10(3)/mcL    Baso # 0.04 <=0.2 x10(3)/mcL    IG# 0.05 (H) 0 - 0.04 x10(3)/mcL    IG% 0.6 %    NRBC% 0.0 %            Psychiatric Mental Status Exam:  General Appearance: appears stated age, dressed in hospital garb, lying in bed, Bipap in place  Arousal: Sedated  Behavior: unable to participate  Movements and Motor Activity: no tics, no tremors, no akathisia, no dystonia, no evidence of tardive dyskinesia  Orientation: Unable to Assess  Speech:  Unable to assess  Mood: Sedated  Affect: calm  Thought Process: Unable to Assess  Associations: Unable to Assess  Thought Content and Perceptions: Unable to Assess  Recent and Remote Memory: Unable to Assess; per interview/observation with patient  Attention and Concentration: Unable to Assess; per interview/observation with patient  Fund of Knowledge: Unable to Assess; based on history, vocabulary, fund of knowledge, syntax, grammar, and content  Insight:  Unable to assess ; based on understanding of severity of illness and HPI  Judgment:  Unable to assess ; based on patient's behavior and HPI    ASSESSMENT/PLAN:   Diagnosis:  Major Depressive Disorder, Recurrent: Moderate (F33.1)        Recommendations:  Will discontinue PEC at this time due to current medical status.  Will sign-off; please reconsult as needed.        Usman Barrett

## 2024-03-25 NOTE — PROGRESS NOTES
[facility] - 5th Floor Med Surg  Pulmonary Critical Care Note    Patient Name: Mirza Nelson Jr.  MRN: [id number]  Admission Date: 3/19/2024  Hospital Length of Stay: 6 days  Code Status: DNR  Attending Provider: Darrius Daley MD  Primary Care Provider: NILESH Pena MD     Subjective:     HPI:   This is a 68-year-old male with a history of end-stage renal disease on HD MWF, GERD, BPH, hyperlipidemia, MDD and anxiety who presented to the ED on 03/19/2024 from leave the [facility] after a fall.  Patient was reportedly showing symptoms of confusion as well as suicidal ideations.  He was reportedly refusing dialysis. He was just recently admitted and discharged on 03/17/2024 during that time he was treated for ESBL E coli UTI with Rocephin and Zosyn.  Of note, he was also hospitalized on 2/18/2024 at [facility].  He was diagnosed with a NSTEMI and underwent left heart catheterization with PTCA with stent to the LAD on 02/23.  He was  followed by Urology at that time due to hematuria and has a chronic indwelling catheter.  He was also intubated during the stay for respiratory failure. (Chart is marked for merge, these records are in his previous chart).  Initial chest x-ray in the ED showed enlarged cardiac silhouette with vascular congestion.  Urine culture from 03/19 currently growing Gram-negative rods.  Blood cultures collected on 03/20 remain in process.  Patient reportedly had an aspiration event yesterday in dialysis following an episode of emesis.  His oxygen requirements have increased since that time.  His mentation has also declined.  CTA of head obtained overnight with no acute intracranial abnormalities.  ABGs obtained overnight revealed a pH of 7.35, pCO2 of 53, PO2 59, bicarb 29.  He was placed on BiPAP.  Pulmonary was consulted for hypoxemic hypercapnia along with possible aspiration pneumonia.  Upon arrival to assess patient, the rapid response team was present.  Patient remains  tachypneic with an labored breathing.  He responds to pain however does not communicate.  CABG revealed a glucose of 55 and he was started on a D10 infusion.  Repeat ABGs on BiPAP 14/8 and 65% revealed a pH of 7.45, pCO2 of 41, PO2 of 123, bicarb of 28.  Per report patient was awake oriented as of yesterday.    Hospital Course/Significant events:  3/21: Intubated    24 Hour Interval History:  Remains intubated and mechanically ventilated.  .  He is on low-dose Propofol for sedation.  Minimal sputum production.  Failed weaning attempts yesterday.    No past medical history on file.    No past surgical history on file.    Social History     Socioeconomic History    Marital status:      Social Determinants of Health     Financial Resource Strain: Low Risk  (3/20/2024)    Overall Financial Resource Strain (CARDIA)     Difficulty of Paying Living Expenses: Not hard at all   Food Insecurity: No Food Insecurity (3/20/2024)    Hunger Vital Sign     Worried About Running Out of Food in the Last Year: Never true     Ran Out of Food in the Last Year: Never true   Transportation Needs: No Transportation Needs (3/20/2024)    PRAPARE - Transportation     Lack of Transportation (Medical): No     Lack of Transportation (Non-Medical): No   Stress: No Stress Concern Present (3/20/2024)    [employer] - Occupational Stress Questionnaire     Feeling of Stress : Not at all   Housing Stability: Low Risk  (3/20/2024)    Housing Stability Vital Sign     Unable to Pay for Housing in the Last Year: No     Number of Places Lived in the Last Year: 1     Unstable Housing in the Last Year: No           No current outpatient medications    Current Inpatient Medications   azithromycin  500 mg Intravenous Q24H    doxycycline IV (PEDS and ADULTS)  100 mg Intravenous Q12H    famotidine (PF)  20 mg Intravenous Daily    insulin regular  0.1 Units/kg Intravenous Once    white petrolatum   Topical (Top) BID        Current Intravenous Infusions   dexmedeTOMIDine (Precedex) infusion (titrating) Stopped (03/25/24 0450)    propofoL 20 mcg/kg/min (03/25/24 0835)         Review of Systems   Unable to perform ROS: Mental status change          Objective:       Intake/Output Summary (Last 24 hours) at 3/25/2024 0936  Last data filed at 3/25/2024 0625  Gross per 24 hour   Intake 931.04 ml   Output 325 ml   Net 606.04 ml           Vital Signs (Most Recent):  Temp: 97.7 °F (36.5 °C) (03/25/24 0800)  Pulse: 63 (03/25/24 0915)  Resp: (!) 21 (03/25/24 0915)  BP: (!) 108/55 (03/25/24 0900)  SpO2: 95 % (03/25/24 0915)  Body mass index is 23.57 kg/m².  Weight: 66.2 kg (146 lb) Vital Signs (24h Range):  Temp:  [97.7 °F (36.5 °C)-99 °F (37.2 °C)] 97.7 °F (36.5 °C)  Pulse:  [57-70] 63  Resp:  [18-29] 21  SpO2:  [92 %-100 %] 95 %  BP: (100-157)/(47-85) 108/55     Physical Exam  Constitutional:       General: He is not in acute distress.     Appearance: He is not toxic-appearing.      Comments: Intubated and sedated   HENT:      Head: Normocephalic and atraumatic.   Eyes:      Extraocular Movements: Extraocular movements intact.      Pupils: Pupils are equal, round, and reactive to light.   Cardiovascular:      Rate and Rhythm: Normal rate and regular rhythm.      Pulses: Normal pulses.      Heart sounds: No murmur heard.     No gallop.   Pulmonary:      Effort: No respiratory distress.      Breath sounds: No stridor. No wheezing, rhonchi or rales.   Abdominal:      General: Abdomen is flat.      Palpations: Abdomen is soft.   Musculoskeletal:         General: No swelling or deformity.      Left lower leg: No edema.   Skin:     General: Skin is warm.      Coloration: Skin is not jaundiced.      Findings: No bruising.   Neurological:      Comments: Patient sedated and therefore unable to accurately exam           Lines/Drains/Airways       Drain  Duration                  Urethral Catheter 03/20/24 5 days         NG/OG Tube 03/21/24 1700  Wendy sump 16 Fr. Right mouth 3 days         Rectal Tube 03/25/24 0730 <1 day              Airway  Duration                  Airway - Non-Surgical 03/21/24 1625 Endotracheal Tube 3 days              Peripheral Intravenous Line  Duration                  Hemodialysis AV Fistula Right upper arm -- days         Peripheral IV - Single Lumen 03/21/24 1700 20 G Anterior;Left;Proximal Upper Arm 3 days         Peripheral IV - Single Lumen 03/24/24 2300 20 G Anterior;Distal;Left Upper Arm <1 day                    Significant Labs:    Lab Results   Component Value Date    WBC 7.72 03/25/2024    HGB 8.2 (L) 03/25/2024    HCT 26.7 (L) 03/25/2024    .5 (H) 03/25/2024    PLT 57 (L) 03/25/2024           BMP  Lab Results   Component Value Date     03/25/2024    K 5.3 (H) 03/25/2024    CHLORIDE 100 03/25/2024    CO2 20 (L) 03/25/2024    BUN 80.9 (H) 03/25/2024    CREATININE 7.73 (H) 03/25/2024    CALCIUM 8.2 (L) 03/25/2024    AGAP 18.0 03/20/2024         ABG  Recent Labs   Lab 03/24/24  0355   PH 7.460*   PO2 121.0*   PCO2 33.0*   HCO3 23.5   POCBASEDEF -0.10         Mechanical Ventilation Support:  Vent Mode: A/C (03/25/24 0805)  Ventilator Initiated: Yes (03/21/24 1634)  Set Rate: 20 BPM (03/25/24 0805)  Vt Set: 500 mL (03/25/24 0805)  Pressure Support: 10 cmH20 (03/24/24 0841)  PEEP/CPAP: 5 cmH20 (03/25/24 0805)  Oxygen Concentration (%): 40 (03/25/24 0805)  Peak Airway Pressure: 35 cmH20 (03/25/24 0805)  Total Ve: 12.3 L/m (03/25/24 0805)  F/VT Ratio<105 (RSBI): (!) 52.88 (03/25/24 0805)      Significant Imaging:  I have reviewed the pertinent imaging within the past 24 hours.    No new imaging      Assessment/Plan:     Assessment  Acute hypoxic and hypercapnic respiratory failure.  Intubated March 21st  Altered mental status  Pulmonary vascular congestion per CXR  Possible aspiration event  E coli UTI with chronic indwelling nielsen  ESRD on MWF HD  Thrombocytopenia      Plan   Continue full mechanical ventilation  support today does not appear that he can be successfully weaned from the ventilator.  We will discuss with family when they arrive about their desires as apparently the patient has indicated in the past he did not wish life support measures.   DNR per family request.   With thrombocytopenia discontinued Zosyn and started doxycycline on March 24th.  Continue monitor platelet count.  Dialysis per Nephrology  We will hold heparin and monitor platelet count  Begin enteral tube feeds    DVT prophylaxis:  Subcu heparin will be held with developing thrombocytopenia.  GI prophylaxis:  Pepcid    33 minutes of critical care time was spent reviewing medical records, direct patient contact, coordinating treatment with nursing and respiratory therapy and/or discussing the case with family members       Darrius Daley MD  Pulmonary Critical Care Medicine  [facility]   DOS: 03/25/2024

## 2024-03-25 NOTE — PLAN OF CARE
Problem: Infection  Goal: Absence of Infection Signs and Symptoms  Outcome: Ongoing, Not Progressing     Problem: Device-Related Complication Risk (Hemodialysis)  Goal: Safe, Effective Therapy Delivery  Outcome: Ongoing, Not Progressing     Problem: Hemodynamic Instability (Hemodialysis)  Goal: Effective Tissue Perfusion  Outcome: Ongoing, Not Progressing     Problem: Infection (Hemodialysis)  Goal: Absence of Infection Signs and Symptoms  Outcome: Ongoing, Not Progressing     Problem: Adult Inpatient Plan of Care  Goal: Plan of Care Review  Outcome: Ongoing, Not Progressing  Goal: Patient-Specific Goal (Individualized)  Outcome: Ongoing, Not Progressing  Goal: Absence of Hospital-Acquired Illness or Injury  Outcome: Ongoing, Not Progressing  Goal: Optimal Comfort and Wellbeing  Outcome: Ongoing, Not Progressing  Goal: Readiness for Transition of Care  Outcome: Ongoing, Not Progressing     Problem: Impaired Wound Healing  Goal: Optimal Wound Healing  Outcome: Ongoing, Not Progressing     Problem: Coping Ineffective  Goal: Effective Coping  Outcome: Ongoing, Not Progressing

## 2024-03-25 NOTE — NURSING
Nurses Note -- 4 Eyes      3/25/2024   3:41 PM      Skin assessed during: Daily Assessment      [] No Altered Skin Integrity Present    []Prevention Measures Documented      [x] Yes- Altered Skin Integrity Present or Discovered   [x] LDA Added if Not in Epic (Describe Wound)   [] New Altered Skin Integrity was Present on Admit and Documented in LDA   [] Wound Image Taken    Wound Care Consulted? Yes    Attending Nurse:  Nathan Gamble RN/Staff Member:   Sylwia

## 2024-03-26 PROBLEM — Z51.5 COMFORT MEASURES ONLY STATUS: Status: ACTIVE | Noted: 2024-01-01

## 2024-03-26 NOTE — CONSULTS
The patient is not responding to treatments and the family has accepted to withdraw care by Thursday.   I provided care and support. I offered prayers and comforting words to his hearing.  I also gave him the last rites.  03/26/24

## 2024-03-26 NOTE — NURSING
Nurses Note -- 4 Eyes      3/26/2024   2:35 PM      Skin assessed during: Daily Assessment      [] No Altered Skin Integrity Present    [x]Prevention Measures Documented      [x] Yes- Altered Skin Integrity Present or Discovered   [] LDA Added if Not in Epic (Describe Wound)   [] New Altered Skin Integrity was Present on Admit and Documented in LDA   [] Wound Image Taken    Wound Care Consulted? Yes    Attending Nurse:  Cooper Hui RN    Second RN/Staff Member:   Garry Robledo RN

## 2024-03-26 NOTE — PROGRESS NOTES
[facility] S[facility]  Pulmonary Critical Care Note    Patient Name: Mirza Nelson Jr.  MRN: [id number]  Admission Date: 3/19/2024  Hospital Length of Stay: 7 days  Code Status: DNR  Attending Provider: Darrius Daley MD  Primary Care Provider: NILESH Pena MD     Subjective:     HPI:   This is a 68-year-old male with a history of end-stage renal disease on HD MWF, GERD, BPH, hyperlipidemia, MDD and anxiety who presented to the ED on 03/19/2024 from leave the [facility] after a fall.  Patient was reportedly showing symptoms of confusion as well as suicidal ideations.  He was reportedly refusing dialysis. He was just recently admitted and discharged on 03/17/2024 during that time he was treated for ESBL E coli UTI with Rocephin and Zosyn.  Of note, [facility]  He was diagnosed with a NSTEMI and underwent left heart catheterization with PTCA with stent to the LAD on 02/23.  He was  followed by Urology at that time due to hematuria and has a chronic indwelling catheter.  He was also intubated during the stay for respiratory failure. (Chart is marked for merge, these records are in his previous chart).  Initial chest x-ray in the ED showed enlarged cardiac silhouette with vascular congestion.  Urine culture from 03/19 currently growing Gram-negative rods.  Blood cultures collected on 03/20 remain in process.  Patient reportedly had an aspiration event yesterday in dialysis following an episode of emesis.  His oxygen requirements have increased since that time.  His mentation has also declined.  CTA of head obtained overnight with no acute intracranial abnormalities.  ABGs obtained overnight revealed a pH of 7.35, pCO2 of 53, PO2 59, bicarb 29.  He was placed on BiPAP.  Pulmonary was consulted for hypoxemic hypercapnia along with possible aspiration pneumonia.  Upon arrival to assess patient, the rapid response team was present.  Patient remains  tachypneic with an labored breathing.  He responds to pain however does not communicate.  CABG revealed a glucose of 55 and he was started on a D10 infusion.  Repeat ABGs on BiPAP 14/8 and 65% revealed a pH of 7.45, pCO2 of 41, PO2 of 123, bicarb of 28.  Per report patient was awake oriented as of yesterday.    Hospital Course/Significant events:  3/21: Intubated    24 Hour Interval History:  Remains intubated and mechanically ventilated.   He is on low-dose Propofol for sedation.  Minimal sputum production.  Failed weaning attempts yesterday.    No past medical history on file.    No past surgical history on file.    Social History     Socioeconomic History    Marital status:      Social Determinants of Health     Financial Resource Strain: Low Risk  (3/20/2024)    Overall Financial Resource Strain (CARDIA)     Difficulty of Paying Living Expenses: Not hard at all   Food Insecurity: No Food Insecurity (3/20/2024)    Hunger Vital Sign     Worried About Running Out of Food in the Last Year: Never true     Ran Out of Food in the Last Year: Never true   Transportation Needs: No Transportation Needs (3/20/2024)    PRAPARE - Transportation     Lack of Transportation (Medical): No     Lack of Transportation (Non-Medical): No   Stress: No Stress Concern Present (3/20/2024)    [employer] - Occupational Stress Questionnaire     Feeling of Stress : Not at all   Housing Stability: Low Risk  (3/20/2024)    Housing Stability Vital Sign     Unable to Pay for Housing in the Last Year: No     Number of Places Lived in the Last Year: 1     Unstable Housing in the Last Year: No           No current outpatient medications    Current Inpatient Medications   doxycycline IV (PEDS and ADULTS)  100 mg Intravenous Q12H    famotidine (PF)  20 mg Intravenous Daily    insulin regular  0.1 Units/kg Intravenous Once    white petrolatum   Topical (Top) BID       Current Intravenous Infusions   dexmedeTOMIDine  (Precedex) infusion (titrating) Stopped (03/25/24 0450)    propofoL 25 mcg/kg/min (03/26/24 0616)         Review of Systems   Unable to perform ROS: Mental status change          Objective:       Intake/Output Summary (Last 24 hours) at 3/26/2024 0911  Last data filed at 3/26/2024 0616  Gross per 24 hour   Intake 1844.91 ml   Output 1705 ml   Net 139.91 ml           Vital Signs (Most Recent):  Temp: 98.5 °F (36.9 °C) (03/26/24 0800)  Pulse: 88 (03/26/24 0615)  Resp: (!) 26 (03/26/24 0615)  BP: (!) 140/68 (03/26/24 0600)  SpO2: 96 % (03/26/24 0615)  Body mass index is 23.57 kg/m².  Weight: 66.2 kg (146 lb) Vital Signs (24h Range):  Temp:  [97.4 °F (36.3 °C)-100 °F (37.8 °C)] 98.5 °F (36.9 °C)  Pulse:  [62-97] 88  Resp:  [19-31] 26  SpO2:  [93 %-99 %] 96 %  BP: ()/(52-72) 140/68     Physical Exam  Constitutional:       General: He is not in acute distress.     Appearance: He is not toxic-appearing.      Comments: Intubated and sedated   HENT:      Head: Normocephalic and atraumatic.   Eyes:      Extraocular Movements: Extraocular movements intact.      Pupils: Pupils are equal, round, and reactive to light.   Cardiovascular:      Rate and Rhythm: Normal rate and regular rhythm.      Pulses: Normal pulses.      Heart sounds: No murmur heard.     No gallop.   Pulmonary:      Effort: No respiratory distress.      Breath sounds: No stridor. No wheezing, rhonchi or rales.   Abdominal:      General: Abdomen is flat.      Palpations: Abdomen is soft.   Musculoskeletal:         General: No swelling or deformity.      Left lower leg: No edema.   Skin:     General: Skin is warm.      Coloration: Skin is not jaundiced.      Findings: No bruising.   Neurological:      Comments: Patient sedated and therefore unable to accurately exam           Lines/Drains/Airways       Drain  Duration                  Urethral Catheter 03/20/24 6 days         NG/OG Tube 03/21/24 1700 Wendy sumevita 16 Fr. Right mouth 4 days         Rectal Tube  03/25/24 0730 1 day              Airway  Duration                  Airway - Non-Surgical 03/21/24 1625 Endotracheal Tube 4 days              Peripheral Intravenous Line  Duration                  Hemodialysis AV Fistula Right upper arm -- days         Peripheral IV - Single Lumen 03/21/24 1700 20 G Anterior;Left;Proximal Upper Arm 4 days         Peripheral IV - Single Lumen 03/24/24 2300 20 G Anterior;Distal;Left Upper Arm 1 day                    Significant Labs:    Lab Results   Component Value Date    WBC 10.14 03/26/2024    HGB 9.6 (L) 03/26/2024    HCT 31.1 (L) 03/26/2024    .7 (H) 03/26/2024    PLT 65 (L) 03/26/2024           BMP  Lab Results   Component Value Date     (L) 03/26/2024    K 4.6 03/26/2024    CHLORIDE 103 03/26/2024    CO2 19 (L) 03/26/2024    BUN 44.8 (H) 03/26/2024    CREATININE 4.70 (H) 03/26/2024    CALCIUM 8.5 (L) 03/26/2024    AGAP 18.0 03/20/2024         ABG  Recent Labs   Lab 03/26/24  0502   PH 7.380   PO2 95.0   PCO2 42.0   HCO3 24.8   POCBASEDEF -0.40         Mechanical Ventilation Support:  Vent Mode: SIMV (03/26/24 0600)  Ventilator Initiated: Yes (03/21/24 1634)  Set Rate: 20 BPM (03/26/24 0600)  Vt Set: 500 mL (03/26/24 0600)  Pressure Support: 10 cmH20 (03/26/24 0600)  PEEP/CPAP: 5 cmH20 (03/26/24 0600)  Oxygen Concentration (%): 30 (03/26/24 0800)  Peak Airway Pressure: 20 cmH20 (03/26/24 0600)  Total Ve: 11.9 L/m (03/26/24 0600)  F/VT Ratio<105 (RSBI): (!) 62.11 (03/26/24 0600)      Significant Imaging:  I have reviewed the pertinent imaging within the past 24 hours.   Chest x-ray with minimal bilateral unchanged interstitial infiltrates.  Endotracheal tube in appropriate position.      Assessment/Plan:     Assessment  Acute hypoxic and hypercapnic respiratory failure.  Intubated March 21st  Altered mental status  Pulmonary vascular congestion per CXR  Possible aspiration event  E coli UTI with chronic indwelling nielsen  ESRD on MWF HD  Thrombocytopenia      Plan    Continue full mechanical ventilation support,  does not appear that he can be successfully weaned from the ventilator.  We will discuss with family when they arrive about their desires as apparently the patient has indicated in the past he did not wish life support measures.   DNR per family request.  With thrombocytopenia discontinued Zosyn and started doxycycline on March 24th.  Continue monitor platelet count.  Dialysis per Nephrology  We will hold heparin and monitor platelet count.  Likely resume soon if trend continues to improve.  Begin enteral tube feeds    DVT prophylaxis:  Subcu heparin will be held with thrombocytopenia.  GI prophylaxis:  Pepcid    33 minutes of critical care time was spent reviewing medical records, direct patient contact, coordinating treatment with nursing and respiratory therapy and/or discussing the case with family members       Darrius Daley MD  Pulmonary Critical Care Medicine  [facility]   DOS: 03/26/2024

## 2024-03-26 NOTE — PLAN OF CARE
Problem: Infection  Goal: Absence of Infection Signs and Symptoms  Outcome: Ongoing, Progressing     Problem: Device-Related Complication Risk (Hemodialysis)  Goal: Safe, Effective Therapy Delivery  Outcome: Ongoing, Progressing     Problem: Hemodynamic Instability (Hemodialysis)  Goal: Effective Tissue Perfusion  Outcome: Ongoing, Progressing     Problem: Infection (Hemodialysis)  Goal: Absence of Infection Signs and Symptoms  Outcome: Ongoing, Progressing     Problem: Adult Inpatient Plan of Care  Goal: Plan of Care Review  Outcome: Ongoing, Progressing  Goal: Patient-Specific Goal (Individualized)  Outcome: Ongoing, Progressing  Goal: Absence of Hospital-Acquired Illness or Injury  Outcome: Ongoing, Progressing  Goal: Optimal Comfort and Wellbeing  Outcome: Ongoing, Progressing  Goal: Readiness for Transition of Care  Outcome: Ongoing, Progressing

## 2024-03-26 NOTE — CONSULTS
Inpatient consult to Palliative Care  Consult performed by: Dari Markham FNP  Consult ordered by: Darrius Daley MD      Patient Name: Mirza Nelson Jr.   MRN: [id number]   Admission Date: 3/19/2024   Hospital Length of Stay: 7   Attending Provider: Darrius Daley MD   Consulting Provider: Dari HARVEY  Reason for Consult: Goals of Care  Primary Care Physician: NILESH Pena MD     Principal Problem: Acute confusional state     Patient information was obtained from relative(s) and ER records.      Final diagnoses:  [W19.XXXA] Fall  [N18.6, Z99.2] ESRD (end stage renal disease) on dialysis (Primary)  [Z91.199] Noncompliance  [I10] Benign essential HTN  [D64.9] Chronic anemia  [R45.851] Suicidal ideation  [E87.5] Hyperkalemia     Assessment/Plan:     I reviewed the patient and family's understanding of the seriousness of the illness and its expected prognosis. We discussed the patient's goals of care and treatment preferences. I clarified current code status. I identified the surrogate decision maker or health care POA. I answered all questions and we formulated a plan including recommendations for symptom management and how to best achieve goals of care.       Advance Care Planning     Date: 03/26/2024    [facility]  I engaged the family in a voluntary conversation about advance care planning and we specifically addressed what the goals of care would be moving forward, in light of the patient's change in clinical status, specifically current condition.  We did specifically address the patient's likely prognosis, which is poor.  We explored the patient's values and preferences for future care.  The family endorses that what is most important right now is to focus on comfort and QOL     Accordingly, we have decided that the best plan to meet the patient's goals includes pivot to comfort-focused care    Notified by staff RN that family was at bedside to discuss goals of care further.  Met with patient's  sister with RN and JAMIR Abad RN present who states that children have left all decision-making up to her.  She states that she knows that patient would not want his life prolonged in his current condition and would like to seek withdrawal of life support with comfort measures in place.  Spoke to patient's son Stephanie on speaker phone who confirmed the family is in agreement with withdrawal of life support.  Discussed that patient would most not live more than a few hours d/t current ventilatory support.           ADDENDUM: NOTIFIED BY RN THAT SON WOULD LIKE TO WAIT FOR WITHDRAWAL FROM LIFE SUPPORT UNTIL FRIDAY.         Recommendations:     Comfort measures in place      History of Present Illness:     Patient is a 68-year-old male with history of ESRD on HD, GERD, BPH, HLD, MDD and anxiety who presented to the ED on 03/19/2024 from [facility].  Patient was have been showing symptoms of confusion,  expressing suicidal ideations and refusing dialysis.  Of note patient had recently underwent hospitalization at this facility and [facility].  Patient was admitted to hospitalist service and receiving treatment for UTI.  Patient reportedly had an aspiration event in dialysis after which he required BiPAP and eventual intubation.  Palliative Care was consulted spoke to family last week about code status and goals of care.   We are meeting with family today to discuss further.       Active Ambulatory Problems     Diagnosis Date Noted    No Active Ambulatory Problems     Resolved Ambulatory Problems     Diagnosis Date Noted    No Resolved Ambulatory Problems     No Additional Past Medical History        No past surgical history on file.     Review of patient's allergies indicates:  Not on File       Current Facility-Administered Medications:     acetaminophen suppository 325 mg, 325 mg, Rectal, Q4H PRN, Isidro Dunne MD, 325 mg at 03/20/24 0454    acetaminophen tablet 650 mg, 650 mg, Oral, Q6H PRN, Isidro Dunne MD,  650 mg at 24 1558    [COMPLETED] calcium gluconate 1 g in NS IVPB (premixed), 1 g, Intravenous, ED 1 Time, Stopped at 24 1221 **AND** calcium gluconate 1 g in NS IVPB (premixed), 1 g, Intravenous, Q10 Min PRN, Deanna Mckeon MD    dexmedetomidine (PRECEDEX) 400mcg/100mL 0.9% NaCL infusion, 0-1.4 mcg/kg/hr, Intravenous, Continuous, Darrius Daley MD, Stopped at 24 0450    dextrose 10% bolus 250 mL 250 mL, 250 mL, Intravenous, bolus from bag, Isidro Dunne MD, Stopped at 24 0423    doxycycline 100 mg in dextrose 5 % in water (D5W) 100 mL IVPB (MB+), 100 mg, Intravenous, Q12H, Darrius Daley MD, Stopped at 24 1028    famotidine (PF) injection 20 mg, 20 mg, Intravenous, Daily, Darrius Daley MD, 20 mg at 24 0858    glycopyrrolate injection 0.2 mg, 0.2 mg, Intravenous, QID PRN, Dari Markham, CATHYP    HYDROmorphone (PF) injection 1 mg, 1 mg, Intravenous, Q1H PRN, Dari Markham, FNP    [COMPLETED] dextrose 10% bolus 500 mL 500 mL, 50 g, Intravenous, Once, Last Rate: 999 mL/hr at 24 1315, 500 mL at 24 1315 **AND** [] dextrose 10% bolus 250 mL 250 mL, 25 g, Intravenous, PRN **AND** insulin regular injection 7.26 Units 0.0726 mL, 0.1 Units/kg, Intravenous, Once, Deanna Mckeon MD    LORazepam injection 1 mg, 1 mg, Intravenous, Q4H PRN, Dari Markham, FNANASTASIA    morphine injection 10 mg, 10 mg, Intravenous, Once PRN, Dari Markham, FNP    morphine injection 4 mg, 4 mg, Intravenous, Q1H PRN, Dari Markham, FNP    ondansetron injection 4 mg, 4 mg, Intravenous, Q6H PRN, Isidro Dunne MD    propofol (DIPRIVAN) 10 mg/mL infusion, 0-50 mcg/kg/min, Intravenous, Continuous, Marqiuse Valle, DO, Last Rate: 8.7 mL/hr at 24 0911, 20 mcg/kg/min at 24 0911    white petrolatum 41 % ointment, , Topical (Top), BID, Isidro Dunne MD, Given at 24 0859    zinc oxide-cod liver oil 40 % paste, , Topical (Top),  "BID, Darrius Daley MD     acetaminophen, acetaminophen, [COMPLETED] calcium gluconate IVPB **AND** calcium gluconate IVPB, dextrose 10%, glycopyrrolate, HYDROmorphone, lorazepam, morphine, morphine, ondansetron     No family history on file.     Review of Systems   Unable to perform ROS: Intubated            Objective:   /66   Pulse 92   Temp 98.6 °F (37 °C) (Oral)   Resp (!) 21   Ht 5' 6" (1.676 m)   Wt 66.2 kg (146 lb)   SpO2 (!) 94%   BMI 23.57 kg/m²      Physical Exam  Constitutional:       Appearance: He is ill-appearing.   HENT:      Head: Normocephalic.   Eyes:      Pupils: Pupils are equal, round, and reactive to light.   Cardiovascular:      Rate and Rhythm: Normal rate.   Pulmonary:      Breath sounds: Rhonchi present.   Abdominal:      Palpations: Abdomen is soft.   Skin:     General: Skin is warm.   Neurological:      Comments: sedated           FAMILY CONTACTS:     Review of Symptoms  Review of Symptoms      Symptom Assessment (ESAS 0-10 Scale)  Pain:  0  Dyspnea:  0  Anxiety:  0  Nausea:  0  Depression:  0  Anorexia:  0  Fatigue:  0  Insomnia:  0  Restlessness:  0  Agitation:  0         Performance Status:  10    Psychosocial/Cultural:   See Palliative Psychosocial Note: Yes  Patient has 3 adult children  **Primary  to Follow**  Palliative Care  Consult: No    Spiritual:  F - Brigida and Belief:  Restoration      Advance Care Planning   Advance Directives:     Decision Making:  Family answered questions  Goals of Care: The family endorses that what is most important right now is to focus on comfort and QOL     Accordingly, we have decided that the best plan to meet the patient's goals includes pivot to comfort-focused care            PAINAD: NA    Caregiver burden formerly assessed: Yes        > 50% of 45 min of encounter was spent in chart review, face to face discussion of goals of care, symptom assessment, coordination of care and emotional support. "         Dari Markham FNP, [facility]  Palliative Medicine  [facility] - Observation Unit

## 2024-03-26 NOTE — NURSING
"Spoke with the patient's sister while she was on the phone with the son of the patient and he said, " we can wait until Friday to withdrawal." Palliative team is notified of the change and they plan on withdrawal on Friday. Patient's sister spoke with the son and daughter of the patient again and they came to a final agreement of withdrawal on Thursday, 3/28. Palliative team aware of plan of care.      "

## 2024-03-26 NOTE — NURSING
Nurses Note -- 4 Eyes      3/26/2024   4:33 AM      Skin assessed during: Daily Assessment      [] No Altered Skin Integrity Present    [x]Prevention Measures Documented      [x] Yes- Altered Skin Integrity Present or Discovered   [] LDA Added if Not in Epic (Describe Wound)   [] New Altered Skin Integrity was Present on Admit and Documented in LDA   [] Wound Image Taken    Wound Care Consulted? Yes    Attending Nurse:  RAMANA Miller    Second RN/Staff Member:   RAMANA Pabon

## 2024-03-26 NOTE — PROGRESS NOTES
[facility] - 5th Floor Med Surg  Nephrology  Progress Note    Patient Name: Mirza Nelson Jr.  MRN: [id number]  Admission Date: 3/19/2024  Hospital Length of Stay: 7 days  Attending Provider: Darrius Daley MD   Primary Care Physician: NILESH Pena MD  Principal Problem:Acute confusional state      Subjective:     HPI: Mr Mirza Nelson ( 1955) is a 68-year-old AAM with dialysis dependent ESRD followed by Dr Butler. Reportedly he has missed dialysis for a week. Patient presented as a trauma secondary to fall at home. He was noted to have critical hyperkalemia with potassium of 7 for which he is undergoing emergent HD. Patient is disoriented with bilateral upper extremity tremors. Due to confusion and attempts to pull at lines he has a sitter at bedside. Of note patient was recently hospitalized with ESBL E coli UTI and discharged to [facility] on 3/11.      Interval History: Patient was dialyzed both Tuesday and Wednesday last week with no improvement in mentation. He vomiting during dialysis on Thursday without suspicion of aspiration at the time. He did however require escalation of oxygen therapy overnight. Subsequently transferred to ICU and intubated secondary to respiratory failure. He is being treated for PNA and UTI with Zosyn. Urine culture repeated 3/19 showing ESBL E coli.     He did not tolerate weaning of vent yesterday. Sedation has been weaned to propofol only. Tube feedings resumed and tolerating so far.         Review of patient's allergies indicates:  Not on File  Current Facility-Administered Medications   Medication Frequency    acetaminophen suppository 325 mg Q4H PRN    acetaminophen tablet 650 mg Q6H PRN    calcium gluconate 1 g in NS IVPB (premixed) Q10 Min PRN    dexmedetomidine (PRECEDEX) 400mcg/100mL 0.9% NaCL infusion Continuous    dextrose 10% bolus 250 mL 250 mL bolus from bag    doxycycline 100 mg in dextrose 5 % in water (D5W) 100 mL IVPB (MB+)  Q12H    famotidine (PF) injection 20 mg Daily    insulin regular injection 7.26 Units 0.0726 mL Once    morphine injection 2 mg Q4H PRN    ondansetron injection 4 mg Q6H PRN    propofol (DIPRIVAN) 10 mg/mL infusion Continuous    white petrolatum 41 % ointment BID       Objective:     Vital Signs (Most Recent):  Temp: 98.5 °F (36.9 °C) (03/26/24 0800)  Pulse: 88 (03/26/24 0615)  Resp: (!) 26 (03/26/24 0615)  BP: (!) 140/68 (03/26/24 0600)  SpO2: 96 % (03/26/24 0615) Vital Signs (24h Range):  Temp:  [97.4 °F (36.3 °C)-100 °F (37.8 °C)] 98.5 °F (36.9 °C)  Pulse:  [62-97] 88  Resp:  [19-31] 26  SpO2:  [93 %-99 %] 96 %  BP: ()/(52-72) 140/68     Weight: 66.2 kg (146 lb) (03/22/24 1021)  Body mass index is 23.57 kg/m².  Body surface area is 1.76 meters squared.    I/O last 3 completed shifts:  In: 2134.9 [I.V.:473; NG/GT:1472; IV Piggyback:189.9]  Out: 1905 [Urine:255; Emesis/NG output:1000; Drains:150; Other:500]    Physical Exam  Constitutional:       General: He is not in acute distress.     Appearance: He is ill-appearing.   HENT:      Head: Atraumatic.      Mouth/Throat:      Mouth: Mucous membranes are dry.   Cardiovascular:      Rate and Rhythm: Normal rate.   Pulmonary:      Breath sounds: Normal breath sounds.      Comments: Mechanical ventilation   Abdominal:      General: Bowel sounds are normal. There is no distension.      Comments: NG with tube feedings in progress   Genitourinary:     Comments: Urinary catheter  Musculoskeletal:         General: No swelling.      Cervical back: Neck supple.   Skin:     General: Skin is warm.   Neurological:      Comments: Sedated          Significant Labs:sureBMP:   Recent Labs   Lab 03/26/24  0136   *   K 4.6   CO2 19*   BUN 44.8*   CREATININE 4.70*   CALCIUM 8.5*   MG 2.00       CBC:   Recent Labs   Lab 03/26/24  0136   WBC 10.14   RBC 2.97*   HGB 9.6*   HCT 31.1*   PLT 65*   .7*   MCH 32.3*   MCHC 30.9*       Microbiology Results (last 7 days)        Procedure Component Value Units Date/Time    Blood Culture [[id number]]  (Normal) Collected: 03/20/24 0650    Order Status: Completed Specimen: Blood Updated: 03/25/24 0900     CULTURE, BLOOD ([facility]) No Growth at 5 days    Blood Culture [[id number]]  (Normal) Collected: 03/20/24 0747    Order Status: Completed Specimen: Blood Updated: 03/25/24 0900     CULTURE, BLOOD ([facility]) No Growth at 5 days    Urine culture [[id number]]  (Abnormal)  (Susceptibility) Collected: 03/19/24 1132    Order Status: Completed Specimen: Urine Updated: 03/22/24 0742     Urine Culture >/= 100,000 colonies/ml Escherichia coli ESBL          Specimen (24h ago, onward)      None          Recent Labs   Lab 03/19/24  1132   PROTEINUA 3+*   BACTERIA Many*   LEUKOCYTESUR 500*   UROBILINOGEN Normal         Significant Imaging:  No new imaging     Assessment/Plan:     ESRD on HD - [facility], MWF, [facility]  Dialysis noncompliance with critical hyperkalemia and pulmonary vascular congestion on CXR  Acute respiratory failure s/t suspected aspiration requiring intubation   Trauma fall at home   Altered mental status   Chronic indwelling nielsen catheter changed every three weeks per Dr Navarrete   Frequent UTI - Most recently ESBL E Coli 2/29   CAD s/p PCI   Chronic back pain      Recommendations  Continue dialysis on MWF schedule   No change from renal standpoint today. We will continue to monitor.       KARLOS Preston  Nephrology  [facility] - 5th Floor Med Surg

## 2024-03-26 NOTE — NURSING
"Spoke to the sister of the patient at bedside after the meeting with the palliative team and she said that she spoke with the son and daughter on the phone and said " we would like to withdraw today".  "

## 2024-03-26 NOTE — PROGRESS NOTES
[facility] - 7th Floor ICU  Wound Care    Patient Name:  Mirza Nelson Jr.   MRN:  [id number]  Date: 3/26/2024  Diagnosis: Acute confusional state    History:     No past medical history on file.    Social History     Socioeconomic History    Marital status:      Social Determinants of Health     Financial Resource Strain: Low Risk  (3/20/2024)    Overall Financial Resource Strain (CARDIA)     Difficulty of Paying Living Expenses: Not hard at all   Food Insecurity: No Food Insecurity (3/20/2024)    Hunger Vital Sign     Worried About Running Out of Food in the Last Year: Never true     Ran Out of Food in the Last Year: Never true   Transportation Needs: No Transportation Needs (3/20/2024)    PRAPARE - Transportation     Lack of Transportation (Medical): No     Lack of Transportation (Non-Medical): No   Stress: No Stress Concern Present (3/20/2024)    [employer] - Occupational Stress Questionnaire     Feeling of Stress : Not at all   Housing Stability: Low Risk  (3/20/2024)    Housing Stability Vital Sign     Unable to Pay for Housing in the Last Year: No     Number of Places Lived in the Last Year: 1     Unstable Housing in the Last Year: No       Precautions:     Allergies as of 03/19/2024    (Not on File)       WO Assessment Details/Treatment        03/26/24 1151   WOCN Assessment   Visit Date 03/26/24   Visit Time 1151   Consult Type New   [facility] Speciality Wound   Intervention assessed;chart review;orders   Teaching on-going   Skin Interventions   Device Skin Pressure Protection absorbent pad utilized/changed        Altered Skin Integrity 03/19/24 1804 midline Sacral spine #1  Partial thickness tissue loss. Shallow open ulcer with a red or pink wound bed, without slough. Intact or Open/Ruptured Serum-filled blister.   Date First Assessed/Time First Assessed: 03/19/24 1804   Altered Skin Integrity Present on Admission - Did Patient arrive to the hospital with  altered skin?: yes  Orientation: midline  Location: Sacral spine  Wound Number: #1  Primary Wound Type: (c) ...   Wound Image    Description of Altered Skin Integrity Partial thickness tissue loss. Shallow open ulcer with a red or pink wound bed, without slough. Intact or Open/Ruptured Serum-filled blister.   Dressing Appearance Dry;Intact;Clean   Drainage Amount Scant   Drainage Characteristics/Odor Serous   Appearance Pink;Red;Moist   Tissue loss description Partial thickness   Red (%), Wound Tissue Color 100 %   Periwound Area Moist;Redness;Scar tissue   Wound Edges Irregular   Care Cleansed with:;Sterile normal saline   Dressing Applied     WOCN consulted for sacrum. No family at bedside. Treatment recommendations put into place.  Sacrum: Cleanse with NS. Apply Desitin, cover with ABD pad, secure with medipore tape. Change BID and PRN. Keep areas clean and dry, no adult briefs while in bed. Nursing to continue with turning every two hours, wedge, and floating heels.  Head of bed elevated, bed in lowest position, and call bell within reach. On ICU SHAWN mattress.  Will follow up.    03/26/2024

## 2024-03-27 NOTE — PROGRESS NOTES
[facility] - 5th [facility] Med Surg  Pulmonary Critical Care Note    Patient Name: Mirza Nelson Jr.  MRN: [id number]  Admission Date: 3/19/2024  Hospital Length of Stay: 8 days  Code Status: DNR  Attending Provider: Darrius Daley MD  Primary Care Provider: NILESH Pena MD     Subjective:     HPI:   This is a 68-year-old male with a history of end-stage renal disease on HD MWF, GERD, BPH, hyperlipidemia, MDD and anxiety who presented to the ED on 03/19/2024 from leave the [facility] after a fall.  Patient was reportedly showing symptoms of confusion as well as suicidal ideations.  He was reportedly refusing dialysis. He was just recently admitted and discharged on 03/17/2024 during that time he was treated for ESBL E coli UTI with Rocephin and Zosyn.  Of note, he was also hospitalized on 2/18/2024 at [facility].  He was diagnosed with a NSTEMI and underwent left heart catheterization with PTCA with stent to the LAD on 02/23.  He was  followed by Urology at that time due to hematuria and has a chronic indwelling catheter.  He was also intubated during the stay for respiratory failure. (Chart is marked for merge, these records are in his previous chart).  Initial chest x-ray in the ED showed enlarged cardiac silhouette with vascular congestion.  Urine culture from 03/19 currently growing Gram-negative rods.  Blood cultures collected on 03/20 remain in process.  Patient reportedly had an aspiration event yesterday in dialysis following an episode of emesis.  His oxygen requirements have increased since that time.  His mentation has also declined.  CTA of head obtained overnight with no acute intracranial abnormalities.  ABGs obtained overnight revealed a pH of 7.35, pCO2 of 53, PO2 59, bicarb 29.  He was placed on BiPAP.  Pulmonary was consulted for hypoxemic hypercapnia along with possible aspiration pneumonia.  Upon arrival to assess patient, the rapid response team was present.  Patient remains  tachypneic with an labored breathing.  He responds to pain however does not communicate.  CABG revealed a glucose of 55 and he was started on a D10 infusion.  Repeat ABGs on BiPAP 14/8 and 65% revealed a pH of 7.45, pCO2 of 41, PO2 of 123, bicarb of 28.  Per report patient was awake oriented as of yesterday.    Hospital Course/Significant events:  3/21: Intubated    24 Hour Interval History:  Remains intubated and mechanically ventilated. He has failed weaning attempts. Palliative has met with family and they plan for possible withdrawal tomorrow.    No past medical history on file.    No past surgical history on file.    Social History     Socioeconomic History    Marital status:      Social Determinants of Health     Financial Resource Strain: Low Risk  (3/20/2024)    Overall Financial Resource Strain (CARDIA)     Difficulty of Paying Living Expenses: Not hard at all   Food Insecurity: No Food Insecurity (3/20/2024)    Hunger Vital Sign     Worried About Running Out of Food in the Last Year: Never true     Ran Out of Food in the Last Year: Never true   Transportation Needs: No Transportation Needs (3/20/2024)    PRAPARE - Transportation     Lack of Transportation (Medical): No     Lack of Transportation (Non-Medical): No   Stress: No Stress Concern Present (3/20/2024)    [place] [employer] [place] [employer] - Occupational Stress Questionnaire     Feeling of Stress : Not at all   Housing Stability: Low Risk  (3/20/2024)    Housing Stability Vital Sign     Unable to Pay for Housing in the Last Year: No     Number of Places Lived in the Last Year: 1     Unstable Housing in the Last Year: No           No current outpatient medications    Current Inpatient Medications   doxycycline IV (PEDS and ADULTS)  100 mg Intravenous Q12H    famotidine (PF)  20 mg Intravenous Daily    insulin regular  0.1 Units/kg Intravenous Once    white petrolatum   Topical (Top) BID    zinc oxide-cod liver oil   Topical (Top) BID        Current Intravenous Infusions   dexmedeTOMIDine (Precedex) infusion (titrating) 0.6 mcg/kg/hr (03/27/24 0621)    propofoL Stopped (03/26/24 1656)         Review of Systems   Unable to perform ROS: Mental status change          Objective:       Intake/Output Summary (Last 24 hours) at 3/27/2024 1059  Last data filed at 3/27/2024 0618  Gross per 24 hour   Intake 1274.74 ml   Output 265 ml   Net 1009.74 ml           Vital Signs (Most Recent):  Temp: 99.7 °F (37.6 °C) (03/27/24 0800)  Pulse: 83 (03/27/24 1000)  Resp: (!) 32 (03/27/24 1000)  BP: (!) 151/71 (03/27/24 1000)  SpO2: 99 % (03/27/24 1000)  Body mass index is 23.57 kg/m².  Weight: 66.2 kg (146 lb) Vital Signs (24h Range):  Temp:  [98.5 °F (36.9 °C)-100.1 °F (37.8 °C)] 99.7 °F (37.6 °C)  Pulse:  [76-98] 83  Resp:  [20-40] 32  SpO2:  [91 %-100 %] 99 %  BP: (111-164)/(55-73) 151/71     Physical Exam  Constitutional:       General: He is not in acute distress.     Appearance: He is not toxic-appearing.      Comments: Intubated and sedated   HENT:      Head: Normocephalic and atraumatic.   Eyes:      Extraocular Movements: Extraocular movements intact.      Pupils: Pupils are equal, round, and reactive to light.   Cardiovascular:      Rate and Rhythm: Normal rate and regular rhythm.      Pulses: Normal pulses.      Heart sounds: No murmur heard.     No gallop.   Pulmonary:      Effort: No respiratory distress.      Breath sounds: No stridor. Rhonchi present. No wheezing or rales.   Abdominal:      General: Abdomen is flat.      Palpations: Abdomen is soft.   Musculoskeletal:         General: No swelling or deformity.      Left lower leg: No edema.   Skin:     General: Skin is warm.      Coloration: Skin is not jaundiced.      Findings: No bruising.   Neurological:      Comments: Patient sedated and therefore unable to accurately exam           Lines/Drains/Airways       Drain  Duration                  Urethral Catheter 03/20/24 7 days         NG/OG Tube  03/21/24 1700 [place] sump 16 Fr. Right mouth 5 days         Rectal Tube 03/25/24 0730 2 days              Airway  Duration                  Airway - Non-Surgical 03/21/24 1625 Endotracheal Tube 5 days              Peripheral Intravenous Line  Duration                  Hemodialysis AV Fistula Right upper arm -- days         Peripheral IV - Single Lumen 03/21/24 1700 20 G Anterior;Left;Proximal Upper Arm 5 days         Peripheral IV - Single Lumen 03/24/24 2300 20 G Anterior;Distal;Left Upper Arm 2 days                    Significant Labs:    Lab Results   Component Value Date    WBC 11.16 03/27/2024    HGB 9.0 (L) 03/27/2024    HCT 28.8 (L) 03/27/2024    .9 (H) 03/27/2024    PLT 97 (L) 03/27/2024           BMP  Lab Results   Component Value Date     (L) 03/27/2024    K 4.7 03/27/2024    CHLORIDE 103 03/27/2024    CO2 18 (L) 03/27/2024    BUN 65.2 (H) 03/27/2024    CREATININE 6.17 (H) 03/27/2024    CALCIUM 8.5 (L) 03/27/2024    AGAP 18.0 03/20/2024         ABG  Recent Labs   Lab 03/26/24  0502   PH 7.380   PO2 95.0   PCO2 42.0   HCO3 24.8   POCBASEDEF -0.40         Mechanical Ventilation Support:  Vent Mode: SIMV (03/27/24 1005)  Ventilator Initiated: Yes (03/21/24 1634)  Set Rate: 20 BPM (03/27/24 1005)  Vt Set: 500 mL (03/27/24 1005)  Pressure Support: 10 cmH20 (03/27/24 1005)  PEEP/CPAP: 5 cmH20 (03/27/24 1005)  Oxygen Concentration (%): 30 (03/27/24 0815)  Peak Airway Pressure: 25 cmH20 (03/27/24 1005)  Total Ve: 12.4 L/m (03/27/24 1005)  F/VT Ratio<105 (RSBI): (!) 56.93 (03/27/24 0608)      Significant Imaging:  I have reviewed the pertinent imaging within the past 24 hours.   Chest x-ray with minimal bilateral unchanged interstitial infiltrates.  Endotracheal tube in appropriate position.      Assessment/Plan:     Assessment  Acute hypoxic and hypercapnic respiratory failure.  Intubated March 21st  Altered mental status  Pulmonary vascular congestion per CXR  Possible aspiration event  E coli UTI  with chronic indwelling nielsen  ESRD on MWF HD  Thrombocytopenia      Plan  Continue full mechanical ventilation support,  does not appear that he can be successfully weaned from the ventilator.  Family is in agreement with palliative withdrawal, however his son lives in [place] therefore they would like to wait until tomorrow.  Dialysis per Nephrology  We will hold heparin and monitor platelet count. Now 97.  continue enteral tube feeds  Patient is a DNR    DVT prophylaxis:  Subcu heparin will be held with thrombocytopenia.  GI prophylaxis:  Pepcid    33 minutes of critical care time was spent reviewing medical records, direct patient contact, coordinating treatment with nursing and respiratory therapy and/or discussing the case with family members       WES Peterson  Pulmonary Critical Care Medicine  [facility]   DOS: 03/27/2024

## 2024-03-27 NOTE — NURSING
03/27/24 1700   Post-Hemodialysis Assessment   Blood Volume Processed (Liters) 39.3 L   Dialyzer Clearance Clear   Duration of Treatment 120 minutes   Total UF (mL) 1000 mL   Patient Response to Treatment pt tolerated tx   Post-Hemodialysis Comments NAD noted, VSS, Net UF 1,000 mL

## 2024-03-27 NOTE — PROGRESS NOTES
[facility] - 5th Floor Med Surg  Nephrology  Progress Note    Patient Name: Mirza Nelson Jr.  MRN: [id number]  Admission Date: 3/19/2024  Hospital Length of Stay: 8 days  Attending Provider: Darrius Daley MD   Primary Care Physician: NILESH Pena MD  Principal Problem:Acute confusional state      Subjective:     HPI: Mr Mirza Nelson ( 1955) is a 68-year-old AAM with dialysis dependent ESRD followed by Dr Butler. Reportedly he has missed dialysis for a week. Patient presented as a trauma secondary to fall at home. He was noted to have critical hyperkalemia with potassium of 7 for which he is undergoing emergent HD. Patient is disoriented with bilateral upper extremity tremors. Due to confusion and attempts to pull at lines he has a sitter at bedside. Of note patient was recently hospitalized with ESBL E coli UTI and discharged to [facility] on 3/11.      Interval History: Patient was dialyzed both Tuesday and Wednesday last week with no improvement in mentation. He vomiting during dialysis on Thursday without suspicion of aspiration at the time. He did however require escalation of oxygen therapy overnight. Subsequently transferred to ICU and intubated secondary to respiratory failure. He is being treated for PNA and UTI with Zosyn. Urine culture repeated 3/19 showing ESBL E coli.     He did not tolerate weaning of vent.  Sedation has been weaned to propofol only. Tube feedings resumed and tolerating. No acute events overnight. Pending dialysis today.         Review of patient's allergies indicates:  Not on File  Current Facility-Administered Medications   Medication Frequency    acetaminophen suppository 325 mg Q4H PRN    acetaminophen tablet 650 mg Q6H PRN    calcium gluconate 1 g in NS IVPB (premixed) Q10 Min PRN    dexmedetomidine (PRECEDEX) 400mcg/100mL 0.9% NaCL infusion Continuous    dextrose 10% bolus 250 mL 250 mL bolus from bag    doxycycline 100 mg in dextrose 5  % in water (D5W) 100 mL IVPB (MB+) Q12H    famotidine (PF) injection 20 mg Daily    glycopyrrolate injection 0.2 mg QID PRN    HYDROmorphone (PF) injection 1 mg Q1H PRN    insulin regular injection 7.26 Units 0.0726 mL Once    LORazepam injection 1 mg Q4H PRN    morphine injection 10 mg Once PRN    morphine injection 4 mg Q1H PRN    ondansetron injection 4 mg Q6H PRN    propofol (DIPRIVAN) 10 mg/mL infusion Continuous    white petrolatum 41 % ointment BID    zinc oxide-cod liver oil 40 % paste BID       Objective:     Vital Signs (Most Recent):  Temp: 99.7 °F (37.6 °C) (03/27/24 0800)  Pulse: 77 (03/27/24 0700)  Resp: (!) 23 (03/27/24 0700)  BP: (!) 138/57 (03/27/24 0700)  SpO2: 100 % (03/27/24 0700) Vital Signs (24h Range):  Temp:  [98.5 °F (36.9 °C)-100.1 °F (37.8 °C)] 99.7 °F (37.6 °C)  Pulse:  [76-98] 77  Resp:  [20-40] 23  SpO2:  [91 %-100 %] 100 %  BP: (111-164)/(55-73) 138/57     Weight: 66.2 kg (146 lb) (03/22/24 1021)  Body mass index is 23.57 kg/m².  Body surface area is 1.76 meters squared.    I/O last 3 completed shifts:  In: 2887.7 [I.V.:559.3; NG/GT:2045; IV Piggyback:283.4]  Out: 295 [Urine:145; Stool:150]    Physical Exam  Constitutional:       General: He is not in acute distress.     Appearance: He is ill-appearing.   HENT:      Head: Atraumatic.      Mouth/Throat:      Mouth: Mucous membranes are dry.   Cardiovascular:      Rate and Rhythm: Normal rate.   Pulmonary:      Breath sounds: Normal breath sounds.      Comments: Mechanical ventilation   Abdominal:      General: Bowel sounds are normal. There is no distension.      Comments: NG with tube feedings in progress   Genitourinary:     Comments: Urinary catheter  Musculoskeletal:         General: No swelling.      Cervical back: Neck supple.   Skin:     General: Skin is warm.   Neurological:      Comments: Sedated          Significant Labs:sureBMP:   Recent Labs   Lab 03/27/24  0157   *   K 4.7   CO2 18*   BUN 65.2*   CREATININE 6.17*  "  CALCIUM 8.5*   MG 2.10       CBC:   Recent Labs   Lab 03/27/24  0157   WBC 11.16   RBC 2.72*   HGB 9.0*   HCT 28.8*   PLT 97*   .9*   MCH 33.1*   MCHC 31.3*       Microbiology Results (last 7 days)       Procedure Component Value Units Date/Time    Blood Culture [[id number]]  (Normal) Collected: 03/20/24 0650    Order Status: Completed Specimen: Blood Updated: 03/25/24 0900     CULTURE, BLOOD ([facility]) No Growth at 5 days    Blood Culture [[id number]]  (Normal) Collected: 03/20/24 0747    Order Status: Completed Specimen: Blood Updated: 03/25/24 0900     CULTURE, BLOOD ([facility]) No Growth at 5 days    Urine culture [[id number]]  (Abnormal)  (Susceptibility) Collected: 03/19/24 1132    Order Status: Completed Specimen: Urine Updated: 03/22/24 0742     Urine Culture >/= 100,000 colonies/ml Escherichia coli ESBL          Specimen (24h ago, onward)      None          No results for input(s): "COLORU", "CLARITYU", "SPECGRAV", "PHUR", "PROTEINUA", "GLUCOSEU", "BILIRUBINCON", "BLOODU", "WBCU", "RBCU", "BACTERIA", "MUCUS", "NITRITE", "LEUKOCYTESUR", "UROBILINOGEN", "HYALINECASTS" in the last 168 hours.      Significant Imaging:  No new imaging     Assessment/Plan:     ESRD on HD - [facility], [facility], [facility]  Dialysis noncompliance with critical hyperkalemia and pulmonary vascular congestion on CXR  Acute respiratory failure s/t suspected aspiration requiring intubation   Trauma fall at home   Altered mental status   Chronic indwelling nielsen catheter changed every three weeks per Dr Navarrete   Frequent UTI - Most recently ESBL E Coli 2/29   CAD s/p PCI   Chronic back pain      Recommendations  Continue dialysis on MWF schedule   Continue goals of care discussion with family       KARLOS Preston  Nephrology  [facility] - 5th Floor Med Surg  "

## 2024-03-27 NOTE — NURSING
Nurses Note -- 4 Eyes      3/27/2024   2:32 PM      Skin assessed during: Daily Assessment      [] No Altered Skin Integrity Present    [x]Prevention Measures Documented      [x] Yes- Altered Skin Integrity Present or Discovered   [] LDA Added if Not in Epic (Describe Wound)   [] New Altered Skin Integrity was Present on Admit and Documented in LDA   [] Wound Image Taken    Wound Care Consulted? Yes    Attending Nurse:  Cooper Hui RN    Second RN/Staff Member:   Garry Robledo RN

## 2024-03-27 NOTE — NURSING
Nurses Note -- 4 Eyes      3/27/2024   4:20 AM      Skin assessed during: Daily Assessment      [] No Altered Skin Integrity Present    [x]Prevention Measures Documented      [x] Yes- Altered Skin Integrity Present or Discovered   [] LDA Added if Not in Epic (Describe Wound)   [] New Altered Skin Integrity was Present on Admit and Documented in LDA   [] Wound Image Taken    Wound Care Consulted? Yes    Attending Nurse:  RAMANA Miller     Second RN/Staff Member:   RAMANA Wei

## 2024-03-27 NOTE — NURSING
Patient is more awake and nodding appropriately. I turned the precedex completely off and explained how we planned to take the breathing tube out tomorrow. I then asked the patient if he wanted us to put the tube back in if he would need, and he shook his head no.

## 2024-03-28 NOTE — PROGRESS NOTES
[facility] - 5th Floor Med Surg  Nephrology  Progress Note    Patient Name: Mirza Nelson Jr.  MRN: [id number]  Admission Date: 3/19/2024  Hospital Length of Stay: 9 days  Attending Provider: Darrius Daley MD   Primary Care Physician: NILESH Pena MD  Principal Problem:Acute confusional state      Subjective:     HPI: Mr Mirza Nelson ( 1955) is a 68-year-old AAM with dialysis dependent ESRD followed by Dr Butler. Reportedly he has missed dialysis for a week. Patient presented as a trauma secondary to fall at home. He was noted to have critical hyperkalemia with potassium of 7 for which he is undergoing emergent HD. Patient is disoriented with bilateral upper extremity tremors. Due to confusion and attempts to pull at lines he has a sitter at bedside. Of note patient was recently hospitalized with ESBL E coli UTI and [facility] on 3/11.      Interval History: Patient was dialyzed both Tuesday and Wednesday last week with no improvement in mentation. He vomiting during dialysis on Thursday without suspicion of aspiration at the time. He did however require escalation of oxygen therapy overnight. Subsequently transferred to ICU and intubated secondary to respiratory failure. He is being treated for PNA and UTI with Zosyn. Urine culture repeated 3/19 showing ESBL E coli.     He did not tolerate weaning of vent. Patient on sedation via precedex. Appears uncomfortable on ventilator this morning. Tachycardic with RT and nurse at bedside. No edema.         Review of patient's allergies indicates:  Not on File  Current Facility-Administered Medications   Medication Frequency    acetaminophen suppository 325 mg Q4H PRN    acetaminophen tablet 650 mg Q6H PRN    calcium gluconate 1 g in NS IVPB (premixed) Q10 Min PRN    dexmedetomidine (PRECEDEX) 400mcg/100mL 0.9% NaCL infusion Continuous    dextrose 10% bolus 250 mL 250 mL bolus from bag    doxycycline 100 mg in dextrose  5 % in water (D5W) 100 mL IVPB (MB+) Q12H    famotidine (PF) injection 20 mg Daily    glycopyrrolate injection 0.2 mg QID PRN    HYDROmorphone (PF) injection 1 mg Q1H PRN    insulin regular injection 7.26 Units 0.0726 mL Once    LORazepam injection 1 mg Q4H PRN    morphine injection 10 mg Once PRN    morphine injection 4 mg Q1H PRN    ondansetron injection 4 mg Q6H PRN    propofol (DIPRIVAN) 10 mg/mL infusion Continuous    white petrolatum 41 % ointment BID    zinc oxide-cod liver oil 40 % paste BID       Objective:     Vital Signs (Most Recent):  Temp: 98.5 °F (36.9 °C) (03/28/24 0400)  Pulse: 107 (03/28/24 0706)  Resp: (!) 45 (03/28/24 0905)  BP: (!) 138/97 (03/28/24 0706)  SpO2: 97 % (03/28/24 0706) Vital Signs (24h Range):  Temp:  [98.5 °F (36.9 °C)-99.8 °F (37.7 °C)] 98.5 °F (36.9 °C)  Pulse:  [] 107  Resp:  [20-46] 45  SpO2:  [91 %-100 %] 97 %  BP: ()/(46-97) 138/97     Weight: 66.2 kg (146 lb) (03/22/24 1021)  Body mass index is 23.57 kg/m².  Body surface area is 1.76 meters squared.    I/O last 3 completed shifts:  In: 850.8 [I.V.:334.2; NG/GT:221; IV Piggyback:295.7]  Out: 1265 [Urine:115; Other:1000; Stool:150]    Physical Exam  Constitutional:       General: He is not in acute distress.     Appearance: He is ill-appearing.   HENT:      Head: Atraumatic.      Mouth/Throat:      Mouth: Mucous membranes are dry.   Cardiovascular:      Rate and Rhythm: Normal rate.   Pulmonary:      Breath sounds: Normal breath sounds.      Comments: Mechanical ventilation   Abdominal:      General: Bowel sounds are normal. There is no distension.      Comments: NG with tube feedings in progress   Genitourinary:     Comments: Urinary catheter  Musculoskeletal:         General: No swelling.      Cervical back: Neck supple.   Skin:     General: Skin is warm.   Neurological:      Comments: Sedated          Significant Labs:sureBMP:   Recent Labs   Lab 03/28/24  0245   *   K 4.1   CO2 19*   BUN 55.7*  "  CREATININE 5.36*   CALCIUM 8.0*   MG 2.00       CBC:   Recent Labs   Lab 03/28/24  0245   WBC 13.59*   RBC 2.93*   HGB 9.7*   HCT 31.2*      .5*   MCH 33.1*   MCHC 31.1*       Microbiology Results (last 7 days)       Procedure Component Value Units Date/Time    Blood Culture [[id number]]  (Normal) Collected: 03/20/24 0650    Order Status: Completed Specimen: Blood Updated: 03/25/24 0900     CULTURE, BLOOD ([facility]) No Growth at 5 days    Blood Culture [[id number]]  (Normal) Collected: 03/20/24 0747    Order Status: Completed Specimen: Blood Updated: 03/25/24 0900     CULTURE, BLOOD ([facility]) No Growth at 5 days    Urine culture [[id number]]  (Abnormal)  (Susceptibility) Collected: 03/19/24 1132    Order Status: Completed Specimen: Urine Updated: 03/22/24 0742     Urine Culture >/= 100,000 colonies/ml Escherichia coli ESBL          Specimen (24h ago, onward)      None          No results for input(s): "COLORU", "CLARITYU", "SPECGRAV", "PHUR", "PROTEINUA", "GLUCOSEU", "BILIRUBINCON", "BLOODU", "WBCU", "RBCU", "BACTERIA", "MUCUS", "NITRITE", "LEUKOCYTESUR", "UROBILINOGEN", "HYALINECASTS" in the last 168 hours.      Significant Imaging:  No new imaging     Assessment/Plan:     ESRD on HD - [facility], [facility]  Dialysis noncompliance with critical hyperkalemia and pulmonary vascular congestion on CXR  Acute respiratory failure s/t suspected aspiration requiring intubation   Trauma fall at home   Altered mental status   Chronic indwelling nielsen catheter changed every three weeks per Dr Navarrete   Frequent UTI - Most recently ESBL E Coli 2/29   CAD s/p PCI   Chronic back pain      Recommendations  Continue dialysis on MWF schedule   Continue goals of care discussion with family       KARLOS Preston  Nephrology  [facility] - 5th Floor Med Surg  "

## 2024-03-28 NOTE — PROGRESS NOTES
[facility] - 5th [facility] Med Surg  Pulmonary Critical Care Note    Patient Name: Mirza Nelson Jr.  MRN: [id number]  Admission Date: 3/19/2024  Hospital Length of Stay: 9 days  Code Status: DNR  Attending Provider: Darrius Daley MD  Primary Care Provider: NILESH Pena MD     Subjective:     HPI:   This is a 68-year-old male with a history of end-stage renal disease on HD MWF, GERD, BPH, hyperlipidemia, MDD and anxiety who presented to the ED on 03/19/2024 from leave the [facility] after a fall.  Patient was reportedly showing symptoms of confusion as well as suicidal ideations.  He was reportedly refusing dialysis. He was just recently admitted and discharged on 03/17/2024 during that time he was treated for ESBL E coli UTI with Rocephin and Zosyn.  Of note, he was also hospitalized on 2/18/2024 at [facility].  He was diagnosed with a NSTEMI and underwent left heart catheterization with PTCA with stent to the LAD on 02/23.  He was  followed by Urology at that time due to hematuria and has a chronic indwelling catheter.  He was also intubated during the stay for respiratory failure. (Chart is marked for merge, these records are in his previous chart).  Initial chest x-ray in the ED showed enlarged cardiac silhouette with vascular congestion.  Urine culture from 03/19 currently growing Gram-negative rods.  Blood cultures collected on 03/20 remain in process.  Patient reportedly had an aspiration event yesterday in dialysis following an episode of emesis.  His oxygen requirements have increased since that time.  His mentation has also declined.  CTA of head obtained overnight with no acute intracranial abnormalities.  ABGs obtained overnight revealed a pH of 7.35, pCO2 of 53, PO2 59, bicarb 29.  He was placed on BiPAP.  Pulmonary was consulted for hypoxemic hypercapnia along with possible aspiration pneumonia.  Upon arrival to assess patient, the rapid response team was present.  Patient remains  tachypneic with an labored breathing.  He responds to pain however does not communicate.  CABG revealed a glucose of 55 and he was started on a D10 infusion.  Repeat ABGs on BiPAP 14/8 and 65% revealed a pH of 7.45, pCO2 of 41, PO2 of 123, bicarb of 28.  Per report patient was awake oriented as of yesterday.    Hospital Course/Significant events:  3/21: Intubated    24 Hour Interval History:  Remains intubated and mechanically ventilated. He has failed weaning attempts. Palliative has met with family and they plan for possible withdrawal today or tomorrow.    No past medical history on file.    No past surgical history on file.    Social History     Socioeconomic History    Marital status:      Social Determinants of Health     Financial Resource Strain: Low Risk  (3/20/2024)    Overall Financial Resource Strain (CARDIA)     Difficulty of Paying Living Expenses: Not hard at all   Food Insecurity: No Food Insecurity (3/20/2024)    Hunger Vital Sign     Worried About Running Out of Food in the Last Year: Never true     Ran Out of Food in the Last Year: Never true   Transportation Needs: No Transportation Needs (3/20/2024)    PRAPARE - Transportation     Lack of Transportation (Medical): No     Lack of Transportation (Non-Medical): No   Stress: No Stress Concern Present (3/20/2024)    [employer] - Occupational Stress Questionnaire     Feeling of Stress : Not at all   Housing Stability: Low Risk  (3/20/2024)    Housing Stability Vital Sign     Unable to Pay for Housing in the Last Year: No     Number of Places Lived in the Last Year: 1     Unstable Housing in the Last Year: No           No current outpatient medications    Current Inpatient Medications   doxycycline IV (PEDS and ADULTS)  100 mg Intravenous Q12H    famotidine (PF)  20 mg Intravenous Daily    insulin regular  0.1 Units/kg Intravenous Once    white petrolatum   Topical (Top) BID    zinc oxide-cod liver oil   Topical  (Top) BID       Current Intravenous Infusions   dexmedeTOMIDine (Precedex) infusion (titrating) 0.8 mcg/kg/hr (03/28/24 0706)    propofoL Stopped (03/26/24 1656)         Review of Systems   Unable to perform ROS: Mental status change          Objective:       Intake/Output Summary (Last 24 hours) at 3/28/2024 0902  Last data filed at 3/28/2024 0706  Gross per 24 hour   Intake 438.18 ml   Output 1900 ml   Net -1461.82 ml           Vital Signs (Most Recent):  Temp: 98.5 °F (36.9 °C) (03/28/24 0400)  Pulse: 107 (03/28/24 0706)  Resp: (!) 42 (03/28/24 0706)  BP: (!) 138/97 (03/28/24 0706)  SpO2: 97 % (03/28/24 0706)  Body mass index is 23.57 kg/m².  Weight: 66.2 kg (146 lb) Vital Signs (24h Range):  Temp:  [98.5 °F (36.9 °C)-99.8 °F (37.7 °C)] 98.5 °F (36.9 °C)  Pulse:  [] 107  Resp:  [20-46] 42  SpO2:  [91 %-100 %] 97 %  BP: ()/(46-97) 138/97     Physical Exam  Constitutional:       General: He is not in acute distress.     Appearance: He is not toxic-appearing.      Comments: Intubated and sedated   HENT:      Head: Normocephalic and atraumatic.   Eyes:      Extraocular Movements: Extraocular movements intact.      Pupils: Pupils are equal, round, and reactive to light.   Cardiovascular:      Rate and Rhythm: Normal rate and regular rhythm.      Pulses: Normal pulses.      Heart sounds: No murmur heard.     No gallop.   Pulmonary:      Effort: No respiratory distress.      Breath sounds: No stridor. Rhonchi present. No wheezing or rales.   Abdominal:      General: Abdomen is flat.      Palpations: Abdomen is soft.   Musculoskeletal:         General: No swelling or deformity.      Left lower leg: No edema.   Skin:     General: Skin is warm.      Coloration: Skin is not jaundiced.      Findings: No bruising.   Neurological:      Comments: Patient sedated and therefore unable to accurately exam           Lines/Drains/Airways       Drain  Duration                  Urethral Catheter 03/20/24 8 days          NG/OG Tube 03/21/24 1700 Ward sump 16 Fr. Right mouth 6 days         Rectal Tube 03/25/24 0730 3 days              Airway  Duration                  Airway - Non-Surgical 03/21/24 1625 Endotracheal Tube 6 days              Peripheral Intravenous Line  Duration                  Hemodialysis AV Fistula Right upper arm -- days         Peripheral IV - Single Lumen 03/21/24 1700 20 G Anterior;Left;Proximal Upper Arm 6 days         Peripheral IV - Single Lumen 03/24/24 2300 20 G Anterior;Distal;Left Upper Arm 3 days                    Significant Labs:    Lab Results   Component Value Date    WBC 13.59 (H) 03/28/2024    HGB 9.7 (L) 03/28/2024    HCT 31.2 (L) 03/28/2024    .5 (H) 03/28/2024     03/28/2024           BMP  Lab Results   Component Value Date     (L) 03/28/2024    K 4.1 03/28/2024    CHLORIDE 103 03/28/2024    CO2 19 (L) 03/28/2024    BUN 55.7 (H) 03/28/2024    CREATININE 5.36 (H) 03/28/2024    CALCIUM 8.0 (L) 03/28/2024    AGAP 18.0 03/20/2024         ABG  Recent Labs   Lab 03/26/24  0502   PH 7.380   PO2 95.0   PCO2 42.0   HCO3 24.8   POCBASEDEF -0.40         Mechanical Ventilation Support:  Vent Mode: SIMV (03/28/24 0843)  Ventilator Initiated: Yes (03/21/24 1634)  Set Rate: 20 BPM (03/28/24 0843)  Vt Set: 500 mL (03/28/24 0843)  Pressure Support: 10 cmH20 (03/28/24 0843)  PEEP/CPAP: 5 cmH20 (03/28/24 0843)  Oxygen Concentration (%): 30 (03/28/24 0843)  Peak Airway Pressure: 33 cmH20 (03/28/24 0843)  Total Ve: 11.7 L/m (03/28/24 0843)  F/VT Ratio<105 (RSBI): (!) 66.67 (03/27/24 9664)      Significant Imaging:  I have reviewed the pertinent imaging within the past 24 hours.   Chest x-ray with minimal bilateral unchanged interstitial infiltrates.  Endotracheal tube in appropriate position.      Assessment/Plan:     Assessment  Acute hypoxic and hypercapnic respiratory failure.  Intubated March 21st  Altered mental status  Pulmonary vascular congestion per CXR  Possible aspiration event  E  coli UTI with chronic indwelling nielsen  ESRD on MWF HD  Thrombocytopenia      Plan  Continue full mechanical ventilation support,  does not appear that he can be successfully weaned from the ventilator.  Family is in agreement with palliative withdrawal, however his son lives in [place] therefore they would like to wait until today or tomorrow.  Dialysis per Nephrology  We will hold heparin and monitor platelet count. Now 97.  continue enteral tube feeds  Patient is a DNR    DVT prophylaxis:  Subcu heparin will be held with thrombocytopenia.  GI prophylaxis:  Pepcid    33 minutes of critical care time was spent reviewing medical records, direct patient contact, coordinating treatment with nursing and respiratory therapy and/or discussing the case with family members       Darrius Daley MD  Pulmonary Critical Care Medicine  [facility]   DOS: 03/28/2024

## 2024-03-28 NOTE — DISCHARGE SUMMARY
[facility]  Pulmonology/Critical Care  Discharge Summary      Patient Name: Mirza Nelson Jr.  MRN: [id number]  Admission Date: 3/19/2024  Hospital Length of Stay: 9 days  Discharge Date and Time: Patient death pronounced at 345pm by Dr. Darrius Daley    Reason for admission: Acute confusional state    Primary (Principal), Secondary, Final Diagnoses:  1. ESRD (end stage renal disease) on dialysis    2. Fall    3. Noncompliance    4. Benign essential HTN    5. Chronic anemia    6. Suicidal ideation    7. Hyperkalemia    8. Acute confusional state    9. Malnutrition, unspecified type    10. Comfort measures only status      Procedures performed: * No surgery found *    Care, treatment & services provided:    Orders Placed This Encounter   Procedures    Critical Care    PACK PUP LINUS    Evolution Bed w Pulsate Mattress     CART    Feeding Pump     CART    Urine culture    Blood Culture    Blood Culture    X-Ray Chest 1 View    X-Ray Pelvis Routine AP    CT Head Without Contrast    CT Cervical Spine Without Contrast    CT Head Without Contrast    X-Ray Chest 1 View    X-Ray Chest 1 View    X-Ray Chest 1 View    X-Ray Abdomen Flat And Erect    X-Ray Chest 1 View    CBC auto differential    Comprehensive metabolic panel    Protime-INR    APTT    Urinalysis, Reflex to Urine Culture    Drug Screen, Urine    Ethanol    CBC with Differential    Magnesium    Phosphorus    Acetaminophen Level    Troponin I    Hepatitis B Surface Antigen    Basic Metabolic Panel    RT Blood Gas    Comprehensive Metabolic Panel    CBC Auto Differential    CBC with Differential    Manual Differential    RT Blood Gas    RT Blood Gas    MRSA PCR    Blood Gas (ABG)    CBC Auto Differential    Comprehensive Metabolic Panel    Magnesium    Phosphorus    Blood Gas (ABG)    CBC with Differential    Manual Differential    CBC with Differential    Manual Differential    CBC with Differential    Manual Differential    CBC with  Differential    RT Blood Gas    CBC with Differential    CBC with Differential    CBC with Differential    Cardiac Monitoring - Adult    Cheek to Gravity    Chlorhexidine Bath    Chlorohexidine Gluconate Bath    Skin assessment    Moisture Management    Sacral Protection    Elevate heels off of bed    Check Medical Devices for Pressure    Nursing communication    Wound care routine (specify)    Assess for signs of distress Moderate to severe use of accessory muscles, increased respiratory rate exceeding 25 breaths per minute, gasping, coughing, choking, increased agitation, pain, nausea, or other distressing symptoms    At the request of patient, a comfort focused treatment plan has been initiated.  The patient is dying and wishes to allow natural death.  Symptomatic management may include medication or other orders to ease pain, difficulty breathing, and anxiety.    Misc nursing order (specify)    Vital signs    Oral care    Oral suction    Discontinue restraints    Discontinue blood pressure cuff    Discontinue blood draws/lab work    Discontinue dialysis    Discontinue chest x-rays    Discontinue nasogastric tube    Discontinue SCDs    Discontinue nutritional support (tube fedding, TPN, PPN)    Discontinue pulse oximeter    DNR (Do Not Resuscitate)    Inpatient consult to Nephrology    Inpatient consult to Psychiatry    IP Wound Care consult Nurse    Inpatient consult to Pulmonology    Inpatient consult to Palliative Care    IP Wound Care consult Nurse    Inpatient consult to Palliative Care    Inpatient consult to Spiritual Care    Contact isolation status    Tube Feedings/Formulas 50; 1,000; Novasource Renal - Unflavored; OG; ProSource TF20; 1 time daily; 30; Every 4 hours    Inhalation Treatment Once    Oxygen Continuous    Pulse Oximetry Q4H    CARDIAC MONITORING STRIPS    CARDIAC MONITORING STRIPS    CARDIAC MONITORING STRIPS    CARDIAC MONITORING STRIPS    CARDIAC MONITORING STRIPS    CARDIAC MONITORING  "STRIPS    CARDIAC MONITORING STRIPS    CARDIAC MONITORING STRIPS    CARDIAC MONITORING STRIPS    CARDIAC MONITORING STRIPS    CARDIAC MONITORING STRIPS    CARDIAC MONITORING STRIPS    CARDIAC MONITORING STRIPS    CARDIAC MONITORING STRIPS    CARDIAC MONITORING STRIPS    CARDIAC MONITORING STRIPS    EKG 12-lead    Type & Screen    ABORH RETYPE    Prepare patient for dialysis    Hemodialysis inpatient If "per protocol" is selected for one or more ingredients (K+, Ca++, Na+, Bicarb) for the dialysate bath solution, select the hyperlink for the protocol instructions.    Prepare patient for dialysis    Hemodialysis inpatient If "per protocol" is selected for one or more ingredients (K+, Ca++, Na+, Bicarb) for the dialysate bath solution, select the hyperlink for the protocol instructions.    Admit to Inpatient    Transfer patient    Patient is no longer PEC/Psych Hold and/or CEC     Death note: Came to bedside to evaluate patient.  On exam patient has no pupillary response, oculocephalic reflex is not present.  No response to pain in all 4 extremities.  No heart sounds on auscultation.  No peripheral pulses felt.    Cause of Death:Aspiration pneumonia    Discharged Condition:     Disposition:  in medical facility      Darrius Daley MD  Pulmonology  "

## 2024-03-28 NOTE — PHYSICIAN QUERY
"PT Name: Mirza Nelson Jr.  MR #: [id number]    DOCUMENTATION CLARIFICATION     CDS/: BRENNEN Woodson, RN, CDS               Contact information: [email or web address]     This form is a permanent document in the medical record.     Query Date: March 28, 2024    By submitting this query, we are merely seeking further clarification of documentation.  Please utilize your independent clinical judgment when addressing the question(s) below.    The medical record contains the following:   Indicators  Supporting Clinical Findings Location in Medical Record   X Registered Dietician Diagnosis  Malnutrition Context: chronic illness   Malnutrition Level: moderate    RD, 3/25    Energy Intake      Weight Loss     X Fat Loss  Subcutaneous Fat (Malnutrition): mild depletion   Upper Arm Region (Subcutaneous Fat Loss): mild depletion    RD, 3/25   X Muscle Loss  Muscle Mass (Malnutrition): severe depletion   [place] Region (Muscle Loss): mild depletion  Clavicle Bone Region (Muscle Loss): severe depletion  Clavicle and Acromion Bone Region (Muscle Loss): severe depletion    RD, 3/25    Edema/Fluid Accumulation      Reduced  Strength (by dynamometer)     X Weight, BMI, Usual Body Weight Height: 5' 6" (167.6 cm), Height Method: Stated  Last Weight: 66.2 kg (146 lb)   Weight Method: Bed Scale  BMI (Calculated): 23.6    RD, 3/25    Delayed Wound Healing     X Acute or Chronic Illness Diagnosis:  Acute hypoxic and hypercapnic respiratory failure  Altered mental status  Pulmonary vascular congestion per CXR  Possible aspiration event  UTI with chronic indwelling folowy  ESRD on MWF HD  Hypoglycemia  Relevant Medical History: HD MWF, GERD, BPH, hyperlipidemia, MDD and anxiety     RD, 3/25    Social or Environmental Circumstances     X Treatment Tube feeding:  Novasource Renal goal rate 50 ml/hr and ProSource TF20 once daily to provide  2080 kcal/d, 109% needs  110 g protein/d, 100% needs  720 ml free water/d  (calculations based " on estimated 20 hr/d run time)      RD, 3/25      Other       Academy of Nutrition and Dietetics (Academy) and the American Society for Parenteral and Enteral Nutrition (A.S.P.E.N.) Clinical Characteristics to support Malnutrition      Criteria for mild malnutrition is defined as 1 characteristic outlined above within the established moderate or severe parameters.  A minimum of 2 out of the 6 characteristics noted above are recommended for a diagnosis of moderate or severe malnutrition.  Chronic illness/injury is a disease/condition lasting 3 months or longer.    The noted clinical guidelines are only system guidelines and do not replace the providers clinical judgment.    Provider, please specify the diagnosis associated with above clinical findings.      [  ] Moderate Malnutrition - a minimum of 2 of the 6 moderate malnutrition characteristics noted above      [  ] Malnutrition, Unspecified degree     [ x ] Other Nutritional Diagnosis (please specify): _______         Please document in your progress notes daily for the duration of treatment until resolved and  include in your discharge summary.      Reference:    NILESH Kolb, PhD, RD, Karie THOMPSON P., PhD, RN, REHANA Gamboa MD, PhD, Talia VASQUEZ A., MS, RD, [facility], FEMI Franklin, MS, RD, The Academy Malnutrition Work Group, The A.S.P.E.N. Board of Directors. (2012). Consensus Statement: Academy of Nutrition and Dietetics and American Society for Parenteral and Enteral Nutrition: Characteristics Recommended for the Identification and Documentation of Adult Malnutrition (Undernutrition). Journal of Parenteral and Enteral Nutrition, 36(3), 275-283. doi:10.1177/7581477676110486     Form No. 17389

## 2024-03-28 NOTE — PROGRESS NOTES
Advance Care Planning     Date: 03/28/2024    Today a voluntary meeting took place: other (conference room) notified patient's son Stephanie via telephone    Patient Participation: Patient is unable to participate     Attendees (Name and  Relationship to patient):  Patient's ramya Brown    Staff attendees (Name and  Role): Fátima RN-CHPN    ACP Conversation (General): Understanding of current condition Patient's son reports he is currently on his way to the hospital from [place] and should  be arriving in about 1/5 hours. Planned transition to comfort care and extubation. Informed when he arrives medical team will speak to him and discuss goals of care of the patient. Verbalized understanding     Code Status: DNR; status confirmed/order placed in chart    Goals of care: The family endorses that what is most important right now is to focus on symptom/pain control and comfort and QOL     Accordingly, we have decided that the best plan to meet the patient's goals includes pivot to comfort-focused care      Recommendations/  Follow-up tasks: The patient and health care agent were provided the following recommendations planned extubation and transitioning to comfort-focused care when son arrives at bedside.

## 2024-03-28 NOTE — PROGRESS NOTES
Inpatient Nutrition Assessment    Admit Date: 3/19/2024   Total duration of encounter: 9 days   Patient Age: 68 y.o.    Nutrition Recommendation/Prescription     Tube feeding:  Novasource Renal goal rate 50 ml/hr and ProSource TF20 once daily to provide  2080 kcal/d, 109% needs  110 g protein/d, 100% needs  720 ml free water/d  (calculations based on estimated 20 hr/d run time)     Communication of Recommendations: reviewed with nurse    Nutrition Assessment     Malnutrition Assessment/Nutrition-Focused Physical Exam    Malnutrition Context: chronic illness (03/21/24 1020)  Malnutrition Level: moderate (03/21/24 1020)  Energy Intake (Malnutrition):  (unable to determine) (03/21/24 1020)  Weight Loss (Malnutrition):  (unable to determine, no wt hx in EMR) (03/21/24 1020)  Subcutaneous Fat (Malnutrition): mild depletion (03/21/24 1020)     Upper Arm Region (Subcutaneous Fat Loss): mild depletion     Muscle Mass (Malnutrition): severe depletion (03/21/24 1020)  Nondenominational Region (Muscle Loss): mild depletion  Clavicle Bone Region (Muscle Loss): severe depletion  Clavicle and Acromion Bone Region (Muscle Loss): severe depletion                 Fluid Accumulation (Malnutrition):  (does not meet criteria) (03/21/24 1022)  Hand  Strength, Left (Malnutrition): unable to determine (03/21/24 1022)  Hand  Strength, Right (Malnutrition): unable to determine (03/21/24 1022)  A minimum of two characteristics is recommended for diagnosis of either severe or non-severe malnutrition.    Chart Review    Reason Seen: follow-up    Malnutrition Screening Tool Results   Have you recently lost weight without trying?: Yes: Unsure how much  Have you been eating poorly because of a decreased appetite?: No   MST Score: 2   Diagnosis:  Acute hypoxic and hypercapnic respiratory failure  Altered mental status  Pulmonary vascular congestion per CXR  Possible aspiration event  UTI with chronic indwelling folowy  ESRD on MWF  HD  Hypoglycemia    Relevant Medical History: HD MWF, GERD, BPH, hyperlipidemia, MDD and anxiety     Scheduled Medications:  doxycycline IV (PEDS and ADULTS), 100 mg, Q12H  famotidine (PF), 20 mg, Daily  insulin regular, 0.1 Units/kg, Once  white petrolatum, , BID  zinc oxide-cod liver oil, , BID    Continuous Infusions:  dexmedeTOMIDine (Precedex) infusion (titrating), Last Rate: 0.8 mcg/kg/hr (03/28/24 0706)  propofoL, Last Rate: Stopped (03/26/24 1656)    PRN Medications: acetaminophen, acetaminophen, [COMPLETED] calcium gluconate IVPB **AND** calcium gluconate IVPB, dextrose 10%, glycopyrrolate, HYDROmorphone, lorazepam, morphine, morphine, ondansetron    Calorie Containing IV Medications: no significant kcals from medications at this time    Recent Labs   Lab 03/22/24  0214 03/22/24  0427 03/22/24  0427 03/23/24  0151 03/24/24  0235 03/24/24  0236 03/25/24  0143 03/25/24  0144 03/26/24  0136 03/27/24  0157 03/28/24  0245     --   --  135*  --  136 136  --  133* 134* 134*   K 5.2*  --   --  4.4  --  4.8 5.3*  --  4.6 4.7 4.1   CALCIUM 8.8  --   --  9.1  --  8.2* 8.2*  --  8.5* 8.5* 8.0*   PHOS 5.1*  --   --  3.3  --  4.7 6.4*  --  3.8 5.1* 4.8*   MG 1.80  --   --  1.90  --  2.10 2.30  --  2.00 2.10 2.00   CHLORIDE 100  --   --  101  --  101 100  --  103 103 103   CO2 21*  --   --  21*  --  20* 20*  --  19* 18* 19*   BUN 41.5*  --   --  34.2*  --  60.4* 80.9*  --  44.8* 65.2* 55.7*   CREATININE 7.45*  --   --  4.86*  --  6.40* 7.73*  --  4.70* 6.17* 5.36*   EGFRNORACEVR 7  --   --  12  --  9 7  --  13 9 11   GLUCOSE 32*  --   --  103  --  110 107  --  109 96 148*   BILITOT 1.2  --   --  1.1  --  0.8 0.5  --  0.4 0.4 0.5   ALKPHOS 41  --   --  43  --  42 43  --  46 50 51   ALT 10  --   --  10  --  8 8  --  9 7 8   AST 18  --   --  17  --  13 12  --  22 13 11   ALBUMIN 1.9*  --   --  1.8*  --  1.7* 1.6*  --  1.6* 1.6* 1.7*   WBC  --  8.29  8.29   < > 7.92  7.92 6.86  6.86  --   --  7.72 10.14 11.16 13.59*  "  HGB  --  8.4*  --  9.2* 9.1*  --   --  8.2* 9.6* 9.0* 9.7*   HCT  --  26.9*  --  29.6* 29.7*  --   --  26.7* 31.1* 28.8* 31.2*    < > = values in this interval not displayed.       Nutrition Orders:  No diet orders on file  Tube Feedings/Formulas 50; 1,000; Novasource Renal - Unflavored; OG; ProSource TF20; 1 time daily; 30; Every 4 hours     Appetite/Oral Intake: NPO/not applicable  Factors Affecting Nutritional Intake: on mechanical ventilation  Food/Alevism/Cultural Preferences: none reported  Food Allergies: none reported  Last Bowel Movement: 03/27/24  Wound(s):     Altered Skin Integrity 03/19/24 1804 midline Sacral spine #1  Partial thickness tissue loss. Shallow open ulcer with a red or pink wound bed, without slough. Intact or Open/Ruptured Serum-filled blister.-Tissue loss description: Partial thickness     Comments    3/21/24 Poor intake since admit. Currently on BiPAP, report of possible aspiration in dialysis post-emesis. Muscle and fat loss observed. Unable to obtain any subjective history on weight or appetite prior to admit. Will leave tube feeding recommendations above if aggressive nutrition therapy warranted.     3/22/24 Patient transferred to ICU and intubated, plans to start tube feeding, will provide orders with Novasource Renal given electrolytes abnormalities.    3/25/24 Patient remains on ventilator, tube feeding at 10 ml/hr (goal 50 ml/hr). Nurse reports patient vomited a couple of days ago so tube feeding was turned down, reports will increase to 25 ml/hr now and likely advance as tolerated.    3/28/24  Patient remains intubated. Spoke with RN, plans for withdrawal from care.    Anthropometrics    Height: 5' 6" (167.6 cm), Height Method: Stated  Last Weight: 66.2 kg (146 lb) (03/22/24 1021), Weight Method: Bed Scale  BMI (Calculated): 23.6  BMI Classification: overweight (BMI 25-29.9)        Ideal Body Weight (IBW), Male: 142 lb     % Ideal Body Weight, Male (lb): 112.68 %            "               Usual Weight Provided By: unable to obtain usual weight    Wt Readings from Last 5 Encounters:   24 66.2 kg (146 lb)     Weight Change(s) Since Admission:   (3/22) admission weight (72.6 kg) documented as 'stated', bed weight (66.2 kg) taken during rounds - appears accurate but will continue to monitor for trends  Wt Readings from Last 1 Encounters:   24 1021 66.2 kg (146 lb)   24 2200 72.6 kg (160 lb)   24 1039 72.6 kg (160 lb)   Admit Weight: 72.6 kg (160 lb) (24 1039), Weight Method: Stated    Estimated Needs    Weight Used For Calorie Calculations: 66.2 kg (146 lb)  Energy Calorie Requirements (kcal): 1,911.31  Energy Need Method: [place]  Weight Used For Protein Calculations: 66.2 kg (146 lb)  Protein Requirements: 100-120 g, 1.5-1.8 g/kg  Fluid Requirements (mL): 1000 + urine output (dialysis)  Total Ve: 9.8 L/m; Temp (24hrs), Av.6 °F (37 °C), Min:98 °F (36.7 °C), Max:99 °F (37.2 °C)  Vtot (L/Min) for Dev State Equation Calculation: 13.4; Max Temp for Dev State Equation Calculation: 100.8 °F  Last Updated: 3/22     Enteral Nutrition     Formula: Novasource Renal  Rate/Volume: 10 ml/hr  Water Flushes: 15 ml every 2 hours  Additives/Modulars: none at this time  Route: orogastric tube  Method: continuous  Total Nutrition Provided by Tube Feeding, Additives, and Flushes:  Calories Provided  400 kcal/d, 21% needs   Protein Provided  18 g/d, 18% needs   Fluid Provided  294 ml/d   Continuous feeding calculations based on estimated 20 hr/d run time unless otherwise stated.    Parenteral Nutrition Patient not receiving parenteral nutrition support at this time.    Evaluation of Received Nutrient Intake    Calories: not meeting estimated needs  Protein: not meeting estimated needs    Patient Education Not applicable.    Nutrition Diagnosis     PES: Inadequate oral intake related to inability to consume sufficient nutrients as evidenced by less than 80% needs met.  (active)     PES: Moderate starvation related malnutrition related to chronic illness as evidenced by mild fat depletion and severe muscle depletion. (active)    Nutrition Interventions     Intervention(s): modified rate of enteral nutrition, commercial food, and collaboration with other providers    Goal: Meet greater than 80% of nutritional needs by follow-up. (goal not met)  Goal: Maintain weight throughout hospitalization. (goal progressing)    Nutrition Goals & Monitoring     Dietitian will monitor: energy intake, enteral nutrition intake, weight, weight change, electrolyte/renal panel, glucose/endocrine profile, and gastrointestinal profile    Nutrition Risk/Follow-Up: high (follow-up in 1-4 days)   Please consult if re-assessment needed sooner.

## 2024-03-28 NOTE — NURSING
Nurses Note -- 4 Eyes      3/28/2024   4:36 AM      Skin assessed during: Daily Assessment      [] No Altered Skin Integrity Present    [x]Prevention Measures Documented      [x] Yes- Altered Skin Integrity Present or Discovered   [] LDA Added if Not in Epic (Describe Wound)   [] New Altered Skin Integrity was Present on Admit and Documented in LDA   [] Wound Image Taken    Wound Care Consulted? Yes    Attending Nurse:  Nely Gamble RN/Staff Member:   Magda Milner RN

## 2024-03-28 NOTE — PHYSICIAN QUERY
PT Name: Mirza Nelson Jr.  MR #: [id number]     DOCUMENTATION CLARIFICATION     CDS/: BRENNEN Woodson, RN, CDS               Contact information:[email or web address]   This form is a permanent document in the medical record.     Query Date: March 28, 2024    By submitting this query, we are merely seeking further clarification of documentation.  Please utilize your independent clinical judgment when addressing the question(s) below.  Provider, please provide the integumentary diagnosis related to the documentation of Sacral Spine:   The Medical Record contains the following:    Wound care RN, 3/20  Altered Skin Integrity 03/19/24 1808 midline Sacral spine #1 Partial thickness tissue loss. Shallow open ulcer with a red or pink wound bed, without slough. Intact or Open/Ruptured Serum-filled blister.   Cleansed with:;Sterile normal saline     Wound care consulted for sacrum. One on One with sitter at bedside.  Treatment recommendations Sacrum: Cleanse with NS. Apply sacral foam dressing. Change daily and monitor.           Wound care RN, 3/26    Altered skin integrity: Sacral Spine  Description: Partial thickness tissue loss. Shallow open ulcer with a red or pink wound bed, without slough. Intact or Open/Ruptured Serum-filled blister.     WOCN consulted for sacrum. No family at bedside. Treatment recommendations put into place. Sacrum: Cleanse with NS. Apply Desitin, cover with ABD pad, secure with medipore tape. Change BID and PRN. Keep areas clean and dry, no adult briefs while in bed. Nursing to continue with turning every two hours, wedge, and floating heels.              The clinical guidelines noted are only a system guideline. It does not replace the providers clinical judgment.    Per the National Pressure Injury Advisory Panel:   A pressure injury is localized damage to the skin and underlying soft tissue usually over a bony prominence or related to a medical or other device. The injury can present  as intact skin or an open ulcer and may be painful. The injury occurs as a result of intense and/or prolonged pressure or pressure in combination with shear. The tolerance of soft tissue for pressure and shear may also be affected by microclimate, nutrition, perfusion, co-morbidities and condition of the soft tissue.       Stage 1 Pressure Injury:  Intact skin with a localized area of non-blanchable erythema, which may appear differently in darkly pigmented skin. Color changes do not include purple or maroon discoloration; these may indicate deep tissue pressure injury.    Stage 2 Pressure Injury:  Partial-thickness loss of skin with exposed dermis. The wound bed is viable, pink or red, moist, and may also present as an intact or ruptured serum-filled blister.    Stage 3 Pressure Injury:  Full-thickness loss of skin, in which adipose (fat) is visible in the ulcer and granulation tissue and epibole (rolled wound edges) are often present. Slough and/or eschar may be visible. Undermining and tunneling may occur.    Stage 4 Pressure Injury:  Full-thickness skin and tissue loss with exposed or directly palpable fascia, muscle, tendon, ligament, cartilage or bone in the ulcer. Slough and/or eschar may be visible. Epibole (rolled edges), undermining and/or tunneling often occur.    Unstageable Pressure Injury:  Full-thickness skin and tissue loss in which the extent of tissue damage within the ulcer cannot be confirmed because it is obscured by slough or eschar. If slough or eschar is removed, a Stage 3 or Stage 4 pressure injury will be revealed.        Provider, please provide the integumentary diagnosis related to the documentation of Sacral Spine:     [   ] Pressure Injury/Decubitus Ulcer, Stage 2     [   ] Moisture associated dermatitis due to Fecal, Urinary, or Dual Incontinence     [ x  ] Other Integumentary Diagnosis (please specify):______________         Please document in your progress notes daily for the  duration of treatment until resolved and include in your discharge summary.    Reference:    THIERNO Dinh., NILESH Bustillos., Goldberg, M., NIKKI Beckwith., THIERNO Salmeron, & JAMIR Yip. (2016). Revised National Pressure Ulcer Advisory Panel Pressure Injury Staging System: Revised Pressure Injury Staging System. J Wound Ostomy Continence Nurs, 43(6), 585-597. doi:10.1097/[id number]    Form No.[id number]

## 2025-03-03 NOTE — PLAN OF CARE
Patient lives alone in a single story home. Patient's sister lives 2 blocks away and can assist if needed. DME in home: shower chair, rollator. Patient goes to HD MWF at [facility]-uses [employer] for transportation. List of SNF facilities provided to patient. Patient would like to review and will get choice in AM. PCP: Dr. Flores       03/04/24 8076   Discharge Assessment   Assessment Type Discharge Planning Assessment   Confirmed/corrected address, phone number and insurance Yes   Confirmed Demographics Correct on Facesheet   Source of Information patient   Reason For Admission Pt. Reports a slip and fall, denies head strike. Pt. C/o generalized weakness since having cardiac stents placed x 2 days ago.   People in Home alone   Do you expect to return to your current living situation? Yes   Do you have help at home or someone to help you manage your care at home? Yes   Prior to hospitilization cognitive status: Alert/Oriented   Current cognitive status: Alert/Oriented   Home Accessibility wheelchair accessible   Home Layout Able to live on 1st floor   Equipment Currently Used at Home rollator;shower chair   Readmission within 30 days? No   Patient currently being followed by outpatient case management? No   Do you currently have service(s) that help you manage your care at home? No   Do you take prescription medications? Yes   Do you have prescription coverage? Yes   Coverage [employer] Medicare/Medicaid   Do you have any problems affording any of your prescribed medications? No   Is the patient taking medications as prescribed? yes   How do you get to doctors appointments? health plan transportation   Are you on dialysis? Yes   Dialysis Name and Scheduled days [facility]   Do you take coumadin? No   Discharge Plan A Skilled Nursing Facility   Discharge Plan B Home Health   DME Needed Upon Discharge  other (see comments)  (TBD)   Discharge Plan discussed with: Patient   Transition of Care Barriers None        Patient notified via [employer].

## (undated) DEVICE — FORCEP ALLIGATOR 2.8MM W/NDL

## (undated) DEVICE — SOL IRRI STRL WATER 1000ML

## (undated) DEVICE — BAG LABGUARD BIOHAZARD 6X9IN

## (undated) DEVICE — COLLECTION SPECIMEN NEPTUNE

## (undated) DEVICE — TIP SUCTION YANKAUER

## (undated) DEVICE — FORCEP BX LG CAP 2.8MMX240CM

## (undated) DEVICE — KIT SURGICAL COLON .25 1.1OZ

## (undated) DEVICE — TUBING O2 FEMALE CONN 13FT

## (undated) DEVICE — KIT CANIST SUCTION 1200CC

## (undated) DEVICE — CONTAINER SPECIMEN SCREW 4OZ